# Patient Record
Sex: FEMALE | Race: WHITE | NOT HISPANIC OR LATINO | Employment: OTHER | URBAN - METROPOLITAN AREA
[De-identification: names, ages, dates, MRNs, and addresses within clinical notes are randomized per-mention and may not be internally consistent; named-entity substitution may affect disease eponyms.]

---

## 2017-01-10 ENCOUNTER — ALLSCRIPTS OFFICE VISIT (OUTPATIENT)
Dept: OTHER | Facility: OTHER | Age: 72
End: 2017-01-10

## 2017-01-31 ENCOUNTER — ALLSCRIPTS OFFICE VISIT (OUTPATIENT)
Dept: OTHER | Facility: OTHER | Age: 72
End: 2017-01-31

## 2017-02-07 ENCOUNTER — GENERIC CONVERSION - ENCOUNTER (OUTPATIENT)
Dept: OTHER | Facility: OTHER | Age: 72
End: 2017-02-07

## 2017-02-24 DIAGNOSIS — E55.9 VITAMIN D DEFICIENCY: ICD-10-CM

## 2017-02-24 DIAGNOSIS — Z12.31 ENCOUNTER FOR SCREENING MAMMOGRAM FOR MALIGNANT NEOPLASM OF BREAST: ICD-10-CM

## 2017-02-24 DIAGNOSIS — I10 ESSENTIAL (PRIMARY) HYPERTENSION: ICD-10-CM

## 2017-02-24 DIAGNOSIS — Z12.11 ENCOUNTER FOR SCREENING FOR MALIGNANT NEOPLASM OF COLON: ICD-10-CM

## 2017-02-27 ENCOUNTER — ALLSCRIPTS OFFICE VISIT (OUTPATIENT)
Dept: OTHER | Facility: OTHER | Age: 72
End: 2017-02-27

## 2017-03-08 ENCOUNTER — ALLSCRIPTS OFFICE VISIT (OUTPATIENT)
Dept: OTHER | Facility: OTHER | Age: 72
End: 2017-03-08

## 2017-03-15 ENCOUNTER — GENERIC CONVERSION - ENCOUNTER (OUTPATIENT)
Dept: OTHER | Facility: OTHER | Age: 72
End: 2017-03-15

## 2017-04-03 ENCOUNTER — ALLSCRIPTS OFFICE VISIT (OUTPATIENT)
Dept: OTHER | Facility: OTHER | Age: 72
End: 2017-04-03

## 2017-05-03 ENCOUNTER — GENERIC CONVERSION - ENCOUNTER (OUTPATIENT)
Dept: OTHER | Facility: OTHER | Age: 72
End: 2017-05-03

## 2017-05-23 ENCOUNTER — GENERIC CONVERSION - ENCOUNTER (OUTPATIENT)
Dept: OTHER | Facility: OTHER | Age: 72
End: 2017-05-23

## 2017-06-26 ENCOUNTER — GENERIC CONVERSION - ENCOUNTER (OUTPATIENT)
Dept: OTHER | Facility: OTHER | Age: 72
End: 2017-06-26

## 2017-06-29 ENCOUNTER — ALLSCRIPTS OFFICE VISIT (OUTPATIENT)
Dept: OTHER | Facility: OTHER | Age: 72
End: 2017-06-29

## 2017-08-14 ENCOUNTER — ALLSCRIPTS OFFICE VISIT (OUTPATIENT)
Dept: OTHER | Facility: OTHER | Age: 72
End: 2017-08-14

## 2017-09-25 ENCOUNTER — GENERIC CONVERSION - ENCOUNTER (OUTPATIENT)
Dept: OTHER | Facility: OTHER | Age: 72
End: 2017-09-25

## 2017-09-25 DIAGNOSIS — I71.9 AORTIC ANEURYSM WITHOUT RUPTURE (HCC): ICD-10-CM

## 2017-09-26 ENCOUNTER — GENERIC CONVERSION - ENCOUNTER (OUTPATIENT)
Dept: OTHER | Facility: OTHER | Age: 72
End: 2017-09-26

## 2017-09-28 ENCOUNTER — GENERIC CONVERSION - ENCOUNTER (OUTPATIENT)
Dept: OTHER | Facility: OTHER | Age: 72
End: 2017-09-28

## 2017-09-29 ENCOUNTER — GENERIC CONVERSION - ENCOUNTER (OUTPATIENT)
Dept: OTHER | Facility: OTHER | Age: 72
End: 2017-09-29

## 2017-10-04 ENCOUNTER — GENERIC CONVERSION - ENCOUNTER (OUTPATIENT)
Dept: OTHER | Facility: OTHER | Age: 72
End: 2017-10-04

## 2017-10-05 ENCOUNTER — GENERIC CONVERSION - ENCOUNTER (OUTPATIENT)
Dept: OTHER | Facility: OTHER | Age: 72
End: 2017-10-05

## 2017-10-06 ENCOUNTER — GENERIC CONVERSION - ENCOUNTER (OUTPATIENT)
Dept: OTHER | Facility: OTHER | Age: 72
End: 2017-10-06

## 2017-10-10 ENCOUNTER — GENERIC CONVERSION - ENCOUNTER (OUTPATIENT)
Dept: OTHER | Facility: OTHER | Age: 72
End: 2017-10-10

## 2017-10-11 ENCOUNTER — HOSPITAL ENCOUNTER (INPATIENT)
Facility: HOSPITAL | Age: 72
LOS: 2 days | Discharge: HOME | DRG: 309 | End: 2017-10-13
Attending: EMERGENCY MEDICINE | Admitting: FAMILY MEDICINE
Payer: MEDICARE

## 2017-10-11 ENCOUNTER — GENERIC CONVERSION - ENCOUNTER (OUTPATIENT)
Dept: OTHER | Facility: OTHER | Age: 72
End: 2017-10-11

## 2017-10-11 ENCOUNTER — APPOINTMENT (EMERGENCY)
Dept: RADIOLOGY | Facility: HOSPITAL | Age: 72
DRG: 309 | End: 2017-10-11
Payer: MEDICARE

## 2017-10-11 DIAGNOSIS — F43.21 GRIEF REACTION: ICD-10-CM

## 2017-10-11 DIAGNOSIS — F41.9 ANXIETY: ICD-10-CM

## 2017-10-11 DIAGNOSIS — F43.29 GRIEF REACTION WITH PROLONGED BEREAVEMENT: ICD-10-CM

## 2017-10-11 DIAGNOSIS — F32.A DEPRESSION: ICD-10-CM

## 2017-10-11 DIAGNOSIS — I48.91 NEW ONSET ATRIAL FIBRILLATION (HCC): Primary | ICD-10-CM

## 2017-10-11 PROBLEM — R42 DIZZINESS: Status: ACTIVE | Noted: 2017-10-11

## 2017-10-11 PROBLEM — F43.81 GRIEF REACTION WITH PROLONGED BEREAVEMENT: Status: ACTIVE | Noted: 2017-10-11

## 2017-10-11 PROBLEM — N17.9 AKI (ACUTE KIDNEY INJURY) (HCC): Status: ACTIVE | Noted: 2017-10-11

## 2017-10-11 PROBLEM — I10 HTN (HYPERTENSION): Status: ACTIVE | Noted: 2017-10-11

## 2017-10-11 PROBLEM — G89.29 CHRONIC PAIN: Status: ACTIVE | Noted: 2017-10-11

## 2017-10-11 PROBLEM — M81.0 OSTEOPOROSIS: Status: ACTIVE | Noted: 2017-10-11

## 2017-10-11 LAB
ALBUMIN SERPL BCP-MCNC: 3.2 G/DL (ref 3.5–5)
ALP SERPL-CCNC: 108 U/L (ref 46–116)
ALT SERPL W P-5'-P-CCNC: 23 U/L (ref 12–78)
ANION GAP SERPL CALCULATED.3IONS-SCNC: 9 MMOL/L (ref 4–13)
APTT PPP: 30 SECONDS (ref 23–35)
AST SERPL W P-5'-P-CCNC: 19 U/L (ref 5–45)
BASOPHILS # BLD AUTO: 0.04 THOUSANDS/ΜL (ref 0–0.1)
BASOPHILS NFR BLD AUTO: 0 % (ref 0–1)
BILIRUB SERPL-MCNC: 0.54 MG/DL (ref 0.2–1)
BUN SERPL-MCNC: 22 MG/DL (ref 5–25)
CALCIUM SERPL-MCNC: 8.5 MG/DL (ref 8.3–10.1)
CHLORIDE SERPL-SCNC: 110 MMOL/L (ref 100–108)
CO2 SERPL-SCNC: 25 MMOL/L (ref 21–32)
CREAT SERPL-MCNC: 1.98 MG/DL (ref 0.6–1.3)
EOSINOPHIL # BLD AUTO: 0.09 THOUSAND/ΜL (ref 0–0.61)
EOSINOPHIL NFR BLD AUTO: 1 % (ref 0–6)
ERYTHROCYTE [DISTWIDTH] IN BLOOD BY AUTOMATED COUNT: 14.4 % (ref 11.6–15.1)
GFR SERPL CREATININE-BSD FRML MDRD: 25 ML/MIN/1.73SQ M
GLUCOSE SERPL-MCNC: 77 MG/DL (ref 65–140)
HCT VFR BLD AUTO: 48.4 % (ref 34.8–46.1)
HGB BLD-MCNC: 16 G/DL (ref 11.5–15.4)
INR PPP: 1.08 (ref 0.86–1.16)
LYMPHOCYTES # BLD AUTO: 1.85 THOUSANDS/ΜL (ref 0.6–4.47)
LYMPHOCYTES NFR BLD AUTO: 19 % (ref 14–44)
MAGNESIUM SERPL-MCNC: 1.8 MG/DL (ref 1.6–2.6)
MCH RBC QN AUTO: 30.8 PG (ref 26.8–34.3)
MCHC RBC AUTO-ENTMCNC: 33.1 G/DL (ref 31.4–37.4)
MCV RBC AUTO: 93 FL (ref 82–98)
MONOCYTES # BLD AUTO: 0.91 THOUSAND/ΜL (ref 0.17–1.22)
MONOCYTES NFR BLD AUTO: 10 % (ref 4–12)
NEUTROPHILS # BLD AUTO: 6.6 THOUSANDS/ΜL (ref 1.85–7.62)
NEUTS SEG NFR BLD AUTO: 70 % (ref 43–75)
NRBC BLD AUTO-RTO: 0 /100 WBCS
PHOSPHATE SERPL-MCNC: 3.7 MG/DL (ref 2.3–4.1)
PLATELET # BLD AUTO: 282 THOUSANDS/UL (ref 149–390)
PMV BLD AUTO: 11.1 FL (ref 8.9–12.7)
POTASSIUM SERPL-SCNC: 3.9 MMOL/L (ref 3.5–5.3)
PROT SERPL-MCNC: 7.2 G/DL (ref 6.4–8.2)
PROTHROMBIN TIME: 14 SECONDS (ref 12.1–14.4)
RBC # BLD AUTO: 5.2 MILLION/UL (ref 3.81–5.12)
SODIUM SERPL-SCNC: 144 MMOL/L (ref 136–145)
SPECIMEN SOURCE: NORMAL
T4 FREE SERPL-MCNC: 1.17 NG/DL (ref 0.76–1.46)
TROPONIN I BLD-MCNC: 0.01 NG/ML (ref 0–0.08)
TSH SERPL DL<=0.05 MIU/L-ACNC: 3.92 UIU/ML (ref 0.36–3.74)
WBC # BLD AUTO: 9.54 THOUSAND/UL (ref 4.31–10.16)

## 2017-10-11 PROCEDURE — 80053 COMPREHEN METABOLIC PANEL: CPT | Performed by: EMERGENCY MEDICINE

## 2017-10-11 PROCEDURE — 71010 HB CHEST X-RAY 1 VIEW FRONTAL (PORTABLE): CPT

## 2017-10-11 PROCEDURE — 96372 THER/PROPH/DIAG INJ SC/IM: CPT

## 2017-10-11 PROCEDURE — 96374 THER/PROPH/DIAG INJ IV PUSH: CPT

## 2017-10-11 PROCEDURE — 36415 COLL VENOUS BLD VENIPUNCTURE: CPT | Performed by: EMERGENCY MEDICINE

## 2017-10-11 PROCEDURE — 96361 HYDRATE IV INFUSION ADD-ON: CPT

## 2017-10-11 PROCEDURE — 84443 ASSAY THYROID STIM HORMONE: CPT | Performed by: EMERGENCY MEDICINE

## 2017-10-11 PROCEDURE — 85610 PROTHROMBIN TIME: CPT | Performed by: EMERGENCY MEDICINE

## 2017-10-11 PROCEDURE — 84439 ASSAY OF FREE THYROXINE: CPT | Performed by: EMERGENCY MEDICINE

## 2017-10-11 PROCEDURE — 85025 COMPLETE CBC W/AUTO DIFF WBC: CPT | Performed by: EMERGENCY MEDICINE

## 2017-10-11 PROCEDURE — 83735 ASSAY OF MAGNESIUM: CPT | Performed by: EMERGENCY MEDICINE

## 2017-10-11 PROCEDURE — 93005 ELECTROCARDIOGRAM TRACING: CPT

## 2017-10-11 PROCEDURE — 99285 EMERGENCY DEPT VISIT HI MDM: CPT

## 2017-10-11 PROCEDURE — 84484 ASSAY OF TROPONIN QUANT: CPT

## 2017-10-11 PROCEDURE — 93005 ELECTROCARDIOGRAM TRACING: CPT | Performed by: EMERGENCY MEDICINE

## 2017-10-11 PROCEDURE — 85730 THROMBOPLASTIN TIME PARTIAL: CPT | Performed by: EMERGENCY MEDICINE

## 2017-10-11 PROCEDURE — 84100 ASSAY OF PHOSPHORUS: CPT | Performed by: EMERGENCY MEDICINE

## 2017-10-11 RX ORDER — ETODOLAC 400 MG/1
400 TABLET, EXTENDED RELEASE ORAL 2 TIMES DAILY
COMMUNITY
End: 2017-10-13 | Stop reason: HOSPADM

## 2017-10-11 RX ORDER — BACLOFEN 10 MG/1
10 TABLET ORAL 2 TIMES DAILY PRN
COMMUNITY
End: 2019-05-08 | Stop reason: CLARIF

## 2017-10-11 RX ORDER — METOPROLOL SUCCINATE 25 MG/1
25 TABLET, EXTENDED RELEASE ORAL DAILY
Status: DISCONTINUED | OUTPATIENT
Start: 2017-10-12 | End: 2017-10-12

## 2017-10-11 RX ORDER — DULOXETIN HYDROCHLORIDE 60 MG/1
60 CAPSULE, DELAYED RELEASE ORAL
Status: DISCONTINUED | OUTPATIENT
Start: 2017-10-11 | End: 2017-10-11

## 2017-10-11 RX ORDER — AMLODIPINE BESYLATE 5 MG/1
5 TABLET ORAL DAILY
COMMUNITY
End: 2017-10-13 | Stop reason: HOSPADM

## 2017-10-11 RX ORDER — GABAPENTIN 300 MG/1
300 CAPSULE ORAL 3 TIMES DAILY
Status: DISCONTINUED | OUTPATIENT
Start: 2017-10-11 | End: 2017-10-13 | Stop reason: HOSPADM

## 2017-10-11 RX ORDER — AMLODIPINE BESYLATE 5 MG/1
5 TABLET ORAL DAILY
Status: DISCONTINUED | OUTPATIENT
Start: 2017-10-12 | End: 2017-10-12

## 2017-10-11 RX ORDER — ALENDRONATE SODIUM 70 MG/1
70 TABLET ORAL
COMMUNITY
End: 2018-02-16

## 2017-10-11 RX ORDER — OXYCODONE AND ACETAMINOPHEN 10; 325 MG/1; MG/1
1 TABLET ORAL DAILY PRN
COMMUNITY
End: 2018-09-27 | Stop reason: SDUPTHER

## 2017-10-11 RX ORDER — DILTIAZEM HYDROCHLORIDE 5 MG/ML
15 INJECTION INTRAVENOUS ONCE
Status: COMPLETED | OUTPATIENT
Start: 2017-10-11 | End: 2017-10-11

## 2017-10-11 RX ORDER — METOPROLOL SUCCINATE 50 MG/1
25 TABLET, EXTENDED RELEASE ORAL DAILY
Status: ON HOLD | COMMUNITY
End: 2017-10-13

## 2017-10-11 RX ORDER — ASPIRIN 81 MG/1
81 TABLET, CHEWABLE ORAL DAILY
COMMUNITY
End: 2017-10-13 | Stop reason: HOSPADM

## 2017-10-11 RX ORDER — SODIUM CHLORIDE 9 MG/ML
100 INJECTION, SOLUTION INTRAVENOUS CONTINUOUS
Status: DISCONTINUED | OUTPATIENT
Start: 2017-10-11 | End: 2017-10-11

## 2017-10-11 RX ORDER — ZOLPIDEM TARTRATE 5 MG/1
10 TABLET ORAL
Status: DISCONTINUED | OUTPATIENT
Start: 2017-10-11 | End: 2017-10-13 | Stop reason: HOSPADM

## 2017-10-11 RX ORDER — OXYCODONE HYDROCHLORIDE AND ACETAMINOPHEN 5; 325 MG/1; MG/1
1 TABLET ORAL ONCE
Status: COMPLETED | OUTPATIENT
Start: 2017-10-11 | End: 2017-10-11

## 2017-10-11 RX ORDER — ONDANSETRON 2 MG/ML
4 INJECTION INTRAMUSCULAR; INTRAVENOUS EVERY 6 HOURS PRN
Status: DISCONTINUED | OUTPATIENT
Start: 2017-10-11 | End: 2017-10-13 | Stop reason: HOSPADM

## 2017-10-11 RX ORDER — CALCIUM CARBONATE 200(500)MG
1000 TABLET,CHEWABLE ORAL DAILY PRN
Status: DISCONTINUED | OUTPATIENT
Start: 2017-10-11 | End: 2017-10-13 | Stop reason: HOSPADM

## 2017-10-11 RX ORDER — ACETAMINOPHEN 325 MG/1
650 TABLET ORAL EVERY 6 HOURS PRN
Status: DISCONTINUED | OUTPATIENT
Start: 2017-10-11 | End: 2017-10-13 | Stop reason: HOSPADM

## 2017-10-11 RX ORDER — DOCUSATE SODIUM 100 MG/1
100 CAPSULE, LIQUID FILLED ORAL 2 TIMES DAILY
Status: DISCONTINUED | OUTPATIENT
Start: 2017-10-11 | End: 2017-10-13 | Stop reason: HOSPADM

## 2017-10-11 RX ORDER — ASPIRIN 81 MG/1
81 TABLET, CHEWABLE ORAL DAILY
Status: DISCONTINUED | OUTPATIENT
Start: 2017-10-12 | End: 2017-10-13

## 2017-10-11 RX ORDER — BUPROPION HYDROCHLORIDE 150 MG/1
150 TABLET ORAL DAILY
Status: DISCONTINUED | OUTPATIENT
Start: 2017-10-12 | End: 2017-10-13 | Stop reason: HOSPADM

## 2017-10-11 RX ORDER — POTASSIUM CHLORIDE AND SODIUM CHLORIDE 900; 300 MG/100ML; MG/100ML
50 INJECTION, SOLUTION INTRAVENOUS CONTINUOUS
Status: DISCONTINUED | OUTPATIENT
Start: 2017-10-11 | End: 2017-10-12

## 2017-10-11 RX ORDER — OMEGA-3S/DHA/EPA/FISH OIL/D3 300MG-1000
800 CAPSULE ORAL DAILY
Status: DISCONTINUED | OUTPATIENT
Start: 2017-10-12 | End: 2017-10-13 | Stop reason: HOSPADM

## 2017-10-11 RX ORDER — OXYCODONE HYDROCHLORIDE AND ACETAMINOPHEN 5; 325 MG/1; MG/1
1 TABLET ORAL EVERY 6 HOURS PRN
Status: DISCONTINUED | OUTPATIENT
Start: 2017-10-11 | End: 2017-10-13 | Stop reason: HOSPADM

## 2017-10-11 RX ORDER — DULOXETIN HYDROCHLORIDE 60 MG/1
20 CAPSULE, DELAYED RELEASE ORAL DAILY
COMMUNITY
End: 2017-10-13 | Stop reason: HOSPADM

## 2017-10-11 RX ORDER — BUPROPION HYDROCHLORIDE 150 MG/1
150 TABLET ORAL DAILY
COMMUNITY
End: 2018-02-19 | Stop reason: SDUPTHER

## 2017-10-11 RX ADMIN — OXYCODONE HYDROCHLORIDE AND ACETAMINOPHEN 1 TABLET: 5; 325 TABLET ORAL at 17:02

## 2017-10-11 RX ADMIN — SODIUM CHLORIDE 1000 ML: 0.9 INJECTION, SOLUTION INTRAVENOUS at 16:03

## 2017-10-11 RX ADMIN — DILTIAZEM HYDROCHLORIDE 15 MG: 5 INJECTION INTRAVENOUS at 16:04

## 2017-10-11 RX ADMIN — DULOXETINE 60 MG: 60 CAPSULE, DELAYED RELEASE ORAL at 23:28

## 2017-10-11 RX ADMIN — SODIUM CHLORIDE AND POTASSIUM CHLORIDE 75 ML/HR: 9; 2.98 INJECTION, SOLUTION INTRAVENOUS at 23:36

## 2017-10-11 RX ADMIN — GABAPENTIN 300 MG: 300 CAPSULE ORAL at 20:45

## 2017-10-11 RX ADMIN — OXYCODONE HYDROCHLORIDE AND ACETAMINOPHEN 1 TABLET: 5; 325 TABLET ORAL at 20:45

## 2017-10-11 RX ADMIN — ZOLPIDEM TARTRATE 10 MG: 5 TABLET ORAL at 23:28

## 2017-10-11 RX ADMIN — ENOXAPARIN SODIUM 80 MG: 80 INJECTION SUBCUTANEOUS at 16:39

## 2017-10-11 RX ADMIN — DOCUSATE SODIUM 100 MG: 100 CAPSULE, LIQUID FILLED ORAL at 20:45

## 2017-10-11 NOTE — H&P
H&P Exam - Janice Madrigal 81 y o  female MRN: [id number]  Unit/Bed#: ED 27 Encounter: [id number]        Assessment:     Principal Problem:    Atrial fibrillation ([address] )  Active Problems:    MARU (acute kidney injury) ([address] )    Grief reaction with prolonged bereavement    Dizziness    Chronic pain    Osteoporosis    HTN (hypertension)    Depression    Anxiety      Plan:    Ellis Feldmna is a 81 y o  female with PMhx of HTN, AAA, Osteoporosis, vit D def, Depression, Anxiety, Insomnia, chronic pain syndrome, of being admitted under [facility] with one week hx of fatigue and one day hx of dizziness and SOB  Currently in no acute distress  1  Dizziness : likely 2n2 New Onset A Fib : currently rate controlled  Pulse of 84  New onset that resolved after 1x dose of IV Cardizem 15mg  Will work up patient for pathological  afib etiologies such as thyroid/ electrolyte imbalance/ cardiology  Currently : Troponin  Neg--> 0 1, TSH:  WNL: --> 0 392, T4: 1 17  Will place patient on telemetry, order and ECHO  ( last echo May 206 EF 60%, trace mitral regurge,) and get Cardiology consult for further recommendations  CHADS score 1 points: low risk for stroke  Will anticoagulate with aspirin and  Lovenox and await further anticoagulation from cardiology  2  SOB : resolved  Likely 2n2 to Small Pleural effusion and/or atelectasis/infiltrate, min pulm vascular congestion  Currently in NO acute distress  Sat O2 room air 96%  CBC 9 54 and afebrile  Will monitor  3  MARU: Likely pre-renal   ( BS 1 2)  1 04 ( May 2016)--> 1 98, BUN 20  GFR 25  Will Hydrate patient with  IV [id number] @75ml/hr given CXR of small pleural effusion  Monitor for fluid overload and BMP in the morning  Avoid NSAIDs    4  Hypokalemia: 3 9--> IV KDUR 40--> pending morning labs  5  Complicated Grief with underlying Depression/Anxiety: NO Suicidal Ideation  Recent death ( suicide of grandson around 6 weeks ago)  Patient agrees to Psych consult   Will continue home regimen of Wellbutrin XL Qd and Duloxetine 60mg QD  6  HTN: currently 130/84  Goal of 140/80  Continue home regimen Amlodipine 5mg QD, Metoprolol succinate ER 50mg QD  7  Aortic root dilatation of ascending aorta : Aortic Aneurysm  Mild dilatation seen on Echo May 2016  : Continue on Metoprolol 50mg QD  8  Chronic Pain syndrome: 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 to Degenerative oateoarthritic vertebra changes  MRI June 2017: spinal stenosis, foraminal stenosis  Currently no BI, UI  The   Followed by pain management outpatient  Will continue regimen of  Percocet 5-325 q tablet q6hrs prn, Gabapentin 300mg TID  9  Insomnia: Continue home regimen of Ambien 10mg HA prn  10  Osteoporosis: Home regimen of weekly Alendronate: formula not in pharmacy: will adjust accordingly   Continue Vit D  11  Diet: Regular   12  GI PPx: zofran, will consider protonix  13  DVT PPx: Lovenox , CSDs  14  PCP: Dr Andrea Isaac  15  CODE Status: DNR/DNI  16  Dispo: Inpatient  Monitor her new onset Afib, MARU, Depression  Likely +2 midnights      Plan D/W Dr Darnell  and [facility] Team  We appreciate the input from the consulting teams and case management  CC: Dizziness, fatigue, depression  History of Present Illness   Jeanene Dandy is a 81 y o  female  With PMhx of HTN, AAA, Osteoporosis, vit D def, Depression, Anxiety, Insomnia, chronic pain syndrome, who presents at [facility] ED with 1 week hx of  feeling fatigue and feeling dizzy when moving from sitting to standing position, and feeling " full head" and slightly difficult to breathe since 5 am this morning  Patients reports that these symptoms occurred this morning and have mostly resolved   Currently Denies CP, chest tightness, SOB, Abd pain, nausea, vomiting, blurry vision, constipation, diarrhea, weight loss/gain, dysuria        Patient states she as been going through a rough time these past "weeks", stating that she found her grandson ( whom she was extremely close to) hung at her house 6 weeks ago  She cries easily, cant talk about the incident, and is feeling "really down"  Also mentions that her   27 years ago today  Denies Suicidal Ideation, states she understands she needs help coping with  His loss  Patient also reports decrease appeite and decrease solid/ liquid intake for the past 3 weeks  ED Management: IV Bolus NS, EKG, CBC, CMP, Mg, Phos, POCT trop, PT INR, T4, TSH, Cardizem 15mg injection, Lovenox 80mg  Injection, percocet    Review of Systems   Constitutional: Positive for activity change, appetite change and fatigue  Negative for chills, diaphoresis, fever and unexpected weight change  HENT: Negative for congestion, rhinorrhea, sneezing and sore throat  Eyes: Negative for photophobia and visual disturbance  Respiratory: Positive for shortness of breath  Negative for cough, choking, chest tightness, wheezing and stridor  Cardiovascular: Negative for chest pain, palpitations and leg swelling  Gastrointestinal: Negative for abdominal distention, abdominal pain, constipation, diarrhea, nausea and vomiting  Endocrine: Negative for polyuria  Genitourinary: Negative for difficulty urinating, dysuria and flank pain  Musculoskeletal: Positive for arthralgias and back pain  Negative for gait problem, neck pain and neck stiffness  Skin: Negative for color change  Allergic/Immunologic: Negative for immunocompromised state  Neurological: Positive for dizziness and light-headedness  Negative for tremors, seizures, syncope, facial asymmetry, speech difficulty, weakness, numbness and headaches  Psychiatric/Behavioral: Positive for agitation  Negative for behavioral problems, confusion, decreased concentration, dysphoric mood, hallucinations, self-injury, sleep disturbance and suicidal ideas  The patient is nervous/anxious  The patient is not hyperactive           Historical Information   Past Medical History:   Diagnosis Date    Aortic aneurysm ([facility] )     Hypertension     Psychiatric disorder     anxiety     Past Surgical History:   Procedure Laterality Date    HIP SURGERY       Social History   History   Alcohol Use No     History   Drug Use No     History   Smoking Status    Never Smoker   Smokeless Tobacco    Not on file     Family History:   non-contributory    Meds/Allergies   all medications and allergies reviewed  No Known Allergies    Objective   Vitals: Blood pressure 128/75, pulse 74, temperature 97 7 °F (36 5 °C), temperature source Oral, resp  rate 18, height 5' 4" (1 626 m), weight 81 6 kg (180 lb), SpO2 94 %  Vitals:    10/11/17 1548 10/11/17 1658 10/11/17 1816   BP: 119/93 113/71 128/75   Pulse: (!) 137 58 74   Resp: 16 18 18   Temp: 97 7 °F (36 5 °C)     TempSrc: Oral     SpO2: 93% 92% 94%   Weight: 81 6 kg (180 lb)     Height: 5' 4" (1 626 m)           Intake/Output Summary (Last 24 hours) at 10/11/17 1929  Last data filed at 10/11/17 1523   Gross per 24 hour   Intake              900 ml   Output                0 ml   Net              900 ml       Invasive Devices     Peripheral Intravenous Line            Peripheral IV 10/11/17 Left Hand less than 1 day                Physical Exam   Constitutional: She is oriented to person, place, and time  She appears well-developed and well-nourished  No distress  HENT:   Head: Normocephalic  Eyes: Pupils are equal, round, and reactive to light  Right eye exhibits no discharge  Left eye exhibits no discharge  No scleral icterus  Neck: Normal range of motion  No JVD present  Cardiovascular: Normal rate, regular rhythm and normal heart sounds  Exam reveals no friction rub  No murmur heard  Pulmonary/Chest: Effort normal and breath sounds normal  No respiratory distress  She has no wheezes  She has no rales  She exhibits no tenderness  Abdominal: Soft  She exhibits no distension  There is no tenderness  There is no rebound  Musculoskeletal: Normal range of motion  She exhibits no edema  Neurological: She is alert and oriented to person, place, and time  She has normal reflexes  Skin: Skin is dry  No rash noted  She is not diaphoretic  No erythema  No pallor  Psychiatric: She has a normal mood and affect  Her behavior is normal  Judgment and thought content normal        Lab Results:   I have personally reviewed pertinent reports        Recent Results (from the past 24 hour(s))   POCT troponin    Collection Time: 10/11/17  3:29 PM   Result Value Ref Range    POC Troponin I 0 01 0 00 - 0 08 ng/ml    Specimen Type VENOUS    Comprehensive metabolic panel    Collection Time: 10/11/17  3:33 PM   Result Value Ref Range    Sodium 144 136 - 145 mmol/L    Potassium 3 9 3 5 - 5 3 mmol/L    Chloride 110 (H) 100 - 108 mmol/L    CO2 25 21 - 32 mmol/L    Anion Gap 9 4 - 13 mmol/L    BUN 22 5 - 25 mg/dL    Creatinine 1 98 (H) 0 60 - 1 30 mg/dL    Glucose 77 65 - 140 mg/dL    Calcium 8 5 8 3 - 10 1 mg/dL    AST 19 5 - 45 U/L    ALT 23 12 - 78 U/L    Alkaline Phosphatase 108 46 - 116 U/L    Total Protein 7 2 6 4 - 8 2 g/dL    Albumin 3 2 (L) 3 5 - 5 0 g/dL    Total Bilirubin 0 54 0 20 - 1 00 mg/dL    eGFR 25 ml/min/1 73sq m   CBC and differential    Collection Time: 10/11/17  3:33 PM   Result Value Ref Range    WBC 9 54 4 31 - 10 16 Thousand/uL    RBC 5 20 (H) 3 81 - 5 12 Million/uL    Hemoglobin 16 0 (H) 11 5 - 15 4 g/dL    Hematocrit 48 4 (H) 34 8 - 46 1 %    MCV 93 82 - 98 fL    MCH 30 8 26 8 - 34 3 pg    MCHC 33 1 31 4 - 37 4 g/dL    RDW 14 4 11 6 - 15 1 %    MPV 11 1 8 9 - 12 7 fL    Platelets 092 254 - 518 Thousands/uL    nRBC 0 /100 WBCs    Neutrophils Relative 70 43 - 75 %    Lymphocytes Relative 19 14 - 44 %    Monocytes Relative 10 4 - 12 %    Eosinophils Relative 1 0 - 6 %    Basophils Relative 0 0 - 1 %    Neutrophils Absolute 6 60 1 85 - 7 62 Thousands/µL    Lymphocytes Absolute 1 85 0 60 - 4 47 Thousands/µL    Monocytes Absolute 0 91 0 17 - 1 22 Thousand/µL    Eosinophils Absolute 0 09 0 00 - 0 61 Thousand/µL    Basophils Absolute 0 04 0 00 - 0 10 Thousands/µL   Protime-INR    Collection Time: 10/11/17  3:33 PM   Result Value Ref Range    Protime 14 0 12 1 - 14 4 seconds    INR 1 08 0 86 - 1 16   APTT    Collection Time: 10/11/17  3:33 PM   Result Value Ref Range    PTT 30 23 - 35 seconds   TSH    Collection Time: 10/11/17  3:33 PM   Result Value Ref Range    TSH 3RD GENERATON 3 920 (H) 0 358 - 3 740 uIU/mL   Magnesium    Collection Time: 10/11/17  3:33 PM   Result Value Ref Range    Magnesium 1 8 1 6 - 2 6 mg/dL   Phosphorus    Collection Time: 10/11/17  3:33 PM   Result Value Ref Range    Phosphorus 3 7 2 3 - 4 1 mg/dL   T4, free    Collection Time: 10/11/17  3:33 PM   Result Value Ref Range    Free T4 1 17 0 76 - 1 46 ng/dL     Blood Culture: No results found for: BLOODCX,   Urinalysis: No results found for: Kristi Body, SPECGRAV, PHUR, LEUKOCYTESUR, NITRITE, PROTEINUA, GLUCOSEU, KETONESU, BILIRUBINUR, BLOODU,   Urine Culture: No results found for: URINECX,   Wound Culure: No results found for: WOUNDCULT    Imaging:   EKG, Pathology, and Other Studies:   I have personally reviewed pertinent reports  Counseling / Coordination of Care  Total floor / unit time spent today 45 minutes  Greater than 50% of total time was spent with the patient and / or family counseling and / or coordination of care          Caitlin Neil MD PGY-2   Family Medicine

## 2017-10-11 NOTE — Clinical Note
Case was discussed with Dr Sebastian Sherman and the patient's admission status was agreed to be Admission Status: inpatient status to the service of Dr Sebastian Sherman

## 2017-10-11 NOTE — ED NOTES
Cardizem infusion to be held at this time, pt now in [facility], [address] [place] [address]  10/11/17 2698

## 2017-10-11 NOTE — ED ATTENDING ATTESTATION
Apurva Schuster DO, saw and evaluated the patient  I have discussed the patient with the resident/non-physician practitioner and agree with the resident's/non-physician practitioner's findings, Plan of Care, and MDM as documented in the resident's/non-physician practitioner's note, except where noted  All available labs and Radiology studies were reviewed  At this point I agree with the current assessment done in the Emergency Department  I have conducted an independent evaluation of this patient a history and physical is as follows:      Critical Care Time  The patient presented with a condition in which there was a high probability of imminent or life-threatening deterioration, and critical care services (excluding separately billable procedures) totalled 30-74 minutes  Emergency Department Note- Vianney Singh 81 y o  female MRN: [id number]    Unit/Bed#: ED 22 Encounter: [id number]    Vianney Singh is a 81 y o  female who presents with   Chief Complaint   Patient presents with    Dizziness     dizziness and sob since 5am, patient called PCP who advised to come to ER         History of Present Illness   HPI:  Vianney Singh is a 81 y o  female who presents with shortness of breath, lightheadedness and palpitations since approximately 5:00 a m  this morning when she awoke  Denies any recent change in medications, illness, trauma  The has had no previous symptoms of palpitations  Per EMS, patient was in a rapid atrial fibrillation and mildly hypotensive with systolic blood pressure around 100  We treated with IV fluids upon arrival   Patient is in a rapid atrial fibrillation and/or from 130 to 160 beats per minute  Blood pressure improved with IV fluids to systolic in the 002U  Will place on Cardizem bolus and drip  Will obtain labs including thyroid magnesium phosphorus, cardiac evaluation  Patient is also tearful and depressed secondary to the recent death of her grandson      ROS is otherwise unremarkable  Historical Information   Past Medical History:   Diagnosis Date    Aortic aneurysm ([address] )     Hypertension     Psychiatric disorder     anxiety     Past Surgical History:   Procedure Laterality Date    HIP SURGERY       Social History   History   Alcohol Use No     History   Drug Use No     History   Smoking Status    Never Smoker   Smokeless Tobacco    Not on file       Family History:   Meds/Allergies     No Known Allergies    Objective   First Vitals:   Blood Pressure: 119/93 (10/11/17 1548)  Pulse: (!) 137 (10/11/17 1548)  Temperature: 97 7 °F (36 5 °C) (10/11/17 1548)  Temp Source: Oral (10/11/17 1548)  Respirations: 16 (10/11/17 1548)  Height: 5' 4" (162 6 cm) (10/11/17 1548)  Weight - Scale: 81 6 kg (180 lb) (10/11/17 1548)  SpO2: 93 % (10/11/17 1548)    Current Vitals:   Blood Pressure: 119/93 (10/11/17 1548)  Pulse: (!) 137 (10/11/17 1548)  Temperature: 97 7 °F (36 5 °C) (10/11/17 1548)  Temp Source: Oral (10/11/17 1548)  Respirations: 16 (10/11/17 1548)  Height: 5' 4" (162 6 cm) (10/11/17 1548)  Weight - Scale: 81 6 kg (180 lb) (10/11/17 1548)  SpO2: 93 % (10/11/17 1548)      Intake/Output Summary (Last 24 hours) at 10/11/17 1700  Last data filed at 10/11/17 1523   Gross per 24 hour   Intake              900 ml   Output                0 ml   Net              900 ml       Invasive Devices     Peripheral Intravenous Line            Peripheral IV 10/11/17 Left Hand less than 1 day                      Medical Decision Makin  Labs reviewed within normal range  Patient did convert to normal sinus rhythm after Cardizem administration  Will give p o  calcium channel blocker, admit for further evaluation of new onset paroxysmal atrial fibrillation and also recommend psychiatric evaluation for grief reaction and patient did voice suicidal ideations states she can't live anymore with agree she has currently    Recent Results (from the past 36 hour(s))   POCT troponin    Collection Time: 10/11/17  3:29 PM   Result Value Ref Range    POC Troponin I 0 01 0 00 - 0 08 ng/ml    Specimen Type VENOUS    Comprehensive metabolic panel    Collection Time: 10/11/17  3:33 PM   Result Value Ref Range    Sodium 144 136 - 145 mmol/L    Potassium 3 9 3 5 - 5 3 mmol/L    Chloride 110 (H) 100 - 108 mmol/L    CO2 25 21 - 32 mmol/L    Anion Gap 9 4 - 13 mmol/L    BUN 22 5 - 25 mg/dL    Creatinine 1 98 (H) 0 60 - 1 30 mg/dL    Glucose 77 65 - 140 mg/dL    Calcium 8 5 8 3 - 10 1 mg/dL    AST 19 5 - 45 U/L    ALT 23 12 - 78 U/L    Alkaline Phosphatase 108 46 - 116 U/L    Total Protein 7 2 6 4 - 8 2 g/dL    Albumin 3 2 (L) 3 5 - 5 0 g/dL    Total Bilirubin 0 54 0 20 - 1 00 mg/dL    eGFR 25 ml/min/1 73sq m   CBC and differential    Collection Time: 10/11/17  3:33 PM   Result Value Ref Range    WBC 9 54 4 31 - 10 16 Thousand/uL    RBC 5 20 (H) 3 81 - 5 12 Million/uL    Hemoglobin 16 0 (H) 11 5 - 15 4 g/dL    Hematocrit 48 4 (H) 34 8 - 46 1 %    MCV 93 82 - 98 fL    MCH 30 8 26 8 - 34 3 pg    MCHC 33 1 31 4 - 37 4 g/dL    RDW 14 4 11 6 - 15 1 %    MPV 11 1 8 9 - 12 7 fL    Platelets 971 851 - 132 Thousands/uL    nRBC 0 /100 WBCs    Neutrophils Relative 70 43 - 75 %    Lymphocytes Relative 19 14 - 44 %    Monocytes Relative 10 4 - 12 %    Eosinophils Relative 1 0 - 6 %    Basophils Relative 0 0 - 1 %    Neutrophils Absolute 6 60 1 85 - 7 62 Thousands/µL    Lymphocytes Absolute 1 85 0 60 - 4 47 Thousands/µL    Monocytes Absolute 0 91 0 17 - 1 22 Thousand/µL    Eosinophils Absolute 0 09 0 00 - 0 61 Thousand/µL    Basophils Absolute 0 04 0 00 - 0 10 Thousands/µL   Protime-INR    Collection Time: 10/11/17  3:33 PM   Result Value Ref Range    Protime 14 0 12 1 - 14 4 seconds    INR 1 08 0 86 - 1 16   APTT    Collection Time: 10/11/17  3:33 PM   Result Value Ref Range    PTT 30 23 - 35 seconds     X-ray chest 1 view portable   Final Result      Small left lung base opacity suggestive of a pleural effusion and/or atelectasis/infiltrate  Minimal pulmonary vascular congestion  Stable cardiomegaly  Workstation performed: [id number]               Portions of the record may have been created with voice recognition software  Occasional wrong word or "sound a like" substitutions may have occurred due to the inherent limitations of voice recognition software

## 2017-10-11 NOTE — ED PROVIDER NOTES
History  Chief Complaint   Patient presents with    Dizziness     dizziness and sob since 5am, patient called PCP who advised to come to ER     HPI  42-year-old female presents to the ED with complaints of SOB and dizziness  Patient states that she awoke at 5 AM due to shortness of breath  Since that time, she has also felt slightly dizzy and lightheadedness  She has difficulty further explaining her symptoms  She denies having had similar symptoms in the past and has not noted any modifying factors for her symptoms  Due to dizziness, patient did not feel comfortable driving herself in, so she called EMS  Upon EMS arrival, she was found to have atrial fibrillation with RVR  She denies any known history of atrial fibrillation and she is not currently on any anticoagulation  On ROS, patient denies any recent fevers, chills, chest pain, abdominal pain, nausea, vomiting, weakness, numbness, paresthesias, or complaints other than stated above  However, while in the ED, she makes multiple comments about having felt very sad since her grandson committed suicide last month  Per nurses, during triage, she also made remarks about not wanting to live anymore  Prior to Admission Medications   Prescriptions Last Dose Informant Patient Reported? Taking?    Cholecalciferol (VITAMIN D PO)   Yes Yes   Sig: Take 5,000 mg by mouth daily   DULoxetine (CYMBALTA) 60 mg delayed release capsule   Yes Yes   Sig: Take 20 mg by mouth daily   alendronate (FOSAMAX) 70 mg tablet   Yes Yes   Sig: Take 70 mg by mouth every 7 days   amLODIPine (NORVASC) 5 mg tablet   Yes Yes   Sig: Take 5 mg by mouth daily   aspirin 81 mg chewable tablet   Yes Yes   Sig: Chew 81 mg daily   baclofen 10 mg tablet   Yes Yes   Sig: Take 10 mg by mouth 2 (two) times a day as needed for muscle spasms   etodolac (LODINE XL) 400 MG 24 hr tablet   Yes Yes   Sig: Take 400 mg by mouth 2 (two) times a day   furosemide (LASIX) 40 mg tablet   No Yes   Sig: Take 1 tablet (40 mg total) by mouth as needed (edema) Indications: Edema    gabapentin (NEURONTIN) 300 mg capsule 10/11/2017 at Unknown time  Yes Yes   Sig: Take 300 mg by mouth 3 (three) times a day     metoprolol succinate (TOPROL-XL) 50 mg 24 hr tablet   Yes Yes   Sig: Take 25 mg by mouth daily   oxyCODONE-acetaminophen (PERCOCET)  mg per tablet   Yes Yes   Sig: Take 1 tablet by mouth daily as needed for moderate pain   zolpidem (AMBIEN) 10 mg tablet 10/10/2017 at Unknown time  Yes Yes   Sig: Take 10 mg by mouth daily at bedtime as needed for sleep  Facility-Administered Medications: None       Past Medical History:   Diagnosis Date    Aortic aneurysm ([address] )     Hypertension     Psychiatric disorder     anxiety       Past Surgical History:   Procedure Laterality Date    HIP SURGERY         History reviewed  No pertinent family history  I have reviewed and agree with the history as documented  Social History   Substance Use Topics    Smoking status: Never Smoker    Smokeless tobacco: Not on file    Alcohol use No        Review of Systems   Constitutional: Negative for chills, fatigue and fever  HENT: Negative for trouble swallowing  Eyes: Negative for visual disturbance  Respiratory: Positive for shortness of breath  Negative for cough  Cardiovascular: Negative for chest pain  Gastrointestinal: Negative for abdominal pain, nausea and vomiting  Genitourinary: Negative for dysuria and hematuria  Musculoskeletal: Negative for back pain  Skin: Negative for rash  Allergic/Immunologic: Negative for immunocompromised state  Neurological: Positive for dizziness and light-headedness  Negative for headaches  Hematological: Does not bruise/bleed easily  Psychiatric/Behavioral: Positive for dysphoric mood  Negative for suicidal ideas  All other systems reviewed and are negative        Physical Exam  ED Triage Vitals [10/11/17 1548]   Temperature Pulse Respirations Blood Pressure SpO2   97 7 °F (36 5 °C) (!) 137 16 119/93 93 %      Temp Source Heart Rate Source Patient Position - Orthostatic VS BP Location FiO2 (%)   Oral Monitor Sitting Right arm --      Pain Score       No Pain           Physical Exam   Constitutional: She is oriented to person, place, and time  She appears well-nourished  No distress  HENT:   Head: Normocephalic and atraumatic  Eyes: EOM are normal    Neck: Normal range of motion  Neck supple  Cardiovascular: An irregularly irregular rhythm present  Tachycardia present  Pulmonary/Chest: Effort normal and breath sounds normal  No respiratory distress  Abdominal: Soft  She exhibits no distension  There is no tenderness  Musculoskeletal: Normal range of motion  Neurological: She is alert and oriented to person, place, and time  Skin: Skin is warm and dry  She is not diaphoretic  Psychiatric: She has a normal mood and affect  Her behavior is normal    Nursing note and vitals reviewed  ED Medications  Medications   sodium chloride 0 9 % bolus 1,000 mL (not administered)   diltiazem (CARDIZEM) injection 15 mg (not administered)   enoxaparin (LOVENOX) injection 1 mg/kg (not administered)       Diagnostic Studies  Labs Reviewed   COMPREHENSIVE METABOLIC PANEL - Abnormal        Result Value Ref Range Status    Chloride 110 (*) 100 - 108 mmol/L Final    Creatinine 1 98 (*) 0 60 - 1 30 mg/dL Final    Comment: Standardized to IDMS reference method    Albumin 3 2 (*) 3 5 - 5 0 g/dL Final    Sodium 144  136 - 145 mmol/L Final    Potassium 3 9  3 5 - 5 3 mmol/L Final    Comment: Slightly Hemolyzed; Results May be Affected    CO2 25  21 - 32 mmol/L Final    Anion Gap 9  4 - 13 mmol/L Final    BUN 22  5 - 25 mg/dL Final    Glucose 77  65 - 140 mg/dL Final    Comment:   If the patient is fasting, the ADA then defines impaired fasting glucose as > 100 mg/dL and diabetes as > or equal to 123 mg/dL    Specimen collection should occur prior to Sulfasalazine administration due to the potential for falsely depressed results  Specimen collection should occur prior to Sulfapyridine administration due to the potential for falsely elevated results  Calcium 8 5  8 3 - 10 1 mg/dL Final    AST 19  5 - 45 U/L Final    Comment: Slightly Hemolyzed; Results May be Affected  Specimen collection should occur prior to Sulfasalazine administration due to the potential for falsely depressed results  ALT 23  12 - 78 U/L Final    Comment:   Specimen collection should occur prior to Sulfasalazine and/or Sulfapyridine administration due to the potential for falsely depressed results  Alkaline Phosphatase 108  46 - 116 U/L Final    Total Protein 7 2  6 4 - 8 2 g/dL Final    Total Bilirubin 0 54  0 20 - 1 00 mg/dL Final    eGFR 25  ml/min/1 73sq m Final    Narrative:     National Kidney Disease Education Program recommendations are as follows:  GFR calculation is accurate only with a steady state creatinine  Chronic Kidney disease less than 60 ml/min/1 73 sq  meters  Kidney failure less than 15 ml/min/1 73 sq  meters     CBC AND DIFFERENTIAL - Abnormal     RBC 5 20 (*) 3 81 - 5 12 Million/uL Final    Hemoglobin 16 0 (*) 11 5 - 15 4 g/dL Final    Hematocrit 48 4 (*) 34 8 - 46 1 % Final    WBC 9 54  4 31 - 10 16 Thousand/uL Final    MCV 93  82 - 98 fL Final    MCH 30 8  26 8 - 34 3 pg Final    MCHC 33 1  31 4 - 37 4 g/dL Final    RDW 14 4  11 6 - 15 1 % Final    MPV 11 1  8 9 - 12 7 fL Final    Platelets 351  007 - 390 Thousands/uL Final    nRBC 0  /100 WBCs Final    Neutrophils Relative 70  43 - 75 % Final    Lymphocytes Relative 19  14 - 44 % Final    Monocytes Relative 10  4 - 12 % Final    Eosinophils Relative 1  0 - 6 % Final    Basophils Relative 0  0 - 1 % Final    Neutrophils Absolute 6 60  1 85 - 7 62 Thousands/µL Final    Lymphocytes Absolute 1 85  0 60 - 4 47 Thousands/µL Final    Monocytes Absolute 0 91  0 17 - 1 22 Thousand/µL Final    Eosinophils Absolute 0 09  0 00 - 0 61 Thousand/µL Final Basophils Absolute 0 04  0 00 - 0 10 Thousands/µL Final   TSH, 3RD GENERATION - Abnormal     TSH 3RD GENERATON 3 920 (*) 0 358 - 3 740 uIU/mL Final    Narrative:     Patients undergoing fluorescein dye angiography may retain small amounts of fluorescein in the body for 48-72 hours post procedure  Samples containing fluorescein can produce falsely depressed TSH values  If the patient had this procedure,a specimen should be resubmitted post fluorescein clearance  The recommended reference ranges for TSH during pregnancy are as follows:  First trimester 0 1 to 2 5 uIU/mL  Second trimester  0 2 to 3 0 uIU/mL  Third trimester 0 3 to 3 0 uIU/m     PROTIME-INR - Normal    Protime 14 0  12 1 - 14 4 seconds Final    INR 1 08  0 86 - 1 16 Final   APTT - Normal    PTT 30  23 - 35 seconds Final    Narrative: Therapeutic Heparin Range = 60-90 seconds   MAGNESIUM - Normal    Magnesium 1 8  1 6 - 2 6 mg/dL Final    Comment: Slightly Hemolyzed; Results May be Affected   PHOSPHORUS - Normal    Phosphorus 3 7  2 3 - 4 1 mg/dL Final   T4, FREE - Normal    Free T4 1 17  0 76 - 1 46 ng/dL Final    Comment:   Specimen collection should occur prior to Sulfasalazine administration due to the potential for falsely elevated results  POCT TROPONIN - Normal    POC Troponin I 0 01  0 00 - 0 08 ng/ml Final    Specimen Type VENOUS   Final    Narrative:     [employer] i-Stat handheld analyzer 99% cutoff is > 0 08ng/mL in network Emergency Departments    o cTnI 99% cutoff is useful only when applied to patients in the clinical setting of myocardial ischemia  o cTnI 99% cutoff should be interpreted in the context of clinical history, ECG findings and possibly cardiac imaging to establish correct diagnosis  o cTnI 99% cutoff may be suggestive but clearly not indicative of a coronary event without the clinical setting of myocardial ischemia         X-ray chest 1 view portable   Final Result      Small left lung base opacity suggestive of a pleural effusion and/or atelectasis/infiltrate  Minimal pulmonary vascular congestion  Stable cardiomegaly  Workstation performed: [id number]             Procedures  Procedures      Phone Consults  ED Phone Contact    ED Course  ED Course as of Oct 11 2253   Wed Oct 11, 2017   1625 1 0 one year ago Creatinine: (!) 1 98   1709 FM paged    1714 TSH 3RD Renell Asper: (!) 3 920                   MDM  Number of Diagnoses or Management Options  Grief reaction:   New onset atrial fibrillation [facility]):   Diagnosis management comments: 79-year-old female with new onset afib  Will obtain cardiac workup + lytes, TSH  Will tx with Cardizem and give Lovenox  Plan for admission  CritCare Time    Disposition  Final diagnoses:   New onset atrial fibrillation (HCC)   Grief reaction     ED Disposition     ED Disposition Condition Comment    Admit  Case was discussed with Dr Brenda Hernandez and the patient's admission status was agreed to be Admission Status: inpatient status to the service of Dr Brenda Hernandez  Follow-up Information      Patient's Medications   Discharge Prescriptions    No medications on file     No discharge procedures on file  ED Provider  Attending physically available and evaluated Lizbetne Dandy  JUSTIN managed the patient along with the ED Attending      Electronically Signed by       Scott Tadeo MD  Resident  10/14/17 2486

## 2017-10-12 ENCOUNTER — APPOINTMENT (INPATIENT)
Dept: RADIOLOGY | Facility: HOSPITAL | Age: 72
DRG: 309 | End: 2017-10-12
Payer: MEDICARE

## 2017-10-12 LAB
ALBUMIN SERPL BCP-MCNC: 3.2 G/DL (ref 3.5–5)
ALP SERPL-CCNC: 100 U/L (ref 46–116)
ALT SERPL W P-5'-P-CCNC: 23 U/L (ref 12–78)
ANION GAP SERPL CALCULATED.3IONS-SCNC: 7 MMOL/L (ref 4–13)
AST SERPL W P-5'-P-CCNC: 15 U/L (ref 5–45)
ATRIAL RATE: 150 BPM
ATRIAL RATE: 163 BPM
ATRIAL RATE: 54 BPM
BILIRUB SERPL-MCNC: 0.62 MG/DL (ref 0.2–1)
BUN SERPL-MCNC: 24 MG/DL (ref 5–25)
CALCIUM SERPL-MCNC: 8.4 MG/DL (ref 8.3–10.1)
CHLORIDE SERPL-SCNC: 111 MMOL/L (ref 100–108)
CO2 SERPL-SCNC: 25 MMOL/L (ref 21–32)
CREAT SERPL-MCNC: 1.3 MG/DL (ref 0.6–1.3)
GFR SERPL CREATININE-BSD FRML MDRD: 42 ML/MIN/1.73SQ M
GLUCOSE SERPL-MCNC: 92 MG/DL (ref 65–140)
P AXIS: 0 DEGREES
P AXIS: 192 DEGREES
P AXIS: 84 DEGREES
PLATELET # BLD AUTO: 233 THOUSANDS/UL (ref 149–390)
PMV BLD AUTO: 10.7 FL (ref 8.9–12.7)
POTASSIUM SERPL-SCNC: 4.3 MMOL/L (ref 3.5–5.3)
PR INTERVAL: 158 MS
PROT SERPL-MCNC: 7 G/DL (ref 6.4–8.2)
QRS AXIS: 38 DEGREES
QRS AXIS: 40 DEGREES
QRS AXIS: 46 DEGREES
QRSD INTERVAL: 86 MS
QRSD INTERVAL: 90 MS
QRSD INTERVAL: 92 MS
QT INTERVAL: 304 MS
QT INTERVAL: 374 MS
QT INTERVAL: 428 MS
QTC INTERVAL: 395 MS
QTC INTERVAL: 405 MS
QTC INTERVAL: 438 MS
SODIUM SERPL-SCNC: 143 MMOL/L (ref 136–145)
T WAVE AXIS: -25 DEGREES
T WAVE AXIS: 30 DEGREES
T WAVE AXIS: 35 DEGREES
VENTRICULAR RATE: 125 BPM
VENTRICULAR RATE: 54 BPM
VENTRICULAR RATE: 67 BPM

## 2017-10-12 PROCEDURE — 80053 COMPREHEN METABOLIC PANEL: CPT | Performed by: FAMILY MEDICINE

## 2017-10-12 PROCEDURE — 85049 AUTOMATED PLATELET COUNT: CPT | Performed by: FAMILY MEDICINE

## 2017-10-12 PROCEDURE — 73564 X-RAY EXAM KNEE 4 OR MORE: CPT

## 2017-10-12 RX ORDER — FAMOTIDINE 20 MG/1
20 TABLET, FILM COATED ORAL DAILY
Status: DISCONTINUED | OUTPATIENT
Start: 2017-10-12 | End: 2017-10-13 | Stop reason: HOSPADM

## 2017-10-12 RX ORDER — METOPROLOL SUCCINATE 50 MG/1
50 TABLET, EXTENDED RELEASE ORAL DAILY
Status: DISCONTINUED | OUTPATIENT
Start: 2017-10-13 | End: 2017-10-13 | Stop reason: HOSPADM

## 2017-10-12 RX ADMIN — OXYCODONE HYDROCHLORIDE AND ACETAMINOPHEN 1 TABLET: 5; 325 TABLET ORAL at 10:10

## 2017-10-12 RX ADMIN — ENOXAPARIN SODIUM 30 MG: 30 INJECTION SUBCUTANEOUS at 09:58

## 2017-10-12 RX ADMIN — FAMOTIDINE 20 MG: 20 TABLET, FILM COATED ORAL at 09:56

## 2017-10-12 RX ADMIN — GABAPENTIN 300 MG: 300 CAPSULE ORAL at 17:35

## 2017-10-12 RX ADMIN — DOCUSATE SODIUM 100 MG: 100 CAPSULE, LIQUID FILLED ORAL at 09:55

## 2017-10-12 RX ADMIN — BUPROPION HYDROCHLORIDE 150 MG: 150 TABLET, FILM COATED, EXTENDED RELEASE ORAL at 09:57

## 2017-10-12 RX ADMIN — Medication 1000 MG: at 12:28

## 2017-10-12 RX ADMIN — APIXABAN 5 MG: 5 TABLET, FILM COATED ORAL at 17:35

## 2017-10-12 RX ADMIN — OXYCODONE HYDROCHLORIDE AND ACETAMINOPHEN 1 TABLET: 5; 325 TABLET ORAL at 22:43

## 2017-10-12 RX ADMIN — ASPIRIN 81 MG 81 MG: 81 TABLET ORAL at 09:56

## 2017-10-12 RX ADMIN — DOCUSATE SODIUM 100 MG: 100 CAPSULE, LIQUID FILLED ORAL at 17:35

## 2017-10-12 RX ADMIN — GABAPENTIN 300 MG: 300 CAPSULE ORAL at 21:17

## 2017-10-12 RX ADMIN — CHOLECALCIFEROL TAB 10 MCG (400 UNIT) 800 UNITS: 10 TAB at 09:57

## 2017-10-12 RX ADMIN — METOPROLOL SUCCINATE 25 MG: 25 TABLET, EXTENDED RELEASE ORAL at 09:55

## 2017-10-12 RX ADMIN — AMLODIPINE BESYLATE 5 MG: 5 TABLET ORAL at 09:55

## 2017-10-12 RX ADMIN — ZOLPIDEM TARTRATE 10 MG: 5 TABLET ORAL at 22:43

## 2017-10-12 RX ADMIN — GABAPENTIN 300 MG: 300 CAPSULE ORAL at 09:55

## 2017-10-12 NOTE — SOCIAL WORK
Cm received call from [facility] practice resident regarding eliquis advised needs prior auth  Auth obtained ref# [id number]  Information provided to [employer] and co-pay is $8 25 pt made aware and agreeable

## 2017-10-12 NOTE — CONSULTS
Consultation -  [address] 81 y o  female MRN: [id number]  Unit/Bed#: [facility] 157-93 Encounter: [id number]      Chief Complaint: "I am not crazy"    History of Present Illness   Physician Requesting Consult: Christy oMbley MD  Reason for Consult / Principal Problem: Grief reaction, depression, anxiety    Lisa Meadows is a 81 y o  female presented with dizziness and shortness of breath  Patient reports feeling depressed after her grandson's suicide approximately 6 weeks ago  Patient states that she found him in her house after he hanged himself and he was pronounced brain dead  He was kept on life support for organ donation and she was not present when he was taken off life support  This affected her because her  was also taken off life support many years ago after a fall  Patient wanted a recent photo and ashes of her grandson, but her daughter refused to give them to her  Her son says that she needs to accept his death and "get over it"  She has been taking medications for depression including Cymbalta, Paxil, and Wellbutrin prescribed to her by her family doctor  Patient reports that these have not helped  Patient says that she has a neighbor that she can talk to but does not feel ready to see a therapist because she feels she will just cry the whole time  Patient reports poor sleep and is not involved in activities that she used to do due to decreased interest  Patient says that sometimes she thinks about not wanting to wake up, but she does not want to die  Patient denies any psychotic symptoms  She states that she has support from the  in her [place] and she has been able to talk to him multiple times  She does not feel guilty about the death of her grandson, but she does not understand what really happened to him and why he ended his life  They had a good relationship and she misses him  She understands that grief is normal because he  recently         Psychiatric Review Of Systems:  sleep: yes  appetite changes: yes  weight changes: no  energy/anergy: yes  interest/pleasure/anhedonia: yes  somatic symptoms: no  anxiety/panic: no  darwin: no  guilty/hopeless: no  self injurious behavior/risky behavior: no    Historical Information   Past Psychiatric History:   None  Currently in treatment with primary doctor  Past Suicide attempts: none  Past Violent behavior: none  Past Psychiatric medication trial: Paxil, Cymbalta, Wellbutrin    Substance Abuse History:  She denies any drug or alcohol abuse  I have assessed this patient for substance use within the past 12 months     History of IP/OP rehabilitation program: none  Smoking history: never smoked    Family Psychiatric History:   Father with history of depression  Daughter is a mikeer  Social History  Education: high school diploma/GED  Learning Disabilities: none  Marital history:   Living arrangement, social support: Patient lives alone  Occupational History: retired  Functioning Relationships: good support system  Other Pertinent History: none    Traumatic History:   Abuse: Denies  Other Traumatic Events: Denies    Past Medical History:   Diagnosis Date    Aortic aneurysm ([address] )     Arthritis     Hypertension     Psychiatric disorder     anxiety       Medical Review Of Systems:  Review of Systems - Negative except feelings of depression  All other systems were reviewed and they are negative       Meds/Allergies   current meds:   Current Facility-Administered Medications   Medication Dose Route Frequency    acetaminophen (TYLENOL) tablet 650 mg  650 mg Oral Q6H PRN    aspirin chewable tablet 81 mg  81 mg Oral Daily    buPROPion (WELLBUTRIN XL) 24 hr tablet 150 mg  150 mg Oral Daily    calcium carbonate (TUMS) chewable tablet 1,000 mg  1,000 mg Oral Daily PRN    cholecalciferol (VITAMIN D3) tablet 800 Units  800 Units Oral Daily    docusate sodium (COLACE) capsule 100 mg  100 mg Oral BID    enoxaparin (LOVENOX) subcutaneous injection 30 mg  30 mg Subcutaneous Daily    famotidine (PEPCID) tablet 20 mg  20 mg Oral Daily    gabapentin (NEURONTIN) capsule 300 mg  300 mg Oral TID    [START ON 10/13/2017] metoprolol succinate (TOPROL-XL) 24 hr tablet 50 mg  50 mg Oral Daily    ondansetron (ZOFRAN) injection 4 mg  4 mg Intravenous Q6H PRN    oxyCODONE-acetaminophen (PERCOCET) 5-325 mg per tablet 1 tablet  1 tablet Oral Q6H PRN    zolpidem (AMBIEN) tablet 10 mg  10 mg Oral HS PRN     No Known Allergies    Objective   Vital signs in last 24 hours:  Temp:  [97 7 °F (36 5 °C)-98 4 °F (36 9 °C)] 98 1 °F (36 7 °C)  HR:  [] 80  Resp:  [16-18] 18  BP: (112-134)/(70-93) 122/88      Intake/Output Summary (Last 24 hours) at 10/12/17 1349  Last data filed at 10/12/17 1222   Gross per 24 hour   Intake          2601 25 ml   Output              200 ml   Net          2401 25 ml       Mental Status Evaluation:  Appearance:  age appropriate   Behavior:  cooperative   Speech:  normal pitch and normal volume   Mood:  sad   Affect:  mood-congruent   Language: naming objects and repeating phrases   Thought Process:  goal directed   Associations: intact associations   Thought Content:  normal   Perceptual Disturbances: None   Risk Potential: She denies any suicidal thoughts plan or intent   Sensorium:  person, place, time/date and situation   Memory:  recent and remote memory grossly intact   Cognition:  grossly intact   Consciousness:  alert and awake    Attention: attention span and concentration were age appropriate   Intellect: within normal limits   Fund of Knowledge: awareness of current events: fair   Insight:  fair   Judgment: fair   Muscle Strength and Tone: within normal limits   Gait/Station: normal gait/station   Motor Activity: no abnormal movements     Lab Results:    Lab Results   Component Value Date    WBC 9 54 10/11/2017    HGB 16 0 (H) 10/11/2017    HCT 48 4 (H) 10/11/2017    MCV 93 10/11/2017     10/12/2017     Lab Results   Component Value Date    GLUCOSE 92 10/12/2017    CALCIUM 8 4 10/12/2017     10/12/2017    K 4 3 10/12/2017    CO2 25 10/12/2017     (H) 10/12/2017    BUN 24 10/12/2017    CREATININE 1 30 10/12/2017         Code Status: )Level 3 - DNAR and DNI    Assessment/Plan     Assessment:  Diego Welch is a 81 y o  female who presented with dizziness and shortness of breath  Patient is experiencing grief after her grandson's suicide and symptoms of depression including lack of sleep, poor appetite and anhedonia  She denies any suicidal thoughts plan or intent  At this moment patient states that she is not ready for individual therapy, but she is planning to restart her voluntary job  Diagnosis: Depressive disorder, unspecified F32 9      Plan:   Continue medical management  I recommend individual therapy  I told the patient that if she is feeling more depressed she can contact us at [phone] where we have a partial hospitalization if she needs it  Continue Wellbutrin  mg daily PO  Follow up with primary doctor  I will sign off    Risks, benefits and possible side effects of Medications:   Risks, benefits, and possible side effects of medications explained to patient and patient verbalizes understanding             Arabella Davis MD

## 2017-10-12 NOTE — CONSULTS
Consultation - Cardiology   Lisa Meadows 81 y o  female MRN: [id number]  Unit/Bed#: [facility] 511-01 Encounter: [id number]      Assessment:  Principal Problem:    Atrial fibrillation ([facility] [address] )  Active Problems:    MARU (acute kidney injury) ([facility] [address] )    Grief reaction with prolonged bereavement    Dizziness    Chronic pain    Osteoporosis    HTN (hypertension)    Depression    Anxiety      Plan:  1  New onset paroxysmal atrial fibrillation-  - this is in the setting of recent stress and increased caffeine intake  - currently in sinus rhythm  - on rate control-metoprolol succinate 25 mg daily- increase to 50mg daily  - CHADVASC-3-hypertension, age, female  Recommend anticoagulation with NOACs- Eliquis versus Xarelto depending on her insurance coverage   - free T4-1 17   - 2D echo to evaluate EF, chamber size and valvular function  - discussed to cut down on caffeine intake  2   Hypertension-  - Discontinue Amlodipine  Continue Toprol XL 50mg daily  History of Present Illness   Physician Requesting Consult: Kristin Skinner, *  Reason for Consult / Principal Problem:  Atrial fibrillation  HPI: Lisa Meadows is a 81y o  year old female w with past medical history of hypertension, AAA, depression, anxiety presented to the ED with chief complain of acute onset of shortness of breath  Patient has been feeling fatigued and dizzy for the past few weeks  Unfortunately, she recently he has lost her grandson due to a suicide at home and thought her symptoms were due to that  However, yesterday she woke up with an acute episode of shortness of breath and decided to come to the ED  Symptoms subsided since coming to the hospital   Denies any chest pain/chest pressure, prior episodes of shortness of breath, palpitations or feelings of skipped beat, nausea or vomiting  She is extremely tearful during my encounter due to her loss  Also reports decreased appetite and food intake for the past few weeks    Drinks Coke 4-5 cans every day  Denies any smoking or drug use  No prior cardiac history  No family history of premature heart disease  In the ED, patient was found to be in AFib with RVR and was given 15 mg of Cardizem with which she reverted back into sinus rhythm according to the documentation  Currently, patient denies any shortness of breath, no dizziness, chest pain/pressure or palpitations  She was seen by Cardiology in May 2016 for presumed diastolic heart failure and hypertension as an inpatient  Echo at that time revealed normal systolic and diastolic function  EKG:  Atrial fibrillation with a ventricular rate of 126  Normal axis  [facility]  Telemetry-in sinus rhythm at a rate of 70  Paroxysms of atrial fibrillation overnight  Inpatient consult to Cardiology  Performed by: Hema Lee  Authorized by: Beuford Severe           Review of Systems:  Review of Systems   Constitutional: Positive for activity change, appetite change and fatigue  Negative for chills, diaphoresis and fever  HENT: Positive for congestion  Respiratory: Negative for apnea, cough and chest tightness  Cardiovascular: Negative for chest pain, palpitations and leg swelling  Gastrointestinal: Negative for abdominal pain, nausea and vomiting  Genitourinary: Negative for difficulty urinating  Musculoskeletal: Positive for arthralgias and back pain  Neurological: Positive for dizziness  Negative for facial asymmetry, light-headedness, numbness and headaches  Psychiatric/Behavioral: Positive for decreased concentration and dysphoric mood         14 systems reviewed and negative with the exception of the above and the following    Historical Information   Past Medical History:   Diagnosis Date    Aortic aneurysm ([address] )     Arthritis     Hypertension     Psychiatric disorder     anxiety     Past Surgical History:   Procedure Laterality Date    HIP SURGERY      JOINT REPLACEMENT       History   Alcohol Use No     History Drug Use No     History   Smoking Status    Never Smoker   Smokeless Tobacco    Never Used     Family History: Father with MI at age 68  Meds/Allergies   all current active meds have been reviewed  No Known Allergies    Objective   Vitals: Blood pressure 122/88, pulse 80, temperature 98 1 °F (36 7 °C), temperature source Oral, resp  rate 18, height 5' 4" (1 626 m), weight 85 2 kg (187 lb 13 3 oz), SpO2 90 %  , Body mass index is 32 24 kg/m² , Orthostatic Blood Pressures    Flowsheet Row Most Recent Value   Blood Pressure  122/88 filed at 10/12/2017 0701   Patient Position - Orthostatic VS  Lying filed at 10/12/2017 0701            Intake/Output Summary (Last 24 hours) at 10/12/17 0846  Last data filed at 10/12/17 0701   Gross per 24 hour   Intake          2401 25 ml   Output              200 ml   Net          2201 25 ml       Invasive Devices     Peripheral Intravenous Line            Peripheral IV 10/11/17 Right Antecubital less than 1 day                    Physical Exam:  Physical Exam   Constitutional: She is oriented to person, place, and time  She appears well-developed and well-nourished  Patient is tearful  HENT:   Head: Normocephalic and atraumatic  Mouth/Throat: No oropharyngeal exudate  Eyes: Conjunctivae are normal  No scleral icterus  Neck: Neck supple  No JVD present  No thyromegaly present  Cardiovascular: Normal rate, regular rhythm, normal heart sounds and intact distal pulses  Exam reveals no gallop and no friction rub  No murmur heard  Pulmonary/Chest: Effort normal and breath sounds normal  She has no wheezes  She has no rales  Abdominal: Soft  Bowel sounds are normal  There is no tenderness  Musculoskeletal: Normal range of motion  She exhibits no edema  Neurological: She is alert and oriented to person, place, and time  Skin: Skin is warm and dry         Lab Results:     Lab Results   Component Value Date    TROPONINI <0 02 05/14/2016    TROPONINI <0 02 05/14/2016 TROPONINI <0 02 05/13/2016       Lab Results   Component Value Date    GLUCOSE 92 10/12/2017    CALCIUM 8 4 10/12/2017     10/12/2017    K 4 3 10/12/2017    CO2 25 10/12/2017     (H) 10/12/2017    BUN 24 10/12/2017    CREATININE 1 30 10/12/2017       Lab Results   Component Value Date    WBC 9 54 10/11/2017    HGB 16 0 (H) 10/11/2017    HCT 48 4 (H) 10/11/2017    MCV 93 10/11/2017     10/12/2017       Lab Results   Component Value Date    CHOL 152 03/25/2014     Lab Results   Component Value Date    HDL 52 03/25/2014     Lab Results   Component Value Date    LDLCALC 87 03/25/2014     Lab Results   Component Value Date    TRIG 63 03/25/2014       Lab Results   Component Value Date    ALT 23 10/12/2017    AST 15 10/12/2017         Results from last 7 days  Lab Units 10/11/17  1533   INR  1 08         Imaging: I have personally reviewed pertinent reports  EKG:  Atrial fibrillation with a ventricular rate of 126  Normal axis

## 2017-10-12 NOTE — SOCIAL WORK
Received script for eliquis to check cost  Per [facility] requires prior auth number to call is [phone] ID# [id number]  Resident Cardoza made aware
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
## 2017-10-12 NOTE — SOCIAL WORK
CM met with Pt with an introduction and explanation of role  Pt reported residing alone in a 5th floor apt with elevator access and a roller walker if needed  Pt reported being independent with ADLs, had VNA and [facility] the [facility] but no hx of mental health placement  Pt reported having a living will with her son Seble Ramos as [place]  Pt uses [employer]  CM reviewed d/c planning process including the following: identifying help at home, patient preference for d/c planning needs, Discharge Lounge, [facility] Meds to Bed program, availability of treatment team to discuss questions or concerns patient and/or family may have regarding understanding medications and recognizing signs and symptoms once discharged  CM also encouraged patient to follow up with all recommended appointments after discharge  Patient advised of importance for patient and family to participate in managing patients medical well being

## 2017-10-13 ENCOUNTER — GENERIC CONVERSION - ENCOUNTER (OUTPATIENT)
Dept: OTHER | Facility: OTHER | Age: 72
End: 2017-10-13

## 2017-10-13 ENCOUNTER — APPOINTMENT (INPATIENT)
Dept: NON INVASIVE DIAGNOSTICS | Facility: HOSPITAL | Age: 72
DRG: 309 | End: 2017-10-13
Payer: MEDICARE

## 2017-10-13 VITALS
OXYGEN SATURATION: 92 % | TEMPERATURE: 97.9 F | DIASTOLIC BLOOD PRESSURE: 75 MMHG | HEART RATE: 6 BPM | SYSTOLIC BLOOD PRESSURE: 137 MMHG | WEIGHT: 187.83 LBS | HEIGHT: 64 IN | RESPIRATION RATE: 18 BRPM | BODY MASS INDEX: 32.07 KG/M2

## 2017-10-13 LAB
ANION GAP SERPL CALCULATED.3IONS-SCNC: 6 MMOL/L (ref 4–13)
BASOPHILS # BLD AUTO: 0.02 THOUSANDS/ΜL (ref 0–0.1)
BASOPHILS NFR BLD AUTO: 0 % (ref 0–1)
BUN SERPL-MCNC: 17 MG/DL (ref 5–25)
CALCIUM SERPL-MCNC: 8.5 MG/DL (ref 8.3–10.1)
CHLORIDE SERPL-SCNC: 110 MMOL/L (ref 100–108)
CO2 SERPL-SCNC: 25 MMOL/L (ref 21–32)
CREAT SERPL-MCNC: 0.9 MG/DL (ref 0.6–1.3)
EOSINOPHIL # BLD AUTO: 0.1 THOUSAND/ΜL (ref 0–0.61)
EOSINOPHIL NFR BLD AUTO: 2 % (ref 0–6)
ERYTHROCYTE [DISTWIDTH] IN BLOOD BY AUTOMATED COUNT: 14.6 % (ref 11.6–15.1)
GFR SERPL CREATININE-BSD FRML MDRD: 65 ML/MIN/1.73SQ M
GLUCOSE SERPL-MCNC: 86 MG/DL (ref 65–140)
HCT VFR BLD AUTO: 40.4 % (ref 34.8–46.1)
HGB BLD-MCNC: 13.3 G/DL (ref 11.5–15.4)
LYMPHOCYTES # BLD AUTO: 1.65 THOUSANDS/ΜL (ref 0.6–4.47)
LYMPHOCYTES NFR BLD AUTO: 34 % (ref 14–44)
MCH RBC QN AUTO: 30.4 PG (ref 26.8–34.3)
MCHC RBC AUTO-ENTMCNC: 32.9 G/DL (ref 31.4–37.4)
MCV RBC AUTO: 92 FL (ref 82–98)
MONOCYTES # BLD AUTO: 0.41 THOUSAND/ΜL (ref 0.17–1.22)
MONOCYTES NFR BLD AUTO: 8 % (ref 4–12)
NEUTROPHILS # BLD AUTO: 2.74 THOUSANDS/ΜL (ref 1.85–7.62)
NEUTS SEG NFR BLD AUTO: 56 % (ref 43–75)
NRBC BLD AUTO-RTO: 0 /100 WBCS
PLATELET # BLD AUTO: 188 THOUSANDS/UL (ref 149–390)
PMV BLD AUTO: 10.6 FL (ref 8.9–12.7)
POTASSIUM SERPL-SCNC: 4 MMOL/L (ref 3.5–5.3)
RBC # BLD AUTO: 4.38 MILLION/UL (ref 3.81–5.12)
SODIUM SERPL-SCNC: 141 MMOL/L (ref 136–145)
WBC # BLD AUTO: 4.93 THOUSAND/UL (ref 4.31–10.16)

## 2017-10-13 PROCEDURE — 85025 COMPLETE CBC W/AUTO DIFF WBC: CPT | Performed by: FAMILY MEDICINE

## 2017-10-13 PROCEDURE — 93306 TTE W/DOPPLER COMPLETE: CPT

## 2017-10-13 PROCEDURE — 80048 BASIC METABOLIC PNL TOTAL CA: CPT | Performed by: FAMILY MEDICINE

## 2017-10-13 RX ORDER — FAMOTIDINE 20 MG/1
20 TABLET, FILM COATED ORAL DAILY
Qty: 30 TABLET | Refills: 0 | Status: CANCELLED | OUTPATIENT
Start: 2017-10-14 | End: 2017-11-13

## 2017-10-13 RX ORDER — RANITIDINE 150 MG/1
150 TABLET ORAL
Qty: 30 TABLET | Refills: 0 | Status: SHIPPED | OUTPATIENT
Start: 2017-10-13 | End: 2018-06-29 | Stop reason: SDUPTHER

## 2017-10-13 RX ORDER — DOCUSATE SODIUM 100 MG/1
100 CAPSULE, LIQUID FILLED ORAL 2 TIMES DAILY
Qty: 10 CAPSULE | Refills: 0 | Status: SHIPPED | OUTPATIENT
Start: 2017-10-13 | End: 2018-06-29 | Stop reason: SDUPTHER

## 2017-10-13 RX ORDER — METOPROLOL SUCCINATE 50 MG/1
50 TABLET, EXTENDED RELEASE ORAL 2 TIMES DAILY
Qty: 60 TABLET | Refills: 0 | Status: SHIPPED | OUTPATIENT
Start: 2017-10-13 | End: 2018-03-14 | Stop reason: CLARIF

## 2017-10-13 RX ADMIN — OXYCODONE HYDROCHLORIDE AND ACETAMINOPHEN 1 TABLET: 5; 325 TABLET ORAL at 15:16

## 2017-10-13 RX ADMIN — OXYCODONE HYDROCHLORIDE AND ACETAMINOPHEN 1 TABLET: 5; 325 TABLET ORAL at 08:54

## 2017-10-13 RX ADMIN — BUPROPION HYDROCHLORIDE 150 MG: 150 TABLET, FILM COATED, EXTENDED RELEASE ORAL at 09:46

## 2017-10-13 RX ADMIN — FAMOTIDINE 20 MG: 20 TABLET, FILM COATED ORAL at 09:47

## 2017-10-13 RX ADMIN — DOCUSATE SODIUM 100 MG: 100 CAPSULE, LIQUID FILLED ORAL at 09:47

## 2017-10-13 RX ADMIN — METOPROLOL SUCCINATE 50 MG: 50 TABLET, EXTENDED RELEASE ORAL at 09:46

## 2017-10-13 RX ADMIN — GABAPENTIN 300 MG: 300 CAPSULE ORAL at 09:47

## 2017-10-13 RX ADMIN — CHOLECALCIFEROL TAB 10 MCG (400 UNIT) 800 UNITS: 10 TAB at 09:46

## 2017-10-13 RX ADMIN — GABAPENTIN 300 MG: 300 CAPSULE ORAL at 15:15

## 2017-10-13 RX ADMIN — APIXABAN 5 MG: 5 TABLET, FILM COATED ORAL at 09:46

## 2017-10-13 RX ADMIN — ASPIRIN 81 MG 81 MG: 81 TABLET ORAL at 09:47

## 2017-10-13 NOTE — PROGRESS NOTES
Cardiology Progress Note - Calista Turner 81 y o  female MRN: [id number]    Unit/Bed#: [facility] 511-01 Encounter: [id number]      Assessment:  Principal Problem:    Atrial fibrillation ([facility] [address] )  Active Problems:    MARU (acute kidney injury) ([facility] [address] )    Grief reaction with prolonged bereavement    Dizziness    Chronic pain    Osteoporosis    HTN (hypertension)    Depression    Anxiety      Plan:  1  New onset paroxysmal atrial fibrillation-  - currently in sinus rhythm   -continue metoprolol succinate 50 mg daily  - CHADVASC-3-on Eliquis 5 mg b i d  Eliot Keoka - pending 2D echo      2  Hypertension-  -controlled on metoprolol succinate 50 mg daily       Subjective:   Patient seen and examined  No significant events overnight  Denies chest pain, shortness of breath or palpitations  No further episodes of dizziness  Telemetry-sinus rhythm with PACs  No episodes of AFib noted overnight  Objective:     Vitals: Blood pressure 136/87, pulse 76, temperature 98 2 °F (36 8 °C), temperature source Oral, resp  rate 18, height 5' 4" (1 626 m), weight 85 2 kg (187 lb 13 3 oz), SpO2 93 %  , Body mass index is 32 24 kg/m² , Orthostatic Blood Pressures    Flowsheet Row Most Recent Value   Blood Pressure  136/87 filed at 10/13/2017 4989   Patient Position - Orthostatic VS  Lying filed at 10/13/2017 8320            Intake/Output Summary (Last 24 hours) at 10/13/17 0954  Last data filed at 10/13/17 0120   Gross per 24 hour   Intake            872 5 ml   Output              350 ml   Net            522 5 ml           Physical Exam:    GEN: Calista Turner appears well, alert and oriented x 3, pleasant and cooperative   HEENT: anicteric, mucous membranes moist  NECK:  Supple   HEART: regular rhythm, normal S1 and S2, no murmurs, clicks, gallops or rubs   LUNGS: clear to auscultation bilaterally; no wheezes, rales, or rhonchi   ABDOMEN: normal bowel sounds, soft, no tenderness, no distention  EXTREMITIES: peripheral pulses normal; no clubbing, cyanosis, or edema  SKIN:  Warm      Current Facility-Administered Medications:     acetaminophen (TYLENOL) tablet 650 mg, 650 mg, Oral, Q6H PRN, Rosa Conley MD    [facility]) tablet 5 mg, 5 mg, Oral, BID, Marlyn Hopkins, , 5 mg at 10/13/17 0946    buPROPion (WELLBUTRIN XL) 24 hr tablet 150 mg, 150 mg, Oral, Daily, Rosa Conley MD, 150 mg at 10/13/17 0946    calcium carbonate (TUMS) chewable tablet 1,000 mg, 1,000 mg, Oral, Daily PRN, Rosa Conley MD, 1,000 mg at 10/12/17 1228    cholecalciferol (VITAMIN D3) tablet 800 Units, 800 Units, Oral, Daily, Rosa Conley MD, 800 Units at 10/13/17 0946    docusate sodium (COLACE) capsule 100 mg, 100 mg, Oral, BID, Rosa Conley MD, 100 mg at 10/13/17 0947    famotidine (PEPCID) tablet 20 mg, 20 mg, Oral, Daily, Alessandra Ordaz MD, 20 mg at 10/13/17 0947    gabapentin (NEURONTIN) capsule 300 mg, 300 mg, Oral, TID, Rosa Conley MD, 300 mg at 10/13/17 0947    metoprolol succinate (TOPROL-XL) 24 hr tablet 50 mg, 50 mg, Oral, Daily, Mary Beltran MD, 50 mg at 10/13/17 0946    ondansetron (ZOFRAN) injection 4 mg, 4 mg, Intravenous, Q6H PRN, Rosa Conley MD    oxyCODONE-acetaminophen (PERCOCET) 5-325 mg per tablet 1 tablet, 1 tablet, Oral, Q6H PRN, Rosa Conley MD, 1 tablet at 10/13/17 0854    zolpidem (AMBIEN) tablet 10 mg, 10 mg, Oral, HS PRN, Rosa Conley MD, 10 mg at 10/12/17 8953    Labs & Results:    Lab Results   Component Value Date    TROPONINI <0 02 05/14/2016    TROPONINI <0 02 05/14/2016    TROPONINI <0 02 05/13/2016       Lab Results   Component Value Date    GLUCOSE 86 10/13/2017    CALCIUM 8 5 10/13/2017     10/13/2017    K 4 0 10/13/2017    CO2 25 10/13/2017     (H) 10/13/2017    BUN 17 10/13/2017    CREATININE 0 90 10/13/2017       Lab Results   Component Value Date    WBC 4 93 10/13/2017    HGB 13 3 10/13/2017    HCT 40 4 10/13/2017    MCV 92 10/13/2017     10/13/2017       Results from last 7 days  Lab Units 10/11/17  1533   INR  1 08       Lab Results   Component Value Date    CHOL 152 03/25/2014     Lab Results   Component Value Date    HDL 52 03/25/2014     Lab Results   Component Value Date    LDLCALC 87 03/25/2014     Lab Results   Component Value Date    TRIG 63 03/25/2014       Lab Results   Component Value Date    ALT 23 10/12/2017    AST 15 10/12/2017

## 2017-10-13 NOTE — DISCHARGE SUMMARY
Discharge Summary - Cristiano Westbrook 81 y o  female MRN: [id number]    Unit/Bed#: [facility] 858-25 Encounter: [id number]    Admission Date: 10/11/2017   Discharge Date: 10/13/2017  Condition at Discharge: good   Disposition: Home       Admitting Diagnosis:   Principal Problem:    Atrial fibrillation ([facility] )  Active Problems:    MARU (acute kidney injury) ([facility] )    Grief reaction with prolonged bereavement    Dizziness    Chronic pain    Osteoporosis    HTN (hypertension)    Depression    Anxiety          Important Physician Related Follow Up:   · Follow up with Donte Resendiz within 2 weeks   Follow up new medications, including Eliquis and increased Metoprolol   · Follow up with cardiology outpatient within 3-4 weeks  · Follow up with ortho outpatient for chronic Left knee pain       Hospital Course:   Cristiano Westbrook is a 81y o  year old female with past medical history of hypertension, AAA, depression,osteoporosis who presented to the ED with chief complaint of acute onset of shortness of breath with dizziness  She had recently lost her grandson due to suicide at home and was dealing with severe grief since then  She reported she had not been eating properly and did not feel like doing anything and on day of presentation, when she woke up she felt that she could not breath   She also felt very dizzy and light headed and was brought to ED by EMS  In ED, EKG was done and she was found to be AFib with RVR and was given 15 mg of Cardizem with which she reverted back into sinus rhythm  Troponin was done which was negative, BMP was done which showed pt to be in mild MARU  Cr was 1 98 (baseline 1 2) and GFR was 25, the patient was hydrated with fluids and MARU resolved  Pt was placed on telemetry and observed overnight  Cardiology was consulted and they recommended increasing her metoprolol from 50 mg once daily to twice daily   Pt was started on anticoagulant Eliquis 5 mg twice daily because of CHADVASC  Score of 3 - hypertension, age,female echo was done on 10/13/17 and showed EF of 60% with Grade 1 diastolic dysfunction,there was dilatation of the ascending aorta at 4 1 cm in anteroposterior diameter   Psychiatry was also consulted for grief reaction and they recommended continuation of Wellbutrin 150 mg daily and individual therapy but pt refused individual therapy at this time  Pt also reported of falling and injuring her left knee ago  Left knee was swollen on exam  X ray was done but no fracture or dislocation was seen  Pt has been previously recommended Total knee Replacement of her left knee joint  Pt to follow up with orthopedic surgeon outpatient  On day of discharge pt had complete resolution of symptoms, ambulating  She was discharged with advise to follow up with PCP at [facility] within 2 weeks, cardiologist within 3-4 weeks and with orthopedic surgeon         Consults:  Cardiology  Psychiatry    Complications: None      Procedures Performed: none    Significant Findings/Abnormal Results with this admission:    Imaging/Other:  EKG: a fib rate 125/ -> Repeat EKG  Sinus bradycardia with sinus arrhythmia with junctional escape    WBC: 9 54->4 93  Hb: 16->13 3  Plat: 282->188  INR: 1 08  Na: 144->143->141  K: 3 9-> 4 3->4 0  Cr ( Baseline 1 2) 1 98'->1 30->0 9  GFR: 25-> 42->65  TSH: 3 920  T4: 1 17    CXR 10/11/17:   small left pleural effusion and/or atelectasis/infiltrate     Left knee xray 10/12/17   No fracture or dislocation    Echo 10/13/17       EF of 60% with Grade 1 diastolic dysfunction,there was dilatation of the ascending aorta at 4 1 cm in anteroposterior diameter           Exam on Day of Discharge:   Vitals:    10/12/17 2243 10/13/17 0311 10/13/17 0649 10/13/17 1043   BP: 136/86 102/66 136/87 114/86   Pulse: 76 72 76 84   Resp: 18 18 18 16   Temp: 97 5 °F (36 4 °C) 97 8 °F (36 6 °C) 98 2 °F (36 8 °C) 98 °F (36 7 °C)   TempSrc: Oral Oral Oral Oral   SpO2: 94% 95% 93% 90% Weight:       Height:         Physical Exam   Constitutional: She is oriented to person, place, and time  She appears well-developed and well-nourished  HENT:   Head: Normocephalic and atraumatic  Eyes: Conjunctivae and EOM are normal    Cardiovascular: Normal rate  Irregular   Pulmonary/Chest: Effort normal and breath sounds normal    Abdominal: Soft  Musculoskeletal:   Left knee:swelling +,edema +   Neurological: She is alert and oriented to person, place, and time  Skin: Skin is warm and dry  Discharge instructions/Information to patient and family:   See after visit summary for information provided to patient and family  Provisions for Follow-Up Care:  See after visit summary for information related to follow-up care and any pertinent home health orders  Planned Readmission: No    Discharge Statement   I spent 30 minutes discharging the patient  This time was spent on the day of discharge  I had direct contact with the patient on the day of discharge  Additional documentation is required if more than 30 minutes were spent on discharge  Discharge Medications:  See after visit summary for reconciled discharge medications provided to patient and family    Medication changes made with this admission:  · Eliquis 5 mg twice daily  · Metoprolol 50 mg twice daily  · Stop aspirin 81 mg, amlodipine 5 mg  · Continue Wellbutrin 150 mg  once daily      MD Demar Sawyer U  2  PGY 1  10/13/2017  11:49 AM

## 2017-10-13 NOTE — DISCHARGE INSTRUCTIONS
Please take    Eliquis 5 mg twice daily  Metoprolol 50 mg twice daily  Stop aspirin 81 mg  Stop amlodipine 5 mg  Stop Etodolac  May take Zantac 150 mg daily as needed   Continue Wellbutrin 150 mg  once daily    Continue rest of the home medications as previously prescribed     Follow up with   M[facility] within 2 weeks   2  Follow up with cardiology outpatient within 3-4 weeks  3  Follow up with ortho outpatient for left knee OA        A-fib (Atrial Fibrillation)   WHAT YOU NEED TO KNOW:   A-fib may come and go, or it may be a long-term condition  A-fib can cause blood clots, stroke, or heart failure  These conditions may become life-threatening  It is important to treat and manage a-fib to help prevent a blood clot, stroke, or heart failure  DISCHARGE INSTRUCTIONS:   Call 911 for any of the following:   · You have any of the following signs of a heart attack:      ¨ Squeezing, pressure, or pain in your chest that lasts longer than 5 minutes or returns    ¨ Discomfort or pain in your back, neck, jaw, stomach, or arm     ¨ Trouble breathing    ¨ Nausea or vomiting    ¨ Lightheadedness or a sudden cold sweat, especially with chest pain or trouble breathing    · You have any of the following signs of a stroke:      ¨ Numbness or drooping on one side of your face     ¨ Weakness in an arm or leg    ¨ Confusion or difficulty speaking    ¨ Dizziness, a severe headache, or vision loss  Seek care immediately if:  You have any of the following signs of a blood clot:  · You feel lightheaded, are short of breath, and have chest pain  · You cough up blood  · You have swelling, redness, pain, or warmth in your arm or leg  Contact your cardiologist or healthcare provider if:   · Your heart rate is higher than your healthcare provider said it should be  · You have new or worsening swelling in your legs, feet, ankles, or abdomen       · You are short of breath, even at rest      · You have questions or concerns about your condition or care  Medicines: You may need any of the following:  · Heart medicines  help control your heart rate and rhythm  You may need more than one medicine to treat your symptoms  · Blood thinners    help prevent blood clots  Examples of blood thinners include heparin and warfarin  Clots can cause strokes, heart attacks, and death  The following are general safety guidelines to follow while you are taking a blood thinner:    ¨ Watch for bleeding and bruising while you take blood thinners  Watch for bleeding from your gums or nose  Watch for blood in your urine and bowel movements  Use a soft washcloth on your skin, and a soft toothbrush to brush your teeth  This can keep your skin and gums from bleeding  If you shave, use an electric shaver  Do not play contact sports  ¨ Tell your dentist and other healthcare providers that you take anticoagulants  Wear a bracelet or necklace that says you take this medicine  ¨ Do not start or stop any medicines unless your healthcare provider tells you to  Many medicines cannot be used with blood thinners  ¨ Tell your healthcare provider right away if you forget to take the medicine, or if you take too much  ¨ Warfarin  is a blood thinner that you may need to take  The following are things you should be aware of if you take warfarin  § Foods and medicines can affect the amount of warfarin in your blood  Do not make major changes to your diet while you take warfarin  Warfarin works best when you eat about the same amount of vitamin K every day  Vitamin K is found in green leafy vegetables and certain other foods  Ask for more information about what to eat when you are taking warfarin  § You will need to see your healthcare provider for follow-up visits when you are on warfarin  You will need regular blood tests  These tests are used to decide how much medicine you need      · Antiplatelets , such as aspirin, help prevent blood clots  Take your antiplatelet medicine exactly as directed  These medicines make it more likely for you to bleed or bruise  If you are told to take aspirin, do not take acetaminophen or ibuprofen instead  · Take your medicine as directed  Contact your healthcare provider if you think your medicine is not helping or if you have side effects  Tell him or her if you are allergic to any medicine  Keep a list of the medicines, vitamins, and herbs you take  Include the amounts, and when and why you take them  Bring the list or the pill bottles to follow-up visits  Carry your medicine list with you in case of an emergency  Follow up with your cardiologist as directed: You will need regular blood tests and monitoring  Write down your questions so you remember to ask them during your visits  Manage A-fib:   · Know your target heart rate  Learn how to take your pulse and monitor your heart rate  · Manage other health conditions  This includes high blood pressure, sleep apnea, thyroid disease, diabetes, and other heart conditions  Take medicine as directed and follow your treatment plan  · Limit or do not drink alcohol  Alcohol can make a-fib hard to manage  Ask your healthcare provider if it is safe for you to drink alcohol  A drink of alcohol is 12 ounces of beer, 5 ounces of wine, or 1½ ounces of liquor  · Do not smoke  Nicotine and other chemicals in cigarettes and cigars can cause heart and lung damage  Ask your healthcare provider for information if you currently smoke and need help to quit  E-cigarettes or smokeless tobacco still contain nicotine  Talk to your healthcare provider before you use these products  · Eat heart-healthy foods  Heart healthy foods will help keep your cholesterol low  These include fruits, vegetables, whole-grain breads, low-fat dairy products, beans, lean meats, and fish   Replace butter and margarine with heart-healthy oils such as olive oil and canola oil      · Maintain a healthy weight  Ask your healthcare provider how much you should weigh  Ask him to help you create a weight loss plan if you are overweight  · Exercise for 30 minutes  most days of the week  Ask your healthcare provider about the best exercise plan for you  © 2017 [address] [employer] Information is for End User's use only and may not be sold, redistributed or otherwise used for commercial purposes  All illustrations and images included in CareNotes® are the copyrighted property of [employer]  or [address]  The above information is an  only  It is not intended as medical advice for individual conditions or treatments  Talk to your doctor, nurse or pharmacist before following any medical regimen to see if it is safe and effective for you

## 2017-10-13 NOTE — PROGRESS NOTES
Progress Note - Mona Sorto 81 y o  female MRN: [id number]    Unit/Bed#: [facility] 340-46 Encounter: [id number]      Assessment:  Mona Sorto is a 81y o  year old female with PMH of depression, osteoporosis, anxiety, chronic pain syndrome presented due to dizziness and SOB      Plan:  1  Dizziness and SOB in setting of New onset paroxysmal atrial fibrillation   -chest pain and dizziness resolved  - etiology electrolyte imbalance vs cardiac etiology   - echo today,  K 4 0   - metoprolol 50 mg daily per cardiology recommendations  - CHADVASC-3-hypertension, age, female, anticoagulation with NOACs- Eliquis per cardiology  - started eliquis 5 mg twice daily , discontinue Aspirin 81 mg     2  Complicated Grief with underlying Depression/Anxiety:   - Recent death ( suicide of grandson around 6 weeks ago)  - psych consulted: recommend continuation of Wellbutrin 150 mg daily, individual therapy but pt declined individual therapy at this time         3  Left knee swelling  -OA pt was recommended TKR  -h/o fall 2 weeks ago and pt complains of increase in swelling since then  -XR left knee: No fracture or dislocation  -continue pain regimen:percocet q6, tylenol  - outpt follow up with orthopedic surgeon    4  Heartburn  -started famotidine 20 mg daily     5  MARU: resolved after IVF, Likely pre-renal  -pt says she has not been eating or drinking a lot since death of her grandson    -Cr ( BS 1 2)  1 98->1 30-> 90, GFR 25->42->65      6  HTN: 136/87 today  Within goal   -Discontinue Amlodipine 5mg QD, Metoprolol succinate ER 50mg QD per cardio      7  Aortic root dilatation of ascending aorta : Aortic Aneurysm  Mild dilatation seen on Echo May 2016 , Echo ordered,  pending     8  Chronic Pain syndrome: Likely 2n2 to Degenerative oateoarthritic vertebra changes  MRI June 2017: spinal stenosis, foraminal stenosis     Follows pain management outpatient   Will continue regimen of  Percocet 5-325 q tablet q6hrs prn, Gabapentin 300mg TID     9  Insomnia: Continue home regimen of Ambien 10mg HA prn     10  Osteoporosis: Home regimen of weekly Alendronate Continue Vit D       11  Diet: Regular  12  DVT PPx: CSDs     13  PCP: Dr Tse     14  CODE Status: DNR/DNI     Dispo:  inpatient  Pending echo, anticipate discharge in 24 hours      Plan discussed with Dr Grande and [facility]     Subjective:   Pt seen and examined at bedside in morning  Pt slept well last night and had breakfast in morning   No acute events reported overnight          Objective:     Vitals:  Vitals:    10/12/17 1932 10/12/17 2243 10/13/17 0311 10/13/17 0649   BP: 101/63 136/86 102/66 136/87   Pulse: 60 76 72 76   Resp: 16 18 18 18   Temp: 97 9 °F (36 6 °C) 97 5 °F (36 4 °C) 97 8 °F (36 6 °C) 98 2 °F (36 8 °C)   TempSrc: Oral Oral Oral Oral   SpO2: 91% 94% 95% 93%   Weight:       Height:             Intake/Output Summary (Last 24 hours) at 10/13/17 0906  Last data filed at 10/13/17 0120   Gross per 24 hour   Intake            872 5 ml   Output              350 ml   Net            522 5 ml       Invasive Devices     Peripheral Intravenous Line            Peripheral IV 10/11/17 Right Antecubital 1 day                  Labs:   Admission on 10/11/2017   Component Date Value    Sodium 10/11/2017 144     Potassium 10/11/2017 3 9     Chloride 10/11/2017 110*    CO2 10/11/2017 25     Anion Gap 10/11/2017 9     BUN 10/11/2017 22     Creatinine 10/11/2017 1 98*    Glucose 10/11/2017 77     Calcium 10/11/2017 8 5     AST 10/11/2017 19     ALT 10/11/2017 23     Alkaline Phosphatase 10/11/2017 108     Total Protein 10/11/2017 7 2     Albumin 10/11/2017 3 2*    Total Bilirubin 10/11/2017 0 54     eGFR 10/11/2017 25     WBC 10/11/2017 9 54     RBC 10/11/2017 5 20*    Hemoglobin 10/11/2017 16 0*    Hematocrit 10/11/2017 48 4*    MCV 10/11/2017 93     MCH 10/11/2017 30 8     MCHC 10/11/2017 33 1     RDW 10/11/2017 14 4     MPV 10/11/2017 11 1     Platelets 57/49/8732 282  nRBC 10/11/2017 0     Neutrophils Relative 10/11/2017 70     Lymphocytes Relative 10/11/2017 19     Monocytes Relative 10/11/2017 10     Eosinophils Relative 10/11/2017 1     Basophils Relative 10/11/2017 0     Neutrophils Absolute 10/11/2017 6 60     Lymphocytes Absolute 10/11/2017 1 85     Monocytes Absolute 10/11/2017 0 91     Eosinophils Absolute 10/11/2017 0 09     Basophils Absolute 10/11/2017 0 04     Ventricular Rate 10/12/2017 54     Atrial Rate 10/12/2017 54     NC Interval 10/12/2017 158     QRSD Interval 10/12/2017 92     QT Interval 10/12/2017 428     QTC Interval 10/12/2017 405     P Axis 10/12/2017 84     QRS Axis 10/12/2017 46     T Wave Axis 10/12/2017 35     Protime 10/11/2017 14 0     INR 10/11/2017 1 08     PTT 10/11/2017 30     TSH 3RD GENERATON 10/11/2017 3 920*    Magnesium 10/11/2017 1 8     Phosphorus 10/11/2017 3 7     POC Troponin I 10/11/2017 0 01     Specimen Type 10/11/2017 VENOUS     Free T4 10/11/2017 1 17     Sodium 10/12/2017 143     Potassium 10/12/2017 4 3     Chloride 10/12/2017 111*    CO2 10/12/2017 25     Anion Gap 10/12/2017 7     BUN 10/12/2017 24     Creatinine 10/12/2017 1 30     Glucose 10/12/2017 92     Calcium 10/12/2017 8 4     AST 10/12/2017 15     ALT 10/12/2017 23     Alkaline Phosphatase 10/12/2017 100     Total Protein 10/12/2017 7 0     Albumin 10/12/2017 3 2*    Total Bilirubin 10/12/2017 0 62     eGFR 10/12/2017 42     Platelets 21/03/9372 233     MPV 10/12/2017 10 7     Ventricular Rate 10/12/2017 125     Atrial Rate 10/12/2017 163     QRSD Interval 10/12/2017 86     QT Interval 10/12/2017 304     QTC Interval 10/12/2017 438     P Axis 10/12/2017 0     QRS Axis 10/12/2017 40     T Wave Axis 10/12/2017 -25     Ventricular Rate 10/12/2017 67     Atrial Rate 10/12/2017 150     QRSD Interval 10/12/2017 90     QT Interval 10/12/2017 374     QTC Interval 10/12/2017 395     P Axis 10/12/2017 192     QRS Axis 10/12/2017 38     T Wave Axis 10/12/2017 30     Sodium 10/13/2017 141     Potassium 10/13/2017 4 0     Chloride 10/13/2017 110*    CO2 10/13/2017 25     Anion Gap 10/13/2017 6     BUN 10/13/2017 17     Creatinine 10/13/2017 0 90     Glucose 10/13/2017 86     Calcium 10/13/2017 8 5     eGFR 10/13/2017 65     WBC 10/13/2017 4 93     RBC 10/13/2017 4 38     Hemoglobin 10/13/2017 13 3     Hematocrit 10/13/2017 40 4     MCV 10/13/2017 92     MCH 10/13/2017 30 4     MCHC 10/13/2017 32 9     RDW 10/13/2017 14 6     MPV 10/13/2017 10 6     Platelets 39/06/2764 188     nRBC 10/13/2017 0     Neutrophils Relative 10/13/2017 56     Lymphocytes Relative 10/13/2017 34     Monocytes Relative 10/13/2017 8     Eosinophils Relative 10/13/2017 2     Basophils Relative 10/13/2017 0     Neutrophils Absolute 10/13/2017 2 74     Lymphocytes Absolute 10/13/2017 1 65     Monocytes Absolute 10/13/2017 0 41     Eosinophils Absolute 10/13/2017 0 10     Basophils Absolute 10/13/2017 0 02        Physical Exam   Constitutional: She is oriented to person, place, and time  She appears well-developed and well-nourished  HENT:   Head: Normocephalic and atraumatic  Eyes: Conjunctivae and EOM are normal    Cardiovascular: Normal rate  Irregular   Pulmonary/Chest: Effort normal and breath sounds normal    Musculoskeletal:   Left knee joint: swelling+,tenderness +   Neurological: She is alert and oriented to person, place, and time  Skin: Skin is warm and dry         Current Facility-Administered Medications   Medication Dose Route Frequency    acetaminophen (TYLENOL) tablet 650 mg  650 mg Oral Q6H PRN    apixaban (ELIQUIS) tablet 5 mg  5 mg Oral BID    aspirin chewable tablet 81 mg  81 mg Oral Daily    buPROPion (WELLBUTRIN XL) 24 hr tablet 150 mg  150 mg Oral Daily    calcium carbonate (TUMS) chewable tablet 1,000 mg  1,000 mg Oral Daily PRN    cholecalciferol (VITAMIN D3) tablet 800 Units  800 Units Oral Daily    docusate sodium (COLACE) capsule 100 mg  100 mg Oral BID    famotidine (PEPCID) tablet 20 mg  20 mg Oral Daily    gabapentin (NEURONTIN) capsule 300 mg  300 mg Oral TID    metoprolol succinate (TOPROL-XL) 24 hr tablet 50 mg  50 mg Oral Daily    ondansetron (ZOFRAN) injection 4 mg  4 mg Intravenous Q6H PRN    oxyCODONE-acetaminophen (PERCOCET) 5-325 mg per tablet 1 tablet  1 tablet Oral Q6H PRN    zolpidem (AMBIEN) tablet 10 mg  10 mg Oral HS PRN     No Known Allergies      Imaging/Other:    X ray left knee:  No fracture or dislocation    VTE Pharmacologic Prophylaxis: Sequential compression device (Venodyne)   VTE Mechanical Prophylaxis: sequential compression device      Be Agustin MD  Family Medicine  PGY-1

## 2017-10-14 NOTE — PHYSICIAN ADVISOR
Current patient class: Inpatient  The patient is currently on Hospital Day: 3      The patient was admitted to the hospital at [id number] on 10/11/17 for the following diagnosis: Anxiety [F41 9]  Grief reaction [F43 20]  Depression [F32 9]  New onset atrial fibrillation (HCC) [I48 91]  Bipolar affective disorder, rapid cycling ([address] ) [F31 9]  Grief reaction with prolonged bereavement [F43 21]       There is documentation in the medical record of an expected length of stay of at least 2 midnights  The patient is therefore expected to satisfy the 2 midnight benchmark and given the 2 midnight presumption is appropriate for INPATIENT ADMISSION  Given this expectation of a satisfying stay, CMS instructs us that the patient is most often appropriate for inpatient admission under part A provided medical necessity is documented in the chart  After review of the relevant documentation, labs, vital signs and test results, the patient is appropriate for INPATIENT ADMISSION  Admission to the hospital as an inpatient is a complex decision making process which requires the practitioner to consider the patients presenting complaint, history and physical examination and all relevant testing  With this in mind, in this case, the patient was deemed appropriate for INPATIENT ADMISSION  After review of the documentation and testing available at the time of the admission I concur with this clinical determination of medical necessity  Rationale is as follows:     The patient is a 81 yrs old Female who presented to the ED at 10/11/2017  3:20 PM with a chief complaint of Dizziness (dizziness and sob since 5am, patient called PCP who advised to come to ER)    The patients vitals on arrival were ED Triage Vitals [10/11/17 1548]   Temperature Pulse Respirations Blood Pressure SpO2   97 7 °F (36 5 °C) (!) 137 16 119/93 93 %      Temp Source Heart Rate Source Patient Position - Orthostatic VS BP Location FiO2 (%)   Oral Monitor Sitting Right arm --      Pain Score       No Pain           Past Medical History:   Diagnosis Date    Aortic aneurysm ([address] )     Arthritis     Hypertension     Psychiatric disorder     anxiety     Past Surgical History:   Procedure Laterality Date    HIP SURGERY      JOINT REPLACEMENT             Consults have been placed to:   IP CONSULT TO CARDIOLOGY  IP CONSULT TO PSYCHIATRY    Vitals:    10/13/17 0311 10/13/17 0649 10/13/17 1043 10/13/17 1500   BP: 102/66 136/87 114/86 137/75   Pulse: 72 76 84 (!) 6   Resp: 18 18 16 18   Temp: 97 8 °F (36 6 °C) 98 2 °F (36 8 °C) 98 °F (36 7 °C) 97 9 °F (36 6 °C)   TempSrc: Oral Oral Oral Oral   SpO2: 95% 93% 90% 92%   Weight:       Height:           Most recent labs:    Recent Labs      10/11/17   1533  10/12/17   0503  10/13/17   0425  10/13/17   0453   WBC  9 54   --   4 93   --    HGB  16 0*   --   13 3   --    HCT  48 4*   --   40 4   --    PLT  282  233  188   --    K  3 9  4 3   --   4 0   NA  144  143   --   141   CALCIUM  8 5  8 4   --   8 5   BUN  22  24   --   17   CREATININE  1 98*  1 30   --   0 90   INR  1 08   --    --    --    AST  19  15   --    --    ALT  23  23   --    --    ALKPHOS  108  100   --    --    BILITOT  0 54  0 62   --    --        Scheduled Meds:  Continuous Infusions:  No current facility-administered medications for this encounter  PRN Meds:      Surgical procedures (if appropriate):

## 2017-10-23 ENCOUNTER — ALLSCRIPTS OFFICE VISIT (OUTPATIENT)
Dept: OTHER | Facility: OTHER | Age: 72
End: 2017-10-23

## 2017-10-27 ENCOUNTER — GENERIC CONVERSION - ENCOUNTER (OUTPATIENT)
Dept: OTHER | Facility: OTHER | Age: 72
End: 2017-10-27

## 2017-11-01 ENCOUNTER — GENERIC CONVERSION - ENCOUNTER (OUTPATIENT)
Dept: OTHER | Facility: OTHER | Age: 72
End: 2017-11-01

## 2017-11-01 ENCOUNTER — ALLSCRIPTS OFFICE VISIT (OUTPATIENT)
Dept: OTHER | Facility: OTHER | Age: 72
End: 2017-11-01

## 2017-11-27 ENCOUNTER — ALLSCRIPTS OFFICE VISIT (OUTPATIENT)
Dept: OTHER | Facility: OTHER | Age: 72
End: 2017-11-27

## 2017-11-27 ENCOUNTER — GENERIC CONVERSION - ENCOUNTER (OUTPATIENT)
Dept: OTHER | Facility: OTHER | Age: 72
End: 2017-11-27
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
## 2018-01-10 NOTE — MISCELLANEOUS
Message   Recorded as Task   Date: 03/13/2017 08:18 PM, Created By: Farshad Ureña   Task Name: Follow Up   Assigned To: [facility],Team   Regarding Patient: Remigio BOUDREAUX, Status: Active   CommentDaris Pitch - 13 Mar 2017 8:18 PM     TASK CREATED  Please call the patient and advise her that I did discuss her medication regimen with Dr Les Jones and at this point with the issues in the past, he is not comfortable prescribing any opioid medications  At this point we will only provide non-opioid therapies or medications  As we discussed at her last office visit, she is welcome to seek out a second pain management opinion and we can provide her with a list of local providers, but as long as she is being seen by our practice it will be utilizing non-opioid therapy  Rosalee Coronado - 15 Mar 2017 11:19 AM     TASK EDITED  Spoke to pt, verbalized understanding  Copt of other pain management doctors placed in the mail  Confirmed w/pt home address  Active Problems    1  Analgesic use (V58 69) (Z79 899)   2  Aortic aneurysm (441 9) (I71 9)   3  Chronic bilateral low back pain with bilateral sciatica (724 2,724 3,338 29)   (M54 42,M54 41,G89 29)   4  Colon cancer screening (V76 51) (Z12 11)   5  Depression with anxiety (300 4) (F41 8)   6  Encounter for long-term opiate analgesic use (V58 69) (Z79 891)   7  Flu vaccine need (V04 81) (Z23)   8  Hypertension (401 9) (I10)   9  Insomnia (780 52) (G47 00)   10  Left knee pain (719 46) (M25 562)   11  Lumbar canal stenosis (724 02) (M48 06)   12  Lumbar radiculopathy (724 4) (M54 16)   13  Myofascial pain syndrome (729 1) (M79 1)   14  Noncompliance of patient with other medical treatment and regimen (V15 81) (Z91 19)   15  Opioid dependence (304 00) (F11 20)   16  Osteoarthritis, unspecified osteoarthritis type, unspecified site (715 90) (M19 90)   17  Osteoporosis (733 00) (M81 0)   18  Pain syndrome, chronic (338 4) (G89 4)   19   Postmenopausal (V49 81) (Z78 0)   20  Right shoulder pain (719 41) (M25 511)   21  Screening for osteoporosis (V82 81) (Z13 820)   22  Spondylosis of lumbar region without myelopathy or radiculopathy (721 3) (M47 816)   23  Visit for screening mammogram (V76 12) (Z12 31)   24  Vitamin D deficiency (268 9) (E55 9)    Current Meds   1  Clobetasol Propionate E 0 05 % External Cream; APPLY AND GENTLY MASSAGE INTO   AFFECTED AREA(S) TWICE DAILY; Therapy: 61VBR5083 to (Evaluate:07Oct2016)  Requested for: 44Hjd2958; Last   Rx:41Vth9227 Ordered   2  DiazePAM 2 MG Oral Tablet; TAKE 1 TABLET 3 times daily PRN; Therapy: 27DLA5201 to (Evaluate:29Mar2017)  Requested for: 12BPC1418; Last   Rx:92Rzj4703 Ordered   3  Diclofenac Sodium 1 % Transdermal Gel (Voltaren); Apply to affected joints 2 to 3 times   daily  Prescribed by Dr Mary Grace Glover; Therapy: 65IMP5048 to (Evaluate:06Jun2017)  Requested for: 50CCC0141; Last   Rx:08Mar2017 Ordered   4  DULoxetine HCl - 30 MG Oral Capsule Delayed Release Particles (Cymbalta); take 1   capsule daily; Therapy: 63AFR2209 to (Eli Canales)  Requested for: 87IFZ5387; Last   Rx:08Mar2017 Ordered   5  Gabapentin 300 MG Oral Capsule; TAKE 2 CAPSULES 3 TIMES DAILY; Therapy: 80ZRN9878 to (Evaluate:11Jun2017)  Requested for: 32Cwg6025; Last   Rx:05Aef4868 Ordered   6  Metoprolol Succinate ER 50 MG Oral Tablet Extended Release 24 Hour; TAKE 1 TABLET   DAILY; Therapy: 77Upm2838 to (Brook Perez)  Requested for: 34Pto6805; Last   Rx:73Txa4646 Ordered   7  Zolpidem Tartrate 10 MG Oral Tablet (Ambien); TAKE 1 TABLET AT BEDTIME AS   NEEDED; Therapy: 69XRV1658 to (Evaluate:29Mar2017); Last Rx:23Ylg5516 Ordered    Allergies    1   Xanax TABS    Signatures   Electronically signed by : Javid Florentino RN; Mar 15 2017 11:19AM EST                       (Author)

## 2018-01-10 NOTE — RESULT NOTES
Message   Recorded as Task   Date: 09/14/2016 03:23 PM, Created By: Anna Martínez   Task Name: Med Renewal Request   Assigned To: [facility]   Regarding Patient: Ronnie Mendez, Status: Active   Comment:    Sharron Metzger - 14 Sep 2016 3:23 PM     TASK CREATED  Caller: Self; Renew Medication; [phone] (Home)  Recieved a TC from the pt  today stating the gabapentin and voltaren gel were not ordered  Looks like it wa ordered through [employer]  Pt  is requesting it to be sent to [facility]  Can you please order? Thanks   Genesis Schwab - 14 Sep 2016 4:52 PM     TASK REPLIED TO: Previously Assigned To Pradip Soriano  The prescriptions were sent to the [facility] pharmacy thank you   Sharron Metzger - 19 Sep 2016 8:57 AM     TASK EDITED   Sharron Metzger - 19 Sep 2016 9:01 AM     TASK EDITED  Pt  [place]          Signatures   Electronically signed by : Al Robbins, ; Sep 19 2016  9:02AM EST                       (Author)

## 2018-01-10 NOTE — MISCELLANEOUS
Assessment    1  Atrial fibrillation, new onset (427 31) (I48 91)   2  Hypertension (401 9) (I10)   3  Depression with anxiety (300 4) (F41 8)   4  Osteoporosis (733 00) (M81 0)   5  Insomnia (780 52) (G47 00)    Plan  Aortic aneurysm    · BuPROPion HCl ER (XL) 150 MG Oral Tablet Extended Release 24 Hour  (Wellbutrin XL); TAKE 1 TABLET DAILY   Rx By: Marivel Glover; Dispense: 30 Days ; #:30 Tablet Extended Release 24 Hour; Refill: 1; For: Aortic aneurysm; VIOLETA = N; Verified Transmission to  [address]; Last Updated By: System Mercantila; 10/23/2017 11:33:58 AM  Hypertension    · Furosemide 40 MG Oral Tablet; Take one tablet as needed   Rx By: Marivel Glover; Dispense: 0 Days ; #:30 Tablet; Refill: 0; For: Hypertension; VIOLETA = N; Record   · Metoprolol Succinate ER 50 MG Oral Tablet Extended Release 24 Hour; TAKE 2  TABLETS DAILY   Rx By: Marivel Glover; Dispense: 30 Days ; #:60 Tablet Extended Release 24 Hour; Refill: 3; For: Hypertension; VIOLETA = N; Verified Transmission to  [address]; Last Updated By: System, SureScripts; 10/23/2017 11:34:08 AM  Insomnia    · Zolpidem Tartrate 10 MG Oral Tablet (Ambien); TAKE 1 TABLET AT BEDTIME AS  NEEDED   Rx By: Marivel Glover; Dispense: 30 Days ; #:30 Tablet; Refill: 0; For: Insomnia; VIOLETA = N; Call Rx  Lumbar radiculopathy, Pain syndrome, chronic    · Gabapentin 300 MG Oral Capsule; TAKE 2 CAPSULES 3 TIMES DAILY   Rx By: Marivel Glover; Dispense: 30 Days ; #:180 Capsule; Refill: 5; For: Lumbar radiculopathy, Pain syndrome, chronic; VIOLETA = N; Verified Transmission to  [address]; Last Updated By: System, SureScripts; 10/23/2017 11:33:54 AM  Osteoporosis    · Alendronate Sodium 70 MG Oral Tablet; take one tablet weekly   Rx By: Marivel Glover; Dispense: 0 Days ; #:4 Tablet;  Refill: 3; For: Osteoporosis; VIOLETA = N; Verified Transmission to  [address]; Last Updated By: System, SureScripts; 10/23/2017 11:34:03 AM    Discussion/Summary  Discussion Summary:   1)A-fib - continue eliquis and follow up with cardiology  2) sweating - could be related to buproprion which has a side effect of diaphoresis but don't want to change this at this time due to her complicated grief reaction  3)Stopped baclofen due to the a-fib - she will discuss this with pain management  4)HTN - well controlled - continue medication  5)Depression/grief reaction due to her grandson's death  Will reach out to  again to give her the behavioral health resources that were previously set up for her  She does seem amenable to this now  follow up 4-6 weeks  Chief Complaint  Chief Complaint Chronic Condition [facility]: Patient is here today for follow up of chronic conditions described in HPI  History of Present Illness  TCM Communication [facility]: The patient is being contacted for follow-up after hospitalization and 10/23/17  Hospital records were reviewed  She was hospitalized at [facility]  The dates of hospitalization:, date of admission: 10/11/17, date of discharge: 10/13/17  Diagnosis: Atrial Fiblrillation  She was discharged to home  Medications reviewed and updated today  She scheduled a follow up appointment  Follow-up appointments with other specialists: Cardiology  Symptoms: dizziness  The patient is currently experiencing symptoms  Topics counseled included instructions for management and activities of daily living     Communication performed and completed by Jese Birmingham LPN   HPI: Admission Date: 10/11/2017  Discharge Date: 10/13/2017 Condition at Discharge: good  Disposition: Home  Â  Â  Admitting Diagnosis:  Principal Problem:  Atrial fibrillation ([facility] ) Active Problems:  MARU (acute kidney injury) ([facility] [address] )  Grief reaction with prolonged bereavement  Dizziness  Chronic pain  Osteoporosis  HTN (hypertension)  Depression  Anxiety Â  Â  Â  Â  Important Physician Related Follow Up:  Â· Follow up with Demar GREEN 2  [place] within 2 weeks   Follow up new medications, including Eliquis and increased Metoprolol  Â· Follow up with cardiology outpatient within 3-4 weeks Â· Follow up with ortho outpatient for chronic Left knee pain  [facility] Course:  Tanmay Cordova a 81 y  o Â year old femaleÂ with past medical history of hypertension, AAA, depression,osteoporosis who presented to the ED with chief complaint of acute onset of shortness of breath with dizziness  She had recently lost her grandson due to suicide at home and was dealing with severe grief since then  She reported she had not been eating properly and did not feel like doing anything and on day of presentation, when she woke up she felt that she could not breath   She also felt very dizzy and light headed and was brought to ED by EMS  In ED, EKG was done and she was found to be AFib with RVR and was given 15 mg of Cardizem with which she reverted back into sinus rhythm  Troponin was done which was negative, BMP was done which showed pt to be in mild MARU  Cr was 1 98 (baseline 1 2) and GFR was 25, the patient was hydrated with fluids and MARU resolved  Pt was placed on telemetry and observed overnight  Cardiology was consulted and they recommended increasing her metoprolol from 50 mg once daily to twice daily  Pt was started on anticoagulant Eliquis 5 mg twice daily because of CHADVASC Score of 3 - hypertension, age,female echo was done on 10/13/17 and showed EF of 60% with Grade 1 diastolic dysfunction,there was dilatation of the ascending aorta at 4 1 cm in anteroposterior diameter   Psychiatry was also consulted for grief reaction and they recommended continuation of Wellbutrin 150 mg daily and individual therapy but pt refused individual therapy at this time  Pt also reported of falling and injuring her left knee ago  Left knee was swollen on exam  X ray was done but no fracture or dislocation was seen   Pt has been previously recommended Total knee Replacement of her left knee joint  Pt to follow up with orthopedic surgeon outpatient  On day of discharge pt had complete resolution of symptoms, ambulating  She was discharged with advise to follow up with PCP at [facility] within 2 weeks, cardiologist within 3-4 weeks and with orthopedic surgeon  SHAMIKA  Â  Consults: Cardiology Psychiatry Â  Complications: None Â  Â  Procedures Performed: none Â  Significant Findings/Abnormal Results with this admission: Â  Imaging/Other: EKG: a fib rate 125/ -> Repeat EKG Sinus bradycardia with sinus arrhythmia with junctional escape  WBC: 9 54->4 93 Hb: 16->13 3 Plat: 282->188 INR: 1 08 Na: 144->143->141 K: 3 9-> 4 3->4 0 Cr ( Baseline 1 2) 1 98'->1 30->0 9 GFR: 25-> 42->65 TSH: 3 920 T4: 1 17 Â  CXR 10/11/17:  small left pleural effusion and/or atelectasis/infiltrate  Â  Left knee xray 10/12/17  No fracture or dislocation Â  Echo 10/13/17  EF of 60% with Grade 1 diastolic dysfunction,there was dilatation of the ascending aorta at 4 1 cm in anteroposterior diameter   Sonia Layer Â  Exam on Day of Discharge:  Vitals Vitals: Â  10/12/17 2243 10/13/17 0311 10/13/17 0649 10/13/17 1043 BP: 136/86 102/66 136/87 114/86 Pulse: 76 72 76 84 Resp: 18 18 18 16 Temp: 97 5  degrees F (36 4  degrees C) 97 8  degrees F (36 6  degrees C) 98 2  degrees F (36 8  degrees C) 98  degrees F (36 7  degrees C) TempSrc: Oral Oral Oral Oral SpO2: 94% 95% 93% 90% Weight: Â  Â  Â  Â  Height: Â  Â  Â  Â   Â  Physical Exam  Constitutional: She is oriented to person, place, and time  She appears well-developed and well-nourished  HENT:  Head: Normocephalic and atraumatic  Eyes: Conjunctivae and EOM are normal   Cardiovascular: Normal rate  Irregular  Pulmonary/Chest: Effort normal and breath sounds normal   Abdominal: Soft  Musculoskeletal:  Left knee:swelling +,edema +  Neurological: She is alert and oriented to person, place, and time  Skin: Skin is warm and dry    Busnani Quiroz Â  Discharge instructions/Information to patient and family:  See after visit summary for information provided to patient and family  Â  Provisions for Follow-Up Care: See after visit summary for information related to follow-up care and any pertinent home health orders  Â  Planned Readmission: No Â  Discharge Statement  I spent 30 minutes discharging the patient  This time was spent on the day of discharge  I had direct contact with the patient on the day of discharge  Additional documentation is required if more than 30 minutes were spent on discharge  Â  Discharge Medications: See after visit summary for reconciled discharge medications provided to patient and family  Medication changes made with this admission: Â· Eliquis 5 mg twice daily Â· Metoprolol 50 mg twice daily Â· Stop aspirin 81 mg, amlodipine 5 mg Â· Continue Wellbutrin 150 mg once daily  Patient discharged home, reviewed all medications  Since being home she has had one episode of vertigo, no other symptoms  She is able to manage all of her daily living activities  Seen on 10/23 regarding the above hospitalization -   she was found to have A-fib  started on eliquis  she has noted increased nausea but is relieved by zantac and has not changed her appetitie  she has a follow up in place with cardiology for NOv1  I did address the issue of her grandson's death and she again became very tearful and told me that she couldn't talk about it because it was too painful  we were able to find her some counseling through our  and behavioral health which at the last visit the patient refused when they finally got in touch with her  She now says she regrets having done that and thinks that she does need some help    she also does complain about diaphoresis on occasion which is new for her  TSH done as inpatient I believe as part of a-fib work up  Review of Systems  Complete-Female:   Constitutional: no fever, not feeling poorly and no chills  Cardiovascular: no chest pain and no palpitations  Respiratory: no shortness of breath  Musculoskeletal: no arthralgias and no myalgias  Integumentary: no rashes and no skin lesions  Neurological: no headache  Active Problems    1  Analgesic use (V58 69) (Z79 899)   2  Aortic aneurysm (441 9) (I71 9)   3  Atrial fibrillation, new onset (427 31) (I48 91)   4  Chronic bilateral low back pain with bilateral sciatica (724 2,724 3,338 29)   (M54 42,M54 41,G89 29)   5  Colon cancer screening (V76 51) (Z12 11)   6  Depression with anxiety (300 4) (F41 8)   7  Encounter for long-term opiate analgesic use (V58 69) (Z79 891)   8  Flu vaccine need (V04 81) (Z23)   9  Hypertension (401 9) (I10)   10  Insomnia (780 52) (G47 00)   11  Left knee pain (719 46) (M25 562)   12  Lumbar canal stenosis (724 02) (M48 061)   13  Lumbar radiculopathy (724 4) (M54 16)   14  Myofascial pain syndrome (729 1) (M79 1)   15  Noncompliance of patient with other medical treatment and regimen (V15 81) (Z91 19)   16  Opioid dependence (304 00) (F11 20)   17  Osteoarthritis, unspecified osteoarthritis type, unspecified site (715 90) (M19 90)   18  Osteoporosis (733 00) (M81 0)   19  Pain syndrome, chronic (338 4) (G89 4)   20  Postmenopausal (V49 81) (Z78 0)   21  Right shoulder pain (719 41) (M25 511)   22  Screening for genitourinary condition (V81 6) (Z13 89)   23  Screening for osteoporosis (V82 81) (Z13 820)   24  Spondylosis of lumbar region without myelopathy or radiculopathy (721 3) (M47 816)   25  Visit for screening mammogram (V76 12) (Z12 31)   26  Vitamin D deficiency (268 9) (E55 9)    Past Medical History    1  History of Acute deep vein thrombosis of lower limb, unspecified laterality   2  History of Dislocation Of The Hip (835 00)   3  Need for pneumococcal vaccination (V03 82) (Z23)    Surgical History    1  History of Hip Replacement   2  History of Knee Arthroscopy (Therapeutic)   3   History of Tubal Ligation    Family History  Mother    1  Family history of Colon carcinoma   2  Family history of Mother  At Age 55  Father    3  Family history of Father  At Age 78  Family History    4  Family history of Cancer   5  Family history of Hypertension (V17 49)   6  Family history of Thyroid Disorder (V18 19)    Social History    · Being A Social Drinker   · Never A Smoker   · Never Used Drugs    Current Meds   1  Alendronate Sodium 70 MG Oral Tablet; take one tablet weekly; Therapy: (Ashley Montaño) to Recorded   2  BuPROPion HCl ER (XL) 150 MG Oral Tablet Extended Release 24 Hour; TAKE 1   TABLET DAILY; Therapy: 27QON7569 to (Cody Polk)  Requested for: 02XJF8732; Last   Rx:55Row5755 Ordered   3  Eliquis 5 MG Oral Tablet; Take 1 tablet twice daily; Therapy: 55MKB1470 to (Evaluate:2017); Last Rx:2017 Ordered   4  Furosemide 40 MG Oral Tablet; Take one tablet as needed; Therapy: (Ashley Montaño) to Recorded   5  Gabapentin 300 MG Oral Capsule; TAKE 2 CAPSULES 3 TIMES DAILY; Therapy: 64YSP4937 to (Evaluate:2017)  Requested for: 92Kaw9936; Last   Rx:87Ovy0992 Ordered   6  Meloxicam 7 5 MG Oral Tablet; take 1 tablet by mouth every day; Therapy: 65TEJ9315 to (Evaluate:2017)  Requested for: 96CUZ5790; Last   Rx:93Hur4494 Ordered   7  Metoprolol Succinate ER 50 MG Oral Tablet Extended Release 24 Hour; TAKE 2   TABLETS DAILY; Therapy: 06Ray9452 to (Evaluate:2017)  Requested for: 79HBO4550; Last   Rx:2017 Ordered   8  Oxycodone-Acetaminophen  MG Oral Tablet; TAKE 1 TABLET 4 TIMES DAILY AS   NEEDED FOR PAIN;   Therapy: (Recorded:19Vwx3612) to Recorded   9  RaNITidine HCl - 150 MG Oral Tablet; TAKE 1 TABLET AT BEDTIME; Therapy: 01YHN4908 to (Evaluate:02Ubl9063) Recorded   10  Vitamin D3 5000 UNIT Oral Tablet; take one tablet daily; Therapy: (Ashley Montaño) to Recorded   11   Zolpidem Tartrate 10 MG Oral Tablet; TAKE 1 TABLET AT BEDTIME AS NEEDED; Therapy: 61Ieb4176 to (03 17 74 30 53); Last Rx:15Ukr6639 Ordered  Medication List Reviewed: The medication list was reviewed and updated today  Allergies    1  Xanax TABS    Vitals  Signs   Recorded: 33UKS7937 11:03AM   Temperature: 97 F  Heart Rate: 68  Respiration: 16  Systolic: 830  Diastolic: 98  Height: 5 ft 4 5 in  Weight: 191 lb 6 oz  BMI Calculated: 32 34  BSA Calculated: 1 93  Pain Scale: 8    Physical Exam    Constitutional   General appearance: No acute distress, well appearing and well nourished  Eyes   Conjunctiva and lids: No swelling, erythema or discharge  Cardiovascular   Auscultation of heart: Normal rate and rhythm, normal S1 and S2, without murmurs  Examination of extremities for edema and/or varicosities: Normal     Abdomen   Abdomen: Non-tender, no masses  Musculoskeletal   Gait and station: Normal     Inspection/palpation of joints, bones, and muscles: Normal     Skin   Skin and subcutaneous tissue: Normal without rashes or lesions  Neurologic   Cranial nerves: Cranial nerves 2-12 intact  Psychiatric   Orientation to person, place, and time: Normal     Mood and affect: Normal          Message   Recorded as Task   Date: 10/13/2017 04:36 PM, Created By: System   Task Name: [facility]   Assigned To: [facility] triage,Team   Regarding Patient: MAGDALENO TARA D, Status:  In Progress   Comment:    System - 13 Oct 2017 4:36 PM     Patient discharged from hospital   Patient Name: Dejon Garcia  Patient YOB: 1945  Discharge Date: 10/13/2017  Facility: [place] [address] Oct 2017 9:33 AM     TASK REASSIGNED: Previously Assigned To [facility] 16 Oct 2017 5:47 PM     TASK IN PROGRESS     Future Appointments    Date/Time Provider Specialty Site   11/27/2017 11:30 AM Madelyn Shell[address]   11/01/2017 02:00 PM Tamiko De Anda MD Internal Medicine [facility] Cece Hand     Signatures   Electronically signed by : Hossein Cruz DO; Oct 23 2017  4:03PM EST                       (Author)

## 2018-01-10 NOTE — MISCELLANEOUS
Reason For Visit  Reason For Visit Free Text Note Form: No return call received from pt  and another message left today  Call to [facility]- counseling and appointment that had previously been scheduled for pt  for 10/10 has been cancelled as pt  has been unable to be notified of appointment date and time  Will await return call and continue to be available to provide support  Active Problems    1  Analgesic use (V58 69) (Z79 899)   2  Aortic aneurysm (441 9) (I71 9)   3  Chronic bilateral low back pain with bilateral sciatica (724 2,724 3,338 29)   (M54 42,M54 41,G89 29)   4  Colon cancer screening (V76 51) (Z12 11)   5  Depression with anxiety (300 4) (F41 8)   6  Encounter for long-term opiate analgesic use (V58 69) (Z79 891)   7  Flu vaccine need (V04 81) (Z23)   8  Hypertension (401 9) (I10)   9  Insomnia (780 52) (G47 00)   10  Left knee pain (719 46) (M25 562)   11  Lumbar canal stenosis (724 02) (M48 061)   12  Lumbar radiculopathy (724 4) (M54 16)   13  Myofascial pain syndrome (729 1) (M79 1)   14  Noncompliance of patient with other medical treatment and regimen (V15 81) (Z91 19)   15  Opioid dependence (304 00) (F11 20)   16  Osteoarthritis, unspecified osteoarthritis type, unspecified site (715 90) (M19 90)   17  Osteoporosis (733 00) (M81 0)   18  Pain syndrome, chronic (338 4) (G89 4)   19  Postmenopausal (V49 81) (Z78 0)   20  Right shoulder pain (719 41) (M25 511)   21  Screening for genitourinary condition (V81 6) (Z13 89)   22  Screening for osteoporosis (V82 81) (Z13 820)   23  Spondylosis of lumbar region without myelopathy or radiculopathy (721 3) (M47 816)   24  Visit for screening mammogram (V76 12) (Z12 31)   25  Vitamin D deficiency (268 9) (E55 9)    Current Meds   1  Adult Aspirin EC Low Strength 81 MG Oral Tablet Delayed Release; TAKE 1 TABLET   DAILY; Therapy: (William Uriarte) to Recorded   2  Alendronate Sodium 70 MG Oral Tablet; take one tablet weekly;    Therapy: (Laurie Rich) to Recorded   3  AmLODIPine Besylate 5 MG Oral Tablet; take one tablet daily; Therapy: (Laurie Rich) to Recorded   4  BuPROPion HCl ER (XL) 150 MG Oral Tablet Extended Release 24 Hour (Wellbutrin XL);   TAKE 1 TABLET DAILY; Therapy: 74LMK2991 to (Josue Sanabria)  Requested for: 83SFO6179; Last   Rx:30Ulz1032 Ordered   5  DULoxetine HCl - 60 MG Oral Capsule Delayed Release Particles; TAKE 2 CAPSULES   AT BEDTIME  Requested for: 49Wfx5903; Last Rx:72Jmq0081 Ordered   6  Furosemide 40 MG Oral Tablet; Take one tablet as needed; Therapy: (Laurie Rich) to Recorded   7  Gabapentin 300 MG Oral Capsule; TAKE 2 CAPSULES 3 TIMES DAILY; Therapy: 38BOA8195 to (Evaluate:11Jun2017)  Requested for: 10Afp9296; Last   Rx:72Sss6180 Ordered   8  Meloxicam 7 5 MG Oral Tablet; take 1 tablet by mouth every day; Therapy: 92ZUJ9874 to (Evaluate:12Nov2017)  Requested for: 75UJB0178; Last   Rx:13Cwu9955 Ordered   9  Metoprolol Succinate ER 50 MG Oral Tablet Extended Release 24 Hour; TAKE 1 TABLET   DAILY; Therapy: 71Ced6476 to (Evaluate:45Tny5913)  Requested for: 66Ofl8093; Last   Rx:95Otf2952 Ordered   10  Oxycodone-Acetaminophen  MG Oral Tablet; TAKE 1 TABLET 4 TIMES DAILY AS    NEEDED FOR PAIN;    Therapy: (Recorded:68Dga7035) to Recorded   11  Vitamin D3 5000 UNIT Oral Tablet; take one tablet daily; Therapy: (Laurie Rich) to Recorded   12  Zolpidem Tartrate 10 MG Oral Tablet (Ambien); TAKE 1 TABLET AT BEDTIME AS    NEEDED; Therapy: 09Apr2014 to (21 ); Last Rx:61Ujl8103 Ordered    Allergies    1   Xanax TABS    Signatures   Electronically signed by : ANNALISA Valera; Oct  6 2017  3:17PM EST                       (Author)

## 2018-01-11 NOTE — MISCELLANEOUS
Message   Recorded as Task   Date: 09/29/2016 01:49 PM, Created By: Pam Razo   Task Name: Follow Up   Assigned To: [facility],Team   Regarding Patient: Jefrey Meigs, Status: Active   Comment:    Rosalee Coronado - 29 Sep 2016 1:49 PM     TASK CREATED  Caller: Self; General Medical Question; [phone] (Home)  Pt called stating that she saw Dr Soriano at last appt and her oxycontin 12 hour was stopped  She does not want to return on the medication but she is requesting something long acting  Pt is currently taking percocet 7 5mg qid and she in pain all day  Her pain is in the left lower back 8/10 dull,throbbing,ache that never goes away  Pt would like to go back on either the fentanyl patch or morphine 5mg and she will take something for the constipation if it occurs again  Informed pt that I would give all information to Dr Starks and call w/recommendations  Pt has no f/u scheduled  Joy Gutierrez - 29 Sep 2016 2:32 PM     TASK REPLIED TO: Previously Assigned To [facility] [facility],Team                      I reviewed Dr Mando Bishop note and it should be best to remain on the oxycodone 7 5/325mg  We have changed medications few times  She can also consider the injection as discussed  Rosalee Coronado - 29 Sep 2016 3:05 PM     TASK EDITED  Pt aware, she states she is scared to death of needles  Would like to discuss w/Dr Gutierrez  SOVS 10/27/16  Active Problems    1  Analgesic use (V58 69) (Z79 899)   2  Aortic aneurysm (441 9) (I71 9)   3  Depression with anxiety (300 4) (F41 8)   4  Ecchymosis (459 89) (R58)   5  Eczema (692 9) (L30 9)   6  Edema (782 3) (R60 9)   7  Hypertension (401 9) (I10)   8  Insomnia (780 52) (G47 00)   9  Left knee pain (719 46) (M25 562)   10  Lumbar canal stenosis (724 02) (M48 06)   11  Lumbar radiculopathy (724 4) (M54 16)   12  Myofascial pain syndrome (729 1) (M79 1)   13   Noncompliance of patient with other medical treatment and regimen (V15 81) (Z91 19)   14  Opioid dependence (304 00) (F11 20)   15  Osteoarthritis, unspecified osteoarthritis type, unspecified site (715 90) (M19 90)   16  Osteoporosis (733 00) (M81 0)   17  Pain syndrome, chronic (338 4) (G89 4)   18  Right shoulder pain (719 41) (M25 511)   19  Spondylosis of lumbar region without myelopathy or radiculopathy (721 3) (M47 816)   20  Visit for screening mammogram (V76 12) (Z12 31)   21  Vitamin D deficiency (268 9) (E55 9)    Current Meds   1  AmLODIPine Besylate 5 MG Oral Tablet; TAKE 1 TABLET DAILY FOR BLOOD   PRESSURE; Therapy: 73FXQ2306 to (Evaluate:88Chs8211)  Requested for: 74Qwq3486; Last   Rx:07Sep2016 Ordered   2  Clobetasol Propionate E 0 05 % External Cream; APPLY AND GENTLY MASSAGE INTO   AFFECTED AREA(S) TWICE DAILY; Therapy: 42LWS0783 to (Evaluate:07Oct2016)  Requested for: 85Scl5294; Last   Rx:94Vzj3929 Ordered   3  Diazepam 2 MG Oral Tablet; TAKE 1 TABLET 3 times daily PRN; Therapy: 55QER6140 to (Jeanette Alfred)  Requested for: 02IZM9101; Last   Rx:45Exk2426 Ordered   4  Diclofenac Sodium 1 % Transdermal Gel (Voltaren); Apply to affected joints 2 to 3 times   daily  Prescribed by Dr Kate Gerardo; Therapy: 44ECA3700 to (Last Rx:44Eke6819)  Requested for: 75Nor7317 Ordered   5  Furosemide 40 MG Oral Tablet; TAKE 1 TABLET DAILY  Requested for: 77ORH5609; Last   WQ:46OFB3670 Ordered   6  Gabapentin 300 MG Oral Capsule; TAKE 1 CAPSULE 3 TIMES DAILY; Therapy: 53BSE0932 to (Evaluate:72Wzo8230)  Requested for: 17Cbz4185; Last   Rx:50Gpb1569 Ordered   7  Metoprolol Succinate ER 50 MG Oral Tablet Extended Release 24 Hour; TAKE 0 5   TABLET Twice daily; Therapy: 56Vuz9966 to (Jeanette Alfred)  Requested for: 58PGP2905; Last   Rx:84Qhi3627 Ordered   8  Oxycodone-Acetaminophen 7 5-325 MG Oral Tablet; TAKE 1 TABLET EVERY 6 HOURS   AS NEEDED FOR PAIN;   Therapy: 97HTT8112 to (Evaluate:40Hth3433); Last Rx:42Sjm1510 Ordered   9   OxyCONTIN 15 MG Oral Tablet ER 12 Hour Abuse-Deterrent; TAKE 1 TABLET Every 8   hours; Therapy: 34Zdt8880 to (Evaluate:49Fxy8722); Last Rx:13Jun2016 Ordered   10  PARoxetine HCl - 20 MG Oral Tablet (Paxil); TAKE 1 TABLET DAILY; Therapy: 32EID2200 to (Evaluate:87Sgr1975)  Requested for: 25Poz5519; Last    Rx:04Fds9331 Ordered   11  Vitamin D3 1000 UNIT Oral Capsule; Take 1 capsule twice daily; Therapy: 70WNO7586 to (Last Rx:17Mar2014)  Requested for: 32LVH0299 Ordered   12  Zolpidem Tartrate 10 MG Oral Tablet (Ambien); TAKE 1 TABLET AT BEDTIME AS    NEEDED; Therapy: 97TFI9592 to (Evaluate:06Nov2016); Last Rx:50Vsx7080 Ordered    Allergies    1   Xanax TABS    Signatures   Electronically signed by : Gillian Milian RN; Sep 29 2016  3:05PM EST                       (Author)
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
## 2018-01-11 NOTE — MISCELLANEOUS
Message   Recorded as Task   Date: 12/14/2016 04:14 PM, Created By: Fely Díaz   Task Name: Care Coordination   Assigned To: Rosalee Coronado   Regarding Patient: Izzy Davis, Status: Active   JossEmy Calhoun - 14 Dec 2016 4:14 PM     TASK CREATED  Pt was observed getting into the  side of her car,despite discharge instructions stating that she could not drive for the remainder of today & the pt informed the nurse her friend was driving her home  I discussed this with the pt and she stated that she didn't know that  Pt did change seats w/her friend and then drove away  Pt was post L L3-L5 RFA done today w/Dr Bo Borden - 14 Dec 2016 5:04 PM     TASK REPLIED TO: Previously Assigned To Theodore Cuadra  Thank you for addressing this  The patient was given clear instructions  Active Problems    1  Analgesic use (V58 69) (Z79 899)   2  Aortic aneurysm (441 9) (I71 9)   3  Colon cancer screening (V76 51) (Z12 11)   4  Depression with anxiety (300 4) (F41 8)   5  Flu vaccine need (V04 81) (Z23)   6  Hypertension (401 9) (I10)   7  Insomnia (780 52) (G47 00)   8  Left knee pain (719 46) (M25 562)   9  Lumbar canal stenosis (724 02) (M48 06)   10  Lumbar radiculopathy (724 4) (M54 16)   11  Myofascial pain syndrome (729 1) (M79 1)   12  Need for pneumococcal vaccination (V03 82) (Z23)   13  Noncompliance of patient with other medical treatment and regimen (V15 81) (Z91 19)   14  Opioid dependence (304 00) (F11 20)   15  Osteoarthritis, unspecified osteoarthritis type, unspecified site (715 90) (M19 90)   16  Osteoporosis (733 00) (M81 0)   17  Pain syndrome, chronic (338 4) (G89 4)   18  Postmenopausal (V49 81) (Z78 0)   19  Right shoulder pain (719 41) (M25 511)   20  Screening for osteoporosis (V82 81) (Z13 820)   21  Spondylosis of lumbar region without myelopathy or radiculopathy (721 3) (M47 816)   22   Visit for screening mammogram (V76 12) (Z12 31)   23  Vitamin D deficiency (268 9) (E55 9)    Current Meds   1  AmLODIPine Besylate 5 MG Oral Tablet; TAKE 1 TABLET DAILY FOR BLOOD   PRESSURE; Therapy: 85HFY3097 to (Evaluate:12Jan2017)  Requested for: 97Iky3895; Last   Rx:88Txu7680 Ordered   2  Clobetasol Propionate E 0 05 % External Cream; APPLY AND GENTLY MASSAGE INTO   AFFECTED AREA(S) TWICE DAILY; Therapy: 68QVJ3694 to (Evaluate:07Oct2016)  Requested for: 39Cwc7922; Last   Rx:43Pkk6328 Ordered   3  DiazePAM 2 MG Oral Tablet; TAKE 1 TABLET 3 times daily PRN; Therapy: 39GTX7240 to (Evaluate:11Feb2017)  Requested for: 86Vna9372; Last   Rx:43Gie3090 Ordered   4  Diclofenac Potassium 50 MG Oral Tablet; Take 1 tablet bid with meals when necessary; Therapy: 57EOS6548 to (Lorene Reece)  Requested for: 04NLJ4634; Last   Rx:53Sjt2325 Ordered   5  Diclofenac Sodium 1 % Transdermal Gel (Voltaren); Apply to affected joints 2 to 3 times   daily  Prescribed by Dr Tona Cooney; Therapy: 98OXK8549 to (Last Rx:41Civ0558)  Requested for: 72WUV5729 Ordered   6  DULoxetine HCl - 30 MG Oral Capsule Delayed Release Particles (Cymbalta); TAKE 1   CAPSULE Bedtime; Therapy: 73BJF5445 to (Evaluate:11Feb2017)  Requested for: 11Euj9648; Last   Rx:92Kdf0185 Ordered   7  Furosemide 40 MG Oral Tablet; TAKE 1 TABLET DAILY  Requested for: 53ERG1797; Last   Rx:83Kqj0843 Ordered   8  Gabapentin 300 MG Oral Capsule; TAKE 2 CAPSULES 3 TIMES DAILY; Therapy: 75LHM7486 to (Evaluate:11Jun2017)  Requested for: 88Ihx7748; Last   Rx:88Str1972 Ordered   9  Metoprolol Succinate ER 50 MG Oral Tablet Extended Release 24 Hour; TAKE 1 TABLET   DAILY; Therapy: 09Pew5819 to (Evaluate:16Oct2017)  Requested for: 21Oct2016; Last   Rx:21Oct2016 Ordered   10  Oxycodone-Acetaminophen 7 5-325 MG Oral Tablet; TAKE 1 TABLET EVERY 6 HOURS    AS NEEDED FOR PAIN;    Therapy: 28ALT8423 to (Evaluate:20Nbm5827); Last Rx:09Sep2016 Ordered   11  Vitamin D3 1000 UNIT Oral Capsule;  Take 1 capsule twice daily; Therapy: 43YHA5426 to (Last Rx:17Mar2014)  Requested for: 95YKX8483 Ordered   12  Zolpidem Tartrate 10 MG Oral Tablet (Ambien); TAKE 1 TABLET AT BEDTIME AS    NEEDED; Therapy: 74PLY3515 to (Evaluate:58Dcf5931); Last Rx:27Noo7499 Ordered    Allergies    1   Xanax TABS    Signatures   Electronically signed by : Tate Fernando RN; Dec 15 2016  8:03AM EST                       (Author)

## 2018-01-11 NOTE — MISCELLANEOUS
Message   Recorded as Task   Date: 11/14/2016 10:46 AM, Created By: Patricia Linton   Task Name: Follow Up   Assigned To: [facility] surgery sched,Team   Regarding Patient: Jered BOUDREAUX, Status: In Progress   Comment:    Aurelia Shen - 14 Nov 2016 10:46 AM     TASK CREATED  Caller: Self; General Medical Question; [phone] (Home)  Pt left vm on [address] triage that the last time she was seen by Dr Charlette Santoyo someone was suppose to call to schedule the procedure with the needles  No one has called to cortney it  She said she only has an ov for 12/5  Can someone check into it and please c/b  She would like to do it before the end of the year  Sharron Metzger - 14 Nov 2016 11:29 AM     TASK EDITED    Tried to review the notes  Looks like last RFA was in 2014  She had one Left L3-L5 MBB done on 3/23  I see a second one scheduled but then cancelled  Would you like me to schedule a second MBB or just go ahead and schedule the RFA  JW to advise  thanks   Derrick Kiser - 14 Nov 2016 5:30 PM     TASK REPLIED TO: Previously Assigned To Derrick Kiser                      The patient had a left L3-L5 RFA in the past with good results  There is no need to schedule a second medial branch block as she has had success with a radiofrequency ablation  Per Dr Carol Diane notes she was to be scheduled for radiofrequency ablation  Sharron Metzger - 15 Nov 2016 8:41 AM     TASK EDITED  Can you please schedule c/ JW  Thanks!!! Bryant Mercedes - 17 Nov 2016 10:11 AM     TASK IN PROGRESS   Bryant Mercedes - 17 Nov 2016 10:17 AM     TASK REPLIED TO: Previously Assigned To [facility] surgery sched,Team  Scheduled pt on 12/14 for L L3-L5 RFA  Went over preprocedure instructions:  NPO 1hr prior, needs , if develops infection or is put on abx needs to call to rs, loose, comf clothing  Pt verbalized understanding  Active Problems    1  Analgesic use (V58 69) (Z79 899)   2  Aortic aneurysm (441 9) (I71 9)   3  Depression with anxiety (300 4) (F41 8)   4  Ecchymosis (459 89) (R58)   5  Eczema (692 9) (L30 9)   6  Edema (782 3) (R60 9)   7  Hypertension (401 9) (I10)   8  Insomnia (780 52) (G47 00)   9  Left knee pain (719 46) (M25 562)   10  Lumbar canal stenosis (724 02) (M48 06)   11  Lumbar radiculopathy (724 4) (M54 16)   12  Myofascial pain syndrome (729 1) (M79 1)   13  Noncompliance of patient with other medical treatment and regimen (V15 81) (Z91 19)   14  Opioid dependence (304 00) (F11 20)   15  Osteoarthritis, unspecified osteoarthritis type, unspecified site (715 90) (M19 90)   16  Osteoporosis (733 00) (M81 0)   17  Pain syndrome, chronic (338 4) (G89 4)   18  Right shoulder pain (719 41) (M25 511)   19  Spondylosis of lumbar region without myelopathy or radiculopathy (721 3) (M47 816)   20  Visit for screening mammogram (V76 12) (Z12 31)   21  Vitamin D deficiency (268 9) (E55 9)    Current Meds   1  AmLODIPine Besylate 5 MG Oral Tablet; TAKE 1 TABLET DAILY FOR BLOOD   PRESSURE; Therapy: 34GQV5492 to (Evaluate:27Jpz9644)  Requested for: 48Gws1629; Last   Rx:57Suw0736 Ordered   2  Clobetasol Propionate E 0 05 % External Cream; APPLY AND GENTLY MASSAGE INTO   AFFECTED AREA(S) TWICE DAILY; Therapy: 01MAF9232 to (Evaluate:07Oct2016)  Requested for: 32Ntd3434; Last   Rx:79Pdv7981 Ordered   3  DiazePAM 2 MG Oral Tablet; TAKE 1 TABLET 3 times daily PRN; Therapy: 38VZD6293 to (Evaluate:09Vfe8040)  Requested for: 17OSQ8633; Last   Rx:21Oct2016 Ordered   4  Diclofenac Sodium 1 % Transdermal Gel (Voltaren); Apply to affected joints 2 to 3 times   daily  Prescribed by Dr Krupa Leahy; Therapy: 32FJA0819 to (Last Rx:21Oct2016)  Requested for: 21Oct2016 Ordered   5  Furosemide 40 MG Oral Tablet; TAKE 1 TABLET DAILY  Requested for: 90VUJ0246; Last   JQ:24JGS3107 Ordered   6  Gabapentin 300 MG Oral Capsule; TAKE 1 CAPSULE 3 TIMES DAILY; Therapy: 57QUY8429 to (Evaluate:26Hlz7439)  Requested for: 27Oct2016; Last   Rx:27Oct2016 Ordered   7   Metoprolol Succinate ER 50 MG Oral Tablet Extended Release 24 Hour; TAKE 1 TABLET   DAILY; Therapy: 76Cud2276 to (Evaluate:16Oct2017)  Requested for: 44RQG6189; Last   Rx:21Oct2016 Ordered   8  Oxycodone-Acetaminophen 7 5-325 MG Oral Tablet; TAKE 1 TABLET EVERY 6 HOURS   AS NEEDED FOR PAIN;   Therapy: 19APM9212 to (Evaluate:40Upa4863); Last Rx:31For7260 Ordered   9  PARoxetine HCl - 20 MG Oral Tablet (Paxil); TAKE 1 TABLET DAILY; Therapy: 70SAP8111 to (Evaluate:14Sql1313)  Requested for: 97Yzj3177; Last   Rx:88Goe4128 Ordered   10  Vitamin D3 1000 UNIT Oral Capsule; Take 1 capsule twice daily; Therapy: 95KQZ5867 to (Last Rx:17Mar2014)  Requested for: 09ISQ1168 Ordered   11  Zolpidem Tartrate 10 MG Oral Tablet (Ambien); TAKE 1 TABLET AT BEDTIME AS    NEEDED; Therapy: 28DCD7956 to (Evaluate:70Hdf5251); Last Rx:21Oct2016 Ordered    Allergies    1   Xanax TABS    Signatures   Electronically signed by : Poonam Trinidad, ; Nov 17 2016 10:17AM EST                       (Author)

## 2018-01-11 NOTE — MISCELLANEOUS
Message   Recorded as Task   Date: 08/08/2016 04:30 PM, Created By: Jarad lCeveland   Task Name: Med Renewal Request   Assigned To: [facility] [place] [facility],Team   Regarding Patient: Lizzie Welsh, Status: In Progress   Comment:    Javy Muniz - 08 Aug 2016 4:30 PM     TASK CREATED  Caller: Self; Renew Medication; [phone] (Home)  Received message from pt stating that she had 5 pills of her medication left and accidentally dropped them down the sink  Pt states that she spoke with [facility] who informed her that they could fill prescription early if it is ok with [facility]  Attempted to contact pt to identify medication she needs refill on  LMOM advising pt to call back  Stacia Paredes - 09 Aug 2016 9:51 AM     TASK IN PROGRESS   Javy Muniz - 10 Aug 2016 10:21 AM     TASK EDITED  spoke with pt who states that she received her bidpvjp4ntx from her pharmacy  Issue resolved  Active Problems    1  Analgesic use (V58 69) (Z79 899)   2  Anxiety disorder (300 00) (F41 9)   3  Aortic aneurysm (441 9) (I71 9)   4  Depression (311) (F32 9)   5  Eczema (692 9) (L30 9)   6  Edema (782 3) (R60 9)   7  Flu vaccine need (V04 81) (Z23)   8  Hypertension (401 9) (I10)   9  Insomnia (780 52) (G47 00)   10  Left knee pain (719 46) (M25 562)   11  Lumbar canal stenosis (724 02) (M48 06)   12  Lumbar radiculopathy (724 4) (M54 16)   13  Myofascial pain syndrome (729 1) (M79 1)   14  Noncompliance of patient with other medical treatment and regimen (V15 81) (Z91 19)   15  Opioid dependence (304 00) (F11 20)   16  Osteoarthritis, unspecified osteoarthritis type, unspecified site (715 90) (M19 90)   17  Osteoporosis (733 00) (M81 0)   18  Pain syndrome, chronic (338 4) (G89 4)   19  Right shoulder pain (719 41) (M25 511)   20  Spondylosis of lumbar region without myelopathy or radiculopathy (721 3) (M47 816)   21  Visit for screening mammogram (V76 12) (Z12 31)   22   Vitamin D deficiency (268 9) (E55 9)    Current Meds   1  AmLODIPine Besylate 5 MG Oral Tablet; TAKE 1 TABLET DAILY FOR BLOOD   PRESSURE; Therapy: 15UIJ4736 to (Evaluate:24Ciq5096)  Requested for: 22SGN5113; Last   Rx:33Mfn7063 Ordered   2  Desoximetasone 0 25 % External Cream (Topicort); APPLY TOPICALLY TWICE DAILY   AS NEEDED; Therapy: 26WWA9651 to (Evaluate:06Jun2016)  Requested for: 17VDC5903; Last   Rx:70Uli2169 Ordered   3  Diazepam 2 MG Oral Tablet; TAKE 1 TABLET 3 times daily PRN; Therapy: 19VNS4031 to (Evaluate:40Bqf7966); Last Rx:33Edl8369 Ordered   4  Diclofenac Sodium 1 % Transdermal Gel (Voltaren); Apply to affected joints 2 to 3 times   daily  Prescribed by Dr Ning Paredes; Therapy: 13ALJ4358 to (Last Rx:26Qdc3735)  Requested for: 94Jsu1067 Ordered   5  Furosemide 40 MG Oral Tablet Recorded   6  Gabapentin 300 MG Oral Capsule; TAKE 2 CAPSULE Bedtime; Therapy: 45WPP5628 to (Evaluate:58Jed1574)  Requested for: 17Hbo8064; Last   Rx:39Rtv1441 Ordered   7  Lopressor TABS (Metoprolol Tartrate); take 1/2 tablet twice daily Recorded   8  Metoprolol Succinate ER 50 MG Oral Tablet Extended Release 24 Hour; TAKE 0 5   TABLET Twice daily; Therapy: 97Jvo5636 to (Evaluate:40Dse7024)  Requested for: 12Rcw4734; Last   Rx:58Osm3531 Ordered   9  Oxycodone-Acetaminophen 7 5-325 MG Oral Tablet; TAKE 1 TABLET Every 8 hours   PRN; Therapy: 46VCA5078 to (Evaluate:96Yqj8091); Last Rx:13Jun2016 Ordered   10  OxyCONTIN 15 MG Oral Tablet ER 12 Hour Abuse-Deterrent; TAKE 1 TABLET Every 8    hours; Therapy: 32Lbb7150 to (Evaluate:38Cnh1475); Last Rx:13Jun2016 Ordered   11  PARoxetine HCl - 20 MG Oral Tablet (Paxil); TAKE 1 TABLET DAILY; Therapy: 22RPO6412 to [phone])  Requested for: [phone]; Last    Rx:27Oct2015 Ordered   12  Vitamin D3 1000 UNIT Oral Capsule; Take 1 capsule twice daily; Therapy: 92KJH7604 to (Last Rx:17Mar2014)  Requested for: 24ECC4932 Ordered   13   Zolpidem Tartrate 10 MG Oral Tablet (Ambien); TAKE 1 TABLET AT BEDTIME AS    NEEDED; Therapy: 07JDB0540 to (Evaluate:01Oct2016); Last Rx:67Ejx4239 Ordered    Allergies    1   Xanax TABS    Signatures   Electronically signed by : Jeremias Merchant, ; Aug 10 2016 10:22AM EST                       (Author)

## 2018-01-12 NOTE — RESULT NOTES
Message   Recorded as Task   Date: 02/07/2017 10:43 AM, Created By: Jonathon Tarango   Task Name: Medical Complaint Callback   Assigned To: [facility]   Regarding Patient: Risa Austin, Status: Active   CommentRylan Polanco - 07 Feb 2017 10:43 AM     TASK CREATED  Caller: Self; Medical Complaint; [phone] (Home)  S/w the pt  after receiving a vmlom form 1016 stating she has been taking the Robaxon for the past week and it is not helping her at all  It makes her sleepy and then she wakes up at 1 am wide awake and that is even after she takes an Electa Graft  Then she stated it is nonformulary per her insurance company  Also, the flector patch is not working  The pain is at her panty line and the patch will not stick to her because it is at an inconvenient spot  She tried using medical tape and it did not help  She stated that she did have the cream of the diclofenac and that did not help  She also stated she is taking ASA 325mg QID, because that seems to be helping with the pain  She is also taking Pepcid after inquiring about stomach pain  AO to advise  Thanks   Jimbo Amanda - 07 Feb 2017 1:09 PM     TASK REPLIED TO: Previously Assigned To Jimbo Amanda   I can try a different muscle relaxer for the patient  As far as the flector patch if it doesn't stick I am not going to order it  When I last saw her she told me she was taking 10 ASA daily her stomach pain is probably from that  I am willing to provide her a sample of vimovo which is naproxen with a stomach protectant in it  Sharron Metzger - 07 Feb 2017 1:48 PM     TASK EDITED   Sharron Metzger - 07 Feb 2017 3:22 PM     TASK EDITED  S/w the pt  and she will  the samples tomorrow     Jimbo Amanda - 07 Feb 2017 3:33 PM     TASK REPLIED TO: Previously Assigned To Jimbo guan TY        Signatures   Electronically signed by : Maged Reveles, ; Feb 7 2017  3:40PM EST                       (Author)

## 2018-01-12 NOTE — MISCELLANEOUS
Message   Recorded as Task   Date: 03/08/2016 01:22 PM, Created By: Vu Hooper   Task Name: Medical Complaint Callback   Assigned To: [facility]   Regarding Patient: Peggy Rider, Status: Active   CommentJarekángela Daniel - 08 Mar 2016 1:22 PM     TASK CREATED  Caller: Self; Medical Complaint; [phone] (Home)  Received VM from pt  on [facility] triage line from 1230 pm  Pt  states that the call is for ML  Pt  states that she is not doing well on her medications, and cannot imagine continuing like this for even one more day  Pt  states that she is "miserable " Pt  states that every month she comes to see Dr Joe Mancera, and she never experiences an improvement in her symptoms  Pt  states that she looked back at her records, and has never been on the dose that she is currently being prescribed  Pt  requesting c/b at [phone]  Stacia Paredes - 08 Mar 2016 1:52 PM     TASK REPLIED TO: Previously Assigned To [employer] with pt who is currently using fentanyl 25mcg patch q 48hrs  States she is miserable-due to change her patch tonight and has 5-6 25mcg patches left  Pt states her pain is across her back and is worse than ever  Taking percocet prn  States her pain is not under control  She feels her pain was better controlled with the 50mcg patch  Pt does have f/u with you 3-17-16  Please advise  Joy Dos Santos - 08 Mar 2016 3:48 PM     TASK REPLIED TO: Previously Assigned To Joy Dos Santos  I have ordered Fentanyl 50 mcg Patches  I have again ordered Q48, however if insurance does not approve it, she should change it to Q72  Stacia Paredes - 08 Mar 2016 4:14 PM     TASK REPLIED TO: Previously Assigned To [facility]  Spoke with pt and advised of the same,verbalizes understanding,will  rx tomorrow  Active Problems    1  Analgesic use (V58 69) (Z79 899)   2  Anxiety disorder (300 00) (F41 9)   3  Aortic aneurysm (441 9) (I71 9)   4  Depression (311) (F32 9)   5  Eczema (692 9) (L30 9)   6  Edema (782 3) (R60 9)   7  Flu vaccine need (V04 81) (Z23)   8  Hypertension (401 9) (I10)   9  Insomnia (780 52) (G47 00)   10  Left knee pain (719 46) (M25 562)   11  Lumbar canal stenosis (724 02) (M48 06)   12  Lumbar radiculopathy (724 4) (M54 16)   13  Myofascial pain syndrome (729 1) (M79 1)   14  Noncompliance of patient with other medical treatment and regimen (V15 81) (Z91 19)   15  Opioid dependence (304 00) (F11 20)   16  Osteoarthritis, unspecified osteoarthritis type, unspecified site (715 90) (M19 90)   17  Osteoporosis (733 00) (M81 0)   18  Pain syndrome, chronic (338 4) (G89 4)   19  Right shoulder pain (719 41) (M25 511)   20  Spondylosis of lumbar region without myelopathy or radiculopathy (721 3) (M47 816)   21  Visit for screening mammogram (V76 12) (Z12 31)   22  Vitamin D deficiency (268 9) (E55 9)    Current Meds   1  AmLODIPine Besylate 5 MG Oral Tablet; TAKE 1 TABLET DAILY FOR BLOOD   PRESSURE; Therapy: 55JWO9764 to (Evaluate:13Mar2016)  Requested for: 22BRJ5094; Last   Rx:19Mar2015 Ordered   2  Atenolol 100 MG Oral Tablet; TAKE ONE TABLET BY MOUTH ONCE DAILY; Therapy: 16SXE4536 to (Evaluate:15Mar2017)  Requested for: 17Yvp3116; Last   Rx:69Zzb4273 Ordered   3  Desoximetasone 0 25 % External Cream (Topicort); APPLY TOPICALLY TWICE DAILY   AS NEEDED; Therapy: 34ZEI2878 to (Evaluate:76Xvz1094)  Requested for: 69IVW7622; Last   Rx:23Erf3181 Ordered   4  Diazepam 2 MG Oral Tablet; TAKE 1 TABLET 3 times daily PRN; Therapy: 04BBO9342 to (Evaluate:73Quk5181); Last Rx:86Tjp3117 Ordered   5  FentaNYL 50 MCG/HR Transdermal Patch 72 Hour; APPLY 1 PATCH EVERY 48 HOURS; Therapy: 02KWA3255 to (Evaluate:07Apr2016)  Requested for: 90HGY2406; Last   Rx:08Mar2016 Ordered   6  Oxycodone-Acetaminophen 7 5-325 MG Oral Tablet; TAKE 1 TABLET Every 8 hours   PRN; Therapy: 71GLI3553 to (Evaluate:19Mar2016); Last Rx:18Feb2016 Ordered   7  PARoxetine HCl - 20 MG Oral Tablet (Paxil); TAKE 1 TABLET DAILY; Therapy: 51RVQ5950 to 72 470 15 18)  Requested for: [phone]; Last   Rx:18Qxg0796 Ordered   8  Vitamin D3 1000 UNIT Oral Capsule; Take 1 capsule twice daily; Therapy: 97FCS1139 to (Last Rx:17Mar2014)  Requested for: 48GPJ6979 Ordered   9  Voltaren 1 % Transdermal Gel; Apply to affected joints 2 to 3 times daily  Prescribed by   Dr April Tovar; Therapy: 06GNG5142 to (Last Rx:67Gqz1971)  Requested for: 55Vmx9712 Ordered   10  Zolpidem Tartrate 10 MG Oral Tablet (Ambien); TAKE 1 TABLET AT BEDTIME AS    NEEDED; Therapy: 58FIG5626 to (Evaluate:15Apr2016); Last Rx:03Ghe5881 Ordered    Allergies    1  Xanax TABS    Signatures   Electronically signed by :  Stacia Paredes, ; Mar  8 2016  4:14PM EST                       (Author)

## 2018-01-12 NOTE — RESULT NOTES
Message   Recorded as Task   Date: 04/13/2016 08:47 AM, Created By: Sahra Luke   Task Name: Follow Up   Assigned To: [facility]   Regarding Patient: Sara Deshpande, Status: Active   Comment:    Sahra Luke - 13 Apr 2016 8:47 AM     TASK CREATED  Patient's pain diary is confusing, she appeared to have more pain relief later in the day  If she felt it was significant, we can proceed with the ablation  Sharron Metzger - 13 Apr 2016 9:16 AM     TASK EDITED  S/w the pt  and explained the process  She will f/u tomorrow  she has povs c/you at 1015  Joy Dos Santos - 13 Apr 2016 10:09 AM     TASK REPLIED TO: Previously Assigned To Sahra Luke  Thank you          Signatures   Electronically signed by : Daljit Hayden, ; Apr 13 2016 10:12AM EST                       (Author)

## 2018-01-13 VITALS
SYSTOLIC BLOOD PRESSURE: 122 MMHG | BODY MASS INDEX: 32.15 KG/M2 | WEIGHT: 193 LBS | HEART RATE: 64 BPM | HEIGHT: 65 IN | DIASTOLIC BLOOD PRESSURE: 94 MMHG

## 2018-01-13 VITALS
HEART RATE: 107 BPM | TEMPERATURE: 97.8 F | SYSTOLIC BLOOD PRESSURE: 128 MMHG | HEIGHT: 65 IN | DIASTOLIC BLOOD PRESSURE: 91 MMHG | WEIGHT: 198.25 LBS | BODY MASS INDEX: 33.03 KG/M2

## 2018-01-13 VITALS
SYSTOLIC BLOOD PRESSURE: 148 MMHG | DIASTOLIC BLOOD PRESSURE: 98 MMHG | WEIGHT: 191.38 LBS | HEART RATE: 68 BPM | HEIGHT: 65 IN | BODY MASS INDEX: 31.89 KG/M2 | TEMPERATURE: 97 F | RESPIRATION RATE: 16 BRPM

## 2018-01-13 NOTE — MISCELLANEOUS
Reason For Visit  Reason For Visit Free Text Note Form: Spoke with pt x2 this date to help coordinate [address] referral  [facility] will not have an opening soon but pt [facility] in [place] or on [facility] does  Pt has a tentative appointment on Tuesday 10/10/17 at 11:25am with the therapist Jennifer at the [facility] location  This location has better parking available  SW will confirm arrangement with pt and assist as indicated  Active Problems    1  Analgesic use (V58 69) (Z79 899)   2  Aortic aneurysm (441 9) (I71 9)   3  Chronic bilateral low back pain with bilateral sciatica (724 2,724 3,338 29)   (M54 42,M54 41,G89 29)   4  Colon cancer screening (V76 51) (Z12 11)   5  Depression with anxiety (300 4) (F41 8)   6  Encounter for long-term opiate analgesic use (V58 69) (Z79 891)   7  Flu vaccine need (V04 81) (Z23)   8  Hypertension (401 9) (I10)   9  Insomnia (780 52) (G47 00)   10  Left knee pain (719 46) (M25 562)   11  Lumbar canal stenosis (724 02) (M48 06)   12  Lumbar radiculopathy (724 4) (M54 16)   13  Myofascial pain syndrome (729 1) (M79 1)   14  Noncompliance of patient with other medical treatment and regimen (V15 81) (Z91 19)   15  Opioid dependence (304 00) (F11 20)   16  Osteoarthritis, unspecified osteoarthritis type, unspecified site (715 90) (M19 90)   17  Osteoporosis (733 00) (M81 0)   18  Pain syndrome, chronic (338 4) (G89 4)   19  Postmenopausal (V49 81) (Z78 0)   20  Right shoulder pain (719 41) (M25 511)   21  Screening for genitourinary condition (V81 6) (Z13 89)   22  Screening for osteoporosis (V82 81) (Z13 820)   23  Spondylosis of lumbar region without myelopathy or radiculopathy (721 3) (M47 816)   24  Visit for screening mammogram (V76 12) (Z12 31)   25  Vitamin D deficiency (268 9) (E55 9)    Current Meds   1  Adult Aspirin EC Low Strength 81 MG Oral Tablet Delayed Release; TAKE 1 TABLET   DAILY;    Therapy: (Alex Balbuena) to Recorded   2  Alendronate Sodium 70 MG Oral Tablet; take one tablet weekly; Therapy: (Cj Sanchez) to Recorded   3  AmLODIPine Besylate 5 MG Oral Tablet; take one tablet daily; Therapy: (Cj Sanchez) to Recorded   4  BuPROPion HCl ER (XL) 150 MG Oral Tablet Extended Release 24 Hour (Wellbutrin XL);   TAKE 1 TABLET DAILY; Therapy: 70FAL4571 to (Mary Mims)  Requested for: 47XSD5032; Last   Rx:49Rpy8370 Ordered   5  DULoxetine HCl - 60 MG Oral Capsule Delayed Release Particles; TAKE 2 CAPSULES   AT BEDTIME  Requested for: 09Jjd6170; Last Rx:84Roe0830 Ordered   6  Furosemide 40 MG Oral Tablet; Take one tablet as needed; Therapy: (Cj Sanchez) to Recorded   7  Gabapentin 300 MG Oral Capsule; TAKE 2 CAPSULES 3 TIMES DAILY; Therapy: 64RXY5360 to (Evaluate:11Jun2017)  Requested for: 05Zjb9479; Last   Rx:93Tct9858 Ordered   8  Meloxicam 7 5 MG Oral Tablet; take 1 tablet by mouth every day; Therapy: 93WGQ8669 to (Evaluate:12Nov2017)  Requested for: 70MRO6197; Last   Rx:33Yez4128 Ordered   9  Metoprolol Succinate ER 50 MG Oral Tablet Extended Release 24 Hour; TAKE 1 TABLET   DAILY; Therapy: 63Jmx9035 to (Evaluate:16Jjj3293)  Requested for: 82Bfx6846; Last   Rx:35Jsp7111 Ordered   10  Oxycodone-Acetaminophen  MG Oral Tablet; TAKE 1 TABLET 4 TIMES DAILY AS    NEEDED FOR PAIN;    Therapy: (Recorded:64Kmr3508) to Recorded   11  Vitamin D3 5000 UNIT Oral Tablet; take one tablet daily; Therapy: (Cj Sanchez) to Recorded   12  Zolpidem Tartrate 10 MG Oral Tablet (Ambien); TAKE 1 TABLET AT BEDTIME AS    NEEDED; Therapy: 09Apr2014 to (03 17 74 30 53); Last Rx:51Qzg7328 Ordered    Allergies    1   Xanax TABS    Signatures   Electronically signed by : Vero Wolfe LCSW; Sep 29 2017  4:25PM EST                       (Author)
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
## 2018-01-14 VITALS
HEART RATE: 78 BPM | HEIGHT: 65 IN | RESPIRATION RATE: 16 BRPM | TEMPERATURE: 98.7 F | DIASTOLIC BLOOD PRESSURE: 90 MMHG | WEIGHT: 201.13 LBS | BODY MASS INDEX: 33.51 KG/M2 | SYSTOLIC BLOOD PRESSURE: 134 MMHG

## 2018-01-14 VITALS
HEART RATE: 109 BPM | TEMPERATURE: 98.5 F | DIASTOLIC BLOOD PRESSURE: 90 MMHG | WEIGHT: 194.38 LBS | SYSTOLIC BLOOD PRESSURE: 129 MMHG | HEIGHT: 65 IN | BODY MASS INDEX: 32.39 KG/M2

## 2018-01-14 VITALS
WEIGHT: 194.13 LBS | BODY MASS INDEX: 32.35 KG/M2 | DIASTOLIC BLOOD PRESSURE: 80 MMHG | SYSTOLIC BLOOD PRESSURE: 114 MMHG | TEMPERATURE: 97.3 F | HEIGHT: 65 IN | RESPIRATION RATE: 16 BRPM | HEART RATE: 68 BPM

## 2018-01-14 VITALS
DIASTOLIC BLOOD PRESSURE: 74 MMHG | TEMPERATURE: 97.4 F | SYSTOLIC BLOOD PRESSURE: 124 MMHG | HEART RATE: 72 BPM | RESPIRATION RATE: 14 BRPM | WEIGHT: 199.25 LBS | HEIGHT: 65 IN | BODY MASS INDEX: 33.2 KG/M2

## 2018-01-14 NOTE — MISCELLANEOUS
Reason For Visit  Reason For Visit Free Text Note Form: Call to pt  to inform her of outpatient mental health appointment that has been tentatively scheduled for her for Tuesday 10/10/17 at [facility]  Left message for pt  and await return call  Will continue to be available to provide support  Active Problems    1  Analgesic use (V58 69) (Z79 899)   2  Aortic aneurysm (441 9) (I71 9)   3  Chronic bilateral low back pain with bilateral sciatica (724 2,724 3,338 29)   (M54 42,M54 41,G89 29)   4  Colon cancer screening (V76 51) (Z12 11)   5  Depression with anxiety (300 4) (F41 8)   6  Encounter for long-term opiate analgesic use (V58 69) (Z79 891)   7  Flu vaccine need (V04 81) (Z23)   8  Hypertension (401 9) (I10)   9  Insomnia (780 52) (G47 00)   10  Left knee pain (719 46) (M25 562)   11  Lumbar canal stenosis (724 02) (M48 061)   12  Lumbar radiculopathy (724 4) (M54 16)   13  Myofascial pain syndrome (729 1) (M79 1)   14  Noncompliance of patient with other medical treatment and regimen (V15 81) (Z91 19)   15  Opioid dependence (304 00) (F11 20)   16  Osteoarthritis, unspecified osteoarthritis type, unspecified site (715 90) (M19 90)   17  Osteoporosis (733 00) (M81 0)   18  Pain syndrome, chronic (338 4) (G89 4)   19  Postmenopausal (V49 81) (Z78 0)   20  Right shoulder pain (719 41) (M25 511)   21  Screening for genitourinary condition (V81 6) (Z13 89)   22  Screening for osteoporosis (V82 81) (Z13 820)   23  Spondylosis of lumbar region without myelopathy or radiculopathy (721 3) (M47 816)   24  Visit for screening mammogram (V76 12) (Z12 31)   25  Vitamin D deficiency (268 9) (E55 9)    Current Meds   1  Adult Aspirin EC Low Strength 81 MG Oral Tablet Delayed Release; TAKE 1 TABLET   DAILY; Therapy: (Verdis Formica) to Recorded   2  Alendronate Sodium 70 MG Oral Tablet; take one tablet weekly; Therapy: (Gasper Whitakera) to Recorded   3   AmLODIPine Besylate 5 MG Oral Tablet; take one tablet daily; Therapy: (Alexis Evans) to Recorded   4  BuPROPion HCl ER (XL) 150 MG Oral Tablet Extended Release 24 Hour (Wellbutrin XL);   TAKE 1 TABLET DAILY; Therapy: 59LGY9096 to (Yadira Rodrigues)  Requested for: 36QBY5437; Last   Rx:59Qvv5348 Ordered   5  DULoxetine HCl - 60 MG Oral Capsule Delayed Release Particles; TAKE 2 CAPSULES   AT BEDTIME  Requested for: 59Zoz7636; Last Rx:01Pzl8905 Ordered   6  Furosemide 40 MG Oral Tablet; Take one tablet as needed; Therapy: (Alexis Evans) to Recorded   7  Gabapentin 300 MG Oral Capsule; TAKE 2 CAPSULES 3 TIMES DAILY; Therapy: 68PWZ8901 to (Evaluate:11Jun2017)  Requested for: 82Nar5809; Last   Rx:79Cjw6268 Ordered   8  Meloxicam 7 5 MG Oral Tablet; take 1 tablet by mouth every day; Therapy: 13VQM8574 to (Evaluate:12Nov2017)  Requested for: 90JUE5848; Last   Rx:60Dlf1661 Ordered   9  Metoprolol Succinate ER 50 MG Oral Tablet Extended Release 24 Hour; TAKE 1 TABLET   DAILY; Therapy: 48Pvt6617 to (Evaluate:24Vbk3390)  Requested for: 15Eij4787; Last   Rx:45Icu3503 Ordered   10  Oxycodone-Acetaminophen  MG Oral Tablet; TAKE 1 TABLET 4 TIMES DAILY AS    NEEDED FOR PAIN;    Therapy: (Recorded:98Fwq8776) to Recorded   11  Vitamin D3 5000 UNIT Oral Tablet; take one tablet daily; Therapy: (Alexis Evans) to Recorded   12  Zolpidem Tartrate 10 MG Oral Tablet (Ambien); TAKE 1 TABLET AT BEDTIME AS    NEEDED; Therapy: 09Apr2014 to (64 873 135); Last Rx:20Djj9975 Ordered    Allergies    1   Xanax TABS    Signatures   Electronically signed by : ANNALISA Dumont; Oct  5 2017 10:50AM EST                       (Author)
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
## 2018-01-14 NOTE — MISCELLANEOUS
Signatures   Electronically signed by : Dheeraj Chery, ; May  3 2017  2:32PM EST                       (Author)

## 2018-01-14 NOTE — MISCELLANEOUS
Message   Recorded as Task   Date: 06/26/2017 08:37 AM, Created By: Monique Benito   Task Name: Miscellaneous   Assigned To: Monique Benito   Regarding Patient: Jessi Romero, Status: Active   CommentMilo Sea - 26 Jun 2017 8:37 AM     TASK CREATED  Pt LM w/service to cx appt for today  I tried to reach her to r/s but got a message that "she was not accepting calls at this time"  Pradip Soriano - 26 Jun 2017 8:39 AM     TASK REPLIED TO: Previously Assigned To Pradip Soriano  aware        Active Problems    1  Analgesic use (V58 69) (Z79 899)   2  Aortic aneurysm (441 9) (I71 9)   3  Chronic bilateral low back pain with bilateral sciatica (724 2,724 3,338 29)   (M54 42,M54 41,G89 29)   4  Colon cancer screening (V76 51) (Z12 11)   5  Depression with anxiety (300 4) (F41 8)   6  Encounter for long-term opiate analgesic use (V58 69) (Z79 891)   7  Flu vaccine need (V04 81) (Z23)   8  Hypertension (401 9) (I10)   9  Insomnia (780 52) (G47 00)   10  Left knee pain (719 46) (M25 562)   11  Lumbar canal stenosis (724 02) (M48 06)   12  Lumbar radiculopathy (724 4) (M54 16)   13  Myofascial pain syndrome (729 1) (M79 1)   14  Noncompliance of patient with other medical treatment and regimen (V15 81) (Z91 19)   15  Opioid dependence (304 00) (F11 20)   16  Osteoarthritis, unspecified osteoarthritis type, unspecified site (715 90) (M19 90)   17  Osteoporosis (733 00) (M81 0)   18  Pain syndrome, chronic (338 4) (G89 4)   19  Postmenopausal (V49 81) (Z78 0)   20  Right shoulder pain (719 41) (M25 511)   21  Screening for osteoporosis (V82 81) (Z13 820)   22  Spondylosis of lumbar region without myelopathy or radiculopathy (721 3) (M47 816)   23  Visit for screening mammogram (V76 12) (Z12 31)   24  Vitamin D deficiency (268 9) (E55 9)    Current Meds   1  Clobetasol Propionate E 0 05 % External Cream; APPLY AND GENTLY MASSAGE INTO   AFFECTED AREA(S) TWICE DAILY;    Therapy: 12RRH2251 to (Evaluate:30Edt1785)  Requested for: 44IMK5513; Last   DC:82YSN5801 Ordered   2  DiazePAM 2 MG Oral Tablet; TAKE 1 TABLET 3 times daily PRN; Therapy: 22VNG9342 to (Evaluate:29Mar2017)  Requested for: 94IDB4703; Last   Rx:95Yfb7815 Ordered   3  Diclofenac Sodium 1 % Transdermal Gel (Voltaren); Apply to affected joints 2 to 3 times   daily  Prescribed by Dr Christina Dwyer; Therapy: 59RLO5007 to (Evaluate:06Jun2017)  Requested for: 81ZSA7185; Last   Rx:08Mar2017 Ordered   4  DULoxetine HCl - 30 MG Oral Capsule Delayed Release Particles (Cymbalta); take 1   capsule daily; Therapy: 49FRR8634 to (Ines Snyder)  Requested for: 08CKO8614; Last   Rx:08Mar2017 Ordered   5  Gabapentin 300 MG Oral Capsule; TAKE 2 CAPSULES 3 TIMES DAILY; Therapy: 13ZKD5255 to (Evaluate:11Jun2017)  Requested for: 16Kpd2675; Last   Rx:96Waz4095 Ordered   6  Metoprolol Succinate ER 50 MG Oral Tablet Extended Release 24 Hour; TAKE 1 TABLET   DAILY; Therapy: 78Iou0781 to (Ligia Max)  Requested for: 54Vlm4719; Last   Rx:37Ydf8584 Ordered   7  Zolpidem Tartrate 10 MG Oral Tablet (Ambien); TAKE 1 TABLET AT BEDTIME AS   NEEDED; Therapy: 77AYD5501 to (Evaluate:29Mar2017); Last Rx:89Ylk0077 Ordered    Allergies    1  Xanax TABS    Signatures   Electronically signed by :  Kyle Sierra, ; Jun 26 2017  8:40AM EST                       (Author)

## 2018-01-15 NOTE — MISCELLANEOUS
Reason For Visit  Reason For Visit Free Text Note Form: Received message from Ligia Johnson at [address] [facility] who reports she has received a return call from pt  who reports that she is not interested in scheduling an appointment for counseling at this time  Pt  informed Ligia Johnson that she would contact her as needed  Will continue to be available to provide support  Active Problems    1  Analgesic use (V58 69) (Z79 899)   2  Aortic aneurysm (441 9) (I71 9)   3  Chronic bilateral low back pain with bilateral sciatica (724 2,724 3,338 29)   (M54 42,M54 41,G89 29)   4  Colon cancer screening (V76 51) (Z12 11)   5  Depression with anxiety (300 4) (F41 8)   6  Encounter for long-term opiate analgesic use (V58 69) (Z79 891)   7  Flu vaccine need (V04 81) (Z23)   8  Hypertension (401 9) (I10)   9  Insomnia (780 52) (G47 00)   10  Left knee pain (719 46) (M25 562)   11  Lumbar canal stenosis (724 02) (M48 061)   12  Lumbar radiculopathy (724 4) (M54 16)   13  Myofascial pain syndrome (729 1) (M79 1)   14  Noncompliance of patient with other medical treatment and regimen (V15 81) (Z91 19)   15  Opioid dependence (304 00) (F11 20)   16  Osteoarthritis, unspecified osteoarthritis type, unspecified site (715 90) (M19 90)   17  Osteoporosis (733 00) (M81 0)   18  Pain syndrome, chronic (338 4) (G89 4)   19  Postmenopausal (V49 81) (Z78 0)   20  Right shoulder pain (719 41) (M25 511)   21  Screening for genitourinary condition (V81 6) (Z13 89)   22  Screening for osteoporosis (V82 81) (Z13 820)   23  Spondylosis of lumbar region without myelopathy or radiculopathy (721 3) (M47 816)   24  Visit for screening mammogram (V76 12) (Z12 31)   25  Vitamin D deficiency (268 9) (E55 9)    Current Meds   1  Adult Aspirin EC Low Strength 81 MG Oral Tablet Delayed Release; TAKE 1 TABLET   DAILY; Therapy: (Bereket Splinter) to Recorded   2  Alendronate Sodium 70 MG Oral Tablet; take one tablet weekly;    Therapy: (Anali Robles) to Recorded   3  AmLODIPine Besylate 5 MG Oral Tablet; take one tablet daily; Therapy: (Anali Robles) to Recorded   4  BuPROPion HCl ER (XL) 150 MG Oral Tablet Extended Release 24 Hour (Wellbutrin XL);   TAKE 1 TABLET DAILY; Therapy: 20QFQ1373 to (Betty Swanson)  Requested for: 67OEB0447; Last   Rx:43Pjq5152 Ordered   5  DULoxetine HCl - 60 MG Oral Capsule Delayed Release Particles; TAKE 2 CAPSULES   AT BEDTIME  Requested for: 32Yks9722; Last Rx:64Org7668 Ordered   6  Furosemide 40 MG Oral Tablet; Take one tablet as needed; Therapy: (Anali Robles) to Recorded   7  Gabapentin 300 MG Oral Capsule; TAKE 2 CAPSULES 3 TIMES DAILY; Therapy: 81VLB1055 to (Evaluate:11Jun2017)  Requested for: 94Qij7684; Last   Rx:84Utr1154 Ordered   8  Meloxicam 7 5 MG Oral Tablet; take 1 tablet by mouth every day; Therapy: 42HSH0231 to (Evaluate:12Nov2017)  Requested for: 48CLP6609; Last   Rx:28Uwa8878 Ordered   9  Metoprolol Succinate ER 50 MG Oral Tablet Extended Release 24 Hour; TAKE 1 TABLET   DAILY; Therapy: 44Cuv9820 to (Evaluate:55Swm0336)  Requested for: 97Gve4475; Last   Rx:71Idx8513 Ordered   10  Oxycodone-Acetaminophen  MG Oral Tablet; TAKE 1 TABLET 4 TIMES DAILY AS    NEEDED FOR PAIN;    Therapy: (Recorded:14Jcm0578) to Recorded   11  Vitamin D3 5000 UNIT Oral Tablet; take one tablet daily; Therapy: (Anali Robles) to Recorded   12  Zolpidem Tartrate 10 MG Oral Tablet (Ambien); TAKE 1 TABLET AT BEDTIME AS    NEEDED; Therapy: 25Laj4718 to (05 12 73 93 30); Last Rx:79Umg6803 Ordered    Allergies    1   Xanax TABS    Signatures   Electronically signed by : ANNALISA Taylor; Oct 10 2017  9:04AM EST                       (Author)

## 2018-01-15 NOTE — MISCELLANEOUS
Reason For Visit  Reason For Visit Free Text Note Form: Received message from MD that pt  has reconsidered and is now interested in pursuing outpatient mental health counseling  Call to pt  X2 and messages left  Will await return call and continue to be available to provide support  Update provided to MD       Active Problems    1  Acute kidney injury (584 9) (N17 9)   2  Analgesic use (V58 69) (Z79 899)   3  Aortic aneurysm (441 9) (I71 9)   4  Atrial fibrillation, new onset (427 31) (I48 91)   5  Chronic bilateral low back pain with bilateral sciatica (724 2,724 3,338 29)   (M54 42,M54 41,G89 29)   6  Colon cancer screening (V76 51) (Z12 11)   7  Depression with anxiety (300 4) (F41 8)   8  Encounter for long-term opiate analgesic use (V58 69) (Z79 891)   9  Flu vaccine need (V04 81) (Z23)   10  Hypertension (401 9) (I10)   11  Insomnia (780 52) (G47 00)   12  Left knee pain (719 46) (M25 562)   13  Lumbar canal stenosis (724 02) (M48 061)   14  Lumbar radiculopathy (724 4) (M54 16)   15  Myofascial pain syndrome (729 1) (M79 1)   16  Need for influenza vaccination (V04 81) (Z23)   17  Noncompliance of patient with other medical treatment and regimen (V15 81) (Z91 19)   18  Opioid dependence (304 00) (F11 20)   19  Osteoarthritis, unspecified osteoarthritis type, unspecified site (715 90) (M19 90)   20  Osteoporosis (733 00) (M81 0)   21  Pain syndrome, chronic (338 4) (G89 4)   22  Postmenopausal (V49 81) (Z78 0)   23  Right shoulder pain (719 41) (M25 511)   24  Screening for genitourinary condition (V81 6) (Z13 89)   25  Screening for osteoporosis (V82 81) (Z13 820)   26  Spondylosis of lumbar region without myelopathy or radiculopathy (721 3) (M47 816)   27  Visit for screening mammogram (V76 12) (Z12 31)   28  Vitamin D deficiency (268 9) (E55 9)    Current Meds   1  Alendronate Sodium 70 MG Oral Tablet; take one tablet weekly  Requested for:   38WCS8549; Last Rx:23Oct2017 Ordered   2   BuPROPion HCl ER (XL) 150 MG Oral Tablet Extended Release 24 Hour (Wellbutrin XL);   TAKE 1 TABLET DAILY; Therapy: 01RLG3876 to (Evaluate:00Fpo9194)  Requested for: 23Oct2017; Last   Rx:23Oct2017 Ordered   3  Eliquis 5 MG Oral Tablet; Take 1 tablet twice daily; Therapy: 73HPN2500 to (Evaluate:12Nov2017); Last Rx:13Oct2017 Ordered   4  Furosemide 40 MG Oral Tablet; Take one tablet as needed; Last Rx:23Oct2017 Ordered   5  Gabapentin 300 MG Oral Capsule; TAKE 2 CAPSULES 3 TIMES DAILY; Therapy: 23RKA9039 to (Evaluate:21Apr2018)  Requested for: 23Oct2017; Last   RW:64KUO4797 Ordered   6  Metoprolol Succinate ER 50 MG Oral Tablet Extended Release 24 Hour; TAKE 2   TABLETS DAILY; Therapy: 31Pwe7907 to (Evaluate:69Wxo2743)  Requested for: 23Oct2017; Last   RL:60IRN2224 Ordered   7  Oxycodone-Acetaminophen  MG Oral Tablet; TAKE 1 TABLET 4 TIMES DAILY AS   NEEDED FOR PAIN;   Therapy: (Recorded:26Pmh2107) to Recorded   8  RaNITidine HCl - 150 MG Oral Tablet; TAKE 1 TABLET AT BEDTIME; Therapy: 04UQL5570 to (Evaluate:35Mzw8210) Recorded   9  Vitamin D3 5000 UNIT Oral Tablet; take one tablet daily; Therapy: (Obey Olson) to Recorded   10  Zolpidem Tartrate 10 MG Oral Tablet (Ambien); TAKE 1 TABLET AT BEDTIME AS    NEEDED; Therapy: 59HQX6200 to (Evaluate:22Nov2017); Last Rx:23Oct2017 Ordered    Allergies    1   Xanax TABS    Signatures   Electronically signed by : ANNALISA Paulson; Oct 27 2017  2:03PM EST                       (Author)

## 2018-01-15 NOTE — MISCELLANEOUS
Reason For Visit  Reason For Visit Free Text Note Form: SW attempted to call pt again this date but had to leave a message  Case discussed with Jermain Matute MSW who will f/u with pt as needed  Active Problems    1  Analgesic use (V58 69) (Z79 899)   2  Aortic aneurysm (441 9) (I71 9)   3  Chronic bilateral low back pain with bilateral sciatica (724 2,724 3,338 29)   (M54 42,M54 41,G89 29)   4  Colon cancer screening (V76 51) (Z12 11)   5  Depression with anxiety (300 4) (F41 8)   6  Encounter for long-term opiate analgesic use (V58 69) (Z79 891)   7  Flu vaccine need (V04 81) (Z23)   8  Hypertension (401 9) (I10)   9  Insomnia (780 52) (G47 00)   10  Left knee pain (719 46) (M25 562)   11  Lumbar canal stenosis (724 02) (M48 061)   12  Lumbar radiculopathy (724 4) (M54 16)   13  Myofascial pain syndrome (729 1) (M79 1)   14  Noncompliance of patient with other medical treatment and regimen (V15 81) (Z91 19)   15  Opioid dependence (304 00) (F11 20)   16  Osteoarthritis, unspecified osteoarthritis type, unspecified site (715 90) (M19 90)   17  Osteoporosis (733 00) (M81 0)   18  Pain syndrome, chronic (338 4) (G89 4)   19  Postmenopausal (V49 81) (Z78 0)   20  Right shoulder pain (719 41) (M25 511)   21  Screening for genitourinary condition (V81 6) (Z13 89)   22  Screening for osteoporosis (V82 81) (Z13 820)   23  Spondylosis of lumbar region without myelopathy or radiculopathy (721 3) (M47 816)   24  Visit for screening mammogram (V76 12) (Z12 31)   25  Vitamin D deficiency (268 9) (E55 9)    Current Meds   1  Adult Aspirin EC Low Strength 81 MG Oral Tablet Delayed Release; TAKE 1 TABLET   DAILY; Therapy: (Gene Binning) to Recorded   2  Alendronate Sodium 70 MG Oral Tablet; take one tablet weekly; Therapy: (Gene Binning) to Recorded   3  AmLODIPine Besylate 5 MG Oral Tablet; take one tablet daily; Therapy: (Gene Binning) to Recorded   4   BuPROPion HCl ER (XL) 150 MG Oral Tablet Extended Release 24 Hour (Wellbutrin XL);   TAKE 1 TABLET DAILY; Therapy: 50PBC1669 to (Kathy Eason)  Requested for: 08SXC6367; Last   Rx:69Hoq8315 Ordered   5  DULoxetine HCl - 60 MG Oral Capsule Delayed Release Particles; TAKE 2 CAPSULES   AT BEDTIME  Requested for: 55Ggr0003; Last Rx:10Yqk7599 Ordered   6  Furosemide 40 MG Oral Tablet; Take one tablet as needed; Therapy: (Cristina Mendiola) to Recorded   7  Gabapentin 300 MG Oral Capsule; TAKE 2 CAPSULES 3 TIMES DAILY; Therapy: 17OWO0232 to (Evaluate:11Jun2017)  Requested for: 21Vzy5342; Last   Rx:03Rnd4488 Ordered   8  Meloxicam 7 5 MG Oral Tablet; take 1 tablet by mouth every day; Therapy: 43MDA2553 to (Evaluate:12Nov2017)  Requested for: 05CIE1036; Last   Rx:43Pyf7132 Ordered   9  Metoprolol Succinate ER 50 MG Oral Tablet Extended Release 24 Hour; TAKE 1 TABLET   DAILY; Therapy: 54Jnz0859 to (Evaluate:52Lab8782)  Requested for: 61Mlp1300; Last   Rx:59Udz0505 Ordered   10  Oxycodone-Acetaminophen  MG Oral Tablet; TAKE 1 TABLET 4 TIMES DAILY AS    NEEDED FOR PAIN;    Therapy: (Recorded:14Zns3089) to Recorded   11  Vitamin D3 5000 UNIT Oral Tablet; take one tablet daily; Therapy: (Cristina Mendiola) to Recorded   12  Zolpidem Tartrate 10 MG Oral Tablet (Ambien); TAKE 1 TABLET AT BEDTIME AS    NEEDED; Therapy: 09Apr2014 to (); Last Rx:75Mpj3536 Ordered    Allergies    1   Xanax TABS    Signatures   Electronically signed by : Stephen Hurst LCSW; Oct  4 2017  4:33PM EST                       (Author)

## 2018-01-15 NOTE — MISCELLANEOUS
Message   Recorded as Task   Date: 05/25/2016 03:28 PM, Created By: Mica Cristobal   Task Name: Miscellaneous   Assigned To: Mica Cristobal   Regarding Patient: Gertrude العراقي, Status: Active   CommentAmador Cami - 25 May 2016 3:28 PM     TASK CREATED  Caller: Self; [phone] (Home)  Called patient to  r/s appt from 6/9 to 6/13  She stated she would be out of medications by then  She also said she was in the hospital for a few days and didn't take them  So I'm thinking she should have extra  Do you know if she should be ok? Joy Dos Santos - 25 May 2016 3:44 PM     TASK REPLIED TO: Previously Assigned To Madhav Soler  It seems she was in hospital from 5/13 to 5/17, so she should have enough  Active Problems    1  Analgesic use (V58 69) (Z79 899)   2  Anxiety disorder (300 00) (F41 9)   3  Aortic aneurysm (441 9) (I71 9)   4  Depression (311) (F32 9)   5  Eczema (692 9) (L30 9)   6  Edema (782 3) (R60 9)   7  Flu vaccine need (V04 81) (Z23)   8  Hypertension (401 9) (I10)   9  Insomnia (780 52) (G47 00)   10  Left knee pain (719 46) (M25 562)   11  Lumbar canal stenosis (724 02) (M48 06)   12  Lumbar radiculopathy (724 4) (M54 16)   13  Myofascial pain syndrome (729 1) (M79 1)   14  Noncompliance of patient with other medical treatment and regimen (V15 81) (Z91 19)   15  Opioid dependence (304 00) (F11 20)   16  Osteoarthritis, unspecified osteoarthritis type, unspecified site (715 90) (M19 90)   17  Osteoporosis (733 00) (M81 0)   18  Pain syndrome, chronic (338 4) (G89 4)   19  Right shoulder pain (719 41) (M25 511)   20  Spondylosis of lumbar region without myelopathy or radiculopathy (721 3) (M47 816)   21  Visit for screening mammogram (V76 12) (Z12 31)   22  Vitamin D deficiency (268 9) (E55 9)    Current Meds   1  AmLODIPine Besylate 5 MG Oral Tablet; TAKE 1 TABLET DAILY FOR BLOOD   PRESSURE; Therapy: 15MOD8285 to (Evaluate:13Mar2016)  Requested for: 31NSK3382;  Last   Rx:19Mar2015 Ordered   2  Atenolol 100 MG Oral Tablet; TAKE ONE TABLET BY MOUTH ONCE DAILY; Therapy: 55HFF2979 to (Evaluate:15Mar2017)  Requested for: 17Ipj8979; Last   Rx:21Qbe4561 Ordered   3  Desoximetasone 0 25 % External Cream (Topicort); APPLY TOPICALLY TWICE DAILY   AS NEEDED; Therapy: 90ZEO7758 to (Evaluate:06Jun2016)  Requested for: 94NQV3067; Last   Rx:25Jsu3446 Ordered   4  Diazepam 2 MG Oral Tablet; TAKE 1 TABLET 3 times daily PRN; Therapy: 53ADE5846 to (Evaluate:06Jun2016); Last Rx:07Apr2016 Ordered   5  FentaNYL 25 MCG/HR Transdermal Patch 72 Hour; Apply 1 patch td every 48 hours as   directed; Therapy: 71NHS6706 to (Last Rx:50Smz4335)  Requested for: 14Apr2016 Ordered   6  Oxycodone-Acetaminophen 7 5-325 MG Oral Tablet; TAKE 1 TABLET Every 8 hours   PRN; Therapy: 63NNQ1962 to (Evaluate:12Jun2016); Last Rx:90Ozf0080 Ordered   7  OxyCONTIN 15 MG Oral Tablet ER 12 Hour Abuse-Deterrent; TAKE 1 TABLET Every 8   hours; Therapy: 08Zce0227 to (Evaluate:18Jun2016); Last Rx:21Czw1843 Ordered   8  PARoxetine HCl - 20 MG Oral Tablet (Paxil); TAKE 1 TABLET DAILY; Therapy: 22BEP6031 to [phone])  Requested for: 642-150-054; Last   Rx:22Vfa8193 Ordered   9  Vitamin D3 1000 UNIT Oral Capsule; Take 1 capsule twice daily; Therapy: 18QPF6934 to (Last Rx:17Mar2014)  Requested for: 04RHL1470 Ordered   10  Voltaren 1 % Transdermal Gel (Diclofenac Sodium); Apply to affected joints 2 to 3 times    daily  Prescribed by Dr Jim Conley; Therapy: 56ZMQ1880 to (Last Rx:57Nja5713)  Requested for: 41Nyd7200 Ordered   11  Zolpidem Tartrate 10 MG Oral Tablet (Ambien); TAKE 1 TABLET AT BEDTIME AS    NEEDED; Therapy: 12WKZ4478 to (Evaluate:12Jun2016); Last Rx:13Apr2016 Ordered    Allergies    1   Xanax TABS    Signatures   Electronically signed by : Liliam Mendoza, ; May 26 2016  8:13AM EST                       (Author)

## 2018-01-16 NOTE — MISCELLANEOUS
Message   Recorded as Task   Date: 10/05/2016 02:30 PM, Created By: Rivka Torres   Task Name: Care Coordination   Assigned To: [facility]   Regarding Patient: Stephania Nguyen, Status: Active   CommentRudolluis Margaret - 05 Oct 2016 2:30 PM     TASK CREATED  Caller: Mathieu Alanis; Care Coordination; [phone]  Zeke the pharmacist([phone]) called stating that the pt was at the pharmacy with a script for oxycodone 7/5mg with a DNFB of 10/7/16  Pt was asking the pharmacist for a couple tabs of oxycodone stating that her grandson has taken some of her medicaiton  Dr Gutierrez aware & pharmacist is not to fill early  Joy Mortensen - 06 Oct 2016 8:58 AM     TASK REPLIED TO: Previously Assigned To [facility]                      This is very concerning as she also recently stated she dropped her medication when elevator was stuck  Last November she had taken 2 different long acting medications in combination  She was reminded about our contract  These are dangerous behaviors and concerning and I do not feel comfortable continuing to prescribe her opioids  She has another prescription to be filled on 10/7 for percocet 7 5/325 mg Q6  She should decrease that to Q8 x 3 weeks  Then Q12 x 3 weeks, then Q daily x 3 weeks, then D/c  I will send her a letter that we will no longer prescribe opioids  Rosalee Coronado - 06 Oct 2016 9:43 AM     TASK EDITED    Discussed weaning instructions w/pt  Verbalized understanding  Confirmed sovs for 10/27/16  Joy Gutierrez - 06 Oct 2016 12:31 PM     TASK REPLIED TO: Previously Assigned To Joy Gutierrez                      Thank you        Active Problems    1  Analgesic use (V58 69) (Z79 899)   2  Aortic aneurysm (441 9) (I71 9)   3  Depression with anxiety (300 4) (F41 8)   4  Ecchymosis (459 89) (R58)   5  Eczema (692 9) (L30 9)   6  Edema (782 3) (R60 9)   7   Hypertension (401  9) (I10)   8  Insomnia (780 52) (G47 00)   9  Left knee pain (719 46) (M25 562)   10  Lumbar canal stenosis (724 02) (M48 06)   11  Lumbar radiculopathy (724 4) (M54 16)   12  Myofascial pain syndrome (729 1) (M79 1)   13  Noncompliance of patient with other medical treatment and regimen (V15 81) (Z91 19)   14  Opioid dependence (304 00) (F11 20)   15  Osteoarthritis, unspecified osteoarthritis type, unspecified site (715 90) (M19 90)   16  Osteoporosis (733 00) (M81 0)   17  Pain syndrome, chronic (338 4) (G89 4)   18  Right shoulder pain (719 41) (M25 511)   19  Spondylosis of lumbar region without myelopathy or radiculopathy (721 3) (M47 816)   20  Visit for screening mammogram (V76 12) (Z12 31)   21  Vitamin D deficiency (268 9) (E55 9)    Current Meds   1  AmLODIPine Besylate 5 MG Oral Tablet; TAKE 1 TABLET DAILY FOR BLOOD   PRESSURE; Therapy: 61NIF0724 to (Evaluate:41Obo4427)  Requested for: 56Wrf5852; Last   Rx:46Fha5839 Ordered   2  Clobetasol Propionate E 0 05 % External Cream; APPLY AND GENTLY MASSAGE INTO   AFFECTED AREA(S) TWICE DAILY; Therapy: 43JTL4054 to (Evaluate:08Ozt0855)  Requested for: 85Hbw3185; Last   Rx:07Oht1032 Ordered   3  Diazepam 2 MG Oral Tablet; TAKE 1 TABLET 3 times daily PRN; Therapy: 16LHD6349 to (John Castillo)  Requested for: 40GLA9099; Last   Rx:69Ovj3778 Ordered   4  Diclofenac Sodium 1 % Transdermal Gel (Voltaren); Apply to affected joints 2 to 3 times   daily  Prescribed by Dr Kate Gerardo; Therapy: 47HRO0651 to (Last Rx:78Zoz3111)  Requested for: 72Shr9464 Ordered   5  Furosemide 40 MG Oral Tablet; TAKE 1 TABLET DAILY  Requested for: 01BGM0660; Last   ME:20MBN0755 Ordered   6  Gabapentin 300 MG Oral Capsule; TAKE 1 CAPSULE 3 TIMES DAILY; Therapy: 26WQX4801 to (Evaluate:44Ejw1664)  Requested for: 99Gmg5442; Last   Rx:10Vku4158 Ordered   7  Metoprolol Succinate ER 50 MG Oral Tablet Extended Release 24 Hour; TAKE 0 5   TABLET Twice daily;    Therapy: 03Hrq3413 to (21 )  Requested for: 64ZWB3378; Last   Rx:70Qpq8906 Ordered   8  Oxycodone-Acetaminophen 7 5-325 MG Oral Tablet; TAKE 1 TABLET EVERY 6 HOURS   AS NEEDED FOR PAIN;   Therapy: 40CIL9465 to (Evaluate:70Fgd7925); Last Rx:74Ixc8568 Ordered   9  OxyCONTIN 15 MG Oral Tablet ER 12 Hour Abuse-Deterrent; TAKE 1 TABLET Every 8   hours; Therapy: 73Ndi9827 to (Evaluate:53Nfi6116); Last Rx:09Qrn0863 Ordered   10  PARoxetine HCl - 20 MG Oral Tablet (Paxil); TAKE 1 TABLET DAILY; Therapy: 16YFU6098 to (Evaluate:97Mix1241)  Requested for: 05Lym8245; Last    Rx:94Lno2066 Ordered   11  Vitamin D3 1000 UNIT Oral Capsule; Take 1 capsule twice daily; Therapy: 66PGK1918 to (Last Rx:17Mar2014)  Requested for: 88EAF6013 Ordered   12  Zolpidem Tartrate 10 MG Oral Tablet (Ambien); TAKE 1 TABLET AT BEDTIME AS    NEEDED; Therapy: 22TQP0946 to (Evaluate:06Nov2016); Last Rx:05Gfk5869 Ordered    Allergies    1   Xanax TABS    Signatures   Electronically signed by : Thomas Castillo RN; Oct  6 2016  1:25PM EST                       (Author)

## 2018-01-16 NOTE — MISCELLANEOUS
Message   Recorded as Task   Date: 01/27/2016 08:11 AM, Created By: Hortencia Chery   Task Name: Miscellaneous   Assigned To: Hortencia Chery   Regarding Patient: Dora Mcginnis, Status: Active   CommentJake Ross - 27 Jan 2016 8:11 AM     TASK CREATED  Patient left a message with the answering machine to cancel her appt  1/27/16 @ 10:45 AM with Dr Yessica Rodriguez  The patient will call back to Joy Van - 27 Jan 2016 8:34 AM     TASK REPLIED TO: Previously Assigned To Deysi Newberry  Thank you  Active Problems    1  Analgesic use (V58 69) (Z79 899)   2  Anxiety disorder (300 00) (F41 9)   3  Aortic aneurysm (441 9) (I71 9)   4  Depression (311) (F32 9)   5  Eczema (692 9) (L30 9)   6  Edema (782 3) (R60 9)   7  Flu vaccine need (V04 81) (Z23)   8  Hypertension (401 9) (I10)   9  Insomnia (780 52) (G47 00)   10  Left knee pain (719 46) (M25 562)   11  Lumbar canal stenosis (724 02) (M48 06)   12  Lumbar radiculopathy (724 4) (M54 16)   13  Myofascial pain syndrome (729 1) (M79 1)   14  Noncompliance of patient with other medical treatment and regimen (V15 81) (Z91 19)   15  Opioid dependence (304 00) (F11 20)   16  Osteoarthritis, unspecified osteoarthritis type, unspecified site (715 90) (M19 90)   17  Osteoporosis (733 00) (M81 0)   18  Pain syndrome, chronic (338 4) (G89 4)   19  Spondylosis of lumbar region without myelopathy or radiculopathy (721 3) (M47 816)   20  Visit for screening mammogram (V76 12) (Z12 31)   21  Vitamin D deficiency (268 9) (E55 9)    Current Meds   1  AmLODIPine Besylate 5 MG Oral Tablet; TAKE 1 TABLET DAILY FOR BLOOD   PRESSURE; Therapy: 41MHT9088 to (Evaluate:13Mar2016)  Requested for: 24HVZ2475; Last   Rx:19Mar2015 Ordered   2  Atenolol 100 MG Oral Tablet; TAKE ONE TABLET BY MOUTH ONCE DAILY; Therapy: 41WFE4150 to (Evaluate:15Mar2017)  Requested for: 21Dec2015; Last   Rx:73Gbb2957 Ordered   3   Desoximetasone 0 25 % External Cream (Topicort); APPLY TOPICALLY TWICE DAILY   AS NEEDED; Therapy: 81LPZ2901 to (Evaluate:53Bem2660)  Requested for: 44ABJ5558; Last   Rx:63Ahy7578 Ordered   4  Diazepam 2 MG Oral Tablet; TAKE 1 TABLET 3 times daily PRN; Therapy: 35USA7135 to (Evaluate:22Vrq3776); Last Rx:55Rtn3985 Ordered   5  Gabapentin 300 MG Oral Capsule; TAKE 2 CAPSULE Bedtime; Therapy: 00YIS8390 to (Evaluate:95Jzd1977)  Requested for: 21Jan2016; Last   Rx:21Jan2016 Ordered   6  Oxycodone-Acetaminophen 5-325 MG Oral Tablet; TAKE 1 TABLET EVERY 8 HOURS   AS NEEDED; Therapy: 98UTD1629 to (Evaluate:95Ymx4535); Last Rx:21Jan2016 Ordered   7  OxyCONTIN 15 MG Oral Tablet ER 12 Hour Abuse-Deterrent; TAKE 1 TABLET Every 8   hours; Therapy: 63Wai9565 to (Evaluate:45Bre9830); Last Rx:21Jan2016 Ordered   8  PARoxetine HCl - 20 MG Oral Tablet (Paxil); TAKE 1 TABLET DAILY; Therapy: 09VVN5460 to )  Requested for: 596-101-288; Last   Rx:43Rhz6605 Ordered   9  Vitamin D3 1000 UNIT Oral Capsule; Take 1 capsule twice daily; Therapy: 75SMY6248 to (Last Rx:80Fvz3703)  Requested for: 40NEP3882 Ordered   10  Voltaren 1 % Transdermal Gel; Apply to affected joints 2 to 3 times daily  Prescribed by    Dr Deep Banks; Therapy: 35HOS4123 to (Last Rx:06Vdt1403)  Requested for: 13Qvs2056 Ordered   11  Zolpidem Tartrate 10 MG Oral Tablet (Ambien); TAKE 1 TABLET AT BEDTIME AS    NEEDED; Therapy: 80IBW4310 to (Evaluate:41Lgo2808); Last Rx:12Drl9767 Ordered    Allergies    1  Xanax TABS    Signatures   Electronically signed by :  Mohini Hall, ; Jan 27 2016  9:46AM EST                       (Author)

## 2018-01-16 NOTE — MISCELLANEOUS
Reason For Visit  Reason For Visit Free Text Note Form: SW spoke with pt via the phone this date to offer OP Zeeshan Tobias services as she has suffered the loss of her grandson to Suicide  Supportive counseling provided  Pt has also agreed to referral to [facility] and referral tasked into them this date  If they are not available pt has agreed to additional referrals   SW to f/u and assist pt with same  Active Problems    1  Analgesic use (V58 69) (Z79 899)   2  Aortic aneurysm (441 9) (I71 9)   3  Chronic bilateral low back pain with bilateral sciatica (724 2,724 3,338 29)   (M54 42,M54 41,G89 29)   4  Colon cancer screening (V76 51) (Z12 11)   5  Depression with anxiety (300 4) (F41 8)   6  Encounter for long-term opiate analgesic use (V58 69) (Z79 891)   7  Flu vaccine need (V04 81) (Z23)   8  Hypertension (401 9) (I10)   9  Insomnia (780 52) (G47 00)   10  Left knee pain (719 46) (M25 562)   11  Lumbar canal stenosis (724 02) (M48 06)   12  Lumbar radiculopathy (724 4) (M54 16)   13  Myofascial pain syndrome (729 1) (M79 1)   14  Noncompliance of patient with other medical treatment and regimen (V15 81) (Z91 19)   15  Opioid dependence (304 00) (F11 20)   16  Osteoarthritis, unspecified osteoarthritis type, unspecified site (715 90) (M19 90)   17  Osteoporosis (733 00) (M81 0)   18  Pain syndrome, chronic (338 4) (G89 4)   19  Postmenopausal (V49 81) (Z78 0)   20  Right shoulder pain (719 41) (M25 511)   21  Screening for genitourinary condition (V81 6) (Z13 89)   22  Screening for osteoporosis (V82 81) (Z13 820)   23  Spondylosis of lumbar region without myelopathy or radiculopathy (721 3) (M47 816)   24  Visit for screening mammogram (V76 12) (Z12 31)   25  Vitamin D deficiency (268 9) (E55 9)    Current Meds   1  Adult Aspirin EC Low Strength 81 MG Oral Tablet Delayed Release; TAKE 1 TABLET   DAILY; Therapy: (Judith Barber) to Recorded   2   Alendronate Sodium 70 MG Oral Tablet; take one tablet weekly; Therapy: (Edwin Walker) to Recorded   3  AmLODIPine Besylate 5 MG Oral Tablet; take one tablet daily; Therapy: (Edwin Walker) to Recorded   4  BuPROPion HCl ER (XL) 150 MG Oral Tablet Extended Release 24 Hour (Wellbutrin XL);   TAKE 1 TABLET DAILY; Therapy: 32VCF6386 to (Carlos Chacon)  Requested for: 99NYM6968; Last   Rx:80Obr1579 Ordered   5  DULoxetine HCl - 60 MG Oral Capsule Delayed Release Particles; TAKE 2 CAPSULES   AT BEDTIME  Requested for: 56Cmc2009; Last Rx:80Dcu6842 Ordered   6  Furosemide 40 MG Oral Tablet; Take one tablet as needed; Therapy: (Edwin Walker) to Recorded   7  Gabapentin 300 MG Oral Capsule; TAKE 2 CAPSULES 3 TIMES DAILY; Therapy: 76NJQ2831 to (Evaluate:11Jun2017)  Requested for: 02Ifn6358; Last   Rx:39Ivk0757 Ordered   8  Meloxicam 7 5 MG Oral Tablet; take 1 tablet by mouth every day; Therapy: 08VRH0129 to (Evaluate:12Nov2017)  Requested for: 47QOB2444; Last   Rx:40For6679 Ordered   9  Metoprolol Succinate ER 50 MG Oral Tablet Extended Release 24 Hour; TAKE 1 TABLET   DAILY; Therapy: 21Ovg2893 to (Evaluate:44Wkj4277)  Requested for: 37Slh9448; Last   Rx:04Pwx7727 Ordered   10  Oxycodone-Acetaminophen  MG Oral Tablet; TAKE 1 TABLET 4 TIMES DAILY AS    NEEDED FOR PAIN;    Therapy: (Recorded:86Hey2815) to Recorded   11  Vitamin D3 5000 UNIT Oral Tablet; take one tablet daily; Therapy: (Edwin Walker) to Recorded   12  Zolpidem Tartrate 10 MG Oral Tablet (Ambien); TAKE 1 TABLET AT BEDTIME AS    NEEDED; Therapy: 09Apr2014 to (77 87 135); Last Rx:56Qwn2017 Ordered    Allergies    1   Xanax TABS    Signatures   Electronically signed by : Brice Vega LCSW; Sep 28 2017  2:31PM EST                       (Author)

## 2018-01-16 NOTE — RESULT NOTES
Message   Recorded as Task   Date: 10/27/2016 10:47 AM, Created By: Kaitlin Brooks   Task Name: Miscellaneous   Assigned To: [facility]   Regarding Patient: Rosio Martin, Status: Active   CommentColton Rein - 27 Oct 2016 10:47 AM     TASK CREATED  [facility] called to say she does not want you to send the note from todays visit to Dr Abdon Rolon  She would prefer that you send the note to Dr Jonna Fernandez since she would like to switch to that practice  Joy Gutierrez - 27 Oct 2016 12:03 PM     TASK REPLIED TO: Previously Assigned To [facility]                      I am not sure how to not send the note  They are sent automatically  As for her new PCP, any physician can see the notes from us  Sharron Metzger - 27 Oct 2016 12:31 PM     TASK EDITED   S/w the pt  and she is aware          Signatures   Electronically signed by : Althea Perez, ; Oct 27 2016 12:32PM EST                       (Author)

## 2018-01-16 NOTE — MISCELLANEOUS
Message   Recorded as Task   Date: 03/03/2016 01:05 PM, Created By: Vu Hooper   Task Name: Medical Complaint Callback   Assigned To: [facility]   Regarding Patient: Peggy Rider, Status: Active   CommentVinaney Sanchez - 03 Mar 2016 1:05 PM     TASK CREATED  Caller: Self; Medical Complaint; [phone] (Home)  Received VM from pt  on [employer] triage line from 1208 pm  Pt  states that she wants to talk about her Fentanyl patches  Per pt  the frequency was changed from Q48H to Q72H  Pt  states that she is not doing well w/ this change  Pt  requesting c/b at [phone]  Stacia Paredes - 03 Mar 2016 2:05 PM     TASK REPLIED TO: Previously Assigned To [facility]  VM left on home phone to cb  See previous task-insurance will only cover 10 patches/month that is why pt was instructed to change q 72 hours  Stacia Paredes - 80 Mar 2016 2:05 PM     TASK IN PROGRESS   Stephanie Morfin - 04 Mar 2016 9:15 AM     TASK EDITED  Received VM from pt  on [employer] triage line from 805 am  Pt  states that she is returning a call  Per pt  she was on Fentanyl 50 mcg pathces Q48H for 4 years  Pt  states that she was then switched to 25 mcg because of insurance reasons  Pt  states that she wants to be switched back to 50 mcg  Pt  also states that she has a note from her [facility] from 2/28/14 for Odette Crawley  Pt  requesting c/b  **Pt  mentioned in previous message about Q48H and Q72H dosing  It appears that even the order for 25 mcg patches are to be changed Q48H, but the patches themselves are the 72 hr  patches  **   Sharron Metzger - 04 Mar 2016 10:13 AM     TASK EDITED  S/w pt  to clarify and she states the 25mcg patch every 72 hours is not as effective as the 50mcg patch she was previously on  She notices by the second day that the pain is stronger and she finds that by the third day she is changing the patch earlier because of the pain   She is also taking the percocet for breakthrough pain and that doesn't really help also  She is not too sure what her insurance co will cover or if she needs a prior auth  Next POVS is on 3/17  Please advise  thanks   [id number] - 04 Mar 2016 4:02 PM     TASK REPLIED TO: Previously Assigned To [address]  She should do Q48 hours for now, and we can change to 50mcg next week  But it should again be Q72 h  MayStacia rodriguez - 04 Mar 2016 4:31 PM     TASK REPLIED TO: Previously Assigned To [facility]  Spoke with pt and advised to change fentanyl patch q 48hrs and to cb on Monday with Joy Laird - 04 Mar 2016 4:52 PM     TASK REPLIED TO: Previously Assigned To [id number]  Thank you  Active Problems    1  Analgesic use (V58 69) (Z79 899)   2  Anxiety disorder (300 00) (F41 9)   3  Aortic aneurysm (441 9) (I71 9)   4  Depression (311) (F32 9)   5  Eczema (692 9) (L30 9)   6  Edema (782 3) (R60 9)   7  Flu vaccine need (V04 81) (Z23)   8  Hypertension (401 9) (I10)   9  Insomnia (780 52) (G47 00)   10  Left knee pain (719 46) (M25 562)   11  Lumbar canal stenosis (724 02) (M48 06)   12  Lumbar radiculopathy (724 4) (M54 16)   13  Myofascial pain syndrome (729 1) (M79 1)   14  Noncompliance of patient with other medical treatment and regimen (V15 81) (Z91 19)   15  Opioid dependence (304 00) (F11 20)   16  Osteoarthritis, unspecified osteoarthritis type, unspecified site (715 90) (M19 90)   17  Osteoporosis (733 00) (M81 0)   18  Pain syndrome, chronic (338 4) (G89 4)   19  Right shoulder pain (719 41) (M25 511)   20  Spondylosis of lumbar region without myelopathy or radiculopathy (721 3) (M47 816)   21  Visit for screening mammogram (V76 12) (Z12 31)   22  Vitamin D deficiency (268 9) (E55 9)    Current Meds   1  AmLODIPine Besylate 5 MG Oral Tablet; TAKE 1 TABLET DAILY FOR BLOOD   PRESSURE; Therapy: 18QOG5673 to (Evaluate:13Mar2016)  Requested for: 96ROW7979; Last   Rx:19Mar2015 Ordered   2   Atenolol 100 MG Oral Tablet; TAKE ONE TABLET BY MOUTH ONCE DAILY; Therapy: 89KON7366 to (Evaluate:15Mar2017)  Requested for: 09Mzs7296; Last   Rx:95Cvk7055 Ordered   3  Desoximetasone 0 25 % External Cream (Topicort); APPLY TOPICALLY TWICE DAILY   AS NEEDED; Therapy: 14CJD1221 to (Evaluate:90Dho3304)  Requested for: 04BEU2101; Last   Rx:57Rki7557 Ordered   4  Diazepam 2 MG Oral Tablet; TAKE 1 TABLET 3 times daily PRN; Therapy: 16KQB5379 to (Evaluate:79Ghq3689); Last Rx:87Ndl0556 Ordered   5  FentaNYL 25 MCG/HR Transdermal Patch 72 Hour; Apply 1 patch td every 48 hours as   directed; Therapy: 21ACP4012 to (Evaluate:19Mar2016)  Requested for: 89TCN9507; Last   Rx:83Ofn1493 Ordered   6  Oxycodone-Acetaminophen 7 5-325 MG Oral Tablet; TAKE 1 TABLET Every 8 hours   PRN; Therapy: 68KAO6044 to (Evaluate:19Mar2016); Last Rx:78Glg1311 Ordered   7  PARoxetine HCl - 20 MG Oral Tablet (Paxil); TAKE 1 TABLET DAILY; Therapy: 78MEN7091 to 21 )  Requested for: [phone]; Last   Rx:61Vyw1251 Ordered   8  Vitamin D3 1000 UNIT Oral Capsule; Take 1 capsule twice daily; Therapy: 32BHT3608 to (Last Rx:17Mar2014)  Requested for: 76RZS7818 Ordered   9  Voltaren 1 % Transdermal Gel; Apply to affected joints 2 to 3 times daily  Prescribed by   Dr Nick Blizzard; Therapy: 38USW5562 to (Last Rx:55Crh6917)  Requested for: 02Iap9383 Ordered   10  Zolpidem Tartrate 10 MG Oral Tablet (Ambien); TAKE 1 TABLET AT BEDTIME AS    NEEDED; Therapy: 48KDV1719 to (Evaluate:15Apr2016); Last Rx:21Dtp4819 Ordered    Allergies    1   Xanax TABS    Signatures   Electronically signed by : Kristin Younger, ; Mar  7 2016  8:00AM EST                       (Author)

## 2018-01-16 NOTE — MISCELLANEOUS
Message   Recorded as Task   Date: 02/18/2016 02:00 PM, Created By: Efra Sainz   Task Name: Miscellaneous   Assigned To: [facility]   Regarding Patient: Tom Saint, Status: Active   CommentNile Valentino - 18 Feb 1570 3:02 PM     TASK CREATED  Recvd call from pharmacy Fentanyl need a quantity limit exception  Insurance will approved 10 for a 30 day supply  I called Insurance and spoke with leda and I submitted quantity limit exception request  72 hour turn around for the requestYou Dietrich - 23 Feb 9863 86:22 AM     TASK EDITED  Working on [employer], waiting on decision  Kanchan Cordova - 24 Feb 3154 5:73 PM     TASK EDITED  Recvd call from [employer] that they need to know why patient needs 15 patches and need to know why she can not use a higher dose like 50 or 75 because plan only covers 10 patches - You can call to discuss @ [phone] option 3 case # [id number] - Please advise  Emely Tinsley - 25 Feb 2016 4:20 PM     TASK REPLIED TO: Previously Assigned To [facility]  Please ask patient to change patches 72 hours as insurance will only cover 10 patches per month  Depending on her response, we can see how to proceed with the next order  Stacia Paredes - 25 Feb 2016 4:25 PM     TASK REPLIED TO: Previously Assigned To [facility]  Spoke with pt and advised of the same,verbalizes understanding  Active Problems    1  Analgesic use (V58 69) (Z79 899)   2  Anxiety disorder (300 00) (F41 9)   3  Aortic aneurysm (441 9) (I71 9)   4  Depression (311) (F32 9)   5  Eczema (692 9) (L30 9)   6  Edema (782 3) (R60 9)   7  Flu vaccine need (V04 81) (Z23)   8  Hypertension (401 9) (I10)   9  Insomnia (780 52) (G47 00)   10  Left knee pain (719 46) (M25 562)   11  Lumbar canal stenosis (724 02) (M48 06)   12  Lumbar radiculopathy (724 4) (M54 16)   13  Myofascial pain syndrome (729 1) (M79 1)   14  Noncompliance of patient with other medical treatment and regimen (V15 81) (Z91 19)   15  Opioid dependence (304 00) (F11 20)   16  Osteoarthritis, unspecified osteoarthritis type, unspecified site (715 90) (M19 90)   17  Osteoporosis (733 00) (M81 0)   18  Pain syndrome, chronic (338 4) (G89 4)   19  Right shoulder pain (719 41) (M25 511)   20  Spondylosis of lumbar region without myelopathy or radiculopathy (721 3) (M47 816)   21  Visit for screening mammogram (V76 12) (Z12 31)   22  Vitamin D deficiency (268 9) (E55 9)    Current Meds   1  AmLODIPine Besylate 5 MG Oral Tablet; TAKE 1 TABLET DAILY FOR BLOOD   PRESSURE; Therapy: 57BXG2455 to (Evaluate:13Mar2016)  Requested for: 74FTC8618; Last   Rx:19Mar2015 Ordered   2  Atenolol 100 MG Oral Tablet; TAKE ONE TABLET BY MOUTH ONCE DAILY; Therapy: 90CMP4707 to (Evaluate:15Mar2017)  Requested for: 54Gdn5752; Last   Rx:59Har5899 Ordered   3  Desoximetasone 0 25 % External Cream (Topicort); APPLY TOPICALLY TWICE DAILY   AS NEEDED; Therapy: 71ATN1964 to (Evaluate:28Tbx8055)  Requested for: 64VBJ6766; Last   Rx:35Res3335 Ordered   4  Diazepam 2 MG Oral Tablet; TAKE 1 TABLET 3 times daily PRN; Therapy: 66NUD2128 to (Evaluate:71Lpu2873); Last Rx:80Rlc1265 Ordered   5  FentaNYL 25 MCG/HR Transdermal Patch 72 Hour; Apply 1 patch td every 48 hours as   directed; Therapy: 73HJA7464 to (Evaluate:19Mar2016)  Requested for: 77WHZ9981; Last   Rx:43Wsm3312 Ordered   6  Oxycodone-Acetaminophen 7 5-325 MG Oral Tablet; TAKE 1 TABLET Every 8 hours   PRN; Therapy: 71EGM0753 to (Evaluate:19Mar2016); Last Rx:96Mau5903 Ordered   7  PARoxetine HCl - 20 MG Oral Tablet (Paxil); TAKE 1 TABLET DAILY; Therapy: 59RGD9683 to 72 470 15 18)  Requested for: [phone]; Last   Rx:27Oct2015 Ordered   8  Vitamin D3 1000 UNIT Oral Capsule; Take 1 capsule twice daily; Therapy: 59TWR0300 to (Last Rx:17Mar2014)  Requested for: 66NXR0850 Ordered   9  Voltaren 1 % Transdermal Gel;  Apply to affected joints 2 to 3 times daily  Prescribed by   Dr Karolina Euceda; Therapy: 74PVU1896 to (Last Rx:58Kom2919)  Requested for: 99Lun2329 Ordered   10  Zolpidem Tartrate 10 MG Oral Tablet (Ambien); TAKE 1 TABLET AT BEDTIME AS    NEEDED; Therapy: 61RQD1814 to (Evaluate:15Apr2016); Last Rx:74Mzg6737 Ordered    Allergies    1  Xanax TABS    Signatures   Electronically signed by :  Musa Mares, ; Feb 25 2016  4:25PM EST                       (Author)

## 2018-01-17 NOTE — RESULT NOTES
Message   Recorded as Task   Date: 07/06/2016 03:21 PM, Created By: Fredrick Metzger   Task Name: Med Renewal Request   Assigned To: [facility]   Regarding Patient: Poly Rubio, Status: In Progress   Comment:    Stacia Paredes - 06 Jul 2016 3:21 PM     TASK CREATED  Caller: Self; Renew Medication; [phone] (Home)  Pt left VM today requesting refill of voltaren gel 1% 100gms to [facility]  VM left for pt to cb-asking pt if ok to refill on Monday when DR Scott returns  Pt has f/u with Dr BOUDREAUX 9-6-16   Fredrick Metzger - 06 Jul 2016 3:21 PM     TASK IN [address] - 08 Jul 2016 3:49 PM     TASK EDITED  Attempted to contact pt  Spoke with a female who states pt is not in  Advised to have pt contact hospitals on Monday  Stacia Paredes - 11 Jul 2016 1:53 PM     TASK REASSIGNED: Previously Assigned To [facility]   Joy Gutierrez - 12 Jul 2016 2:37 PM     TASK REPLIED TO: Previously Assigned To Joy Gutierrez  This is refilled  It was last filled by Stacia Zapata - 12 Jul 2016 3:51 PM     TASK REPLIED TO: Previously Assigned To [facility]  Spoke with pt and advised of the same,appreciative  Signatures   Electronically signed by :  Nicole Villalobos, ; Jul 12 2016  3:51PM EST                       (Author)

## 2018-01-17 NOTE — RESULT NOTES
Message   Recorded as Task   Date: 12/15/2016 09:02 AM, Created By: Jennifer Alfaro   Task Name: Follow Up   Assigned To: Sharron Metzger   Regarding Patient: Carolin Euceda, Status: Active   Comment:    Sharron Metzger - 15 Dec 2016 9:02 AM     TASK CREATED  Results Inquiry  Pt  is S/P a Left L3-L5 RFA c/ JW on 12/14  Right L3-L5 RFA scheduled for 12/28  Sovs F/U is scheduled for 1/10  S/w the pt  this am and she stated she is a little sore but feeling ok  The bandaides are off and area is C/D/I  She knows she has to give it some time     Jeanne Jameel - 15 Dec 2016 1:12 PM     TASK REPLIED TO: Previously Assigned To [employer] 18   Electronically signed by : Fay Ruiz, ; Dec 15 2016  1:52PM EST                       (Author)

## 2018-01-22 VITALS
HEART RATE: 72 BPM | TEMPERATURE: 96.4 F | WEIGHT: 193.13 LBS | BODY MASS INDEX: 32.18 KG/M2 | HEIGHT: 65 IN | DIASTOLIC BLOOD PRESSURE: 90 MMHG | RESPIRATION RATE: 16 BRPM | SYSTOLIC BLOOD PRESSURE: 142 MMHG

## 2018-01-22 VITALS
TEMPERATURE: 96.7 F | WEIGHT: 191.5 LBS | HEIGHT: 63 IN | DIASTOLIC BLOOD PRESSURE: 72 MMHG | SYSTOLIC BLOOD PRESSURE: 118 MMHG | BODY MASS INDEX: 33.93 KG/M2 | HEART RATE: 74 BPM | RESPIRATION RATE: 14 BRPM

## 2018-01-22 VITALS
SYSTOLIC BLOOD PRESSURE: 104 MMHG | HEIGHT: 64 IN | HEART RATE: 78 BPM | DIASTOLIC BLOOD PRESSURE: 78 MMHG | BODY MASS INDEX: 32.78 KG/M2 | WEIGHT: 192 LBS

## 2018-02-16 PROBLEM — M54.41 CHRONIC BILATERAL LOW BACK PAIN WITH BILATERAL SCIATICA: Status: ACTIVE | Noted: 2017-03-08

## 2018-02-16 PROBLEM — G89.29 CHRONIC BILATERAL LOW BACK PAIN WITH BILATERAL SCIATICA: Status: ACTIVE | Noted: 2017-03-08

## 2018-02-16 PROBLEM — M54.42 CHRONIC BILATERAL LOW BACK PAIN WITH BILATERAL SCIATICA: Status: ACTIVE | Noted: 2017-03-08

## 2018-02-19 ENCOUNTER — OFFICE VISIT (OUTPATIENT)
Dept: FAMILY MEDICINE CLINIC | Facility: CLINIC | Age: 73
End: 2018-02-19
Payer: MEDICARE

## 2018-02-19 VITALS
SYSTOLIC BLOOD PRESSURE: 130 MMHG | WEIGHT: 190 LBS | RESPIRATION RATE: 14 BRPM | HEIGHT: 64 IN | DIASTOLIC BLOOD PRESSURE: 80 MMHG | TEMPERATURE: 98.2 F | BODY MASS INDEX: 32.44 KG/M2 | HEART RATE: 84 BPM

## 2018-02-19 DIAGNOSIS — I48.91 ATRIAL FIBRILLATION, NEW ONSET (HCC): ICD-10-CM

## 2018-02-19 DIAGNOSIS — G47.00 INSOMNIA, UNSPECIFIED TYPE: ICD-10-CM

## 2018-02-19 DIAGNOSIS — F41.8 DEPRESSION WITH ANXIETY: ICD-10-CM

## 2018-02-19 DIAGNOSIS — I10 ESSENTIAL HYPERTENSION: Primary | ICD-10-CM

## 2018-02-19 DIAGNOSIS — M54.41 CHRONIC BILATERAL LOW BACK PAIN WITH BILATERAL SCIATICA: ICD-10-CM

## 2018-02-19 DIAGNOSIS — G89.29 CHRONIC BILATERAL LOW BACK PAIN WITH BILATERAL SCIATICA: ICD-10-CM

## 2018-02-19 DIAGNOSIS — M54.42 CHRONIC BILATERAL LOW BACK PAIN WITH BILATERAL SCIATICA: ICD-10-CM

## 2018-02-19 PROCEDURE — 99213 OFFICE O/P EST LOW 20 MIN: CPT | Performed by: FAMILY MEDICINE

## 2018-02-19 RX ORDER — ZOLPIDEM TARTRATE 10 MG/1
10 TABLET ORAL
Qty: 30 TABLET | Refills: 0 | Status: SHIPPED | OUTPATIENT
Start: 2018-02-19 | End: 2018-03-16 | Stop reason: SDUPTHER

## 2018-02-19 RX ORDER — BUPROPION HYDROCHLORIDE 150 MG/1
150 TABLET ORAL DAILY
Qty: 30 TABLET | Refills: 2 | Status: SHIPPED | OUTPATIENT
Start: 2018-02-19 | End: 2018-05-11 | Stop reason: SDUPTHER

## 2018-02-19 RX ORDER — GABAPENTIN 300 MG/1
300 CAPSULE ORAL 3 TIMES DAILY
Qty: 30 CAPSULE | Refills: 2 | Status: SHIPPED | OUTPATIENT
Start: 2018-02-19 | End: 2018-05-11 | Stop reason: SDUPTHER

## 2018-02-19 NOTE — ASSESSMENT & PLAN NOTE
130/80 today  Well controlled with metoprolol  Patient does not check blood pressure at home    Will see cardiologist 3/7/18

## 2018-02-19 NOTE — ASSESSMENT & PLAN NOTE
Taking Sarahann Foil daily since her grandson hung him self in her apartament in 1/2017   daughter had cardiac arrest 1 month ago   Will renew [place]

## 2018-02-19 NOTE — PROGRESS NOTES
Assessment/Plan:    Problem List Items Addressed This Visit        Cardiovascular and Mediastinum    Atrial fibrillation, new onset ([address] )     Rhythm controlled with metoprolol  Anticoagulated on   Eliquis   will see cardiologist on/7/2018         Hypertension - Primary     130/80 today  Well controlled with metoprolol  Patient does not check blood pressure at home  Will see cardiologist 3/7/18            Other    Chronic bilateral low back pain with bilateral sciatica      Well controlled,  Will continue gabapentin         Relevant Medications    gabapentin (NEURONTIN) 300 mg capsule    buPROPion (WELLBUTRIN XL) 150 mg 24 hr tablet    Depression with anxiety     Well controlled  Will renew wellbutrin         Relevant Medications    buPROPion (WELLBUTRIN XL) 150 mg 24 hr tablet    zolpidem (AMBIEN) 10 mg tablet    Insomnia      Will renew zolpidem         Relevant Medications    zolpidem (AMBIEN) 10 mg tablet          Subjective:      Patient ID: Mindi Dsouza is a 81 y o  female  With history of AFib, hypertension, osteoarthritis, depression  Here to refill her medications  Patient takes medications as directed  She denies any chest pain, shortness of breath, dizziness, palpitations the    HPI  Patient is a 68-year-old lady with history of  The following portions of the patient's history were reviewed and updated as appropriate: allergies, current medications, past family history, past medical history, past social history, past surgical history and problem list     Review of Systems   Constitutional: Negative for fatigue and fever  Eyes: Negative for visual disturbance  Respiratory: Negative for chest tightness, shortness of breath and wheezing  Cardiovascular: Negative for chest pain and palpitations  Gastrointestinal: Negative for abdominal distention, abdominal pain, constipation, diarrhea and nausea           Objective:    Vitals:    02/19/18 1128   BP: 130/80   Pulse: 84   Resp: 14   Temp: 98 2 °F (36 8 °C)        Physical Exam   Constitutional: She is oriented to person, place, and time  She appears well-developed and well-nourished  No distress  HENT:   Head: Normocephalic  Right Ear: External ear normal    Left Ear: External ear normal    Mouth/Throat: Oropharynx is clear and moist  No oropharyngeal exudate  Eyes: Conjunctivae are normal  Pupils are equal, round, and reactive to light  Neck: Normal range of motion  Cardiovascular: Normal rate, regular rhythm, normal heart sounds and intact distal pulses  Exam reveals no gallop and no friction rub  No murmur heard  Pulmonary/Chest: Effort normal and breath sounds normal  No respiratory distress  She has no wheezes  She has no rales  She exhibits no tenderness  Abdominal: Soft  She exhibits no distension and no mass  There is no tenderness  There is no rebound and no guarding  Musculoskeletal: Normal range of motion  She exhibits no edema, tenderness or deformity  Lymphadenopathy:     She has no cervical adenopathy  Neurological: She is alert and oriented to person, place, and time  Skin: Skin is warm and dry  No rash noted  She is not diaphoretic  No pallor  Psychiatric: She has a normal mood and affect  Vitals reviewed

## 2018-03-08 DIAGNOSIS — I48.91 ATRIAL FIBRILLATION, UNSPECIFIED TYPE (HCC): Primary | ICD-10-CM

## 2018-03-14 ENCOUNTER — TELEPHONE (OUTPATIENT)
Dept: FAMILY MEDICINE CLINIC | Facility: CLINIC | Age: 73
End: 2018-03-14

## 2018-03-14 ENCOUNTER — OFFICE VISIT (OUTPATIENT)
Dept: CARDIOLOGY CLINIC | Facility: CLINIC | Age: 73
End: 2018-03-14

## 2018-03-14 VITALS
HEIGHT: 64 IN | BODY MASS INDEX: 31.72 KG/M2 | WEIGHT: 185.8 LBS | DIASTOLIC BLOOD PRESSURE: 60 MMHG | SYSTOLIC BLOOD PRESSURE: 100 MMHG | HEART RATE: 84 BPM

## 2018-03-14 DIAGNOSIS — I48.0 PAROXYSMAL ATRIAL FIBRILLATION (HCC): Primary | ICD-10-CM

## 2018-03-14 DIAGNOSIS — I10 ESSENTIAL HYPERTENSION: ICD-10-CM

## 2018-03-14 DIAGNOSIS — Z00.00 ENCOUNTER FOR PREVENTIVE HEALTH EXAMINATION: ICD-10-CM

## 2018-03-14 PROCEDURE — 99214 OFFICE O/P EST MOD 30 MIN: CPT | Performed by: INTERNAL MEDICINE

## 2018-03-14 PROCEDURE — 93000 ELECTROCARDIOGRAM COMPLETE: CPT | Performed by: INTERNAL MEDICINE

## 2018-03-14 RX ORDER — METOPROLOL SUCCINATE 50 MG/1
50 TABLET, EXTENDED RELEASE ORAL
COMMUNITY
End: 2018-03-14 | Stop reason: SDUPTHER

## 2018-03-14 RX ORDER — MORPHINE SULFATE 15 MG/1
TABLET, FILM COATED, EXTENDED RELEASE ORAL 2 TIMES DAILY
COMMUNITY
Start: 2018-02-20 | End: 2018-09-27 | Stop reason: SDUPTHER

## 2018-03-14 RX ORDER — METOPROLOL SUCCINATE 50 MG/1
50 TABLET, EXTENDED RELEASE ORAL DAILY
Qty: 90 TABLET | Refills: 3 | Status: SHIPPED | OUTPATIENT
Start: 2018-03-14 | End: 2018-10-03 | Stop reason: SDUPTHER

## 2018-03-14 NOTE — PROGRESS NOTES
Cardiology Progress Note    Janelle Arita  [id number]  1945  HEART & VASCULAR [facility] [address]      1  Atrial fibrillation, unspecified type (HCC)  POCT ECG    apixaban (ELIQUIS) 5 mg    metoprolol succinate (TOPROL-XL) 50 mg 24 hr tablet   2  Encounter for preventive health examination  Lipid Panel with Direct LDL reflex       Discussion/Summary:  Ms Jean Claude Greenwood is a 30-year-old female who is here for routine follow-up  Her past medical history is significant for paroxysmal atrial fibrillation, hypertension, AAA      1  Paroxysmal atrial fibrillation-  - EKG in clinic today- sinus tachycardia  Normal axis and intervals  - Continue metoprolol succinate 50 mg daily  - Anticoagulation with Eliquis 5 mg b i d        2  Hypertension-  - Controlled on Metoprolol succinate 50mg daily  - Will check lipid panel  Last lipid panel- March 2014- LDL-87, HDL-52     3  AAA-  - Echo- October 2017-4 1cm     - CT chest in Feb 2013-Ascending aorta-4 2cm  - Follow-up in six months at which time will get a repeat CT chest and BMP prior to it  F/u in six months  History of Present Illness:  Ms Jean Claude Greenwood is a 30-year-old female with past medical history of hypertension, AAA, depression, anxiety was seen in the hospital by me on 10/12/2017 when she presented to the ED with chief complaints of acute shortness of breath  During that time, unfortunately she lost her grandson due to a suicide of home and she was extremely tearful during the hospital stay  In the ED, patient was found to be in atrial fibrillation with RVR and was given 15 milligrams of Cardizem with which she reverted back into sinus rhythm  He was also seen by Cardiology in May 2016 for presumed diastolic heart failure and hypertension as an inpatient  Echo at that time revealed normal diastolic function   Her home dose of metoprolol succinate was increased to 50 mg twice daily(home medication- Amlodipine 5mg was discontinued) and she was started on anticoagulation with Eliquis as her Linh Vasc score was 3  Patient had normal thyroid function tests  An echo was done on 10/13/2017 which showed normal LVEF-60 percent with no regional wall motion abnormalities  Grade 1 diastolic dysfunction  Normal RV size and function  Normal biatrial size  Dilatation of the ascending aorta at 4 1 centimeters in the anteroposterior diameter  During my previous visit, dose of metoprolol was decreased to once daily due to symptoms of dizziness with patient maintaining normal heart rate in sinus  Since then, she has been doing well  No further episodes of dizziness  No chest pain/pressure or shortness of breath  No palpitations or skipped beats  No syncope  No decreased oral intake  And no trauma to the head  Patient is still trying to cope with her grandson loss and is going to seek therapy group sessions  Compliant with her medications  Able to do her daily activities with no limitations  No dark-colored stools or bruising with Eliquis         Patient Active Problem List   Diagnosis    [facility], [facility]    Acute kidney injury ([facility]    Grief reaction with prolonged bereavement    Dizziness    Chronic pain    Osteoporosis    Hypertension    Depression    Anxiety    Aortic aneurysm ([facility]    Chronic bilateral low back pain with bilateral sciatica    Depression with anxiety    Insomnia    Left knee pain    Lumbar canal stenosis    Myofascial pain syndrome    Opioid dependence (HCC)    Osteoarthritis    Pain syndrome, chronic    Right shoulder pain    Spondylosis of lumbar region without myelopathy or radiculopathy    Vitamin D deficiency     Past Medical History:   Diagnosis Date    Acute deep vein thrombosis of lower limb ([facility] )     last assessed [id number]    Aortic aneurysm (HCC)     Arthritis     Atrial fibrillation ([facility]     [facility]     last assessed [id number]  Hypertension     Psychiatric disorder     anxiety     Social History     Social History    Marital status:      Spouse name: N/A    Number of children: N/A    Years of education: N/A     Occupational History    Not on file       Social History Main Topics    Smoking status: Never Smoker    Smokeless tobacco: Never Used    Alcohol use No      Comment: being a social drinker per [employer]    Drug use: No    Sexual activity: Not on file     Other Topics Concern    Not on file     Social History Narrative    No narrative on file      Family History   Problem Relation Age of Onset    Colon cancer Mother     Breast cancer Family     Thyroid cancer Family     Colon cancer Family     Hypertension Family     Thyroid disease Family     Hypertension Father     Dementia Father      Past Surgical History:   Procedure Laterality Date    HIP SURGERY      JOINT REPLACEMENT      KNEE ARTHROSCOPY Bilateral     x2 rt and 1 on left    TUBAL LIGATION         Current Outpatient Prescriptions:     alendronate (FOSAMAX) 70 mg tablet, Take 1 tablet by mouth once a week, Disp: , Rfl:     apixaban (ELIQUIS) 5 mg, Take 1 tablet (5 mg total) by mouth 2 (two) times a day for 90 days, Disp: 180 tablet, Rfl: 3    baclofen 10 mg tablet, Take 10 mg by mouth 2 (two) times a day as needed for muscle spasms, Disp: , Rfl:     buPROPion (WELLBUTRIN XL) 150 mg 24 hr tablet, Take 1 tablet (150 mg total) by mouth daily, Disp: 30 tablet, Rfl: 2    docusate sodium (COLACE) 100 mg capsule, Take 1 capsule by mouth 2 (two) times a day (Patient taking differently: Take 100 mg by mouth as needed  ), Disp: 10 capsule, Rfl: 0    furosemide (LASIX) 40 mg tablet, Take 1 tablet (40 mg total) by mouth as needed (edema) Indications: Edema , Disp: 30 tablet, Rfl: 0    gabapentin (NEURONTIN) 300 mg capsule, Take 1 capsule (300 mg total) by mouth 3 (three) times a day, Disp: 30 capsule, Rfl: 2    metoprolol succinate (TOPROL-XL) 50 mg 24 hr tablet, Take 1 tablet (50 mg total) by mouth daily for 90 days, Disp: 90 tablet, Rfl: 3    morphine (MS CONTIN) 15 mg 12 hr tablet, 2 (two) times a day, Disp: , Rfl:     oxyCODONE-acetaminophen (PERCOCET)  mg per tablet, Take 1 tablet by mouth daily as needed for moderate pain, Disp: , Rfl:     ranitidine (ZANTAC) 150 mg tablet, Take 1 tablet by mouth daily at bedtime (Patient taking differently: Take 150 mg by mouth as needed  ), Disp: 30 tablet, Rfl: 0    zolpidem (AMBIEN) 10 mg tablet, Take 1 tablet (10 mg total) by mouth daily at bedtime as needed for sleep, Disp: 30 tablet, Rfl: 0  Allergies   Allergen Reactions    Alprazolam      Annotation - [id number]:  Bad side effect         Labs:  Admission on 10/11/2017, Discharged on 10/13/2017   Component Date Value    Sodium 10/11/2017 144     Potassium 10/11/2017 3 9     Chloride 10/11/2017 110*    CO2 10/11/2017 25     Anion Gap 10/11/2017 9     BUN 10/11/2017 22     Creatinine 10/11/2017 1 98*    Glucose 10/11/2017 77     Calcium 10/11/2017 8 5     AST 10/11/2017 19     ALT 10/11/2017 23     Alkaline Phosphatase 10/11/2017 108     Total Protein 10/11/2017 7 2     Albumin 10/11/2017 3 2*    Total Bilirubin 10/11/2017 0 54     eGFR 10/11/2017 25     WBC 10/11/2017 9 54     RBC 10/11/2017 5 20*    Hemoglobin 10/11/2017 16 0*    Hematocrit 10/11/2017 48 4*    MCV 10/11/2017 93     MCH 10/11/2017 30 8     MCHC 10/11/2017 33 1     RDW 10/11/2017 14 4     MPV 10/11/2017 11 1     Platelets 80/47/9682 282     nRBC 10/11/2017 0     Neutrophils Relative 10/11/2017 70     Lymphocytes Relative 10/11/2017 19     Monocytes Relative 10/11/2017 10     Eosinophils Relative 10/11/2017 1     Basophils Relative 10/11/2017 0     Neutrophils Absolute 10/11/2017 6 60     Lymphocytes Absolute 10/11/2017 1 85     Monocytes Absolute 10/11/2017 0 91     Eosinophils Absolute 10/11/2017 0 09     Basophils Absolute 10/11/2017 0 04     Ventricular Rate 10/11/2017 54     Atrial Rate 10/11/2017 54     MN Interval 10/11/2017 158     QRSD Interval 10/11/2017 92     QT Interval 10/11/2017 428     QTC Interval 10/11/2017 405     P Axis 10/11/2017 84     QRS Axis 10/11/2017 46     T Wave Axis 10/11/2017 35     Protime 10/11/2017 14 0     INR 10/11/2017 1 08     PTT 10/11/2017 30     TSH 3RD GENERATON 10/11/2017 3 920*    Magnesium 10/11/2017 1 8     Phosphorus 10/11/2017 3 7     POC Troponin I 10/11/2017 0 01     Specimen Type 10/11/2017 VENOUS     Free T4 10/11/2017 1 17     Sodium 10/12/2017 143     Potassium 10/12/2017 4 3     Chloride 10/12/2017 111*    CO2 10/12/2017 25     Anion Gap 10/12/2017 7     BUN 10/12/2017 24     Creatinine 10/12/2017 1 30     Glucose 10/12/2017 92     Calcium 10/12/2017 8 4     AST 10/12/2017 15     ALT 10/12/2017 23     Alkaline Phosphatase 10/12/2017 100     Total Protein 10/12/2017 7 0     Albumin 10/12/2017 3 2*    Total Bilirubin 10/12/2017 0 62     eGFR 10/12/2017 42     Platelets 55/99/6278 233     MPV 10/12/2017 10 7     Ventricular Rate 10/11/2017 125     Atrial Rate 10/11/2017 163     QRSD Interval 10/11/2017 86     QT Interval 10/11/2017 304     QTC Interval 10/11/2017 438     P Axis 10/11/2017 0     QRS Axis 10/11/2017 40     T Wave Axis 10/11/2017 -25     Ventricular Rate 10/11/2017 67     Atrial Rate 10/11/2017 150     QRSD Interval 10/11/2017 90     QT Interval 10/11/2017 374     QTC Interval 10/11/2017 395     P Axis 10/11/2017 192     QRS Axis 10/11/2017 38     T Wave Axis 10/11/2017 30     Sodium 10/13/2017 141     Potassium 10/13/2017 4 0     Chloride 10/13/2017 110*    CO2 10/13/2017 25     Anion Gap 10/13/2017 6     BUN 10/13/2017 17     Creatinine 10/13/2017 0 90     Glucose 10/13/2017 86     Calcium 10/13/2017 8 5     eGFR 10/13/2017 65     WBC 10/13/2017 4 93     RBC 10/13/2017 4 38     Hemoglobin 10/13/2017 13 3     Hematocrit 10/13/2017 40 4     MCV 10/13/2017 92     MCH 10/13/2017 30 4     MCHC 10/13/2017 32 9     RDW 10/13/2017 14 6     MPV 10/13/2017 10 6     Platelets 00/77/1154 188     nRBC 10/13/2017 0     Neutrophils Relative 10/13/2017 56     Lymphocytes Relative 10/13/2017 34     Monocytes Relative 10/13/2017 8     Eosinophils Relative 10/13/2017 2     Basophils Relative 10/13/2017 0     Neutrophils Absolute 10/13/2017 2 74     Lymphocytes Absolute 10/13/2017 1 65     Monocytes Absolute 10/13/2017 0 41     Eosinophils Absolute 10/13/2017 0 10     Basophils Absolute 10/13/2017 0 02         Imaging: No results found  ECG: Sinus tachycardia  Normal axis and intervals  Review of Systems:  Review of Systems   Constitutional: Negative for activity change, appetite change, chills, diaphoresis, fatigue and fever  HENT: Negative for congestion  Respiratory: Negative for cough, chest tightness, shortness of breath and wheezing  Cardiovascular: Negative for chest pain, palpitations and leg swelling  Gastrointestinal: Negative for abdominal pain, diarrhea, nausea and vomiting  Genitourinary: Negative for difficulty urinating and dysuria  Musculoskeletal: Negative for back pain  Skin: Negative for color change and rash  Neurological: Negative for dizziness, syncope, facial asymmetry, weakness, light-headedness, numbness and headaches  Psychiatric/Behavioral: Negative for confusion           Vitals:    03/14/18 1256   BP: 100/60   BP Location: Right arm   Patient Position: Sitting   Cuff Size: Standard   Pulse: 84   Weight: 84 3 kg (185 lb 12 8 oz)   Height: 5' 4" (1 626 m)     Vitals:    03/14/18 1256   Weight: 84 3 kg (185 lb 12 8 oz)     Height: 5' 4" (162 6 cm)     Physical Exam:  General appearance:  Appears stated age, alert, well appearing and in no distress  HEENT:  PERRLA, EOMI, no scleral icterus, no conjunctival pallor  NECK:  Supple, No elevated JVP, no thyromegaly, no carotid bruits  HEART:  Regular rate and rhythm, normal S1/S2, no S3/S4, no murmur or rub  LUNGS:  Clear to auscultation bilaterally  ABDOMEN:  Soft, non-tender, positive bowel sounds, no rebound or guarding, no organomegaly   EXTREMITIES:  No edema  VASCULAR:  Normal pedal pulses   SKIN: No lesions or rashes on exposed skin  NEURO:  CN II-XII intact, no focal deficits

## 2018-03-15 ENCOUNTER — TELEPHONE (OUTPATIENT)
Dept: FAMILY MEDICINE CLINIC | Facility: CLINIC | Age: 73
End: 2018-03-15

## 2018-03-15 ENCOUNTER — TELEPHONE (OUTPATIENT)
Dept: OBGYN CLINIC | Facility: HOSPITAL | Age: 73
End: 2018-03-15

## 2018-03-16 DIAGNOSIS — G47.00 INSOMNIA, UNSPECIFIED TYPE: ICD-10-CM

## 2018-03-16 RX ORDER — ZOLPIDEM TARTRATE 10 MG/1
10 TABLET ORAL
Qty: 30 TABLET | Refills: 0 | Status: SHIPPED | OUTPATIENT
Start: 2018-03-16 | End: 2018-05-10 | Stop reason: SDUPTHER

## 2018-04-09 NOTE — TELEPHONE ENCOUNTER
Gwen Shrestha calling again - requesting supporting notes/records for pt to receive this back brace   # is [phone] with any questions

## 2018-04-09 NOTE — TELEPHONE ENCOUNTER
Faxed office note from 2/19/18 and pain management note from 2/20/18 to [facility] @ [phone]

## 2018-04-13 ENCOUNTER — TELEPHONE (OUTPATIENT)
Dept: FAMILY MEDICINE CLINIC | Facility: CLINIC | Age: 73
End: 2018-04-13

## 2018-04-13 NOTE — TELEPHONE ENCOUNTER
Voicemail:    Patient requesting refill    Zolpidem 10mg 1 tablet daily    Please advise  Thank you      Patient uses [address]

## 2018-04-14 NOTE — TELEPHONE ENCOUNTER
Dear  Dr Isaias Castañeda    Can you please refill zolpidem for this Pt  PDMP checked   Last filled 3/16       Thank you

## 2018-05-09 ENCOUNTER — TELEPHONE (OUTPATIENT)
Dept: FAMILY MEDICINE CLINIC | Facility: CLINIC | Age: 73
End: 2018-05-09

## 2018-05-09 NOTE — TELEPHONE ENCOUNTER
Dear Dr Courtney Crawford    [facility]       Thank you

## 2018-05-10 DIAGNOSIS — M54.42 CHRONIC BILATERAL LOW BACK PAIN WITH BILATERAL SCIATICA: ICD-10-CM

## 2018-05-10 DIAGNOSIS — G47.00 INSOMNIA, UNSPECIFIED TYPE: ICD-10-CM

## 2018-05-10 DIAGNOSIS — G89.29 CHRONIC BILATERAL LOW BACK PAIN WITH BILATERAL SCIATICA: ICD-10-CM

## 2018-05-10 DIAGNOSIS — M54.41 CHRONIC BILATERAL LOW BACK PAIN WITH BILATERAL SCIATICA: ICD-10-CM

## 2018-05-10 RX ORDER — ZOLPIDEM TARTRATE 10 MG/1
10 TABLET ORAL
Qty: 30 TABLET | Refills: 3 | Status: SHIPPED | OUTPATIENT
Start: 2018-05-10 | End: 2018-08-31 | Stop reason: SDUPTHER

## 2018-05-10 RX ORDER — GABAPENTIN 300 MG/1
CAPSULE ORAL
Qty: 180 CAPSULE | Refills: 0 | Status: CANCELLED | OUTPATIENT
Start: 2018-05-10

## 2018-05-10 RX ORDER — BUPROPION HYDROCHLORIDE 150 MG/1
TABLET ORAL
Qty: 30 TABLET | Refills: 0 | Status: CANCELLED | OUTPATIENT
Start: 2018-05-10

## 2018-05-11 ENCOUNTER — TELEPHONE (OUTPATIENT)
Dept: FAMILY MEDICINE CLINIC | Facility: CLINIC | Age: 73
End: 2018-05-11

## 2018-05-11 DIAGNOSIS — M54.41 CHRONIC BILATERAL LOW BACK PAIN WITH BILATERAL SCIATICA: ICD-10-CM

## 2018-05-11 DIAGNOSIS — G89.29 CHRONIC BILATERAL LOW BACK PAIN WITH BILATERAL SCIATICA: ICD-10-CM

## 2018-05-11 DIAGNOSIS — M54.42 CHRONIC BILATERAL LOW BACK PAIN WITH BILATERAL SCIATICA: ICD-10-CM

## 2018-05-11 RX ORDER — GABAPENTIN 300 MG/1
600 CAPSULE ORAL 3 TIMES DAILY
Qty: 180 CAPSULE | Refills: 0 | Status: SHIPPED | OUTPATIENT
Start: 2018-05-11 | End: 2018-06-28 | Stop reason: SDUPTHER

## 2018-05-11 RX ORDER — BUPROPION HYDROCHLORIDE 150 MG/1
150 TABLET ORAL DAILY
Qty: 30 TABLET | Refills: 0 | Status: SHIPPED | OUTPATIENT
Start: 2018-05-11 | End: 2018-06-18 | Stop reason: SDUPTHER

## 2018-05-11 RX ORDER — GABAPENTIN 300 MG/1
600 CAPSULE ORAL 3 TIMES DAILY
Qty: 30 CAPSULE | Refills: 0 | Status: SHIPPED | OUTPATIENT
Start: 2018-05-11 | End: 2018-05-11 | Stop reason: SDUPTHER

## 2018-05-11 NOTE — TELEPHONE ENCOUNTER
Pt calling she needs her medications filled  She said she usually sees Dr Lavinia Bucio and the last provider messed up her medication refills  I called [place] [facility] to clarify pt had been on Wellbutrin 150 daily, and gabapentin was always 300mg caps take 2 capsules tid  They were supposed to deliver her meds this afternoon and they faxed us although we had not received the fax  Please review   Thank you

## 2018-06-18 DIAGNOSIS — M54.42 CHRONIC BILATERAL LOW BACK PAIN WITH BILATERAL SCIATICA: ICD-10-CM

## 2018-06-18 DIAGNOSIS — G89.29 CHRONIC BILATERAL LOW BACK PAIN WITH BILATERAL SCIATICA: ICD-10-CM

## 2018-06-18 DIAGNOSIS — M54.41 CHRONIC BILATERAL LOW BACK PAIN WITH BILATERAL SCIATICA: ICD-10-CM

## 2018-06-19 RX ORDER — BUPROPION HYDROCHLORIDE 150 MG/1
150 TABLET ORAL DAILY
Qty: 30 TABLET | Refills: 0 | Status: SHIPPED | OUTPATIENT
Start: 2018-06-19 | End: 2018-06-28 | Stop reason: SDUPTHER

## 2018-06-27 NOTE — PROGRESS NOTES
Bronwen Riedel 1945 female MRN: [id number]    Family Medicine Follow-up Visit    ASSESSMENT/PLAN  Vitamin D deficiency  Last Vit D not recent - recheck    Pain syndrome, chronic  Follows with pain management - continue    Depression with anxiety  Controlled on wellbutrin  She is considering seeing a counselor - she will either call her insurance or call this office and speak with Destiny Melendez to find a counselor if she decides to do so  Healthcare maintenance  Hep c - ordered  Mammogram - refused despite knowing risk  Colonoscopy - refused depsite knowing risk and refused FIT  Blood work - ordered lipids etc   Glaucoma - saw doctor at [facility] but that was over 2 years ago  Will see doctor  Osteoarthritis  voltaren gel for knee pain/arthritis - this has helped int he past     Hypertension    Hypertension- patients hypertension is controlled today with a Blood pressure of Blood Pressure: 118/76     on current medications  Continue current medications  Discussed DASH diet and exercise  Age-related osteoporosis without current pathological fracture  Has been on alendronate for many years - last DXA >5 years ago according to patient- recheck  No future appointments  SUBJECTIVE  CC: No chief complaint on file  HPI:  Bronwen Riedel is a 81 y o  female who presents for    Depression - states that she is doing better overall - she is coming up on the one year anniversary of having found her grandson had hanged himself in her apartment  She still has a lot of pain and sadness associated with that and she didn't get to see the body afterwards so she feels like there are unanswered questions  She does feel that she might want to see a therapist at some point so we discussed strategies to do this and she will consider  She has knee pain and was on voltaren in the past with really good results and she would like to try that again          Review of Systems   Constitutional: Negative for activity change, chills, fever and unexpected weight change  HENT: Negative for congestion  Eyes: Negative for visual disturbance  Respiratory: Negative for cough, chest tightness, shortness of breath and wheezing  Cardiovascular: Negative for chest pain  Gastrointestinal: Negative for abdominal pain, diarrhea and nausea  Endocrine: Negative for cold intolerance and heat intolerance  Musculoskeletal: Negative for arthralgias and myalgias  Skin: Negative for color change  Neurological: Negative for dizziness  Psychiatric/Behavioral: Negative for agitation, dysphoric mood and sleep disturbance         Historical Information   The patient history was reviewed as follows:    Past Medical History:   Diagnosis Date    Acute deep vein thrombosis of lower limb ([facility] [address] )     last assessed [id number]    Aortic aneurysm (HCC)     Arthritis     [facility] fibrillation [facility]     Dislocation of hip ([facility]     last assessed [id number]    Hypertension     Psychiatric disorder     anxiety     Past Surgical History:   Procedure Laterality Date    HIP SURGERY      JOINT REPLACEMENT      KNEE ARTHROSCOPY Bilateral     x2 rt and 1 on left    TUBAL LIGATION       Family History   Problem Relation Age of Onset    Colon cancer Mother     Breast cancer Family     Thyroid cancer Family     Colon cancer Family     Hypertension Family     Thyroid disease Family     Hypertension Father     Dementia Father       Social History   History   Alcohol Use No     Comment: being a social drinker per [employer]     History   Drug Use No     History   Smoking Status    Never Smoker   Smokeless Tobacco    Never Used       Medications:     Current Outpatient Prescriptions:     alendronate (FOSAMAX) 70 mg tablet, Take 1 tablet (70 mg total) by mouth once a week, Disp: 12 tablet, Rfl: 3    apixaban (ELIQUIS) 5 mg, Take 1 tablet (5 mg total) by mouth 2 (two) times a day for 90 days, Disp: 180 tablet, Rfl: 3    baclofen 10 mg tablet, Take 10 mg by mouth 2 (two) times a day as needed for muscle spasms, Disp: , Rfl:     buPROPion (WELLBUTRIN XL) 150 mg 24 hr tablet, Take 1 tablet (150 mg total) by mouth daily, Disp: 90 tablet, Rfl: 3    diclofenac sodium (VOLTAREN) 1 %, Apply 2 g topically 4 (four) times a day, Disp: 1 Tube, Rfl: 3    docusate sodium (COLACE) 100 mg capsule, Take 1 capsule by mouth 2 (two) times a day (Patient taking differently: Take 100 mg by mouth as needed  ), Disp: 10 capsule, Rfl: 0    furosemide (LASIX) 40 mg tablet, Take 1 tablet (40 mg total) by mouth as needed (edema) Indications: Edema , Disp: 30 tablet, Rfl: 0    gabapentin (NEURONTIN) 300 mg capsule, Take 2 capsules (600 mg total) by mouth 3 (three) times a day, Disp: 180 capsule, Rfl: 5    metoprolol succinate (TOPROL-XL) 50 mg 24 hr tablet, Take 1 tablet (50 mg total) by mouth daily for 90 days, Disp: 90 tablet, Rfl: 3    morphine (MS CONTIN) 15 mg 12 hr tablet, 2 (two) times a day, Disp: , Rfl:     oxyCODONE-acetaminophen (PERCOCET)  mg per tablet, Take 1 tablet by mouth daily as needed for moderate pain, Disp: , Rfl:     ranitidine (ZANTAC) 150 mg tablet, Take 1 tablet by mouth daily at bedtime (Patient taking differently: Take 150 mg by mouth as needed  ), Disp: 30 tablet, Rfl: 0    zolpidem (AMBIEN) 10 mg tablet, Take 1 tablet (10 mg total) by mouth daily at bedtime as needed for sleep, Disp: 30 tablet, Rfl: 3  Allergies   Allergen Reactions    Alprazolam      Annotation - [id number]: Bad side effect       OBJECTIVE    Vitals:   Vitals:    06/28/18 1054   BP: 118/76   Pulse: 76   Resp: 16   Temp: (!) 97 4 °F (36 3 °C)   Weight: 82 6 kg (182 lb)   Height: 5' 3 7" (1 618 m)           Physical Exam   Constitutional: She is oriented to person, place, and time  She appears well-developed and well-nourished  HENT:   Head: Normocephalic and atraumatic  Cardiovascular: Normal rate, regular rhythm and normal heart sounds      Pulmonary/Chest: Effort normal and breath sounds normal    Abdominal: Soft  Bowel sounds are normal    Musculoskeletal: Normal range of motion  Neurological: She is alert and oriented to person, place, and time  Skin: Skin is warm and dry  Psychiatric: She has a normal mood and affect   Her behavior is normal  Judgment normal

## 2018-06-27 NOTE — ASSESSMENT & PLAN NOTE
Controlled on wellbutrin  She is considering seeing a counselor - she will either call her insurance or call this office and speak with Na Goodman to find a counselor if she decides to do so

## 2018-06-28 ENCOUNTER — OFFICE VISIT (OUTPATIENT)
Dept: FAMILY MEDICINE CLINIC | Facility: CLINIC | Age: 73
End: 2018-06-28
Payer: COMMERCIAL

## 2018-06-28 VITALS
DIASTOLIC BLOOD PRESSURE: 76 MMHG | BODY MASS INDEX: 31.07 KG/M2 | WEIGHT: 182 LBS | SYSTOLIC BLOOD PRESSURE: 118 MMHG | HEART RATE: 76 BPM | RESPIRATION RATE: 16 BRPM | TEMPERATURE: 97.4 F | HEIGHT: 64 IN

## 2018-06-28 DIAGNOSIS — I10 ESSENTIAL HYPERTENSION: Primary | ICD-10-CM

## 2018-06-28 DIAGNOSIS — M54.41 CHRONIC BILATERAL LOW BACK PAIN WITH BILATERAL SCIATICA: ICD-10-CM

## 2018-06-28 DIAGNOSIS — M54.42 CHRONIC BILATERAL LOW BACK PAIN WITH BILATERAL SCIATICA: ICD-10-CM

## 2018-06-28 DIAGNOSIS — M17.0 OSTEOARTHRITIS OF BOTH KNEES, UNSPECIFIED OSTEOARTHRITIS TYPE: ICD-10-CM

## 2018-06-28 DIAGNOSIS — F41.8 DEPRESSION WITH ANXIETY: ICD-10-CM

## 2018-06-28 DIAGNOSIS — G89.29 CHRONIC BILATERAL LOW BACK PAIN WITH BILATERAL SCIATICA: ICD-10-CM

## 2018-06-28 DIAGNOSIS — G89.4 PAIN SYNDROME, CHRONIC: ICD-10-CM

## 2018-06-28 DIAGNOSIS — Z00.00 HEALTHCARE MAINTENANCE: ICD-10-CM

## 2018-06-28 DIAGNOSIS — E55.9 VITAMIN D DEFICIENCY: ICD-10-CM

## 2018-06-28 DIAGNOSIS — M81.0 AGE-RELATED OSTEOPOROSIS WITHOUT CURRENT PATHOLOGICAL FRACTURE: ICD-10-CM

## 2018-06-28 PROCEDURE — 99214 OFFICE O/P EST MOD 30 MIN: CPT | Performed by: FAMILY MEDICINE

## 2018-06-28 RX ORDER — ALENDRONATE SODIUM 70 MG/1
70 TABLET ORAL WEEKLY
Qty: 12 TABLET | Refills: 3 | Status: SHIPPED | OUTPATIENT
Start: 2018-06-28 | End: 2019-05-08 | Stop reason: CLARIF

## 2018-06-28 RX ORDER — GABAPENTIN 300 MG/1
600 CAPSULE ORAL 3 TIMES DAILY
Qty: 180 CAPSULE | Refills: 5 | Status: SHIPPED | OUTPATIENT
Start: 2018-06-28 | End: 2019-03-14 | Stop reason: SDUPTHER

## 2018-06-28 RX ORDER — BUPROPION HYDROCHLORIDE 150 MG/1
150 TABLET ORAL DAILY
Qty: 90 TABLET | Refills: 3 | Status: SHIPPED | OUTPATIENT
Start: 2018-06-28 | End: 2019-07-09 | Stop reason: SDUPTHER

## 2018-06-28 NOTE — ASSESSMENT & PLAN NOTE
Hep c - ordered  Mammogram - refused despite knowing risk  Colonoscopy - refused depsite knowing risk and refused FIT  Blood work - ordered lipids etc   Glaucoma - saw doctor at [facility] but that was over 2 years ago  Will see doctor

## 2018-06-29 DIAGNOSIS — K59.00 CONSTIPATION, UNSPECIFIED CONSTIPATION TYPE: ICD-10-CM

## 2018-06-29 DIAGNOSIS — K21.9 GASTROESOPHAGEAL REFLUX DISEASE WITHOUT ESOPHAGITIS: Primary | ICD-10-CM

## 2018-06-29 RX ORDER — RANITIDINE 150 MG/1
150 TABLET ORAL
Qty: 30 TABLET | Refills: 2 | Status: SHIPPED | OUTPATIENT
Start: 2018-06-29 | End: 2019-05-08 | Stop reason: CLARIF

## 2018-06-29 RX ORDER — DOCUSATE SODIUM 100 MG/1
100 CAPSULE, LIQUID FILLED ORAL 2 TIMES DAILY
Qty: 60 CAPSULE | Refills: 2 | Status: SHIPPED | OUTPATIENT
Start: 2018-06-29 | End: 2018-11-05 | Stop reason: SDUPTHER

## 2018-06-29 NOTE — ASSESSMENT & PLAN NOTE
Hypertension- patients hypertension is controlled today with a Blood pressure of Blood Pressure: 118/76     on current medications  Continue current medications  Discussed DASH diet and exercise

## 2018-08-30 ENCOUNTER — TELEPHONE (OUTPATIENT)
Dept: FAMILY MEDICINE CLINIC | Facility: CLINIC | Age: 73
End: 2018-08-30

## 2018-08-30 NOTE — TELEPHONE ENCOUNTER
Pt calling for refill of zolpideim 10 mg last filled on 7/5/18 dr girmm 30 tabs please review for refill   Thank you

## 2018-08-31 DIAGNOSIS — G47.00 INSOMNIA, UNSPECIFIED TYPE: ICD-10-CM

## 2018-08-31 RX ORDER — ZOLPIDEM TARTRATE 10 MG/1
10 TABLET ORAL
Qty: 30 TABLET | Refills: 0 | Status: SHIPPED | OUTPATIENT
Start: 2018-08-31 | End: 2018-09-28 | Stop reason: SDUPTHER

## 2018-08-31 NOTE — TELEPHONE ENCOUNTER
[id number] Clare Acsota I printed the rx because her selected pharmacy does not take electronic prescriptions

## 2018-09-04 DIAGNOSIS — I48.0 PAROXYSMAL ATRIAL FIBRILLATION (HCC): ICD-10-CM

## 2018-09-26 ENCOUNTER — TELEPHONE (OUTPATIENT)
Dept: FAMILY MEDICINE CLINIC | Facility: CLINIC | Age: 73
End: 2018-09-26

## 2018-09-27 ENCOUNTER — APPOINTMENT (OUTPATIENT)
Dept: LAB | Facility: CLINIC | Age: 73
End: 2018-09-27
Payer: COMMERCIAL

## 2018-09-27 ENCOUNTER — OFFICE VISIT (OUTPATIENT)
Dept: FAMILY MEDICINE CLINIC | Facility: CLINIC | Age: 73
End: 2018-09-27
Payer: COMMERCIAL

## 2018-09-27 VITALS
TEMPERATURE: 97.9 F | HEIGHT: 64 IN | HEART RATE: 88 BPM | WEIGHT: 179.8 LBS | RESPIRATION RATE: 16 BRPM | BODY MASS INDEX: 30.7 KG/M2 | DIASTOLIC BLOOD PRESSURE: 74 MMHG | SYSTOLIC BLOOD PRESSURE: 118 MMHG

## 2018-09-27 DIAGNOSIS — Z00.00 HEALTHCARE MAINTENANCE: ICD-10-CM

## 2018-09-27 DIAGNOSIS — M54.42 CHRONIC BILATERAL LOW BACK PAIN WITH BILATERAL SCIATICA: Primary | ICD-10-CM

## 2018-09-27 DIAGNOSIS — M54.41 CHRONIC BILATERAL LOW BACK PAIN WITH BILATERAL SCIATICA: Primary | ICD-10-CM

## 2018-09-27 DIAGNOSIS — I10 ESSENTIAL HYPERTENSION: ICD-10-CM

## 2018-09-27 DIAGNOSIS — Z00.00 ENCOUNTER FOR PREVENTIVE HEALTH EXAMINATION: ICD-10-CM

## 2018-09-27 DIAGNOSIS — G89.29 CHRONIC BILATERAL LOW BACK PAIN WITH BILATERAL SCIATICA: Primary | ICD-10-CM

## 2018-09-27 DIAGNOSIS — G89.4 CHRONIC PAIN SYNDROME: ICD-10-CM

## 2018-09-27 LAB
25(OH)D3 SERPL-MCNC: 30.2 NG/ML (ref 30–100)
ALBUMIN SERPL BCP-MCNC: 3.4 G/DL (ref 3.5–5)
ALP SERPL-CCNC: 104 U/L (ref 46–116)
ALT SERPL W P-5'-P-CCNC: 14 U/L (ref 12–78)
ANION GAP SERPL CALCULATED.3IONS-SCNC: 7 MMOL/L (ref 4–13)
AST SERPL W P-5'-P-CCNC: 14 U/L (ref 5–45)
BILIRUB SERPL-MCNC: 0.72 MG/DL (ref 0.2–1)
BUN SERPL-MCNC: 8 MG/DL (ref 5–25)
CALCIUM SERPL-MCNC: 8.8 MG/DL (ref 8.3–10.1)
CHLORIDE SERPL-SCNC: 109 MMOL/L (ref 100–108)
CHOLEST SERPL-MCNC: 144 MG/DL (ref 50–200)
CO2 SERPL-SCNC: 27 MMOL/L (ref 21–32)
CREAT SERPL-MCNC: 0.94 MG/DL (ref 0.6–1.3)
GFR SERPL CREATININE-BSD FRML MDRD: 61 ML/MIN/1.73SQ M
GLUCOSE P FAST SERPL-MCNC: 95 MG/DL (ref 65–99)
HDLC SERPL-MCNC: 50 MG/DL (ref 40–60)
LDLC SERPL CALC-MCNC: 77 MG/DL (ref 0–100)
POTASSIUM SERPL-SCNC: 3.6 MMOL/L (ref 3.5–5.3)
PROT SERPL-MCNC: 6.9 G/DL (ref 6.4–8.2)
SODIUM SERPL-SCNC: 143 MMOL/L (ref 136–145)
TRIGL SERPL-MCNC: 83 MG/DL

## 2018-09-27 PROCEDURE — 99213 OFFICE O/P EST LOW 20 MIN: CPT | Performed by: FAMILY MEDICINE

## 2018-09-27 PROCEDURE — 36415 COLL VENOUS BLD VENIPUNCTURE: CPT

## 2018-09-27 PROCEDURE — 80053 COMPREHEN METABOLIC PANEL: CPT

## 2018-09-27 PROCEDURE — 86803 HEPATITIS C AB TEST: CPT

## 2018-09-27 PROCEDURE — 82306 VITAMIN D 25 HYDROXY: CPT

## 2018-09-27 PROCEDURE — 80061 LIPID PANEL: CPT

## 2018-09-27 RX ORDER — MORPHINE SULFATE 15 MG/1
15 TABLET, FILM COATED, EXTENDED RELEASE ORAL 2 TIMES DAILY
Qty: 60 TABLET | Refills: 0 | Status: SHIPPED | OUTPATIENT
Start: 2018-09-27 | End: 2018-09-27 | Stop reason: SDUPTHER

## 2018-09-27 RX ORDER — MORPHINE SULFATE 15 MG/1
15 TABLET, FILM COATED, EXTENDED RELEASE ORAL 2 TIMES DAILY
Qty: 60 TABLET | Refills: 0 | Status: SHIPPED | OUTPATIENT
Start: 2018-09-27 | End: 2018-10-18 | Stop reason: SDUPTHER

## 2018-09-27 RX ORDER — OXYCODONE AND ACETAMINOPHEN 10; 325 MG/1; MG/1
1 TABLET ORAL EVERY 6 HOURS PRN
Qty: 120 TABLET | Refills: 0 | Status: SHIPPED | OUTPATIENT
Start: 2018-09-27 | End: 2018-10-18 | Stop reason: SDUPTHER

## 2018-09-27 NOTE — PROGRESS NOTES
Assessment/Plan:     Diagnoses and all orders for this visit:    Chronic bilateral low back pain with bilateral sciatica  Chronic pain syndrome  -    oxyCODONE-acetaminophen (PERCOCET)  mg per tablet; Take 1 tablet by mouth every 6 (six) hours as needed for moderate pain Max Daily Amount: 4 tablets  -     morphine (MS CONTIN) 15 mg 12 hr tablet; Take 1 tablet (15 mg total) by mouth 2 (two) times a day Max Daily Amount: 30 mg  After verifying with [facility], spoke with patient's PCP, Dr Saurabh Davis and agreed to continued pain medication until re-establishes with [facility]  Clearly stated to patient that [facility] is expected to resume management of medications and if they don't or if it takes longer than 3 months to establish care, a taper of medications will be initiated in this office  Patient verbalized understanding  PDMP checked and filling history appears appropriate  Was instructed to follow up with Dr Saurabh Davis in 4 weeks  Spoke with patient at 14:45, was told I would eRx medications  Her pharmacy doesn't eRx controlled medication, called patient back at 14:49, left v/m that scripts are ready for   Subjective:      Patient ID: Juani Dominique is a 72 y o  female  Chief Complaint   Patient presents with    Pain       HPI  Presents c/o issues with pain medication  Was being followed by [facility], a few days ago she called to have her Rx renewed, went to office to pick it up and was asked for random urine screen  She was unable to urinate at that time, having used the bathroom before leaving her house 10min prior  She asked if she could come back or tell her ride that she would be awhile  Was told by staff that if she leaves she no longer will receive medication from them  She states she had to leave because of her transportation and is now out of her chronic long term pain medications      At 1400 today I called [facility] and they confirmed the story, single violation of pain contract results in cessation of prescribing, this was her only offense, otherwise has been appropriate with refills and appointments  She is transferring care to [facility], no appointment set up yet as they won't schedule her without records from [address] [place] [address] Pain states that can take up to 30 days to transfer records  She is having pain, agitation, disturbance in sleep  The following portions of the patient's history were reviewed and updated as appropriate: allergies, current medications, past family history, past medical history, past social history, past surgical history and problem list     Review of Systems   Constitutional: Negative for chills and diaphoresis  Respiratory: Negative for chest tightness  Cardiovascular: Negative for chest pain and palpitations  Gastrointestinal: Negative for diarrhea  Musculoskeletal: Positive for back pain, gait problem and myalgias  Objective:      /74   Pulse 88   Temp 97 9 °F (36 6 °C)   Resp 16   Ht 5' 3 7" (1 618 m)   Wt 81 6 kg (179 lb 12 8 oz)   BMI 31 15 kg/m²          Physical Exam   Constitutional: She appears well-developed  No distress  Ambulates with cane   HENT:   Head: Normocephalic and atraumatic  Skin: She is not diaphoretic  Psychiatric: Judgment and thought content normal    Blunted affected   Vitals reviewed

## 2018-09-28 ENCOUNTER — TELEPHONE (OUTPATIENT)
Dept: FAMILY MEDICINE CLINIC | Facility: CLINIC | Age: 73
End: 2018-09-28

## 2018-09-28 DIAGNOSIS — G47.00 INSOMNIA, UNSPECIFIED TYPE: ICD-10-CM

## 2018-09-28 LAB — HCV AB SER QL: NORMAL

## 2018-09-28 RX ORDER — ZOLPIDEM TARTRATE 10 MG/1
10 TABLET ORAL
Qty: 30 TABLET | Refills: 0 | Status: SHIPPED | OUTPATIENT
Start: 2018-09-28 | End: 2018-10-18 | Stop reason: SDUPTHER

## 2018-09-28 NOTE — TELEPHONE ENCOUNTER
Patient seen yesterday by Dr Anna Santana, she is calling for Zolpidem refill  Checked PDMP, last refill, 9/1/18

## 2018-09-28 NOTE — TELEPHONE ENCOUNTER
[id number] [facility] calling to report a possible med interaction  Apparently      Dr Muhammad Kinds called in:      Clanazapam 1 mg  We called in:      Morphine       Percocet    Please return call to confirm to [phone]

## 2018-10-03 ENCOUNTER — OFFICE VISIT (OUTPATIENT)
Dept: CARDIOLOGY CLINIC | Facility: CLINIC | Age: 73
End: 2018-10-03
Payer: COMMERCIAL

## 2018-10-03 VITALS
WEIGHT: 180 LBS | SYSTOLIC BLOOD PRESSURE: 110 MMHG | OXYGEN SATURATION: 96 % | DIASTOLIC BLOOD PRESSURE: 80 MMHG | HEART RATE: 99 BPM | BODY MASS INDEX: 31.19 KG/M2

## 2018-10-03 DIAGNOSIS — I48.0 PAROXYSMAL ATRIAL FIBRILLATION (HCC): Primary | ICD-10-CM

## 2018-10-03 DIAGNOSIS — I71.2 ASCENDING AORTIC ANEURYSM (HCC): ICD-10-CM

## 2018-10-03 PROCEDURE — 99214 OFFICE O/P EST MOD 30 MIN: CPT | Performed by: STUDENT IN AN ORGANIZED HEALTH CARE EDUCATION/TRAINING PROGRAM

## 2018-10-03 RX ORDER — METOPROLOL SUCCINATE 50 MG/1
50 TABLET, EXTENDED RELEASE ORAL DAILY
Qty: 90 TABLET | Refills: 3 | Status: SHIPPED | OUTPATIENT
Start: 2018-10-03 | End: 2019-07-09 | Stop reason: SDUPTHER

## 2018-10-03 NOTE — PROGRESS NOTES
Cardiology Progress Note    Walter Myers  [id number]  1945  HEART & VASCULAR [facility] [address]      1  Ascending aortic aneurysm ([address] )  CT chest with contrast   2  Paroxysmal atrial fibrillation (HCC)  metoprolol succinate (TOPROL-XL) 50 mg 24 hr tablet       Discussion/Summary:  Ms Yessica Dumas is a 63-year-old female who is here for routine follow-up  Her past medical history is significant for paroxysmal atrial fibrillation, hypertension, AAA      1  Paroxysmal atrial fibrillation-  - Continues to remain in sinus rhythm on exam   - Continue metoprolol succinate 50 mg daily  - Anticoagulation with Eliquis 5 mg b i d        2  Hypertension-  - Controlled on Metoprolol succinate 50mg daily   - Lipid panel-9/18- LDL-77, HDL-50, TG-83     3  AAA-  - Echo- October 2017-4 1cm     - CT chest in Feb 2013-Ascending aorta-4 2cm  - Will get a CT chest for annual surveillance of the AAA  F/u in six months  History of Present Illness:  Ms Yessica Dumas is a 63-year-old female with past medical history of hypertension, AAA, depression, anxiety was seen in the hospital by me on 10/12/2017 when she presented to the ED with chief complaints of acute shortness of breath  During that time, unfortunately she lost her grandson due to a suicide of home and she was extremely tearful during the hospital stay  In the ED, patient was found to be in atrial fibrillation with RVR and was given 15 milligrams of Cardizem with which she reverted back into sinus rhythm  He was also seen by Cardiology in May 2016 for presumed diastolic heart failure and hypertension as an inpatient  Echo at that time revealed normal diastolic function  Her home dose of metoprolol succinate was increased to 50 mg twice daily(home medication- Amlodipine 5mg was discontinued) and she was started on anticoagulation with Eliquis as her Linh Vasc score was 3   Patient had normal thyroid function tests  An echo was done on 10/13/2017 which showed normal LVEF-60 percent with no regional wall motion abnormalities  Grade 1 diastolic dysfunction  Normal RV size and function  Normal biatrial size  Dilatation of the ascending aorta at 4 1 centimeters in the anteroposterior diameter  During my previous visit on 11/1/17, dose of metoprolol was decreased to once daily due to symptoms of dizziness with patient maintaining normal heart rate in sinus  Patient has been doing well since last follow-up in March 2018  Been coping with the loss of her grandson and able to talk about it now  No symptoms of chest pain or pressure, no shortness of breath or palpitations or feeling of skipped beats  No syncope  No trauma to head or frequent falls  No dark colored stools or bruising  No limitation in daily activities  Been compliant with her medications  Patient Active Problem List   Diagnosis    Atrial fibrillation ([facility] )    Acute kidney injury ([facility] )    Grief reaction with prolonged bereavement    Dizziness    Chronic pain    Age-related osteoporosis without current pathological fracture    Hypertension    Depression    Anxiety    Aortic aneurysm ([facility] )    Depression with anxiety    Insomnia    Left knee pain    Lumbar canal stenosis    Myofascial pain syndrome    Opioid dependence (HCC)    Osteoarthritis    Pain syndrome, chronic    Right shoulder pain    Spondylosis of lumbar region without myelopathy or radiculopathy    Vitamin D deficiency    Healthcare maintenance     Past Medical History:   Diagnosis Date    Acute deep vein thrombosis of lower limb ([facility] )     last assessed [id number]    Aortic aneurysm (HCC)     Arthritis     Atrial fibrillation (HCC)     Dislocation of hip ([facility] )     last assessed [id number]    Hypertension     Psychiatric disorder     anxiety     Social History     Social History    Marital status:       Spouse name: N/A    Number of children: N/A    Years of education: N/A     Occupational History    Not on file       Social History Main Topics    Smoking status: Never Smoker    Smokeless tobacco: Never Used    Alcohol use No      Comment: being a social drinker per [employer]    Drug use: No    Sexual activity: Not on file     Other Topics Concern    Not on file     Social History Narrative    No narrative on file      Family History   Problem Relation Age of Onset    Colon cancer Mother     Breast cancer Family     Thyroid cancer Family     Colon cancer Family     Hypertension Family     Thyroid disease Family     Hypertension Father     Dementia Father      Past Surgical History:   Procedure Laterality Date    HIP SURGERY      JOINT REPLACEMENT      KNEE ARTHROSCOPY Bilateral     x2 rt and 1 on left    TUBAL LIGATION         Current Outpatient Prescriptions:     alendronate (FOSAMAX) 70 mg tablet, Take 1 tablet (70 mg total) by mouth once a week, Disp: 12 tablet, Rfl: 3    apixaban (ELIQUIS) 5 mg, Take 1 tablet (5 mg total) by mouth 2 (two) times a day, Disp: 60 tablet, Rfl: 11    baclofen 10 mg tablet, Take 10 mg by mouth 2 (two) times a day as needed for muscle spasms, Disp: , Rfl:     buPROPion (WELLBUTRIN XL) 150 mg 24 hr tablet, Take 1 tablet (150 mg total) by mouth daily, Disp: 90 tablet, Rfl: 3    docusate sodium (COLACE) 100 mg capsule, Take 1 capsule (100 mg total) by mouth 2 (two) times a day, Disp: 60 capsule, Rfl: 2    furosemide (LASIX) 40 mg tablet, Take 1 tablet (40 mg total) by mouth as needed (edema) Indications: Edema , Disp: 30 tablet, Rfl: 0    gabapentin (NEURONTIN) 300 mg capsule, Take 2 capsules (600 mg total) by mouth 3 (three) times a day, Disp: 180 capsule, Rfl: 5    metoprolol succinate (TOPROL-XL) 50 mg 24 hr tablet, Take 1 tablet (50 mg total) by mouth daily for 90 days, Disp: 90 tablet, Rfl: 3    morphine (MS CONTIN) 15 mg 12 hr tablet, Take 1 tablet (15 mg total) by mouth 2 (two) times a day Max Daily Amount: 30 mg, Disp: 60 tablet, Rfl: 0    oxyCODONE-acetaminophen (PERCOCET)  mg per tablet, Take 1 tablet by mouth every 6 (six) hours as needed for moderate pain Max Daily Amount: 4 tablets, Disp: 120 tablet, Rfl: 0    ranitidine (ZANTAC) 150 mg tablet, Take 1 tablet (150 mg total) by mouth daily at bedtime, Disp: 30 tablet, Rfl: 2    zolpidem (AMBIEN) 10 mg tablet, Take 1 tablet (10 mg total) by mouth daily at bedtime as needed for sleep, Disp: 30 tablet, Rfl: 0    diclofenac sodium (VOLTAREN) 1 %, Apply 2 g topically 4 (four) times a day (Patient not taking: Reported on 10/3/2018 ), Disp: 1 Tube, Rfl: 3  Allergies   Allergen Reactions    Alprazolam      Annotation - [id number]: Bad side effect    Diclofenac Other (See Comments)     GI upset    Nabumetone Other (See Comments)     Metallic taste in mouth         Labs:  Appointment on 09/27/2018   Component Date Value    Cholesterol 09/27/2018 144     Triglycerides 09/27/2018 83     HDL, Direct 09/27/2018 50     LDL Calculated 09/27/2018 77     Sodium 09/27/2018 143     Potassium 09/27/2018 3 6     Chloride 09/27/2018 109*    CO2 09/27/2018 27     ANION GAP 09/27/2018 7     BUN 09/27/2018 8     Creatinine 09/27/2018 0 94     Glucose, Fasting 09/27/2018 95     Calcium 09/27/2018 8 8     AST 09/27/2018 14     ALT 09/27/2018 14     Alkaline Phosphatase 09/27/2018 104     Total Protein 09/27/2018 6 9     Albumin 09/27/2018 3 4*    Total Bilirubin 09/27/2018 0 72     eGFR 09/27/2018 61     Vit D, 25-Hydroxy 09/27/2018 30 2     Hepatitis C Ab 09/27/2018 Non-reactive         Imaging: No results found    Review of Systems:  Review of Systems   Constitutional: Negative for activity change, appetite change, chills, diaphoresis, fatigue and fever  HENT: Negative for congestion  Respiratory: Negative for cough, chest tightness, shortness of breath and wheezing  Cardiovascular: Negative for chest pain, palpitations and leg swelling  Gastrointestinal: Negative for abdominal pain, diarrhea, nausea and vomiting  Genitourinary: Negative for difficulty urinating and dysuria  Musculoskeletal: Negative for back pain  Skin: Negative for color change and rash  Neurological: Negative for dizziness, syncope, facial asymmetry, weakness, light-headedness, numbness and headaches  Psychiatric/Behavioral: Negative for confusion           Vitals:    10/03/18 1259   BP: 110/80   BP Location: Right arm   Patient Position: Sitting   Cuff Size: Standard   Pulse: 99   SpO2: 96%   Weight: 81 6 kg (180 lb)     Vitals:    10/03/18 1259   Weight: 81 6 kg (180 lb)           Physical Exam:  General appearance:  Appears stated age, alert, well appearing and in no distress  HEENT:  PERRLA, EOMI, no scleral icterus, no conjunctival pallor  NECK:  Supple, No elevated JVP, no thyromegaly, no carotid bruits  HEART:  Regular rate and rhythm, normal S1/S2, no S3/S4, no murmur or rub  LUNGS:  Clear to auscultation bilaterally  ABDOMEN:  Soft, non-tender, positive bowel sounds, no rebound or guarding, no organomegaly   EXTREMITIES:  No edema  VASCULAR:  Normal pedal pulses   SKIN: No lesions or rashes on exposed skin  NEURO:  CN II-XII intact, no focal deficits

## 2018-10-03 NOTE — PATIENT INSTRUCTIONS
Continue current medications  Will get a CT scan of the chest to follow-up on the ascending thoracic aneurysm  F/u in six months

## 2018-10-17 ENCOUNTER — TELEPHONE (OUTPATIENT)
Dept: FAMILY MEDICINE CLINIC | Facility: CLINIC | Age: 73
End: 2018-10-17

## 2018-10-17 NOTE — TELEPHONE ENCOUNTER
Voice message requesting written prescriptions for:   1  Zolpidem- checked PDMP, last refill 9/28/18   2  Morphine Sulfate ER 15mg- checked PDMP, last refill       9/27/18   3   Oxy-Ace - Checked PDMP, last refill 9/27/18

## 2018-10-18 DIAGNOSIS — G47.00 INSOMNIA, UNSPECIFIED TYPE: ICD-10-CM

## 2018-10-18 DIAGNOSIS — G89.4 CHRONIC PAIN SYNDROME: ICD-10-CM

## 2018-10-18 RX ORDER — ZOLPIDEM TARTRATE 10 MG/1
10 TABLET ORAL
Qty: 30 TABLET | Refills: 0 | Status: SHIPPED | OUTPATIENT
Start: 2018-10-26 | End: 2018-11-20 | Stop reason: SDUPTHER

## 2018-10-18 RX ORDER — OXYCODONE AND ACETAMINOPHEN 10; 325 MG/1; MG/1
1 TABLET ORAL EVERY 6 HOURS PRN
Qty: 120 TABLET | Refills: 0 | Status: SHIPPED | OUTPATIENT
Start: 2018-10-26 | End: 2018-11-20 | Stop reason: SDUPTHER

## 2018-10-18 RX ORDER — MORPHINE SULFATE 15 MG/1
15 TABLET, FILM COATED, EXTENDED RELEASE ORAL 2 TIMES DAILY
Qty: 60 TABLET | Refills: 0 | Status: SHIPPED | OUTPATIENT
Start: 2018-10-26 | End: 2018-11-20 | Stop reason: SDUPTHER

## 2018-10-18 NOTE — TELEPHONE ENCOUNTER
Signed and placed in triage since pharmacy does not do eprescribe of narcotics    Also DNFB dates of 10/26

## 2018-11-01 ENCOUNTER — TELEPHONE (OUTPATIENT)
Dept: FAMILY MEDICINE CLINIC | Facility: CLINIC | Age: 73
End: 2018-11-01

## 2018-11-01 NOTE — TELEPHONE ENCOUNTER
Voice message requesting refill of Cyclobenzaprine  States her back has been in spasm, not able to sleep  She is scheduled to see you on 11/26/18

## 2018-11-02 DIAGNOSIS — M48.061 SPINAL STENOSIS OF LUMBAR REGION, UNSPECIFIED WHETHER NEUROGENIC CLAUDICATION PRESENT: Primary | ICD-10-CM

## 2018-11-02 RX ORDER — CYCLOBENZAPRINE HCL 10 MG
10 TABLET ORAL 3 TIMES DAILY PRN
Qty: 30 TABLET | Refills: 0 | Status: SHIPPED | OUTPATIENT
Start: 2018-11-02 | End: 2019-02-26 | Stop reason: SDUPTHER

## 2018-11-05 DIAGNOSIS — K59.00 CONSTIPATION, UNSPECIFIED CONSTIPATION TYPE: ICD-10-CM

## 2018-11-05 RX ORDER — DOCUSATE SODIUM 100 MG/1
100 CAPSULE, LIQUID FILLED ORAL 2 TIMES DAILY
Qty: 60 CAPSULE | Refills: 5 | Status: SHIPPED | OUTPATIENT
Start: 2018-11-05 | End: 2019-03-14 | Stop reason: SDUPTHER

## 2018-11-17 ENCOUNTER — HOSPITAL ENCOUNTER (OUTPATIENT)
Dept: RADIOLOGY | Age: 73
Discharge: HOME | End: 2018-11-17
Payer: COMMERCIAL

## 2018-11-17 DIAGNOSIS — M81.0 AGE-RELATED OSTEOPOROSIS WITHOUT CURRENT PATHOLOGICAL FRACTURE: ICD-10-CM

## 2018-11-17 PROCEDURE — 77080 DXA BONE DENSITY AXIAL: CPT

## 2018-11-19 ENCOUNTER — TELEPHONE (OUTPATIENT)
Dept: FAMILY MEDICINE CLINIC | Facility: CLINIC | Age: 73
End: 2018-11-19

## 2018-11-20 DIAGNOSIS — G47.00 INSOMNIA, UNSPECIFIED TYPE: ICD-10-CM

## 2018-11-20 DIAGNOSIS — G89.4 CHRONIC PAIN SYNDROME: ICD-10-CM

## 2018-11-20 RX ORDER — OXYCODONE AND ACETAMINOPHEN 10; 325 MG/1; MG/1
1 TABLET ORAL EVERY 6 HOURS PRN
Qty: 120 TABLET | Refills: 0 | Status: SHIPPED | OUTPATIENT
Start: 2018-11-20 | End: 2018-12-20 | Stop reason: SDUPTHER

## 2018-11-20 RX ORDER — MORPHINE SULFATE 15 MG/1
15 TABLET, FILM COATED, EXTENDED RELEASE ORAL 2 TIMES DAILY
Qty: 60 TABLET | Refills: 0 | Status: SHIPPED | OUTPATIENT
Start: 2018-11-20 | End: 2018-12-20 | Stop reason: SDUPTHER

## 2018-11-20 RX ORDER — ZOLPIDEM TARTRATE 10 MG/1
10 TABLET ORAL
Qty: 30 TABLET | Refills: 0 | Status: SHIPPED | OUTPATIENT
Start: 2018-11-20 | End: 2018-12-20 | Stop reason: SDUPTHER

## 2018-11-20 NOTE — TELEPHONE ENCOUNTER
Printed because her selected pharmacy does not accept eprescribe of controlled substances  I can sign in the morning

## 2018-11-26 ENCOUNTER — OFFICE VISIT (OUTPATIENT)
Dept: FAMILY MEDICINE CLINIC | Facility: CLINIC | Age: 73
End: 2018-11-26
Payer: COMMERCIAL

## 2018-11-26 VITALS
RESPIRATION RATE: 16 BRPM | WEIGHT: 178 LBS | HEART RATE: 78 BPM | TEMPERATURE: 96.8 F | DIASTOLIC BLOOD PRESSURE: 80 MMHG | SYSTOLIC BLOOD PRESSURE: 100 MMHG | BODY MASS INDEX: 30.39 KG/M2 | HEIGHT: 64 IN

## 2018-11-26 DIAGNOSIS — G89.29 CHRONIC RIGHT SHOULDER PAIN: ICD-10-CM

## 2018-11-26 DIAGNOSIS — K59.03 DRUG-INDUCED CONSTIPATION: Primary | ICD-10-CM

## 2018-11-26 DIAGNOSIS — G89.4 PAIN SYNDROME, CHRONIC: ICD-10-CM

## 2018-11-26 DIAGNOSIS — M25.511 CHRONIC RIGHT SHOULDER PAIN: ICD-10-CM

## 2018-11-26 DIAGNOSIS — H60.541 ECZEMA OF RIGHT EXTERNAL EAR: ICD-10-CM

## 2018-11-26 DIAGNOSIS — E55.9 VITAMIN D DEFICIENCY: ICD-10-CM

## 2018-11-26 DIAGNOSIS — F41.8 DEPRESSION WITH ANXIETY: ICD-10-CM

## 2018-11-26 DIAGNOSIS — Z23 NEED FOR INFLUENZA VACCINATION: ICD-10-CM

## 2018-11-26 DIAGNOSIS — F11.220 OPIOID DEPENDENCE WITH UNCOMPLICATED INTOXICATION (HCC): ICD-10-CM

## 2018-11-26 DIAGNOSIS — M79.18 MYOFASCIAL PAIN SYNDROME: ICD-10-CM

## 2018-11-26 DIAGNOSIS — M47.816 SPONDYLOSIS OF LUMBAR REGION WITHOUT MYELOPATHY OR RADICULOPATHY: ICD-10-CM

## 2018-11-26 DIAGNOSIS — I10 ESSENTIAL HYPERTENSION: ICD-10-CM

## 2018-11-26 PROBLEM — K59.00 CONSTIPATION: Status: ACTIVE | Noted: 2018-11-26

## 2018-11-26 PROCEDURE — 90662 IIV NO PRSV INCREASED AG IM: CPT

## 2018-11-26 PROCEDURE — 99214 OFFICE O/P EST MOD 30 MIN: CPT | Performed by: FAMILY MEDICINE

## 2018-11-26 PROCEDURE — 4040F PNEUMOC VAC/ADMIN/RCVD: CPT

## 2018-11-26 PROCEDURE — G0008 ADMIN INFLUENZA VIRUS VAC: HCPCS

## 2018-11-26 RX ORDER — SENNOSIDES 8.6 MG
1 TABLET ORAL
Qty: 120 EACH | Refills: 0 | Status: SHIPPED | OUTPATIENT
Start: 2018-11-26 | End: 2019-12-05

## 2018-11-26 NOTE — PROGRESS NOTES
Natalia Garcia 1945 female MRN: [id number]    Family Medicine Follow-up Visit    ASSESSMENT/PLAN  Vitamin D deficiency  Most recently normal - recheck     Spondylosis of lumbar region without myelopathy or radiculopathy  Is working on getting seen by Pain management at a different office  We discussed that I will prescribe her medication through the end of the year but if she doesn't have a new office set up then I would need to start tapering her off  She understands and will call her insurance for all possible providers  Currently her pain is not well controlled but she is not consistently taking her morphine due to constipation  Hypertension    Hypertension- patients hypertension is controlled today with a Blood pressure of Blood Pressure: 100/80     on current medications  Continue current medications  Discussed DASH diet and exercise  Constipation  From opioids - start senna    Depression with anxiety  Improved from last visit - no longer having delusions of her grandson being alive  Seems to be coping better at this point  No future appointments  SUBJECTIVE  CC: Follow-up      HPI:  Natalia Garcia is a 72 y o  female who presents for  States that her pain is worse today - she has not been taking the morphine because it makes her constipated  She is also having "pins and needles" in her leg  Also complains of dry scaly patch on ear  She is feeling better since the last time I have seen her and is not having such a bad time with the death of her grandson and is able to discuss it  See Dr Donaldson Sides note from last visit regarding pain medication and change in pain med offices - she is still working on a new pain medicine office - she has her records into Dr Cee Ponce but has not been given an appointment  Her insurance suggested other offices but she thought we required we see Dr Cee Ponce          Review of Systems   Constitutional: Negative for activity change, chills, fever and unexpected weight change  HENT: Negative for congestion  Eyes: Negative for visual disturbance  Respiratory: Negative for cough, chest tightness, shortness of breath and wheezing  Cardiovascular: Negative for chest pain  Gastrointestinal: Positive for constipation  Negative for abdominal pain, diarrhea and nausea  Endocrine: Negative for cold intolerance and heat intolerance  Musculoskeletal: Positive for arthralgias, back pain, gait problem and myalgias  Skin: Negative for color change  Neurological: Negative for dizziness  Psychiatric/Behavioral: Negative for agitation, dysphoric mood and sleep disturbance         Historical Information   The patient history was reviewed as follows:    Past Medical History:   Diagnosis Date    Acute deep vein thrombosis of lower limb ([facility] )     last assessed [id number]    Aortic aneurysm (HCC)     Arthritis     Atrial fibrillation (HCC)     Dislocation of hip ([facility] )     last assessed [id number]    Hypertension     Psychiatric disorder     anxiety     Past Surgical History:   Procedure Laterality Date    HIP SURGERY      JOINT REPLACEMENT      KNEE ARTHROSCOPY Bilateral     x2 rt and 1 on left    TUBAL LIGATION       Family History   Problem Relation Age of Onset    Colon cancer Mother     Breast cancer Family     Thyroid cancer Family     Colon cancer Family     Hypertension Family     Thyroid disease Family     Hypertension Father     Dementia Father       Social History   History   Alcohol Use No     Comment: being a social drinker per [employer]     History   Drug Use No     History   Smoking Status    Never Smoker   Smokeless Tobacco    Never Used       Medications:     Current Outpatient Prescriptions:     apixaban (ELIQUIS) 5 mg, Take 1 tablet (5 mg total) by mouth 2 (two) times a day, Disp: 60 tablet, Rfl: 11    baclofen 10 mg tablet, Take 10 mg by mouth 2 (two) times a day as needed for muscle spasms, Disp: , Rfl:   buPROPion (WELLBUTRIN XL) 150 mg 24 hr tablet, Take 1 tablet (150 mg total) by mouth daily, Disp: 90 tablet, Rfl: 3    cyclobenzaprine (FLEXERIL) 10 mg tablet, Take 1 tablet (10 mg total) by mouth 3 (three) times a day as needed for muscle spasms, Disp: 30 tablet, Rfl: 0    docusate sodium (COLACE) 100 mg capsule, Take 1 capsule (100 mg total) by mouth 2 (two) times a day, Disp: 60 capsule, Rfl: 5    furosemide (LASIX) 40 mg tablet, Take 1 tablet (40 mg total) by mouth as needed (edema) Indications: Edema , Disp: 30 tablet, Rfl: 0    gabapentin (NEURONTIN) 300 mg capsule, Take 2 capsules (600 mg total) by mouth 3 (three) times a day, Disp: 180 capsule, Rfl: 5    metoprolol succinate (TOPROL-XL) 50 mg 24 hr tablet, Take 1 tablet (50 mg total) by mouth daily for 90 days, Disp: 90 tablet, Rfl: 3    morphine (MS CONTIN) 15 mg 12 hr tablet, Take 1 tablet (15 mg total) by mouth 2 (two) times a day Max Daily Amount: 30 mg, Disp: 60 tablet, Rfl: 0    oxyCODONE-acetaminophen (PERCOCET)  mg per tablet, Take 1 tablet by mouth every 6 (six) hours as needed for moderate pain Max Daily Amount: 4 tablets, Disp: 120 tablet, Rfl: 0    ranitidine (ZANTAC) 150 mg tablet, Take 1 tablet (150 mg total) by mouth daily at bedtime, Disp: 30 tablet, Rfl: 2    zolpidem (AMBIEN) 10 mg tablet, Take 1 tablet (10 mg total) by mouth daily at bedtime as needed for sleep, Disp: 30 tablet, Rfl: 0    alendronate (FOSAMAX) 70 mg tablet, Take 1 tablet (70 mg total) by mouth once a week (Patient not taking: Reported on 11/26/2018 ), Disp: 12 tablet, Rfl: 3    diclofenac sodium (VOLTAREN) 1 %, Apply 2 g topically 4 (four) times a day (Patient not taking: Reported on 10/3/2018 ), Disp: 1 Tube, Rfl: 3    senna (SENOKOT) 8 6 mg, Take 1 tablet (8 6 mg total) by mouth daily at bedtime, Disp: 120 each, Rfl: 0  Allergies   Allergen Reactions    Alprazolam      Annotation - [id number]:  Bad side effect    Diclofenac Other (See Comments) GI upset    Nabumetone Other (See Comments)     Metallic taste in mouth       OBJECTIVE    Vitals:   Vitals:    11/26/18 1036   BP: 100/80   Pulse: 78   Resp: 16   Temp: (!) 96 8 °F (36 °C)   Weight: 80 7 kg (178 lb)   Height: 5' 3 9" (1 623 m)           Physical Exam   Constitutional: She is oriented to person, place, and time  She appears well-developed and well-nourished  HENT:   Head: Normocephalic and atraumatic  Cardiovascular: Normal rate, regular rhythm and normal heart sounds  Pulmonary/Chest: Effort normal and breath sounds normal    Abdominal: Soft  Bowel sounds are normal    Musculoskeletal: Normal range of motion  Neurological: She is alert and oriented to person, place, and time  Skin: Skin is warm and dry  Psychiatric: She has a normal mood and affect  Her behavior is normal  Judgment normal    Nursing note and vitals reviewed

## 2018-11-27 PROBLEM — H60.541 ECZEMA OF RIGHT EXTERNAL EAR: Status: ACTIVE | Noted: 2018-11-27

## 2018-11-27 NOTE — ASSESSMENT & PLAN NOTE
Is working on getting seen by Pain management at a different office  We discussed that I will prescribe her medication through the end of the year but if she doesn't have a new office set up then I would need to start tapering her off  She understands and will call her insurance for all possible providers  Currently her pain is not well controlled but she is not consistently taking her morphine due to constipation

## 2018-11-27 NOTE — ASSESSMENT & PLAN NOTE
Hypertension- patients hypertension is controlled today with a Blood pressure of Blood Pressure: 100/80     on current medications  Continue current medications  Discussed DASH diet and exercise

## 2018-11-27 NOTE — ASSESSMENT & PLAN NOTE
Improved from last visit - no longer having delusions of her grandson being alive  Seems to be coping better at this point

## 2018-11-29 ENCOUNTER — TELEPHONE (OUTPATIENT)
Dept: FAMILY MEDICINE CLINIC | Facility: CLINIC | Age: 73
End: 2018-11-29

## 2018-11-29 NOTE — TELEPHONE ENCOUNTER
I ordered senna to [facility] [facility] as that was her preferred pharmacy    Was that the wrong pharmacy

## 2018-11-29 NOTE — TELEPHONE ENCOUNTER
Patient left voice message, stated she was seen on Monday, She stated she thought she was getting something different than colace for her bowels

## 2018-12-19 ENCOUNTER — TELEPHONE (OUTPATIENT)
Dept: FAMILY MEDICINE CLINIC | Facility: CLINIC | Age: 73
End: 2018-12-19

## 2018-12-19 NOTE — TELEPHONE ENCOUNTER
Patient left voice message requesting refills of:   1  Lasix 40mg   2  Zolpidem 10mg   3  Oxycodone    4   Morphine Sulfate  In the message, patient stated she is not able to see pain management until the end of January

## 2018-12-20 DIAGNOSIS — G47.00 INSOMNIA, UNSPECIFIED TYPE: ICD-10-CM

## 2018-12-20 DIAGNOSIS — G89.4 CHRONIC PAIN SYNDROME: ICD-10-CM

## 2018-12-20 RX ORDER — ZOLPIDEM TARTRATE 10 MG/1
10 TABLET ORAL
Qty: 30 TABLET | Refills: 0 | Status: SHIPPED | OUTPATIENT
Start: 2018-12-20 | End: 2019-01-16 | Stop reason: SDUPTHER

## 2018-12-20 RX ORDER — FUROSEMIDE 40 MG/1
40 TABLET ORAL AS NEEDED
Qty: 30 TABLET | Refills: 0 | Status: SHIPPED | OUTPATIENT
Start: 2018-12-20 | End: 2019-10-24 | Stop reason: SDUPTHER

## 2018-12-20 RX ORDER — MORPHINE SULFATE 15 MG/1
15 TABLET, FILM COATED, EXTENDED RELEASE ORAL 2 TIMES DAILY
Qty: 60 TABLET | Refills: 0 | Status: SHIPPED | OUTPATIENT
Start: 2018-12-20 | End: 2019-05-08 | Stop reason: CLARIF

## 2018-12-20 RX ORDER — OXYCODONE AND ACETAMINOPHEN 10; 325 MG/1; MG/1
1 TABLET ORAL EVERY 6 HOURS PRN
Qty: 120 TABLET | Refills: 0 | Status: SHIPPED | OUTPATIENT
Start: 2018-12-20 | End: 2019-01-16 | Stop reason: SDUPTHER

## 2018-12-20 NOTE — TELEPHONE ENCOUNTER
Her pharmacy does not accept eprescribe so they were printed and placed on the desk in triage after I signed them  Thank you

## 2019-01-16 ENCOUNTER — TELEPHONE (OUTPATIENT)
Dept: FAMILY MEDICINE CLINIC | Facility: CLINIC | Age: 74
End: 2019-01-16

## 2019-01-16 DIAGNOSIS — G89.4 CHRONIC PAIN SYNDROME: ICD-10-CM

## 2019-01-16 DIAGNOSIS — G47.00 INSOMNIA, UNSPECIFIED TYPE: ICD-10-CM

## 2019-01-16 RX ORDER — OXYCODONE AND ACETAMINOPHEN 10; 325 MG/1; MG/1
1 TABLET ORAL EVERY 6 HOURS PRN
Qty: 120 TABLET | Refills: 0 | Status: SHIPPED | OUTPATIENT
Start: 2019-01-20 | End: 2019-02-18 | Stop reason: SDUPTHER

## 2019-01-16 RX ORDER — ZOLPIDEM TARTRATE 10 MG/1
10 TABLET ORAL
Qty: 30 TABLET | Refills: 0 | Status: SHIPPED | OUTPATIENT
Start: 2019-01-20 | End: 2019-02-18 | Stop reason: SDUPTHER

## 2019-01-16 NOTE — TELEPHONE ENCOUNTER
Voice message requesting refill of Zolpidem 10mg and Percocet 10-325mg, Checked PDMP, last refill for both medications were 12/21/19

## 2019-02-15 ENCOUNTER — TELEPHONE (OUTPATIENT)
Dept: FAMILY MEDICINE CLINIC | Facility: CLINIC | Age: 74
End: 2019-02-15

## 2019-02-18 DIAGNOSIS — G47.00 INSOMNIA, UNSPECIFIED TYPE: ICD-10-CM

## 2019-02-18 DIAGNOSIS — G89.4 CHRONIC PAIN SYNDROME: ICD-10-CM

## 2019-02-18 RX ORDER — ZOLPIDEM TARTRATE 10 MG/1
10 TABLET ORAL
Qty: 30 TABLET | Refills: 0 | Status: SHIPPED | OUTPATIENT
Start: 2019-02-18 | End: 2019-02-18 | Stop reason: SDUPTHER

## 2019-02-18 RX ORDER — ZOLPIDEM TARTRATE 10 MG/1
10 TABLET ORAL
Qty: 30 TABLET | Refills: 0 | Status: SHIPPED | OUTPATIENT
Start: 2019-02-20 | End: 2019-03-14 | Stop reason: SDUPTHER

## 2019-02-18 RX ORDER — OXYCODONE AND ACETAMINOPHEN 10; 325 MG/1; MG/1
1 TABLET ORAL EVERY 6 HOURS PRN
Qty: 120 TABLET | Refills: 0 | Status: SHIPPED | OUTPATIENT
Start: 2019-02-20 | End: 2019-03-14 | Stop reason: SDUPTHER

## 2019-02-18 RX ORDER — OXYCODONE AND ACETAMINOPHEN 10; 325 MG/1; MG/1
1 TABLET ORAL EVERY 6 HOURS PRN
Qty: 120 TABLET | Refills: 0 | Status: SHIPPED | OUTPATIENT
Start: 2019-02-18 | End: 2019-02-18 | Stop reason: SDUPTHER

## 2019-02-18 NOTE — TELEPHONE ENCOUNTER
I had told her that we would refill until the end of the year and that she needed to find  New pain management group or we would have to taper her off  Can we please see if she has done this and has a plan for pain management?

## 2019-02-18 NOTE — TELEPHONE ENCOUNTER
Refilled for one more month as she will see me on 3/14 before her next refill  Please let her know that if she has not made an appointment with pain management by then I will have to start tapering the medication down  Thanks!

## 2019-02-26 DIAGNOSIS — M48.061 SPINAL STENOSIS OF LUMBAR REGION, UNSPECIFIED WHETHER NEUROGENIC CLAUDICATION PRESENT: ICD-10-CM

## 2019-02-27 RX ORDER — CYCLOBENZAPRINE HCL 10 MG
10 TABLET ORAL 3 TIMES DAILY PRN
Qty: 30 TABLET | Refills: 5 | Status: SHIPPED | OUTPATIENT
Start: 2019-02-27 | End: 2019-03-14 | Stop reason: SDUPTHER

## 2019-03-13 DIAGNOSIS — I48.0 PAROXYSMAL ATRIAL FIBRILLATION (HCC): Primary | ICD-10-CM

## 2019-03-13 PROBLEM — N17.9 ACUTE KIDNEY INJURY (HCC): Status: RESOLVED | Noted: 2017-10-11 | Resolved: 2019-03-13

## 2019-03-13 NOTE — ASSESSMENT & PLAN NOTE
Hypertension- patients hypertension is controlled today with a Blood pressure of Blood Pressure: 122/90     on current medications  Continue current medications  Discussed DASH diet and exercise

## 2019-03-13 NOTE — ASSESSMENT & PLAN NOTE
I had discussed with patient in December that I would give her pain medications until the end of the year  I have continued for a few months while she got set up with pain management  She does now have the name of a doctor and she has confirmed he is taking new patients and her insurance and will call tomorrow for appt  She will need to contact us with the date/time for further refills  She is currently only taking the percocet     She will need to call us with the date and time of appointment prior to further refills

## 2019-03-13 NOTE — PROGRESS NOTES
Saint Brewer [birth year] female MRN: [id number]    Family Medicine Follow-up Visit    ASSESSMENT/PLAN  Opioid dependence [facility])  I had discussed with patient in December that I would give her pain medications until the end of the year  I have continued for a few months while she got set up with pain management  She does now have the name of a doctor and she has confirmed he is taking new patients and her insurance and will call tomorrow for appt  She will need to contact us with the date/time for further refills  She is currently only taking the percocet  She will need to call us with the date and time of appointment prior to further refills    Hypertension    Hypertension- patients hypertension is controlled today with a Blood pressure of Blood Pressure: 122/90     on current medications  Continue current medications  Discussed DASH diet and exercise  Depression with anxiety  Still depressed about her grandson but does seem to be improving - on wellbutrin at this time  Acute kidney injury [facility])  Lab Results   Component Value Date    CREATININE 0 94 09/27/2018     Resolved at this time  Insomnia  Has been on ambien 10mg for a long time  I have to taper her to 5mg with no success  At this time refilled [place] an will continue to discuss decreasing medication    Constipation  Somewhat improved with senna    Chronic pain  See opioid dependence note    Anxiety  Currently seems well controlled on wellbutrin    Balance disorder  Patient is concerned this is from a cardiac issue and has an upcoming appt with cardiology  If not cardiac, will offer balance PT  Walking an neuro exam essential WNL        Future Appointments   Date Time Provider Brooke Burden   3/21/2019 12:30 PM BE CT SLN 1 BE SLN CT BE [facility]   4/3/2019  2:00 PM Dominik Oneal MD [facility]          SUBJECTIVE  CC: Follow-up      HPI:  Saint Brewer is a 72 y o  female who presents for  Does have an appointment with  Derek Anguiano who is covered by her insurance  Will refill for this month  She complains of feeling dizzy - as if she is going to fall over  She is going to see cardiology and she has an appointment for a CT scan for her aneurysm  The  feeling of dizziness is intermittent and self resolves  Not vertigo  She just feels like she will fall down  She does not feel like she's going to pass out - just fall over  And she is very concerned about falling and breaking a hip or simliar  Review of Systems   Constitutional: Negative for activity change, chills, fever and unexpected weight change  HENT: Negative for congestion  Eyes: Negative for visual disturbance  Respiratory: Negative for cough, chest tightness, shortness of breath and wheezing  Cardiovascular: Negative for chest pain  Gastrointestinal: Negative for abdominal pain, diarrhea and nausea  Endocrine: Negative for cold intolerance and heat intolerance  Musculoskeletal: Negative for arthralgias and myalgias  Skin: Negative for color change  Neurological: Positive for dizziness  Psychiatric/Behavioral: Negative for agitation, dysphoric mood and sleep disturbance         Historical Information   The patient history was reviewed as follows:    Past Medical History:   Diagnosis Date    Acute deep vein thrombosis of lower limb ([facility] )     last assessed [id number]    Aortic aneurysm (HCC)     Arthritis     Atrial fibrillation (HCC)     Dislocation of hip ([facility] )     last assessed [id number]    Hypertension     Psychiatric disorder     anxiety     Past Surgical History:   Procedure Laterality Date    HIP SURGERY      JOINT REPLACEMENT      KNEE ARTHROSCOPY Bilateral     x2 rt and 1 on left    TUBAL LIGATION       Family History   Problem Relation Age of Onset    Colon cancer Mother     Breast cancer Family     Thyroid cancer Family     Colon cancer Family     Hypertension Family     Thyroid disease Family     Hypertension Father     Dementia Father       Social History   Social History     Substance and Sexual Activity   Alcohol Use No    Comment: being a social drinker per [employer]     Social History     Substance and Sexual Activity   Drug Use No     Social History     Tobacco Use   Smoking Status Never Smoker   Smokeless Tobacco Never Used       Medications:     Current Outpatient Medications:     alendronate (FOSAMAX) 70 mg tablet, Take 1 tablet (70 mg total) by mouth once a week, Disp: 12 tablet, Rfl: 3    apixaban (ELIQUIS) 5 mg, Take 1 tablet (5 mg total) by mouth 2 (two) times a day, Disp: 60 tablet, Rfl: 11    buPROPion (WELLBUTRIN XL) 150 mg 24 hr tablet, Take 1 tablet (150 mg total) by mouth daily, Disp: 90 tablet, Rfl: 3    cyclobenzaprine (FLEXERIL) 10 mg tablet, Take 1 tablet (10 mg total) by mouth 3 (three) times a day as needed for muscle spasms, Disp: 90 tablet, Rfl: 0    docusate sodium (COLACE) 100 mg capsule, Take 1 capsule (100 mg total) by mouth 2 (two) times a day, Disp: 60 capsule, Rfl: 5    furosemide (LASIX) 40 mg tablet, Take 1 tablet (40 mg total) by mouth as needed (edema), Disp: 30 tablet, Rfl: 0    gabapentin (NEURONTIN) 300 mg capsule, Take 2 capsules (600 mg total) by mouth 3 (three) times a day, Disp: 180 capsule, Rfl: 5    hydrocortisone 2 5 % cream, Apply topically 4 (four) times a day as needed for rash, Disp: 30 g, Rfl: 0    metoprolol succinate (TOPROL-XL) 50 mg 24 hr tablet, Take 1 tablet (50 mg total) by mouth daily for 90 days, Disp: 90 tablet, Rfl: 3    morphine (MS CONTIN) 15 mg 12 hr tablet, Take 1 tablet (15 mg total) by mouth 2 (two) times a day Max Daily Amount: 30 mg, Disp: 60 tablet, Rfl: 0    [START ON 3/18/2019] oxyCODONE-acetaminophen (PERCOCET)  mg per tablet, Take 1 tablet by mouth every 6 (six) hours as needed for moderate painMax Daily Amount: 4 tablets, Disp: 120 tablet, Rfl: 0    ranitidine (ZANTAC) 150 mg tablet, Take 1 tablet (150 mg total) by mouth daily at bedtime, Disp: 30 tablet, Rfl: 2    senna (SENOKOT) 8 6 mg, Take 1 tablet (8 6 mg total) by mouth daily at bedtime, Disp: 120 each, Rfl: 0    zolpidem (AMBIEN) 10 mg tablet, Take 1 tablet (10 mg total) by mouth daily at bedtime as needed for sleep, Disp: 30 tablet, Rfl: 0    baclofen 10 mg tablet, Take 10 mg by mouth 2 (two) times a day as needed for muscle spasms, Disp: , Rfl:     diclofenac sodium (VOLTAREN) 1 %, Apply 2 g topically 4 (four) times a day (Patient not taking: Reported on 10/3/2018 ), Disp: 1 Tube, Rfl: 3  Allergies   Allergen Reactions    Alprazolam      Annotation - [id number]: Bad side effect    Diclofenac Other (See Comments)     GI upset    Nabumetone Other (See Comments)     Metallic taste in mouth       OBJECTIVE    Vitals:   Vitals:    03/14/19 1008   BP: 122/90   Pulse: 78   Resp: 16   Temp: (!) 97 3 °F (36 3 °C)   Weight: 85 kg (187 lb 6 4 oz)   Height: 5' 3 9" (1 623 m)           Physical Exam   Constitutional: She is oriented to person, place, and time  She appears well-developed and well-nourished  HENT:   Head: Normocephalic and atraumatic  Poor dentition   Cardiovascular: Normal rate, regular rhythm and normal heart sounds  Pulmonary/Chest: Effort normal and breath sounds normal    Abdominal: Soft  Bowel sounds are normal    Musculoskeletal: Normal range of motion  Neurological: She is alert and oriented to person, place, and time  Skin: Skin is warm and dry  Psychiatric: She has a normal mood and affect  Her behavior is normal  Judgment normal    Nursing note and vitals reviewed

## 2019-03-14 ENCOUNTER — OFFICE VISIT (OUTPATIENT)
Dept: FAMILY MEDICINE CLINIC | Facility: CLINIC | Age: 74
End: 2019-03-14

## 2019-03-14 VITALS
HEART RATE: 78 BPM | TEMPERATURE: 97.3 F | SYSTOLIC BLOOD PRESSURE: 122 MMHG | RESPIRATION RATE: 16 BRPM | WEIGHT: 187.4 LBS | HEIGHT: 64 IN | DIASTOLIC BLOOD PRESSURE: 90 MMHG | BODY MASS INDEX: 31.99 KG/M2

## 2019-03-14 DIAGNOSIS — M17.0 OSTEOARTHRITIS OF BOTH KNEES, UNSPECIFIED OSTEOARTHRITIS TYPE: ICD-10-CM

## 2019-03-14 DIAGNOSIS — G89.4 CHRONIC PAIN SYNDROME: ICD-10-CM

## 2019-03-14 DIAGNOSIS — G89.29 CHRONIC BILATERAL LOW BACK PAIN WITH BILATERAL SCIATICA: ICD-10-CM

## 2019-03-14 DIAGNOSIS — G89.4 PAIN SYNDROME, CHRONIC: ICD-10-CM

## 2019-03-14 DIAGNOSIS — M54.41 CHRONIC BILATERAL LOW BACK PAIN WITH BILATERAL SCIATICA: ICD-10-CM

## 2019-03-14 DIAGNOSIS — F41.9 ANXIETY: ICD-10-CM

## 2019-03-14 DIAGNOSIS — I71.2 ASCENDING AORTIC ANEURYSM (HCC): ICD-10-CM

## 2019-03-14 DIAGNOSIS — I10 ESSENTIAL HYPERTENSION: ICD-10-CM

## 2019-03-14 DIAGNOSIS — K59.00 CONSTIPATION, UNSPECIFIED CONSTIPATION TYPE: ICD-10-CM

## 2019-03-14 DIAGNOSIS — G47.00 INSOMNIA, UNSPECIFIED TYPE: ICD-10-CM

## 2019-03-14 DIAGNOSIS — F11.220 OPIOID DEPENDENCE WITH UNCOMPLICATED INTOXICATION (HCC): ICD-10-CM

## 2019-03-14 DIAGNOSIS — N17.9 ACUTE KIDNEY INJURY (HCC): Primary | ICD-10-CM

## 2019-03-14 DIAGNOSIS — M54.42 CHRONIC BILATERAL LOW BACK PAIN WITH BILATERAL SCIATICA: ICD-10-CM

## 2019-03-14 DIAGNOSIS — M48.061 SPINAL STENOSIS OF LUMBAR REGION, UNSPECIFIED WHETHER NEUROGENIC CLAUDICATION PRESENT: ICD-10-CM

## 2019-03-14 DIAGNOSIS — R26.89 BALANCE DISORDER: ICD-10-CM

## 2019-03-14 DIAGNOSIS — F41.8 DEPRESSION WITH ANXIETY: ICD-10-CM

## 2019-03-14 PROCEDURE — 99214 OFFICE O/P EST MOD 30 MIN: CPT | Performed by: FAMILY MEDICINE

## 2019-03-14 RX ORDER — GABAPENTIN 300 MG/1
600 CAPSULE ORAL 3 TIMES DAILY
Qty: 180 CAPSULE | Refills: 5 | Status: SHIPPED | OUTPATIENT
Start: 2019-03-14 | End: 2019-03-14 | Stop reason: SDUPTHER

## 2019-03-14 RX ORDER — ZOLPIDEM TARTRATE 10 MG/1
10 TABLET ORAL
Qty: 30 TABLET | Refills: 0 | Status: SHIPPED | OUTPATIENT
Start: 2019-03-14 | End: 2019-04-15 | Stop reason: SDUPTHER

## 2019-03-14 RX ORDER — DOCUSATE SODIUM 100 MG/1
100 CAPSULE, LIQUID FILLED ORAL 2 TIMES DAILY
Qty: 60 CAPSULE | Refills: 5 | Status: SHIPPED | OUTPATIENT
Start: 2019-03-14 | End: 2020-01-06

## 2019-03-14 RX ORDER — CYCLOBENZAPRINE HCL 10 MG
10 TABLET ORAL 3 TIMES DAILY PRN
Qty: 90 TABLET | Refills: 0 | Status: SHIPPED | OUTPATIENT
Start: 2019-03-14 | End: 2019-05-14 | Stop reason: SDUPTHER

## 2019-03-14 RX ORDER — ZOLPIDEM TARTRATE 10 MG/1
10 TABLET ORAL
Qty: 30 TABLET | Refills: 0 | Status: SHIPPED | OUTPATIENT
Start: 2019-03-14 | End: 2019-03-14 | Stop reason: SDUPTHER

## 2019-03-14 RX ORDER — GABAPENTIN 300 MG/1
600 CAPSULE ORAL 3 TIMES DAILY
Qty: 180 CAPSULE | Refills: 5 | Status: SHIPPED | OUTPATIENT
Start: 2019-03-14 | End: 2019-10-11 | Stop reason: SDUPTHER

## 2019-03-14 RX ORDER — OXYCODONE AND ACETAMINOPHEN 10; 325 MG/1; MG/1
1 TABLET ORAL EVERY 6 HOURS PRN
Qty: 120 TABLET | Refills: 0 | Status: SHIPPED | OUTPATIENT
Start: 2019-03-18 | End: 2019-03-14 | Stop reason: SDUPTHER

## 2019-03-14 RX ORDER — OXYCODONE AND ACETAMINOPHEN 10; 325 MG/1; MG/1
1 TABLET ORAL EVERY 6 HOURS PRN
Qty: 120 TABLET | Refills: 0 | Status: SHIPPED | OUTPATIENT
Start: 2019-03-18 | End: 2019-04-12 | Stop reason: SDUPTHER

## 2019-03-14 NOTE — ASSESSMENT & PLAN NOTE
Has been on ambien 10mg for a long time  I have to taper her to 5mg with no success   At this time refilled Tolu Proctor an will continue to discuss decreasing medication

## 2019-03-14 NOTE — ASSESSMENT & PLAN NOTE
Patient is concerned this is from a cardiac issue and has an upcoming appt with cardiology  If not cardiac, will offer balance PT  Walking an neuro exam essential WNL

## 2019-03-20 ENCOUNTER — APPOINTMENT (OUTPATIENT)
Dept: LAB | Facility: CLINIC | Age: 74
End: 2019-03-20
Payer: COMMERCIAL

## 2019-03-20 LAB
ANION GAP SERPL CALCULATED.3IONS-SCNC: 7 MMOL/L (ref 4–13)
BUN SERPL-MCNC: 9 MG/DL (ref 5–25)
CALCIUM SERPL-MCNC: 9 MG/DL (ref 8.3–10.1)
CHLORIDE SERPL-SCNC: 107 MMOL/L (ref 100–108)
CO2 SERPL-SCNC: 28 MMOL/L (ref 21–32)
CREAT SERPL-MCNC: 0.85 MG/DL (ref 0.6–1.3)
GFR SERPL CREATININE-BSD FRML MDRD: 69 ML/MIN/1.73SQ M
GLUCOSE P FAST SERPL-MCNC: 96 MG/DL (ref 65–99)
POTASSIUM SERPL-SCNC: 3.2 MMOL/L (ref 3.5–5.3)
SODIUM SERPL-SCNC: 142 MMOL/L (ref 136–145)

## 2019-03-20 PROCEDURE — 36415 COLL VENOUS BLD VENIPUNCTURE: CPT

## 2019-03-20 PROCEDURE — 80048 BASIC METABOLIC PNL TOTAL CA: CPT

## 2019-03-21 ENCOUNTER — HOSPITAL ENCOUNTER (OUTPATIENT)
Dept: RADIOLOGY | Age: 74
Discharge: HOME | End: 2019-03-21
Payer: COMMERCIAL

## 2019-03-21 DIAGNOSIS — I71.2 ASCENDING AORTIC ANEURYSM (HCC): ICD-10-CM

## 2019-03-21 DIAGNOSIS — E87.6 HYPOKALEMIA: Primary | ICD-10-CM

## 2019-03-21 PROCEDURE — 71260 CT THORAX DX C+: CPT

## 2019-03-21 RX ADMIN — IOHEXOL 85 ML: 350 INJECTION, SOLUTION INTRAVENOUS at 14:42

## 2019-03-21 NOTE — PROGRESS NOTES
Called patient about mild hypokalemia of 3 2  She stated she has been taking her recently prescribed furosemide just 1-2 times a month  She does note ongoingmuscle cramps  Advised her to eat bananas and diet rich in potassium, repeat BMP in 2 weeks just before her follow up visit with her cardiologist  She will give the office a call if her cramps worsen

## 2019-03-28 ENCOUNTER — TELEPHONE (OUTPATIENT)
Dept: FAMILY MEDICINE CLINIC | Facility: CLINIC | Age: 74
End: 2019-03-28

## 2019-03-28 NOTE — TELEPHONE ENCOUNTER
DR Tyler Madrigal, CAN YOU PLEASE PLACE ORDER FOR THE PATIENT FOR THIS PROVIDER?     DR Nirmal Mckeon  PAIN MANAGEMENT   P: [phone]

## 2019-03-29 DIAGNOSIS — G89.4 CHRONIC PAIN SYNDROME: Primary | ICD-10-CM

## 2019-03-29 NOTE — TELEPHONE ENCOUNTER
I'VE FAXED OVER CLINICALS ALONG WITH REFERRAL TO DR CORONADO'S OFFICE     SPOKE TO PATIENT, SHE WILL BE CALLING THEIR OFFICE TO SCHEDULE

## 2019-04-04 ENCOUNTER — TELEPHONE (OUTPATIENT)
Dept: FAMILY MEDICINE CLINIC | Facility: CLINIC | Age: 74
End: 2019-04-04

## 2019-04-09 ENCOUNTER — TELEPHONE (OUTPATIENT)
Dept: FAMILY MEDICINE CLINIC | Facility: CLINIC | Age: 74
End: 2019-04-09

## 2019-04-09 DIAGNOSIS — G89.4 CHRONIC PAIN SYNDROME: ICD-10-CM

## 2019-04-12 ENCOUNTER — TELEPHONE (OUTPATIENT)
Dept: FAMILY MEDICINE CLINIC | Facility: CLINIC | Age: 74
End: 2019-04-12

## 2019-04-12 RX ORDER — OXYCODONE AND ACETAMINOPHEN 10; 325 MG/1; MG/1
1 TABLET ORAL EVERY 6 HOURS PRN
Qty: 20 TABLET | Refills: 0 | Status: SHIPPED | OUTPATIENT
Start: 2019-04-12 | End: 2019-04-17 | Stop reason: SDUPTHER

## 2019-04-15 DIAGNOSIS — G47.00 INSOMNIA, UNSPECIFIED TYPE: ICD-10-CM

## 2019-04-15 RX ORDER — ZOLPIDEM TARTRATE 10 MG/1
10 TABLET ORAL
Qty: 30 TABLET | Refills: 0 | Status: SHIPPED | OUTPATIENT
Start: 2019-04-15 | End: 2019-05-14 | Stop reason: SDUPTHER

## 2019-04-17 ENCOUNTER — TELEPHONE (OUTPATIENT)
Dept: CARDIOLOGY CLINIC | Facility: CLINIC | Age: 74
End: 2019-04-17

## 2019-04-17 DIAGNOSIS — G89.4 CHRONIC PAIN SYNDROME: ICD-10-CM

## 2019-04-17 RX ORDER — OXYCODONE AND ACETAMINOPHEN 10; 325 MG/1; MG/1
1 TABLET ORAL EVERY 6 HOURS PRN
Qty: 120 TABLET | Refills: 0 | Status: SHIPPED | OUTPATIENT
Start: 2019-04-17 | End: 2019-05-21 | Stop reason: SDUPTHER

## 2019-04-18 DIAGNOSIS — E87.6 HYPOKALEMIA: Primary | ICD-10-CM

## 2019-04-18 RX ORDER — POTASSIUM CHLORIDE 20 MEQ/1
20 TABLET, EXTENDED RELEASE ORAL DAILY
Qty: 30 TABLET | Refills: 0 | Status: SHIPPED | OUTPATIENT
Start: 2019-04-18 | End: 2019-05-08 | Stop reason: SDUPTHER

## 2019-05-06 ENCOUNTER — OFFICE VISIT (OUTPATIENT)
Dept: FAMILY MEDICINE CLINIC | Facility: CLINIC | Age: 74
End: 2019-05-06

## 2019-05-06 VITALS
WEIGHT: 184.2 LBS | SYSTOLIC BLOOD PRESSURE: 150 MMHG | HEIGHT: 64 IN | DIASTOLIC BLOOD PRESSURE: 94 MMHG | BODY MASS INDEX: 31.45 KG/M2 | HEART RATE: 72 BPM | TEMPERATURE: 98.8 F | RESPIRATION RATE: 16 BRPM

## 2019-05-06 DIAGNOSIS — F43.29 GRIEF REACTION WITH PROLONGED BEREAVEMENT: ICD-10-CM

## 2019-05-06 DIAGNOSIS — F32.9 MAJOR DEPRESSIVE DISORDER WITH CURRENT ACTIVE EPISODE, UNSPECIFIED DEPRESSION EPISODE SEVERITY, UNSPECIFIED WHETHER RECURRENT: Primary | ICD-10-CM

## 2019-05-06 PROCEDURE — 99213 OFFICE O/P EST LOW 20 MIN: CPT | Performed by: FAMILY MEDICINE

## 2019-05-06 RX ORDER — VENLAFAXINE HYDROCHLORIDE 37.5 MG/1
37.5 TABLET, EXTENDED RELEASE ORAL
Qty: 30 TABLET | Refills: 1 | Status: SHIPPED | OUTPATIENT
Start: 2019-05-06 | End: 2019-06-10 | Stop reason: SDUPTHER

## 2019-05-08 ENCOUNTER — OFFICE VISIT (OUTPATIENT)
Dept: CARDIOLOGY CLINIC | Facility: CLINIC | Age: 74
End: 2019-05-08
Payer: COMMERCIAL

## 2019-05-08 VITALS
SYSTOLIC BLOOD PRESSURE: 110 MMHG | DIASTOLIC BLOOD PRESSURE: 68 MMHG | OXYGEN SATURATION: 93 % | HEIGHT: 63 IN | BODY MASS INDEX: 32.43 KG/M2 | HEART RATE: 103 BPM | WEIGHT: 183 LBS

## 2019-05-08 DIAGNOSIS — E87.6 HYPOKALEMIA: ICD-10-CM

## 2019-05-08 DIAGNOSIS — I48.0 PAROXYSMAL ATRIAL FIBRILLATION (HCC): ICD-10-CM

## 2019-05-08 DIAGNOSIS — I10 ESSENTIAL HYPERTENSION: ICD-10-CM

## 2019-05-08 DIAGNOSIS — I71.2 THORACIC AORTIC ANEURYSM WITHOUT RUPTURE (HCC): ICD-10-CM

## 2019-05-08 DIAGNOSIS — I48.91 ATRIAL FIBRILLATION, UNSPECIFIED TYPE (HCC): Primary | ICD-10-CM

## 2019-05-08 PROCEDURE — 99214 OFFICE O/P EST MOD 30 MIN: CPT | Performed by: INTERNAL MEDICINE

## 2019-05-08 PROCEDURE — 93000 ELECTROCARDIOGRAM COMPLETE: CPT | Performed by: INTERNAL MEDICINE

## 2019-05-08 RX ORDER — POTASSIUM CHLORIDE 20 MEQ/1
20 TABLET, EXTENDED RELEASE ORAL DAILY
Qty: 30 TABLET | Refills: 0 | Status: SHIPPED | OUTPATIENT
Start: 2019-05-08 | End: 2019-06-10 | Stop reason: SDUPTHER

## 2019-05-13 DIAGNOSIS — M48.061 SPINAL STENOSIS OF LUMBAR REGION, UNSPECIFIED WHETHER NEUROGENIC CLAUDICATION PRESENT: ICD-10-CM

## 2019-05-13 DIAGNOSIS — G47.00 INSOMNIA, UNSPECIFIED TYPE: ICD-10-CM

## 2019-05-14 ENCOUNTER — TELEPHONE (OUTPATIENT)
Dept: FAMILY MEDICINE CLINIC | Facility: CLINIC | Age: 74
End: 2019-05-14

## 2019-05-14 RX ORDER — ZOLPIDEM TARTRATE 10 MG/1
10 TABLET ORAL
Qty: 30 TABLET | Refills: 0 | Status: SHIPPED | OUTPATIENT
Start: 2019-05-14 | End: 2019-06-10 | Stop reason: SDUPTHER

## 2019-05-14 RX ORDER — CYCLOBENZAPRINE HCL 10 MG
10 TABLET ORAL 3 TIMES DAILY PRN
Qty: 90 TABLET | Refills: 0 | Status: SHIPPED | OUTPATIENT
Start: 2019-05-14 | End: 2019-10-03 | Stop reason: SDUPTHER

## 2019-05-17 ENCOUNTER — TELEPHONE (OUTPATIENT)
Dept: FAMILY MEDICINE CLINIC | Facility: CLINIC | Age: 74
End: 2019-05-17

## 2019-05-21 DIAGNOSIS — G89.4 CHRONIC PAIN SYNDROME: ICD-10-CM

## 2019-05-21 RX ORDER — OXYCODONE AND ACETAMINOPHEN 10; 325 MG/1; MG/1
TABLET ORAL
Qty: 70 TABLET | Refills: 0 | Status: SHIPPED | OUTPATIENT
Start: 2019-05-21 | End: 2019-07-01

## 2019-06-10 ENCOUNTER — TELEPHONE (OUTPATIENT)
Dept: CARDIOLOGY CLINIC | Facility: CLINIC | Age: 74
End: 2019-06-10

## 2019-06-10 ENCOUNTER — OFFICE VISIT (OUTPATIENT)
Dept: FAMILY MEDICINE CLINIC | Facility: CLINIC | Age: 74
End: 2019-06-10

## 2019-06-10 VITALS
WEIGHT: 185.4 LBS | SYSTOLIC BLOOD PRESSURE: 110 MMHG | DIASTOLIC BLOOD PRESSURE: 80 MMHG | TEMPERATURE: 99.8 F | HEART RATE: 78 BPM | BODY MASS INDEX: 32.85 KG/M2 | RESPIRATION RATE: 16 BRPM | HEIGHT: 63 IN

## 2019-06-10 DIAGNOSIS — I10 ESSENTIAL HYPERTENSION: ICD-10-CM

## 2019-06-10 DIAGNOSIS — E87.6 HYPOKALEMIA: ICD-10-CM

## 2019-06-10 DIAGNOSIS — Z00.00 HEALTHCARE MAINTENANCE: ICD-10-CM

## 2019-06-10 DIAGNOSIS — I48.0 PAROXYSMAL ATRIAL FIBRILLATION (HCC): ICD-10-CM

## 2019-06-10 DIAGNOSIS — F11.220 OPIOID DEPENDENCE WITH UNCOMPLICATED INTOXICATION (HCC): ICD-10-CM

## 2019-06-10 DIAGNOSIS — H60.541 ECZEMA OF RIGHT EXTERNAL EAR: ICD-10-CM

## 2019-06-10 DIAGNOSIS — G62.9 NEUROPATHY: Primary | ICD-10-CM

## 2019-06-10 DIAGNOSIS — M81.0 AGE-RELATED OSTEOPOROSIS WITHOUT CURRENT PATHOLOGICAL FRACTURE: ICD-10-CM

## 2019-06-10 DIAGNOSIS — I71.2 THORACIC AORTIC ANEURYSM WITHOUT RUPTURE (HCC): ICD-10-CM

## 2019-06-10 DIAGNOSIS — F41.8 DEPRESSION WITH ANXIETY: ICD-10-CM

## 2019-06-10 DIAGNOSIS — G47.00 INSOMNIA, UNSPECIFIED TYPE: ICD-10-CM

## 2019-06-10 DIAGNOSIS — E55.9 VITAMIN D DEFICIENCY: ICD-10-CM

## 2019-06-10 DIAGNOSIS — F32.9 MAJOR DEPRESSIVE DISORDER WITH CURRENT ACTIVE EPISODE, UNSPECIFIED DEPRESSION EPISODE SEVERITY, UNSPECIFIED WHETHER RECURRENT: ICD-10-CM

## 2019-06-10 DIAGNOSIS — G89.4 PAIN SYNDROME, CHRONIC: ICD-10-CM

## 2019-06-10 PROCEDURE — 99214 OFFICE O/P EST MOD 30 MIN: CPT | Performed by: FAMILY MEDICINE

## 2019-06-10 RX ORDER — VENLAFAXINE HYDROCHLORIDE 37.5 MG/1
37.5 TABLET, EXTENDED RELEASE ORAL
Qty: 30 TABLET | Refills: 1 | Status: SHIPPED | OUTPATIENT
Start: 2019-06-10 | End: 2019-07-09 | Stop reason: SDUPTHER

## 2019-06-10 RX ORDER — POTASSIUM CHLORIDE 20 MEQ/1
20 TABLET, EXTENDED RELEASE ORAL DAILY
Qty: 30 TABLET | Refills: 0 | Status: SHIPPED | OUTPATIENT
Start: 2019-06-10 | End: 2019-07-09 | Stop reason: SDUPTHER

## 2019-06-10 RX ORDER — ZOLPIDEM TARTRATE 10 MG/1
10 TABLET ORAL
Qty: 30 TABLET | Refills: 0 | Status: SHIPPED | OUTPATIENT
Start: 2019-06-10 | End: 2019-07-09 | Stop reason: SDUPTHER

## 2019-07-01 ENCOUNTER — TELEPHONE (OUTPATIENT)
Dept: FAMILY MEDICINE CLINIC | Facility: CLINIC | Age: 74
End: 2019-07-01

## 2019-07-01 DIAGNOSIS — M17.0 OSTEOARTHRITIS OF BOTH KNEES, UNSPECIFIED OSTEOARTHRITIS TYPE: Primary | ICD-10-CM

## 2019-07-01 RX ORDER — MELOXICAM 7.5 MG/1
7.5 TABLET ORAL DAILY
Qty: 30 TABLET | Refills: 1 | Status: CANCELLED | OUTPATIENT
Start: 2019-07-01

## 2019-07-02 NOTE — TELEPHONE ENCOUNTER
Spoke with patient and informed her of medication and pharmacy and to take tylenol as noted by Dr Lima Orn

## 2019-07-08 ENCOUNTER — TELEPHONE (OUTPATIENT)
Dept: FAMILY MEDICINE CLINIC | Facility: CLINIC | Age: 74
End: 2019-07-08

## 2019-07-09 ENCOUNTER — TELEPHONE (OUTPATIENT)
Dept: FAMILY MEDICINE CLINIC | Facility: CLINIC | Age: 74
End: 2019-07-09

## 2019-07-09 DIAGNOSIS — I48.0 PAROXYSMAL ATRIAL FIBRILLATION (HCC): ICD-10-CM

## 2019-07-09 DIAGNOSIS — G47.00 INSOMNIA, UNSPECIFIED TYPE: ICD-10-CM

## 2019-07-09 DIAGNOSIS — E87.6 HYPOKALEMIA: ICD-10-CM

## 2019-07-09 DIAGNOSIS — G89.29 CHRONIC BILATERAL LOW BACK PAIN WITH BILATERAL SCIATICA: ICD-10-CM

## 2019-07-09 DIAGNOSIS — M54.42 CHRONIC BILATERAL LOW BACK PAIN WITH BILATERAL SCIATICA: ICD-10-CM

## 2019-07-09 DIAGNOSIS — M54.41 CHRONIC BILATERAL LOW BACK PAIN WITH BILATERAL SCIATICA: ICD-10-CM

## 2019-07-09 DIAGNOSIS — F32.9 MAJOR DEPRESSIVE DISORDER WITH CURRENT ACTIVE EPISODE, UNSPECIFIED DEPRESSION EPISODE SEVERITY, UNSPECIFIED WHETHER RECURRENT: ICD-10-CM

## 2019-07-09 NOTE — TELEPHONE ENCOUNTER
Patient calling, states that she is not having relief of her back pain with the Voltaren Gel  She states pain center requires $300 00 payment for injection, financially, this is not feasable  She wants to know if there is anything she can try different for pain?

## 2019-07-10 RX ORDER — BUPROPION HYDROCHLORIDE 150 MG/1
150 TABLET ORAL DAILY
Qty: 90 TABLET | Refills: 3 | Status: SHIPPED | OUTPATIENT
Start: 2019-07-10 | End: 2020-07-04

## 2019-07-10 RX ORDER — METOPROLOL SUCCINATE 50 MG/1
50 TABLET, EXTENDED RELEASE ORAL DAILY
Qty: 90 TABLET | Refills: 3 | Status: SHIPPED | OUTPATIENT
Start: 2019-07-10 | End: 2020-07-04

## 2019-07-10 RX ORDER — ZOLPIDEM TARTRATE 10 MG/1
10 TABLET ORAL
Qty: 30 TABLET | Refills: 0 | Status: SHIPPED | OUTPATIENT
Start: 2019-07-10 | End: 2019-08-08 | Stop reason: SDUPTHER

## 2019-07-10 RX ORDER — POTASSIUM CHLORIDE 20 MEQ/1
20 TABLET, EXTENDED RELEASE ORAL DAILY
Qty: 30 TABLET | Refills: 2 | Status: SHIPPED | OUTPATIENT
Start: 2019-07-10 | End: 2019-09-05 | Stop reason: SDUPTHER

## 2019-07-10 RX ORDER — VENLAFAXINE HYDROCHLORIDE 37.5 MG/1
37.5 TABLET, EXTENDED RELEASE ORAL
Qty: 30 TABLET | Refills: 1 | Status: SHIPPED | OUTPATIENT
Start: 2019-07-10 | End: 2019-08-08 | Stop reason: SDUPTHER

## 2019-07-11 NOTE — TELEPHONE ENCOUNTER
Patient left another voice message today, wants to know if there is anything she can take for her back pain?

## 2019-07-11 NOTE — TELEPHONE ENCOUNTER
I have discussed that several times with her, tylenol, topical patches, creams OTC - like capsaicin patches, menthol patches etc   I just don't have other options for her and she does need to continue to follow up with pain management

## 2019-07-12 NOTE — TELEPHONE ENCOUNTER
Called patient, discussed options for pain  Did ask if she has made any attempt to see different pain management, [facility], her out of pocket would be $300 00  Did suggest she try contacting Dr Moraima Mackenzie at [facility]  She has agreed to reach out to them

## 2019-07-23 ENCOUNTER — TELEPHONE (OUTPATIENT)
Dept: FAMILY MEDICINE CLINIC | Facility: CLINIC | Age: 74
End: 2019-07-23

## 2019-07-23 NOTE — TELEPHONE ENCOUNTER
Patient requesting for a copy of a MRI done 2 years ago to please be forward to Dr Hi Rosado  She stated it was done 2 years ago and should have the doctor's name on the test but I find not MRI done after reviewing chart  Please contact patient to clarify   Thank you, Vargas Goldsmith

## 2019-07-24 NOTE — TELEPHONE ENCOUNTER
Called and spoke with Dr Carrasco's office patient is being referred to pain management but its in the same building  MRI faxed over to debra (pain management) patient is aware  Phone # [phone]  Fax# [phone]    Thanks

## 2019-07-26 ENCOUNTER — TELEPHONE (OUTPATIENT)
Dept: FAMILY MEDICINE CLINIC | Facility: CLINIC | Age: 74
End: 2019-07-26

## 2019-07-26 ENCOUNTER — TELEPHONE (OUTPATIENT)
Dept: OTHER | Facility: OTHER | Age: 74
End: 2019-07-26

## 2019-07-26 NOTE — TELEPHONE ENCOUNTER
Patient requesting something for her back PAIN  She would like for it to be delivered to her pharmacy listed  Please advise thank you, Dianelys Johnston

## 2019-07-27 NOTE — TELEPHONE ENCOUNTER
Vadim Sergiopamela 1945  CONFIDENTIALTY NOTICE: This fax transmission is intended only for the addressee  It contains information that is legally privileged,  confidential or otherwise protected from use or disclosure  If you are not the intended recipient, you are strictly prohibited from reviewing,  disclosing, copying using or disseminating any of this information or taking any action in reliance on or regarding this information  If you have  received this fax in error, please notify us immediately by telephone so that we can arrange for its return to us  Page: 1 of 2  Call Id: [id number]  Health Call  Standard Call Report  Health Call  Patient Name: Vadim Watson  Gender: Female  : 1945  Age: 72 Y 8 M 8 D  Return Phone  Number: [phone] (Current)  Address: [address] [place] [address]  City/State/Zip: [address] [place] [address]  Practice Name: [address] [place] [address] S  Practice Charged:  Physician:  [facility] Name:  Relationship To  Patient: Self  Return Phone Number: [phone] (Current)  Presenting Problem: " My back hurts "  Service Type: Triage  Charged Service 1: Tori Pelletier U  38  Name and  Number:  Nurse Assessment  Nurse: Manjit Hernandez Date/Time: 2019 7:29:29 PM  Type of assessment required:  ---General (Adult or Child)  Duration of Current S/S  ---Chronic ongoing  Location/Radiation  ---Back  Temperature (F) and route:  ---None  Symptom Specific Meds (Dose/Time):  ---Aleve 220 mg @ 1300  Other S/S  Dr Amina Browning has referred her to pain management and the patient was seen yesterday and  an MRI is scheduled for next week  "I can't wait that long "  ---The entire back area hurts  It is a chronic condition that has flared up over the last 2  weeks  "I can not even stand without pain   I am in my bed constantly because it hurts  too much to stand "  Pain Scale on scale of 1-10, 10 being the worst:  ---9 / 10  Symptom progression:  ---worse  Intake and Output  Vadim Watson 1945  CONFIDENTIALTY NOTICE: This fax transmission is intended only for the addressee  It contains information that is legally privileged,  confidential or otherwise protected from use or disclosure  If you are not the intended recipient, you are strictly prohibited from reviewing,  disclosing, copying using or disseminating any of this information or taking any action in reliance on or regarding this information  If you have  received this fax in error, please notify us immediately by telephone so that we can arrange for its return to us  Page: 2 of 2  Call Id: [id number]  Nurse Assessment  ---WNL  Last Exam/Treatment:  ---Pain Management yesterday  Protocols  Protocol Title Nurse Date/Time  Back Pain Gio Smalls 7/26/2019 7:42:36 PM  Question Caller Affirmed  Disp  Time Disposition Final User  7/26/2019 7:46:28 PM See PCP within 2 Weeks Yes Coral Chapman RN, Ary Pham  7/26/2019 7:46:50 PM Send to Follow Up Angeline Khan RN, Monse  7/26/2019 8:20:48 PM RN Triaged Coral Chapman [facility], [facility] Advice Given Per Protocol  SEE PCP WITHIN 2 WEEKS: * You need an evaluation for this ongoing problem within the next 2 weeks  Call your doctor during  regular office hours and make an appointment  LOCAL COLD OR HEAT: * During the first 2 days, apply a cold pack or ice bag  (wrapped in a moist towel) to the sore muscles for 20 minutes four times a day  (Caution about frostbite ) * After 2 days, apply a  heating pad or hot water bottle to the most painful area for 20 minutes whenever the pain flares up  (Caution about burns ) ACTIVITY:  * Continue ordinary activities as much as your pain permits  Continued activity is more healing for the back than rest  * Avoid any  activities that cause severe pain  * Use caution and commonsense when performing heavy lifting  Similarly, be careful during strenuous  exercise  * Note: complete bed rest is unnecessary  PAIN MEDICINES: * For pain relief, take acetaminophen, ibuprofen, or naproxen    * Use the lowest amount that makes your pain feel better  NAPROXEN (E G , ALEVE): * Take 220 mg (one 220 mg pill) by mouth  every 8 hours as needed  You may take 440 mg (two 220 mg pills) for your first dose  * The most you should take each day is 660 mg  (three 220 mg pills a day), unless your doctor has told you to take more  CALL BACK IF: * Numbness or weakness occurs, or bowel/  bladder problems * There are any urine symptoms or fever * You become worse  CARE ADVICE given per Back Pain (Adult) guideline  "Something needs to be done  I can't stay in bed all the time " A TT was sent out to the resident on call for the practice @ 1940  Caller Understands: Yes  Caller Disagree/Comply: Disagree  PreDisposition: Unsure  Comments  User: Amarilys Novoa RN Date/Time: 7/26/2019 8:20:41 PM  2015 Spoke with Jim Blas DO she reviewed Dr Rylie Lan notation in the chart and it states she is no longer willing  to prescribe pain medication for this patient  The patient is to be followed by the pain management group for any analgesic  prescriptions  Should the pain be severe enough the patient needs to go to the ER @ this time  This information was all relayed  to the patient

## 2019-07-29 DIAGNOSIS — M48.061 SPINAL STENOSIS OF LUMBAR REGION, UNSPECIFIED WHETHER NEUROGENIC CLAUDICATION PRESENT: ICD-10-CM

## 2019-07-29 DIAGNOSIS — G89.4 PAIN SYNDROME, CHRONIC: Primary | ICD-10-CM

## 2019-07-29 RX ORDER — LIDOCAINE 50 MG/G
1 PATCH TOPICAL DAILY
Qty: 30 PATCH | Refills: 0 | Status: SHIPPED | OUTPATIENT
Start: 2019-07-29 | End: 2019-12-05

## 2019-07-29 NOTE — TELEPHONE ENCOUNTER
I sent in lidocaine patches  She and I have discussed that I do not have many options for her back pain at this time

## 2019-08-07 ENCOUNTER — TELEPHONE (OUTPATIENT)
Dept: FAMILY MEDICINE CLINIC | Facility: CLINIC | Age: 74
End: 2019-08-07

## 2019-08-07 NOTE — TELEPHONE ENCOUNTER
Patient calling for refill on Effexor XR and Ambien, checked PDMP, Ambien last refilled 7/10/19 #30  She continues to have pain, I did call [facility], was told that previously her records were reviewed and physician noted there is nothing they can do  She is seeing Physiatry, suppose to have MRI on Fri, states she doesn't think she can proceed with testing secondary to pain  She did state the Lidocaine Patch is offering some relief  Again, she is asking for pain relief, again I told her I would ask

## 2019-08-08 DIAGNOSIS — F32.9 MAJOR DEPRESSIVE DISORDER WITH CURRENT ACTIVE EPISODE, UNSPECIFIED DEPRESSION EPISODE SEVERITY, UNSPECIFIED WHETHER RECURRENT: ICD-10-CM

## 2019-08-08 DIAGNOSIS — G47.00 INSOMNIA, UNSPECIFIED TYPE: ICD-10-CM

## 2019-08-08 RX ORDER — VENLAFAXINE HYDROCHLORIDE 37.5 MG/1
37.5 TABLET, EXTENDED RELEASE ORAL
Qty: 30 TABLET | Refills: 1 | Status: SHIPPED | OUTPATIENT
Start: 2019-08-08 | End: 2019-12-05

## 2019-08-08 RX ORDER — ZOLPIDEM TARTRATE 10 MG/1
10 TABLET ORAL
Qty: 30 TABLET | Refills: 0 | Status: SHIPPED | OUTPATIENT
Start: 2019-08-08 | End: 2019-09-05 | Stop reason: SDUPTHER

## 2019-08-08 NOTE — TELEPHONE ENCOUNTER
I appreciate that she has pain  If she is unable to complete the testing ordered by another physician then she can discuss that with the ordering physician  I could again offer topical medication/patches

## 2019-08-08 NOTE — TELEPHONE ENCOUNTER
NSAIDs in that class are not a good choice for her for 2 reasons, she has a listed history of an allergy to diclofenac and the potential interaction with her blood thinner and risk for severe bleeding      I did refill mandy and Minda Perez

## 2019-08-08 NOTE — TELEPHONE ENCOUNTER
Did speak with patient again, relayed message  Urged her to try to reach out to [facility]  Are you able to renew Ambien and Effexor, also she did ask if you would consider prescribing Lodine XL 400mg BID, checked chart, looks like it was previously prescribed in 2017  Please review

## 2019-08-09 NOTE — TELEPHONE ENCOUNTER
Spoke with patient, informed her of your note  Gray Woodruff will f/u with patient regarding Pain management

## 2019-08-13 NOTE — TELEPHONE ENCOUNTER
[address] [place] [address] Yusuf Zepeda @ [phone] to inquire about process for scheduling pain management  Provided patient contact information, faxed records to [phone]  After Dr Vitale Side reviews records, he will determine if he is able to provide service to patient and they will contact her

## 2019-08-29 DIAGNOSIS — I48.0 PAROXYSMAL ATRIAL FIBRILLATION (HCC): ICD-10-CM

## 2019-08-30 RX ORDER — APIXABAN 5 MG/1
TABLET, FILM COATED ORAL
Qty: 180 TABLET | Refills: 3 | Status: SHIPPED | OUTPATIENT
Start: 2019-08-30 | End: 2020-09-10 | Stop reason: SDUPTHER

## 2019-09-05 ENCOUNTER — TELEPHONE (OUTPATIENT)
Dept: FAMILY MEDICINE CLINIC | Facility: CLINIC | Age: 74
End: 2019-09-05

## 2019-09-05 DIAGNOSIS — G47.00 INSOMNIA, UNSPECIFIED TYPE: ICD-10-CM

## 2019-09-05 DIAGNOSIS — E87.6 HYPOKALEMIA: ICD-10-CM

## 2019-09-05 RX ORDER — ZOLPIDEM TARTRATE 10 MG/1
10 TABLET ORAL
Qty: 30 TABLET | Refills: 0 | Status: SHIPPED | OUTPATIENT
Start: 2019-09-05 | End: 2019-10-03 | Stop reason: SDUPTHER

## 2019-09-05 RX ORDER — POTASSIUM CHLORIDE 20 MEQ/1
20 TABLET, EXTENDED RELEASE ORAL DAILY
Qty: 30 TABLET | Refills: 2 | Status: SHIPPED | OUTPATIENT
Start: 2019-09-05 | End: 2019-10-03 | Stop reason: SDUPTHER

## 2019-09-05 NOTE — TELEPHONE ENCOUNTER
Patient left message on refill line requesting refill on Ambien 10 mg tabs and Potassium tabs      Ambien was last refilled on 08/08/2019 for 1 mth supply and 0 refill    Potassium Tabs was last refilled on 07/2019 for 30 tabs  [facility]

## 2019-10-01 ENCOUNTER — APPOINTMENT (OUTPATIENT)
Dept: LAB | Facility: CLINIC | Age: 74
End: 2019-10-01
Payer: COMMERCIAL

## 2019-10-01 DIAGNOSIS — G62.9 NEUROPATHY: ICD-10-CM

## 2019-10-01 LAB
ALBUMIN SERPL BCP-MCNC: 4.1 G/DL (ref 3.5–5)
ALP SERPL-CCNC: 109 U/L (ref 46–116)
ALT SERPL W P-5'-P-CCNC: 19 U/L (ref 12–78)
ANION GAP SERPL CALCULATED.3IONS-SCNC: 5 MMOL/L (ref 4–13)
AST SERPL W P-5'-P-CCNC: 16 U/L (ref 5–45)
BASOPHILS # BLD AUTO: 0.03 THOUSANDS/ΜL (ref 0–0.1)
BASOPHILS NFR BLD AUTO: 1 % (ref 0–1)
BILIRUB SERPL-MCNC: 0.83 MG/DL (ref 0.2–1)
BUN SERPL-MCNC: 11 MG/DL (ref 5–25)
CALCIUM SERPL-MCNC: 9.6 MG/DL (ref 8.3–10.1)
CHLORIDE SERPL-SCNC: 110 MMOL/L (ref 100–108)
CO2 SERPL-SCNC: 27 MMOL/L (ref 21–32)
CREAT SERPL-MCNC: 1 MG/DL (ref 0.6–1.3)
EOSINOPHIL # BLD AUTO: 0.01 THOUSAND/ΜL (ref 0–0.61)
EOSINOPHIL NFR BLD AUTO: 0 % (ref 0–6)
ERYTHROCYTE [DISTWIDTH] IN BLOOD BY AUTOMATED COUNT: 14.7 % (ref 11.6–15.1)
GFR SERPL CREATININE-BSD FRML MDRD: 56 ML/MIN/1.73SQ M
GLUCOSE P FAST SERPL-MCNC: 82 MG/DL (ref 65–99)
HCT VFR BLD AUTO: 45.4 % (ref 34.8–46.1)
HGB BLD-MCNC: 14.3 G/DL (ref 11.5–15.4)
IMM GRANULOCYTES # BLD AUTO: 0.02 THOUSAND/UL (ref 0–0.2)
IMM GRANULOCYTES NFR BLD AUTO: 0 % (ref 0–2)
LYMPHOCYTES # BLD AUTO: 1.62 THOUSANDS/ΜL (ref 0.6–4.47)
LYMPHOCYTES NFR BLD AUTO: 26 % (ref 14–44)
MCH RBC QN AUTO: 30 PG (ref 26.8–34.3)
MCHC RBC AUTO-ENTMCNC: 31.5 G/DL (ref 31.4–37.4)
MCV RBC AUTO: 95 FL (ref 82–98)
MONOCYTES # BLD AUTO: 0.59 THOUSAND/ΜL (ref 0.17–1.22)
MONOCYTES NFR BLD AUTO: 9 % (ref 4–12)
NEUTROPHILS # BLD AUTO: 3.99 THOUSANDS/ΜL (ref 1.85–7.62)
NEUTS SEG NFR BLD AUTO: 64 % (ref 43–75)
NRBC BLD AUTO-RTO: 0 /100 WBCS
PLATELET # BLD AUTO: 216 THOUSANDS/UL (ref 149–390)
PMV BLD AUTO: 11.1 FL (ref 8.9–12.7)
POTASSIUM SERPL-SCNC: 4.8 MMOL/L (ref 3.5–5.3)
PROT SERPL-MCNC: 7.3 G/DL (ref 6.4–8.2)
RBC # BLD AUTO: 4.77 MILLION/UL (ref 3.81–5.12)
SODIUM SERPL-SCNC: 142 MMOL/L (ref 136–145)
WBC # BLD AUTO: 6.26 THOUSAND/UL (ref 4.31–10.16)

## 2019-10-01 PROCEDURE — 80053 COMPREHEN METABOLIC PANEL: CPT | Performed by: STUDENT IN AN ORGANIZED HEALTH CARE EDUCATION/TRAINING PROGRAM

## 2019-10-01 PROCEDURE — 85025 COMPLETE CBC W/AUTO DIFF WBC: CPT

## 2019-10-01 PROCEDURE — 36415 COLL VENOUS BLD VENIPUNCTURE: CPT | Performed by: STUDENT IN AN ORGANIZED HEALTH CARE EDUCATION/TRAINING PROGRAM

## 2019-10-01 PROCEDURE — 83918 ORGANIC ACIDS TOTAL QUANT: CPT

## 2019-10-03 DIAGNOSIS — M48.061 SPINAL STENOSIS OF LUMBAR REGION, UNSPECIFIED WHETHER NEUROGENIC CLAUDICATION PRESENT: ICD-10-CM

## 2019-10-03 DIAGNOSIS — E87.6 HYPOKALEMIA: ICD-10-CM

## 2019-10-03 DIAGNOSIS — G47.00 INSOMNIA, UNSPECIFIED TYPE: ICD-10-CM

## 2019-10-03 RX ORDER — POTASSIUM CHLORIDE 20 MEQ/1
20 TABLET, EXTENDED RELEASE ORAL DAILY
Qty: 30 TABLET | Refills: 5 | Status: SHIPPED | OUTPATIENT
Start: 2019-10-03 | End: 2020-04-02

## 2019-10-03 RX ORDER — CYCLOBENZAPRINE HCL 10 MG
10 TABLET ORAL 3 TIMES DAILY PRN
Qty: 90 TABLET | Refills: 0 | Status: SHIPPED | OUTPATIENT
Start: 2019-10-03 | End: 2020-07-13 | Stop reason: SDUPTHER

## 2019-10-03 RX ORDER — ZOLPIDEM TARTRATE 10 MG/1
10 TABLET ORAL
Qty: 30 TABLET | Refills: 0 | Status: SHIPPED | OUTPATIENT
Start: 2019-10-06 | End: 2019-11-01 | Stop reason: SDUPTHER

## 2019-10-03 NOTE — TELEPHONE ENCOUNTER
Patient left voice message requesting prescription refills  Checked PDMP, Zolpidem last refiiled on 9/6/19 #30, entered new order to fill on 10/6/19   Please sign off if you approve

## 2019-10-05 LAB
METHYLMALONATE SERPL-SCNC: 528 NMOL/L (ref 0–378)
SL AMB DISCLAIMER: ABNORMAL

## 2019-10-11 DIAGNOSIS — G89.29 CHRONIC BILATERAL LOW BACK PAIN WITH BILATERAL SCIATICA: ICD-10-CM

## 2019-10-11 DIAGNOSIS — M54.42 CHRONIC BILATERAL LOW BACK PAIN WITH BILATERAL SCIATICA: ICD-10-CM

## 2019-10-11 DIAGNOSIS — M54.41 CHRONIC BILATERAL LOW BACK PAIN WITH BILATERAL SCIATICA: ICD-10-CM

## 2019-10-14 DIAGNOSIS — M54.42 CHRONIC BILATERAL LOW BACK PAIN WITH BILATERAL SCIATICA: ICD-10-CM

## 2019-10-14 DIAGNOSIS — G89.29 CHRONIC BILATERAL LOW BACK PAIN WITH BILATERAL SCIATICA: ICD-10-CM

## 2019-10-14 DIAGNOSIS — M54.41 CHRONIC BILATERAL LOW BACK PAIN WITH BILATERAL SCIATICA: ICD-10-CM

## 2019-10-14 RX ORDER — GABAPENTIN 300 MG/1
600 CAPSULE ORAL 3 TIMES DAILY
Qty: 180 CAPSULE | Refills: 5 | Status: SHIPPED | OUTPATIENT
Start: 2019-10-14 | End: 2019-10-24 | Stop reason: SDUPTHER

## 2019-10-14 RX ORDER — GABAPENTIN 300 MG/1
CAPSULE ORAL
Qty: 180 CAPSULE | Refills: 5 | Status: SHIPPED | OUTPATIENT
Start: 2019-10-14 | End: 2019-12-05 | Stop reason: SDUPTHER

## 2019-10-24 ENCOUNTER — OFFICE VISIT (OUTPATIENT)
Dept: FAMILY MEDICINE CLINIC | Facility: CLINIC | Age: 74
End: 2019-10-24

## 2019-10-24 VITALS
TEMPERATURE: 98.3 F | SYSTOLIC BLOOD PRESSURE: 110 MMHG | HEART RATE: 86 BPM | DIASTOLIC BLOOD PRESSURE: 80 MMHG | RESPIRATION RATE: 16 BRPM | BODY MASS INDEX: 33.59 KG/M2 | WEIGHT: 189.6 LBS | HEIGHT: 63 IN

## 2019-10-24 DIAGNOSIS — Z23 ENCOUNTER FOR IMMUNIZATION: ICD-10-CM

## 2019-10-24 DIAGNOSIS — G89.4 CHRONIC PAIN SYNDROME: ICD-10-CM
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
## 2019-10-24 DIAGNOSIS — Z00.00 HEALTHCARE MAINTENANCE: ICD-10-CM

## 2019-10-24 DIAGNOSIS — G89.29 CHRONIC BILATERAL LOW BACK PAIN WITH BILATERAL SCIATICA: ICD-10-CM

## 2019-10-24 DIAGNOSIS — G47.00 INSOMNIA, UNSPECIFIED TYPE: ICD-10-CM

## 2019-10-24 DIAGNOSIS — I48.0 PAROXYSMAL ATRIAL FIBRILLATION (HCC): ICD-10-CM

## 2019-10-24 DIAGNOSIS — I10 ESSENTIAL HYPERTENSION: ICD-10-CM

## 2019-10-24 DIAGNOSIS — M54.42 CHRONIC BILATERAL LOW BACK PAIN WITH BILATERAL SCIATICA: ICD-10-CM

## 2019-10-24 DIAGNOSIS — G89.29 CHRONIC PAIN OF LEFT KNEE: ICD-10-CM

## 2019-10-24 DIAGNOSIS — F41.8 DEPRESSION WITH ANXIETY: ICD-10-CM

## 2019-10-24 DIAGNOSIS — M25.562 CHRONIC PAIN OF LEFT KNEE: ICD-10-CM

## 2019-10-24 DIAGNOSIS — I71.2 THORACIC AORTIC ANEURYSM WITHOUT RUPTURE (HCC): ICD-10-CM

## 2019-10-24 DIAGNOSIS — M79.601 MUSCULOSKELETAL ARM PAIN, RIGHT: ICD-10-CM

## 2019-10-24 DIAGNOSIS — G89.4 PAIN SYNDROME, CHRONIC: Primary | ICD-10-CM

## 2019-10-24 DIAGNOSIS — M54.41 CHRONIC BILATERAL LOW BACK PAIN WITH BILATERAL SCIATICA: ICD-10-CM

## 2019-10-24 PROCEDURE — G0008 ADMIN INFLUENZA VIRUS VAC: HCPCS | Performed by: FAMILY MEDICINE

## 2019-10-24 PROCEDURE — 90662 IIV NO PRSV INCREASED AG IM: CPT | Performed by: FAMILY MEDICINE

## 2019-10-24 PROCEDURE — 99214 OFFICE O/P EST MOD 30 MIN: CPT | Performed by: FAMILY MEDICINE

## 2019-10-24 RX ORDER — GABAPENTIN 300 MG/1
900 CAPSULE ORAL 3 TIMES DAILY
Qty: 270 CAPSULE | Refills: 0 | Status: SHIPPED | OUTPATIENT
Start: 2019-10-24 | End: 2019-12-05

## 2019-10-24 RX ORDER — FUROSEMIDE 40 MG/1
40 TABLET ORAL AS NEEDED
Qty: 30 TABLET | Refills: 0 | Status: SHIPPED | OUTPATIENT
Start: 2019-10-24 | End: 2020-08-25 | Stop reason: SDUPTHER

## 2019-10-24 RX ORDER — FUROSEMIDE 40 MG/1
40 TABLET ORAL AS NEEDED
Qty: 30 TABLET | Refills: 0 | Status: SHIPPED | OUTPATIENT
Start: 2019-10-24 | End: 2019-10-24 | Stop reason: SDUPTHER

## 2019-10-24 NOTE — ASSESSMENT & PLAN NOTE
Hypertension- patients hypertension is controlled today with a Blood pressure of Blood Pressure: 110/80     on current medications  Continue current medications  Discussed DASH diet and exercise

## 2019-10-24 NOTE — ASSESSMENT & PLAN NOTE
Following with pain management -   On oxycodone 10mg BID but she says it isn't enough  She would like for me to write her pain medications as she doesn't want to continue to go to pain management and pay co-pays etc   I don't have any records from them and discussed that I would need to see records to even begin that conversation

## 2019-10-24 NOTE — ASSESSMENT & PLAN NOTE
Patient had a fall at home 3 weeks ago  Discussed further testing including echo/carotid doppler but patient does not wish to pursue this testing    She will let me know if she has further symptoms or will go to ED

## 2019-10-24 NOTE — PROGRESS NOTES
Maribel Gonzalez [id number] female MRN: [id number]    Family Medicine Follow-up Visit    ASSESSMENT/PLAN  Aortic aneurysm [facility])  Last imaging was April 2019  43 mm Ascending aortic aneurysm  Suggested to follow up with vascular surgery     Atrial fibrillation ([address] )  On eliquis  Rate controlled  Follows yearly with cardiology who offered loop recorder if she decided she would like to come off [address] maintenance  awv  mamogram  crc  bmi  Flu - done today    Hypertension    Hypertension- patients hypertension is controlled today with a Blood pressure of Blood Pressure: 110/80     on current medications  Continue current medications  Discussed DASH diet and exercise  Pain syndrome, chronic  Following with pain management -   On oxycodone 10mg BID but she says it isn't enough  She would like for me to write her pain medications as she doesn't want to continue to go to pain management and pay co-pays etc   I don't have any records from them and discussed that I would need to see records to even begin that conversation  Depression with anxiety  On venlafaxine - continue    Fall  Patient had a fall at home 3 weeks ago  Discussed further testing including echo/carotid doppler but patient does not wish to pursue this testing  She will let me know if she has further symptoms or will go to ED    Musculoskeletal arm pain, right  Pain in arm after fall  Xray ordered  Insomnia  Controlled on ambien      Future Appointments   Date Time Provider Brooke Burden   12/2/2019 11:30 AM [address]          SUBJECTIVE  CC: Fall      HPI:  Maribel Gonzalez is a 69 y o  female who presents for  Here for follow up of several concerns  afib - continue to follow with cardiology - no cp/sob  Hypertension - denies CP/SOB/HA  Compliant with medications  No known episodes of hypotension       Depression/anxiety - fair control subjectively    Chronic pain - is currently seeing pain management but has to travel and is not sure she wants to do the injections they are suggesting  She did have a fall at home  She states that she was standing in her kitchen and then was sitting on the floor  She did not lose consciousness but does not have a clear reason for falling  No head injury  This occurred 3 weeks ago  She says that she tried to call and get an appt but that I wasn't available so she didn't come in         Review of Systems    Historical Information   The patient history was reviewed as follows:    Past Medical History:   Diagnosis Date    Acute deep vein thrombosis of lower limb ([facility] [address] )     last assessed [id number]    Aortic aneurysm (HCC)     Arthritis     Atrial fibrillation (HCC)     Dislocation of hip ([facility] [address] )     last assessed [id number]    Hypertension     Psychiatric disorder     anxiety     Past Surgical History:   Procedure Laterality Date    HIP SURGERY      JOINT REPLACEMENT      KNEE ARTHROSCOPY Bilateral     x2 rt and 1 on left    TUBAL LIGATION       Family History   Problem Relation Age of Onset    Colon cancer Mother     Breast cancer Family     Thyroid cancer Family     Colon cancer Family     Hypertension Family     Thyroid disease Family     Hypertension Father     Dementia Father       Social History   Social History     Substance and Sexual Activity   Alcohol Use No    Comment: being a social drinker per [employer]     Social History     Substance and Sexual Activity   Drug Use No     Social History     Tobacco Use   Smoking Status Never Smoker   Smokeless Tobacco Never Used       Medications:     Current Outpatient Medications:     buPROPion (WELLBUTRIN XL) 150 mg 24 hr tablet, Take 1 tablet (150 mg total) by mouth daily, Disp: 90 tablet, Rfl: 3    cyclobenzaprine (FLEXERIL) 10 mg tablet, Take 1 tablet (10 mg total) by mouth 3 (three) times a day as needed for muscle spasms, Disp: 90 tablet, Rfl: 0    diclofenac sodium (VOLTAREN) 1 %, Apply 2 g topically 4 (four) times a day, Disp: 1 Tube, Rfl: 1    docusate sodium (COLACE) 100 mg capsule, Take 1 capsule (100 mg total) by mouth 2 (two) times a day, Disp: 60 capsule, Rfl: 5    ELIQUIS 5 MG, TAKE 1 TAB TWICE A DAY, Disp: 180 tablet, Rfl: 3    furosemide (LASIX) 40 mg tablet, Take 1 tablet (40 mg total) by mouth as needed (edema), Disp: 30 tablet, Rfl: 0    gabapentin (NEURONTIN) 300 mg capsule, TAKE 2 CAPSULES THREE TIMES A DAY, Disp: 180 capsule, Rfl: 5    gabapentin (NEURONTIN) 300 mg capsule, Take 3 capsules (900 mg total) by mouth 3 (three) times a day, Disp: 270 capsule, Rfl: 0    hydrocortisone 2 5 % cream, Apply topically 4 (four) times a day as needed for rash, Disp: 30 g, Rfl: 0    lidocaine (LIDODERM) 5 %, Apply 1 patch topically daily Remove & Discard patch within 12 hours or as directed by MD, Disp: 30 patch, Rfl: 0    metoprolol succinate (TOPROL-XL) 50 mg 24 hr tablet, Take 1 tablet (50 mg total) by mouth daily for 217 days, Disp: 90 tablet, Rfl: 3    potassium chloride (K-DUR,KLOR-CON) 20 mEq tablet, Take 1 tablet (20 mEq total) by mouth daily, Disp: 30 tablet, Rfl: 5    senna (SENOKOT) 8 6 mg, Take 1 tablet (8 6 mg total) by mouth daily at bedtime, Disp: 120 each, Rfl: 0    venlafaxine 37 5 mg 24 hr tablet, Take 1 tablet (37 5 mg total) by mouth daily with breakfast, Disp: 30 tablet, Rfl: 1    zolpidem (AMBIEN) 10 mg tablet, Take 1 tablet (10 mg total) by mouth daily at bedtime as needed for sleep, Disp: 30 tablet, Rfl: 0  Allergies   Allergen Reactions    Alprazolam      [facility] [id number]:  Bad side effect    Diclofenac Other (See Comments)     GI upset    Nabumetone Other (See Comments)     Metallic taste in mouth       OBJECTIVE    Vitals:   Vitals:    10/24/19 1011   BP: 110/80   Pulse: 86   Resp: 16   Temp: 98 3 °F (36 8 °C)   Weight: 86 kg (189 lb 9 6 oz)   Height: 5' 3" (1 6 m)           Physical Exam   Constitutional: She is oriented to person, place, and time  She appears well-developed and well-nourished  HENT:   Head: Normocephalic and atraumatic  Poor dentition   Cardiovascular: Normal rate, regular rhythm and normal heart sounds  Pulmonary/Chest: Effort normal and breath sounds normal    Abdominal: Soft  Bowel sounds are normal    Musculoskeletal: She exhibits tenderness  Tenderness and eccymosis over right upper arm, lateral aspect   Neurological: She is alert and oriented to person, place, and time  Skin: Skin is warm and dry  Psychiatric: She has a normal mood and affect  Her behavior is normal  Judgment normal    Nursing note and vitals reviewed

## 2019-10-24 NOTE — ASSESSMENT & PLAN NOTE
Last imaging was April 2019  43 mm Ascending aortic aneurysm    Suggested to follow up with vascular surgery

## 2019-10-24 NOTE — ASSESSMENT & PLAN NOTE
On eliquis  Rate controlled  Follows yearly with cardiology who offered loop recorder if she decided she would like to come off eliquis

## 2019-11-01 ENCOUNTER — TELEPHONE (OUTPATIENT)
Dept: FAMILY MEDICINE CLINIC | Facility: CLINIC | Age: 74
End: 2019-11-01

## 2019-11-01 DIAGNOSIS — G47.00 INSOMNIA, UNSPECIFIED TYPE: ICD-10-CM

## 2019-11-01 RX ORDER — ZOLPIDEM TARTRATE 10 MG/1
10 TABLET ORAL
Qty: 30 TABLET | Refills: 0 | Status: SHIPPED | OUTPATIENT
Start: 2019-11-01 | End: 2019-11-29 | Stop reason: SDUPTHER

## 2019-11-01 NOTE — TELEPHONE ENCOUNTER
Patient left message on voicemail requesting refill on Ambien 10 mg tabs and Cyclobenzaprine 10 mg tabs  Both requested medications were last refilled on 10/03/2019  Next Appt: 12/02/2019  Patient says that she uses the [facility]  With any question(s), please call patient at home number listed in chart  Thank you  Routing message to clinical [employer] for assistance with PDMP

## 2019-11-01 NOTE — TELEPHONE ENCOUNTER
Patient follows with Dr Roger Infante  Checked PDMP, last refill Zolpidem 10mg #30-10/4/19  Are you able to refill prescriptions?

## 2019-11-15 ENCOUNTER — APPOINTMENT (OUTPATIENT)
Dept: RADIOLOGY | Age: 74
End: 2019-11-15
Payer: COMMERCIAL

## 2019-11-15 DIAGNOSIS — M79.601 MUSCULOSKELETAL ARM PAIN, RIGHT: ICD-10-CM

## 2019-11-15 PROCEDURE — 73060 X-RAY EXAM OF HUMERUS: CPT

## 2019-11-18 ENCOUNTER — TELEPHONE (OUTPATIENT)
Dept: FAMILY MEDICINE CLINIC | Facility: CLINIC | Age: 74
End: 2019-11-18

## 2019-11-18 NOTE — TELEPHONE ENCOUNTER
Patient called to get result of xray  Also states she would like Dr Krista Velásquez to prescribe Oxycodone for arm pain, so she can drive to pain managment tomorrow

## 2019-11-20 NOTE — TELEPHONE ENCOUNTER
Looks like chronic changes and that she needs dedicated shoulder imaging  I can't quite understand the last line so i'm going to call radiology and see what they meant  I cannot write her for oxycodone as it would violate her agreement with pain management

## 2019-11-21 NOTE — TELEPHONE ENCOUNTER
Called patient, reviewed results available  Have you heard from radiology in regards to your questions?

## 2019-11-22 DIAGNOSIS — M25.511 CHRONIC RIGHT SHOULDER PAIN: Primary | ICD-10-CM

## 2019-11-22 DIAGNOSIS — G89.29 CHRONIC RIGHT SHOULDER PAIN: Primary | ICD-10-CM

## 2019-11-22 NOTE — TELEPHONE ENCOUNTER
I haven't - but I am going to order dedicated shoulder xray first and then see if I can get further information

## 2019-11-26 ENCOUNTER — APPOINTMENT (OUTPATIENT)
Dept: RADIOLOGY | Age: 74
End: 2019-11-26
Payer: COMMERCIAL

## 2019-11-26 DIAGNOSIS — M25.511 CHRONIC RIGHT SHOULDER PAIN: ICD-10-CM

## 2019-11-26 DIAGNOSIS — G89.29 CHRONIC RIGHT SHOULDER PAIN: ICD-10-CM

## 2019-11-26 PROCEDURE — 73030 X-RAY EXAM OF SHOULDER: CPT

## 2019-11-29 DIAGNOSIS — G47.00 INSOMNIA, UNSPECIFIED TYPE: ICD-10-CM

## 2019-12-02 RX ORDER — ZOLPIDEM TARTRATE 10 MG/1
TABLET ORAL
Qty: 30 TABLET | Refills: 0 | Status: SHIPPED | OUTPATIENT
Start: 2019-12-02 | End: 2019-12-30 | Stop reason: SDUPTHER

## 2019-12-03 ENCOUNTER — TELEPHONE (OUTPATIENT)
Dept: FAMILY MEDICINE CLINIC | Facility: CLINIC | Age: 74
End: 2019-12-03

## 2019-12-03 NOTE — TELEPHONE ENCOUNTER
Patient had an xray done on 11/26 and was told we would call with the results  She missed her appointment yesterday with Dr Gilma Fernandez

## 2019-12-05 ENCOUNTER — OFFICE VISIT (OUTPATIENT)
Dept: FAMILY MEDICINE CLINIC | Facility: CLINIC | Age: 74
End: 2019-12-05

## 2019-12-05 VITALS
SYSTOLIC BLOOD PRESSURE: 122 MMHG | TEMPERATURE: 96.5 F | WEIGHT: 191.6 LBS | HEIGHT: 63 IN | RESPIRATION RATE: 18 BRPM | DIASTOLIC BLOOD PRESSURE: 70 MMHG | BODY MASS INDEX: 33.95 KG/M2 | HEART RATE: 80 BPM

## 2019-12-05 DIAGNOSIS — G89.29 CHRONIC BILATERAL LOW BACK PAIN WITH BILATERAL SCIATICA: ICD-10-CM

## 2019-12-05 DIAGNOSIS — G89.29 CHRONIC RIGHT SHOULDER PAIN: Primary | ICD-10-CM

## 2019-12-05 DIAGNOSIS — M79.601 MUSCULOSKELETAL ARM PAIN, RIGHT: ICD-10-CM

## 2019-12-05 DIAGNOSIS — F11.220 OPIOID DEPENDENCE WITH UNCOMPLICATED INTOXICATION (HCC): ICD-10-CM

## 2019-12-05 DIAGNOSIS — M54.42 CHRONIC BILATERAL LOW BACK PAIN WITH BILATERAL SCIATICA: ICD-10-CM

## 2019-12-05 DIAGNOSIS — M25.511 CHRONIC RIGHT SHOULDER PAIN: Primary | ICD-10-CM

## 2019-12-05 DIAGNOSIS — M54.41 CHRONIC BILATERAL LOW BACK PAIN WITH BILATERAL SCIATICA: ICD-10-CM

## 2019-12-05 PROCEDURE — 99213 OFFICE O/P EST LOW 20 MIN: CPT | Performed by: FAMILY MEDICINE

## 2019-12-05 RX ORDER — LIDOCAINE 50 MG/G
1 PATCH TOPICAL DAILY
Qty: 30 PATCH | Refills: 2 | Status: SHIPPED | OUTPATIENT
Start: 2019-12-05 | End: 2021-11-26 | Stop reason: SDUPTHER

## 2019-12-05 RX ORDER — GABAPENTIN 300 MG/1
900 CAPSULE ORAL 3 TIMES DAILY
Qty: 270 CAPSULE | Refills: 5 | Status: SHIPPED | OUTPATIENT
Start: 2019-12-05 | End: 2020-06-18 | Stop reason: SDUPTHER

## 2019-12-05 RX ORDER — OXYCODONE HYDROCHLORIDE 10 MG/1
TABLET ORAL
Refills: 0 | COMMUNITY
Start: 2019-11-21 | End: 2021-10-01 | Stop reason: SDUPTHER

## 2019-12-05 NOTE — ASSESSMENT & PLAN NOTE
Continued to discuss this with patient and that she needs to continue follow up with pain management as she is not stable on a dose and does continue to have increased pain  No further opioids from this office

## 2019-12-05 NOTE — ASSESSMENT & PLAN NOTE
Xray showed arthritis  - still potential for rotator cuff involvement  Referred to orthopedics  [facility] as well if she chooses  She has voltaren gel that she will try as well as a lidocaine patch for now

## 2019-12-05 NOTE — PROGRESS NOTES
Percy Rodgers 1945 female MRN: [id number]    Family Medicine Follow-up Visit    ASSESSMENT/PLAN  Opioid dependence [facility])  Continued to discuss this with patient and that she needs to continue follow up with pain management as she is not stable on a dose and does continue to have increased pain  No further opioids from this office  Musculoskeletal arm pain, right  Xray showed arthritis  - still potential for rotator cuff involvement  Referred to orthopedics  She [facility]o [facility] but [facility] as well if she chooses  She has voltaren gel that she will try as well as a lidocaine patch for now  Future Appointments   Date Time Provider Brooke Burden   2/20/2020 10:50 AM Brandon [facility] Leroy          SUBJECTIVE  CC: Shoulder Pain and Results      HPI:  Percy Rodgers is a 69 y o  female who presents for  Had an abnormal xray of her humerus showing some abnormality in the shoulder joint and so dedicated shoulder xray was ordered  Shoulder xray showed shoulder/glenohumeral severe osteoarthritis which is likely the cause of her shoulder pain  She did start having the pain after an injury  She also notes that her arm/humerus pain is essentially resolved now  Again requested us to refill pain medications so that she can stop seeing comprehensive spine and pain  I declined  She mentions that she knows I prescribe some pain medication for people that she knows and she doesn't know why I won't do this for her  I cannot comment on the care of another patient but discussed that all situations are different  Review of Systems   Constitutional: Negative for activity change, chills, fever and unexpected weight change  HENT: Negative for congestion  Eyes: Negative for visual disturbance  Respiratory: Negative for cough, chest tightness, shortness of breath and wheezing  Cardiovascular: Negative for chest pain     Gastrointestinal: Negative for abdominal pain, diarrhea and nausea  Endocrine: Negative for cold intolerance and heat intolerance  Musculoskeletal: Negative for arthralgias and myalgias  Skin: Negative for color change  Neurological: Negative for dizziness  Psychiatric/Behavioral: Negative for agitation, dysphoric mood and sleep disturbance         Historical Information   The patient history was reviewed as follows:    Past Medical History:   Diagnosis Date    Acute deep vein thrombosis of lower limb ([facility] [address] )     last assessed [id number]    Aortic aneurysm (HCC)     Arthritis     Atrial fibrillation ([facility] [address] )     Dislocation of hip ([facility] [address] )     last assessed [id number]    Hypertension     Psychiatric disorder     anxiety     Past Surgical History:   Procedure Laterality Date    HIP SURGERY      JOINT REPLACEMENT      KNEE ARTHROSCOPY Bilateral     x2 rt and 1 on left    TUBAL LIGATION       Family History   Problem Relation Age of Onset    Colon cancer Mother     Breast cancer Family     Thyroid cancer Family     Colon cancer Family     Hypertension Family     Thyroid disease Family     Hypertension Father     Dementia Father       Social History   Social History     Substance and Sexual Activity   Alcohol Use No    Comment: being a social drinker per [employer]     Social History     Substance and Sexual Activity   Drug Use No     Social History     Tobacco Use   Smoking Status Never Smoker   Smokeless Tobacco Never Used       Medications:     Current Outpatient Medications:     buPROPion (WELLBUTRIN XL) 150 mg 24 hr tablet, Take 1 tablet (150 mg total) by mouth daily, Disp: 90 tablet, Rfl: 3    cyclobenzaprine (FLEXERIL) 10 mg tablet, Take 1 tablet (10 mg total) by mouth 3 (three) times a day as needed for muscle spasms, Disp: 90 tablet, Rfl: 0    diclofenac sodium (VOLTAREN) 1 %, Apply 2 g topically 4 (four) times a day, Disp: 1 Tube, Rfl: 1    docusate sodium (COLACE) 100 mg capsule, Take 1 capsule (100 mg total) by mouth 2 (two) times a day, Disp: 60 capsule, Rfl: 5    ELIQUIS 5 MG, TAKE 1 TAB TWICE A DAY, Disp: 180 tablet, Rfl: 3    furosemide (LASIX) 40 mg tablet, Take 1 tablet (40 mg total) by mouth as needed (edema), Disp: 30 tablet, Rfl: 0    gabapentin (NEURONTIN) 300 mg capsule, Take 3 capsules (900 mg total) by mouth 3 (three) times a day, Disp: 270 capsule, Rfl: 5    hydrocortisone 2 5 % cream, Apply topically 4 (four) times a day as needed for rash, Disp: 30 g, Rfl: 0    metoprolol succinate (TOPROL-XL) 50 mg 24 hr tablet, Take 1 tablet (50 mg total) by mouth daily for 217 days, Disp: 90 tablet, Rfl: 3    potassium chloride (K-DUR,KLOR-CON) 20 mEq tablet, Take 1 tablet (20 mEq total) by mouth daily, Disp: 30 tablet, Rfl: 5    zolpidem (AMBIEN) 10 mg tablet, TAKE 1 TAB AT BEDTIME AS NEEDED FOR SLEEP, Disp: 30 tablet, Rfl: 0    lidocaine (LIDODERM) 5 %, Apply 1 patch topically daily Remove & Discard patch within 12 hours or as directed by MD, Disp: 30 patch, Rfl: 2    oxyCODONE (ROXICODONE) 10 MG TABS, TAKE 1 TAB AS NEEDED TWO TO THREE TIMES A DAY, Disp: , Rfl: 0  Allergies   Allergen Reactions    Alprazolam      Annotation - [id number]: Bad side effect    Diclofenac Other (See Comments)     GI upset    Nabumetone Other (See Comments)     Metallic taste in mouth       OBJECTIVE    Vitals:   Vitals:    12/05/19 1020   BP: 122/70   Pulse: 80   Resp: 18   Temp: (!) 96 5 °F (35 8 °C)   Weight: 86 9 kg (191 lb 9 6 oz)   Height: 5' 3" (1 6 m)           Physical Exam   Constitutional: She is oriented to person, place, and time  She appears well-developed and well-nourished  HENT:   Head: Normocephalic and atraumatic  Cardiovascular: Normal rate, regular rhythm and normal heart sounds  Pulmonary/Chest: Effort normal and breath sounds normal    Abdominal: Soft  Bowel sounds are normal    Musculoskeletal: She exhibits tenderness and deformity     Right shoulder has restriction in ROM due to pain with both active and passive motion with extension, abduction and external rotation  Patient is very apprehensive to move the arm  Neurological: She is alert and oriented to person, place, and time  Skin: Skin is warm and dry  Psychiatric: She has a normal mood and affect  Her behavior is normal  Judgment normal    Nursing note and vitals reviewed

## 2019-12-30 DIAGNOSIS — G47.00 INSOMNIA, UNSPECIFIED TYPE: ICD-10-CM

## 2019-12-30 RX ORDER — ZOLPIDEM TARTRATE 10 MG/1
TABLET ORAL
Qty: 30 TABLET | Refills: 0 | Status: SHIPPED | OUTPATIENT
Start: 2019-12-30 | End: 2020-01-29 | Stop reason: SDUPTHER

## 2020-01-03 ENCOUNTER — TELEPHONE (OUTPATIENT)
Dept: FAMILY MEDICINE CLINIC | Facility: CLINIC | Age: 75
End: 2020-01-03

## 2020-01-06 ENCOUNTER — OFFICE VISIT (OUTPATIENT)
Dept: OBGYN CLINIC | Facility: HOSPITAL | Age: 75
End: 2020-01-06
Payer: COMMERCIAL

## 2020-01-06 VITALS
WEIGHT: 186 LBS | DIASTOLIC BLOOD PRESSURE: 81 MMHG | HEART RATE: 96 BPM | BODY MASS INDEX: 32.96 KG/M2 | SYSTOLIC BLOOD PRESSURE: 116 MMHG | HEIGHT: 63 IN

## 2020-01-06 DIAGNOSIS — M19.011 PRIMARY OSTEOARTHRITIS OF RIGHT SHOULDER: Primary | ICD-10-CM

## 2020-01-06 PROCEDURE — 20610 DRAIN/INJ JOINT/BURSA W/O US: CPT | Performed by: ORTHOPAEDIC SURGERY

## 2020-01-06 PROCEDURE — 99203 OFFICE O/P NEW LOW 30 MIN: CPT | Performed by: ORTHOPAEDIC SURGERY

## 2020-01-06 RX ORDER — BUPIVACAINE HYDROCHLORIDE 2.5 MG/ML
2 INJECTION, SOLUTION INFILTRATION; PERINEURAL
Status: COMPLETED | OUTPATIENT
Start: 2020-01-06 | End: 2020-01-06

## 2020-01-06 RX ORDER — BETAMETHASONE SODIUM PHOSPHATE AND BETAMETHASONE ACETATE 3; 3 MG/ML; MG/ML
6 INJECTION, SUSPENSION INTRA-ARTICULAR; INTRALESIONAL; INTRAMUSCULAR; SOFT TISSUE
Status: COMPLETED | OUTPATIENT
Start: 2020-01-06 | End: 2020-01-06

## 2020-01-06 RX ADMIN — BETAMETHASONE SODIUM PHOSPHATE AND BETAMETHASONE ACETATE 6 MG: 3; 3 INJECTION, SUSPENSION INTRA-ARTICULAR; INTRALESIONAL; INTRAMUSCULAR; SOFT TISSUE at 10:41

## 2020-01-06 RX ADMIN — BUPIVACAINE HYDROCHLORIDE 2 ML: 2.5 INJECTION, SOLUTION INFILTRATION; PERINEURAL at 10:41

## 2020-01-06 NOTE — PROGRESS NOTES
Linus White MD personally examined the patient and reviewed the history provided  I agree with the note and the assessment and plan by Zee Saldivar PA-C  Assessment  Diagnoses and all orders for this visit:    Primary osteoarthritis of right shoulder  -     Ambulatory referral to Orthopedic Surgery    Other orders  -     Large joint arthrocentesis        Discussion and Plan:    1  Steroid injection right shoulder (glenohumeral joint) for pain relief   2  Activities to tolerance  Modify activities until symptoms improve  3  Follow up as needed  If symptoms return, injection can be repeated (in 3-4 months)  If pain does not improve, then further discussion about surgical treatment options are indicated (total shoulder)  4  Tylenol PRN  5  Ice/Heat PRN    Subjective:   Patient ID: Yoselyn Wilson is a 69 y o  female      Gar Hires presents to the office with Chief Complaint of Right shoulder pain  She denies any pain in the right shoulder prior to fall in October 2019  She states when she fell she should got akiko  Pain was tolerable at the time, but has worsened  She denies any treatment for the right shoulder since her injury  She takes oxycodone 10mg for her back  She denies improvement with rest  She has pain with motion  She has pain when she tries to sleep  She admits to crepitation with motion and compares her pain to the pain she has in her arthritis left knee  She denies numbness and tingling  The following portions of the patient's history were reviewed and updated as appropriate: allergies, current medications, past family history, past medical history, past social history, past surgical history and problem list     Review of Systems   Constitutional: Negative for chills and fever  HENT: Negative for hearing loss  Eyes: Negative for visual disturbance  Respiratory: Negative for shortness of breath  Cardiovascular: Negative for chest pain     Gastrointestinal: Negative for abdominal pain  Musculoskeletal: Positive for arthralgias, back pain and myalgias  As reviewed in the HPI   Skin: Negative for rash  Neurological:        As reviewed in the HPI   Psychiatric/Behavioral: Negative for agitation  Objective:  /81   Pulse 96   Ht 5' 3" (1 6 m)   Wt 84 4 kg (186 lb)   BMI 32 95 kg/m²       Right Shoulder Exam     Tenderness   Right shoulder tenderness location: deltoid  Range of Motion   Passive abduction: normal   External rotation: normal   Forward flexion: 90 (patient refuses to move further)     Muscle Strength   External rotation: 4/5   Supraspinatus: 4/5     Tests   Hernandez test: positive  Impingement: positive  Drop arm: negative    Other   Erythema: absent  Sensation: normal  Pulse: present    Comments:  Difficult to get patient to participate in exam             Physical Exam   Constitutional: She is oriented to person, place, and time  She appears well-developed and well-nourished  HENT:   Head: Normocephalic  Eyes: EOM are normal    Neck: Normal range of motion  Pulmonary/Chest: Breath sounds normal  She has no wheezes  Neurological: She is alert and oriented to person, place, and time  Skin: Skin is warm and dry  Psychiatric: She has a normal mood and affect  Her behavior is normal  Judgment and thought content normal          I have personally reviewed pertinent films in PACS and my interpretation is as follows      3 views right shoulder: severe glenohumeral OA, no proximal humeral migration    Large joint arthrocentesis: R glenohumeral  Date/Time: 1/6/2020 10:41 AM  Consent given by: patient  Timeout: Immediately prior to procedure a time out was called to verify the correct patient, procedure, equipment, support staff and site/side marked as required   Supporting Documentation  Indications: pain   Procedure Details  Location: shoulder - R glenohumeral  Preparation: Patient was prepped and draped in the usual sterile fashion  Needle size: 22 G  Ultrasound guidance: no  Approach: posterior  Medications administered: 6 mg betamethasone acetate-betamethasone sodium phosphate 6 (3-3) mg/mL; 2 mL bupivacaine 0 25 %    Patient tolerance: patient tolerated the procedure well with no immediate complications  Dressing:  Sterile dressing applied

## 2020-01-06 NOTE — PATIENT INSTRUCTIONS

## 2020-01-28 ENCOUNTER — TELEPHONE (OUTPATIENT)
Dept: FAMILY MEDICINE CLINIC | Facility: CLINIC | Age: 75
End: 2020-01-28

## 2020-01-28 DIAGNOSIS — G47.00 INSOMNIA, UNSPECIFIED TYPE: ICD-10-CM

## 2020-01-29 RX ORDER — ZOLPIDEM TARTRATE 10 MG/1
10 TABLET ORAL
Qty: 30 TABLET | Refills: 0 | Status: SHIPPED | OUTPATIENT
Start: 2020-01-29 | End: 2020-02-25 | Stop reason: SDUPTHER

## 2020-02-18 NOTE — PROGRESS NOTES
Assessment/Plan: Aortic aneurysm ([facility] )  43mm in 2019, compared to 42 in 2014  >5cm would warrant referral   She is also seeing cardiology    Vitamin D deficiency  Recheck Vit D    Atrial fibrillation ([facility] )  Saw cardiology  Is currently rate controlled and is on [employer] maintenance  AWV suggested  She will consider    Hypertension    Hypertension- patients hypertension is controlled today with a Blood pressure of Blood Pressure: 130/82     on current medications  Continue current medications  Discussed DASH diet and exercise  Eczema of right external ear  Continue hydrocortisone as needed  Constipation  OK at this time  Depression  Seems improved overall  She is on wellbutrin  Seems well controlled at this time  Right shoulder pain  Saw orthopedics and pain management and had  Steroid injection but did not feel comfortable with either of these offices  to referred to a different ortho group at her request         Diagnoses and all orders for this visit:    Opioid dependence with uncomplicated intoxication ([facility] )    Depression, unspecified depression type    Paroxysmal atrial fibrillation (HCC)  -     CBC and differential; Future  -     Comprehensive metabolic panel; Future    Healthcare maintenance    Age-related osteoporosis without current pathological fracture  -     cyclobenzaprine (FLEXERIL) 10 mg tablet; Take 1 tablet (10 mg total) by mouth 3 (three) times a day as needed for muscle spasms    Essential hypertension  -     CBC and differential; Future  -     Comprehensive metabolic panel; Future  -     Lipid panel; Future    Anxiety    Drug-induced constipation    Thoracic aortic aneurysm without rupture (HCC)    Vitamin D deficiency  -     Vitamin D 25 hydroxy;  Future    Eczema of right external ear  -     hydrocortisone 2 5 % cream; Apply topically 4 (four) times a day as needed for rash    Chronic right shoulder pain  -     Ambulatory referral to Orthopedic Surgery; Future    Other orders  -     Cancel: CBC and differential; Future  -     Cancel: Comprehensive metabolic panel; Future  -     Cancel: Lipid panel; Future        BMI Counseling: Body mass index is 33 9 kg/m²  The BMI is below normal  No BMI follow-up plan is appropriate  Patient is 74 years old and weight reduction/weight gain would further complicate their underlying physical disability  Subjective:      Patient ID: Sabina Peterson is a 69 y o  female  Here for follow up    She had an injection in her right shoulder with steroids and it didn't really help  She will follow up with ortho regarding this  Her back is the same  She does have sort of widespread arthritis  Is wondering why she's been on wellbutrin for many years    She does have tingling in her feet which is worse when she elevates her feet  Has been on gabapentin for several months with no resolution  No claudication type symptoms but has trouble walking a distance due to her back pain  The following portions of the patient's history were reviewed and updated as appropriate:   She  has a past medical history of Acute deep vein thrombosis of lower limb ([address] ), Aortic aneurysm ([address] ), Arthritis, Atrial fibrillation ([address] ), Dislocation of hip ([address] ), Hypertension, and Psychiatric disorder    She   Patient Active Problem List    Diagnosis Date Noted    Primary osteoarthritis of right shoulder 01/06/2020    Fall 10/24/2019    Musculoskeletal arm pain, right 10/24/2019    Balance disorder 03/14/2019    Eczema of right external ear 11/27/2018    Constipation 11/26/2018    Healthcare maintenance 06/28/2018    Atrial fibrillation ([address] ) 10/11/2017    Chronic pain 10/11/2017    Age-related osteoporosis without current pathological fracture 10/11/2017    Hypertension 10/11/2017    Depression 10/11/2017    Anxiety 10/11/2017    Depression with anxiety 09/07/2016    Right shoulder pain 02/18/2016    Opioid dependence ([address] ) 01/21/2016  Left knee pain 04/24/2015    Aortic aneurysm ([facility] ) 03/17/2014    Spondylosis of lumbar region without myelopathy or radiculopathy 05/09/2013    Myofascial pain syndrome 01/11/2013    Insomnia 12/19/2012    Lumbar canal stenosis 06/15/2012    Osteoarthritis 06/15/2012    Vitamin D deficiency 06/15/2012    Pain syndrome, chronic 05/16/2012     Current Outpatient Medications   Medication Sig Dispense Refill    buPROPion (WELLBUTRIN XL) 150 mg 24 hr tablet Take 1 tablet (150 mg total) by mouth daily 90 tablet 3    cyclobenzaprine (FLEXERIL) 10 mg tablet Take 1 tablet (10 mg total) by mouth 3 (three) times a day as needed for muscle spasms 90 tablet 0    cyclobenzaprine (FLEXERIL) 10 mg tablet Take 1 tablet (10 mg total) by mouth 3 (three) times a day as needed for muscle spasms 20 tablet 0    diclofenac sodium (VOLTAREN) 1 % Apply 2 g topically 4 (four) times a day 1 Tube 1    ELIQUIS 5 MG TAKE 1 TAB TWICE A  tablet 3    furosemide (LASIX) 40 mg tablet Take 1 tablet (40 mg total) by mouth as needed (edema) 30 tablet 0    gabapentin (NEURONTIN) 300 mg capsule Take 3 capsules (900 mg total) by mouth 3 (three) times a day 270 capsule 5    hydrocortisone 2 5 % cream Apply topically 4 (four) times a day as needed for rash 30 g 0    lidocaine (LIDODERM) 5 % Apply 1 patch topically daily Remove & Discard patch within 12 hours or as directed by MD 30 patch 2    metoprolol succinate (TOPROL-XL) 50 mg 24 hr tablet Take 1 tablet (50 mg total) by mouth daily for 217 days 90 tablet 3    oxyCODONE (ROXICODONE) 10 MG TABS TAKE 1 TAB AS NEEDED TWO TO THREE TIMES A DAY  0    potassium chloride (K-DUR,KLOR-CON) 20 mEq tablet Take 1 tablet (20 mEq total) by mouth daily 30 tablet 5    zolpidem (AMBIEN) 10 mg tablet Take 1 tablet (10 mg total) by mouth daily at bedtime as needed for sleep 30 tablet 0     No current facility-administered medications for this visit        She is allergic to alprazolam; diclofenac; and nabumetone       Review of Systems   Constitutional: Negative for activity change, chills, fever and unexpected weight change  HENT: Negative for congestion  Eyes: Negative for visual disturbance  Respiratory: Negative for cough, chest tightness, shortness of breath and wheezing  Cardiovascular: Negative for chest pain  Gastrointestinal: Negative for abdominal pain, diarrhea and nausea  Endocrine: Negative for cold intolerance and heat intolerance  Musculoskeletal: Negative for arthralgias and myalgias  Skin: Negative for color change  Neurological: Negative for dizziness  Psychiatric/Behavioral: Negative for agitation, dysphoric mood and sleep disturbance  Objective:      /82   Pulse 82   Temp 97 5 °F (36 4 °C)   Resp 18   Ht 5' 3" (1 6 m)   Wt 86 8 kg (191 lb 6 4 oz)   BMI 33 90 kg/m²          Physical Exam   Constitutional: She is oriented to person, place, and time  She appears well-developed and well-nourished  HENT:   Head: Normocephalic and atraumatic  Cardiovascular: Normal rate, regular rhythm and normal heart sounds  Pulmonary/Chest: Effort normal and breath sounds normal    Abdominal: Soft  Bowel sounds are normal    Musculoskeletal: Normal range of motion  Neurological: She is alert and oriented to person, place, and time  Skin: Skin is warm and dry  Psychiatric: She has a normal mood and affect  Her behavior is normal  Judgment normal    Nursing note and vitals reviewed

## 2020-02-20 ENCOUNTER — OFFICE VISIT (OUTPATIENT)
Dept: FAMILY MEDICINE CLINIC | Facility: CLINIC | Age: 75
End: 2020-02-20

## 2020-02-20 VITALS
RESPIRATION RATE: 18 BRPM | WEIGHT: 191.4 LBS | BODY MASS INDEX: 33.91 KG/M2 | HEART RATE: 82 BPM | TEMPERATURE: 97.5 F | SYSTOLIC BLOOD PRESSURE: 130 MMHG | DIASTOLIC BLOOD PRESSURE: 82 MMHG | HEIGHT: 63 IN

## 2020-02-20 DIAGNOSIS — G89.29 CHRONIC RIGHT SHOULDER PAIN: ICD-10-CM

## 2020-02-20 DIAGNOSIS — I10 ESSENTIAL HYPERTENSION: ICD-10-CM

## 2020-02-20 DIAGNOSIS — I48.0 PAROXYSMAL ATRIAL FIBRILLATION (HCC): ICD-10-CM

## 2020-02-20 DIAGNOSIS — F11.220 OPIOID DEPENDENCE WITH UNCOMPLICATED INTOXICATION (HCC): Primary | ICD-10-CM

## 2020-02-20 DIAGNOSIS — F41.9 ANXIETY: ICD-10-CM

## 2020-02-20 DIAGNOSIS — M81.0 AGE-RELATED OSTEOPOROSIS WITHOUT CURRENT PATHOLOGICAL FRACTURE: ICD-10-CM

## 2020-02-20 DIAGNOSIS — I71.2 THORACIC AORTIC ANEURYSM WITHOUT RUPTURE (HCC): ICD-10-CM

## 2020-02-20 DIAGNOSIS — H60.541 ECZEMA OF RIGHT EXTERNAL EAR: ICD-10-CM

## 2020-02-20 DIAGNOSIS — K59.03 DRUG-INDUCED CONSTIPATION: ICD-10-CM

## 2020-02-20 DIAGNOSIS — Z00.00 HEALTHCARE MAINTENANCE: ICD-10-CM

## 2020-02-20 DIAGNOSIS — E55.9 VITAMIN D DEFICIENCY: ICD-10-CM

## 2020-02-20 DIAGNOSIS — M25.511 CHRONIC RIGHT SHOULDER PAIN: ICD-10-CM

## 2020-02-20 DIAGNOSIS — F32.A DEPRESSION, UNSPECIFIED DEPRESSION TYPE: ICD-10-CM

## 2020-02-20 PROBLEM — F43.81 GRIEF REACTION WITH PROLONGED BEREAVEMENT: Status: RESOLVED | Noted: 2017-10-11 | Resolved: 2020-02-20

## 2020-02-20 PROBLEM — F43.29 GRIEF REACTION WITH PROLONGED BEREAVEMENT: Status: RESOLVED | Noted: 2017-10-11 | Resolved: 2020-02-20

## 2020-02-20 PROBLEM — R42 DIZZINESS: Status: RESOLVED | Noted: 2017-10-11 | Resolved: 2020-02-20

## 2020-02-20 PROCEDURE — 1036F TOBACCO NON-USER: CPT | Performed by: FAMILY MEDICINE

## 2020-02-20 PROCEDURE — 3079F DIAST BP 80-89 MM HG: CPT | Performed by: FAMILY MEDICINE

## 2020-02-20 PROCEDURE — 3075F SYST BP GE 130 - 139MM HG: CPT | Performed by: FAMILY MEDICINE

## 2020-02-20 PROCEDURE — 4040F PNEUMOC VAC/ADMIN/RCVD: CPT | Performed by: FAMILY MEDICINE

## 2020-02-20 PROCEDURE — 99214 OFFICE O/P EST MOD 30 MIN: CPT | Performed by: FAMILY MEDICINE

## 2020-02-20 PROCEDURE — 3008F BODY MASS INDEX DOCD: CPT | Performed by: FAMILY MEDICINE

## 2020-02-20 RX ORDER — CYCLOBENZAPRINE HCL 10 MG
10 TABLET ORAL 3 TIMES DAILY PRN
Qty: 20 TABLET | Refills: 0 | Status: SHIPPED | OUTPATIENT
Start: 2020-02-20 | End: 2020-03-26 | Stop reason: SDUPTHER

## 2020-02-21 NOTE — ASSESSMENT & PLAN NOTE
Hypertension- patients hypertension is controlled today with a Blood pressure of Blood Pressure: 130/82     on current medications  Continue current medications  Discussed DASH diet and exercise

## 2020-02-21 NOTE — ASSESSMENT & PLAN NOTE
Saw orthopedics and pain management and had  Steroid injection but did not feel comfortable with either of these offices  to referred to a different ortho group at her request

## 2020-02-25 DIAGNOSIS — H60.541 ECZEMA OF RIGHT EXTERNAL EAR: ICD-10-CM

## 2020-02-25 DIAGNOSIS — G47.00 INSOMNIA, UNSPECIFIED TYPE: ICD-10-CM

## 2020-02-25 RX ORDER — ZOLPIDEM TARTRATE 10 MG/1
10 TABLET ORAL
Qty: 30 TABLET | Refills: 0 | Status: SHIPPED | OUTPATIENT
Start: 2020-02-25 | End: 2020-03-23 | Stop reason: SDUPTHER

## 2020-03-02 NOTE — TELEPHONE ENCOUNTER
Saw Dr Ambrose Read today - this was done  Thanks 
Voice message requesting refill of Zolpidem   Checked PDMP, last refill 9/1/18, patient scheduled to see you on 9/27/18
Spontaneous, unlabored and symmetrical

## 2020-03-23 ENCOUNTER — TELEPHONE (OUTPATIENT)
Dept: FAMILY MEDICINE CLINIC | Facility: CLINIC | Age: 75
End: 2020-03-23

## 2020-03-23 DIAGNOSIS — G47.00 INSOMNIA, UNSPECIFIED TYPE: ICD-10-CM

## 2020-03-23 RX ORDER — ZOLPIDEM TARTRATE 10 MG/1
10 TABLET ORAL
Qty: 30 TABLET | Refills: 0 | Status: SHIPPED | OUTPATIENT
Start: 2020-03-23 | End: 2020-04-22 | Stop reason: SDUPTHER

## 2020-03-23 NOTE — TELEPHONE ENCOUNTER
Voice message requesting refill of Cyclobenzaprine and Zolpidem 10mg  Checked PDMP, last Zolpidem refill 2/25/20 #30

## 2020-03-26 ENCOUNTER — TELEPHONE (OUTPATIENT)
Dept: FAMILY MEDICINE CLINIC | Facility: CLINIC | Age: 75
End: 2020-03-26

## 2020-03-26 DIAGNOSIS — M81.0 AGE-RELATED OSTEOPOROSIS WITHOUT CURRENT PATHOLOGICAL FRACTURE: ICD-10-CM

## 2020-03-26 RX ORDER — CYCLOBENZAPRINE HCL 10 MG
10 TABLET ORAL 3 TIMES DAILY PRN
Qty: 20 TABLET | Refills: 0 | Status: SHIPPED | OUTPATIENT
Start: 2020-03-26 | End: 2020-05-13 | Stop reason: SDUPTHER

## 2020-04-02 DIAGNOSIS — E87.6 HYPOKALEMIA: ICD-10-CM

## 2020-04-02 RX ORDER — POTASSIUM CHLORIDE 20 MEQ/1
TABLET, EXTENDED RELEASE ORAL
Qty: 30 TABLET | Refills: 5 | Status: SHIPPED | OUTPATIENT
Start: 2020-04-02 | End: 2020-10-09

## 2020-04-22 DIAGNOSIS — G47.00 INSOMNIA, UNSPECIFIED TYPE: ICD-10-CM

## 2020-04-22 RX ORDER — ZOLPIDEM TARTRATE 10 MG/1
10 TABLET ORAL
Qty: 30 TABLET | Refills: 0 | Status: SHIPPED | OUTPATIENT
Start: 2020-04-22 | End: 2020-05-19 | Stop reason: SDUPTHER

## 2020-05-13 ENCOUNTER — TELEMEDICINE (OUTPATIENT)
Dept: CARDIOLOGY CLINIC | Facility: CLINIC | Age: 75
End: 2020-05-13
Payer: COMMERCIAL

## 2020-05-13 DIAGNOSIS — I48.0 PAROXYSMAL ATRIAL FIBRILLATION (HCC): Primary | ICD-10-CM

## 2020-05-13 DIAGNOSIS — I50.32 CHRONIC HEART FAILURE WITH PRESERVED EJECTION FRACTION (HCC): ICD-10-CM

## 2020-05-13 DIAGNOSIS — I10 ESSENTIAL HYPERTENSION: ICD-10-CM

## 2020-05-13 PROCEDURE — G2012 BRIEF CHECK IN BY MD/QHP: HCPCS | Performed by: STUDENT IN AN ORGANIZED HEALTH CARE EDUCATION/TRAINING PROGRAM

## 2020-05-19 ENCOUNTER — TELEPHONE (OUTPATIENT)
Dept: FAMILY MEDICINE CLINIC | Facility: CLINIC | Age: 75
End: 2020-05-19

## 2020-05-19 DIAGNOSIS — G47.00 INSOMNIA, UNSPECIFIED TYPE: ICD-10-CM

## 2020-05-19 RX ORDER — ZOLPIDEM TARTRATE 10 MG/1
10 TABLET ORAL
Qty: 30 TABLET | Refills: 0 | Status: SHIPPED | OUTPATIENT
Start: 2020-05-22 | End: 2020-06-18 | Stop reason: SDUPTHER

## 2020-05-27 ENCOUNTER — TELEPHONE (OUTPATIENT)
Dept: FAMILY MEDICINE CLINIC | Facility: CLINIC | Age: 75
End: 2020-05-27

## 2020-05-28 ENCOUNTER — OFFICE VISIT (OUTPATIENT)
Dept: FAMILY MEDICINE CLINIC | Facility: CLINIC | Age: 75
End: 2020-05-28

## 2020-05-28 VITALS
RESPIRATION RATE: 14 BRPM | HEIGHT: 63 IN | TEMPERATURE: 97.4 F | SYSTOLIC BLOOD PRESSURE: 128 MMHG | BODY MASS INDEX: 33.91 KG/M2 | DIASTOLIC BLOOD PRESSURE: 78 MMHG | WEIGHT: 191.4 LBS | HEART RATE: 78 BPM

## 2020-05-28 DIAGNOSIS — M25.511 CHRONIC RIGHT SHOULDER PAIN: ICD-10-CM
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
## 2020-05-28 DIAGNOSIS — F41.8 DEPRESSION WITH ANXIETY: ICD-10-CM

## 2020-05-28 DIAGNOSIS — K59.03 DRUG-INDUCED CONSTIPATION: ICD-10-CM

## 2020-05-28 DIAGNOSIS — I10 ESSENTIAL HYPERTENSION: ICD-10-CM

## 2020-05-28 DIAGNOSIS — I50.32 CHRONIC HEART FAILURE WITH PRESERVED EJECTION FRACTION (HCC): ICD-10-CM

## 2020-05-28 DIAGNOSIS — M79.18 MYOFASCIAL PAIN SYNDROME: ICD-10-CM

## 2020-05-28 DIAGNOSIS — G89.4 CHRONIC PAIN SYNDROME: ICD-10-CM

## 2020-05-28 DIAGNOSIS — G89.29 CHRONIC RIGHT SHOULDER PAIN: ICD-10-CM

## 2020-05-28 DIAGNOSIS — I48.0 PAROXYSMAL ATRIAL FIBRILLATION (HCC): Primary | ICD-10-CM

## 2020-05-28 PROCEDURE — 99214 OFFICE O/P EST MOD 30 MIN: CPT | Performed by: FAMILY MEDICINE

## 2020-05-28 PROCEDURE — 3074F SYST BP LT 130 MM HG: CPT | Performed by: FAMILY MEDICINE

## 2020-05-28 PROCEDURE — 1160F RVW MEDS BY RX/DR IN RCRD: CPT | Performed by: FAMILY MEDICINE

## 2020-05-28 PROCEDURE — 1036F TOBACCO NON-USER: CPT | Performed by: FAMILY MEDICINE

## 2020-05-28 PROCEDURE — 4040F PNEUMOC VAC/ADMIN/RCVD: CPT | Performed by: FAMILY MEDICINE

## 2020-05-28 PROCEDURE — 3078F DIAST BP <80 MM HG: CPT | Performed by: FAMILY MEDICINE

## 2020-05-28 RX ORDER — DOCUSATE SODIUM 100 MG/1
100 CAPSULE, LIQUID FILLED ORAL 2 TIMES DAILY
Qty: 10 CAPSULE | Refills: 0 | Status: SHIPPED | OUTPATIENT
Start: 2020-05-28 | End: 2020-06-18 | Stop reason: SDUPTHER

## 2020-05-28 RX ORDER — LIDOCAINE 50 MG/G
1 PATCH TOPICAL DAILY
Qty: 30 PATCH | Refills: 1 | Status: SHIPPED | OUTPATIENT
Start: 2020-05-28 | End: 2020-08-26

## 2020-06-05 ENCOUNTER — TELEPHONE (OUTPATIENT)
Dept: FAMILY MEDICINE CLINIC | Facility: CLINIC | Age: 75
End: 2020-06-05

## 2020-06-17 ENCOUNTER — TELEPHONE (OUTPATIENT)
Dept: FAMILY MEDICINE CLINIC | Facility: CLINIC | Age: 75
End: 2020-06-17

## 2020-06-18 DIAGNOSIS — K59.03 DRUG-INDUCED CONSTIPATION: ICD-10-CM

## 2020-06-18 DIAGNOSIS — M54.42 CHRONIC BILATERAL LOW BACK PAIN WITH BILATERAL SCIATICA: ICD-10-CM

## 2020-06-18 DIAGNOSIS — M54.41 CHRONIC BILATERAL LOW BACK PAIN WITH BILATERAL SCIATICA: ICD-10-CM

## 2020-06-18 DIAGNOSIS — G47.00 INSOMNIA, UNSPECIFIED TYPE: ICD-10-CM

## 2020-06-18 DIAGNOSIS — G89.29 CHRONIC BILATERAL LOW BACK PAIN WITH BILATERAL SCIATICA: ICD-10-CM

## 2020-06-18 RX ORDER — GABAPENTIN 300 MG/1
900 CAPSULE ORAL 3 TIMES DAILY
Qty: 270 CAPSULE | Refills: 5 | Status: SHIPPED | OUTPATIENT
Start: 2020-06-18 | End: 2020-12-02 | Stop reason: SDUPTHER

## 2020-06-18 RX ORDER — DOCUSATE SODIUM 100 MG/1
100 CAPSULE, LIQUID FILLED ORAL 2 TIMES DAILY
Qty: 10 CAPSULE | Refills: 0 | Status: SHIPPED | OUTPATIENT
Start: 2020-06-18 | End: 2020-07-13 | Stop reason: SDUPTHER

## 2020-06-18 RX ORDER — ZOLPIDEM TARTRATE 10 MG/1
10 TABLET ORAL
Qty: 30 TABLET | Refills: 0 | Status: SHIPPED | OUTPATIENT
Start: 2020-06-18 | End: 2020-07-16 | Stop reason: SDUPTHER

## 2020-06-30 ENCOUNTER — APPOINTMENT (OUTPATIENT)
Dept: LAB | Age: 75
End: 2020-06-30
Payer: COMMERCIAL

## 2020-06-30 DIAGNOSIS — E55.9 VITAMIN D DEFICIENCY: ICD-10-CM

## 2020-06-30 DIAGNOSIS — I48.0 PAROXYSMAL ATRIAL FIBRILLATION (HCC): ICD-10-CM

## 2020-06-30 DIAGNOSIS — I10 ESSENTIAL HYPERTENSION: ICD-10-CM

## 2020-06-30 LAB
25(OH)D3 SERPL-MCNC: 11.7 NG/ML (ref 30–100)
ALBUMIN SERPL BCP-MCNC: 3.4 G/DL (ref 3.5–5)
ALP SERPL-CCNC: 100 U/L (ref 46–116)
ALT SERPL W P-5'-P-CCNC: 15 U/L (ref 12–78)
ANION GAP SERPL CALCULATED.3IONS-SCNC: 6 MMOL/L (ref 4–13)
AST SERPL W P-5'-P-CCNC: 11 U/L (ref 5–45)
BASOPHILS # BLD AUTO: 0.04 THOUSANDS/ΜL (ref 0–0.1)
BASOPHILS NFR BLD AUTO: 1 % (ref 0–1)
BILIRUB SERPL-MCNC: 0.57 MG/DL (ref 0.2–1)
BUN SERPL-MCNC: 11 MG/DL (ref 5–25)
CALCIUM SERPL-MCNC: 8.6 MG/DL (ref 8.3–10.1)
CHLORIDE SERPL-SCNC: 107 MMOL/L (ref 100–108)
CHOLEST SERPL-MCNC: 142 MG/DL (ref 50–200)
CO2 SERPL-SCNC: 26 MMOL/L (ref 21–32)
CREAT SERPL-MCNC: 0.97 MG/DL (ref 0.6–1.3)
EOSINOPHIL # BLD AUTO: 0.01 THOUSAND/ΜL (ref 0–0.61)
EOSINOPHIL NFR BLD AUTO: 0 % (ref 0–6)
ERYTHROCYTE [DISTWIDTH] IN BLOOD BY AUTOMATED COUNT: 13.8 % (ref 11.6–15.1)
GFR SERPL CREATININE-BSD FRML MDRD: 59 ML/MIN/1.73SQ M
GLUCOSE P FAST SERPL-MCNC: 93 MG/DL (ref 65–99)
HCT VFR BLD AUTO: 46.3 % (ref 34.8–46.1)
HDLC SERPL-MCNC: 59 MG/DL
HGB BLD-MCNC: 14.6 G/DL (ref 11.5–15.4)
IMM GRANULOCYTES # BLD AUTO: 0.01 THOUSAND/UL (ref 0–0.2)
IMM GRANULOCYTES NFR BLD AUTO: 0 % (ref 0–2)
LDLC SERPL CALC-MCNC: 74 MG/DL (ref 0–100)
LYMPHOCYTES # BLD AUTO: 1.33 THOUSANDS/ΜL (ref 0.6–4.47)
LYMPHOCYTES NFR BLD AUTO: 26 % (ref 14–44)
MCH RBC QN AUTO: 29.6 PG (ref 26.8–34.3)
MCHC RBC AUTO-ENTMCNC: 31.5 G/DL (ref 31.4–37.4)
MCV RBC AUTO: 94 FL (ref 82–98)
MONOCYTES # BLD AUTO: 0.44 THOUSAND/ΜL (ref 0.17–1.22)
MONOCYTES NFR BLD AUTO: 9 % (ref 4–12)
NEUTROPHILS # BLD AUTO: 3.34 THOUSANDS/ΜL (ref 1.85–7.62)
NEUTS SEG NFR BLD AUTO: 64 % (ref 43–75)
NONHDLC SERPL-MCNC: 83 MG/DL
NRBC BLD AUTO-RTO: 0 /100 WBCS
PLATELET # BLD AUTO: 226 THOUSANDS/UL (ref 149–390)
PMV BLD AUTO: 10.9 FL (ref 8.9–12.7)
POTASSIUM SERPL-SCNC: 4 MMOL/L (ref 3.5–5.3)
PROT SERPL-MCNC: 6.7 G/DL (ref 6.4–8.2)
RBC # BLD AUTO: 4.94 MILLION/UL (ref 3.81–5.12)
SODIUM SERPL-SCNC: 139 MMOL/L (ref 136–145)
TRIGL SERPL-MCNC: 47 MG/DL
WBC # BLD AUTO: 5.17 THOUSAND/UL (ref 4.31–10.16)

## 2020-06-30 PROCEDURE — 82306 VITAMIN D 25 HYDROXY: CPT

## 2020-06-30 PROCEDURE — 36415 COLL VENOUS BLD VENIPUNCTURE: CPT

## 2020-06-30 PROCEDURE — 85025 COMPLETE CBC W/AUTO DIFF WBC: CPT

## 2020-06-30 PROCEDURE — 80053 COMPREHEN METABOLIC PANEL: CPT

## 2020-06-30 PROCEDURE — 80061 LIPID PANEL: CPT

## 2020-07-03 DIAGNOSIS — M54.42 CHRONIC BILATERAL LOW BACK PAIN WITH BILATERAL SCIATICA: ICD-10-CM

## 2020-07-03 DIAGNOSIS — G89.29 CHRONIC BILATERAL LOW BACK PAIN WITH BILATERAL SCIATICA: ICD-10-CM

## 2020-07-03 DIAGNOSIS — I48.0 PAROXYSMAL ATRIAL FIBRILLATION (HCC): ICD-10-CM

## 2020-07-03 DIAGNOSIS — M54.41 CHRONIC BILATERAL LOW BACK PAIN WITH BILATERAL SCIATICA: ICD-10-CM

## 2020-07-04 RX ORDER — BUPROPION HYDROCHLORIDE 150 MG/1
TABLET ORAL
Qty: 90 TABLET | Refills: 3 | Status: SHIPPED | OUTPATIENT
Start: 2020-07-04 | End: 2021-06-24

## 2020-07-04 RX ORDER — METOPROLOL SUCCINATE 50 MG/1
TABLET, EXTENDED RELEASE ORAL
Qty: 90 TABLET | Refills: 3 | Status: SHIPPED | OUTPATIENT
Start: 2020-07-04 | End: 2021-06-24

## 2020-07-13 ENCOUNTER — TELEPHONE (OUTPATIENT)
Dept: FAMILY MEDICINE CLINIC | Facility: CLINIC | Age: 75
End: 2020-07-13

## 2020-07-13 DIAGNOSIS — M17.0 OSTEOARTHRITIS OF BOTH KNEES, UNSPECIFIED OSTEOARTHRITIS TYPE: ICD-10-CM

## 2020-07-13 DIAGNOSIS — K59.03 DRUG-INDUCED CONSTIPATION: ICD-10-CM

## 2020-07-13 DIAGNOSIS — M48.061 SPINAL STENOSIS OF LUMBAR REGION, UNSPECIFIED WHETHER NEUROGENIC CLAUDICATION PRESENT: ICD-10-CM

## 2020-07-13 NOTE — TELEPHONE ENCOUNTER
Patient also requesting refills of Zolpidem 10mg  Checked PDMP, last refill 6/18/20 #30  She is wondering if she can have Rx renewed with refills?

## 2020-07-14 RX ORDER — CYCLOBENZAPRINE HCL 10 MG
10 TABLET ORAL 3 TIMES DAILY PRN
Qty: 90 TABLET | Refills: 0 | Status: SHIPPED | OUTPATIENT
Start: 2020-07-14 | End: 2021-01-18 | Stop reason: SDUPTHER

## 2020-07-14 RX ORDER — DOCUSATE SODIUM 100 MG/1
100 CAPSULE, LIQUID FILLED ORAL 2 TIMES DAILY
Qty: 30 CAPSULE | Refills: 2 | Status: SHIPPED | OUTPATIENT
Start: 2020-07-14 | End: 2020-08-25 | Stop reason: SDUPTHER

## 2020-07-16 DIAGNOSIS — G47.00 INSOMNIA, UNSPECIFIED TYPE: ICD-10-CM

## 2020-07-16 RX ORDER — ZOLPIDEM TARTRATE 10 MG/1
10 TABLET ORAL
Qty: 30 TABLET | Refills: 2 | Status: SHIPPED | OUTPATIENT
Start: 2020-07-16 | End: 2020-10-08 | Stop reason: SDUPTHER

## 2020-08-25 DIAGNOSIS — G89.4 CHRONIC PAIN SYNDROME: ICD-10-CM

## 2020-08-25 DIAGNOSIS — K59.03 DRUG-INDUCED CONSTIPATION: ICD-10-CM

## 2020-08-25 RX ORDER — DOCUSATE SODIUM 100 MG/1
100 CAPSULE, LIQUID FILLED ORAL 2 TIMES DAILY
Qty: 30 CAPSULE | Refills: 2 | Status: SHIPPED | OUTPATIENT
Start: 2020-08-25 | End: 2020-12-23 | Stop reason: SDUPTHER

## 2020-08-25 RX ORDER — FUROSEMIDE 40 MG/1
40 TABLET ORAL AS NEEDED
Qty: 30 TABLET | Refills: 1 | Status: SHIPPED | OUTPATIENT
Start: 2020-08-25 | End: 2022-05-11 | Stop reason: SDUPTHER

## 2020-08-26 RX ORDER — APIXABAN 5 MG/1
TABLET, FILM COATED ORAL
COMMUNITY
Start: 2020-06-03 | End: 2020-08-26

## 2020-08-26 RX ORDER — LIDOCAINE 50 MG/G
PATCH TOPICAL
COMMUNITY
Start: 2020-06-08 | End: 2020-08-26

## 2020-08-26 RX ORDER — OXYCODONE HYDROCHLORIDE 10 MG/1
TABLET ORAL
COMMUNITY
Start: 2020-06-16 | End: 2020-08-26

## 2020-08-26 RX ORDER — POTASSIUM CHLORIDE 1500 MG/1
TABLET, FILM COATED, EXTENDED RELEASE ORAL
COMMUNITY
Start: 2020-06-16 | End: 2020-08-26

## 2020-08-26 RX ORDER — GABAPENTIN 300 MG/1
CAPSULE ORAL
COMMUNITY
Start: 2020-06-18 | End: 2020-08-26

## 2020-08-26 RX ORDER — ZOLPIDEM TARTRATE 10 MG/1
TABLET ORAL
COMMUNITY
Start: 2020-06-18 | End: 2020-08-26

## 2020-08-27 ENCOUNTER — OFFICE VISIT (OUTPATIENT)
Dept: FAMILY MEDICINE CLINIC | Facility: CLINIC | Age: 75
End: 2020-08-27

## 2020-08-27 VITALS
BODY MASS INDEX: 34.02 KG/M2 | RESPIRATION RATE: 16 BRPM | DIASTOLIC BLOOD PRESSURE: 70 MMHG | HEIGHT: 63 IN | WEIGHT: 192 LBS | SYSTOLIC BLOOD PRESSURE: 107 MMHG | HEART RATE: 60 BPM | TEMPERATURE: 98.7 F

## 2020-08-27 DIAGNOSIS — F41.8 DEPRESSION WITH ANXIETY: ICD-10-CM

## 2020-08-27 DIAGNOSIS — M25.562 CHRONIC PAIN OF LEFT KNEE: Primary | ICD-10-CM

## 2020-08-27 DIAGNOSIS — I50.32 CHRONIC HEART FAILURE WITH PRESERVED EJECTION FRACTION (HCC): ICD-10-CM

## 2020-08-27 DIAGNOSIS — E55.9 VITAMIN D DEFICIENCY: ICD-10-CM

## 2020-08-27 DIAGNOSIS — G89.29 CHRONIC PAIN OF LEFT KNEE: Primary | ICD-10-CM

## 2020-08-27 PROCEDURE — 4040F PNEUMOC VAC/ADMIN/RCVD: CPT | Performed by: FAMILY MEDICINE

## 2020-08-27 PROCEDURE — 99214 OFFICE O/P EST MOD 30 MIN: CPT | Performed by: FAMILY MEDICINE

## 2020-08-27 PROCEDURE — 3074F SYST BP LT 130 MM HG: CPT | Performed by: FAMILY MEDICINE

## 2020-08-27 PROCEDURE — 1160F RVW MEDS BY RX/DR IN RCRD: CPT | Performed by: FAMILY MEDICINE

## 2020-08-27 PROCEDURE — 3078F DIAST BP <80 MM HG: CPT | Performed by: FAMILY MEDICINE

## 2020-08-27 PROCEDURE — 1036F TOBACCO NON-USER: CPT | Performed by: FAMILY MEDICINE

## 2020-08-27 PROCEDURE — 3008F BODY MASS INDEX DOCD: CPT | Performed by: FAMILY MEDICINE

## 2020-08-27 RX ORDER — DULOXETIN HYDROCHLORIDE 60 MG/1
60 CAPSULE, DELAYED RELEASE ORAL DAILY
Qty: 30 CAPSULE | Refills: 2 | Status: SHIPPED | OUTPATIENT
Start: 2020-08-27 | End: 2020-10-08

## 2020-08-27 RX ORDER — ERGOCALCIFEROL 1.25 MG/1
50000 CAPSULE ORAL WEEKLY
Qty: 8 CAPSULE | Refills: 0 | Status: SHIPPED | OUTPATIENT
Start: 2020-08-27 | End: 2022-05-23

## 2020-09-10 DIAGNOSIS — I48.0 PAROXYSMAL ATRIAL FIBRILLATION (HCC): ICD-10-CM

## 2020-09-14 ENCOUNTER — OFFICE VISIT (OUTPATIENT)
Dept: PODIATRY | Facility: CLINIC | Age: 75
End: 2020-09-14
Payer: COMMERCIAL

## 2020-09-14 VITALS
WEIGHT: 175 LBS | HEART RATE: 70 BPM | HEIGHT: 63 IN | DIASTOLIC BLOOD PRESSURE: 86 MMHG | SYSTOLIC BLOOD PRESSURE: 127 MMHG | BODY MASS INDEX: 31.01 KG/M2

## 2020-09-14 DIAGNOSIS — M79.675 PAIN IN TOE OF LEFT FOOT: ICD-10-CM

## 2020-09-14 DIAGNOSIS — L60.0 INGROWN TOENAIL: Primary | ICD-10-CM

## 2020-09-14 PROCEDURE — 11750 EXCISION NAIL&NAIL MATRIX: CPT | Performed by: PODIATRIST

## 2020-09-14 PROCEDURE — 99202 OFFICE O/P NEW SF 15 MIN: CPT | Performed by: PODIATRIST

## 2020-09-14 RX ORDER — LIDOCAINE HYDROCHLORIDE AND EPINEPHRINE 10; 10 MG/ML; UG/ML
1 INJECTION, SOLUTION INFILTRATION; PERINEURAL ONCE
Status: COMPLETED | OUTPATIENT
Start: 2020-09-14 | End: 2020-09-14

## 2020-09-14 RX ORDER — LIDOCAINE HYDROCHLORIDE 10 MG/ML
1 INJECTION, SOLUTION EPIDURAL; INFILTRATION; INTRACAUDAL; PERINEURAL ONCE
Status: COMPLETED | OUTPATIENT
Start: 2020-09-14 | End: 2020-09-14

## 2020-09-14 RX ADMIN — LIDOCAINE HYDROCHLORIDE 1 ML: 10 INJECTION, SOLUTION EPIDURAL; INFILTRATION; INTRACAUDAL; PERINEURAL at 16:58

## 2020-09-14 RX ADMIN — LIDOCAINE HYDROCHLORIDE AND EPINEPHRINE 1 ML: 10; 10 INJECTION, SOLUTION INFILTRATION; PERINEURAL at 16:59

## 2020-09-14 NOTE — PROGRESS NOTES
Assessment/Plan:    Explained to the patient that she has recurrent ingrown nail along the lateral nail border of the left great toe  Discussed treatment options recommending partial matrixectomy  The goal of this procedure is permanent erradication of the offending nail border  Procedure performed as follows: Anesthesia via 2 cc of a one-to-one mixture of 1 percent xylocaine with epinephrine and 1 percent xylocaine plain  A Betadine prep was performed  Lateral nail border of the left great toe was avulsed to the eponychium  A partial matrixectomy was performed utilizing phenol in a standard manner  A bacitracin dressing was applied  The patient is to soak in warm water twice a day tomorrow followed by a Neosporin dressing  The patient is rescheduled in 1 week  No problem-specific Assessment & Plan notes found for this encounter  Diagnoses and all orders for this visit:    Ingrown toenail  -     lidocaine (PF) (XYLOCAINE-MPF) 1 % injection 1 mL  -     lidocaine-epinephrine (XYLOCAINE/EPINEPHRINE) 1 %-1:100,000 injection 1 mL    Pain in toe of left foot          Subjective:      Patient ID: Gerald Lopez is a 69 y o  female  HPI     Patient, a 79-year-old female presents with chronic pain secondary to an ingrown nail along the lateral nail border of the left great toe  This nail has been a problem off and on for years  She has had partial avulsions performed on at least 2 occasions  Patient is interested in permanent correction  The following portions of the patient's history were reviewed and updated as appropriate: allergies, current medications, past family history, past medical history, past social history, past surgical history and problem list     Review of Systems   Respiratory: Negative  Cardiovascular:        History of cardiac disease and atrial fibrillation  Musculoskeletal: Positive for back pain  Hematological: Bruises/bleeds easily           Objective:      /86 Pulse 70   Ht 5' 3" (1 6 m)   Wt 79 4 kg (175 lb)   BMI 31 00 kg/m²          Physical Exam  Cardiovascular:      Pulses: Normal pulses  Comments: Temperature and turgor within normal limits bilateral  Musculoskeletal: Normal range of motion  Skin:     Capillary Refill: Capillary refill takes 2 to 3 seconds  Comments: Pain with palpation lateral nail border left great toe  No evidence of infection  Neurological:      General: No focal deficit present  Mental Status: She is oriented to person, place, and time  Nail removal    Date/Time: 9/14/2020 4:45 PM  Performed by: Pedro Quinn DPM  Authorized by: Pedro Quinn DPM     Patient location:  Clinic  Other Assisting Provider: No    Consent:     Consent obtained:  Verbal    Consent given by:  Patient    Risks discussed:  Permanent nail deformity, pain and infection    Alternatives discussed:  No treatment  Universal protocol:     Procedure explained and questions answered to patient or proxy's satisfaction: yes      Patient identity confirmed:  Verbally with patient  Location:     Foot:  L big toe  Pre-procedure details:     Skin preparation:  Betadine  Anesthesia (see MAR for exact dosages): Anesthesia method:  Nerve block    Block needle gauge:  25 G    Block anesthetic:  Lidocaine 1% w/o epi and lidocaine 1% WITH epi    Block outcome:  Anesthesia achieved  Nail Removal:     Nail removed:  Partial    Nail side:  Lateral    Nail bed sutured: no    Ingrown nail:     Nail matrix removed or ablated:  Partial  Post-procedure details:     Dressing:  4x4 sterile gauze and antibiotic ointment    Patient tolerance of procedure:   Tolerated well, no immediate complications

## 2020-09-21 ENCOUNTER — OFFICE VISIT (OUTPATIENT)
Dept: PODIATRY | Facility: CLINIC | Age: 75
End: 2020-09-21

## 2020-09-21 VITALS — HEIGHT: 63 IN | BODY MASS INDEX: 31 KG/M2 | TEMPERATURE: 97.9 F

## 2020-09-21 DIAGNOSIS — L60.0 INGROWN TOENAIL: Primary | ICD-10-CM

## 2020-09-21 PROCEDURE — 99024 POSTOP FOLLOW-UP VISIT: CPT | Performed by: PODIATRIST

## 2020-09-21 NOTE — PROGRESS NOTES
Patient presents for assessment of partial matrixectomy which was performed last week  Surgical site healing uneventfully with minimal discomfort related  Drainage is present within normal limits for procedure performed  There is no evidence of infection  Patient to contact me should problems arise

## 2020-10-08 ENCOUNTER — TELEPHONE (OUTPATIENT)
Dept: FAMILY MEDICINE CLINIC | Facility: CLINIC | Age: 75
End: 2020-10-08

## 2020-10-08 DIAGNOSIS — F41.8 DEPRESSION WITH ANXIETY: ICD-10-CM

## 2020-10-08 DIAGNOSIS — G47.00 INSOMNIA, UNSPECIFIED TYPE: ICD-10-CM

## 2020-10-08 DIAGNOSIS — E87.6 HYPOKALEMIA: ICD-10-CM

## 2020-10-08 RX ORDER — ZOLPIDEM TARTRATE 10 MG/1
10 TABLET ORAL
Qty: 30 TABLET | Refills: 3 | Status: SHIPPED | OUTPATIENT
Start: 2020-10-08 | End: 2021-01-18 | Stop reason: SDUPTHER

## 2020-10-08 RX ORDER — DULOXETIN HYDROCHLORIDE 30 MG/1
30 CAPSULE, DELAYED RELEASE ORAL DAILY
Qty: 90 CAPSULE | Refills: 1 | Status: SHIPPED | OUTPATIENT
Start: 2020-10-08 | End: 2021-01-18

## 2020-10-09 RX ORDER — POTASSIUM CHLORIDE 20 MEQ/1
TABLET, EXTENDED RELEASE ORAL
Qty: 30 TABLET | Refills: 5 | Status: SHIPPED | OUTPATIENT
Start: 2020-10-09 | End: 2021-04-26 | Stop reason: SDUPTHER

## 2020-10-13 ENCOUNTER — IMMUNIZATIONS (OUTPATIENT)
Dept: FAMILY MEDICINE CLINIC | Facility: CLINIC | Age: 75
End: 2020-10-13

## 2020-10-13 DIAGNOSIS — Z23 ENCOUNTER FOR IMMUNIZATION: ICD-10-CM

## 2020-10-13 PROCEDURE — 90662 IIV NO PRSV INCREASED AG IM: CPT

## 2020-10-13 PROCEDURE — G0008 ADMIN INFLUENZA VIRUS VAC: HCPCS

## 2020-10-30 ENCOUNTER — OFFICE VISIT (OUTPATIENT)
Dept: PODIATRY | Facility: CLINIC | Age: 75
End: 2020-10-30
Payer: COMMERCIAL

## 2020-10-30 VITALS
HEIGHT: 63 IN | BODY MASS INDEX: 31 KG/M2 | DIASTOLIC BLOOD PRESSURE: 101 MMHG | SYSTOLIC BLOOD PRESSURE: 145 MMHG | HEART RATE: 98 BPM

## 2020-10-30 DIAGNOSIS — L60.0 INGROWN TOENAIL: Primary | ICD-10-CM

## 2020-10-30 PROCEDURE — 99212 OFFICE O/P EST SF 10 MIN: CPT | Performed by: PODIATRIST

## 2020-11-11 ENCOUNTER — HOSPITAL ENCOUNTER (OUTPATIENT)
Dept: NON INVASIVE DIAGNOSTICS | Facility: CLINIC | Age: 75
Discharge: HOME | End: 2020-11-11
Payer: COMMERCIAL

## 2020-11-11 DIAGNOSIS — I50.32 CHRONIC HEART FAILURE WITH PRESERVED EJECTION FRACTION (HCC): ICD-10-CM

## 2020-11-11 PROCEDURE — 93306 TTE W/DOPPLER COMPLETE: CPT

## 2020-11-11 PROCEDURE — 93306 TTE W/DOPPLER COMPLETE: CPT | Performed by: INTERNAL MEDICINE

## 2020-12-02 DIAGNOSIS — M54.41 CHRONIC BILATERAL LOW BACK PAIN WITH BILATERAL SCIATICA: ICD-10-CM

## 2020-12-02 DIAGNOSIS — H60.541 ECZEMA OF RIGHT EXTERNAL EAR: ICD-10-CM

## 2020-12-02 DIAGNOSIS — G89.29 CHRONIC BILATERAL LOW BACK PAIN WITH BILATERAL SCIATICA: ICD-10-CM

## 2020-12-02 DIAGNOSIS — M54.42 CHRONIC BILATERAL LOW BACK PAIN WITH BILATERAL SCIATICA: ICD-10-CM

## 2020-12-02 RX ORDER — GABAPENTIN 300 MG/1
900 CAPSULE ORAL 3 TIMES DAILY
Qty: 270 CAPSULE | Refills: 5 | Status: SHIPPED | OUTPATIENT
Start: 2020-12-02 | End: 2021-01-18 | Stop reason: SDUPTHER

## 2020-12-09 ENCOUNTER — VBI (OUTPATIENT)
Dept: ADMINISTRATIVE | Facility: OTHER | Age: 75
End: 2020-12-09

## 2020-12-23 DIAGNOSIS — K59.03 DRUG-INDUCED CONSTIPATION: ICD-10-CM

## 2020-12-23 RX ORDER — DOCUSATE SODIUM 100 MG/1
100 CAPSULE, LIQUID FILLED ORAL 2 TIMES DAILY
Qty: 60 CAPSULE | Refills: 5 | Status: SHIPPED | OUTPATIENT
Start: 2020-12-23 | End: 2021-09-02 | Stop reason: SDUPTHER

## 2021-01-18 ENCOUNTER — OFFICE VISIT (OUTPATIENT)
Dept: FAMILY MEDICINE CLINIC | Facility: CLINIC | Age: 76
End: 2021-01-18

## 2021-01-18 VITALS
SYSTOLIC BLOOD PRESSURE: 118 MMHG | RESPIRATION RATE: 14 BRPM | BODY MASS INDEX: 34.16 KG/M2 | DIASTOLIC BLOOD PRESSURE: 80 MMHG | HEART RATE: 74 BPM | WEIGHT: 192.8 LBS | HEIGHT: 63 IN | TEMPERATURE: 98.2 F

## 2021-01-18 DIAGNOSIS — M54.41 CHRONIC BILATERAL LOW BACK PAIN WITH BILATERAL SCIATICA: ICD-10-CM

## 2021-01-18 DIAGNOSIS — M48.061 SPINAL STENOSIS OF LUMBAR REGION, UNSPECIFIED WHETHER NEUROGENIC CLAUDICATION PRESENT: ICD-10-CM

## 2021-01-18 DIAGNOSIS — F41.8 DEPRESSION WITH ANXIETY: ICD-10-CM

## 2021-01-18 DIAGNOSIS — I48.0 PAROXYSMAL ATRIAL FIBRILLATION (HCC): ICD-10-CM

## 2021-01-18 DIAGNOSIS — G47.00 INSOMNIA, UNSPECIFIED TYPE: ICD-10-CM

## 2021-01-18 DIAGNOSIS — G89.29 CHRONIC BILATERAL LOW BACK PAIN WITH BILATERAL SCIATICA: ICD-10-CM

## 2021-01-18 DIAGNOSIS — H53.8 BLURRY VISION, BILATERAL: Primary | ICD-10-CM

## 2021-01-18 DIAGNOSIS — I71.2 THORACIC AORTIC ANEURYSM WITHOUT RUPTURE (HCC): ICD-10-CM

## 2021-01-18 DIAGNOSIS — I50.32 CHRONIC HEART FAILURE WITH PRESERVED EJECTION FRACTION (HCC): ICD-10-CM

## 2021-01-18 DIAGNOSIS — E55.9 VITAMIN D DEFICIENCY: ICD-10-CM

## 2021-01-18 DIAGNOSIS — M54.42 CHRONIC BILATERAL LOW BACK PAIN WITH BILATERAL SCIATICA: ICD-10-CM

## 2021-01-18 DIAGNOSIS — F11.220 OPIOID DEPENDENCE WITH UNCOMPLICATED INTOXICATION (HCC): ICD-10-CM

## 2021-01-18 PROCEDURE — 99214 OFFICE O/P EST MOD 30 MIN: CPT | Performed by: FAMILY MEDICINE

## 2021-01-18 RX ORDER — GABAPENTIN 300 MG/1
900 CAPSULE ORAL 3 TIMES DAILY
Qty: 270 CAPSULE | Refills: 5 | Status: SHIPPED | OUTPATIENT
Start: 2021-01-18 | End: 2021-08-04

## 2021-01-18 RX ORDER — ZOLPIDEM TARTRATE 10 MG/1
10 TABLET ORAL
Qty: 30 TABLET | Refills: 3 | Status: SHIPPED | OUTPATIENT
Start: 2021-01-28 | End: 2021-04-26 | Stop reason: SDUPTHER

## 2021-01-18 RX ORDER — DULOXETIN HYDROCHLORIDE 20 MG/1
20 CAPSULE, DELAYED RELEASE ORAL DAILY
Qty: 30 CAPSULE | Refills: 2 | Status: SHIPPED | OUTPATIENT
Start: 2021-01-18 | End: 2021-04-13 | Stop reason: SDUPTHER

## 2021-01-18 RX ORDER — CYCLOBENZAPRINE HCL 10 MG
10 TABLET ORAL 3 TIMES DAILY PRN
Qty: 90 TABLET | Refills: 0 | Status: SHIPPED | OUTPATIENT
Start: 2021-01-18 | End: 2021-04-26 | Stop reason: SDUPTHER

## 2021-01-18 NOTE — ASSESSMENT & PLAN NOTE
Continues to see pain management and continues to ask me to take over prescribing her pain medications  She does still report that her pain is not well controlled and we discussed that I will not take over when she isn't stable on a regimen

## 2021-01-18 NOTE — PROGRESS NOTES
Assessment/Plan:    Vitamin D deficiency  Replaced over the summer - recheck vit D level    Opioid dependence ([facility] )  Continues to see pain management and continues to ask me to take over prescribing her pain medications  She does still report that her pain is not well controlled and we discussed that I will not take over when she isn't stable on a regimen  Lumbar canal stenosis  Continue pain management follow up - needs to get us records    Insomnia  Continue zolpidem    Depression with anxiety  I gave her cymbalta at a previous visit (which she had been on before ) but she had increase daytime sleepiness (was taking in the morning)  We discussed trying to take this at night and to continue for 7-10 days with the understanding that the fatigue usually resolves without intervention  We discussed staying on the medication for a year once started  She agreed    Aortic aneurysm (HCC)  Stable at 42mm on echo - no further intervention at this time  Chronic heart failure with preserved ejection fraction (HCC)  Wt Readings from Last 3 Encounters:   01/18/21 87 5 kg (192 lb 12 8 oz)   09/14/20 79 4 kg (175 lb)   08/27/20 87 1 kg (192 lb)       Continue with current treatment and medications  Atrial fibrillation ([facility] )  Rate controlled on eliquis       a      Subjective:      Patient ID: Michael Doss is a 70 y o  female  Complains of change in vision and needs to see ophthalmology    I had given her medication for depression and she took it for a few days with increased    She has been walking with a walker and feels more confident with it  She doesn't want to go to stores without a cart etc because of feeling unsteady - offered PT for evaluation of the right tool for walking  She doesn't want to change this at this time  She is driving less and is asking that we manage her pain medication due to     She is going to get the COVID vaccine when available            The following portions of the patient's history were reviewed and updated as appropriate:   She  has a past medical history of Acute deep vein thrombosis of lower limb ([facility] ), Aortic aneurysm ([facility] ), Arthritis, Atrial fibrillation ([facility] ), Dislocation of hip ([facility] ), Hypertension, and Psychiatric disorder    She   Patient Active Problem List    Diagnosis Date Noted    Chronic heart failure with preserved ejection fraction ([facility] ) 05/13/2020    Primary osteoarthritis of right shoulder 01/06/2020    Fall 10/24/2019    Musculoskeletal arm pain, right 10/24/2019    Balance disorder 03/14/2019    Eczema of right external ear 11/27/2018    Constipation 11/26/2018    Healthcare maintenance 06/28/2018    Atrial fibrillation (HCC) 10/11/2017    Chronic pain 10/11/2017    Age-related osteoporosis without current pathological fracture 10/11/2017    Hypertension 10/11/2017    Depression 10/11/2017    Anxiety 10/11/2017    Depression with anxiety 09/07/2016    Right shoulder pain 02/18/2016    Opioid dependence ([facility] ) 01/21/2016    Left knee pain 04/24/2015    Aortic aneurysm ([facility] ) 03/17/2014    Spondylosis of lumbar region without myelopathy or radiculopathy 05/09/2013    Myofascial pain syndrome 01/11/2013    Insomnia 12/19/2012    Lumbar canal stenosis 06/15/2012    Osteoarthritis 06/15/2012    Vitamin D deficiency 06/15/2012    Pain syndrome, chronic 05/16/2012     Current Outpatient Medications   Medication Sig Dispense Refill    apixaban (Eliquis) 5 mg Take 1 tablet (5 mg total) by mouth 2 (two) times a day 60 tablet 5    buPROPion (WELLBUTRIN XL) 150 mg 24 hr tablet TAKE 1 TAB DAILY 90 tablet 3    cyclobenzaprine (FLEXERIL) 10 mg tablet Take 1 tablet (10 mg total) by mouth 3 (three) times a day as needed for muscle spasms 90 tablet 0    diclofenac sodium (VOLTAREN) 1 % Apply 2 g topically 4 (four) times a day 1 Tube 5    docusate sodium (COLACE) 100 mg capsule Take 1 capsule (100 mg total) by mouth 2 (two) times a day 60 capsule 5  DULoxetine (CYMBALTA) 20 mg capsule Take 1 capsule (20 mg total) by mouth daily 30 capsule 2    furosemide (LASIX) 40 mg tablet Take 1 tablet (40 mg total) by mouth as needed (edema) 30 tablet 1    gabapentin (NEURONTIN) 300 mg capsule Take 3 capsules (900 mg total) by mouth 3 (three) times a day 270 capsule 5    hydrocortisone 2 5 % cream Apply topically 2 (two) times a day 30 g 2    lidocaine (LIDODERM) 5 % Apply 1 patch topically daily Remove & Discard patch within 12 hours or as directed by MD 30 patch 2    metoprolol succinate (TOPROL-XL) 50 mg 24 hr tablet TAKE 1 TAB DAILY 90 tablet 3    oxyCODONE (ROXICODONE) 10 MG TABS TAKE 1 TAB AS NEEDED TWO TO THREE TIMES A DAY  0    potassium chloride (K-DUR,KLOR-CON) 20 mEq tablet TAKE 1 TAB DAILY 30 tablet 5    [START ON 1/28/2021] zolpidem (AMBIEN) 10 mg tablet Take 1 tablet (10 mg total) by mouth daily at bedtime as needed for sleep 30 tablet 3    ergocalciferol (VITAMIN D2) 50,000 units Take 1 capsule (50,000 Units total) by mouth once a week for 8 doses 8 capsule 0     No current facility-administered medications for this visit  She is allergic to alprazolam; diclofenac; and nabumetone       Review of Systems   Constitutional: Negative for activity change, chills, fever and unexpected weight change  HENT: Negative for congestion  Eyes: Negative for visual disturbance  Respiratory: Negative for cough, chest tightness, shortness of breath and wheezing  Cardiovascular: Negative for chest pain  Gastrointestinal: Negative for abdominal pain, diarrhea and nausea  Endocrine: Negative for cold intolerance and heat intolerance  Musculoskeletal: Positive for arthralgias, back pain, gait problem and myalgias  Skin: Negative for color change  Neurological: Negative for dizziness  Psychiatric/Behavioral: Negative for agitation, dysphoric mood and sleep disturbance           Objective:      /80 (BP Location: Left arm, Patient Position: Sitting, Cuff Size: Large)   Pulse 74   Temp 98 2 °F (36 8 °C) (Tympanic)   Resp 14   Ht 5' 3" (1 6 m)   Wt 87 5 kg (192 lb 12 8 oz)   BMI 34 15 kg/m²          Physical Exam  Constitutional:       Appearance: She is well-developed  HENT:      Head: Normocephalic and atraumatic  Cardiovascular:      Rate and Rhythm: Normal rate and regular rhythm  Heart sounds: Normal heart sounds  Pulmonary:      Effort: Pulmonary effort is normal       Breath sounds: Normal breath sounds  Abdominal:      General: Bowel sounds are normal       Palpations: Abdomen is soft  Musculoskeletal: Normal range of motion  Skin:     General: Skin is warm and dry  Neurological:      Mental Status: She is alert and oriented to person, place, and time     Psychiatric:         Behavior: Behavior normal          Judgment: Judgment normal

## 2021-01-18 NOTE — ASSESSMENT & PLAN NOTE
I gave her cymbalta at a previous visit (which she had been on before ) but she had increase daytime sleepiness (was taking in the morning)  We discussed trying to take this at night and to continue for 7-10 days with the understanding that the fatigue usually resolves without intervention  We discussed staying on the medication for a year once started   She agreed

## 2021-01-18 NOTE — ASSESSMENT & PLAN NOTE
Wt Readings from Last 3 Encounters:   01/18/21 87 5 kg (192 lb 12 8 oz)   09/14/20 79 4 kg (175 lb)   08/27/20 87 1 kg (192 lb)       Continue with current treatment and medications

## 2021-01-30 ENCOUNTER — IMMUNIZATIONS (OUTPATIENT)
Dept: FAMILY MEDICINE CLINIC | Facility: CLINIC | Age: 76
End: 2021-01-30

## 2021-01-30 DIAGNOSIS — Z23 ENCOUNTER FOR IMMUNIZATION: Primary | ICD-10-CM

## 2021-02-27 ENCOUNTER — IMMUNIZATIONS (OUTPATIENT)
Dept: FAMILY MEDICINE CLINIC | Facility: CLINIC | Age: 76
End: 2021-02-27

## 2021-02-27 DIAGNOSIS — Z23 ENCOUNTER FOR IMMUNIZATION: Primary | ICD-10-CM

## 2021-03-10 DIAGNOSIS — I48.0 PAROXYSMAL ATRIAL FIBRILLATION (HCC): ICD-10-CM

## 2021-03-10 RX ORDER — APIXABAN 5 MG/1
TABLET, FILM COATED ORAL
Qty: 180 TABLET | Refills: 4 | Status: SHIPPED | OUTPATIENT
Start: 2021-03-10 | End: 2022-03-31 | Stop reason: SDUPTHER

## 2021-04-13 DIAGNOSIS — F41.8 DEPRESSION WITH ANXIETY: ICD-10-CM

## 2021-04-14 RX ORDER — DULOXETIN HYDROCHLORIDE 20 MG/1
20 CAPSULE, DELAYED RELEASE ORAL DAILY
Qty: 30 CAPSULE | Refills: 5 | Status: SHIPPED | OUTPATIENT
Start: 2021-04-14 | End: 2021-04-26 | Stop reason: SDUPTHER

## 2021-04-26 ENCOUNTER — OFFICE VISIT (OUTPATIENT)
Dept: FAMILY MEDICINE CLINIC | Facility: CLINIC | Age: 76
End: 2021-04-26

## 2021-04-26 VITALS
RESPIRATION RATE: 18 BRPM | BODY MASS INDEX: 31.69 KG/M2 | DIASTOLIC BLOOD PRESSURE: 70 MMHG | WEIGHT: 185.6 LBS | HEIGHT: 64 IN | SYSTOLIC BLOOD PRESSURE: 100 MMHG | HEART RATE: 94 BPM | OXYGEN SATURATION: 95 % | TEMPERATURE: 96.9 F

## 2021-04-26 DIAGNOSIS — Z00.00 ENCOUNTER FOR ANNUAL WELLNESS VISIT (AWV) IN MEDICARE PATIENT: Primary | ICD-10-CM

## 2021-04-26 DIAGNOSIS — R55 SYNCOPE, UNSPECIFIED SYNCOPE TYPE: ICD-10-CM
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
## 2021-04-26 DIAGNOSIS — F41.8 DEPRESSION WITH ANXIETY: ICD-10-CM

## 2021-04-26 DIAGNOSIS — M48.061 SPINAL STENOSIS OF LUMBAR REGION, UNSPECIFIED WHETHER NEUROGENIC CLAUDICATION PRESENT: ICD-10-CM

## 2021-04-26 DIAGNOSIS — G47.00 INSOMNIA, UNSPECIFIED TYPE: ICD-10-CM

## 2021-04-26 DIAGNOSIS — E87.6 HYPOKALEMIA: ICD-10-CM

## 2021-04-26 PROBLEM — R29.6 FALLS: Status: ACTIVE | Noted: 2019-10-24

## 2021-04-26 PROCEDURE — 99214 OFFICE O/P EST MOD 30 MIN: CPT | Performed by: FAMILY MEDICINE

## 2021-04-26 PROCEDURE — G0439 PPPS, SUBSEQ VISIT: HCPCS | Performed by: FAMILY MEDICINE

## 2021-04-26 RX ORDER — CYCLOBENZAPRINE HCL 10 MG
10 TABLET ORAL 3 TIMES DAILY PRN
Qty: 90 TABLET | Refills: 0 | Status: SHIPPED | OUTPATIENT
Start: 2021-04-26 | End: 2021-08-18 | Stop reason: SDUPTHER

## 2021-04-26 RX ORDER — DULOXETIN HYDROCHLORIDE 20 MG/1
20 CAPSULE, DELAYED RELEASE ORAL DAILY
Qty: 30 CAPSULE | Refills: 5 | Status: SHIPPED | OUTPATIENT
Start: 2021-04-26 | End: 2021-06-17

## 2021-04-26 RX ORDER — POTASSIUM CHLORIDE 20 MEQ/1
20 TABLET, EXTENDED RELEASE ORAL DAILY
Qty: 30 TABLET | Refills: 5 | Status: SHIPPED | OUTPATIENT
Start: 2021-04-26 | End: 2021-10-01 | Stop reason: SDUPTHER

## 2021-04-26 RX ORDER — ZOLPIDEM TARTRATE 10 MG/1
10 TABLET ORAL
Qty: 30 TABLET | Refills: 3 | Status: SHIPPED | OUTPATIENT
Start: 2021-04-26 | End: 2021-09-02 | Stop reason: SDUPTHER

## 2021-04-26 NOTE — ASSESSMENT & PLAN NOTE
Has had several falls in the past few months where she gets very dizzy and then finds herself on the floor  Echo normal <6 months ago  Check carotid dopplers    Patient became "light headed" upon standing to leave visit  Repeat BP at that time was 100/70 so jessica had a component of orthostatic hypotension  Discussed increasing hydration  She currently drinks 3-4 cans of coke/day  We discussed that hydration would be better with water and to drink a large glass of water when thirsty prior to drinking soda  She agrees to try

## 2021-04-26 NOTE — ASSESSMENT & PLAN NOTE
Continued follow up with pain management  She is going to sign a records release as she continues to want us to write for her pain medication

## 2021-04-26 NOTE — PROGRESS NOTES
Assessment and Plan:     Problem List Items Addressed This Visit     None           Preventive health issues were discussed with patient, and age appropriate screening tests were ordered as noted in patient's After Visit Summary  Personalized health advice and appropriate referrals for health education or preventive services given if needed, as noted in patient's After Visit Summary       History of Present Illness:     Patient presents for Medicare Annual Wellness visit    Patient Care Team:  [address] as PCP - General  [address] as PCP - [facility],[address] (RTE)  Uday Rivers, DO [place], JOE Liu Ala     Problem List:     Patient Active Problem List   Diagnosis    Atrial fibrillation ([facility] )    Chronic pain    Age-related osteoporosis without current pathological fracture    Hypertension    Depression    Anxiety    Aortic aneurysm ([facility]    Depression with anxiety    Insomnia    Left knee pain    Lumbar canal stenosis    Myofascial pain syndrome    Opioid dependence ([facility] )    Osteoarthritis    Pain syndrome, chronic    Right shoulder pain    Spondylosis of lumbar region without myelopathy or radiculopathy    Vitamin D deficiency    Healthcare maintenance    Constipation    Eczema of right external ear    Balance disorder    Fall    Musculoskeletal arm pain, right    Primary osteoarthritis of right shoulder    Chronic heart failure with preserved ejection fraction [facility])      Past Medical and Surgical History:     Past Medical History:   Diagnosis Date    Acute deep vein thrombosis of lower limb ([facility] )     last assessed [id number]    Aortic aneurysm (HCC)     Arthritis     Atrial fibrillation ([facility]     Dislocation of hip ([facility] )     last assessed [id number]    Hypertension     Psychiatric disorder     anxiety     Past Surgical History:   Procedure Laterality Date    HIP SURGERY      JOINT REPLACEMENT      KNEE ARTHROSCOPY Bilateral     x2 rt and 1 on left    TUBAL LIGATION        Family History:     Family History   Problem Relation Age of Onset    Colon cancer Mother     Breast cancer Family     Thyroid cancer Family     Colon cancer Family     Hypertension Family     Thyroid disease Family     Hypertension Father     Dementia Father       Social History:     E-Cigarette/Vaping    E-Cigarette Use Never User      E-Cigarette/Vaping Substances    Nicotine No     THC No     CBD No     Flavoring No     Other No     Unknown No      Social History     Socioeconomic History    Marital status:       Spouse name: Not on file    Number of children: Not on file    Years of education: Not on file    Highest education level: Not on file   Occupational History    Not on file   Social Needs    Financial resource strain: Not on file    Food insecurity     Worry: Not on file     Inability: Not on file    Transportation needs     Medical: Not on file     Non-medical: Not on file   Tobacco Use    Smoking status: Never Smoker    Smokeless tobacco: Never Used   Substance and Sexual Activity    Alcohol use: No     Comment: being a social drinker per [employer]    Drug use: No    Sexual activity: Not on file   Lifestyle    Physical activity     Days per week: Not on file     Minutes per session: Not on file    Stress: Not on file   Relationships    Social connections     Talks on phone: Not on file     Gets together: Not on file     Attends Congregational service: Not on file     Active member of club or organization: Not on file     Attends meetings of clubs or organizations: Not on file     Relationship status: Not on file    Intimate partner violence     Fear of current or ex partner: Not on file     Emotionally abused: Not on file     Physically abused: Not on file     Forced sexual activity: Not on file   Other Topics Concern    Not on file   Social History Narrative    Not on file      Medications and Allergies:     Current Outpatient Medications   Medication Sig Dispense Refill    buPROPion (WELLBUTRIN XL) 150 mg 24 hr tablet TAKE 1 TAB DAILY 90 tablet 3    cyclobenzaprine (FLEXERIL) 10 mg tablet Take 1 tablet (10 mg total) by mouth 3 (three) times a day as needed for muscle spasms 90 tablet 0    diclofenac sodium (VOLTAREN) 1 % Apply 2 g topically 4 (four) times a day 1 Tube 5    docusate sodium (COLACE) 100 mg capsule Take 1 capsule (100 mg total) by mouth 2 (two) times a day 60 capsule 5    DULoxetine (CYMBALTA) 20 mg capsule Take 1 capsule (20 mg total) by mouth daily 30 capsule 5    Eliquis 5 MG TAKE 1 TAB TWICE A  tablet 4    ergocalciferol (VITAMIN D2) 50,000 units Take 1 capsule (50,000 Units total) by mouth once a week for 8 doses 8 capsule 0    furosemide (LASIX) 40 mg tablet Take 1 tablet (40 mg total) by mouth as needed (edema) 30 tablet 1    gabapentin (NEURONTIN) 300 mg capsule Take 3 capsules (900 mg total) by mouth 3 (three) times a day 270 capsule 5    hydrocortisone 2 5 % cream Apply topically 2 (two) times a day 30 g 2    lidocaine (LIDODERM) 5 % Apply 1 patch topically daily Remove & Discard patch within 12 hours or as directed by MD 30 patch 2    metoprolol succinate (TOPROL-XL) 50 mg 24 hr tablet TAKE 1 TAB DAILY 90 tablet 3    oxyCODONE (ROXICODONE) 10 MG TABS TAKE 1 TAB AS NEEDED TWO TO THREE TIMES A DAY  0    potassium chloride (K-DUR,KLOR-CON) 20 mEq tablet TAKE 1 TAB DAILY 30 tablet 5    zolpidem (AMBIEN) 10 mg tablet Take 1 tablet (10 mg total) by mouth daily at bedtime as needed for sleep 30 tablet 3     No current facility-administered medications for this visit  Allergies   Allergen Reactions    Alprazolam      [facility] - [id number]:  Bad side effect    Diclofenac Other (See Comments)     GI upset    Nabumetone Other (See Comments)     Metallic taste in mouth      Immunizations:     Immunization History   Administered Date(s) Administered    INFLUENZA 12/01/2016    Influenza Split High Dose Preservative Free IM 10/27/2015, 12/13/2016, 01/17/2017, 10/23/2017    Influenza, high dose seasonal 0 7 mL 11/26/2018, 10/24/2019, 10/13/2020    Influenza, seasonal, injectable 12/19/2012    Pneumococcal Conjugate 13-Valent 12/13/2016    Pneumococcal Polysaccharide PPV23 12/01/2016    SARS-CoV-2 / COVID-19 mRNA IM (Delvis Santoyo) 01/30/2021, 02/27/2021    Tdap 04/27/2017      Health Maintenance:         Topic Date Due    Colorectal Cancer Screening  12/13/2026    Hepatitis C Screening  Completed     There are no preventive care reminders to display for this patient  Medicare Health Risk Assessment:     There were no vitals taken for this visit  Last Medicare Wellness visit information reviewed, patient interviewed, no change since last AWV  Health Risk Assessment:   Patient rates overall health as fair  Patient feels that their physical health rating is same  Patient is dissatisfied with their life  Eyesight was rated as much worse  Hearing was rated as same  Patient feels that their emotional and mental health rating is slightly worse  Patients states they are never, rarely angry  Patient states they are always unusually tired/fatigued  Pain experienced in the last 7 days has been some  Patient's pain rating has been 5/10  Patient states that she has experienced no weight loss or gain in last 6 months  Fall Risk Screening: In the past year, patient has experienced: history of falling in past year    Number of falls: 2 or more  Injured during fall?: Yes    Feels unsteady when standing or walking?: Yes    Worried about falling?: Yes      Urinary Incontinence Screening:   Patient has leaked urine accidently in the last six months  Falls Plan of Care: Balance, strength, and gait training instructions were provided  Also sent for carotid dopplers, patient to keep track of her spells, and will reassess        Home Safety:  Patient has trouble with stairs inside or outside of their home  Patient has working smoke alarms and has no working carbon monoxide detector  Home safety hazards include: none  Nutrition:   Current diet is Unhealthy  Usually consists of a tasty cake for breakfast with a coke  And then no lunch and then dinner is usually a protein/potato  Discussed adding vegetables or fruit with each meal     Medications:   Patient is currently taking over-the-counter supplements  OTC medications include: see medication list  Patient is able to manage medications  Activities of Daily Living (ADLs)/Instrumental Activities of Daily Living (IADLs):   Walk and transfer into and out of bed and chair?: Yes  Dress and groom yourself?: Yes    Bathe or shower yourself?: Yes    Feed yourself? Yes  Do your laundry/housekeeping?: Yes  Manage your money, pay your bills and track your expenses?: Yes  Make your own meals?: Yes    Do your own shopping?: Yes    Previous Hospitalizations:   Any hospitalizations or ED visits within the last 12 months?: No      PREVENTIVE SCREENINGS      Cardiovascular Screening:    General: Screening Current      Diabetes Screening:     General: Screening Current      Colorectal Cancer Screening:     General: Screening Current      Cervical Cancer Screening:    General: Screening Not Indicated      Osteoporosis Screening:    General: Screening Not Indicated and History Osteoporosis      Abdominal Aortic Aneurysm (AAA) Screening:        General: Screening Not Indicated      Lung Cancer Screening:     General: Screening Not Indicated      Hepatitis C Screening:    General: Screening Current    Screening, Brief Intervention, and Referral to Treatment (SBIRT)    Screening  Typical number of drinks in a day: 0  Typical number of drinks in a week: 0  Interpretation: Low risk drinking behavior  Single Item Drug Screening:  How often have you used an illegal drug (including marijuana) or a prescription medication for non-medical reasons in the past year? never    Single Item Drug Screen Score: 0  Interpretation: Negative screen for possible drug use disorder    Review of Current Opioid Use  Opioid Risk Tool (ORT) Score: 1  Opioid Risk Tool (ORT) Interpretation: Score 0-3: Low risk for opioid misuse      Ernie Harrell

## 2021-04-26 NOTE — PROGRESS NOTES
Assessment/Plan:    Falls  Has had several falls in the past few months where she gets very dizzy and then finds herself on the floor  Echo normal <6 months ago  Check carotid dopplers    Patient became "light headed" upon standing to leave visit  Repeat BP at that time was 100/70 so jessica had a component of orthostatic hypotension  Discussed increasing hydration  She currently drinks 3-4 cans of coke/day  We discussed that hydration would be better with water and to drink a large glass of water when thirsty prior to drinking soda  She agrees to try  Lumbar canal stenosis  Continued follow up with pain management  She is going to sign a records release as she continues to want us to write for her pain medication  Insomnia  Still on ambien    Depression with anxiety  Decreased to 20mg of cymbalta based on her concern about this causing her dizziness  Also checking bmp due to concern for hyponatremia       a      Subjective:      Patient ID: Nicole Solorio is a 70 y o  female  Falls - several falls in the past few months where she gets dizzy and then finds herself on the floor  This started after starting the cymbalta although apparently she had some difficulty with the refill of cymbalta and has been taking both 20mg and 60 mg alternating on various days  She doesn't stand quickly but it does happen when standing  She denies n/v, chest pain or SOB  She also complains of right ear discomfort that has been present for several days and she put in ear cleaning solution of some kind  She is again asking for refills of her pain medication which is managed by pain management  She refuses injections etc by them            The following portions of the patient's history were reviewed and updated as appropriate:   She  has a past medical history of Acute deep vein thrombosis of lower limb ([address] ), Aortic aneurysm ([address] ), Arthritis, Atrial fibrillation ([address] ), Dislocation of hip ([address] ), Hypertension, and Psychiatric disorder    She   Patient Active Problem List    Diagnosis Date Noted    Chronic heart failure with preserved ejection fraction ([facility] [address] ) 05/13/2020    Primary osteoarthritis of right shoulder 01/06/2020    Falls 10/24/2019    Musculoskeletal arm pain, right 10/24/2019    Balance disorder 03/14/2019    Eczema of right external ear 11/27/2018    Constipation 11/26/2018    Healthcare maintenance 06/28/2018    Atrial fibrillation (HCC) 10/11/2017    Chronic pain 10/11/2017    Age-related osteoporosis without current pathological fracture 10/11/2017    Hypertension 10/11/2017    Depression 10/11/2017    Anxiety 10/11/2017    Depression with anxiety 09/07/2016    Right shoulder pain 02/18/2016    Opioid dependence ([facility] [address] ) 01/21/2016    Left knee pain 04/24/2015    [facility] 03/17/2014    Spondylosis of lumbar region without myelopathy or radiculopathy 05/09/2013    Myofascial pain syndrome 01/11/2013    Insomnia 12/19/2012    Lumbar canal stenosis 06/15/2012    Osteoarthritis 06/15/2012    Vitamin D deficiency 06/15/2012    Pain syndrome, chronic 05/16/2012     Current Outpatient Medications   Medication Sig Dispense Refill    buPROPion (WELLBUTRIN XL) 150 mg 24 hr tablet TAKE 1 TAB DAILY 90 tablet 3    cyclobenzaprine (FLEXERIL) 10 mg tablet Take 1 tablet (10 mg total) by mouth 3 (three) times a day as needed for muscle spasms 90 tablet 0    diclofenac sodium (VOLTAREN) 1 % Apply 2 g topically 4 (four) times a day 1 Tube 5    docusate sodium (COLACE) 100 mg capsule Take 1 capsule (100 mg total) by mouth 2 (two) times a day 60 capsule 5    DULoxetine (CYMBALTA) 20 mg capsule Take 1 capsule (20 mg total) by mouth daily 30 capsule 5    Eliquis 5 MG TAKE 1 TAB TWICE A  tablet 4    ergocalciferol (VITAMIN D2) 50,000 units Take 1 capsule (50,000 Units total) by mouth once a week for 8 doses 8 capsule 0    furosemide (LASIX) 40 mg tablet Take 1 tablet (40 mg total) by mouth as needed (edema) 30 tablet 1    gabapentin (NEURONTIN) 300 mg capsule Take 3 capsules (900 mg total) by mouth 3 (three) times a day 270 capsule 5    hydrocortisone 2 5 % cream Apply topically 2 (two) times a day 30 g 2    lidocaine (LIDODERM) 5 % Apply 1 patch topically daily Remove & Discard patch within 12 hours or as directed by MD 30 patch 2    metoprolol succinate (TOPROL-XL) 50 mg 24 hr tablet TAKE 1 TAB DAILY 90 tablet 3    oxyCODONE (ROXICODONE) 10 MG TABS TAKE 1 TAB AS NEEDED TWO TO THREE TIMES A DAY  0    potassium chloride (K-DUR,KLOR-CON) 20 mEq tablet Take 1 tablet (20 mEq total) by mouth daily 30 tablet 5    zolpidem (AMBIEN) 10 mg tablet Take 1 tablet (10 mg total) by mouth daily at bedtime as needed for sleep 30 tablet 3     No current facility-administered medications for this visit  She is allergic to alprazolam; diclofenac; and nabumetone       Review of Systems   Constitutional: Negative for activity change, chills, fever and unexpected weight change  HENT: Positive for ear pain  Negative for congestion  Eyes: Negative for visual disturbance  Respiratory: Negative for cough, chest tightness, shortness of breath and wheezing  Cardiovascular: Negative for chest pain  Gastrointestinal: Negative for abdominal pain, diarrhea and nausea  Endocrine: Negative for cold intolerance and heat intolerance  Musculoskeletal: Negative for arthralgias and myalgias  Skin: Negative for color change  Neurological: Positive for light-headedness  Negative for dizziness  Psychiatric/Behavioral: Negative for agitation, dysphoric mood and sleep disturbance           Objective:      /70 (BP Location: Left arm, Patient Position: Sitting, Cuff Size: Standard)   Pulse 94   Temp (!) 96 9 °F (36 1 °C) (Tympanic)   Resp 18   Ht 5' 3 5" (1 613 m)   Wt 84 2 kg (185 lb 9 6 oz)   SpO2 95%   BMI 32 36 kg/m²          Physical Exam  Constitutional:       Appearance: She is well-developed  HENT:      Head: Normocephalic and atraumatic  Cardiovascular:      Rate and Rhythm: Normal rate and regular rhythm  Heart sounds: Normal heart sounds  Pulmonary:      Effort: Pulmonary effort is normal       Breath sounds: Normal breath sounds  Abdominal:      General: Bowel sounds are normal       Palpations: Abdomen is soft  Musculoskeletal: Normal range of motion  Skin:     General: Skin is warm and dry  Neurological:      Mental Status: She is alert and oriented to person, place, and time     Psychiatric:         Behavior: Behavior normal          Judgment: Judgment normal

## 2021-04-26 NOTE — PATIENT INSTRUCTIONS
Medicare Preventive Visit Patient Instructions  Thank you for completing your Welcome to Medicare Visit or Medicare Annual Wellness Visit today  Your next wellness visit will be due in one year (4/27/2022)  The screening/preventive services that you may require over the next 5-10 years are detailed below  Some tests may not apply to you based off risk factors and/or age  Screening tests ordered at today's visit but not completed yet may show as past due  Also, please note that scanned in results may not display below  Preventive Screenings:  Service Recommendations Previous Testing/Comments   Colorectal Cancer Screening  * Colonoscopy    * Fecal Occult Blood Test (FOBT)/Fecal Immunochemical Test (FIT)  * Fecal DNA/Cologuard Test  * Flexible Sigmoidoscopy Age: 54-65 years old   Colonoscopy: every 10 years (may be performed more frequently if at higher risk)  OR  FOBT/FIT: every 1 year  OR  Cologuard: every 3 years  OR  Sigmoidoscopy: every 5 years  Screening may be recommended earlier than age 48 if at higher risk for colorectal cancer  Also, an individualized decision between you and your healthcare provider will decide whether screening between the ages of 74-80 would be appropriate  Colonoscopy: 12/13/2016  FOBT/FIT: Not on file  Cologuard: Not on file  Sigmoidoscopy: Not on file          Breast Cancer Screening Age: 36 years old  Frequency: every 1-2 years  Not required if history of left and right mastectomy Mammogram: Not on file        Cervical Cancer Screening Between the ages of 21-29, pap smear recommended once every 3 years  Between the ages of 33-67, can perform pap smear with HPV co-testing every 5 years     Recommendations may differ for women with a history of total hysterectomy, cervical cancer, or abnormal pap smears in past  Pap Smear: Not on file        Hepatitis C Screening Once for adults born between [facility]  More frequently in patients at high risk for Hepatitis C Hep C Antibody: 09/27/2018        Diabetes Screening 1-2 times per year if you're at risk for diabetes or have pre-diabetes Fasting glucose: 93 mg/dL   A1C: No results in last 5 years        Cholesterol Screening Once every 5 years if you don't have a lipid disorder  May order more often based on risk factors  Lipid panel: 06/30/2020          Other Preventive Screenings Covered by Medicare:  1  Abdominal Aortic Aneurysm (AAA) Screening: covered once if your at risk  You're considered to be at risk if you have a family history of AAA  2  Lung Cancer Screening: covers low dose CT scan once per year if you meet all of the following conditions: (1) Age 50-69; (2) No signs or symptoms of lung cancer; (3) Current smoker or have quit smoking within the last 15 years; (4) You have a tobacco smoking history of at least 30 pack years (packs per day multiplied by number of years you smoked); (5) You get a written order from a healthcare provider  3  Glaucoma Screening: covered annually if you're considered high risk: (1) You have diabetes OR (2) Family history of glaucoma OR (3)  aged 48 and older OR (3)  American aged 72 and older  3  Osteoporosis Screening: covered every 2 years if you meet one of the following conditions: (1) You're estrogen deficient and at risk for osteoporosis based off medical history and other findings; (2) Have a vertebral abnormality; (3) On glucocorticoid therapy for more than 3 months; (4) Have primary hyperparathyroidism; (5) On osteoporosis medications and need to assess response to drug therapy  · Last bone density test (DXA Scan): 11/17/2018  5  HIV Screening: covered annually if you're between the age of 12-76  Also covered annually if you are younger than 13 and older than 72 with risk factors for HIV infection  For pregnant patients, it is covered up to 3 times per pregnancy      Immunizations:  Immunization Recommendations   Influenza Vaccine Annual influenza vaccination during flu season is recommended for all persons aged >= 6 months who do not have contraindications   Pneumococcal Vaccine (Prevnar and Pneumovax)  * Prevnar = PCV13  * Pneumovax = PPSV23   Adults 25-60 years old: 1-3 doses may be recommended based on certain risk factors  Adults 72 years old: Prevnar (PCV13) vaccine recommended followed by Pneumovax (PPSV23) vaccine  If already received PPSV23 since turning 65, then PCV13 recommended at least one year after PPSV23 dose  Hepatitis B Vaccine 3 dose series if at intermediate or high risk (ex: diabetes, end stage renal disease, liver disease)   Tetanus (Td) Vaccine - COST NOT COVERED BY MEDICARE PART B Following completion of primary series, a booster dose should be given every 10 years to maintain immunity against tetanus  Td may also be given as tetanus wound prophylaxis  Tdap Vaccine - COST NOT COVERED BY MEDICARE PART B Recommended at least once for all adults  For pregnant patients, recommended with each pregnancy  Shingles Vaccine (Shingrix) - COST NOT COVERED BY MEDICARE PART B  2 shot series recommended in those aged 48 and above     Health Maintenance Due:      Topic Date Due    Colorectal Cancer Screening  12/13/2026    Hepatitis C Screening  Completed     Immunizations Due:  There are no preventive care reminders to display for this patient  Advance Directives   What are advance directives? Advance directives are legal documents that state your wishes and plans for medical care  These plans are made ahead of time in case you lose your ability to make decisions for yourself  Advance directives can apply to any medical decision, such as the treatments you want, and if you want to donate organs  What are the types of advance directives? There are many types of advance directives, and each state has rules about how to use them  You may choose a combination of any of the following:  · Living will: This is a written record of the treatment you want   You can also choose which treatments you do not want, which to limit, and which to stop at a certain time  This includes surgery, medicine, IV fluid, and tube feedings  · Durable power of  for healthcare [facility]): This is a written record that states who you want to make healthcare choices for you when you are unable to make them for yourself  This person, called a proxy, is usually a family member or a friend  You may choose more than 1 proxy  · Do not resuscitate (DNR) order:  A DNR order is used in case your heart stops beating or you stop breathing  It is a request not to have certain forms of treatment, such as CPR  A DNR order may be included in other types of advance directives  · Medical directive: This covers the care that you want if you are in a coma, near death, or unable to make decisions for yourself  You can list the treatments you want for each condition  Treatment may include pain medicine, surgery, blood transfusions, dialysis, IV or tube feedings, and a ventilator (breathing machine)  · Values history: This document has questions about your views, beliefs, and how you feel and think about life  This information can help others choose the care that you would choose  Why are advance directives important? An advance directive helps you control your care  Although spoken wishes may be used, it is better to have your wishes written down  Spoken wishes can be misunderstood, or not followed  Treatments may be given even if you do not want them  An advance directive may make it easier for your family to make difficult choices about your care  Weight Management   Why it is important to manage your weight:  Being overweight increases your risk of health conditions such as heart disease, high blood pressure, type 2 diabetes, and certain types of cancer  It can also increase your risk for osteoarthritis, sleep apnea, and other respiratory problems  Aim for a slow, steady weight loss   Even a small amount of weight loss can lower your risk of health problems  How to lose weight safely:  A safe and healthy way to lose weight is to eat fewer calories and get regular exercise  You can lose up about 1 pound a week by decreasing the number of calories you eat by 500 calories each day  Healthy meal plan for weight management:  A healthy meal plan includes a variety of foods, contains fewer calories, and helps you stay healthy  A healthy meal plan includes the following:  · Eat whole-grain foods more often  A healthy meal plan should contain fiber  Fiber is the part of grains, fruits, and vegetables that is not broken down by your body  Whole-grain foods are healthy and provide extra fiber in your diet  Some examples of whole-grain foods are whole-wheat breads and pastas, oatmeal, brown rice, and bulgur  · Eat a variety of vegetables every day  Include dark, leafy greens such as spinach, kale, amarilis greens, and mustard greens  Eat yellow and orange vegetables such as carrots, sweet potatoes, and winter squash  · Eat a variety of fruits every day  Choose fresh or canned fruit (canned in its own juice or light syrup) instead of juice  Fruit juice has very little or no fiber  · Eat low-fat dairy foods  Drink fat-free (skim) milk or 1% milk  Eat fat-free yogurt and low-fat cottage cheese  Try low-fat cheeses such as mozzarella and other reduced-fat cheeses  · Choose meat and other protein foods that are low in fat  Choose beans or other legumes such as split peas or lentils  Choose fish, skinless poultry (chicken or turkey), or lean cuts of red meat (beef or pork)  Before you cook meat or poultry, cut off any visible fat  · Use less fat and oil  Try baking foods instead of frying them  Add less fat, such as margarine, sour cream, regular salad dressing and mayonnaise to foods  Eat fewer high-fat foods  Some examples of high-fat foods include french fries, doughnuts, ice cream, and cakes    · Eat fewer sweets  Limit foods and drinks that are high in sugar  This includes candy, cookies, regular soda, and sweetened drinks  Exercise:  Exercise at least 30 minutes per day on most days of the week  Some examples of exercise include walking, biking, dancing, and swimming  You can also fit in more physical activity by taking the stairs instead of the elevator or parking farther away from stores  Ask your healthcare provider about the best exercise plan for you  © Copyright [address] 2018 Information is for End User's use only and may not be sold, redistributed or otherwise used for commercial purposes   All illustrations and images included in CareNotes® are the copyrighted property of A D A M [employer]  or [address] [place] [address]

## 2021-04-26 NOTE — ASSESSMENT & PLAN NOTE
Decreased to 20mg of cymbalta based on her concern about this causing her dizziness   Also checking bmp due to concern for hyponatremia

## 2021-06-17 ENCOUNTER — OFFICE VISIT (OUTPATIENT)
Dept: FAMILY MEDICINE CLINIC | Facility: CLINIC | Age: 76
End: 2021-06-17

## 2021-06-17 VITALS
HEART RATE: 89 BPM | HEIGHT: 64 IN | OXYGEN SATURATION: 98 % | RESPIRATION RATE: 18 BRPM | DIASTOLIC BLOOD PRESSURE: 78 MMHG | BODY MASS INDEX: 32.23 KG/M2 | WEIGHT: 188.8 LBS | SYSTOLIC BLOOD PRESSURE: 118 MMHG | TEMPERATURE: 97.9 F

## 2021-06-17 DIAGNOSIS — M47.816 SPONDYLOSIS OF LUMBAR REGION WITHOUT MYELOPATHY OR RADICULOPATHY: Primary | ICD-10-CM

## 2021-06-17 DIAGNOSIS — F11.220 OPIOID DEPENDENCE WITH UNCOMPLICATED INTOXICATION (HCC): ICD-10-CM

## 2021-06-17 PROCEDURE — 99214 OFFICE O/P EST MOD 30 MIN: CPT | Performed by: FAMILY MEDICINE

## 2021-06-17 NOTE — ASSESSMENT & PLAN NOTE
Again asked for me to consider prescribing her pain medication  She has not needed an increased dose and she does have to drive a long way to get her rx  I said she could make an appt and ensure we have her pain management records and I would consider

## 2021-06-17 NOTE — PROGRESS NOTES
Assessment/Plan:    Spondylosis of lumbar region without myelopathy or radiculopathy  Again asked for me to consider prescribing her pain medication  She has not needed an increased dose and she does have to drive a long way to get her rx  I said she could make an appt and ensure we have her pain management records and I would consider         a      Subjective:      Patient ID: Janette Goldsmith is a 70 y o  female  Here for follow up of frequent falls  She states that this has resolved since stopping the cymbalta  She did not yet do the carotid doppler  She did have some vision problems and so she went to ophthalmology and she has cataracts that need to be repaired  She is going to have surgery and needs to have a pre-op visit within a month of surgery  She is also wondering if she can have the paper for a handicap placard  She continues to see pain management but wants to know if we can write for her medicaiton   We do not have records from pain management yet  The following portions of the patient's history were reviewed and updated as appropriate:   She  has a past medical history of Acute deep vein thrombosis of lower limb ([address] ), Aortic aneurysm ([address] ), Arthritis, Atrial fibrillation ([address] ), Dislocation of hip ([address] ), Hypertension, and Psychiatric disorder    She   Patient Active Problem List    Diagnosis Date Noted    Chronic heart failure with preserved ejection fraction ([facility] ) 05/13/2020    Primary osteoarthritis of right shoulder 01/06/2020    Falls 10/24/2019    Musculoskeletal arm pain, right 10/24/2019    Balance disorder 03/14/2019    Eczema of right external ear 11/27/2018    Constipation 11/26/2018    Healthcare maintenance 06/28/2018    Atrial fibrillation (HCC) 10/11/2017    Chronic pain 10/11/2017    Age-related osteoporosis without current pathological fracture 10/11/2017    Hypertension 10/11/2017    Depression 10/11/2017    Anxiety 10/11/2017    Depression with anxiety 09/07/2016    Right shoulder pain 02/18/2016    Opioid dependence ([facility] ) 01/21/2016    Left knee pain 04/24/2015    Aortic aneurysm (HCC) 03/17/2014    Spondylosis of lumbar region without myelopathy or radiculopathy 05/09/2013    Myofascial pain syndrome 01/11/2013    Insomnia 12/19/2012    Lumbar canal stenosis 06/15/2012    Osteoarthritis 06/15/2012    Vitamin D deficiency 06/15/2012    Pain syndrome, chronic 05/16/2012     Current Outpatient Medications   Medication Sig Dispense Refill    buPROPion (WELLBUTRIN XL) 150 mg 24 hr tablet TAKE 1 TAB DAILY 90 tablet 3    cyclobenzaprine (FLEXERIL) 10 mg tablet Take 1 tablet (10 mg total) by mouth 3 (three) times a day as needed for muscle spasms 90 tablet 0    diclofenac sodium (VOLTAREN) 1 % Apply 2 g topically 4 (four) times a day 1 Tube 5    docusate sodium (COLACE) 100 mg capsule Take 1 capsule (100 mg total) by mouth 2 (two) times a day 60 capsule 5    Eliquis 5 MG TAKE 1 TAB TWICE A  tablet 4    ergocalciferol (VITAMIN D2) 50,000 units Take 1 capsule (50,000 Units total) by mouth once a week for 8 doses 8 capsule 0    furosemide (LASIX) 40 mg tablet Take 1 tablet (40 mg total) by mouth as needed (edema) 30 tablet 1    gabapentin (NEURONTIN) 300 mg capsule Take 3 capsules (900 mg total) by mouth 3 (three) times a day 270 capsule 5    hydrocortisone 2 5 % cream Apply topically 2 (two) times a day 30 g 2    lidocaine (LIDODERM) 5 % Apply 1 patch topically daily Remove & Discard patch within 12 hours or as directed by MD 30 patch 2    metoprolol succinate (TOPROL-XL) 50 mg 24 hr tablet TAKE 1 TAB DAILY 90 tablet 3    oxyCODONE (ROXICODONE) 10 MG TABS TAKE 1 TAB AS NEEDED TWO TO THREE TIMES A DAY  0    potassium chloride (K-DUR,KLOR-CON) 20 mEq tablet Take 1 tablet (20 mEq total) by mouth daily 30 tablet 5    zolpidem (AMBIEN) 10 mg tablet Take 1 tablet (10 mg total) by mouth daily at bedtime as needed for sleep 30 tablet 3     No current facility-administered medications for this visit  She is allergic to alprazolam, diclofenac, and nabumetone       Review of Systems   Constitutional: Negative for activity change, chills, fever and unexpected weight change  HENT: Negative for congestion  Eyes: Negative for visual disturbance  Respiratory: Negative for cough, chest tightness, shortness of breath and wheezing  Cardiovascular: Negative for chest pain  Gastrointestinal: Negative for abdominal pain, diarrhea and nausea  Endocrine: Negative for cold intolerance and heat intolerance  Musculoskeletal: Positive for arthralgias, back pain and myalgias  Skin: Negative for color change  Neurological: Negative for dizziness  Psychiatric/Behavioral: Positive for dysphoric mood  Negative for agitation and sleep disturbance  Objective:      /78 (BP Location: Left arm, Patient Position: Sitting, Cuff Size: Standard)   Pulse 89   Temp 97 9 °F (36 6 °C) (Tympanic)   Resp 18   Ht 5' 3 5" (1 613 m)   Wt 85 6 kg (188 lb 12 8 oz)   SpO2 98%   BMI 32 92 kg/m²          Physical Exam  Vitals reviewed  Constitutional:       Appearance: She is well-developed  HENT:      Head: Normocephalic and atraumatic  Cardiovascular:      Rate and Rhythm: Normal rate and regular rhythm  Heart sounds: Normal heart sounds  Pulmonary:      Effort: Pulmonary effort is normal       Breath sounds: Normal breath sounds  Abdominal:      General: Bowel sounds are normal       Palpations: Abdomen is soft  Musculoskeletal:         General: Tenderness present  Comments: Pain over paraspinal muscles    Skin:     General: Skin is warm and dry  Neurological:      Mental Status: She is alert and oriented to person, place, and time     Psychiatric:         Behavior: Behavior normal          Judgment: Judgment normal

## 2021-06-24 DIAGNOSIS — M54.42 CHRONIC BILATERAL LOW BACK PAIN WITH BILATERAL SCIATICA: ICD-10-CM

## 2021-06-24 DIAGNOSIS — G89.29 CHRONIC BILATERAL LOW BACK PAIN WITH BILATERAL SCIATICA: ICD-10-CM

## 2021-06-24 DIAGNOSIS — I48.0 PAROXYSMAL ATRIAL FIBRILLATION (HCC): ICD-10-CM

## 2021-06-24 DIAGNOSIS — M54.41 CHRONIC BILATERAL LOW BACK PAIN WITH BILATERAL SCIATICA: ICD-10-CM

## 2021-06-24 RX ORDER — METOPROLOL SUCCINATE 50 MG/1
TABLET, EXTENDED RELEASE ORAL
Qty: 90 TABLET | Refills: 3 | Status: SHIPPED | OUTPATIENT
Start: 2021-06-24 | End: 2022-06-27

## 2021-06-24 RX ORDER — BUPROPION HYDROCHLORIDE 150 MG/1
TABLET ORAL
Qty: 90 TABLET | Refills: 3 | Status: SHIPPED | OUTPATIENT
Start: 2021-06-24 | End: 2021-07-16

## 2021-07-08 NOTE — PROGRESS NOTES
Cardiology Follow Up    Calista Turner  1945  [id number]  [address] [facility] [place] [id number] [facility]  [phone]    1  Essential hypertension  POCT ECG   2  Chronic heart failure with preserved ejection fraction (HCC)     3  Paroxysmal atrial fibrillation (HCC)  POCT ECG   4  Mild ascending aorta dilatation (HCC)         Interval History:   Patient is here for a follow-up visit and cardiac clearance for a cataract procedures to be done in August 2021  Patient has a history of hypertension, DVT,  PAF on rate control and Eliquis and a mild ascending aortic aneurysm  CT scan of the chest done March 2019 demonstrated a mild ascending aortic aneurysm of 4 3 cm  Echocardiogram done November 2020 demonstrated preserved LV systolic function with mild LAE  The aortic root was 3 9 cm and the ascending aorta was mildly dilated at 4 2 cm  There was no significant change compared to a prior study done October 2017  Patient has had no chest pain or significant dyspnea  Her vital signs are stable today  Patient's EKG today demonstrates sinus rhythm and is a normal tracing with no significant change compared to a prior tracing done May 9, 2019      Patient Active Problem List   Diagnosis    Atrial fibrillation (HCC)    Chronic pain    Age-related osteoporosis without current pathological fracture    Hypertension    Depression    Anxiety    Aortic aneurysm (HCC)    Depression with anxiety    Insomnia    Left knee pain    Lumbar canal stenosis    Myofascial pain syndrome    Opioid dependence (HCC)    Osteoarthritis    Pain syndrome, chronic    Right shoulder pain    Spondylosis of lumbar region without myelopathy or radiculopathy    Vitamin D deficiency    Healthcare maintenance    Constipation    Eczema of right external ear    Balance disorder    Falls    Musculoskeletal arm pain, right    Primary osteoarthritis of right shoulder    Chronic heart failure with preserved ejection fraction [facility])     Past Medical History:   Diagnosis Date    Acute deep vein thrombosis of lower limb (HCC)     last assessed [id number]    Aortic aneurysm (HCC)     Arthritis     Atrial fibrillation (HCC)     Dislocation of hip ([facility] [address] )     last assessed [id number]    Hypertension     Psychiatric disorder     anxiety     Social History     Socioeconomic History    Marital status:      Spouse name: Not on file    Number of children: Not on file    Years of education: Not on file    Highest education level: Not on file   Occupational History    Not on file   Tobacco Use    Smoking status: Never Smoker    Smokeless tobacco: Never Used   Vaping Use    Vaping Use: Never used   Substance and Sexual Activity    Alcohol use: No     Comment: being a social drinker per [employer]    Drug use: No    Sexual activity: Not Currently     Partners: Male   Other Topics Concern    Not on file   Social History Narrative    Not on file     Social Determinants of Health     Financial Resource Strain:     Difficulty of Paying Living Expenses:    Food Insecurity:     Worried About Running Out of Food in the Last Year:     [address] in the Last Year:    Transportation Needs:     Lack of Transportation (Medical):      Lack of Transportation (Non-Medical):    Physical Activity:     Days of Exercise per Week:     Minutes of Exercise per Session:    Stress:     Feeling of Stress :    Social Connections:     Frequency of Communication with Friends and Family:     Frequency of Social Gatherings with Friends and Family:     Attends [employer] Services:     Active Member of Clubs or Organizations:     Attends Club or Organization Meetings:     Marital Status:    Intimate Partner Violence:     Fear of Current or Ex-Partner:     Emotionally Abused:     Physically Abused:     Sexually Abused:       Family History Problem Relation Age of Onset    Colon cancer Mother     Breast cancer Family     Thyroid cancer Family     Colon cancer Family     Hypertension Family     Thyroid disease Family     Hypertension Father     Dementia Father      Past Surgical History:   Procedure Laterality Date    HIP SURGERY      JOINT REPLACEMENT      KNEE ARTHROSCOPY Bilateral     x2 rt and 1 on left    TUBAL LIGATION         Current Outpatient Medications:     buPROPion (WELLBUTRIN XL) 150 mg 24 hr tablet, TAKE 1 TAB DAILY, Disp: 90 tablet, Rfl: 3    cyclobenzaprine (FLEXERIL) 10 mg tablet, Take 1 tablet (10 mg total) by mouth 3 (three) times a day as needed for muscle spasms, Disp: 90 tablet, Rfl: 0    diclofenac sodium (VOLTAREN) 1 %, Apply 2 g topically 4 (four) times a day, Disp: 1 Tube, Rfl: 5    docusate sodium (COLACE) 100 mg capsule, Take 1 capsule (100 mg total) by mouth 2 (two) times a day, Disp: 60 capsule, Rfl: 5    Eliquis 5 MG, TAKE 1 TAB TWICE A DAY, Disp: 180 tablet, Rfl: 4    ergocalciferol (VITAMIN D2) 50,000 units, Take 1 capsule (50,000 Units total) by mouth once a week for 8 doses, Disp: 8 capsule, Rfl: 0    furosemide (LASIX) 40 mg tablet, Take 1 tablet (40 mg total) by mouth as needed (edema), Disp: 30 tablet, Rfl: 1    gabapentin (NEURONTIN) 300 mg capsule, Take 3 capsules (900 mg total) by mouth 3 (three) times a day, Disp: 270 capsule, Rfl: 5    hydrocortisone 2 5 % cream, Apply topically 2 (two) times a day, Disp: 30 g, Rfl: 2    lidocaine (LIDODERM) 5 %, Apply 1 patch topically daily Remove & Discard patch within 12 hours or as directed by MD, Disp: 30 patch, Rfl: 2    metoprolol succinate (TOPROL-XL) 50 mg 24 hr tablet, TAKE 1 TAB DAILY, Disp: 90 tablet, Rfl: 3    oxyCODONE (ROXICODONE) 10 MG TABS, TAKE 1 TAB AS NEEDED TWO TO THREE TIMES A DAY, Disp: , Rfl: 0    potassium chloride (K-DUR,KLOR-CON) 20 mEq tablet, Take 1 tablet (20 mEq total) by mouth daily, Disp: 30 tablet, Rfl: 5   zolpidem (AMBIEN) 10 mg tablet, Take 1 tablet (10 mg total) by mouth daily at bedtime as needed for sleep, Disp: 30 tablet, Rfl: 3  Allergies   Allergen Reactions    Alprazolam      Annotation - [id number]: Bad side effect    Diclofenac Other (See Comments)     GI upset    Nabumetone Other (See Comments)     Metallic taste in mouth       Labs:not applicable  Imaging: No results found  Review of Systems:  Review of Systems   All other systems reviewed and are negative  Physical Exam:  /90 (BP Location: Right arm, Patient Position: Sitting, Cuff Size: Standard)   Pulse 79   Ht 5' 3" (1 6 m)   Wt 85 2 kg (187 lb 14 4 oz)   BMI 33 28 kg/m²   Physical Exam  Vitals reviewed  Constitutional:       Appearance: She is well-developed  HENT:      Head: Normocephalic and atraumatic  Eyes:      Conjunctiva/sclera: Conjunctivae normal       Pupils: Pupils are equal, round, and reactive to light  Cardiovascular:      Rate and Rhythm: Normal rate  Heart sounds: Normal heart sounds  Pulmonary:      Effort: Pulmonary effort is normal       Breath sounds: Normal breath sounds  Musculoskeletal:      Cervical back: Normal range of motion and neck supple  Skin:     General: Skin is warm and dry  Neurological:      Mental Status: She is alert and oriented to person, place, and time  Discussion/Summary:I will continue her present medical regimen  Patient is cleared from my point of view for her cataract surgery  If it is necessary to hold Eliquis as per the surgeon she should hold this for two days prior  I did discuss this with her  In reference to her cardiac status she is stable  I will arrange a follow-up echocardiogram for standard surveillance of her ascending aortic aneurysm and see her in one years time in sooner as is necessary

## 2021-07-09 ENCOUNTER — OFFICE VISIT (OUTPATIENT)
Dept: CARDIOLOGY CLINIC | Facility: CLINIC | Age: 76
End: 2021-07-09
Payer: COMMERCIAL

## 2021-07-09 VITALS
HEART RATE: 79 BPM | BODY MASS INDEX: 33.29 KG/M2 | HEIGHT: 63 IN | SYSTOLIC BLOOD PRESSURE: 134 MMHG | DIASTOLIC BLOOD PRESSURE: 90 MMHG | WEIGHT: 187.9 LBS

## 2021-07-09 DIAGNOSIS — I50.32 CHRONIC HEART FAILURE WITH PRESERVED EJECTION FRACTION (HCC): ICD-10-CM

## 2021-07-09 DIAGNOSIS — I10 ESSENTIAL HYPERTENSION: Primary | ICD-10-CM

## 2021-07-09 DIAGNOSIS — I77.810 MILD ASCENDING AORTA DILATATION (HCC): ICD-10-CM

## 2021-07-09 DIAGNOSIS — I48.0 PAROXYSMAL ATRIAL FIBRILLATION (HCC): ICD-10-CM

## 2021-07-09 PROCEDURE — 93000 ELECTROCARDIOGRAM COMPLETE: CPT | Performed by: INTERNAL MEDICINE

## 2021-07-09 PROCEDURE — 99214 OFFICE O/P EST MOD 30 MIN: CPT | Performed by: INTERNAL MEDICINE

## 2021-07-12 ENCOUNTER — PREPPED CHART (OUTPATIENT)
Dept: URBAN - METROPOLITAN AREA CLINIC 6 | Facility: CLINIC | Age: 76
End: 2021-07-12

## 2021-07-16 ENCOUNTER — OFFICE VISIT (OUTPATIENT)
Dept: FAMILY MEDICINE CLINIC | Facility: CLINIC | Age: 76
End: 2021-07-16

## 2021-07-16 VITALS
WEIGHT: 189.4 LBS | OXYGEN SATURATION: 91 % | BODY MASS INDEX: 32.33 KG/M2 | TEMPERATURE: 97.6 F | DIASTOLIC BLOOD PRESSURE: 80 MMHG | HEART RATE: 112 BPM | HEIGHT: 64 IN | SYSTOLIC BLOOD PRESSURE: 128 MMHG | RESPIRATION RATE: 18 BRPM

## 2021-07-16 DIAGNOSIS — H25.9 AGE-RELATED CATARACT OF BOTH EYES, UNSPECIFIED AGE-RELATED CATARACT TYPE: Primary | ICD-10-CM

## 2021-07-16 PROCEDURE — 99213 OFFICE O/P EST LOW 20 MIN: CPT | Performed by: FAMILY MEDICINE

## 2021-07-16 NOTE — PROGRESS NOTES
Family Practice  Ronda Bermudez 70 y o  female MRN: [id number]  Encounter: [id number]  7/16/2021    PREOP EXAM    Ronda Bermudez is a 70 y o  female with bilateral cataract who is planning to undergo Cataract surgery bilateral under local by Dr Renetta Helm on 8/3/21 right eye and 8/10/21 left eye  Patient has not traveled outside the [address] [place] [address] Road  within the last 14 days  Patient has not had complications with anesthesia in the past     ROS:   Chest pain: no   Shortness of breath: no  Shortness of breath with exertion: no  Orthopnea: no  Dizziness: no  Unexplained weight change: no    PMH:  CAD: yes  HTN: yes  CKD: no  DM: not on insulin: no  History of CVA: no     reports that she has never smoked  She has never used smokeless tobacco  She reports that she does not drink alcohol and does not use drugs  Lab Results   Component Value Date    CREATININE 0 97 06/30/2020       There are no diagnoses linked to this encounter      Revised Cardiac Risk Index (RCRI) for Pre-Operative Risk   (estimates risk of cardiac complications after noncardiac surgery)    · High-risk surgery: No 0 / Yes +1  · Intraperitoneal, intrathoracic, suprainguinal vascular  · History of ischemic heart disease: No 0 / Yes +1  · Hx of MI, (+) exercise test, current chest pain considered due to myocardial ischemia, use of nitrate therapy or ECG with pathological Q waves)  · History of CHF: No 0 / Yes +1  · Pulmonary edema, B/L rales or S3 gallop; FIGUEROA, orthopnea, PND, CXR showing pulmonary vascular redistribution)  · History of cerebrovascular disease: No 0 / Yes +1  · Prior TIA or stroke  · Pre-operative treatment with insulin: No 0 / Yes +1  · Pre-operative creatinine >2 mg/dL: No 0 / Yes +1    RCRI Scoring:  · 0 points: Class I Risk, 3 9% Risk of Major Cardiac Event  · 1 point: Class II Risk, 6 0% Risk of Major Cardiac Event  · 2 points: Class III Risk, 10 1% Risk of Major Cardiac Event  · 3 points: Class IV Risk, 15% Risk of Major Cardiac Event  · 4 points: Class IV Risk, 15% Risk of Major Cardiac Event  · 5 points: Class IV Risk, 15% Risk of Major Cardiac Event  · 6 points: Class IV Risk, 15% Risk of Major Cardiac Event    Recommendations:  Darlene Sandhu is undergoing an elective Minimal risk surgery  They are at [address] risk for major adverse cardiac event (MACE)  RCRI score of 0 at 3 9% risk of major cardiac event  They may proceed with surgery as planned without further workup        Kiara Lawton MD  Family Medicine, PGY-2  1:11 PM 7/16/2021

## 2021-08-04 DIAGNOSIS — M54.42 CHRONIC BILATERAL LOW BACK PAIN WITH BILATERAL SCIATICA: ICD-10-CM

## 2021-08-04 DIAGNOSIS — M54.41 CHRONIC BILATERAL LOW BACK PAIN WITH BILATERAL SCIATICA: ICD-10-CM

## 2021-08-04 DIAGNOSIS — G89.29 CHRONIC BILATERAL LOW BACK PAIN WITH BILATERAL SCIATICA: ICD-10-CM

## 2021-08-04 RX ORDER — GABAPENTIN 300 MG/1
900 CAPSULE ORAL 3 TIMES DAILY
Qty: 270 CAPSULE | Refills: 5 | Status: SHIPPED | OUTPATIENT
Start: 2021-08-04 | End: 2022-03-15 | Stop reason: SDUPTHER

## 2021-08-05 ENCOUNTER — SURGERY/PROCEDURE (OUTPATIENT)
Dept: URBAN - METROPOLITAN AREA SURGICAL CENTER 6 | Facility: SURGICAL CENTER | Age: 76
End: 2021-08-05

## 2021-08-05 DIAGNOSIS — H25.811: ICD-10-CM

## 2021-08-05 PROCEDURE — 66984 XCAPSL CTRC RMVL W/O ECP: CPT

## 2021-08-06 ENCOUNTER — POST-OP CHECK (OUTPATIENT)
Dept: URBAN - METROPOLITAN AREA CLINIC 6 | Facility: CLINIC | Age: 76
End: 2021-08-06

## 2021-08-06 DIAGNOSIS — Z96.1: ICD-10-CM

## 2021-08-06 PROCEDURE — 99024 POSTOP FOLLOW-UP VISIT: CPT

## 2021-08-06 ASSESSMENT — VISUAL ACUITY
OD_PH: 20/30-1
OD_SC: 20/40

## 2021-08-06 ASSESSMENT — TONOMETRY: OD_IOP_MMHG: 23

## 2021-08-10 ENCOUNTER — SURGERY/PROCEDURE (OUTPATIENT)
Dept: URBAN - METROPOLITAN AREA SURGICAL CENTER 6 | Facility: SURGICAL CENTER | Age: 76
End: 2021-08-10

## 2021-08-10 DIAGNOSIS — H25.812: ICD-10-CM

## 2021-08-10 PROCEDURE — 66984 XCAPSL CTRC RMVL W/O ECP: CPT | Mod: 79,LT

## 2021-08-11 ENCOUNTER — 1 DAY POST-OP (OUTPATIENT)
Dept: URBAN - METROPOLITAN AREA CLINIC 6 | Facility: CLINIC | Age: 76
End: 2021-08-11

## 2021-08-11 DIAGNOSIS — Z96.1: ICD-10-CM

## 2021-08-11 PROCEDURE — 99024 POSTOP FOLLOW-UP VISIT: CPT

## 2021-08-11 ASSESSMENT — VISUAL ACUITY
OD_PH: 20/30
OS_PH: 20/30+1
OD_SC: 20/60
OS_SC: 20/60

## 2021-08-11 ASSESSMENT — TONOMETRY
OS_IOP_MMHG: 21
OD_IOP_MMHG: 13

## 2021-08-18 ENCOUNTER — 1 WEEK POST-OP (OUTPATIENT)
Dept: URBAN - METROPOLITAN AREA CLINIC 6 | Facility: CLINIC | Age: 76
End: 2021-08-18

## 2021-08-18 DIAGNOSIS — Z96.1: ICD-10-CM

## 2021-08-18 DIAGNOSIS — M48.061 SPINAL STENOSIS OF LUMBAR REGION, UNSPECIFIED WHETHER NEUROGENIC CLAUDICATION PRESENT: ICD-10-CM

## 2021-08-18 PROCEDURE — 99024 POSTOP FOLLOW-UP VISIT: CPT

## 2021-08-18 ASSESSMENT — VISUAL ACUITY
OD_PH: 20/40
OD_SC: 20/50-2
OS_PH: 20/30-1
OS_SC: 20/80

## 2021-08-18 ASSESSMENT — TONOMETRY
OS_IOP_MMHG: 12
OD_IOP_MMHG: 13

## 2021-08-19 RX ORDER — CYCLOBENZAPRINE HCL 10 MG
10 TABLET ORAL 3 TIMES DAILY PRN
Qty: 90 TABLET | Refills: 0 | Status: SHIPPED | OUTPATIENT
Start: 2021-08-19 | End: 2021-11-01 | Stop reason: SDUPTHER

## 2021-08-31 ENCOUNTER — PREPPED CHART (OUTPATIENT)
Dept: URBAN - METROPOLITAN AREA CLINIC 6 | Facility: CLINIC | Age: 76
End: 2021-08-31

## 2021-09-02 DIAGNOSIS — K59.03 DRUG-INDUCED CONSTIPATION: ICD-10-CM

## 2021-09-02 RX ORDER — DOCUSATE SODIUM 100 MG/1
100 CAPSULE, LIQUID FILLED ORAL 2 TIMES DAILY
Qty: 60 CAPSULE | Refills: 5 | Status: SHIPPED | OUTPATIENT
Start: 2021-09-02 | End: 2021-10-01

## 2021-09-15 ENCOUNTER — RX CHECK (OUTPATIENT)
Dept: URBAN - METROPOLITAN AREA CLINIC 6 | Facility: CLINIC | Age: 76
End: 2021-09-15

## 2021-09-15 DIAGNOSIS — H52.203: ICD-10-CM

## 2021-09-15 DIAGNOSIS — Z96.1: ICD-10-CM

## 2021-09-15 DIAGNOSIS — H52.4: ICD-10-CM

## 2021-09-15 DIAGNOSIS — H52.13: ICD-10-CM

## 2021-09-15 PROCEDURE — 92015 DETERMINE REFRACTIVE STATE: CPT

## 2021-09-15 PROCEDURE — 99024 POSTOP FOLLOW-UP VISIT: CPT

## 2021-09-15 PROCEDURE — G8428 CUR MEDS NOT DOCUMENT: HCPCS

## 2021-09-15 PROCEDURE — 1036F TOBACCO NON-USER: CPT

## 2021-09-15 ASSESSMENT — VISUAL ACUITY
OS_SC: 20/70-1
OD_SC: 20/50-2

## 2021-09-24 ENCOUNTER — HOSPITAL ENCOUNTER (OUTPATIENT)
Dept: NON INVASIVE DIAGNOSTICS | Facility: CLINIC | Age: 76
Discharge: HOME | End: 2021-09-24
Payer: COMMERCIAL

## 2021-09-24 DIAGNOSIS — I48.0 PAROXYSMAL ATRIAL FIBRILLATION (HCC): ICD-10-CM

## 2021-09-24 DIAGNOSIS — I10 ESSENTIAL HYPERTENSION: ICD-10-CM

## 2021-09-24 DIAGNOSIS — I50.32 CHRONIC HEART FAILURE WITH PRESERVED EJECTION FRACTION (HCC): ICD-10-CM

## 2021-09-24 DIAGNOSIS — I77.810 MILD ASCENDING AORTA DILATATION (HCC): ICD-10-CM

## 2021-09-24 PROCEDURE — 93306 TTE W/DOPPLER COMPLETE: CPT

## 2021-09-24 PROCEDURE — 93306 TTE W/DOPPLER COMPLETE: CPT | Performed by: INTERNAL MEDICINE

## 2021-10-01 ENCOUNTER — OFFICE VISIT (OUTPATIENT)
Dept: FAMILY MEDICINE CLINIC | Facility: CLINIC | Age: 76
End: 2021-10-01

## 2021-10-01 VITALS
HEIGHT: 64 IN | TEMPERATURE: 98.5 F | SYSTOLIC BLOOD PRESSURE: 126 MMHG | WEIGHT: 184.8 LBS | HEART RATE: 92 BPM | RESPIRATION RATE: 18 BRPM | OXYGEN SATURATION: 98 % | BODY MASS INDEX: 31.55 KG/M2 | DIASTOLIC BLOOD PRESSURE: 84 MMHG

## 2021-10-01 DIAGNOSIS — E87.6 HYPOKALEMIA: ICD-10-CM

## 2021-10-01 DIAGNOSIS — K59.03 DRUG-INDUCED CONSTIPATION: ICD-10-CM

## 2021-10-01 DIAGNOSIS — G47.00 INSOMNIA, UNSPECIFIED TYPE: ICD-10-CM

## 2021-10-01 DIAGNOSIS — I10 PRIMARY HYPERTENSION: ICD-10-CM

## 2021-10-01 DIAGNOSIS — E55.9 VITAMIN D DEFICIENCY: Primary | ICD-10-CM

## 2021-10-01 DIAGNOSIS — H60.541 ECZEMA OF RIGHT EXTERNAL EAR: ICD-10-CM

## 2021-10-01 DIAGNOSIS — G89.4 PAIN SYNDROME, CHRONIC: ICD-10-CM

## 2021-10-01 DIAGNOSIS — I50.32 CHRONIC HEART FAILURE WITH PRESERVED EJECTION FRACTION (HCC): ICD-10-CM

## 2021-10-01 PROCEDURE — 99214 OFFICE O/P EST MOD 30 MIN: CPT | Performed by: FAMILY MEDICINE

## 2021-10-01 RX ORDER — OXYCODONE HYDROCHLORIDE 10 MG/1
10 TABLET ORAL EVERY 6 HOURS PRN
Qty: 120 TABLET | Refills: 0 | Status: SHIPPED | OUTPATIENT
Start: 2021-10-03 | End: 2021-11-02 | Stop reason: SDUPTHER

## 2021-10-01 RX ORDER — POTASSIUM CHLORIDE 20 MEQ/1
20 TABLET, EXTENDED RELEASE ORAL DAILY
Qty: 30 TABLET | Refills: 5 | Status: SHIPPED | OUTPATIENT
Start: 2021-10-01 | End: 2021-11-22 | Stop reason: SDUPTHER

## 2021-10-01 RX ORDER — NALOXONE HYDROCHLORIDE 4 MG/.1ML
SPRAY NASAL
Qty: 1 EACH | Refills: 1 | Status: SHIPPED | OUTPATIENT
Start: 2021-10-01 | End: 2022-02-15

## 2021-10-01 RX ORDER — ZOLPIDEM TARTRATE 10 MG/1
10 TABLET ORAL
Qty: 30 TABLET | Refills: 0 | Status: SHIPPED | OUTPATIENT
Start: 2021-10-02 | End: 2021-12-20 | Stop reason: SDUPTHER

## 2021-10-01 RX ORDER — SENNOSIDES 8.6 MG
8.6 TABLET ORAL
Qty: 30 TABLET | Refills: 3 | Status: SHIPPED | OUTPATIENT
Start: 2021-10-01

## 2021-10-28 DIAGNOSIS — M48.061 SPINAL STENOSIS OF LUMBAR REGION, UNSPECIFIED WHETHER NEUROGENIC CLAUDICATION PRESENT: ICD-10-CM

## 2021-11-01 DIAGNOSIS — M48.061 SPINAL STENOSIS OF LUMBAR REGION, UNSPECIFIED WHETHER NEUROGENIC CLAUDICATION PRESENT: ICD-10-CM

## 2021-11-01 NOTE — TELEPHONE ENCOUNTER
Completed  Thanks!   M3
Pt needs refill on zolpidem
No adenopathy or splenomegaly. No cervical or inguinal lymphadenopathy.

## 2021-11-02 RX ORDER — CYCLOBENZAPRINE HCL 10 MG
10 TABLET ORAL 3 TIMES DAILY PRN
Qty: 90 TABLET | Refills: 0 | Status: SHIPPED | OUTPATIENT
Start: 2021-11-02 | End: 2021-11-04 | Stop reason: SDUPTHER

## 2021-11-02 RX ORDER — CYCLOBENZAPRINE HCL 10 MG
10 TABLET ORAL 3 TIMES DAILY PRN
Qty: 90 TABLET | Refills: 0 | OUTPATIENT
Start: 2021-11-02

## 2021-11-04 ENCOUNTER — OFFICE VISIT (OUTPATIENT)
Dept: FAMILY MEDICINE CLINIC | Facility: CLINIC | Age: 76
End: 2021-11-04

## 2021-11-04 VITALS
WEIGHT: 186.8 LBS | DIASTOLIC BLOOD PRESSURE: 80 MMHG | HEIGHT: 64 IN | SYSTOLIC BLOOD PRESSURE: 138 MMHG | TEMPERATURE: 96.1 F | OXYGEN SATURATION: 93 % | RESPIRATION RATE: 18 BRPM | BODY MASS INDEX: 31.89 KG/M2 | HEART RATE: 90 BPM

## 2021-11-04 DIAGNOSIS — M47.816 SPONDYLOSIS OF LUMBAR REGION WITHOUT MYELOPATHY OR RADICULOPATHY: ICD-10-CM

## 2021-11-04 DIAGNOSIS — Z79.899 MEDICATION MANAGEMENT: ICD-10-CM

## 2021-11-04 DIAGNOSIS — Z23 ENCOUNTER FOR IMMUNIZATION: Primary | ICD-10-CM

## 2021-11-04 DIAGNOSIS — M79.18 MYOFASCIAL PAIN SYNDROME: ICD-10-CM

## 2021-11-04 DIAGNOSIS — M48.061 SPINAL STENOSIS OF LUMBAR REGION, UNSPECIFIED WHETHER NEUROGENIC CLAUDICATION PRESENT: ICD-10-CM

## 2021-11-04 DIAGNOSIS — G89.4 CHRONIC PAIN SYNDROME: ICD-10-CM

## 2021-11-04 DIAGNOSIS — F11.220 OPIOID DEPENDENCE WITH UNCOMPLICATED INTOXICATION (HCC): ICD-10-CM

## 2021-11-04 PROCEDURE — 99213 OFFICE O/P EST LOW 20 MIN: CPT | Performed by: FAMILY MEDICINE

## 2021-11-04 PROCEDURE — 90662 IIV NO PRSV INCREASED AG IM: CPT | Performed by: FAMILY MEDICINE

## 2021-11-04 PROCEDURE — G0008 ADMIN INFLUENZA VIRUS VAC: HCPCS | Performed by: FAMILY MEDICINE

## 2021-11-04 RX ORDER — CYCLOBENZAPRINE HCL 10 MG
10 TABLET ORAL 3 TIMES DAILY PRN
Qty: 90 TABLET | Refills: 0 | Status: SHIPPED | OUTPATIENT
Start: 2021-11-04 | End: 2022-01-19 | Stop reason: SDUPTHER

## 2021-11-10 ENCOUNTER — 3 MONTH FOLLOW UP (OUTPATIENT)
Dept: URBAN - METROPOLITAN AREA CLINIC 6 | Facility: CLINIC | Age: 76
End: 2021-11-10

## 2021-11-10 DIAGNOSIS — H40.023: ICD-10-CM

## 2021-11-10 DIAGNOSIS — H35.413: ICD-10-CM

## 2021-11-10 DIAGNOSIS — H02.834: ICD-10-CM

## 2021-11-10 DIAGNOSIS — Z96.1: ICD-10-CM

## 2021-11-10 DIAGNOSIS — H02.831: ICD-10-CM

## 2021-11-10 PROCEDURE — 1036F TOBACCO NON-USER: CPT

## 2021-11-10 PROCEDURE — G8427 DOCREV CUR MEDS BY ELIG CLIN: HCPCS

## 2021-11-10 PROCEDURE — 92014 COMPRE OPH EXAM EST PT 1/>: CPT

## 2021-11-10 ASSESSMENT — VISUAL ACUITY
OS_CC: 20/30
OD_CC: 20/30-2

## 2021-11-10 ASSESSMENT — TONOMETRY
OS_IOP_MMHG: 14
OD_IOP_MMHG: 14

## 2021-11-22 DIAGNOSIS — E87.6 HYPOKALEMIA: ICD-10-CM

## 2021-11-23 RX ORDER — POTASSIUM CHLORIDE 20 MEQ/1
20 TABLET, EXTENDED RELEASE ORAL DAILY
Qty: 30 TABLET | Refills: 5 | Status: SHIPPED | OUTPATIENT
Start: 2021-11-23 | End: 2022-04-14 | Stop reason: SDUPTHER

## 2021-11-26 DIAGNOSIS — M25.511 CHRONIC RIGHT SHOULDER PAIN: ICD-10-CM

## 2021-11-26 DIAGNOSIS — G89.29 CHRONIC RIGHT SHOULDER PAIN: ICD-10-CM

## 2021-11-26 DIAGNOSIS — G89.4 PAIN SYNDROME, CHRONIC: ICD-10-CM

## 2021-11-26 RX ORDER — LIDOCAINE 50 MG/G
1 PATCH TOPICAL DAILY
Qty: 30 PATCH | Refills: 2 | Status: SHIPPED | OUTPATIENT
Start: 2021-11-26

## 2021-11-26 RX ORDER — OXYCODONE HYDROCHLORIDE 10 MG/1
10 TABLET ORAL EVERY 6 HOURS PRN
Qty: 120 TABLET | Refills: 0 | Status: SHIPPED | OUTPATIENT
Start: 2021-11-26 | End: 2021-12-20 | Stop reason: SDUPTHER

## 2021-12-17 NOTE — PROGRESS NOTES
Assessment/Plan:    Chronic pain of left knee  -     Ambulatory referral to Orthopedic Surgery; Future  -   Pt may manage pain with OTC Naproxen B I D  PRN    Depression with anxiety  -    Pt complaining of worsening mood, sadness, disinterest d/t anniversary of grandson's suicide  Psychomotor retardation noted during exam  -  Scored 10 on PHQ-9, significant for moderate depression  - DULoxetine (CYMBALTA) 60 mg delayed release capsule; Take 1 capsule (60 mg total) by mouth daily    Chronic heart failure with preserved ejection fraction ([address] )  -  Follows with cardiology annually  - Mild ETODORO noted on exam  - Last echo in 2017 showed 60% EF, G1DD, trace MR, mild AR, moderate TR  - Echo complete with contrast if indicated; Future    Vitamin D deficiency  -     Hx multiple falls in 2019, none this year  -  Last Vit D level low at 11 7 on 6/30/2020  - ergocalciferol (VITAMIN D2) 50,000 units; Take 1 capsule (50,000 Units total) by mouth once a week for 8 doses    Paroxysmal Atrial Fibrillation, rate controlled  - On Eliquis 5 mg and metoprolol succinate 50 mg  - normal rate, rhythm on exam  - No signs of embolic phenomenae at this time  - Continue meds as indicated          Subjective:      Patient ID: Carmencita Glover is a 69 y o  female  Patient is a 68 yo F w/PMH s/f paroxysmal Afib on Eliquis, rate controlled; HTN; CHF w/prEF; chronic pain, depression, who was seen in the office today for f/u of her chronic health problems  Patient is c/o persistent L knee pain with weight bearing, ambulation, with no relief from current oxycodone administration  Pt also reports increased depressive sx lately including fatigue, disinterest, depressed mood/sadness, teariness, and insomnia  She communicates that her poor mood may be related to the anniversary of her grandson's suicide and her social isolation  She has not taken her Cymbalta for 1 5 years, but was receptive to restarting this medication   Pt denies CP, palpitations, SOB, Med: Duloxetine  Dosage: 60 MG  Sig:    Quantity requested:  90    Med: Morphine SR  Dosage: 15 MG  Sig:    Quantity requested:  30    Mail Order: no    Preferred pharmacy has been set up and verified.  [facility] [phone]   facial paralysis, changes in vision or hearing, faintness, localized weakness or numbness, LOC  The following portions of the patient's history were reviewed and updated as appropriate: allergies, current medications, past family history, past medical history, past social history, past surgical history and problem list     Review of Systems   Constitutional: Positive for activity change, appetite change and fatigue  Negative for chills, fever and unexpected weight change  Respiratory: Negative for cough, chest tightness, shortness of breath and wheezing  Cardiovascular: Negative for chest pain and palpitations  Neurological: Negative for facial asymmetry, speech difficulty, weakness, light-headedness and numbness  Psychiatric/Behavioral: Positive for dysphoric mood and sleep disturbance  Negative for confusion, decreased concentration and suicidal ideas  Objective:      /70 (BP Location: Left arm, Patient Position: Sitting, Cuff Size: Large)   Pulse 60   Temp 98 7 °F (37 1 °C) (Tympanic)   Resp 16   Ht 5' 3" (1 6 m)   Wt 87 1 kg (192 lb)   BMI 34 01 kg/m²          Physical Exam  Vitals signs and nursing note reviewed  Constitutional:       General: She is not in acute distress  Appearance: Normal appearance  She is obese  She is not diaphoretic  HENT:      Head: Normocephalic and atraumatic  Cardiovascular:      Rate and Rhythm: Normal rate and regular rhythm  Pulses: Normal pulses  Heart sounds: Normal heart sounds, S1 normal and S2 normal  No murmur  Pulmonary:      Effort: Pulmonary effort is normal  No tachypnea or respiratory distress  Breath sounds: Normal air entry  Examination of the right-lower field reveals decreased breath sounds  Examination of the left-lower field reveals decreased breath sounds  Decreased breath sounds present        Comments: Mildly decreased breath sounds in lower lung fields bilaterally  Musculoskeletal:      Right lower le+ Pitting Edema present  Left lower le+ Pitting Edema present  Skin:     General: Skin is cool and dry  Comments: Dry, flaky skin on B/L LE   Neurological:      General: No focal deficit present  Mental Status: She is oriented to person, place, and time  Cranial Nerves: Cranial nerves are intact  No cranial nerve deficit or facial asymmetry  Sensory: Sensation is intact  Psychiatric:         Attention and Perception: Attention and perception normal          Mood and Affect: Mood is depressed  Affect is blunt and tearful  Behavior: Behavior is cooperative  Thought Content: Thought content normal  Thought content does not include homicidal or suicidal ideation        Comments: Psychomotor retardation noted on exam--no insight per patient

## 2021-12-20 DIAGNOSIS — G89.4 PAIN SYNDROME, CHRONIC: ICD-10-CM

## 2021-12-20 DIAGNOSIS — G47.00 INSOMNIA, UNSPECIFIED TYPE: ICD-10-CM

## 2021-12-21 RX ORDER — OXYCODONE HYDROCHLORIDE 10 MG/1
10 TABLET ORAL EVERY 6 HOURS PRN
Qty: 120 TABLET | Refills: 0 | Status: SHIPPED | OUTPATIENT
Start: 2021-12-23 | End: 2022-01-19 | Stop reason: SDUPTHER

## 2021-12-21 RX ORDER — ZOLPIDEM TARTRATE 10 MG/1
10 TABLET ORAL
Qty: 30 TABLET | Refills: 0 | Status: SHIPPED | OUTPATIENT
Start: 2021-12-21 | End: 2022-01-20 | Stop reason: SDUPTHER

## 2022-01-19 DIAGNOSIS — M48.061 SPINAL STENOSIS OF LUMBAR REGION, UNSPECIFIED WHETHER NEUROGENIC CLAUDICATION PRESENT: ICD-10-CM

## 2022-01-19 DIAGNOSIS — G47.00 INSOMNIA, UNSPECIFIED TYPE: ICD-10-CM

## 2022-01-19 DIAGNOSIS — G89.4 PAIN SYNDROME, CHRONIC: ICD-10-CM

## 2022-01-19 RX ORDER — BUPROPION HYDROCHLORIDE 150 MG/1
TABLET ORAL
COMMUNITY
Start: 2021-12-21 | End: 2022-03-03

## 2022-01-19 RX ORDER — ZOLPIDEM TARTRATE 10 MG/1
10 TABLET ORAL
Qty: 30 TABLET | Refills: 0 | Status: CANCELLED | OUTPATIENT
Start: 2022-01-19

## 2022-01-19 NOTE — TELEPHONE ENCOUNTER
Patient call for medication refill on Generic -Ambien 10 mg tab at bedtime  -Flexeril 10 mg tab  Oxycodone 10 mg tab 4 times per day PRN   Thanks

## 2022-01-20 ENCOUNTER — TELEPHONE (OUTPATIENT)
Dept: FAMILY MEDICINE CLINIC | Facility: CLINIC | Age: 77
End: 2022-01-20

## 2022-01-20 DIAGNOSIS — G47.00 INSOMNIA, UNSPECIFIED TYPE: ICD-10-CM

## 2022-01-20 RX ORDER — CYCLOBENZAPRINE HCL 10 MG
10 TABLET ORAL 3 TIMES DAILY PRN
Qty: 90 TABLET | Refills: 0 | Status: SHIPPED | OUTPATIENT
Start: 2022-01-20 | End: 2022-02-04 | Stop reason: SDUPTHER

## 2022-01-20 RX ORDER — ZOLPIDEM TARTRATE 10 MG/1
10 TABLET ORAL
Qty: 30 TABLET | Refills: 0 | Status: SHIPPED | OUTPATIENT
Start: 2022-01-20 | End: 2022-02-04 | Stop reason: SDUPTHER

## 2022-01-20 RX ORDER — OXYCODONE HYDROCHLORIDE 10 MG/1
10 TABLET ORAL EVERY 6 HOURS PRN
Qty: 120 TABLET | Refills: 0 | Status: SHIPPED | OUTPATIENT
Start: 2022-01-20 | End: 2022-02-17 | Stop reason: SDUPTHER

## 2022-02-04 ENCOUNTER — OFFICE VISIT (OUTPATIENT)
Dept: FAMILY MEDICINE CLINIC | Facility: CLINIC | Age: 77
End: 2022-02-04

## 2022-02-04 VITALS
RESPIRATION RATE: 16 BRPM | WEIGHT: 185.6 LBS | OXYGEN SATURATION: 94 % | HEIGHT: 64 IN | TEMPERATURE: 97.8 F | DIASTOLIC BLOOD PRESSURE: 95 MMHG | SYSTOLIC BLOOD PRESSURE: 139 MMHG | BODY MASS INDEX: 31.69 KG/M2 | HEART RATE: 102 BPM

## 2022-02-04 DIAGNOSIS — F41.8 DEPRESSION WITH ANXIETY: Primary | ICD-10-CM

## 2022-02-04 DIAGNOSIS — M48.061 SPINAL STENOSIS OF LUMBAR REGION, UNSPECIFIED WHETHER NEUROGENIC CLAUDICATION PRESENT: ICD-10-CM

## 2022-02-04 DIAGNOSIS — G47.00 INSOMNIA, UNSPECIFIED TYPE: ICD-10-CM

## 2022-02-04 PROBLEM — F32.A DEPRESSION: Status: RESOLVED | Noted: 2017-10-11 | Resolved: 2022-02-04

## 2022-02-04 PROCEDURE — 99213 OFFICE O/P EST LOW 20 MIN: CPT | Performed by: FAMILY MEDICINE

## 2022-02-04 RX ORDER — ESCITALOPRAM OXALATE 5 MG/1
5 TABLET ORAL DAILY
Qty: 30 TABLET | Refills: 5 | Status: SHIPPED | OUTPATIENT
Start: 2022-02-04 | End: 2022-02-17 | Stop reason: SDUPTHER

## 2022-02-04 RX ORDER — CYCLOBENZAPRINE HCL 10 MG
10 TABLET ORAL 3 TIMES DAILY PRN
Qty: 90 TABLET | Refills: 3 | Status: SHIPPED | OUTPATIENT
Start: 2022-02-04 | End: 2022-06-14 | Stop reason: SDUPTHER

## 2022-02-04 NOTE — PROGRESS NOTES
Family Practice  Lorraine Balbuena 78 y o  female MRN: [id number]  Encounter: [id number]  2/4/2022      Assessment/Plan:       Problem List Items Addressed This Visit        Other    Depression with anxiety - Primary     Worsening symptsom of depressed mood without SI/SH secondary to current chapter of life including living by self and aging  Currently on Wellbutrin 150 mg daily  Plan  · Start lexapro 5 mg daily with transition to 10 mg if tolerating well with plan to eventually slowly taper off of wellbutrin 150 mg daily use for over 15 yrs per patient  · Follow up in 3-4 weeks  Relevant Medications    escitalopram (LEXAPRO) 5 mg tablet    Insomnia     PMDP reviewed  Patient can get Derek Collier reordered 2/20  Lumbar canal stenosis    Relevant Medications    cyclobenzaprine (FLEXERIL) 10 mg tablet              Follow up appointment: 3-4 weeks for start of lexapro    ________________________________________________________________________  Subjective:         HPI:  Lorraine Balbuena is a 78 y o  female presents to the office for follow up and does not have any concerns today  Patient reports feeling down due to aging and living on her own  Patient denies thoughts of SI/SH, but has more persistent days of depressed mood  Patient denies wellbutrin helping with mood very much, but has been on it for 15 years now per patient  Denies any changes at home  Review of Systems   Constitutional: Negative for chills and fever  HENT: Negative for trouble swallowing  Eyes: Negative for visual disturbance  Respiratory: Negative for chest tightness and shortness of breath  Gastrointestinal: Negative for abdominal pain  Neurological: Negative for dizziness, light-headedness and headaches  Psychiatric/Behavioral: Negative for self-injury and suicidal ideas           Historical Information   Past Medical History:   Diagnosis Date    Acute deep vein thrombosis of lower limb ([facility] [address] )     last assessed [id number]   [place] Aortic aneurysm (HCC)     Arthritis     Atrial fibrillation (HCC)     Dislocation of hip ([facility] [address] )     last assessed [id number]    Hypertension     Psychiatric disorder     anxiety     Past Surgical History:   Procedure Laterality Date    HIP SURGERY      JOINT REPLACEMENT      KNEE ARTHROSCOPY Bilateral     x2 rt and 1 on left    TUBAL LIGATION       Social History   Social History     Substance and Sexual Activity   Alcohol Use No    Comment: being a social drinker per [employer]     Social History     Substance and Sexual Activity   Drug Use No     Social History     Tobacco Use   Smoking Status Never Smoker   Smokeless Tobacco Never Used     Family History   Problem Relation Age of Onset    Colon cancer Mother     Breast cancer Family     Thyroid cancer Family     Colon cancer Family     Hypertension Family     Thyroid disease Family     Hypertension Father     Dementia Father          Current Outpatient Medications:     buPROPion (WELLBUTRIN XL) 150 mg 24 hr tablet, , Disp: , Rfl:     cyclobenzaprine (FLEXERIL) 10 mg tablet, Take 1 tablet (10 mg total) by mouth 3 (three) times a day as needed for muscle spasms, Disp: 90 tablet, Rfl: 3    diclofenac sodium (VOLTAREN) 1 %, Apply 2 g topically 4 (four) times a day, Disp: 1 Tube, Rfl: 5    Eliquis 5 MG, TAKE 1 TAB TWICE A DAY, Disp: 180 tablet, Rfl: 4    ergocalciferol (VITAMIN D2) 50,000 units, Take 1 capsule (50,000 Units total) by mouth once a week for 8 doses (Patient not taking: Reported on 11/4/2021), Disp: 8 capsule, Rfl: 0    escitalopram (LEXAPRO) 5 mg tablet, Take 1 tablet (5 mg total) by mouth daily, Disp: 30 tablet, Rfl: 5    furosemide (LASIX) 40 mg tablet, Take 1 tablet (40 mg total) by mouth as needed (edema), Disp: 30 tablet, Rfl: 1    gabapentin (NEURONTIN) 300 mg capsule, TAKE 3 CAPSULES (900 MG TOTAL) BY MOUTH 3 (THREE) TIMES A DAY, Disp: 270 capsule, Rfl: 5    hydrocortisone 2 5 % cream, Apply topically 2 (two) times a day, Disp: 30 g, Rfl: 2    lidocaine (LIDODERM) 5 %, Apply 1 patch topically daily Remove & Discard patch within 12 hours or as directed by MD, Disp: 30 patch, Rfl: 2    metoprolol succinate (TOPROL-XL) 50 mg 24 hr tablet, TAKE 1 TAB DAILY, Disp: 90 tablet, Rfl: 3    naloxone (NARCAN) 4 mg/0 1 mL nasal spray, Administer 1 spray into a nostril  If no response after 2-3 minutes, give another dose in the other nostril using a new spray , Disp: 1 each, Rfl: 1    oxyCODONE (ROXICODONE) 10 MG TABS, Take 1 tablet (10 mg total) by mouth every 6 (six) hours as needed for moderate pain Max Daily Amount: 40 mg, Disp: 120 tablet, Rfl: 0    potassium chloride (K-DUR,KLOR-CON) 20 mEq tablet, Take 1 tablet (20 mEq total) by mouth daily, Disp: 30 tablet, Rfl: 5    senna (SENOKOT) 8 6 mg, Take 1 tablet (8 6 mg total) by mouth daily at bedtime, Disp: 30 tablet, Rfl: 3    zolpidem (AMBIEN) 10 mg tablet, Take 1 tablet (10 mg total) by mouth daily at bedtime as needed for sleep, Disp: 30 tablet, Rfl: 0       Meds/Allergies   (Not in a hospital admission)    No current facility-administered medications for this visit  Allergies   Allergen Reactions    Alprazolam      Annotation - [id number]: Bad side effect    Diclofenac Other (See Comments)     GI upset    Nabumetone Other (See Comments)     Metallic taste in mouth         Objective:     Blood pressure 139/95, pulse 102, temperature 97 8 °F (36 6 °C), temperature source Temporal, resp  rate 16, height 5' 4" (1 626 m), weight 84 2 kg (185 lb 9 6 oz), SpO2 94 %  Body mass index is 31 86 kg/m²  Physical Exam      LAB RESULTS:     No visits with results within 1 Day(s) from this visit     Latest known visit with results is:   Appointment on 06/30/2020   Component Date Value    Vit D, 25-Hydroxy 06/30/2020 11 7*    WBC 06/30/2020 5 17     RBC 06/30/2020 4 94     Hemoglobin 06/30/2020 14 6     Hematocrit 06/30/2020 46 3*    MCV 06/30/2020 94     [address] 06/30/2020 29 6     [facility] 06/30/2020 31 5     RDW 06/30/2020 13 8     MPV 06/30/2020 10 9     Platelets 18/04/9905 226     nRBC 06/30/2020 0     Neutrophils Relative 06/30/2020 64     Immat GRANS % 06/30/2020 0     Lymphocytes Relative 06/30/2020 26     Monocytes Relative 06/30/2020 9     Eosinophils Relative 06/30/2020 0     Basophils Relative 06/30/2020 1     Neutrophils Absolute 06/30/2020 3 34     Immature Grans Absolute 06/30/2020 0 01     Lymphocytes Absolute 06/30/2020 1 33     Monocytes Absolute 06/30/2020 0 44     Eosinophils Absolute 06/30/2020 0 01     Basophils Absolute 06/30/2020 0 04     Sodium 06/30/2020 139     Potassium 06/30/2020 4 0     Chloride 06/30/2020 107     CO2 06/30/2020 26     ANION GAP 06/30/2020 6     BUN 06/30/2020 11     Creatinine 06/30/2020 0 97     Glucose, Fasting 06/30/2020 93     Calcium 06/30/2020 8 6     AST 06/30/2020 11     ALT 06/30/2020 15     Alkaline Phosphatase 06/30/2020 100     Total Protein 06/30/2020 6 7     Albumin 06/30/2020 3 4*    Total Bilirubin 06/30/2020 0 57     eGFR 06/30/2020 59     Cholesterol 06/30/2020 142     Triglycerides 06/30/2020 47     HDL, Direct 06/30/2020 59     LDL Calculated 06/30/2020 74     Non-HDL-Chol (CHOL-HDL) 06/30/2020 83        RADIOLOGY RESULTS: I have personally reviewed pertinent imaging studies            Prosper Puga MD  Family Medicine, PGY-2  9:35 PM 2/4/2022

## 2022-02-05 NOTE — ASSESSMENT & PLAN NOTE
Worsening symptsom of depressed mood without SI/SH secondary to current chapter of life including living by self and aging  Currently on Wellbutrin 150 mg daily  Plan  · Start lexapro 5 mg daily with transition to 10 mg if tolerating well with plan to eventually slowly taper off of wellbutrin 150 mg daily use for over 15 yrs per patient  · Follow up in 3-4 weeks

## 2022-02-07 RX ORDER — ZOLPIDEM TARTRATE 10 MG/1
10 TABLET ORAL
Qty: 30 TABLET | Refills: 0 | Status: SHIPPED | OUTPATIENT
Start: 2022-02-20 | End: 2022-03-15 | Stop reason: SDUPTHER

## 2022-02-15 ENCOUNTER — OFFICE VISIT (OUTPATIENT)
Dept: PODIATRY | Facility: CLINIC | Age: 77
End: 2022-02-15
Payer: COMMERCIAL

## 2022-02-15 VITALS
BODY MASS INDEX: 31.58 KG/M2 | HEART RATE: 93 BPM | SYSTOLIC BLOOD PRESSURE: 169 MMHG | HEIGHT: 64 IN | WEIGHT: 185 LBS | DIASTOLIC BLOOD PRESSURE: 122 MMHG

## 2022-02-15 DIAGNOSIS — L60.0 INGROWN TOENAIL: Primary | ICD-10-CM

## 2022-02-15 DIAGNOSIS — M79.675 PAIN IN TOE OF LEFT FOOT: ICD-10-CM

## 2022-02-15 PROCEDURE — 11750 EXCISION NAIL&NAIL MATRIX: CPT | Performed by: PODIATRIST

## 2022-02-15 NOTE — PROGRESS NOTES
Patient presents with recurrence of a painful ingrown nail affecting the lateral nail border of the left great toe  Patient had partial matrixectomy in 2020 but it has recurred  Discussed treatment options recommending partial matrixectomy  The goal of this procedure is permanent elimination of the offending nail border  Procedure performed as follows: Anesthesia via 3 cc of a one-to-one mixture of 1 percent xylocaine with epinephrine and 1 percent xylocaine plain  A Betadine prep was performed  Lateral nail border of the left great toe was avulsed to the eponychium  A partial matrixectomy was performed utilizing phenol in a standard manner  A bacitracin dressing was applied  The patient is to soak in warm water twice a day tomorrow followed by a Neosporin dressing  The patient is rescheduled in 1 week  Nail removal    Date/Time: 2/15/2022 4:54 PM  Performed by: Lisa Galloway DPM  Authorized by: Lisa Galloway DPM     Patient location:  ClinicUniversal Protocol:  Consent: Verbal consent obtained  Risks and benefits: risks, benefits and alternatives were discussed  Consent given by: patient  Patient understanding: patient states understanding of the procedure being performed  Patient identity confirmed: verbally with patient      Location:     Foot:  L big toe  Pre-procedure details:     Skin preparation:  Betadine  Anesthesia (see MAR for exact dosages):      Anesthesia method:  Nerve block    Block needle gauge:  25 G    Block anesthetic:  Lidocaine 1% WITH epi and lidocaine 1% w/o epi    Block injection procedure:  Anatomic landmarks identified    Block outcome:  Anesthesia achieved  Nail Removal:     Nail removed:  Partial    Nail side:  Lateral    Nail bed sutured: no    Ingrown nail:     Wedge excision of skin: no      Nail matrix removed or ablated:  Partial  Post-procedure details:     Dressing:  4x4 sterile gauze, antibiotic ointment and gauze roll    Patient tolerance of procedure: Tolerated well, no immediate complications

## 2022-02-16 ENCOUNTER — TELEPHONE (OUTPATIENT)
Dept: FAMILY MEDICINE CLINIC | Facility: CLINIC | Age: 77
End: 2022-02-16

## 2022-02-17 DIAGNOSIS — F41.8 DEPRESSION WITH ANXIETY: ICD-10-CM

## 2022-02-17 NOTE — TELEPHONE ENCOUNTER
She wasn't due until the 19th  I sent in the refill  However, something is odd with her PDMP and it doesn't have refills listed since Nov but we have written the prescriptions  Please ensure that she also has a follow up scheduled  Thanks

## 2022-02-18 RX ORDER — ESCITALOPRAM OXALATE 5 MG/1
5 TABLET ORAL DAILY
Qty: 30 TABLET | Refills: 5 | Status: SHIPPED | OUTPATIENT
Start: 2022-02-18 | End: 2022-03-03

## 2022-02-18 NOTE — TELEPHONE ENCOUNTER
Called pharmacy, reviewed medication requests, will be delivered today   Patient scheduled 3/3/22 for f/u

## 2022-02-24 ENCOUNTER — OFFICE VISIT (OUTPATIENT)
Dept: PODIATRY | Facility: CLINIC | Age: 77
End: 2022-02-24

## 2022-02-24 VITALS
DIASTOLIC BLOOD PRESSURE: 105 MMHG | WEIGHT: 185 LBS | HEIGHT: 64 IN | HEART RATE: 94 BPM | BODY MASS INDEX: 31.58 KG/M2 | SYSTOLIC BLOOD PRESSURE: 143 MMHG

## 2022-02-24 DIAGNOSIS — L60.0 INGROWN TOENAIL: Primary | ICD-10-CM

## 2022-02-24 PROCEDURE — 99024 POSTOP FOLLOW-UP VISIT: CPT | Performed by: PODIATRIST

## 2022-02-24 NOTE — PROGRESS NOTES
Patient presents for assessment of partial matrixectomy which was performed last week along the lateral nail border of the left great toe  Surgical site healing uneventfully with minimal discomfort related  Drainage is present within normal limits for procedure performed  There is no evidence of infection  Patient to contact me should problems arise

## 2022-03-03 ENCOUNTER — OFFICE VISIT (OUTPATIENT)
Dept: FAMILY MEDICINE CLINIC | Facility: CLINIC | Age: 77
End: 2022-03-03

## 2022-03-03 VITALS
SYSTOLIC BLOOD PRESSURE: 129 MMHG | HEART RATE: 108 BPM | TEMPERATURE: 98.2 F | WEIGHT: 181.2 LBS | DIASTOLIC BLOOD PRESSURE: 85 MMHG | BODY MASS INDEX: 30.93 KG/M2 | HEIGHT: 64 IN | RESPIRATION RATE: 16 BRPM

## 2022-03-03 DIAGNOSIS — I77.810 MILD ASCENDING AORTA DILATATION (HCC): ICD-10-CM

## 2022-03-03 DIAGNOSIS — F11.220 OPIOID DEPENDENCE WITH UNCOMPLICATED INTOXICATION (HCC): Primary | ICD-10-CM

## 2022-03-03 DIAGNOSIS — F41.8 DEPRESSION WITH ANXIETY: ICD-10-CM

## 2022-03-03 DIAGNOSIS — M48.061 SPINAL STENOSIS OF LUMBAR REGION, UNSPECIFIED WHETHER NEUROGENIC CLAUDICATION PRESENT: ICD-10-CM

## 2022-03-03 DIAGNOSIS — I48.0 PAROXYSMAL ATRIAL FIBRILLATION (HCC): ICD-10-CM

## 2022-03-03 DIAGNOSIS — G47.00 INSOMNIA, UNSPECIFIED TYPE: ICD-10-CM

## 2022-03-03 DIAGNOSIS — N18.31 STAGE 3A CHRONIC KIDNEY DISEASE (HCC): ICD-10-CM

## 2022-03-03 DIAGNOSIS — I50.32 CHRONIC HEART FAILURE WITH PRESERVED EJECTION FRACTION (HCC): ICD-10-CM

## 2022-03-03 PROCEDURE — 99213 OFFICE O/P EST LOW 20 MIN: CPT | Performed by: FAMILY MEDICINE

## 2022-03-03 RX ORDER — ESCITALOPRAM OXALATE 10 MG/1
10 TABLET ORAL DAILY
Qty: 90 TABLET | Refills: 1 | Status: SHIPPED | OUTPATIENT
Start: 2022-03-03

## 2022-03-03 NOTE — PROGRESS NOTES
Assessment/Plan:    Opioid dependence ([facility] )  Still on oxycodone which was started by pain management and we now manage due to transportation  Depression with anxiety  Started on lexapro and to taper off wellbutrin  PHQ-2/9 Depression Screening    Little interest or pleasure in doing things: 2 - more than half the days  Feeling down, depressed, or hopeless: 1 - several days  Trouble falling or staying asleep, or sleeping too much: 0 - not at all  Feeling tired or having little energy: 1 - several days  Poor appetite or overeating: 3 - nearly every day  Feeling bad about yourself - or that you are a failure or have let yourself or your family down: 1 - several days  Trouble concentrating on things, such as reading the newspaper or watching television: 0 - not at all  Moving or speaking so slowly that other people could have noticed  Or the opposite - being so fidgety or restless that you have been moving around a lot more than usual: 0 - not at all  Thoughts that you would be better off dead, or of hurting yourself in some way: 0 - not at all  PHQ-9 Score: 8   PHQ-9 Interpretation: Mild depression        She doesn't feel SI/HI  She does feel that her "end is coming near" and "this will be my year to die"         a      Subjective:      Patient ID: Paulina Wilburn is a 78 y o  female  Changed from wellbutrin to lexapro  Some improvement but has also had a bad month  No other new concerns today      The following portions of the patient's history were reviewed and updated as appropriate:   She  has a past medical history of Acute deep vein thrombosis of lower limb ([facility] ), Aortic aneurysm ([facility] ), Arthritis, Atrial fibrillation ([facility] ), Dislocation of hip ([facility] ), Hypertension, and Psychiatric disorder    She   Patient Active Problem List    Diagnosis Date Noted    Stage 3a chronic kidney disease ([place] [address] ) 03/03/2022    Hypokalemia 10/01/2021    Chronic heart failure with preserved ejection fraction ([facility] ) 05/13/2020    Primary osteoarthritis of right shoulder 01/06/2020    Falls 10/24/2019    Musculoskeletal arm pain, right 10/24/2019    Balance disorder 03/14/2019    Eczema of right external ear 11/27/2018    Constipation 11/26/2018    Healthcare maintenance 06/28/2018    Atrial fibrillation (HCC) 10/11/2017    Chronic pain 10/11/2017    Age-related osteoporosis without current pathological fracture 10/11/2017    Hypertension 10/11/2017    Anxiety 10/11/2017    Depression with anxiety 09/07/2016    Right shoulder pain 02/18/2016    Opioid dependence ([facility] ) 01/21/2016    Left knee pain 04/24/2015    Aortic aneurysm ([facility] ) 03/17/2014    Spondylosis of lumbar region without myelopathy or radiculopathy 05/09/2013    Myofascial pain syndrome 01/11/2013    Insomnia 12/19/2012    Lumbar canal stenosis 06/15/2012    Osteoarthritis 06/15/2012    Vitamin D deficiency 06/15/2012    Pain syndrome, chronic 05/16/2012     Current Outpatient Medications   Medication Sig Dispense Refill    cyclobenzaprine (FLEXERIL) 10 mg tablet Take 1 tablet (10 mg total) by mouth 3 (three) times a day as needed for muscle spasms 90 tablet 3    diclofenac sodium (VOLTAREN) 1 % Apply 2 g topically 4 (four) times a day 1 Tube 5    Eliquis 5 MG TAKE 1 TAB TWICE A  tablet 4    escitalopram (LEXAPRO) 10 mg tablet Take 1 tablet (10 mg total) by mouth daily 90 tablet 1    furosemide (LASIX) 40 mg tablet Take 1 tablet (40 mg total) by mouth as needed (edema) 30 tablet 1    hydrocortisone 2 5 % cream Apply topically 2 (two) times a day 30 g 2    lidocaine (LIDODERM) 5 % Apply 1 patch topically daily Remove & Discard patch within 12 hours or as directed by MD 30 patch 2    metoprolol succinate (TOPROL-XL) 50 mg 24 hr tablet TAKE 1 TAB DAILY 90 tablet 3    oxyCODONE (ROXICODONE) 10 MG TABS Take 1 tablet (10 mg total) by mouth every 6 (six) hours as needed for moderate pain Max Daily Amount: 40 mg 120 tablet 0    potassium chloride (K-DUR,KLOR-CON) 20 mEq tablet Take 1 tablet (20 mEq total) by mouth daily 30 tablet 5    senna (SENOKOT) 8 6 mg Take 1 tablet (8 6 mg total) by mouth daily at bedtime 30 tablet 3    zolpidem (AMBIEN) 10 mg tablet Take 1 tablet (10 mg total) by mouth daily at bedtime as needed for sleep 30 tablet 0    ergocalciferol (VITAMIN D2) 50,000 units Take 1 capsule (50,000 Units total) by mouth once a week for 8 doses (Patient not taking: Reported on 11/4/2021) 8 capsule 0    gabapentin (NEURONTIN) 300 mg capsule TAKE 3 CAPSULES (900 MG TOTAL) BY MOUTH 3 (THREE) TIMES A  capsule 5     No current facility-administered medications for this visit       Review of Systems   Constitutional: Negative for activity change, chills, fever and unexpected weight change  HENT: Negative for congestion  Eyes: Negative for visual disturbance  Respiratory: Negative for cough, chest tightness, shortness of breath and wheezing  Cardiovascular: Negative for chest pain  Gastrointestinal: Negative for abdominal pain, diarrhea and nausea  Endocrine: Negative for cold intolerance and heat intolerance  Musculoskeletal: Negative for arthralgias and myalgias  Skin: Negative for color change  Neurological: Negative for dizziness  Psychiatric/Behavioral: Positive for dysphoric mood  Negative for agitation and sleep disturbance  Objective:      /85 (BP Location: Left arm, Patient Position: Sitting, Cuff Size: Large)   Pulse (!) 108   Temp 98 2 °F (36 8 °C) (Temporal)   Resp 16   Ht 5' 4" (1 626 m)   Wt 82 2 kg (181 lb 3 2 oz)   BMI 31 10 kg/m²          Physical Exam  Vitals reviewed  Constitutional:       Appearance: She is well-developed  HENT:      Head: Normocephalic and atraumatic  Cardiovascular:      Rate and Rhythm: Normal rate and regular rhythm  Heart sounds: Normal heart sounds  Pulmonary:      Effort: Pulmonary effort is normal       Breath sounds: Normal breath sounds  Abdominal:      General: Bowel sounds are normal       Palpations: Abdomen is soft  Musculoskeletal:         General: Normal range of motion  Skin:     General: Skin is warm and dry  Neurological:      Mental Status: She is alert and oriented to person, place, and time     Psychiatric:         Behavior: Behavior normal          Judgment: Judgment normal

## 2022-03-03 NOTE — ASSESSMENT & PLAN NOTE
Started on lexapro and to taper off wellbutrin  PHQ-2/9 Depression Screening    Little interest or pleasure in doing things: 2 - more than half the days  Feeling down, depressed, or hopeless: 1 - several days  Trouble falling or staying asleep, or sleeping too much: 0 - not at all  Feeling tired or having little energy: 1 - several days  Poor appetite or overeating: 3 - nearly every day  Feeling bad about yourself - or that you are a failure or have let yourself or your family down: 1 - several days  Trouble concentrating on things, such as reading the newspaper or watching television: 0 - not at all  Moving or speaking so slowly that other people could have noticed   Or the opposite - being so fidgety or restless that you have been moving around a lot more than usual: 0 - not at all  Thoughts that you would be better off dead, or of hurting yourself in some way: 0 - not at all  PHQ-9 Score: 8   PHQ-9 Interpretation: Mild depression        She doesn't feel SI/HI  She does feel that her "end is coming near" and "this will be my year to die"    Increased lexapro to 10mg  Follow up in 4-6 weeks

## 2022-03-15 ENCOUNTER — TELEPHONE (OUTPATIENT)
Dept: FAMILY MEDICINE CLINIC | Facility: CLINIC | Age: 77
End: 2022-03-15

## 2022-03-15 NOTE — TELEPHONE ENCOUNTER
Received voicemail from patient to refill the following medications  Oxycodone (Roxicodone) 10mg  Gabapentin (Neurontin) 300mg  Zolpidem (Ambien) 10mg    Patient states she never received her Escitalopram (Lexapro) 10mg medication  Please advise if these can be refilled  Patient stated these medications can be sent to the [place] pharmacy to be delivered  Thank you!

## 2022-03-31 DIAGNOSIS — I48.0 PAROXYSMAL ATRIAL FIBRILLATION (HCC): ICD-10-CM

## 2022-04-14 ENCOUNTER — OFFICE VISIT (OUTPATIENT)
Dept: FAMILY MEDICINE CLINIC | Facility: CLINIC | Age: 77
End: 2022-04-14

## 2022-04-14 VITALS
HEART RATE: 105 BPM | HEIGHT: 64 IN | BODY MASS INDEX: 31 KG/M2 | RESPIRATION RATE: 18 BRPM | WEIGHT: 181.6 LBS | SYSTOLIC BLOOD PRESSURE: 108 MMHG | OXYGEN SATURATION: 95 % | DIASTOLIC BLOOD PRESSURE: 73 MMHG | TEMPERATURE: 97.2 F

## 2022-04-14 DIAGNOSIS — E87.6 HYPOKALEMIA: ICD-10-CM

## 2022-04-14 DIAGNOSIS — G47.00 INSOMNIA, UNSPECIFIED TYPE: ICD-10-CM

## 2022-04-14 DIAGNOSIS — G89.4 PAIN SYNDROME, CHRONIC: ICD-10-CM

## 2022-04-14 DIAGNOSIS — Z12.11 SCREENING FOR COLON CANCER: ICD-10-CM

## 2022-04-14 DIAGNOSIS — Z12.31 ENCOUNTER FOR SCREENING MAMMOGRAM FOR MALIGNANT NEOPLASM OF BREAST: ICD-10-CM

## 2022-04-14 DIAGNOSIS — M25.511 RIGHT SHOULDER PAIN, UNSPECIFIED CHRONICITY: ICD-10-CM

## 2022-04-14 DIAGNOSIS — I10 PRIMARY HYPERTENSION: ICD-10-CM

## 2022-04-14 DIAGNOSIS — M47.816 SPONDYLOSIS OF LUMBAR REGION WITHOUT MYELOPATHY OR RADICULOPATHY: Primary | ICD-10-CM

## 2022-04-14 PROCEDURE — 99214 OFFICE O/P EST MOD 30 MIN: CPT | Performed by: FAMILY MEDICINE

## 2022-04-14 RX ORDER — POTASSIUM CHLORIDE 20 MEQ/1
20 TABLET, EXTENDED RELEASE ORAL DAILY
Qty: 30 TABLET | Refills: 5 | Status: SHIPPED | OUTPATIENT
Start: 2022-04-14

## 2022-04-14 RX ORDER — ZOLPIDEM TARTRATE 10 MG/1
10 TABLET ORAL
Qty: 30 TABLET | Refills: 0 | Status: SHIPPED | OUTPATIENT
Start: 2022-04-14 | End: 2022-05-11 | Stop reason: SDUPTHER

## 2022-04-14 RX ORDER — OXYCODONE HYDROCHLORIDE 10 MG/1
10 TABLET ORAL EVERY 6 HOURS PRN
Qty: 120 TABLET | Refills: 0 | Status: SHIPPED | OUTPATIENT
Start: 2022-04-14 | End: 2022-05-11 | Stop reason: SDUPTHER

## 2022-04-14 NOTE — PROGRESS NOTES
Assessment/Plan:    Spondylosis of lumbar region without myelopathy or radiculopathy  Continued on oxycodone  See opioid dependence as well    Insomnia  Has been on ambien for years with no adverse effects and 5mg has not been effective for her in the past    Hypertension    Hypertension- patients hypertension is controlled today with a Blood pressure of Blood Pressure: 108/73     on current medications  Continue current medications  Discussed DASH diet and exercise  a      Subjective:      Patient ID: Matthias Cline is a 78 y o  female  Overall doing ok  No other new concerns  She has been taking care of her neighbor so she hasn't had a lot of time  The following portions of the patient's history were reviewed and updated as appropriate:   She  has a past medical history of Acute deep vein thrombosis of lower limb ([facility] ), Aortic aneurysm ([facility] ), Arthritis, Atrial fibrillation ([facility] ), Dislocation of hip ([facility] ), Hypertension, and Psychiatric disorder    She   Patient Active Problem List    Diagnosis Date Noted    Stage 3a chronic kidney disease ([facility] ) 03/03/2022    Hypokalemia 10/01/2021    Chronic heart failure with preserved ejection fraction ([facility] ) 05/13/2020    Primary osteoarthritis of right shoulder 01/06/2020    Falls 10/24/2019    Musculoskeletal arm pain, right 10/24/2019    Balance disorder 03/14/2019    Eczema of right external ear 11/27/2018    Constipation 11/26/2018    Healthcare maintenance 06/28/2018    Atrial fibrillation (HCC) 10/11/2017    Chronic pain 10/11/2017    Age-related osteoporosis without current pathological fracture 10/11/2017    Hypertension 10/11/2017    Anxiety 10/11/2017    Depression with anxiety 09/07/2016    Right shoulder pain 02/18/2016    Opioid dependence ([facility] ) 01/21/2016    Left knee pain 04/24/2015    Aortic aneurysm ([facility] ) 03/17/2014    Spondylosis of lumbar region without myelopathy or radiculopathy 05/09/2013    Myofascial pain syndrome 01/11/2013    Insomnia 12/19/2012    Lumbar canal stenosis 06/15/2012    Osteoarthritis 06/15/2012    Vitamin D deficiency 06/15/2012    Pain syndrome, chronic 05/16/2012     Current Outpatient Medications   Medication Sig Dispense Refill    apixaban (Eliquis) 5 mg Take 1 tablet (5 mg total) by mouth 2 (two) times a day 60 tablet 4    cyclobenzaprine (FLEXERIL) 10 mg tablet Take 1 tablet (10 mg total) by mouth 3 (three) times a day as needed for muscle spasms 90 tablet 3    diclofenac sodium (VOLTAREN) 1 % Apply 2 g topically 4 (four) times a day 1 Tube 5    escitalopram (LEXAPRO) 10 mg tablet Take 1 tablet (10 mg total) by mouth daily 90 tablet 1    furosemide (LASIX) 40 mg tablet Take 1 tablet (40 mg total) by mouth as needed (edema) 30 tablet 1    gabapentin (NEURONTIN) 300 mg capsule Take 3 capsules (900 mg total) by mouth 3 (three) times a day 270 capsule 5    hydrocortisone 2 5 % cream Apply topically 2 (two) times a day 30 g 2    lidocaine (LIDODERM) 5 % Apply 1 patch topically daily Remove & Discard patch within 12 hours or as directed by MD 30 patch 2    metoprolol succinate (TOPROL-XL) 50 mg 24 hr tablet TAKE 1 TAB DAILY 90 tablet 3    oxyCODONE (ROXICODONE) 10 MG TABS Take 1 tablet (10 mg total) by mouth every 6 (six) hours as needed for moderate pain Max Daily Amount: 40 mg 120 tablet 0    potassium chloride (K-DUR,KLOR-CON) 20 mEq tablet Take 1 tablet (20 mEq total) by mouth daily 30 tablet 5    senna (SENOKOT) 8 6 mg Take 1 tablet (8 6 mg total) by mouth daily at bedtime 30 tablet 3    zolpidem (AMBIEN) 10 mg tablet Take 1 tablet (10 mg total) by mouth daily at bedtime as needed for sleep 30 tablet 0    ergocalciferol (VITAMIN D2) 50,000 units Take 1 capsule (50,000 Units total) by mouth once a week for 8 doses (Patient not taking: Reported on 11/4/2021) 8 capsule 0     No current facility-administered medications for this visit  She has No Known Allergies       Review of Systems Constitutional: Negative for activity change, chills, fever and unexpected weight change  HENT: Negative for congestion  Eyes: Negative for visual disturbance  Respiratory: Negative for cough, chest tightness, shortness of breath and wheezing  Cardiovascular: Negative for chest pain  Gastrointestinal: Negative for abdominal pain, diarrhea and nausea  Endocrine: Negative for cold intolerance and heat intolerance  Musculoskeletal: Negative for arthralgias and myalgias  Skin: Negative for color change  Neurological: Negative for dizziness  Psychiatric/Behavioral: Negative for agitation, dysphoric mood and sleep disturbance  Objective:      /73 (BP Location: Right arm, Patient Position: Sitting, Cuff Size: Standard)   Pulse 105   Temp (!) 97 2 °F (36 2 °C) (Temporal)   Resp 18   Ht 5' 4" (1 626 m)   Wt 82 4 kg (181 lb 9 6 oz)   SpO2 95%   BMI 31 17 kg/m²          Physical Exam  Vitals reviewed  Constitutional:       Appearance: She is well-developed  HENT:      Head: Normocephalic and atraumatic  Mouth/Throat:      Comments: Poor dentition  Cardiovascular:      Rate and Rhythm: Normal rate and regular rhythm  Heart sounds: Normal heart sounds  Pulmonary:      Effort: Pulmonary effort is normal       Breath sounds: Normal breath sounds  Abdominal:      General: Bowel sounds are normal       Palpations: Abdomen is soft  Musculoskeletal:         General: Normal range of motion  Skin:     General: Skin is warm and dry  Neurological:      Mental Status: She is alert and oriented to person, place, and time     Psychiatric:         Behavior: Behavior normal          Judgment: Judgment normal

## 2022-04-15 NOTE — ASSESSMENT & PLAN NOTE
Hypertension- patients hypertension is controlled today with a Blood pressure of Blood Pressure: 108/73     on current medications  Continue current medications  Discussed DASH diet and exercise

## 2022-04-15 NOTE — ASSESSMENT & PLAN NOTE
Has been on ambien for years with no adverse effects and 5mg has not been effective for her in the past

## 2022-04-21 ENCOUNTER — OFFICE VISIT (OUTPATIENT)
Dept: PODIATRY | Facility: CLINIC | Age: 77
End: 2022-04-21
Payer: COMMERCIAL

## 2022-04-21 VITALS — BODY MASS INDEX: 30.9 KG/M2 | HEIGHT: 64 IN | WEIGHT: 181 LBS

## 2022-04-21 DIAGNOSIS — M79.675 PAIN IN TOE OF LEFT FOOT: ICD-10-CM

## 2022-04-21 DIAGNOSIS — L60.0 INGROWN TOENAIL: Primary | ICD-10-CM

## 2022-04-21 PROCEDURE — 11730 AVULSION NAIL PLATE SIMPLE 1: CPT | Performed by: PODIATRIST

## 2022-04-21 PROCEDURE — 99212 OFFICE O/P EST SF 10 MIN: CPT | Performed by: PODIATRIST

## 2022-04-21 RX ORDER — LIDOCAINE HYDROCHLORIDE 10 MG/ML
1 INJECTION, SOLUTION EPIDURAL; INFILTRATION; INTRACAUDAL; PERINEURAL ONCE
Status: COMPLETED | OUTPATIENT
Start: 2022-04-21 | End: 2022-04-21

## 2022-04-21 RX ORDER — LIDOCAINE HYDROCHLORIDE AND EPINEPHRINE 10; 10 MG/ML; UG/ML
1 INJECTION, SOLUTION INFILTRATION; PERINEURAL ONCE
Status: COMPLETED | OUTPATIENT
Start: 2022-04-21 | End: 2022-04-21

## 2022-04-21 RX ADMIN — LIDOCAINE HYDROCHLORIDE 1 ML: 10 INJECTION, SOLUTION EPIDURAL; INFILTRATION; INTRACAUDAL; PERINEURAL at 17:46

## 2022-04-21 RX ADMIN — LIDOCAINE HYDROCHLORIDE AND EPINEPHRINE 1 ML: 10; 10 INJECTION, SOLUTION INFILTRATION; PERINEURAL at 17:46

## 2022-04-21 NOTE — PROGRESS NOTES
Patient presents with continued pain in her left great toe lateral nail border due to an ingrown nail  Patient had partial matrixectomy in February but unfortunately still has pain  There is no evidence of nail recurrence but the remaining nail appears ingrown  Discussed treatment options recommending partial avulsion  The patient is aware that the ingrown nail will return in approximately 4-6 months with this procedure  A partial matrixectomy may be needed in the future  Anesthesia via 3 cc of a one-to-one mixture of 1 percent xylocaine with epinephrine and 1 percent xylocaine plain  A Betadine prep was performed  The lateral nail border of the left great toe was avulsed to the eponychium  Bacitracin dressing was applied  The patient is to soak in warm water b i d  followed by a Neosporin dressing tomorrow  The patient is rescheduled in 1 week  Nail removal    Date/Time: 4/21/2022 5:14 PM  Performed by: Celestino Renteria DPM  Authorized by: Celestino Renteria DPM     Patient location:  ClinicUniversal Protocol:  Consent: Verbal consent obtained  Risks and benefits: risks, benefits and alternatives were discussed  Consent given by: patient  Patient understanding: patient states understanding of the procedure being performed  Patient identity confirmed: verbally with patient      Location:     Foot:  L big toe  Pre-procedure details:     Skin preparation:  Betadine  Anesthesia (see MAR for exact dosages):      Anesthesia method:  Nerve block    Block needle gauge:  25 G    Block anesthetic:  Lidocaine 1% WITH epi and lidocaine 1% w/o epi    Block injection procedure:  Anatomic landmarks identified    Block outcome:  Anesthesia achieved  Nail Removal:     Nail removed:  Partial    Nail side:  Lateral    Nail bed sutured: no    Ingrown nail:     Wedge excision of skin: no      Nail matrix removed or ablated:  None  Post-procedure details:     Dressing:  4x4 sterile gauze, antibiotic ointment and gauze roll Patient tolerance of procedure:   Tolerated well, no immediate complications

## 2022-05-11 DIAGNOSIS — G89.4 CHRONIC PAIN SYNDROME: ICD-10-CM

## 2022-05-11 DIAGNOSIS — H60.541 ECZEMA OF RIGHT EXTERNAL EAR: ICD-10-CM

## 2022-05-11 DIAGNOSIS — G89.4 PAIN SYNDROME, CHRONIC: ICD-10-CM

## 2022-05-11 DIAGNOSIS — G47.00 INSOMNIA, UNSPECIFIED TYPE: ICD-10-CM

## 2022-05-11 NOTE — TELEPHONE ENCOUNTER
Patient called for medication refill for   -Zolpidem 10 mg tab  -Furosemide from 40 mg now is 20 mg tab  -Oxycodone 10 mg   Patient states that's for her Oxycodone it has to be the Pink color Pill she don't want another except -Pink Pill-  Patient contacted the pharmacy         Thanks Thais with [facility] calling INR of 2.3 and has been on 12.5 mg 5 days a week and 10 mg the other two which is Thurs and Sunday. [phone] ok to javier.   Stormy Marrufo RN

## 2022-05-12 RX ORDER — ZOLPIDEM TARTRATE 10 MG/1
10 TABLET ORAL
Qty: 30 TABLET | Refills: 0 | Status: SHIPPED | OUTPATIENT
Start: 2022-05-12 | End: 2022-06-15

## 2022-05-12 RX ORDER — OXYCODONE HYDROCHLORIDE 10 MG/1
10 TABLET ORAL EVERY 6 HOURS PRN
Qty: 120 TABLET | Refills: 0 | Status: SHIPPED | OUTPATIENT
Start: 2022-05-12 | End: 2022-06-14 | Stop reason: SDUPTHER

## 2022-05-12 RX ORDER — FUROSEMIDE 40 MG/1
40 TABLET ORAL AS NEEDED
Qty: 30 TABLET | Refills: 1 | Status: SHIPPED | OUTPATIENT
Start: 2022-05-12

## 2022-05-23 ENCOUNTER — OFFICE VISIT (OUTPATIENT)
Dept: FAMILY MEDICINE CLINIC | Facility: CLINIC | Age: 77
End: 2022-05-23

## 2022-05-23 VITALS — TEMPERATURE: 97.8 F | BODY MASS INDEX: 30.97 KG/M2 | OXYGEN SATURATION: 94 % | WEIGHT: 180.4 LBS

## 2022-05-23 DIAGNOSIS — M47.816 SPONDYLOSIS OF LUMBAR REGION WITHOUT MYELOPATHY OR RADICULOPATHY: ICD-10-CM

## 2022-05-23 DIAGNOSIS — F11.220 OPIOID DEPENDENCE WITH UNCOMPLICATED INTOXICATION (HCC): ICD-10-CM

## 2022-05-23 DIAGNOSIS — M67.442 GANGLION OF HAND, LEFT: Primary | ICD-10-CM

## 2022-05-23 DIAGNOSIS — F41.8 DEPRESSION WITH ANXIETY: ICD-10-CM

## 2022-05-23 DIAGNOSIS — M17.0 OSTEOARTHRITIS OF BOTH KNEES, UNSPECIFIED OSTEOARTHRITIS TYPE: ICD-10-CM

## 2022-05-23 PROBLEM — M67.431 GANGLION CYST OF WRIST, RIGHT: Status: ACTIVE | Noted: 2022-05-23

## 2022-05-23 PROCEDURE — 99214 OFFICE O/P EST MOD 30 MIN: CPT | Performed by: FAMILY MEDICINE

## 2022-05-23 NOTE — PROGRESS NOTES
Assessment/Plan:    Ganglion of hand, left  Most likely ganglion cyst   Offered ultrasound or referral to ortho but it isn't bothering her so she agreed to watchful waiting  Spondylosis of lumbar region without myelopathy or radiculopathy  Continued on long term oxycodone    Opioid dependence ([address] )  Maintained on same dose of oxycodone as prescribed by pain management    Depression with anxiety  Seems partially related to life situations - relatively stable on medications       a      Subjective:      Patient ID: Phyllis Liriano is a 78 y o  female  Still taking care of her neighbor and that takes  A lot of her time and causes her a lot of stress  She is trying to help and not be upset about the situation  She has a lump on her left dorsum of hand  No other new symptoms or concerns      The following portions of the patient's history were reviewed and updated as appropriate:   She  has a past medical history of Acute deep vein thrombosis of lower limb ([address] ), Aortic aneurysm ([address] ), Arthritis, Atrial fibrillation ([address] ), Dislocation of hip ([address] ), Hypertension, and Psychiatric disorder    She   Patient Active Problem List    Diagnosis Date Noted    Ganglion of hand, left 05/23/2022    Stage 3a chronic kidney disease ([address] ) 03/03/2022    Hypokalemia 10/01/2021    Chronic heart failure with preserved ejection fraction ([address] ) 05/13/2020    Primary osteoarthritis of right shoulder 01/06/2020    Falls 10/24/2019    Musculoskeletal arm pain, right 10/24/2019    Balance disorder 03/14/2019    Eczema of right external ear 11/27/2018    Constipation 11/26/2018    Healthcare maintenance 06/28/2018    Atrial fibrillation ([address] ) 10/11/2017    Chronic pain 10/11/2017    Age-related osteoporosis without current pathological fracture 10/11/2017    Hypertension 10/11/2017    Anxiety 10/11/2017    Depression with anxiety 09/07/2016    Right shoulder pain 02/18/2016    Opioid dependence ([address] ) 01/21/2016    Left knee pain 04/24/2015    Aortic aneurysm ([facility] ) 03/17/2014    Spondylosis of lumbar region without myelopathy or radiculopathy 05/09/2013    Myofascial pain syndrome 01/11/2013    Insomnia 12/19/2012    Lumbar canal stenosis 06/15/2012    Vitamin D deficiency 06/15/2012    Pain syndrome, chronic 05/16/2012     Current Outpatient Medications   Medication Sig Dispense Refill    apixaban (Eliquis) 5 mg Take 1 tablet (5 mg total) by mouth 2 (two) times a day 60 tablet 4    cyclobenzaprine (FLEXERIL) 10 mg tablet Take 1 tablet (10 mg total) by mouth 3 (three) times a day as needed for muscle spasms 90 tablet 3    diclofenac sodium (VOLTAREN) 1 % Apply 2 g topically 4 (four) times a day 1 Tube 5    escitalopram (LEXAPRO) 10 mg tablet Take 1 tablet (10 mg total) by mouth daily 90 tablet 1    furosemide (LASIX) 40 mg tablet Take 1 tablet (40 mg total) by mouth as needed in the morning (edema)  30 tablet 1    gabapentin (NEURONTIN) 300 mg capsule Take 3 capsules (900 mg total) by mouth 3 (three) times a day 270 capsule 5    hydrocortisone 2 5 % cream Apply topically 2 (two) times a day 30 g 2    lidocaine (LIDODERM) 5 % Apply 1 patch topically daily Remove & Discard patch within 12 hours or as directed by MD 30 patch 2    metoprolol succinate (TOPROL-XL) 50 mg 24 hr tablet TAKE 1 TAB DAILY 90 tablet 3    oxyCODONE (ROXICODONE) 10 MG TABS Take 1 tablet (10 mg total) by mouth every 6 (six) hours as needed for moderate pain Max Daily Amount: 40 mg 120 tablet 0    potassium chloride (K-DUR,KLOR-CON) 20 mEq tablet Take 1 tablet (20 mEq total) by mouth daily 30 tablet 5    senna (SENOKOT) 8 6 mg Take 1 tablet (8 6 mg total) by mouth daily at bedtime 30 tablet 3    zolpidem (AMBIEN) 10 mg tablet Take 1 tablet (10 mg total) by mouth daily at bedtime as needed for sleep 30 tablet 0     No current facility-administered medications for this visit  She has No Known Allergies       Review of Systems   Constitutional: Negative for activity change, chills, fever and unexpected weight change  HENT: Negative for congestion  Eyes: Negative for visual disturbance  Respiratory: Negative for cough, chest tightness, shortness of breath and wheezing  Cardiovascular: Negative for chest pain  Gastrointestinal: Negative for abdominal pain, diarrhea and nausea  Endocrine: Negative for cold intolerance and heat intolerance  Musculoskeletal: Negative for arthralgias and myalgias  Skin: Negative for color change  Neurological: Negative for dizziness  Psychiatric/Behavioral: Negative for agitation, dysphoric mood and sleep disturbance  Objective:      Temp 97 8 °F (36 6 °C) (Temporal)   Wt 81 8 kg (180 lb 6 4 oz)   SpO2 94%   BMI 30 97 kg/m²          Physical Exam  Constitutional:       Appearance: She is well-developed  HENT:      Head: Normocephalic and atraumatic  Cardiovascular:      Rate and Rhythm: Normal rate and regular rhythm  Heart sounds: Normal heart sounds  Pulmonary:      Effort: Pulmonary effort is normal       Breath sounds: Normal breath sounds  Abdominal:      General: Bowel sounds are normal       Palpations: Abdomen is soft  Musculoskeletal:         General: Swelling present  Normal range of motion  Comments: Left hand cyst/swelling   Skin:     General: Skin is warm and dry  Neurological:      Mental Status: She is alert and oriented to person, place, and time     Psychiatric:         Behavior: Behavior normal          Judgment: Judgment normal

## 2022-05-23 NOTE — ASSESSMENT & PLAN NOTE
Most likely ganglion cyst   Offered ultrasound or referral to ortho but it isn't bothering her so she agreed to watchful waiting

## 2022-06-06 ENCOUNTER — ESTABLISHED COMPREHENSIVE EXAM (OUTPATIENT)
Dept: URBAN - METROPOLITAN AREA CLINIC 6 | Facility: CLINIC | Age: 77
End: 2022-06-06

## 2022-06-06 DIAGNOSIS — H35.413: ICD-10-CM

## 2022-06-06 DIAGNOSIS — H02.831: ICD-10-CM

## 2022-06-06 DIAGNOSIS — Z96.1: ICD-10-CM

## 2022-06-06 DIAGNOSIS — H40.023: ICD-10-CM

## 2022-06-06 DIAGNOSIS — H02.834: ICD-10-CM

## 2022-06-06 PROCEDURE — 92014 COMPRE OPH EXAM EST PT 1/>: CPT

## 2022-06-06 PROCEDURE — 92133 CPTRZD OPH DX IMG PST SGM ON: CPT

## 2022-06-06 ASSESSMENT — VISUAL ACUITY
OU_CC: J1
OD_CC: 20/25-1
OS_CC: 20/25-1

## 2022-06-06 ASSESSMENT — TONOMETRY
OS_IOP_MMHG: 16
OD_IOP_MMHG: 17

## 2022-06-14 DIAGNOSIS — G89.4 PAIN SYNDROME, CHRONIC: ICD-10-CM

## 2022-06-14 DIAGNOSIS — M48.061 SPINAL STENOSIS OF LUMBAR REGION, UNSPECIFIED WHETHER NEUROGENIC CLAUDICATION PRESENT: ICD-10-CM

## 2022-06-14 DIAGNOSIS — I48.0 PAROXYSMAL ATRIAL FIBRILLATION (HCC): ICD-10-CM

## 2022-06-14 RX ORDER — CYCLOBENZAPRINE HCL 10 MG
10 TABLET ORAL 3 TIMES DAILY PRN
Qty: 90 TABLET | Refills: 3 | Status: SHIPPED | OUTPATIENT
Start: 2022-06-14

## 2022-06-14 RX ORDER — OXYCODONE HYDROCHLORIDE 10 MG/1
10 TABLET ORAL EVERY 6 HOURS PRN
Qty: 120 TABLET | Refills: 0 | Status: SHIPPED | OUTPATIENT
Start: 2022-06-14 | End: 2022-07-12 | Stop reason: SDUPTHER

## 2022-06-14 NOTE — TELEPHONE ENCOUNTER
Patient left message on medline requesting refill on her Oxycodone, Cyclobenziprime and Eliquis   Thank You

## 2022-06-27 DIAGNOSIS — I48.0 PAROXYSMAL ATRIAL FIBRILLATION (HCC): ICD-10-CM

## 2022-06-27 RX ORDER — METOPROLOL SUCCINATE 50 MG/1
TABLET, EXTENDED RELEASE ORAL
Qty: 90 TABLET | Refills: 3 | Status: SHIPPED | OUTPATIENT
Start: 2022-06-27

## 2022-07-12 DIAGNOSIS — G89.4 PAIN SYNDROME, CHRONIC: ICD-10-CM

## 2022-07-12 DIAGNOSIS — G47.00 INSOMNIA, UNSPECIFIED TYPE: ICD-10-CM

## 2022-07-12 NOTE — TELEPHONE ENCOUNTER
Patient call requesting refill on this medications and the diclofenac  I try to attached but state dose is no longer available

## 2022-07-13 DIAGNOSIS — M48.061 SPINAL STENOSIS OF LUMBAR REGION, UNSPECIFIED WHETHER NEUROGENIC CLAUDICATION PRESENT: Primary | ICD-10-CM

## 2022-07-13 RX ORDER — OXYCODONE HYDROCHLORIDE 10 MG/1
10 TABLET ORAL EVERY 6 HOURS PRN
Qty: 120 TABLET | Refills: 0 | Status: SHIPPED | OUTPATIENT
Start: 2022-07-14 | End: 2022-08-11 | Stop reason: SDUPTHER

## 2022-07-13 RX ORDER — ZOLPIDEM TARTRATE 10 MG/1
10 TABLET ORAL
Qty: 30 TABLET | Refills: 0 | Status: SHIPPED | OUTPATIENT
Start: 2022-07-14 | End: 2022-08-11 | Stop reason: SDUPTHER

## 2022-07-13 NOTE — TELEPHONE ENCOUNTER
Patient calling also needing medication the VOLTAREN cream be ordered  Was unable to do so       Patient needs the medication delivered by today, she is out

## 2022-08-11 ENCOUNTER — TELEPHONE (OUTPATIENT)
Dept: FAMILY MEDICINE CLINIC | Facility: CLINIC | Age: 77
End: 2022-08-11

## 2022-08-11 DIAGNOSIS — I48.0 PAROXYSMAL ATRIAL FIBRILLATION (HCC): ICD-10-CM

## 2022-08-11 DIAGNOSIS — H60.541 ECZEMA OF RIGHT EXTERNAL EAR: ICD-10-CM

## 2022-08-11 DIAGNOSIS — G89.4 PAIN SYNDROME, CHRONIC: ICD-10-CM

## 2022-08-11 DIAGNOSIS — G47.00 INSOMNIA, UNSPECIFIED TYPE: ICD-10-CM

## 2022-08-11 DIAGNOSIS — M48.061 SPINAL STENOSIS OF LUMBAR REGION, UNSPECIFIED WHETHER NEUROGENIC CLAUDICATION PRESENT: ICD-10-CM

## 2022-08-11 RX ORDER — ZOLPIDEM TARTRATE 10 MG/1
10 TABLET ORAL
Qty: 30 TABLET | Refills: 0 | Status: SHIPPED | OUTPATIENT
Start: 2022-08-11 | End: 2022-08-15 | Stop reason: SDUPTHER

## 2022-08-11 RX ORDER — OXYCODONE HYDROCHLORIDE 10 MG/1
10 TABLET ORAL EVERY 6 HOURS PRN
Qty: 120 TABLET | Refills: 0 | Status: SHIPPED | OUTPATIENT
Start: 2022-08-11 | End: 2022-08-15 | Stop reason: SDUPTHER

## 2022-08-12 NOTE — TELEPHONE ENCOUNTER
Called [facility], confirmed that they received patient's medications, spoke to Kimberly the pharmacist  Kimberly states medications will be out for delivery today

## 2022-08-12 NOTE — TELEPHONE ENCOUNTER
Called patient to see if she received medications  Per patient, she did not receive anything yet  Patient's been advised that call would be placed to pharmacy to check status  Patient verbalized understanding

## 2022-08-12 NOTE — TELEPHONE ENCOUNTER
Received voicemail from patient stating requested medications have not been sent into patient's pharmacy  Patient is requesting this be completed before the weekend as she is almost out of medication  Patient states the pharmacy never received medication

## 2022-08-15 DIAGNOSIS — M48.061 SPINAL STENOSIS OF LUMBAR REGION, UNSPECIFIED WHETHER NEUROGENIC CLAUDICATION PRESENT: ICD-10-CM

## 2022-08-15 DIAGNOSIS — G47.00 INSOMNIA, UNSPECIFIED TYPE: ICD-10-CM

## 2022-08-15 DIAGNOSIS — H60.541 ECZEMA OF RIGHT EXTERNAL EAR: ICD-10-CM

## 2022-08-15 DIAGNOSIS — I48.0 PAROXYSMAL ATRIAL FIBRILLATION (HCC): ICD-10-CM

## 2022-08-15 DIAGNOSIS — G89.4 PAIN SYNDROME, CHRONIC: ICD-10-CM

## 2022-08-15 RX ORDER — ZOLPIDEM TARTRATE 10 MG/1
10 TABLET ORAL
Qty: 30 TABLET | Refills: 0 | Status: SHIPPED | OUTPATIENT
Start: 2022-08-15 | End: 2022-09-13 | Stop reason: SDUPTHER

## 2022-08-15 RX ORDER — OXYCODONE HYDROCHLORIDE 10 MG/1
10 TABLET ORAL EVERY 6 HOURS PRN
Qty: 120 TABLET | Refills: 0 | Status: SHIPPED | OUTPATIENT
Start: 2022-08-15 | End: 2022-09-13 | Stop reason: SDUPTHER

## 2022-08-15 NOTE — TELEPHONE ENCOUNTER
Called patient, gave update regarding medication  Patient was advised that call would be placed to [facility] by end of day today to check status

## 2022-08-15 NOTE — TELEPHONE ENCOUNTER
Received voicemail from patient  Per patient, she needs medication refills sent to [facility] as she has run out of them  Please reorder and send to this pharmacy per patient's request  Thanks!

## 2022-08-15 NOTE — TELEPHONE ENCOUNTER
Patient called the pharmacy, the pharmacist told patient medication is not received   Per pharmacist, please call [facility] and cancel and if not cancelled in mail order  The patient has not taken any of  Medicine (4) in the past 5 days and needs the medication ASAP  Patient can be reached at [phone]

## 2022-08-15 NOTE — TELEPHONE ENCOUNTER
2 Carlie Amezcua, spoke to Bryce Leak the pharmacist  Per Bryce Leak, [facility] needs to cancel the medications in order for [address] [place] [address] to be able to fill them  Zeke advised that a call should be placed to [facility] to have those cancelled

## 2022-08-15 NOTE — TELEPHONE ENCOUNTER
Called patient, notified her that medication would be delivered today  Patient verbalized understanding

## 2022-08-15 NOTE — TELEPHONE ENCOUNTER
Called [facility], spoke to Dawson Osman was advised that patient's medications need to be cancelled as she will not be using that pharmacy  Dawson stated that she will put the request in  Dawson was also asked to put this in as an urgent request as patient is out of medication

## 2022-08-15 NOTE — TELEPHONE ENCOUNTER
2 Carlie Amezcua, spoke to [place] the pharmacist  Per Follansbee, he did not receive communication from [facility] that medication was cancelled  Follansbee states he is going to send some medication for patient so she has it until he has communication from [facility]  This should be delivered to patient today

## 2022-08-16 NOTE — TELEPHONE ENCOUNTER
Received voicemail from patient stating medications were still not cancelled by [facility] pharmacy  Called [facility], spoke to Gladys Putnam cancelled prescriptions while on the phone and advised that [facility] should be able to fill prescriptions now  Gladys Putanm states that claims for this were reversed and patient will not be billed  2 Carlie Amezcua, spoke to [place] the pharmacist  [facility] confirmed that this was cancelled  Haugen states medication will be sent out for delivery today  Called patient, gave update with medications

## 2022-08-18 ENCOUNTER — OFFICE VISIT (OUTPATIENT)
Dept: FAMILY MEDICINE CLINIC | Facility: CLINIC | Age: 77
End: 2022-08-18

## 2022-08-18 ENCOUNTER — TELEPHONE (OUTPATIENT)
Dept: FAMILY MEDICINE CLINIC | Facility: CLINIC | Age: 77
End: 2022-08-18

## 2022-08-18 VITALS
HEIGHT: 64 IN | SYSTOLIC BLOOD PRESSURE: 128 MMHG | RESPIRATION RATE: 18 BRPM | TEMPERATURE: 97.9 F | BODY MASS INDEX: 30.87 KG/M2 | DIASTOLIC BLOOD PRESSURE: 86 MMHG | OXYGEN SATURATION: 97 % | WEIGHT: 180.8 LBS | HEART RATE: 88 BPM

## 2022-08-18 DIAGNOSIS — M25.562 CHRONIC PAIN OF LEFT KNEE: ICD-10-CM

## 2022-08-18 DIAGNOSIS — Z59.9 FINANCIAL DIFFICULTIES: ICD-10-CM

## 2022-08-18 DIAGNOSIS — M48.061 SPINAL STENOSIS OF LUMBAR REGION, UNSPECIFIED WHETHER NEUROGENIC CLAUDICATION PRESENT: ICD-10-CM

## 2022-08-18 DIAGNOSIS — F11.220 OPIOID DEPENDENCE WITH UNCOMPLICATED INTOXICATION (HCC): ICD-10-CM

## 2022-08-18 DIAGNOSIS — N81.10 BLADDER PROLAPSE, FEMALE, ACQUIRED: ICD-10-CM

## 2022-08-18 DIAGNOSIS — E87.6 HYPOKALEMIA: ICD-10-CM

## 2022-08-18 DIAGNOSIS — I50.32 CHRONIC HEART FAILURE WITH PRESERVED EJECTION FRACTION (HCC): ICD-10-CM

## 2022-08-18 DIAGNOSIS — G89.29 CHRONIC PAIN OF LEFT KNEE: ICD-10-CM

## 2022-08-18 DIAGNOSIS — N18.31 STAGE 3A CHRONIC KIDNEY DISEASE (HCC): ICD-10-CM

## 2022-08-18 DIAGNOSIS — M47.816 SPONDYLOSIS OF LUMBAR REGION WITHOUT MYELOPATHY OR RADICULOPATHY: Primary | ICD-10-CM

## 2022-08-18 PROBLEM — H04.122 DRY EYE OF LEFT SIDE: Status: ACTIVE | Noted: 2022-08-18

## 2022-08-18 PROCEDURE — 99214 OFFICE O/P EST MOD 30 MIN: CPT | Performed by: FAMILY MEDICINE

## 2022-08-18 SDOH — ECONOMIC STABILITY - INCOME SECURITY: PROBLEM RELATED TO HOUSING AND ECONOMIC CIRCUMSTANCES, UNSPECIFIED: Z59.9

## 2022-08-18 NOTE — TELEPHONE ENCOUNTER
----- Message from [address] sent at 8/18/2022  9:24 AM EDT -----  Patient got a letter about the office changing insurance acceptance she had questions that I can't answer  Can someone please call her to clarify?  thanks

## 2022-08-18 NOTE — ASSESSMENT & PLAN NOTE
Most likely bladder prolapse based on history  Will schedule for vaginal exam and then likely referral to urogyn

## 2022-08-18 NOTE — ASSESSMENT & PLAN NOTE
Patient complains of constant tearing of that eye  Encouraged use of OTC lubricating drops to improve dry eye and decrease tearing

## 2022-08-18 NOTE — ASSESSMENT & PLAN NOTE
Long term oxycodone that was started and managed and pain management but transferred to our office because of transportation issues to [place]  No changes in medication  She is aware of the risks

## 2022-08-18 NOTE — TELEPHONE ENCOUNTER
Returning patients call [facility] to return call jack alex concerns regarding insurance issues

## 2022-08-18 NOTE — PROGRESS NOTES
Assessment/Plan:    Dry eye of left side  Patient complains of constant tearing of that eye  Encouraged use of OTC lubricating drops to improve dry eye and decrease tearing  Spondylosis of lumbar region without myelopathy or radiculopathy  Long term oxycodone that was started and managed and pain management but transferred to our office because of transportation issues to [place]  No changes in medication  She is aware of the risks  Stage 3a chronic kidney disease ([facility] )  Lab Results   Component Value Date    EGFR 59 06/30/2020    EGFR 56 10/01/2019    EGFR 69 03/20/2019    CREATININE 0 97 06/30/2020    CREATININE 1 00 10/01/2019    CREATININE 0 85 03/20/2019   last blood work in 2020  Given paper for blood work again    Left knee pain  Referred to ortho again - she does not wish to see [facility] ortho so she will contact [facility] or [facility]placed by [facility] since they did her hip;       Diagnoses and all orders for this visit:    Spondylosis of lumbar region without myelopathy or radiculopathy    Opioid dependence with uncomplicated intoxication ([facility] )    Spinal stenosis of lumbar region, unspecified whether neurogenic claudication present    Stage 3a chronic kidney disease ([facility] )    Hypokalemia    Chronic heart failure with preserved ejection fraction ([address] )    Financial difficulties  -     Ambulatory referral to social work care management program; Future    Chronic pain of left knee          Subjective:      Patient ID: Namrata Griggs is a 78 y o  female  Overall doing ok and at first does not have concerns but then admits to the following problems:  But she does have increased knee pain but does not want to see the [facility] ortho group as she had  A bad experience  Also has some feeling of bladder prolapse where she has to hold her bladder into her vagina in order to urinate  Also had some questions about insurance     complains of "nerves" getting bad  She takes care of her neighbor and that puts a lot of pressure on her  She also complains of left eye watering and having some loose skin below the eye        The following portions of the patient's history were reviewed and updated as appropriate:   She  has a past medical history of Acute deep vein thrombosis of lower limb ([facility] ), Aortic aneurysm ([facility] ), Arthritis, Atrial fibrillation ([facility] ), Dislocation of hip ([facility] ), Hypertension, and Psychiatric disorder    She   Patient Active Problem List    Diagnosis Date Noted    Dry eye of left side 08/18/2022    Ganglion of hand, left 05/23/2022    Stage 3a chronic kidney disease ([facility] ) 03/03/2022    Hypokalemia 10/01/2021    Chronic heart failure with preserved ejection fraction ([address] ) 05/13/2020    Primary osteoarthritis of right shoulder 01/06/2020    Falls 10/24/2019    Musculoskeletal arm pain, right 10/24/2019    Balance disorder 03/14/2019    Eczema of right external ear 11/27/2018    Constipation 11/26/2018    Healthcare maintenance 06/28/2018    Atrial fibrillation (HCC) 10/11/2017    Chronic pain 10/11/2017    Age-related osteoporosis without current pathological fracture 10/11/2017    Hypertension 10/11/2017    Anxiety 10/11/2017    Depression with anxiety 09/07/2016    Right shoulder pain 02/18/2016    Opioid dependence ([facility] ) 01/21/2016    Left knee pain 04/24/2015    Aortic aneurysm (HCC) 03/17/2014    Spondylosis of lumbar region without myelopathy or radiculopathy 05/09/2013    Myofascial pain syndrome 01/11/2013    Insomnia 12/19/2012    Lumbar canal stenosis 06/15/2012    Vitamin D deficiency 06/15/2012    Pain syndrome, chronic 05/16/2012     Current Outpatient Medications   Medication Sig Dispense Refill    apixaban (Eliquis) 5 mg Take 1 tablet (5 mg total) by mouth 2 (two) times a day 60 tablet 4    cyclobenzaprine (FLEXERIL) 10 mg tablet Take 1 tablet (10 mg total) by mouth 3 (three) times a day as needed for muscle spasms 90 tablet 3    Diclofenac Sodium (VOLTAREN) 1 % Apply 1 g topically 4 (four) times a day 20 g 0    escitalopram (LEXAPRO) 10 mg tablet Take 1 tablet (10 mg total) by mouth daily 90 tablet 1    furosemide (LASIX) 40 mg tablet Take 1 tablet (40 mg total) by mouth as needed in the morning (edema)  30 tablet 1    gabapentin (NEURONTIN) 300 mg capsule Take 3 capsules (900 mg total) by mouth 3 (three) times a day 270 capsule 5    hydrocortisone 2 5 % cream Apply topically 2 (two) times a day 30 g 2    lidocaine (LIDODERM) 5 % Apply 1 patch topically daily Remove & Discard patch within 12 hours or as directed by MD 30 patch 2    metoprolol succinate (TOPROL-XL) 50 mg 24 hr tablet TAKE 1 TABLET DAILY 90 tablet 3    oxyCODONE (ROXICODONE) 10 MG TABS Take 1 tablet (10 mg total) by mouth every 6 (six) hours as needed for moderate pain Max Daily Amount: 40 mg 120 tablet 0    potassium chloride (K-DUR,KLOR-CON) 20 mEq tablet Take 1 tablet (20 mEq total) by mouth daily 30 tablet 5    senna (SENOKOT) 8 6 mg Take 1 tablet (8 6 mg total) by mouth daily at bedtime 30 tablet 3    zolpidem (AMBIEN) 10 mg tablet Take 1 tablet (10 mg total) by mouth daily at bedtime as needed for sleep 30 tablet 0     No current facility-administered medications for this visit  She has No Known Allergies       Review of Systems   Constitutional: Negative for activity change, chills, fever and unexpected weight change  HENT: Negative for congestion  Eyes: Positive for discharge  Negative for visual disturbance  Respiratory: Negative for cough, chest tightness, shortness of breath and wheezing  Cardiovascular: Negative for chest pain  Gastrointestinal: Negative for abdominal pain, diarrhea and nausea  Endocrine: Negative for cold intolerance and heat intolerance  Musculoskeletal: Positive for arthralgias and back pain  Negative for myalgias  Skin: Negative for color change  Neurological: Negative for dizziness  Psychiatric/Behavioral: Positive for dysphoric mood   Negative for agitation and sleep disturbance  The patient is nervous/anxious  Objective:      /86 (BP Location: Left arm, Patient Position: Sitting, Cuff Size: Standard)   Pulse 88   Temp 97 9 °F (36 6 °C) (Temporal)   Resp 18   Ht 5' 4" (1 626 m)   Wt 82 kg (180 lb 12 8 oz)   SpO2 97%   BMI 31 03 kg/m²          Physical Exam  Constitutional:       Appearance: She is well-developed  HENT:      Head: Normocephalic and atraumatic  Cardiovascular:      Rate and Rhythm: Normal rate and regular rhythm  Heart sounds: Normal heart sounds  Pulmonary:      Effort: Pulmonary effort is normal       Breath sounds: Normal breath sounds  Abdominal:      General: Bowel sounds are normal       Palpations: Abdomen is soft  Musculoskeletal:         General: Normal range of motion  Skin:     General: Skin is warm and dry  Comments: Loose skin under eyes bilaterally but L>R   Neurological:      Mental Status: She is alert and oriented to person, place, and time     Psychiatric:         Behavior: Behavior normal          Judgment: Judgment normal

## 2022-08-18 NOTE — ASSESSMENT & PLAN NOTE
Referred to ortho again - she does not wish to see [facility]'s ortho so she will contact [facility] since they did her hip;

## 2022-08-18 NOTE — ASSESSMENT & PLAN NOTE
Lab Results   Component Value Date    EGFR 59 06/30/2020    EGFR 56 10/01/2019    EGFR 69 03/20/2019    CREATININE 0 97 06/30/2020    CREATININE 1 00 10/01/2019    CREATININE 0 85 03/20/2019   last blood work in 2020  Given paper for blood work again

## 2022-08-25 ENCOUNTER — PATIENT OUTREACH (OUTPATIENT)
Dept: FAMILY MEDICINE CLINIC | Facility: CLINIC | Age: 77
End: 2022-08-25

## 2022-08-25 NOTE — PROGRESS NOTES
CASIMIRO MARTIN received referral from provider DO Carmencita to assist patient with financial difficulties  CASIMIRO MARTIN outreached patient regarding same  CASIMIRO MARTIN introduced self and explained role  Patient reported that she is anticipating the need for a knee surgery and is fearful of the costs  Patient reported that she does not feel comfortable receiving support from [address] [place] [address] due to a bad experience in the past  She reported that will potentially pursue [facility] for the knee surgery  CASIMIRO MARTIN explained to patient that most organizations have financial counselors to assist with payment plans, reduced cost plans, or hardship depending on income and presenting needs  CASIMIRO MARTIN advised patient to reach out to [facility] for further information  Patient was receptive  CASIMIRO MARTIN inquired about any other social needs  Patient reported that she is residing in [address] [facility] and has friends that reside there  She reported that she is still driving and is also receiving food stamps  She denied any further assistance at this time  CASIMIRO MARTIN provided patient with contact information if she is in need of any further support  CASIMIRO MARTIN will continue to remain available and provide psychosocial support as necessary

## 2022-09-09 NOTE — PROGRESS NOTES
Cardiology Follow Up    Jourdan Whitfield  1945  [id number]  [address] [facility] [address] [phone]    1  Essential hypertension  Echo complete w/ contrast if indicated   2  Chronic heart failure with preserved ejection fraction (HCC)  Echo complete w/ contrast if indicated   3  Paroxysmal atrial fibrillation (HCC)  Echo complete w/ contrast if indicated   4  Mild ascending aorta dilatation (HCC)  Echo complete w/ contrast if indicated       Interval History:  Patient is here for a follow-up visit  Patient has HTN, DVT, PAF (on rate control and Eliquis) and a mild ascending aortic aneurysm  CT scan of the chest done March 2019 demonstrated a mild ascending aortic aneurysm of 4 3 cm  Echocardiogram done 9/2021 demonstrated preserved LV systolic function  The aortic root was 4 4 cm and the ascending aorta was mildly dilated at 4 4 cm  Lipid profile done June 2020 looked good  She has been well  She has had no chest pain or significant dyspnea      Patient Active Problem List   Diagnosis    Atrial fibrillation (HCC)    Chronic pain    Age-related osteoporosis without current pathological fracture    Hypertension    Anxiety    Aortic aneurysm ([address] )    Depression with anxiety    Insomnia    Left knee pain    Lumbar canal stenosis    Myofascial pain syndrome    Opioid dependence (HCC)    Pain syndrome, chronic    Right shoulder pain    Spondylosis of lumbar region without myelopathy or radiculopathy    Vitamin D deficiency    Healthcare maintenance    Constipation    Eczema of right external ear    Balance disorder    Falls    Musculoskeletal arm pain, right    Primary osteoarthritis of right shoulder    Chronic heart failure with preserved ejection fraction (HCC)    Hypokalemia    Stage 3a chronic kidney disease (HCC)    Ganglion of hand, left    Dry eye of left side    Bladder prolapse, female, acquired     Past Medical History:   Diagnosis Date    Acute deep vein thrombosis of lower limb ([facility] [address] )     last assessed [id number]    Aortic aneurysm (HCC)     Arthritis     Atrial fibrillation (HCC)     Dislocation of hip ([facility] )     last assessed [id number]    Hypertension     Psychiatric disorder     anxiety     Social History     Socioeconomic History    Marital status:      Spouse name: Not on file    Number of children: Not on file    Years of education: Not on file    Highest education level: Not on file   Occupational History    Not on file   Tobacco Use    Smoking status: Never Smoker    Smokeless tobacco: Never Used   Vaping Use    Vaping Use: Never used   Substance and Sexual Activity    Alcohol use: No     Comment: being a social drinker per [employer]    Drug use: No    Sexual activity: Not Currently     Partners: Male   Other Topics Concern    Not on file   Social History Narrative    Not on file     Social Determinants of Health     Financial Resource Strain: Medium Risk    Difficulty of Paying Living Expenses: Somewhat hard   Food Insecurity: No Food Insecurity    Worried About Running Out of Food in the Last Year: Never true    Maciej of Food in the Last Year: Never true   Transportation Needs: No Transportation Needs    Lack of Transportation (Medical): No    Lack of Transportation (Non-Medical):  No   Physical Activity: Not on file   Stress: Not on file   Social Connections: Not on file   Intimate Partner Violence: Not At Risk    Fear of Current or Ex-Partner: No    Emotionally Abused: No    Physically Abused: No    Sexually Abused: No   Housing Stability: Low Risk     Unable to Pay for Housing in the Last Year: No    Number of Places Lived in the Last Year: 1    Unstable Housing in the Last Year: No      Family History   Problem Relation Age of Onset    Colon cancer Mother     Breast cancer Family     Thyroid cancer Family     Colon cancer Family     Hypertension Family     Thyroid disease Family     Hypertension Father     Dementia Father      Past Surgical History:   Procedure Laterality Date    HIP SURGERY      JOINT REPLACEMENT      KNEE ARTHROSCOPY Bilateral     x2 rt and 1 on left    TUBAL LIGATION         Current Outpatient Medications:     apixaban (Eliquis) 5 mg, Take 1 tablet (5 mg total) by mouth 2 (two) times a day, Disp: 60 tablet, Rfl: 4    cyclobenzaprine (FLEXERIL) 10 mg tablet, Take 1 tablet (10 mg total) by mouth 3 (three) times a day as needed for muscle spasms, Disp: 90 tablet, Rfl: 3    Diclofenac Sodium (VOLTAREN) 1 %, Apply 1 g topically 4 (four) times a day, Disp: 20 g, Rfl: 0    escitalopram (LEXAPRO) 10 mg tablet, Take 1 tablet (10 mg total) by mouth daily, Disp: 90 tablet, Rfl: 1    furosemide (LASIX) 40 mg tablet, Take 1 tablet (40 mg total) by mouth as needed (edema), Disp: 30 tablet, Rfl: 1    gabapentin (NEURONTIN) 300 mg capsule, Take 3 capsules (900 mg total) by mouth 3 (three) times a day, Disp: 270 capsule, Rfl: 5    hydrocortisone 2 5 % cream, Apply topically 2 (two) times a day, Disp: 30 g, Rfl: 2    lidocaine (LIDODERM) 5 %, Apply 1 patch topically daily Remove & Discard patch within 12 hours or as directed by MD, Disp: 30 patch, Rfl: 2    metoprolol succinate (TOPROL-XL) 50 mg 24 hr tablet, TAKE 1 TABLET DAILY, Disp: 90 tablet, Rfl: 3    oxyCODONE (ROXICODONE) 10 MG TABS, Take 1 tablet (10 mg total) by mouth every 6 (six) hours as needed for moderate pain Max Daily Amount: 40 mg, Disp: 120 tablet, Rfl: 0    potassium chloride (K-DUR,KLOR-CON) 20 mEq tablet, Take 1 tablet (20 mEq total) by mouth daily, Disp: 30 tablet, Rfl: 5    senna (SENOKOT) 8 6 mg, Take 1 tablet (8 6 mg total) by mouth daily at bedtime, Disp: 30 tablet, Rfl: 3    zolpidem (AMBIEN) 10 mg tablet, Take 1 tablet (10 mg total) by mouth daily at bedtime as needed for sleep, Disp: 30 tablet, Rfl: 0  No Known Allergies    Labs:not applicable  Imaging: No results found  Review of Systems:  Review of Systems   All other systems reviewed and are negative  Physical Exam:  /80 (BP Location: Right arm, Patient Position: Sitting, Cuff Size: Extra-Large)   Pulse 104   Wt 79 7 kg (175 lb 11 2 oz)   SpO2 96%   BMI 30 16 kg/m²   Physical Exam  Vitals reviewed  Constitutional:       Appearance: She is well-developed  HENT:      Head: Normocephalic and atraumatic  Eyes:      Conjunctiva/sclera: Conjunctivae normal       Pupils: Pupils are equal, round, and reactive to light  Cardiovascular:      Rate and Rhythm: Normal rate  Heart sounds: Normal heart sounds  Pulmonary:      Effort: Pulmonary effort is normal       Breath sounds: Normal breath sounds  Musculoskeletal:      Cervical back: Normal range of motion and neck supple  Skin:     General: Skin is warm and dry  Neurological:      Mental Status: She is alert and oriented to person, place, and time  Discussion/Summary:I will continue the patient's present medical regimen  The patient appears well compensated  I have asked the patient to call if there is a problem in the interim otherwise I will see the patient in one years time  Patient is due for an echocardiogram prior to the next visit to assess LV wall thickness and systolic function

## 2022-09-13 DIAGNOSIS — G89.4 PAIN SYNDROME, CHRONIC: ICD-10-CM

## 2022-09-13 DIAGNOSIS — G47.00 INSOMNIA, UNSPECIFIED TYPE: ICD-10-CM

## 2022-09-13 DIAGNOSIS — G89.4 CHRONIC PAIN SYNDROME: ICD-10-CM

## 2022-09-13 NOTE — TELEPHONE ENCOUNTER
Received VM on Rx line pt requesting the following medication: Furosemide, oxyCODONE, and ambien  All medications pended please review PDMP for oxycodone and refill Rx's if appropriate  Pt requesting refills to go to [facility]

## 2022-09-14 DIAGNOSIS — G47.00 INSOMNIA, UNSPECIFIED TYPE: ICD-10-CM

## 2022-09-14 DIAGNOSIS — G89.4 PAIN SYNDROME, CHRONIC: ICD-10-CM

## 2022-09-14 DIAGNOSIS — G89.4 CHRONIC PAIN SYNDROME: ICD-10-CM

## 2022-09-14 RX ORDER — ZOLPIDEM TARTRATE 10 MG/1
10 TABLET ORAL
Qty: 30 TABLET | Refills: 0 | Status: SHIPPED | OUTPATIENT
Start: 2022-09-14 | End: 2022-10-12

## 2022-09-14 RX ORDER — OXYCODONE HYDROCHLORIDE 10 MG/1
10 TABLET ORAL EVERY 6 HOURS PRN
Qty: 120 TABLET | Refills: 0 | OUTPATIENT
Start: 2022-09-14 | End: 2022-10-14

## 2022-09-14 RX ORDER — ZOLPIDEM TARTRATE 10 MG/1
10 TABLET ORAL
Qty: 30 TABLET | Refills: 0 | OUTPATIENT
Start: 2022-09-14

## 2022-09-14 RX ORDER — FUROSEMIDE 40 MG/1
40 TABLET ORAL AS NEEDED
Qty: 30 TABLET | Refills: 1 | OUTPATIENT
Start: 2022-09-14

## 2022-09-14 RX ORDER — OXYCODONE HYDROCHLORIDE 10 MG/1
10 TABLET ORAL EVERY 6 HOURS PRN
Qty: 120 TABLET | Refills: 0 | Status: SHIPPED | OUTPATIENT
Start: 2022-09-14 | End: 2022-10-12 | Stop reason: SDUPTHER

## 2022-09-14 RX ORDER — FUROSEMIDE 40 MG/1
40 TABLET ORAL AS NEEDED
Qty: 30 TABLET | Refills: 1 | Status: SHIPPED | OUTPATIENT
Start: 2022-09-14

## 2022-09-14 NOTE — TELEPHONE ENCOUNTER
VM left informing pt Rx's have been sent to pharmacy office number provided for any questions or concern

## 2022-09-14 NOTE — TELEPHONE ENCOUNTER
Patient called for medication refill for   -Oxycodone 10 mg tab  -Furosemide 40 mg tab  -Zolpidem 10 mg tab  Please send medication to   [facility]   [phone]       Thanks

## 2022-09-19 ENCOUNTER — OFFICE VISIT (OUTPATIENT)
Dept: CARDIOLOGY CLINIC | Facility: CLINIC | Age: 77
End: 2022-09-19
Payer: COMMERCIAL

## 2022-09-19 VITALS
DIASTOLIC BLOOD PRESSURE: 80 MMHG | BODY MASS INDEX: 30.16 KG/M2 | SYSTOLIC BLOOD PRESSURE: 104 MMHG | OXYGEN SATURATION: 96 % | WEIGHT: 175.7 LBS | HEART RATE: 104 BPM

## 2022-09-19 DIAGNOSIS — I50.32 CHRONIC HEART FAILURE WITH PRESERVED EJECTION FRACTION (HCC): ICD-10-CM

## 2022-09-19 DIAGNOSIS — I77.810 MILD ASCENDING AORTA DILATATION (HCC): ICD-10-CM

## 2022-09-19 DIAGNOSIS — I48.0 PAROXYSMAL ATRIAL FIBRILLATION (HCC): ICD-10-CM

## 2022-09-19 DIAGNOSIS — I10 ESSENTIAL HYPERTENSION: Primary | ICD-10-CM

## 2022-09-19 PROCEDURE — 99214 OFFICE O/P EST MOD 30 MIN: CPT | Performed by: INTERNAL MEDICINE

## 2022-09-19 NOTE — PATIENT INSTRUCTIONS
I will continue the patient's present medical regimen  The patient appears well compensated  I have asked the patient to call if there is a problem in the interim otherwise I will see the patient in 12 months time  Will check follow-up echocardiogram prior to next visit

## 2022-09-22 ENCOUNTER — OFFICE VISIT (OUTPATIENT)
Dept: PODIATRY | Facility: CLINIC | Age: 77
End: 2022-09-22
Payer: COMMERCIAL

## 2022-09-22 VITALS
HEART RATE: 120 BPM | WEIGHT: 175 LBS | SYSTOLIC BLOOD PRESSURE: 136 MMHG | DIASTOLIC BLOOD PRESSURE: 101 MMHG | BODY MASS INDEX: 29.88 KG/M2 | HEIGHT: 64 IN

## 2022-09-22 DIAGNOSIS — L84 CORNS: ICD-10-CM

## 2022-09-22 DIAGNOSIS — L60.0 INGROWN TOENAIL: Primary | ICD-10-CM

## 2022-09-22 PROCEDURE — 99212 OFFICE O/P EST SF 10 MIN: CPT | Performed by: PODIATRIST

## 2022-09-22 NOTE — PROGRESS NOTES
Patient presents for assessment of left great toe  Patient had partial matrixectomy along with partial avulsion of this nail border approximately 5-6 months ago  She notes that if she wears an enclosed shoe for 4-5 hour she has discomfort along this nail border  On exam, no evidence of ingrown nail along the lateral nail border of the left hallux  Eschar/callus is present as anticipated with history of partial matrixectomy  Treatment consisted of removing of callus tissue  Patient told to utilize a moisturizer on a b i d  Basis  She should contact me in 3-4 weeks if pain persists  I am trying to avoid taking more toenail

## 2022-10-10 ENCOUNTER — OFFICE VISIT (OUTPATIENT)
Dept: FAMILY MEDICINE CLINIC | Facility: CLINIC | Age: 77
End: 2022-10-10

## 2022-10-10 DIAGNOSIS — Z23 NEED FOR INFLUENZA VACCINATION: ICD-10-CM

## 2022-10-10 DIAGNOSIS — M17.0 PRIMARY OSTEOARTHRITIS OF BOTH KNEES: ICD-10-CM

## 2022-10-10 DIAGNOSIS — Z59.86 FINANCIAL INSECURITY: Primary | ICD-10-CM

## 2022-10-10 DIAGNOSIS — N81.10 VAGINAL PROLAPSE: ICD-10-CM

## 2022-10-10 DIAGNOSIS — Z91.81 AT RISK FOR FALLS: ICD-10-CM

## 2022-10-10 PROCEDURE — G0008 ADMIN INFLUENZA VIRUS VAC: HCPCS | Performed by: FAMILY MEDICINE

## 2022-10-10 PROCEDURE — 90662 IIV NO PRSV INCREASED AG IM: CPT | Performed by: FAMILY MEDICINE

## 2022-10-10 PROCEDURE — G0439 PPPS, SUBSEQ VISIT: HCPCS | Performed by: FAMILY MEDICINE

## 2022-10-10 SDOH — ECONOMIC STABILITY - INCOME SECURITY: FINANCIAL INSECURITY: Z59.86

## 2022-10-10 NOTE — PROGRESS NOTES
Assessment and Plan:     Problem List Items Addressed This Visit        Other    Financial insecurity - Primary    Relevant Orders    Ambulatory referral to social work care management program      Other Visit Diagnoses     At risk for falls        Primary osteoarthritis of both knees        Relevant Orders    Ambulatory Referral to Orthopedic Surgery    Need for influenza vaccination        Relevant Orders    influenza vaccine, high-dose, PF 0 7 mL (FLUZONE HIGH-DOSE) (Completed)    Vaginal prolapse        Relevant Orders    Ambulatory Referral to Urogynecology        BMI Counseling: There is no height or weight on file to calculate BMI  The BMI is above normal  Nutrition recommendations include encouraging healthy choices of fruits and vegetables  Exercise recommendations include exercising 3-5 times per week  Rationale for BMI follow-up plan is due to patient being overweight or obese  Preventive health issues were discussed with patient, and age appropriate screening tests were ordered as noted in patient's After Visit Summary  Personalized health advice and appropriate referrals for health education or preventive services given if needed, as noted in patient's After Visit Summary       History of Present Illness:     Patient presents for a Medicare Wellness Visit    HPI   Patient Care Team:  Mansoor Yost as PCP - General  Jenny Ayala MD as PCP - [address] [place] [address] (RTE)  Washington Dickinson, DO Nathalie Gillespie, JOE Lezama     Review of Systems:     Review of Systems     Problem List:     Patient Active Problem List   Diagnosis   • Atrial fibrillation [facility])   • Chronic pain   • Age-related osteoporosis without current pathological fracture   • Hypertension   • Anxiety   • Aortic aneurysm (HCC)   • Depression with anxiety   • Insomnia   • Left knee pain   • Lumbar canal stenosis   • Myofascial pain syndrome   • Opioid dependence (HCC)   • Pain syndrome, chronic   • Right shoulder pain   • Spondylosis of lumbar region without myelopathy or radiculopathy   • Vitamin D deficiency   • Healthcare maintenance   • Constipation   • Eczema of right external ear   • Balance disorder   • Falls   • Musculoskeletal arm pain, right   • Primary osteoarthritis of right shoulder   • Chronic heart failure with preserved ejection fraction (HCC)   • Hypokalemia   • Stage 3a chronic kidney disease (HCC)   • Ganglion of hand, left   • Dry eye of left side   • Bladder prolapse, female, acquired   • Financial insecurity      Past Medical and Surgical History:     Past Medical History:   Diagnosis Date   • Acute deep vein thrombosis of lower limb ([facility])     last assessed [id number]   • Aortic aneurysm [facility])    • Arthritis    • Atrial fibrillation ([facility])    • Dislocation of hip ([facility] [address] )     last assessed [id number]   • Hypertension    • Psychiatric disorder     anxiety     Past Surgical History:   Procedure Laterality Date   • HIP SURGERY     • JOINT REPLACEMENT     • KNEE ARTHROSCOPY Bilateral     x2 rt and 1 on left   • TUBAL LIGATION        Family History:     Family History   Problem Relation Age of Onset   • Colon cancer Mother    • Breast cancer Family    • Thyroid cancer Family    • Colon cancer Family    • Hypertension Family    • Thyroid disease Family    • Hypertension Father    • Dementia Father       Social History:     Social History     Socioeconomic History   • Marital status:       Spouse name: None   • Number of children: None   • Years of education: None   • Highest education level: None   Occupational History   • None   Tobacco Use   • Smoking status: Never Smoker   • Smokeless tobacco: Never Used   Vaping Use   • Vaping Use: Never used   Substance and Sexual Activity   • Alcohol use: No     Comment: being a social drinker per [employer]   • Drug use: No   • Sexual activity: Not Currently     Partners: Male   Other Topics Concern   • None   Social History Narrative   • None     Social Determinants of Health     Financial Resource Strain: High Risk   • Difficulty of Paying Living Expenses: Very hard   Food Insecurity: No Food Insecurity   • Worried About Running Out of Food in the Last Year: Never true   • Ran Out of Food in the Last Year: Never true   Transportation Needs: No Transportation Needs   • Lack of Transportation (Medical): No   • Lack of Transportation (Non-Medical):  No   Physical Activity: Not on file   Stress: Not on file   Social Connections: Not on file   Intimate Partner Violence: Not At Risk   • Fear of Current or Ex-Partner: No   • Emotionally Abused: No   • Physically Abused: No   • Sexually Abused: No   Housing Stability: Low Risk    • Unable to Pay for Housing in the Last Year: No   • Number of Places Lived in the Last Year: 1   • Unstable Housing in the Last Year: No      Medications and Allergies:     Current Outpatient Medications   Medication Sig Dispense Refill   • apixaban (Eliquis) 5 mg Take 1 tablet (5 mg total) by mouth 2 (two) times a day 60 tablet 4   • cyclobenzaprine (FLEXERIL) 10 mg tablet Take 1 tablet (10 mg total) by mouth 3 (three) times a day as needed for muscle spasms 90 tablet 3   • Diclofenac Sodium (VOLTAREN) 1 % Apply 1 g topically 4 (four) times a day 20 g 0   • escitalopram (LEXAPRO) 10 mg tablet Take 1 tablet (10 mg total) by mouth daily 90 tablet 1   • furosemide (LASIX) 40 mg tablet Take 1 tablet (40 mg total) by mouth as needed (edema) 30 tablet 1   • gabapentin (NEURONTIN) 300 mg capsule Take 3 capsules (900 mg total) by mouth 3 (three) times a day 270 capsule 5   • hydrocortisone 2 5 % cream Apply topically 2 (two) times a day 30 g 2   • lidocaine (LIDODERM) 5 % Apply 1 patch topically daily Remove & Discard patch within 12 hours or as directed by MD 30 patch 2   • metoprolol succinate (TOPROL-XL) 50 mg 24 hr tablet TAKE 1 TABLET DAILY 90 tablet 3   • oxyCODONE (ROXICODONE) 10 MG TABS Take 1 tablet (10 mg total) by mouth every 6 (six) hours as needed for moderate pain Max Daily Amount: 40 mg 120 tablet 0   • potassium chloride (K-DUR,KLOR-CON) 20 mEq tablet Take 1 tablet (20 mEq total) by mouth daily 30 tablet 5   • senna (SENOKOT) 8 6 mg Take 1 tablet (8 6 mg total) by mouth daily at bedtime 30 tablet 3   • zolpidem (AMBIEN) 10 mg tablet Take 1 tablet (10 mg total) by mouth daily at bedtime as needed for sleep 30 tablet 0     No current facility-administered medications for this visit  No Known Allergies   Immunizations:     Immunization History   Administered Date(s) Administered   • COVID-19 MODERNA VACC 0 5 ML IM 01/30/2021, 02/27/2021, 12/03/2021   • INFLUENZA 12/01/2016   • Influenza Split High Dose Preservative Free IM 10/27/2015, 12/13/2016, 01/17/2017, 10/23/2017   • Influenza, high dose seasonal 0 7 mL 11/26/2018, 10/24/2019, 10/13/2020, 11/04/2021, 10/10/2022   • Influenza, seasonal, injectable 12/19/2012   • Pneumococcal Conjugate 13-Valent 12/13/2016   • Pneumococcal Polysaccharide PPV23 12/01/2016   • Tdap 04/27/2017      Health Maintenance:         Topic Date Due   • Breast Cancer Screening: Mammogram  Never done   • Colorectal Cancer Screening  Never done   • Hepatitis C Screening  Completed         Topic Date Due   • COVID-19 Vaccine (4 - Booster for [place] series) 04/03/2022      Medicare Screening Tests and Risk Assessments:     [facility] is here for her Subsequent Wellness visit  Last Medicare Wellness visit information reviewed, patient interviewed, no change since last AWV  Health Risk Assessment:   Patient rates overall health as fair  Patient feels that their physical health rating is same  Patient is dissatisfied with their life  Eyesight was rated as same  Hearing was rated as same  Patient feels that their emotional and mental health rating is same  Patients states they are sometimes angry  Patient states they are often unusually tired/fatigued  Pain experienced in the last 7 days has been some   Patient states that she has experienced weight loss or gain in last 6 months  Lost pounds     Fall Risk Screening: In the past year, patient has experienced: history of falling in past year    Number of falls: 2 or more  Injured during fall?: No    Feels unsteady when standing or walking?: Yes    Worried about falling?: Yes      Urinary Incontinence Screening:   Patient has leaked urine accidently in the last six months  Has what sounds like vaginal prolapse  Referred to urogyn      Home Safety:  Patient has trouble with stairs inside or outside of their home  Patient has working smoke alarms and has no working carbon monoxide detector  Home safety hazards include: none  Nutrition:   Current diet is Regular  Medications:   Patient is not currently taking any over-the-counter supplements  Patient is able to manage medications  Activities of Daily Living (ADLs)/Instrumental Activities of Daily Living (IADLs):   Walk and transfer into and out of bed and chair?: Yes  Dress and groom yourself?: Yes    Bathe or shower yourself?: Yes    Feed yourself?  Yes  Do your laundry/housekeeping?: Yes  Manage your money, pay your bills and track your expenses?: Yes  Make your own meals?: Yes    Do your own shopping?: Yes    Previous Hospitalizations:   Any hospitalizations or ED visits within the last 12 months?: No      Advance Care Planning:   Living will: Yes    Advanced directive: Yes      PREVENTIVE SCREENINGS      Cardiovascular Screening:    General: Screening Current      Diabetes Screening:     General: Screening Current      Colorectal Cancer Screening:       Due for: Colonoscopy - Low Risk      Breast Cancer Screening:     General: Screening Not Indicated      Cervical Cancer Screening:    General: Screening Not Indicated      Osteoporosis Screening:    General: Screening Not Indicated and History Osteoporosis      Abdominal Aortic Aneurysm (AAA) Screening:        General: Screening Not Indicated      Lung Cancer Screening:     General: Screening Not Indicated      Hepatitis C Screening:    General: Screening Current      Preventive Screening Comments: Colonoscopy ordered previously    Screening, Brief Intervention, and Referral to Treatment (SBIRT)    Screening  Typical number of drinks in a day: 0  Typical number of drinks in a week: 0  Interpretation: Low risk drinking behavior  Single Item Drug Screening:  How often have you used an illegal drug (including marijuana) or a prescription medication for non-medical reasons in the past year? never    Single Item Drug Screen Score: 0  Interpretation: Negative screen for possible drug use disorder    Review of Current Opioid Use    Opioid Risk Tool (ORT) Interpretation: Complete Opioid Risk Tool (ORT)    No exam data present     Physical Exam:     There were no vitals taken for this visit  Physical Exam     [address] [facility] Plan of Care: Balance, strength, and gait training instructions were provided  patient refused PT   Thinks her gait dysfunction is from knee OA - rferred to orthopedics

## 2022-10-10 NOTE — PATIENT INSTRUCTIONS
Medicare Preventive Visit Patient Instructions  Thank you for completing your Welcome to Medicare Visit or Medicare Annual Wellness Visit today  Your next wellness visit will be due in one year (10/11/2023)  The screening/preventive services that you may require over the next 5-10 years are detailed below  Some tests may not apply to you based off risk factors and/or age  Screening tests ordered at today's visit but not completed yet may show as past due  Also, please note that scanned in results may not display below  Preventive Screenings:  Service Recommendations Previous Testing/Comments   Colorectal Cancer Screening  * Colonoscopy    * Fecal Occult Blood Test (FOBT)/Fecal Immunochemical Test (FIT)  * Fecal DNA/Cologuard Test  * Flexible Sigmoidoscopy Age: 39-70 years old   Colonoscopy: every 10 years (may be performed more frequently if at higher risk)  OR  FOBT/FIT: every 1 year  OR  Cologuard: every 3 years  OR  Sigmoidoscopy: every 5 years  Screening may be recommended earlier than age 39 if at higher risk for colorectal cancer  Also, an individualized decision between you and your healthcare provider will decide whether screening between the ages of 74-80 would be appropriate  Colonoscopy: Not on file  FOBT/FIT: Not on file  Cologuard: Not on file  Sigmoidoscopy: Not on file          Breast Cancer Screening Age: 36 years old  Frequency: every 1-2 years  Not required if history of left and right mastectomy Mammogram: Not on file        Cervical Cancer Screening Between the ages of 21-29, pap smear recommended once every 3 years  Between the ages of 33-67, can perform pap smear with HPV co-testing every 5 years     Recommendations may differ for women with a history of total hysterectomy, cervical cancer, or abnormal pap smears in past  Pap Smear: Not on file        Hepatitis C Screening Once for adults born between [facility]  More frequently in patients at high risk for Hepatitis C Hep C Antibody: 09/27/2018        Diabetes Screening 1-2 times per year if you're at risk for diabetes or have pre-diabetes Fasting glucose: 93 mg/dL (6/30/2020)  A1C: No results in last 5 years (No results in last 5 years)      Cholesterol Screening Once every 5 years if you don't have a lipid disorder  May order more often based on risk factors  Lipid panel: 06/30/2020          Other Preventive Screenings Covered by Medicare:  1  Abdominal Aortic Aneurysm (AAA) Screening: covered once if your at risk  You're considered to be at risk if you have a family history of AAA  2  Lung Cancer Screening: covers low dose CT scan once per year if you meet all of the following conditions: (1) Age 50-69; (2) No signs or symptoms of lung cancer; (3) Current smoker or have quit smoking within the last 15 years; (4) You have a tobacco smoking history of at least 20 pack years (packs per day multiplied by number of years you smoked); (5) You get a written order from a healthcare provider  3  Glaucoma Screening: covered annually if you're considered high risk: (1) You have diabetes OR (2) Family history of glaucoma OR (3)  aged 48 and older OR (3)  American aged 72 and older  3  Osteoporosis Screening: covered every 2 years if you meet one of the following conditions: (1) You're estrogen deficient and at risk for osteoporosis based off medical history and other findings; (2) Have a vertebral abnormality; (3) On glucocorticoid therapy for more than 3 months; (4) Have primary hyperparathyroidism; (5) On osteoporosis medications and need to assess response to drug therapy  · Last bone density test (DXA Scan): 11/17/2018  5  HIV Screening: covered annually if you're between the age of 12-76  Also covered annually if you are younger than 13 and older than 72 with risk factors for HIV infection  For pregnant patients, it is covered up to 3 times per pregnancy      Immunizations:  Immunization Recommendations   Influenza Vaccine Annual influenza vaccination during flu season is recommended for all persons aged >= 6 months who do not have contraindications   Pneumococcal Vaccine   * Pneumococcal conjugate vaccine = PCV13 (Prevnar 13), PCV15 (Vaxneuvance), PCV20 (Prevnar 20)  * Pneumococcal polysaccharide vaccine = PPSV23 (Pneumovax) Adults 25-60 years old: 1-3 doses may be recommended based on certain risk factors  Adults 72 years old: 1-2 doses may be recommended based off what pneumonia vaccine you previously received   Hepatitis B Vaccine 3 dose series if at intermediate or high risk (ex: diabetes, end stage renal disease, liver disease)   Tetanus (Td) Vaccine - COST NOT COVERED BY MEDICARE PART B Following completion of primary series, a booster dose should be given every 10 years to maintain immunity against tetanus  Td may also be given as tetanus wound prophylaxis  Tdap Vaccine - COST NOT COVERED BY MEDICARE PART B Recommended at least once for all adults  For pregnant patients, recommended with each pregnancy  Shingles Vaccine (Shingrix) - COST NOT COVERED BY MEDICARE PART B  2 shot series recommended in those aged 48 and above     Health Maintenance Due:      Topic Date Due   • Breast Cancer Screening: Mammogram  Never done   • Colorectal Cancer Screening  Never done   • Hepatitis C Screening  Completed     Immunizations Due:      Topic Date Due   • COVID-19 Vaccine (4 - Booster for Moderna series) 04/03/2022   • Influenza Vaccine (1) 09/01/2022     Advance Directives   What are advance directives? Advance directives are legal documents that state your wishes and plans for medical care  These plans are made ahead of time in case you lose your ability to make decisions for yourself  Advance directives can apply to any medical decision, such as the treatments you want, and if you want to donate organs  What are the types of advance directives?   There are many types of advance directives, and each state has rules about how to use them  You may choose a combination of any of the following:  · Living will: This is a written record of the treatment you want  You can also choose which treatments you do not want, which to limit, and which to stop at a certain time  This includes surgery, medicine, IV fluid, and tube feedings  · Durable power of  for healthcare [facility]): This is a written record that states who you want to make healthcare choices for you when you are unable to make them for yourself  This person, called a proxy, is usually a family member or a friend  You may choose more than 1 proxy  · Do not resuscitate (DNR) order:  A DNR order is used in case your heart stops beating or you stop breathing  It is a request not to have certain forms of treatment, such as CPR  A DNR order may be included in other types of advance directives  · Medical directive: This covers the care that you want if you are in a coma, near death, or unable to make decisions for yourself  You can list the treatments you want for each condition  Treatment may include pain medicine, surgery, blood transfusions, dialysis, IV or tube feedings, and a ventilator (breathing machine)  · Values history: This document has questions about your views, beliefs, and how you feel and think about life  This information can help others choose the care that you would choose  Why are advance directives important? An advance directive helps you control your care  Although spoken wishes may be used, it is better to have your wishes written down  Spoken wishes can be misunderstood, or not followed  Treatments may be given even if you do not want them  An advance directive may make it easier for your family to make difficult choices about your care     Weight Management   Why it is important to manage your weight:  Being overweight increases your risk of health conditions such as heart disease, high blood pressure, type 2 diabetes, and certain types of cancer  It can also increase your risk for osteoarthritis, sleep apnea, and other respiratory problems  Aim for a slow, steady weight loss  Even a small amount of weight loss can lower your risk of health problems  How to lose weight safely:  A safe and healthy way to lose weight is to eat fewer calories and get regular exercise  You can lose up about 1 pound a week by decreasing the number of calories you eat by 500 calories each day  Healthy meal plan for weight management:  A healthy meal plan includes a variety of foods, contains fewer calories, and helps you stay healthy  A healthy meal plan includes the following:  · Eat whole-grain foods more often  A healthy meal plan should contain fiber  Fiber is the part of grains, fruits, and vegetables that is not broken down by your body  Whole-grain foods are healthy and provide extra fiber in your diet  Some examples of whole-grain foods are whole-wheat breads and pastas, oatmeal, brown rice, and bulgur  · Eat a variety of vegetables every day  Include dark, leafy greens such as spinach, kale, amarilis greens, and mustard greens  Eat yellow and orange vegetables such as carrots, sweet potatoes, and winter squash  · Eat a variety of fruits every day  Choose fresh or canned fruit (canned in its own juice or light syrup) instead of juice  Fruit juice has very little or no fiber  · Eat low-fat dairy foods  Drink fat-free (skim) milk or 1% milk  Eat fat-free yogurt and low-fat cottage cheese  Try low-fat cheeses such as mozzarella and other reduced-fat cheeses  · Choose meat and other protein foods that are low in fat  Choose beans or other legumes such as split peas or lentils  Choose fish, skinless poultry (chicken or turkey), or lean cuts of red meat (beef or pork)  Before you cook meat or poultry, cut off any visible fat  · Use less fat and oil  Try baking foods instead of frying them   Add less fat, such as margarine, sour cream, regular salad dressing and deborah to foods  Eat fewer high-fat foods  Some examples of high-fat foods include french fries, doughnuts, ice cream, and cakes  · Eat fewer sweets  Limit foods and drinks that are high in sugar  This includes candy, cookies, regular soda, and sweetened drinks  Exercise:  Exercise at least 30 minutes per day on most days of the week  Some examples of exercise include walking, biking, dancing, and swimming  You can also fit in more physical activity by taking the stairs instead of the elevator or parking farther away from stores  Ask your healthcare provider about the best exercise plan for you  © Copyright [employer] 2018 Information is for End User's use only and may not be sold, redistributed or otherwise used for commercial purposes  All illustrations and images included in CareNotes® are the copyrighted property of [employer]  or [facility] Prevention   AMBULATORY CARE:   Fall prevention  includes ways to make your home and other areas safer  It also includes ways you can move more carefully to prevent a fall  Health conditions that cause changes in your blood pressure, vision, or muscle strength and coordination may increase your risk for falls  Medicines may also increase your risk for falls if they make you dizzy, weak, or sleepy  Call 911 or have someone else call if:   · You have fallen and are unconscious  · You have fallen and cannot move part of your body  Contact your healthcare provider if:   · You have fallen and have pain or a headache  · You have questions or concerns about your condition or care  Fall prevention tips:   · Stand or sit up slowly  This may help you keep your balance and prevent falls  · Use assistive devices as directed  Your healthcare provider may suggest that you use a cane or walker to help you keep your balance  You may need to have grab bars put in your bathroom near the toilet or in the shower      · Wear shoes that fit well and have soles that   Wear shoes both inside and outside  Use slippers with good   Do not wear shoes with high heels  · Wear a personal alarm  This is a device that allows you to call 911 if you fall and need help  Ask your healthcare provider for more information  · Stay active  Exercise can help strengthen your muscles and improve your balance  Your healthcare provider may recommend water aerobics or walking  He or she may also recommend physical therapy to improve your coordination  Never start an exercise program without talking to your healthcare provider first          · Manage your medical conditions  Keep all appointments with your healthcare providers  Visit your eye doctor as directed  Home safety tips:       · Add items to prevent falls in the bathroom  Put nonslip strips on your bath or shower floor to prevent you from slipping  Use a bath mat if you do not have carpet in the bathroom  This will prevent you from falling when you step out of the bath or shower  Use a shower seat so you do not need to stand while you shower  Sit on the toilet or a chair in your bathroom to dry yourself and put on clothing  This will prevent you from losing your balance from drying or dressing yourself while you are standing  · Keep paths clear  Remove books, shoes, and other objects from walkways and stairs  Place cords for telephones and lamps out of the way so that you do not need to walk over them  Tape them down if you cannot move them  Remove small rugs  If you cannot remove a rug, secure it with double-sided tape  This will prevent you from tripping  · Install bright lights in your home  Use night lights to help light paths to the bathroom or kitchen  Always turn on the light before you start walking  · Keep items you use often on shelves within reach  Do not use a step stool to help you reach an item  · Paint or place reflective tape on the edges of your stairs    This will help you see the stairs better  Follow up with your doctor as directed:  Write down your questions so you remember to ask them during your visits  © Copyright [employer] 2022 Information is for End User's use only and may not be sold, redistributed or otherwise used for commercial purposes  All illustrations and images included in CareNotes® are the copyrighted property of A D A M , Inc  or [facility][note 3] [place]a [place]   The above information is an  only  It is not intended as medical advice for individual conditions or treatments  Talk to your doctor, nurse or pharmacist before following any medical regimen to see if it is safe and effective for you

## 2022-10-12 DIAGNOSIS — G47.00 INSOMNIA, UNSPECIFIED TYPE: ICD-10-CM

## 2022-10-12 DIAGNOSIS — G89.4 PAIN SYNDROME, CHRONIC: ICD-10-CM

## 2022-10-12 DIAGNOSIS — I48.0 PAROXYSMAL ATRIAL FIBRILLATION (HCC): ICD-10-CM

## 2022-10-12 DIAGNOSIS — F41.8 DEPRESSION WITH ANXIETY: ICD-10-CM

## 2022-10-12 RX ORDER — METOPROLOL SUCCINATE 50 MG/1
50 TABLET, EXTENDED RELEASE ORAL DAILY
Qty: 90 TABLET | Refills: 3 | Status: SHIPPED | OUTPATIENT
Start: 2022-10-12

## 2022-10-12 RX ORDER — OXYCODONE HYDROCHLORIDE 10 MG/1
10 TABLET ORAL EVERY 6 HOURS PRN
Qty: 120 TABLET | Refills: 0 | Status: SHIPPED | OUTPATIENT
Start: 2022-10-12 | End: 2022-11-11 | Stop reason: SDUPTHER

## 2022-10-12 NOTE — TELEPHONE ENCOUNTER
This medication   oxyCODONE (ROXICODONE) 10 MG      has to be sent to [facility] in [place], [address]  Patent was in on Monday 10/10 just one medicine to be called to this pharmacy      All other medication go to [facility]

## 2022-10-13 ENCOUNTER — PATIENT OUTREACH (OUTPATIENT)
Dept: FAMILY MEDICINE CLINIC | Facility: CLINIC | Age: 77
End: 2022-10-13

## 2022-10-13 RX ORDER — ZOLPIDEM TARTRATE 10 MG/1
10 TABLET ORAL
Qty: 30 TABLET | Refills: 2 | Status: SHIPPED | OUTPATIENT
Start: 2022-10-13

## 2022-10-13 RX ORDER — ESCITALOPRAM OXALATE 10 MG/1
10 TABLET ORAL DAILY
Qty: 90 TABLET | Refills: 1 | Status: SHIPPED | OUTPATIENT
Start: 2022-10-13

## 2022-10-13 NOTE — PROGRESS NOTES
CASIMIRO MARTIN received a referral from Misbah Torres DO to assist patient due to postive [facility] screening regarding financial hardship  CASIMIRO MARTIN previously outreached patient on 8/35 for the same referral  Patient reported at the time that she was fearful of costs related to a knee surgery  Patient reported that did not want to pursue care with [address] [place] [address] due a bad experience in the past  She reported that she would potentially pursue [facility] for the knee surgery  CASIMIRO MARTIN provided information on financial support at the time  Patient denied any further reported at the time  CASIMIRO MARTIN followed up with patient again to determine if any further support is required  Patient reported that her knee pain is getting worse and she has received new referrals from Dr Misbah Torres  Patient reported that she is not as concerned about the knee surgery costs compared to PT costs that will follow  CASIMIRO MARTIN explained that follow up calls can be made to insurance to determine costs and potential financial support once her surgery is approved as insurance will likely not be able to provide costs until the claim is processed  Patient was receptive and understanding  Patient further reported that she is currently waiting for refill of Ambien as she is no longer has any pills  CASMIIRO MARTIN informed patient that a follow up message will be sent to provider  Patient denied any other needs at this time  CASIMIRO MARTIN will continue to follow and provide psychosocial support as necessary

## 2022-10-13 NOTE — TELEPHONE ENCOUNTER
Called patient, states she has not had luck with scheduling with new pain management  She states her insurance has provided her with 2 more providers, she is waiting to hear from them  She is scheduled to see you in March, but will come in sooner if needed  She also stated she stopped morphine, secondary to constipation  Wants to know if you will refill Percocet and Leora Don? This office note has been dictated.

## 2022-11-08 DIAGNOSIS — I48.0 PAROXYSMAL ATRIAL FIBRILLATION (HCC): ICD-10-CM

## 2022-11-11 DIAGNOSIS — G89.4 PAIN SYNDROME, CHRONIC: ICD-10-CM

## 2022-11-11 RX ORDER — OXYCODONE HYDROCHLORIDE 10 MG/1
10 TABLET ORAL EVERY 6 HOURS PRN
Qty: 120 TABLET | Refills: 0 | Status: SHIPPED | OUTPATIENT
Start: 2022-11-11 | End: 2022-12-11

## 2022-11-11 NOTE — TELEPHONE ENCOUNTER
Patient called for medication refill  -Oxycodone 10 mg tab  Patient state in VM if please send medication to   [facility] in [address] [phone]   Thanks

## 2022-11-11 NOTE — TELEPHONE ENCOUNTER
Patient was contacted and agreed to extend Dec 15th appt to a 40 min visit  However patient is asking if  medication refill would be call to pharmacy  The patient was instructed to follow our dietary recommendations regarding a high lean protein, low carb and low fat diet. Reviewed appropriate amount of water to intake daily. Be sure to take vitamins as recommended. Patient was instructed to get at least 150 minutes of exercise weekly including both cardio and strength training.

## 2022-11-11 NOTE — TELEPHONE ENCOUNTER
Good Afternoon Clerical  Please schedule patient for a 40 min visit for Opioid Mgm  Hay can be reached at [phone]    Thanks

## 2022-11-14 NOTE — TELEPHONE ENCOUNTER
Called patient, message relayed that medication was sent to the pharmacy  Patient verbalized understanding

## 2022-11-14 NOTE — TELEPHONE ENCOUNTER
Received voicemail from patient stating she missed call regarding medication refill  Patient states she is unsure if medication was sent to the pharmacy but she is out of it

## 2022-11-23 ENCOUNTER — OFFICE VISIT (OUTPATIENT)
Dept: OBGYN CLINIC | Facility: OTHER | Age: 77
End: 2022-11-23

## 2022-11-23 ENCOUNTER — APPOINTMENT (OUTPATIENT)
Dept: RADIOLOGY | Facility: OTHER | Age: 77
End: 2022-11-23

## 2022-11-23 VITALS
BODY MASS INDEX: 30.19 KG/M2 | SYSTOLIC BLOOD PRESSURE: 135 MMHG | WEIGHT: 176.8 LBS | HEIGHT: 64 IN | DIASTOLIC BLOOD PRESSURE: 91 MMHG | HEART RATE: 103 BPM

## 2022-11-23 DIAGNOSIS — Z01.89 ENCOUNTER FOR LOWER EXTREMITY COMPARISON IMAGING STUDY: ICD-10-CM

## 2022-11-23 DIAGNOSIS — M17.0 PRIMARY OSTEOARTHRITIS OF BOTH KNEES: ICD-10-CM

## 2022-11-23 DIAGNOSIS — M25.562 LEFT KNEE PAIN, UNSPECIFIED CHRONICITY: ICD-10-CM

## 2022-11-23 DIAGNOSIS — M17.12 PRIMARY OSTEOARTHRITIS OF LEFT KNEE: Primary | ICD-10-CM

## 2022-11-23 RX ORDER — BUPIVACAINE HYDROCHLORIDE 2.5 MG/ML
4 INJECTION, SOLUTION INFILTRATION; PERINEURAL
Status: COMPLETED | OUTPATIENT
Start: 2022-11-23 | End: 2022-11-23

## 2022-11-23 RX ORDER — METHYLPREDNISOLONE ACETATE 40 MG/ML
1 INJECTION, SUSPENSION INTRA-ARTICULAR; INTRALESIONAL; INTRAMUSCULAR; SOFT TISSUE
Status: COMPLETED | OUTPATIENT
Start: 2022-11-23 | End: 2022-11-23

## 2022-11-23 RX ADMIN — METHYLPREDNISOLONE ACETATE 1 ML: 40 INJECTION, SUSPENSION INTRA-ARTICULAR; INTRALESIONAL; INTRAMUSCULAR; SOFT TISSUE at 10:06

## 2022-11-23 RX ADMIN — BUPIVACAINE HYDROCHLORIDE 4 ML: 2.5 INJECTION, SOLUTION INFILTRATION; PERINEURAL at 10:06

## 2022-11-23 NOTE — PROGRESS NOTES
Orthopaedic Surgery - Office Note  Glen Brantley (31 y o  female)   : 1945   MRN: [id number]  Encounter Date: 2022    Chief Complaint   Patient presents with   • Left Knee - Pain       Assessment / Plan  Left knee osteoarthritis     · CSI of left knee joint was performed   · Referral given to begin PT  · Ice, heat and angelics prn   Return if symptoms worsen or fail to improve  History of Present Illness  Glen Brantley is a 78 y o  female who presents for evaluation of left knee pain  She has been having for many years without any specific injury  She does ambulate with a walker for longer distances  She had a CSI in the past which didn't offer her much relief  She describes her pain as being lateral with locking  She does get stiffness with prolonged sitting or walking  She has a history of bilateral ELSA which she recovered well from  She is here today to discuss treatments options  Review of Systems  Pertinent items are noted in HPI  All other systems were reviewed and are negative  Physical Exam  /91   Pulse 103   Ht 5' 4" (1 626 m)   Wt 80 2 kg (176 lb 12 8 oz)   BMI 30 35 kg/m²   Cons: Appears well  No apparent distress  Psych: Alert  Oriented x3  Mood and affect normal   Eyes: PERRLA, EOMI  Resp: Normal effort  No audible wheezing or stridor  CV: Palpable pulse  No discernable arrhythmia  No LE edema  Lymph:  No palpable cervical, axillary, or inguinal lymphadenopathy  Skin: Warm  No palpable masses  No visible lesions  Neuro: Normal muscle tone  Normal and symmetric DTR's  Left Knee Exam  Alignment:  mild genu valgus  which is correctable   Inspection:  No swelling  No ecchymosis  Palpation:  moderate diffuse knee tenderness  small effusion  ROM:  Knee Extension 0  Knee Flexion 110  Strength:  Able to actively extend knee against gravity  Stability:  No objective knee instability  Stable Varus / Valgus stress, Lachman, and Posterior drawer    Tests:  No pertinent positive or negative tests  Patella:  Patella tracks centrally with crepitus  Neurovascular:  Sensation intact in DP/SP/Rock/Sa/T nerve distributions  2+ DP & PT pulses  Gait:  Steady  Studies Reviewed  I have personally reviewed pertinent films in PACS  XR of left knee - images from 11/23/2022 which show severe OA    Large joint arthrocentesis: L knee  Universal Protocol:  Consent given by: patient  Time out: Immediately prior to procedure a "time out" was called to verify the correct patient, procedure, equipment, support staff and site/side marked as required  Site marked: the operative site was marked  Supporting Documentation  Indications: pain and diagnostic evaluation   Procedure Details  Location: knee - L knee  Preparation: Patient was prepped and draped in the usual sterile fashion  Needle size: 22 G  Ultrasound guidance: no  Approach: lateral  Medications administered: 4 mL bupivacaine 0 25 %; 1 mL methylPREDNISolone acetate 40 mg/mL    Patient tolerance: patient tolerated the procedure well with no immediate complications  Dressing:  Sterile dressing applied            Medical, Surgical, Family, and Social History  The patient's medical history, family history, and social history, were reviewed and updated as appropriate      Past Medical History:   Diagnosis Date   • Acute deep vein thrombosis of lower limb (HCC)     last assessed 25Feb2013   • Aortic aneurysm [facility])    • Arthritis    • Atrial fibrillation [facility])    • Dislocation of hip ([address] )     last assessed 25Feb2013   • Hypertension    • Psychiatric disorder     anxiety       Past Surgical History:   Procedure Laterality Date   • HIP SURGERY     • JOINT REPLACEMENT     • KNEE ARTHROSCOPY Bilateral     x2 rt and 1 on left   • TUBAL LIGATION         Family History   Problem Relation Age of Onset   • Colon cancer Mother    • Breast cancer Family    • Thyroid cancer Family    • Colon cancer Family    • Hypertension Family    • Thyroid disease Family    • Hypertension Father    • Dementia Father        Social History     Occupational History   • Not on file   Tobacco Use   • Smoking status: Never   • Smokeless tobacco: Never   Vaping Use   • Vaping Use: Never used   Substance and Sexual Activity   • Alcohol use: No     Comment: being a social drinker per [employer]   • Drug use: No   • Sexual activity: Not Currently     Partners: Male       No Known Allergies      Current Outpatient Medications:   •  apixaban (Eliquis) 5 mg, Take 1 tablet (5 mg total) by mouth 2 (two) times a day, Disp: 60 tablet, Rfl: 5  •  cyclobenzaprine (FLEXERIL) 10 mg tablet, Take 1 tablet (10 mg total) by mouth 3 (three) times a day as needed for muscle spasms, Disp: 90 tablet, Rfl: 3  •  Diclofenac Sodium (VOLTAREN) 1 %, Apply 1 g topically 4 (four) times a day, Disp: 20 g, Rfl: 0  •  escitalopram (LEXAPRO) 10 mg tablet, Take 1 tablet (10 mg total) by mouth daily, Disp: 90 tablet, Rfl: 1  •  furosemide (LASIX) 40 mg tablet, Take 1 tablet (40 mg total) by mouth as needed (edema), Disp: 30 tablet, Rfl: 1  •  hydrocortisone 2 5 % cream, Apply topically 2 (two) times a day, Disp: 30 g, Rfl: 2  •  lidocaine (LIDODERM) 5 %, Apply 1 patch topically daily Remove & Discard patch within 12 hours or as directed by MD, Disp: 30 patch, Rfl: 2  •  metoprolol succinate (TOPROL-XL) 50 mg 24 hr tablet, Take 1 tablet (50 mg total) by mouth daily, Disp: 90 tablet, Rfl: 3  •  oxyCODONE (ROXICODONE) 10 MG TABS, Take 1 tablet (10 mg total) by mouth every 6 (six) hours as needed for moderate pain Max Daily Amount: 40 mg, Disp: 120 tablet, Rfl: 0  •  potassium chloride (K-DUR,KLOR-CON) 20 mEq tablet, Take 1 tablet (20 mEq total) by mouth daily, Disp: 30 tablet, Rfl: 5  •  senna (SENOKOT) 8 6 mg, Take 1 tablet (8 6 mg total) by mouth daily at bedtime, Disp: 30 tablet, Rfl: 3  •  zolpidem (AMBIEN) 10 mg tablet, TAKE 1 TABLET (10 MG TOTAL) BY MOUTH DAILY AT BEDTIME AS NEEDED FOR SLEEP, Disp: 30 tablet, Rfl: 2  •  gabapentin (NEURONTIN) 300 mg capsule, Take 3 capsules (900 mg total) by mouth 3 (three) times a day, Disp: 270 capsule, Rfl: 5      [address]    I,:  Rebecca Montano am acting as a scribe while in the presence of the attending physician :       I,:  Peri Davis MD personally performed the services described in this documentation    as scribed in my presence :

## 2022-12-05 DIAGNOSIS — G89.29 CHRONIC BILATERAL LOW BACK PAIN WITH BILATERAL SCIATICA: ICD-10-CM

## 2022-12-05 DIAGNOSIS — M54.42 CHRONIC BILATERAL LOW BACK PAIN WITH BILATERAL SCIATICA: ICD-10-CM

## 2022-12-05 DIAGNOSIS — M54.41 CHRONIC BILATERAL LOW BACK PAIN WITH BILATERAL SCIATICA: ICD-10-CM

## 2022-12-06 RX ORDER — GABAPENTIN 300 MG/1
900 CAPSULE ORAL 3 TIMES DAILY
Qty: 270 CAPSULE | Refills: 5 | Status: SHIPPED | OUTPATIENT
Start: 2022-12-06 | End: 2023-06-04

## 2022-12-06 NOTE — TELEPHONE ENCOUNTER
General VM left informing pt Rx has been sent to pharmacy office number provided for questions or concerns

## 2022-12-07 DIAGNOSIS — G89.4 PAIN SYNDROME, CHRONIC: ICD-10-CM

## 2022-12-07 NOTE — TELEPHONE ENCOUNTER
Received voicemail from patient requesting medication refill of oxycodone 10 mg  Please review and advise if refill is appropriate  Thank you!

## 2022-12-08 RX ORDER — OXYCODONE HYDROCHLORIDE 10 MG/1
10 TABLET ORAL EVERY 6 HOURS PRN
Qty: 120 TABLET | Refills: 0 | Status: SHIPPED | OUTPATIENT
Start: 2022-12-12 | End: 2023-01-11

## 2022-12-08 RX ORDER — NALOXONE HYDROCHLORIDE 4 MG/.1ML
SPRAY NASAL
Qty: 1 EACH | Refills: 1 | Status: SHIPPED | OUTPATIENT
Start: 2022-12-08

## 2022-12-27 ENCOUNTER — OFFICE VISIT (OUTPATIENT)
Dept: FAMILY MEDICINE CLINIC | Facility: CLINIC | Age: 77
End: 2022-12-27

## 2022-12-27 VITALS
HEART RATE: 83 BPM | DIASTOLIC BLOOD PRESSURE: 73 MMHG | HEIGHT: 64 IN | TEMPERATURE: 97.6 F | RESPIRATION RATE: 18 BRPM | OXYGEN SATURATION: 94 % | WEIGHT: 175 LBS | BODY MASS INDEX: 29.88 KG/M2 | SYSTOLIC BLOOD PRESSURE: 107 MMHG

## 2022-12-27 DIAGNOSIS — B34.9 VIRAL ILLNESS: Primary | ICD-10-CM

## 2022-12-27 NOTE — PROGRESS NOTES
Name: Luther Mcburney      :       MRN: [id number]  Encounter Provider: Amber Toribio MD  Encounter Date: 2022   Encounter department: [address] [place] [address]  Viral illness  Assessment & Plan:  - Patient reports symptoms of nasal congestion, sore throat, generalized weakness and feeling unwell  Onset was last Friday  Suspect viral illness  Physical exam unremarkable today  Vital stable   -Discussed viral course with patient  Symptoms may get worse before he gets better  -Will prescribe lozenges to help with sore throat  Encouraged rest and adequate hydration   -Return to care and ED precautions if symptoms not improving    Orders:  -     menthol-cetylpyridinium (CEPACOL) 3 MG lozenge; Take 1 lozenge (3 mg total) by mouth as needed for sore throat         Subjective      Patient presents to the clinic due to respiratory symptoms  She has nasal congestion and sore throat  Onset was last Friday night  She woke up feeling unwell  Denies sick contacts  Review of Systems   Constitutional: Negative for chills and fever  HENT: Positive for congestion (Nasal congestion) and sore throat  Respiratory: Negative for cough, chest tightness and shortness of breath  Cardiovascular: Negative for chest pain and palpitations         Current Outpatient Medications on File Prior to Visit   Medication Sig   • apixaban (Eliquis) 5 mg Take 1 tablet (5 mg total) by mouth 2 (two) times a day   • cyclobenzaprine (FLEXERIL) 10 mg tablet Take 1 tablet (10 mg total) by mouth 3 (three) times a day as needed for muscle spasms   • Diclofenac Sodium (VOLTAREN) 1 % Apply 1 g topically 4 (four) times a day   • escitalopram (LEXAPRO) 10 mg tablet Take 1 tablet (10 mg total) by mouth daily   • furosemide (LASIX) 40 mg tablet Take 1 tablet (40 mg total) by mouth as needed (edema)   • gabapentin (NEURONTIN) 300 mg capsule TAKE 3 CAPSULES (900 MG TOTAL) BY MOUTH 3 (THREE) TIMES A DAY   • hydrocortisone 2 5 % cream Apply topically 2 (two) times a day   • lidocaine (LIDODERM) 5 % Apply 1 patch topically daily Remove & Discard patch within 12 hours or as directed by MD   • metoprolol succinate (TOPROL-XL) 50 mg 24 hr tablet Take 1 tablet (50 mg total) by mouth daily   • naloxone (NARCAN) 4 mg/0 1 mL nasal spray Administer 1 spray into a nostril  If no response after 2-3 minutes, give another dose in the other nostril using a new spray  • oxyCODONE (ROXICODONE) 10 MG TABS Take 1 tablet (10 mg total) by mouth every 6 (six) hours as needed for moderate pain Max Daily Amount: 40 mg Do not start before December 12, 2022  • potassium chloride (K-DUR,KLOR-CON) 20 mEq tablet Take 1 tablet (20 mEq total) by mouth daily   • senna (SENOKOT) 8 6 mg Take 1 tablet (8 6 mg total) by mouth daily at bedtime   • zolpidem (AMBIEN) 10 mg tablet TAKE 1 TABLET (10 MG TOTAL) BY MOUTH DAILY AT BEDTIME AS NEEDED FOR SLEEP       Objective     /73 (BP Location: Left arm, Patient Position: Sitting, Cuff Size: Standard)   Pulse 83   Temp 97 6 °F (36 4 °C) (Temporal)   Resp 18   Ht 5' 4" (1 626 m)   Wt 79 4 kg (175 lb)   SpO2 94%   BMI 30 04 kg/m²     Physical Exam  Vitals reviewed  Constitutional:       General: She is not in acute distress  Appearance: Normal appearance  She is not ill-appearing, toxic-appearing or diaphoretic  HENT:      Head: Normocephalic and atraumatic  Right Ear: Tympanic membrane normal  There is no impacted cerumen  Left Ear: Tympanic membrane normal  There is no impacted cerumen  Eyes:      General:         Right eye: No discharge  Left eye: No discharge  Conjunctiva/sclera: Conjunctivae normal    Cardiovascular:      Rate and Rhythm: Normal rate and regular rhythm  Pulses: Normal pulses  Heart sounds: Normal heart sounds  Pulmonary:      Effort: Pulmonary effort is normal  No respiratory distress        Breath sounds: Normal breath sounds  No wheezing  Musculoskeletal:      Right lower leg: No edema  Skin:     General: Skin is warm and dry  Neurological:      Mental Status: She is alert  Psychiatric:         Mood and Affect: Mood normal          Behavior: Behavior normal          Thought Content:  Thought content normal          Judgment: Judgment normal        Pavle Ibarra MD

## 2022-12-27 NOTE — ASSESSMENT & PLAN NOTE
- Patient reports symptoms of nasal congestion, sore throat, generalized weakness and feeling unwell  Onset was last Friday  Suspect viral illness  Physical exam unremarkable today  Vital stable   -Discussed viral course with patient  Symptoms may get worse before he gets better  -Will prescribe lozenges to help with sore throat    Encouraged rest and adequate hydration   -Return to care and ED precautions if symptoms not improving

## 2023-01-09 DIAGNOSIS — G89.4 PAIN SYNDROME, CHRONIC: ICD-10-CM

## 2023-01-09 NOTE — TELEPHONE ENCOUNTER
Received voicemail from patient requesting medication refill of Oxycodone 10mg  Patient would like this sent to [facility]  Called patient, confirmed that patient would like her medication sent to this pharmacy  Please review and advise if refill is appropriate  Thanks!

## 2023-01-11 RX ORDER — OXYCODONE HYDROCHLORIDE 10 MG/1
10 TABLET ORAL EVERY 6 HOURS PRN
Qty: 120 TABLET | Refills: 0 | Status: SHIPPED | OUTPATIENT
Start: 2023-01-11 | End: 2023-02-10

## 2023-01-12 NOTE — TELEPHONE ENCOUNTER
MS [facility]N CLOSING NOTES

 PT BACK TO SLEEP AFTER SPONGES BATH RENDERED . REPOSITION HER FOR COMFORT. STABLE 
THROUGHOUT THE NIGHT. NO SIGNS OF ANY ACUTE DISTRESS NOTED. IVF D51/2 NS AT 50ML/HR STILL 
INFUSING . KEPT HER WARM AND COMFORTABLE AT ALL TIMES. BED IN LOW AND LOCK IN POSITION WITH 
SIDE RAILS X3 UP AND BED ALARM SET FOR SAFETY. WILL ENDORSE TO AM NURSE FOR CONTINUITY OF 
CARE. Please refill as appropriate  Thank you

## 2023-01-13 NOTE — TELEPHONE ENCOUNTER
Received additional voicemail from patient requesting medication refill of Oxycodone  Called patient, she's been advised that this medication was previously sent to the pharmacy  Patient did not have any further questions

## 2023-01-20 ENCOUNTER — RA CDI HCC (OUTPATIENT)
Dept: OTHER | Facility: HOSPITAL | Age: 78
End: 2023-01-20

## 2023-01-20 NOTE — PROGRESS NOTES
May[facility]  coding opportunities     I13 0     Chart Reviewed number of suggestions sent to Provider: 1     Patients Insurance     Medicare Insurance: [address] [place] [address]

## 2023-01-26 ENCOUNTER — OFFICE VISIT (OUTPATIENT)
Dept: FAMILY MEDICINE CLINIC | Facility: CLINIC | Age: 78
End: 2023-01-26

## 2023-01-26 ENCOUNTER — TELEPHONE (OUTPATIENT)
Dept: FAMILY MEDICINE CLINIC | Facility: CLINIC | Age: 78
End: 2023-01-26

## 2023-01-26 VITALS
SYSTOLIC BLOOD PRESSURE: 128 MMHG | HEART RATE: 114 BPM | RESPIRATION RATE: 18 BRPM | BODY MASS INDEX: 29.59 KG/M2 | OXYGEN SATURATION: 87 % | WEIGHT: 172.4 LBS | TEMPERATURE: 97.1 F | DIASTOLIC BLOOD PRESSURE: 90 MMHG

## 2023-01-26 DIAGNOSIS — G89.4 CHRONIC PAIN SYNDROME: ICD-10-CM

## 2023-01-26 DIAGNOSIS — F11.20 CONTINUOUS OPIOID DEPENDENCE (HCC): ICD-10-CM

## 2023-01-26 RX ORDER — NALOXONE HYDROCHLORIDE 4 MG/.1ML
1 SPRAY NASAL ONCE
Qty: 1 EACH | Refills: 1 | Status: SHIPPED | OUTPATIENT
Start: 2023-01-26 | End: 2023-01-26

## 2023-01-26 NOTE — TELEPHONE ENCOUNTER
Form placed in the   [employer] in the [address] [place] [address] for scan  Patient and Dr Nuria Méndez reviewed and signed form by the time of patient appointment   Thanks

## 2023-01-26 NOTE — PROGRESS NOTES
Assessment/Plan     Problem List Items Addressed This Visit        Other    Chronic pain    Relevant Medications    naloxone (NARCAN) 4 mg/0 1 mL nasal spray    Other Relevant Orders    Oxycodone/Oxymorphone urine (Clinic collect)    Toxicology screen, urine (Clinic collect)   Other Visit Diagnoses     Continuous opioid dependence ([facility] )        Relevant Medications    naloxone (NARCAN) 4 mg/0 1 mL nasal spray    Other Relevant Orders    Oxycodone/Oxymorphone urine (Clinic collect)    Toxicology screen, urine (Clinic collect)         Opioid Management Plan      Subjective     Opioid Management:   Type of visit: Initial    Pain related diagnoses: knee OA, spondylolithesis    Pain is located in back and knee  Feels "crooked" she has to lean to the left to mitigate pain    Current pain description: "dull aching"    Functional status: Pain keeps her from walking long distances or shopping for long periods of time  She uses a walker  Goals of care: Get up in the morning, shower and complete normal activities    Social Support System  Patient receives support from their: daughter and son    Has 2 kids  Daughter has cancer and can't help a lot  Screening Tools/Assessments:    PHQ-2/9:  Last PHQ-2 score: 0 (Last PHQ-2 date: 4/14/2022)  Last PHQ-9 score: 0 (Last PHQ-9 date: 4/14/2022)    Opioid Risk Tool (ORT):  Current ORT Score: 0 (low risk for opiate abuse)    SOAPP:  Current SOAPP Score: 4 (negative, low risk patient)    Brief Pain Inventory (BPI):  1) Throughout our lives, most of us have had pain from time to time (such as minor headaches, sprains, and toothaches)  Have you had pain other than these everyday kinds of pain today?  Yes  2) Where is your pain located? low back and knee (left)  3) Rate your pain at its worst in the last 24 hours: 5  4) Rate your pain at its least in the last 24 hours: 4  5) Rate your average level of pain: 5  6) Rate your pain right now: 4  7) What treatments or medications are you receiving for your pain? oxycodone  8) In the past 24 hours, how much relief have pain treatments or medication provided? 90%  9) During the past 24 hours, pain has interfered with your:     A) General activity: 2     B) Mood: 2     C) Walking ability: 9     D) Normal work (work outside the home & housework): 5     E) Relations with other people: 0     F) Sleep: 0     G) Enjoyment of life: 5    Drug Screen:  Last drug screen was performed more than 365 days ago  Drug screen result based off current prescriptions: consistent    Opioid agreement:  Active Opioid agreement on file?: Yes    Opioid agreement signed date: 11/18/2022  Opioid agreement expiration date: 11/18/2023    Naloxone:  Currently prescribed Naloxone (Narcan): Yes      HPI  Pain Medications             cyclobenzaprine (FLEXERIL) 10 mg tablet Take 1 tablet (10 mg total) by mouth 3 (three) times a day as needed for muscle spasms    escitalopram (LEXAPRO) 10 mg tablet Take 1 tablet (10 mg total) by mouth daily    gabapentin (NEURONTIN) 300 mg capsule TAKE 3 CAPSULES (900 MG TOTAL) BY MOUTH 3 (THREE) TIMES A DAY    oxyCODONE (ROXICODONE) 10 MG TABS Take 1 tablet (10 mg total) by mouth every 6 (six) hours as needed for moderate pain Max Daily Amount: 40 mg         Outpatient Morphine Milligram Equivalents Per Day     1/26/23 - 2/10/23 60 MME/Day    Order Name Dose Route Frequency Maximum MME/Day     oxyCODONE (ROXICODONE) 10 MG TABS 10 mg Oral Every 6 hours PRN 60 MME/Day    Total Potential Morphine Milligram Equivalents Per Day 60 MME/Day    Calculation Information        oxyCODONE (ROXICODONE) 10 MG TABS    oxyCODONE 10 MG Tabs: maximum daily dose of 40 mg * morphine equivalence factor of 1 5 = 60 MME/Day                     2/11/23 and after None              PDMP Review       Value Time User    PDMP Reviewed  Yes 1/6/2023  2:10 PM Con Tse         Review of Systems  Objective     There were no vitals taken for this visit      Physical Exam    Lord Timmy

## 2023-01-26 NOTE — PATIENT INSTRUCTIONS

## 2023-01-27 ENCOUNTER — TELEPHONE (OUTPATIENT)
Dept: FAMILY MEDICINE CLINIC | Facility: CLINIC | Age: 78
End: 2023-01-27

## 2023-01-27 DIAGNOSIS — F11.220 OPIOID DEPENDENCE WITH UNCOMPLICATED INTOXICATION (HCC): Primary | ICD-10-CM

## 2023-01-27 DIAGNOSIS — F11.20 OPIOID DEPENDENCE, UNCOMPLICATED (HCC): ICD-10-CM

## 2023-01-27 DIAGNOSIS — F11.20 UNCOMPLICATED OPIOID DEPENDENCE (HCC): ICD-10-CM

## 2023-01-27 NOTE — TELEPHONE ENCOUNTER
Garth Raines [facility] calling to report, Urine collected yesterday was GNS (quantity not sufficient)  Questions please contact [phone]

## 2023-02-07 DIAGNOSIS — G47.00 INSOMNIA, UNSPECIFIED TYPE: ICD-10-CM

## 2023-02-07 DIAGNOSIS — G89.4 PAIN SYNDROME, CHRONIC: ICD-10-CM

## 2023-02-07 NOTE — TELEPHONE ENCOUNTER
Requested Prescriptions     Pending Prescriptions Disp Refills   • zolpidem (AMBIEN) 10 mg tablet 30 tablet 0     Sig: Take 1 tablet (10 mg total) by mouth daily at bedtime as needed for sleep   • oxyCODONE (ROXICODONE) 10 MG TABS 120 tablet 0     Sig: Take 1 tablet (10 mg total) by mouth every 6 (six) hours as needed for moderate pain Max Daily Amount: 40 mg       -Number of refills left:    -Amount of medication left:    -Pharmacy verified and updated: Yes    -Additional information:    Patient left message on rx line requesting refill on this medications  Per patient she would be out of the OXYCODONE on the of this week

## 2023-02-08 RX ORDER — ZOLPIDEM TARTRATE 10 MG/1
10 TABLET ORAL
Qty: 30 TABLET | Refills: 0 | Status: SHIPPED | OUTPATIENT
Start: 2023-02-08

## 2023-02-08 RX ORDER — OXYCODONE HYDROCHLORIDE 10 MG/1
10 TABLET ORAL EVERY 6 HOURS PRN
Qty: 120 TABLET | Refills: 0 | Status: SHIPPED | OUTPATIENT
Start: 2023-02-10 | End: 2023-03-12

## 2023-02-27 ENCOUNTER — PATIENT OUTREACH (OUTPATIENT)
Dept: FAMILY MEDICINE CLINIC | Facility: CLINIC | Age: 78
End: 2023-02-27

## 2023-02-27 NOTE — PROGRESS NOTES
CASIMIRO MARTIN outreach to follow up with Pt overall status and referral needs  Per chart review Pt with possible financial difficulties/needed supports and concerns r/t cost of potential knee surgery  CASIMIRO CM placed call to Pt this day with no answer  VM left and return call requested to follow-up on Pt status and any/all needs  CASIMIRO CM will continue to follow and assist PRN

## 2023-03-03 ENCOUNTER — PATIENT OUTREACH (OUTPATIENT)
Dept: FAMILY MEDICINE CLINIC | Facility: CLINIC | Age: 78
End: 2023-03-03

## 2023-03-03 NOTE — PROGRESS NOTES
CASIMIRO MARTIN attempted second outreach to Pt this day to follow-up on overall status and any/all referral needs r/t reported financial difficulties  CASIMIRO MARTIN placed second attempt call to Pt this day with no answer  VM left requesting return call to discuss any Pt needs  UTR letter sent this day  Referral closed d/t lack of response at this time  CASIMIRO MARTIN will remain available for any Pt needs or future referrals

## 2023-03-03 NOTE — LETTER
03/03/23    Dear Leandro Norwood,    I am a Care Manager with [address] [place] [address] [id number] [phone]  We have made several attempts to call you by phone  It is important that you contact us back at [phone] so that we can assist with your care needs       Sincerely,         [address] [place] [address]

## 2023-03-06 ENCOUNTER — ESTABLISHED COMPREHENSIVE EXAM (OUTPATIENT)
Dept: URBAN - METROPOLITAN AREA CLINIC 6 | Facility: CLINIC | Age: 78
End: 2023-03-06

## 2023-03-06 DIAGNOSIS — H35.413: ICD-10-CM

## 2023-03-06 DIAGNOSIS — H02.831: ICD-10-CM

## 2023-03-06 DIAGNOSIS — Z96.1: ICD-10-CM

## 2023-03-06 DIAGNOSIS — H02.834: ICD-10-CM

## 2023-03-06 DIAGNOSIS — H40.023: ICD-10-CM

## 2023-03-06 PROCEDURE — 92014 COMPRE OPH EXAM EST PT 1/>: CPT

## 2023-03-06 PROCEDURE — 92133 CPTRZD OPH DX IMG PST SGM ON: CPT

## 2023-03-06 ASSESSMENT — VISUAL ACUITY
OU_CC: J1
OS_GLARE: 20/30
OD_GLARE: 20/30
OD_CC: 20/25-1
OS_CC: 20/25

## 2023-03-06 ASSESSMENT — TONOMETRY
OS_IOP_MMHG: 15
OD_IOP_MMHG: 13

## 2023-03-07 ENCOUNTER — TELEPHONE (OUTPATIENT)
Dept: FAMILY MEDICINE CLINIC | Facility: CLINIC | Age: 78
End: 2023-03-07

## 2023-03-07 DIAGNOSIS — G47.00 INSOMNIA, UNSPECIFIED TYPE: ICD-10-CM

## 2023-03-07 RX ORDER — ZOLPIDEM TARTRATE 10 MG/1
10 TABLET ORAL
Qty: 30 TABLET | Refills: 0 | Status: SHIPPED | OUTPATIENT
Start: 2023-03-07 | End: 2023-04-06 | Stop reason: SDUPTHER

## 2023-03-09 DIAGNOSIS — G47.00 INSOMNIA, UNSPECIFIED TYPE: ICD-10-CM

## 2023-03-09 DIAGNOSIS — G89.4 PAIN SYNDROME, CHRONIC: ICD-10-CM

## 2023-03-09 RX ORDER — OXYCODONE HYDROCHLORIDE 10 MG/1
10 TABLET ORAL EVERY 6 HOURS PRN
Qty: 120 TABLET | Refills: 0 | Status: SHIPPED | OUTPATIENT
Start: 2023-03-09 | End: 2023-04-06 | Stop reason: SDUPTHER

## 2023-03-09 RX ORDER — ZOLPIDEM TARTRATE 10 MG/1
10 TABLET ORAL
Qty: 30 TABLET | Refills: 0 | Status: CANCELLED | OUTPATIENT
Start: 2023-03-09

## 2023-03-09 NOTE — TELEPHONE ENCOUNTER
Requested Prescriptions     Pending Prescriptions Disp Refills   • oxyCODONE (ROXICODONE) 10 MG TABS 120 tablet 0     Sig: Take 1 tablet (10 mg total) by mouth every 6 (six) hours as needed for moderate pain Max Daily Amount: 40 mg   • zolpidem (AMBIEN) 10 mg tablet 30 tablet 0     Sig: Take 1 tablet (10 mg total) by mouth daily at bedtime as needed for sleep         -Number of refills left: 0    -Amount of medication left:     -Pharmacy verified and updated: Yes    -Additional information:     Patient left message on rx line requesting refills on this medications  Per patient she only have two tablets left for the oxycodone and need to be sent today so the pharmacy can delivered before the weekend  Thank You

## 2023-03-20 ENCOUNTER — HOSPITAL ENCOUNTER (OUTPATIENT)
Dept: NON INVASIVE DIAGNOSTICS | Facility: CLINIC | Age: 78
Discharge: HOME | End: 2023-03-20

## 2023-03-20 VITALS
HEART RATE: 98 BPM | WEIGHT: 172 LBS | HEIGHT: 64 IN | DIASTOLIC BLOOD PRESSURE: 90 MMHG | SYSTOLIC BLOOD PRESSURE: 128 MMHG | BODY MASS INDEX: 29.37 KG/M2

## 2023-03-20 DIAGNOSIS — I77.810 MILD ASCENDING AORTA DILATATION (HCC): ICD-10-CM

## 2023-03-20 DIAGNOSIS — I48.0 PAROXYSMAL ATRIAL FIBRILLATION (HCC): ICD-10-CM

## 2023-03-20 DIAGNOSIS — I50.32 CHRONIC HEART FAILURE WITH PRESERVED EJECTION FRACTION (HCC): ICD-10-CM

## 2023-03-20 DIAGNOSIS — I10 ESSENTIAL HYPERTENSION: ICD-10-CM

## 2023-03-20 LAB
AORTIC ROOT: 4.4 CM
AORTIC VALVE MEAN VELOCITY: 8.1 M/S
APICAL FOUR CHAMBER EJECTION FRACTION: 56 %
ASCENDING AORTA: 4.4 CM
AV LVOT MEAN GRADIENT: 3 MMHG
AV LVOT PEAK GRADIENT: 5 MMHG
AV MEAN GRADIENT: 3 MMHG
AV PEAK GRADIENT: 6 MMHG
AV VELOCITY RATIO: 0.91
DOP CALC AO PEAK VEL: 1.27 M/S
DOP CALC AO VTI: 21.64 CM
DOP CALC LVOT PEAK VEL VTI: 21.43 CM
DOP CALC LVOT PEAK VEL: 1.16 M/S
E WAVE DECELERATION TIME: 168 MS
FRACTIONAL SHORTENING: 32 (ref 28–44)
INTERVENTRICULAR SEPTUM IN DIASTOLE (PARASTERNAL SHORT AXIS VIEW): 1 CM
INTERVENTRICULAR SEPTUM: 1 CM (ref 0.6–1.1)
LAAS-AP2: 22.7 CM2
LAAS-AP4: 27.2 CM2
LEFT ATRIUM SIZE: 2.2 CM
LEFT INTERNAL DIMENSION IN SYSTOLE: 1.9 CM (ref 2.1–4)
LEFT VENTRICULAR INTERNAL DIMENSION IN DIASTOLE: 2.8 CM (ref 3.5–6)
LEFT VENTRICULAR POSTERIOR WALL IN END DIASTOLE: 1.3 CM
LEFT VENTRICULAR STROKE VOLUME: 18 ML
LVSV (TEICH): 18 ML
MV E'TISSUE VEL-SEP: 8 CM/S
MV PEAK A VEL: 0.95 M/S
MV PEAK E VEL: 70 CM/S
MV STENOSIS PRESSURE HALF TIME: 49 MS
MV VALVE AREA P 1/2 METHOD: 4.49
RA PRESSURE ESTIMATED: 8 MMHG
RIGHT ATRIUM AREA SYSTOLE A4C: 14.7 CM2
RIGHT VENTRICLE ID DIMENSION: 3.7 CM
RV PSP: 33 MMHG
SL CV LEFT ATRIUM LENGTH A2C: 6.9 CM
SL CV LV EF: 60
SL CV PED ECHO LEFT VENTRICLE DIASTOLIC VOLUME (MOD BIPLANE) 2D: 30 ML
SL CV PED ECHO LEFT VENTRICLE SYSTOLIC VOLUME (MOD BIPLANE) 2D: 12 ML
TR MAX PG: 25 MMHG
TR PEAK VELOCITY: 2.5 M/S
TRICUSPID ANNULAR PLANE SYSTOLIC EXCURSION: 2.1 CM
TRICUSPID VALVE PEAK REGURGITATION VELOCITY: 2.5 M/S

## 2023-03-22 ENCOUNTER — TELEPHONE (OUTPATIENT)
Dept: CARDIOLOGY CLINIC | Facility: CLINIC | Age: 78
End: 2023-03-22

## 2023-03-22 DIAGNOSIS — I50.32 CHRONIC HEART FAILURE WITH PRESERVED EJECTION FRACTION (HCC): Primary | ICD-10-CM

## 2023-03-22 NOTE — TELEPHONE ENCOUNTER
----- Message from Patrick Corley MD sent at 3/20/2023  3:25 PM EDT -----  Please call patient  Echocardiogram demonstrates a echogenic structure posterior to the pericardium  I do not see it on prior studies  Please order a CAT scan of the chest without contrast to better characterize this  For diagnosis for CT of Chest  write "echogenic circumscribed structure posterior to the pericardium of unclear etiology"  Compare to prior Chest CT done 3/21/82460  Thank you

## 2023-03-22 NOTE — TELEPHONE ENCOUNTER
Spoke to patient directly, per Dr Dayami Gould pt will call to arrange CT of chest as directed based on result of recent Echo

## 2023-03-27 ENCOUNTER — OFFICE VISIT (OUTPATIENT)
Dept: FAMILY MEDICINE CLINIC | Facility: CLINIC | Age: 78
End: 2023-03-27

## 2023-03-27 VITALS
BODY MASS INDEX: 30.11 KG/M2 | OXYGEN SATURATION: 94 % | SYSTOLIC BLOOD PRESSURE: 113 MMHG | HEART RATE: 108 BPM | RESPIRATION RATE: 22 BRPM | HEIGHT: 64 IN | TEMPERATURE: 98 F | DIASTOLIC BLOOD PRESSURE: 79 MMHG | WEIGHT: 176.4 LBS

## 2023-03-27 DIAGNOSIS — F11.220 OPIOID DEPENDENCE WITH UNCOMPLICATED INTOXICATION (HCC): ICD-10-CM

## 2023-03-27 DIAGNOSIS — I48.91 ATRIAL FIBRILLATION, UNSPECIFIED TYPE (HCC): ICD-10-CM

## 2023-03-27 DIAGNOSIS — N18.31 STAGE 3A CHRONIC KIDNEY DISEASE (HCC): ICD-10-CM

## 2023-03-27 DIAGNOSIS — I10 PRIMARY HYPERTENSION: Primary | ICD-10-CM

## 2023-03-27 NOTE — ASSESSMENT & PLAN NOTE
Hypertension- patient’s hypertension is controlled today with a Blood pressure of Blood Pressure: 113/79     on current medications  Continue current medications  Discussed DASH diet and exercise

## 2023-03-27 NOTE — ASSESSMENT & PLAN NOTE
Lab Results   Component Value Date    EGFR 59 06/30/2020    EGFR 56 10/01/2019    EGFR 69 03/20/2019    CREATININE 0 97 06/30/2020    CREATININE 1 00 10/01/2019    CREATININE 0 85 03/20/2019   blood work ordered

## 2023-03-27 NOTE — ASSESSMENT & PLAN NOTE
Tried to get urine sample last visit and was unable  We discussed in detail again and she will try as an outpatient  She has a lot of trouble with urinating outside of her home  She did sit in the office for over an hour drinking water trying to give a sample

## 2023-03-27 NOTE — PROGRESS NOTES
Name: Chana Clements      :       MRN: [id number]  Encounter Provider: Davy Aragon  Encounter Date: 3/27/2023   Encounter department: [address] [place] [address]  Primary hypertension  Assessment & Plan:    Hypertension- patient’s hypertension is controlled today with a Blood pressure of Blood Pressure: 113/79     on current medications  Continue current medications  Discussed DASH diet and exercise  Orders:  -     CBC and differential; Future  -     Comprehensive metabolic panel; Future  -     Lipid Panel with Direct LDL reflex; Future    2  Stage 3a chronic kidney disease [facility])  Assessment & Plan:  Lab Results   Component Value Date    EGFR 59 2020    EGFR 56 10/01/2019    EGFR 69 2019    CREATININE 0 97 2020    CREATININE 1 00 10/01/2019    CREATININE 0 85 2019   blood work ordered    Orders:  -     CBC and differential; Future  -     Comprehensive metabolic panel; Future  -     Lipid Panel with Direct LDL reflex; Future    3  Atrial fibrillation, unspecified type [facility])  Assessment & Plan:  Continued cardiology follow up    Orders:  -     TSH, 3rd generation with Free T4 reflex; Future    4  Opioid dependence with uncomplicated intoxication [facility])  Assessment & Plan:  Tried to get urine sample last visit and was unable  We discussed in detail again and she will try as an outpatient  She has a lot of trouble with urinating outside of her home  She did sit in the office for over an hour drinking water trying to give a sample  Subjective      Overall doing ok  Needs to get blood work and urine testing done  Does have a CT scheduled due to an echogenic mass seen on echo - ordered by cardiology    Review of Systems   Constitutional: Negative for activity change, chills, fever and unexpected weight change  HENT: Negative for congestion  Eyes: Negative for visual disturbance     Respiratory: Negative for cough, chest tightness, shortness of breath and wheezing  Cardiovascular: Negative for chest pain  Gastrointestinal: Negative for abdominal pain, diarrhea and nausea  Endocrine: Negative for cold intolerance and heat intolerance  Musculoskeletal: Negative for arthralgias and myalgias  Skin: Negative for color change  Neurological: Negative for dizziness  Psychiatric/Behavioral: Negative for agitation, dysphoric mood and sleep disturbance  Current Outpatient Medications on File Prior to Visit   Medication Sig   • apixaban (Eliquis) 5 mg Take 1 tablet (5 mg total) by mouth 2 (two) times a day   • cyclobenzaprine (FLEXERIL) 10 mg tablet Take 1 tablet (10 mg total) by mouth 3 (three) times a day as needed for muscle spasms   • Diclofenac Sodium (VOLTAREN) 1 % Apply 1 g topically 4 (four) times a day   • escitalopram (LEXAPRO) 10 mg tablet Take 1 tablet (10 mg total) by mouth daily   • furosemide (LASIX) 40 mg tablet Take 1 tablet (40 mg total) by mouth as needed (edema)   • gabapentin (NEURONTIN) 300 mg capsule TAKE 3 CAPSULES (900 MG TOTAL) BY MOUTH 3 (THREE) TIMES A DAY   • hydrocortisone 2 5 % cream Apply topically 2 (two) times a day   • lidocaine (LIDODERM) 5 % Apply 1 patch topically daily Remove & Discard patch within 12 hours or as directed by MD   • menthol-cetylpyridinium (CEPACOL) 3 MG lozenge Take 1 lozenge (3 mg total) by mouth as needed for sore throat   • metoprolol succinate (TOPROL-XL) 50 mg 24 hr tablet Take 1 tablet (50 mg total) by mouth daily   • naloxone (NARCAN) 4 mg/0 1 mL nasal spray Administer 1 spray into a nostril  If no response after 2-3 minutes, give another dose in the other nostril using a new spray     • naloxone (NARCAN) 4 mg/0 1 mL nasal spray 0 1 mL (4 mg total) into each nostril once for 1 dose If the desired response is not obtained after 2-3 minutes, administer an additional dose using a new spray   • oxyCODONE (ROXICODONE) 10 MG TABS Take 1 tablet (10 mg "total) by mouth every 6 (six) hours as needed for moderate pain Max Daily Amount: 40 mg   • potassium chloride (K-DUR,KLOR-CON) 20 mEq tablet Take 1 tablet (20 mEq total) by mouth daily   • senna (SENOKOT) 8 6 mg Take 1 tablet (8 6 mg total) by mouth daily at bedtime   • zolpidem (AMBIEN) 10 mg tablet Take 1 tablet (10 mg total) by mouth daily at bedtime as needed for sleep       Objective     /79 (BP Location: Left arm, Patient Position: Sitting, Cuff Size: Standard)   Pulse (!) 108   Temp 98 °F (36 7 °C) (Temporal)   Resp 22   Ht 5' 4\" (1 626 m)   Wt 80 kg (176 lb 6 4 oz)   SpO2 94%   BMI 30 28 kg/m²     Physical Exam  Constitutional:       Appearance: She is well-developed  HENT:      Head: Normocephalic and atraumatic  Cardiovascular:      Rate and Rhythm: Normal rate and regular rhythm  Heart sounds: Normal heart sounds  Pulmonary:      Effort: Pulmonary effort is normal       Breath sounds: Normal breath sounds  Abdominal:      General: Bowel sounds are normal       Palpations: Abdomen is soft  Musculoskeletal:         General: Normal range of motion  Skin:     General: Skin is warm and dry  Neurological:      Mental Status: She is alert and oriented to person, place, and time     Psychiatric:         Behavior: Behavior normal          Judgment: Judgment normal        Macy Tse  "

## 2023-03-29 ENCOUNTER — APPOINTMENT (OUTPATIENT)
Dept: LAB | Age: 78
End: 2023-03-29

## 2023-03-29 ENCOUNTER — HOSPITAL ENCOUNTER (OUTPATIENT)
Dept: RADIOLOGY | Age: 78
Discharge: HOME | End: 2023-03-29

## 2023-03-29 DIAGNOSIS — I48.91 ATRIAL FIBRILLATION, UNSPECIFIED TYPE (HCC): ICD-10-CM

## 2023-03-29 DIAGNOSIS — I10 PRIMARY HYPERTENSION: ICD-10-CM

## 2023-03-29 DIAGNOSIS — N18.31 STAGE 3A CHRONIC KIDNEY DISEASE (HCC): ICD-10-CM

## 2023-03-29 DIAGNOSIS — I50.32 CHRONIC HEART FAILURE WITH PRESERVED EJECTION FRACTION (HCC): ICD-10-CM

## 2023-03-29 LAB
ALBUMIN SERPL BCP-MCNC: 3.6 G/DL (ref 3.5–5)
ALP SERPL-CCNC: 77 U/L (ref 46–116)
ALT SERPL W P-5'-P-CCNC: 12 U/L (ref 12–78)
ANION GAP SERPL CALCULATED.3IONS-SCNC: 5 MMOL/L (ref 4–13)
AST SERPL W P-5'-P-CCNC: 14 U/L (ref 5–45)
BASOPHILS # BLD AUTO: 0.04 THOUSANDS/ÂΜL (ref 0–0.1)
BASOPHILS NFR BLD AUTO: 1 % (ref 0–1)
BILIRUB SERPL-MCNC: 1.02 MG/DL (ref 0.2–1)
BUN SERPL-MCNC: 11 MG/DL (ref 5–25)
CALCIUM SERPL-MCNC: 8.8 MG/DL (ref 8.3–10.1)
CHLORIDE SERPL-SCNC: 107 MMOL/L (ref 96–108)
CHOLEST SERPL-MCNC: 152 MG/DL
CO2 SERPL-SCNC: 27 MMOL/L (ref 21–32)
CREAT SERPL-MCNC: 1.09 MG/DL (ref 0.6–1.3)
EOSINOPHIL # BLD AUTO: 0.1 THOUSAND/ÂΜL (ref 0–0.61)
EOSINOPHIL NFR BLD AUTO: 2 % (ref 0–6)
ERYTHROCYTE [DISTWIDTH] IN BLOOD BY AUTOMATED COUNT: 17.2 % (ref 11.6–15.1)
GFR SERPL CREATININE-BSD FRML MDRD: 49 ML/MIN/1.73SQ M
GLUCOSE P FAST SERPL-MCNC: 81 MG/DL (ref 65–99)
HCT VFR BLD AUTO: 41.8 % (ref 34.8–46.1)
HDLC SERPL-MCNC: 54 MG/DL
HGB BLD-MCNC: 12.8 G/DL (ref 11.5–15.4)
IMM GRANULOCYTES # BLD AUTO: 0.03 THOUSAND/UL (ref 0–0.2)
IMM GRANULOCYTES NFR BLD AUTO: 1 % (ref 0–2)
LDLC SERPL CALC-MCNC: 83 MG/DL (ref 0–100)
LYMPHOCYTES # BLD AUTO: 1.24 THOUSANDS/ÂΜL (ref 0.6–4.47)
LYMPHOCYTES NFR BLD AUTO: 23 % (ref 14–44)
MCH RBC QN AUTO: 27.5 PG (ref 26.8–34.3)
MCHC RBC AUTO-ENTMCNC: 30.6 G/DL (ref 31.4–37.4)
MCV RBC AUTO: 90 FL (ref 82–98)
MONOCYTES # BLD AUTO: 0.4 THOUSAND/ÂΜL (ref 0.17–1.22)
MONOCYTES NFR BLD AUTO: 8 % (ref 4–12)
NEUTROPHILS # BLD AUTO: 3.54 THOUSANDS/ÂΜL (ref 1.85–7.62)
NEUTS SEG NFR BLD AUTO: 65 % (ref 43–75)
NRBC BLD AUTO-RTO: 0 /100 WBCS
PLATELET # BLD AUTO: 287 THOUSANDS/UL (ref 149–390)
PMV BLD AUTO: 11.5 FL (ref 8.9–12.7)
POTASSIUM SERPL-SCNC: 4.2 MMOL/L (ref 3.5–5.3)
PROT SERPL-MCNC: 6.5 G/DL (ref 6.4–8.4)
RBC # BLD AUTO: 4.65 MILLION/UL (ref 3.81–5.12)
SODIUM SERPL-SCNC: 139 MMOL/L (ref 135–147)
TRIGL SERPL-MCNC: 75 MG/DL
TSH SERPL DL<=0.05 MIU/L-ACNC: 3.46 UIU/ML (ref 0.45–4.5)
WBC # BLD AUTO: 5.35 THOUSAND/UL (ref 4.31–10.16)

## 2023-03-31 ENCOUNTER — APPOINTMENT (OUTPATIENT)
Dept: LAB | Age: 78
End: 2023-03-31

## 2023-03-31 PROCEDURE — 80365 DRUG SCREENING OXYCODONE: CPT | Performed by: FAMILY MEDICINE

## 2023-03-31 PROCEDURE — 80307 DRUG TEST PRSMV CHEM ANLYZR: CPT | Performed by: FAMILY MEDICINE

## 2023-04-05 DIAGNOSIS — M48.061 SPINAL STENOSIS OF LUMBAR REGION, UNSPECIFIED WHETHER NEUROGENIC CLAUDICATION PRESENT: ICD-10-CM

## 2023-04-05 DIAGNOSIS — G47.00 INSOMNIA, UNSPECIFIED TYPE: ICD-10-CM

## 2023-04-05 DIAGNOSIS — G89.4 PAIN SYNDROME, CHRONIC: ICD-10-CM

## 2023-04-05 DIAGNOSIS — F41.8 DEPRESSION WITH ANXIETY: ICD-10-CM

## 2023-04-06 LAB
OXYCODONE UR QL CFM: 3064 NG/ML
OXYCODONE+OXYMORPHONE SERPLBLD QL SCN: POSITIVE
OXYCODONE+OXYMORPHONE UR QL SCN: NORMAL NG/ML
OXYCODONE+OXYMORPHONE UR QL SCN: POSITIVE
OXYMORPHONE UR CFM-MCNC: 6552 NG/ML
OXYMORPHONE UR QL CFM: POSITIVE

## 2023-04-06 RX ORDER — OXYCODONE HYDROCHLORIDE 10 MG/1
10 TABLET ORAL EVERY 6 HOURS PRN
Qty: 120 TABLET | Refills: 0 | Status: SHIPPED | OUTPATIENT
Start: 2023-04-06 | End: 2023-05-06

## 2023-04-06 RX ORDER — ZOLPIDEM TARTRATE 10 MG/1
10 TABLET ORAL
Qty: 30 TABLET | Refills: 0 | Status: SHIPPED | OUTPATIENT
Start: 2023-04-06

## 2023-04-06 RX ORDER — CYCLOBENZAPRINE HCL 10 MG
10 TABLET ORAL 3 TIMES DAILY PRN
Qty: 90 TABLET | Refills: 3 | Status: SHIPPED | OUTPATIENT
Start: 2023-04-06

## 2023-04-06 RX ORDER — ESCITALOPRAM OXALATE 10 MG/1
10 TABLET ORAL DAILY
Qty: 90 TABLET | Refills: 1 | OUTPATIENT
Start: 2023-04-06

## 2023-04-06 RX ORDER — ESCITALOPRAM OXALATE 10 MG/1
10 TABLET ORAL DAILY
Qty: 90 TABLET | Refills: 1 | Status: SHIPPED | OUTPATIENT
Start: 2023-04-06

## 2023-04-06 NOTE — TELEPHONE ENCOUNTER
Received voicemail from patient requesting medication refill of Ambien 10mg, Lexapro 10mg, Cyclobenzaprine 10mg and Oxycodone 10mg  Patient is requesting this be sent to [facility] in [facility]  Patient states she is going to [place] for the holiday; please advise if refills are appropriate

## 2023-04-09 LAB
AMPHETAMINES UR QL SCN: NEGATIVE NG/ML
BARBITURATES UR QL SCN: NEGATIVE NG/ML
BENZODIAZ UR QL: NEGATIVE NG/ML
BZE UR QL: NEGATIVE NG/ML
CANNABINOIDS UR QL SCN: NEGATIVE NG/ML
METHADONE UR QL SCN: NEGATIVE NG/ML
OPIATES UR QL: NEGATIVE
PCP UR QL: NEGATIVE NG/ML
PROPOXYPH UR QL SCN: NEGATIVE NG/ML

## 2023-04-27 ENCOUNTER — OFFICE VISIT (OUTPATIENT)
Dept: FAMILY MEDICINE CLINIC | Facility: CLINIC | Age: 78
End: 2023-04-27

## 2023-04-27 VITALS
SYSTOLIC BLOOD PRESSURE: 131 MMHG | DIASTOLIC BLOOD PRESSURE: 81 MMHG | WEIGHT: 170.2 LBS | OXYGEN SATURATION: 96 % | HEART RATE: 96 BPM | TEMPERATURE: 97.9 F | RESPIRATION RATE: 18 BRPM | BODY MASS INDEX: 29.21 KG/M2

## 2023-04-27 DIAGNOSIS — F11.220 OPIOID DEPENDENCE WITH UNCOMPLICATED INTOXICATION (HCC): Primary | ICD-10-CM

## 2023-04-27 DIAGNOSIS — F41.8 DEPRESSION WITH ANXIETY: ICD-10-CM

## 2023-04-27 DIAGNOSIS — Z79.899 HIGH RISK MEDICATION USE: ICD-10-CM

## 2023-04-27 DIAGNOSIS — F11.20 CONTINUOUS OPIOID DEPENDENCE (HCC): ICD-10-CM

## 2023-04-27 DIAGNOSIS — G89.4 CHRONIC PAIN SYNDROME: ICD-10-CM

## 2023-04-27 NOTE — PATIENT INSTRUCTIONS
Goals of care:  Maximize your health and quality of life by:   · Increasing your level of function and activity  · Decreasing the negative effects of pain on your life  · Minimizing the risks and side effects of medications and ensuring safe use of opioid medication     Ways for you to help meet your goals:  Maintain a healthy lifestyle  This includes proper nutrition, regular physical activity as able, try for 8 hours of sleep per night, use stress reduction strategies, avoid triggers  Risks and side effects of opioid use:  Prescription opioids carry serious risks of addiction and  overdose, especially with prolonged use  An opioid overdose,  often marked by slowed breathing, can cause sudden death  The  use of prescription opioids can have a number of side effects as  well, even when taken as directed:  · Tolerance--meaning you might need to take more of a medication for the same pain relief  · Physical dependence--meaning you have symptoms of withdrawal when a medication is stopped  · Increased sensitivity to pain  · Constipation  · Nausea, vomiting, dry mouth  · Sleepiness and dizziness   · Confusion  · Depression  · Low levels of testosterone that can result in lower sex drive, energy, and strength  · Itching and sweating    If you are prescribed opioids for pain:  · Never take opioids in greater amounts or more often than prescribed  · Help prevent misuse and abuse  - Never sell or share prescription opioids         - Never use another person’s prescription opioids  · ‡Store prescription opioids in a secure place and out of reach of others (this may include visitors, children, friends, and family)  · Safely dispose of unused prescription opioids: Find your community drug take-back program or your pharmacy mail-back program, or flush them down the toilet, following guidance from the Food and Drug Administration (www fda gov/Drugs/ResourcesForYou)    · ‡Visit www cdc gov/drugoverdose to learn about the risks of opioid abuse and overdose  · If you believe you may be struggling with addiction, tell your health care provider and ask for guidance or call [facility]’s National Helpline at 5-050-113-HELP  Goals of care:  Maximize your health and quality of life by: Increasing your level of function and activity  Decreasing the negative effects of pain on your life  Minimizing the risks and side effects of medications and ensuring safe use of opioid medication     Ways for you to help meet your goals:  Maintain a healthy lifestyle  This includes proper nutrition, regular physical activity as able, try for 8 hours of sleep per night, use stress reduction strategies, avoid triggers  Risks and side effects of opioid use:  Prescription opioids carry serious risks of addiction and  overdose, especially with prolonged use  An opioid overdose,  often marked by slowed breathing, can cause sudden death  The  use of prescription opioids can have a number of side effects as  well, even when taken as directed: Tolerance--meaning you might need to take more of a medication for the same pain relief  Physical dependence--meaning you have symptoms of withdrawal when a medication is stopped  Increased sensitivity to pain  Constipation  Nausea, vomiting, dry mouth  Sleepiness and dizziness   Confusion  Depression  Low levels of testosterone that can result in lower sex drive, energy, and strength  Itching and sweating    If you are prescribed opioids for pain:  Never take opioids in greater amounts or more often than prescribed  Help prevent misuse and abuse  - Never sell or share prescription opioids         - Never use another person’s prescription opioids  ‡Store prescription opioids in a secure place and out of reach of others (this may include visitors, children, friends, and family)    Safely dispose of unused prescription opioids: Find your community drug take-back program or your pharmacy mail-back program, or flush them down the toilet, following guidance from the Food and Drug Administration (www fda gov/Drugs/ResourcesForYou)  ‡Visit www cdc gov/drugoverdose to learn about the risks of opioid abuse and overdose  If you believe you may be struggling with addiction, tell your health care provider and ask for guidance or call [facility]’s National Helpline at 1-684-919-FKXC

## 2023-04-27 NOTE — PROGRESS NOTES
Assessment/Plan     Problem List Items Addressed This Visit        Other    Chronic pain     Continue current medications  See opioid HPI         Opioid dependence ([facility] ) - Primary   Other Visit Diagnoses     [facility]        High risk medication use                 Treatment Goals: To perform daily activities with acceptable pain levels    Opiate risks  There are risks associated with opioid medications, including dependence, addiction and tolerance  The patient understands and agrees to use these medications only as prescribed  Potential side effects of the medications include, but are not limited to, constipation, drowsiness, addiction, impaired judgment and risk of fatal overdose if not taken as prescribed  The patient was warned against driving while taking sedation medications  Sharing medications is a felony  At this point in time, the patient is showing no signs of addiction, abuse, diversion or suicidal ideation  Patient has a high risk condition (age > 72, MARIBEL, renal or hepatic impairment, mental health condition, use of alcohol or other substances)  Has been counseled on the specific risks of taking opioids with these conditions and the increased risks including including sedation, increased fall risk, dizziness, addictive potential and death  PDMP review  [place] or [place]  reviewed  No red flags were identified; safe to proceed with prescription          Subjective     Opioid Management:   Type of visit: Follow-up    Pain related diagnoses: knee OA, spondylolithesis    Current pain description: Pain today is OK -she is taking her medication at this time       Functional status: Able to complete most of her activities - does have knee issues that limit some walking    Screening Tools/Assessments:    PHQ-2/9:  PHQ-2 score: 2    Brief Pain Inventory (BPI):  1) Throughout our lives, most of us have had pain from time to time (such as minor headaches, sprains, and toothaches)  Have you had pain other than these everyday kinds of pain today? Yes  2) Where is your pain located? left knee, back - lower left  3) Rate your pain at its worst in the last 24 hours: 7  4) Rate your pain at its least in the last 24 hours: 3  5) Rate your average level of pain: 6  6) Rate your pain right now: 6  7) What treatments or medications are you receiving for your pain?  oxycodone, voltaren, aleve  8) In the past 24 hours, how much relief have pain treatments or medication provided? 50%  9) During the past 24 hours, pain has interfered with your:     A) General activity: 10     B) Mood: 5     C) Walking ability: 10     D) Normal work (work outside the home & housework): 10     E) Relations with other people: 0     F) Sleep: 0     G) Enjoyment of life: 6    Drug Screen:  Date of last drug screen: 4/9/2023  Drug screen result based off current prescriptions: consistent    Opioid agreement:  Active Opioid agreement on file?: Yes    Opioid agreement signed date: 11/18/2022  Opioid agreement expiration date: 11/18/2023    Naloxone:  Currently prescribed Naloxone (Narcan): Yes      HPI  Pain Medications             cyclobenzaprine (FLEXERIL) 10 mg tablet Take 1 tablet (10 mg total) by mouth 3 (three) times a day as needed for muscle spasms    escitalopram (LEXAPRO) 10 mg tablet TAKE 1 TABLET (10 MG TOTAL) BY MOUTH DAILY    gabapentin (NEURONTIN) 300 mg capsule TAKE 3 CAPSULES (900 MG TOTAL) BY MOUTH 3 (THREE) TIMES A DAY    oxyCODONE (ROXICODONE) 10 MG TABS Take 1 tablet (10 mg total) by mouth every 6 (six) hours as needed for moderate pain Max Daily Amount: 40 mg         Outpatient Morphine Milligram Equivalents Per Day     4/27/23 - 5/6/23 60 MME/Day    Order Name Dose Route Frequency Maximum MME/Day     oxyCODONE (ROXICODONE) 10 MG TABS 10 mg Oral Every 6 hours PRN 60 MME/Day    Total Potential Morphine Milligram Equivalents Per Day 60 MME/Day    Calculation Information        oxyCODONE (Elysia Kee) 10 MG TABS    oxyCODONE 10 MG Tabs: maximum daily dose of 40 mg * morphine equivalence factor of 1 5 = 60 MME/Day                     5/7/23 and after None              PDMP Review       Value Time User    PDMP Reviewed  Yes 1/6/2023  2:10 PM Taylor Tse         Review of Systems   Constitutional: Negative for activity change, chills, fever and unexpected weight change  HENT: Negative for congestion  Eyes: Negative for visual disturbance  Respiratory: Negative for cough, chest tightness, shortness of breath and wheezing  Cardiovascular: Negative for chest pain  Gastrointestinal: Negative for abdominal pain, diarrhea and nausea  Endocrine: Negative for cold intolerance and heat intolerance  Musculoskeletal: Positive for arthralgias, back pain and myalgias  Skin: Negative for color change  Neurological: Negative for dizziness  Psychiatric/Behavioral: Negative for agitation, dysphoric mood and sleep disturbance  Objective     /81   Pulse 96   Temp 97 9 °F (36 6 °C)   Resp 18   Wt 77 2 kg (170 lb 3 2 oz)   SpO2 96%   BMI 29 21 kg/m²     Physical Exam  Vitals and nursing note reviewed  Constitutional:       General: She is not in acute distress  Appearance: She is well-developed  HENT:      Head: Normocephalic and atraumatic  Eyes:      Conjunctiva/sclera: Conjunctivae normal    Cardiovascular:      Rate and Rhythm: Normal rate and regular rhythm  Heart sounds: No murmur heard  Pulmonary:      Effort: Pulmonary effort is normal  No respiratory distress  Breath sounds: Normal breath sounds  Abdominal:      Palpations: Abdomen is soft  Tenderness: There is no abdominal tenderness  Musculoskeletal:         General: Tenderness and deformity present  No swelling  Cervical back: Neck supple  Skin:     General: Skin is warm and dry  Capillary Refill: Capillary refill takes less than 2 seconds     Neurological:      Mental Status: She is alert    Psychiatric:         Mood and Affect: Mood normal          Davy Aragon

## 2023-05-05 ENCOUNTER — TELEPHONE (OUTPATIENT)
Dept: FAMILY MEDICINE CLINIC | Facility: CLINIC | Age: 78
End: 2023-05-05

## 2023-05-30 DIAGNOSIS — G89.4 PAIN SYNDROME, CHRONIC: ICD-10-CM

## 2023-05-30 DIAGNOSIS — G47.00 INSOMNIA, UNSPECIFIED TYPE: ICD-10-CM

## 2023-05-30 RX ORDER — OXYCODONE HYDROCHLORIDE 10 MG/1
10 TABLET ORAL EVERY 6 HOURS PRN
Qty: 120 TABLET | Refills: 0 | Status: SHIPPED | OUTPATIENT
Start: 2023-06-07 | End: 2023-07-07

## 2023-05-30 RX ORDER — ZOLPIDEM TARTRATE 10 MG/1
10 TABLET ORAL
Qty: 30 TABLET | Refills: 0 | Status: SHIPPED | OUTPATIENT
Start: 2023-06-02

## 2023-06-30 ENCOUNTER — APPOINTMENT (EMERGENCY)
Dept: RADIOLOGY | Facility: HOSPITAL | Age: 78
DRG: 690 | End: 2023-06-30
Payer: COMMERCIAL

## 2023-06-30 ENCOUNTER — HOSPITAL ENCOUNTER (INPATIENT)
Facility: HOSPITAL | Age: 78
LOS: 5 days | Discharge: HOME HEALTH | DRG: 690 | End: 2023-07-06
Attending: EMERGENCY MEDICINE | Admitting: FAMILY MEDICINE
Payer: COMMERCIAL

## 2023-06-30 DIAGNOSIS — I48.0 PAROXYSMAL ATRIAL FIBRILLATION (HCC): ICD-10-CM

## 2023-06-30 DIAGNOSIS — G89.4 PAIN SYNDROME, CHRONIC: ICD-10-CM

## 2023-06-30 DIAGNOSIS — M79.601 MUSCULOSKELETAL ARM PAIN, RIGHT: ICD-10-CM

## 2023-06-30 DIAGNOSIS — D64.9 ANEMIA: ICD-10-CM

## 2023-06-30 DIAGNOSIS — M48.061 SPINAL STENOSIS OF LUMBAR REGION, UNSPECIFIED WHETHER NEUROGENIC CLAUDICATION PRESENT: ICD-10-CM
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
## 2023-06-30 DIAGNOSIS — G47.00 INSOMNIA: ICD-10-CM

## 2023-06-30 DIAGNOSIS — K59.00 CONSTIPATION: ICD-10-CM

## 2023-06-30 LAB
BASOPHILS # BLD AUTO: 0.04 THOUSANDS/ÂΜL (ref 0–0.1)
BASOPHILS NFR BLD AUTO: 1 % (ref 0–1)
EOSINOPHIL # BLD AUTO: 0.04 THOUSAND/ÂΜL (ref 0–0.61)
EOSINOPHIL NFR BLD AUTO: 1 % (ref 0–6)
ERYTHROCYTE [DISTWIDTH] IN BLOOD BY AUTOMATED COUNT: 17.2 % (ref 11.6–15.1)
HCT VFR BLD AUTO: 27.9 % (ref 34.8–46.1)
HGB BLD-MCNC: 8.8 G/DL (ref 11.5–15.4)
IMM GRANULOCYTES # BLD AUTO: 0.03 THOUSAND/UL (ref 0–0.2)
IMM GRANULOCYTES NFR BLD AUTO: 1 % (ref 0–2)
LYMPHOCYTES # BLD AUTO: 0.86 THOUSANDS/ÂΜL (ref 0.6–4.47)
LYMPHOCYTES NFR BLD AUTO: 13 % (ref 14–44)
MCH RBC QN AUTO: 29.1 PG (ref 26.8–34.3)
MCHC RBC AUTO-ENTMCNC: 31.5 G/DL (ref 31.4–37.4)
MCV RBC AUTO: 92 FL (ref 82–98)
MONOCYTES # BLD AUTO: 0.36 THOUSAND/ÂΜL (ref 0.17–1.22)
MONOCYTES NFR BLD AUTO: 5 % (ref 4–12)
NEUTROPHILS # BLD AUTO: 5.29 THOUSANDS/ÂΜL (ref 1.85–7.62)
NEUTS SEG NFR BLD AUTO: 79 % (ref 43–75)
NRBC BLD AUTO-RTO: 0 /100 WBCS
PLATELET # BLD AUTO: 200 THOUSANDS/UL (ref 149–390)
PMV BLD AUTO: 11.3 FL (ref 8.9–12.7)
RBC # BLD AUTO: 3.02 MILLION/UL (ref 3.81–5.12)
WBC # BLD AUTO: 6.62 THOUSAND/UL (ref 4.31–10.16)

## 2023-06-30 PROCEDURE — 80053 COMPREHEN METABOLIC PANEL: CPT

## 2023-06-30 PROCEDURE — 99285 EMERGENCY DEPT VISIT HI MDM: CPT

## 2023-06-30 PROCEDURE — 85025 COMPLETE CBC W/AUTO DIFF WBC: CPT

## 2023-06-30 PROCEDURE — 73502 X-RAY EXAM HIP UNI 2-3 VIEWS: CPT

## 2023-06-30 PROCEDURE — 36415 COLL VENOUS BLD VENIPUNCTURE: CPT

## 2023-06-30 PROCEDURE — 82550 ASSAY OF CK (CPK): CPT

## 2023-06-30 PROCEDURE — 84443 ASSAY THYROID STIM HORMONE: CPT | Performed by: EMERGENCY MEDICINE

## 2023-06-30 PROCEDURE — 72125 CT NECK SPINE W/O DYE: CPT

## 2023-06-30 PROCEDURE — G1004 CDSM NDSC: HCPCS

## 2023-06-30 PROCEDURE — 96360 HYDRATION IV INFUSION INIT: CPT

## 2023-06-30 PROCEDURE — 93005 ELECTROCARDIOGRAM TRACING: CPT

## 2023-06-30 PROCEDURE — 73030 X-RAY EXAM OF SHOULDER: CPT

## 2023-06-30 PROCEDURE — 73560 X-RAY EXAM OF KNEE 1 OR 2: CPT

## 2023-06-30 PROCEDURE — 70450 CT HEAD/BRAIN W/O DYE: CPT

## 2023-06-30 RX ADMIN — SODIUM CHLORIDE 1000 ML: 0.9 INJECTION, SOLUTION INTRAVENOUS at 23:39

## 2023-06-30 NOTE — Clinical Note
Case was discussed with Dr. Grecia Shea and the patient's admission status was agreed to be Admission Status: observation status to the service of Dr. Long Griffiths .

## 2023-07-01 PROBLEM — D64.9 ANEMIA: Status: ACTIVE | Noted: 2023-07-01
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
## 2023-07-01 PROBLEM — N39.0 UTI (URINARY TRACT INFECTION): Status: ACTIVE | Noted: 2023-07-01

## 2023-07-01 LAB
ALBUMIN SERPL BCP-MCNC: 3 G/DL (ref 3.5–5)
ALP SERPL-CCNC: 49 U/L (ref 46–116)
ALT SERPL W P-5'-P-CCNC: 17 U/L (ref 12–78)
ANION GAP SERPL CALCULATED.3IONS-SCNC: 6 MMOL/L
ANION GAP SERPL CALCULATED.3IONS-SCNC: 8 MMOL/L
AST SERPL W P-5'-P-CCNC: 17 U/L (ref 5–45)
ATRIAL RATE: 103 BPM
BACTERIA UR QL AUTO: ABNORMAL /HPF
BILIRUB SERPL-MCNC: 0.93 MG/DL (ref 0.2–1)
BILIRUB UR QL STRIP: NEGATIVE
BUN SERPL-MCNC: 41 MG/DL (ref 5–25)
BUN SERPL-MCNC: 45 MG/DL (ref 5–25)
CALCIUM ALBUM COR SERPL-MCNC: 9.5 MG/DL (ref 8.3–10.1)
CALCIUM SERPL-MCNC: 8.1 MG/DL (ref 8.3–10.1)
CALCIUM SERPL-MCNC: 8.7 MG/DL (ref 8.3–10.1)
CHLORIDE SERPL-SCNC: 113 MMOL/L (ref 96–108)
CHLORIDE SERPL-SCNC: 116 MMOL/L (ref 96–108)
CK SERPL-CCNC: 68 U/L (ref 26–192)
CLARITY UR: CLEAR
CO2 SERPL-SCNC: 22 MMOL/L (ref 21–32)
CO2 SERPL-SCNC: 23 MMOL/L (ref 21–32)
COLOR UR: YELLOW
CREAT SERPL-MCNC: 0.91 MG/DL (ref 0.6–1.3)
CREAT SERPL-MCNC: 1.12 MG/DL (ref 0.6–1.3)
ERYTHROCYTE [DISTWIDTH] IN BLOOD BY AUTOMATED COUNT: 17.2 % (ref 11.6–15.1)
FERRITIN SERPL-MCNC: 15 NG/ML (ref 11–307)
GFR SERPL CREATININE-BSD FRML MDRD: 48 ML/MIN/1.73SQ M
GFR SERPL CREATININE-BSD FRML MDRD: 61 ML/MIN/1.73SQ M
GLUCOSE SERPL-MCNC: 104 MG/DL (ref 65–140)
GLUCOSE SERPL-MCNC: 107 MG/DL (ref 65–140)
GLUCOSE UR STRIP-MCNC: NEGATIVE MG/DL
HCT VFR BLD AUTO: 24.1 % (ref 34.8–46.1)
HGB BLD-MCNC: 7.7 G/DL (ref 11.5–15.4)
HGB UR QL STRIP.AUTO: NEGATIVE
HYALINE CASTS #/AREA URNS LPF: ABNORMAL /LPF
IRON SATN MFR SERPL: 21 % (ref 15–50)
IRON SERPL-MCNC: 47 UG/DL (ref 50–170)
KETONES UR STRIP-MCNC: ABNORMAL MG/DL
LEUKOCYTE ESTERASE UR QL STRIP: NEGATIVE
MCH RBC QN AUTO: 29.4 PG (ref 26.8–34.3)
MCHC RBC AUTO-ENTMCNC: 32 G/DL (ref 31.4–37.4)
MCV RBC AUTO: 92 FL (ref 82–98)
NITRITE UR QL STRIP: POSITIVE
NON-SQ EPI CELLS URNS QL MICRO: ABNORMAL /HPF
P AXIS: 62 DEGREES
PH UR STRIP.AUTO: 5.5 [PH] (ref 4.5–8)
PLATELET # BLD AUTO: 167 THOUSANDS/UL (ref 149–390)
PLATELET # BLD AUTO: 195 THOUSANDS/UL (ref 149–390)
PMV BLD AUTO: 11.6 FL (ref 8.9–12.7)
PMV BLD AUTO: 11.9 FL (ref 8.9–12.7)
POTASSIUM SERPL-SCNC: 3.7 MMOL/L (ref 3.5–5.3)
POTASSIUM SERPL-SCNC: 3.8 MMOL/L (ref 3.5–5.3)
PR INTERVAL: 160 MS
PROT SERPL-MCNC: 5.9 G/DL (ref 6.4–8.4)
PROT UR STRIP-MCNC: NEGATIVE MG/DL
QRS AXIS: 46 DEGREES
QRSD INTERVAL: 80 MS
QT INTERVAL: 358 MS
QTC INTERVAL: 468 MS
RBC # BLD AUTO: 2.62 MILLION/UL (ref 3.81–5.12)
RBC #/AREA URNS AUTO: ABNORMAL /HPF
SODIUM SERPL-SCNC: 143 MMOL/L (ref 135–147)
SODIUM SERPL-SCNC: 145 MMOL/L (ref 135–147)
SP GR UR STRIP.AUTO: 1.02 (ref 1–1.03)
T WAVE AXIS: 6 DEGREES
TIBC SERPL-MCNC: 228 UG/DL (ref 250–450)
TSH SERPL DL<=0.05 MIU/L-ACNC: 2.08 UIU/ML (ref 0.45–4.5)
UROBILINOGEN UR QL STRIP.AUTO: 1 E.U./DL
VENTRICULAR RATE: 103 BPM
WBC # BLD AUTO: 5.96 THOUSAND/UL (ref 4.31–10.16)
WBC #/AREA URNS AUTO: ABNORMAL /HPF

## 2023-07-01 PROCEDURE — 87086 URINE CULTURE/COLONY COUNT: CPT | Performed by: FAMILY MEDICINE

## 2023-07-01 PROCEDURE — 81001 URINALYSIS AUTO W/SCOPE: CPT

## 2023-07-01 PROCEDURE — 99285 EMERGENCY DEPT VISIT HI MDM: CPT | Performed by: EMERGENCY MEDICINE

## 2023-07-01 PROCEDURE — 83550 IRON BINDING TEST: CPT

## 2023-07-01 PROCEDURE — 87186 SC STD MICRODIL/AGAR DIL: CPT | Performed by: FAMILY MEDICINE

## 2023-07-01 PROCEDURE — 85049 AUTOMATED PLATELET COUNT: CPT

## 2023-07-01 PROCEDURE — 82728 ASSAY OF FERRITIN: CPT

## 2023-07-01 PROCEDURE — 99223 1ST HOSP IP/OBS HIGH 75: CPT | Performed by: FAMILY MEDICINE

## 2023-07-01 PROCEDURE — 85027 COMPLETE CBC AUTOMATED: CPT

## 2023-07-01 PROCEDURE — 80048 BASIC METABOLIC PNL TOTAL CA: CPT

## 2023-07-01 PROCEDURE — 83540 ASSAY OF IRON: CPT

## 2023-07-01 PROCEDURE — 36415 COLL VENOUS BLD VENIPUNCTURE: CPT

## 2023-07-01 PROCEDURE — 93010 ELECTROCARDIOGRAM REPORT: CPT | Performed by: INTERNAL MEDICINE

## 2023-07-01 RX ORDER — CEPHALEXIN 500 MG/1
500 CAPSULE ORAL 3 TIMES DAILY
Status: COMPLETED | OUTPATIENT
Start: 2023-07-01 | End: 2023-07-05

## 2023-07-01 RX ORDER — OXYCODONE HYDROCHLORIDE 10 MG/1
10 TABLET ORAL EVERY 8 HOURS PRN
Status: DISCONTINUED | OUTPATIENT
Start: 2023-07-01 | End: 2023-07-06 | Stop reason: HOSPADM

## 2023-07-01 RX ORDER — OXYCODONE HYDROCHLORIDE 5 MG/1
5 TABLET ORAL EVERY 6 HOURS PRN
Status: DISCONTINUED | OUTPATIENT
Start: 2023-07-01 | End: 2023-07-01

## 2023-07-01 RX ORDER — ACETAMINOPHEN 325 MG/1
650 TABLET ORAL EVERY 6 HOURS PRN
Status: DISCONTINUED | OUTPATIENT
Start: 2023-07-01 | End: 2023-07-06 | Stop reason: HOSPADM

## 2023-07-01 RX ORDER — CYCLOBENZAPRINE HCL 10 MG
10 TABLET ORAL
Status: DISCONTINUED | OUTPATIENT
Start: 2023-07-01 | End: 2023-07-02

## 2023-07-01 RX ORDER — LANOLIN ALCOHOL/MO/W.PET/CERES
3 CREAM (GRAM) TOPICAL
Status: DISCONTINUED | OUTPATIENT
Start: 2023-07-01 | End: 2023-07-06 | Stop reason: HOSPADM

## 2023-07-01 RX ORDER — ERGOCALCIFEROL 1.25 MG/1
50000 CAPSULE ORAL WEEKLY
Status: DISCONTINUED | OUTPATIENT
Start: 2023-07-01 | End: 2023-07-06 | Stop reason: HOSPADM

## 2023-07-01 RX ORDER — ZOLPIDEM TARTRATE 5 MG/1
10 TABLET ORAL
Status: DISCONTINUED | OUTPATIENT
Start: 2023-07-01 | End: 2023-07-01

## 2023-07-01 RX ORDER — SENNOSIDES 8.6 MG
8.6 TABLET ORAL
Status: DISCONTINUED | OUTPATIENT
Start: 2023-07-01 | End: 2023-07-04

## 2023-07-01 RX ORDER — KETOROLAC TROMETHAMINE 30 MG/ML
15 INJECTION, SOLUTION INTRAMUSCULAR; INTRAVENOUS ONCE
Status: DISCONTINUED | OUTPATIENT
Start: 2023-07-01 | End: 2023-07-02

## 2023-07-01 RX ORDER — FERROUS SULFATE 325(65) MG
325 TABLET ORAL
Status: DISCONTINUED | OUTPATIENT
Start: 2023-07-02 | End: 2023-07-06 | Stop reason: HOSPADM

## 2023-07-01 RX ORDER — ONDANSETRON 2 MG/ML
4 INJECTION INTRAMUSCULAR; INTRAVENOUS EVERY 6 HOURS PRN
Status: DISCONTINUED | OUTPATIENT
Start: 2023-07-01 | End: 2023-07-06 | Stop reason: HOSPADM

## 2023-07-01 RX ORDER — CYCLOBENZAPRINE HCL 10 MG
10 TABLET ORAL 3 TIMES DAILY PRN
Status: DISCONTINUED | OUTPATIENT
Start: 2023-07-01 | End: 2023-07-01

## 2023-07-01 RX ORDER — METOPROLOL SUCCINATE 50 MG/1
50 TABLET, EXTENDED RELEASE ORAL DAILY
Status: DISCONTINUED | OUTPATIENT
Start: 2023-07-01 | End: 2023-07-06 | Stop reason: HOSPADM

## 2023-07-01 RX ORDER — OXYCODONE HYDROCHLORIDE 10 MG/1
10 TABLET ORAL EVERY 6 HOURS PRN
Status: DISCONTINUED | OUTPATIENT
Start: 2023-07-01 | End: 2023-07-01

## 2023-07-01 RX ORDER — ESCITALOPRAM OXALATE 10 MG/1
10 TABLET ORAL DAILY
Status: DISCONTINUED | OUTPATIENT
Start: 2023-07-01 | End: 2023-07-06 | Stop reason: HOSPADM

## 2023-07-01 RX ORDER — POLYETHYLENE GLYCOL 3350 17 G/17G
17 POWDER, FOR SOLUTION ORAL DAILY
Status: DISCONTINUED | OUTPATIENT
Start: 2023-07-01 | End: 2023-07-04

## 2023-07-01 RX ORDER — CALCIUM CARBONATE 500 MG/1
1000 TABLET, CHEWABLE ORAL DAILY PRN
Status: DISCONTINUED | OUTPATIENT
Start: 2023-07-01 | End: 2023-07-06 | Stop reason: HOSPADM

## 2023-07-01 RX ORDER — GABAPENTIN 300 MG/1
300 CAPSULE ORAL 3 TIMES DAILY
Status: DISCONTINUED | OUTPATIENT
Start: 2023-07-01 | End: 2023-07-06 | Stop reason: HOSPADM

## 2023-07-01 RX ADMIN — CEPHALEXIN 500 MG: 500 CAPSULE ORAL at 16:17

## 2023-07-01 RX ADMIN — OXYCODONE HYDROCHLORIDE 10 MG: 10 TABLET ORAL at 11:13

## 2023-07-01 RX ADMIN — GABAPENTIN 300 MG: 300 CAPSULE ORAL at 08:23

## 2023-07-01 RX ADMIN — APIXABAN 5 MG: 5 TABLET, FILM COATED ORAL at 08:24

## 2023-07-01 RX ADMIN — GABAPENTIN 300 MG: 300 CAPSULE ORAL at 20:07

## 2023-07-01 RX ADMIN — ACETAMINOPHEN 650 MG: 325 TABLET, FILM COATED ORAL at 18:15

## 2023-07-01 RX ADMIN — ERGOCALCIFEROL 50000 UNITS: 1.25 CAPSULE ORAL at 12:45

## 2023-07-01 RX ADMIN — ACETAMINOPHEN 650 MG: 325 TABLET, FILM COATED ORAL at 07:21

## 2023-07-01 RX ADMIN — CEPHALEXIN 500 MG: 500 CAPSULE ORAL at 20:06

## 2023-07-01 RX ADMIN — CYCLOBENZAPRINE HYDROCHLORIDE 10 MG: 10 TABLET, FILM COATED ORAL at 03:31

## 2023-07-01 RX ADMIN — ESCITALOPRAM OXALATE 10 MG: 10 TABLET ORAL at 08:23

## 2023-07-01 RX ADMIN — SENNOSIDES 8.6 MG: 8.6 TABLET, FILM COATED ORAL at 03:32

## 2023-07-01 RX ADMIN — METOPROLOL SUCCINATE 50 MG: 50 TABLET, EXTENDED RELEASE ORAL at 08:23

## 2023-07-01 RX ADMIN — MELATONIN TAB 3 MG 3 MG: 3 TAB at 21:35

## 2023-07-01 RX ADMIN — OXYCODONE HYDROCHLORIDE 10 MG: 10 TABLET ORAL at 20:06

## 2023-07-01 RX ADMIN — APIXABAN 5 MG: 5 TABLET, FILM COATED ORAL at 20:06

## 2023-07-01 RX ADMIN — MELATONIN TAB 3 MG 3 MG: 3 TAB at 03:31

## 2023-07-01 RX ADMIN — CYCLOBENZAPRINE HYDROCHLORIDE 10 MG: 10 TABLET, FILM COATED ORAL at 21:35

## 2023-07-01 RX ADMIN — CEPHALEXIN 500 MG: 500 CAPSULE ORAL at 12:45

## 2023-07-01 RX ADMIN — SODIUM CHLORIDE 1000 ML: 0.9 INJECTION, SOLUTION INTRAVENOUS at 03:33

## 2023-07-01 RX ADMIN — GABAPENTIN 300 MG: 300 CAPSULE ORAL at 16:17

## 2023-07-01 RX ADMIN — OXYCODONE HYDROCHLORIDE 10 MG: 10 TABLET ORAL at 03:32

## 2023-07-01 NOTE — ASSESSMENT & PLAN NOTE
Lab Results   Component Value Date    EGFR 71 07/04/2023    EGFR 72 07/02/2023    EGFR 61 07/01/2023    CREATININE 0.81 07/04/2023    CREATININE 0.80 07/02/2023    CREATININE 0.91 07/01/2023     Plan  - avoid nephrotoxic agents, renal dose medications

## 2023-07-01 NOTE — ASSESSMENT & PLAN NOTE
Wt Readings from Last 3 Encounters:   04/27/23 77.2 kg (170 lb 3.2 oz)   03/27/23 80 kg (176 lb 6.4 oz)   03/20/23 78 kg (172 lb)     Home meds: Toprol 50 mg daily and lasix 40 mg as needed  Last Cardiology visit: 9/19/2022    Assessment  Not in acute heart failure. No fluid overload.      Plan  - c/w Toprol 50 mg daily

## 2023-07-01 NOTE — ASSESSMENT & PLAN NOTE
78 F with of presents to ED after a mechanical fall. No headstrike or LOC. On eliquis. Pt was down for significant amount of time, difficult to quantify because her room remained dark for hours. Pt fell due to her legs giving out, she believes it is related to her Left knee OA. She fell onto her left hip and knee and also bruised her right shoulder. No signs of acute bleeding.   - Pt reports her medications make her dizzy.    · VSS  · Hgb 8.8 on admission downtrending, 6.2 this am   · CK wnl, TSH wnl  · 6/30 CTH, CT spine, Left pelvis/hip, left knee, and right shoulder negative for acute pathology or bleed based, however, pending final results    Plan  - monitor for acute bleed  - PT/OT following, recommend short term rehab  - Medication reconciliation to decrease dizziness 2/2 medications:  d/c ambien, decrease flexeril to 10 mg qHs PRN, and decrease Oxy 10 mg q8h PRN

## 2023-07-01 NOTE — ASSESSMENT & PLAN NOTE
Pt takes ambien 10 mg at bedtime. She reports dizziness with her current medications. Plan  - D/c ambien; can likely benefit from consultant to pharmacist for polypharmacy  - C/w Flexeril 10 mg qHs  - Try melatonin PRN  - delirium precautions.

## 2023-07-01 NOTE — ASSESSMENT & PLAN NOTE
-Hgb: 6.3>6.2 >8.4>7.3  -Iron: 47  -s/p 1u PRBC on 7/3, 200 mg IV iron given (7/2)  -Pt endorses poor PO intake  -7/3 CT Ab/Pelv to assess for active source of bleeding: small amount of hemorrhage possibly present in gastric lumen, R psoas    Plan  -Holding Eliquis  -Hemoccult order  -Started patient on 325 mg ferrous sulfate daily   -Transfuse hgb > 7

## 2023-07-01 NOTE — ASSESSMENT & PLAN NOTE
Patient has chronic pain related to L knee OA and spondylolithesis  Home meds: Flexeril 10 TID PRN, Gabapentin 900 mg TID, oxycodone 10 mg q6h PRN  PDMP reviewed, pt appears to use Oxycodone 4 times a day (pt fills 120 tabs per month)  Last CSI of left knee, 11/23/22   Patient still taking Oxy 10 every 8 hours pain still 8-10 out of 10  I have a significant discussion with patient as well overuse history of opioid can cause amatory dysfunction lead to falling and more complication  Plan  - Decrease flexeril 10 qHs PRN and decrease oxycodone 10 mg q8h PRN

## 2023-07-01 NOTE — ASSESSMENT & PLAN NOTE
Blood pressure well controlled  -most recent Blood Pressure: 108/78    Plan  - Continue home Toprol 50 mg daily and Lasix 40 mg PRN for leg edema

## 2023-07-01 NOTE — UTILIZATION REVIEW
Initial Clinical Review     OBSERVATION 7/1 0051 CHANGED to INPATIENT 7/1 1039  DEHYDRATED ON EXAM, UTI NOTED, START KEFLEX, AMBULATORY DYSFUNCTION REQUIRING PT/OT     Admission: Date/Time/Statement:   Admission Orders (From admission, onward)     Ordered        07/01/23 1039  [address]  Once            07/01/23 0051  Place in Observation  Once,   Status:  Canceled                      Orders Placed This Encounter   Procedures   • INPATIENT ADMISSION     Standing Status:   Standing     Number of Occurrences:   1     Order Specific Question:   Level of Care     Answer:   Med Surg [16]     Order Specific Question:   Estimated length of stay     Answer:   More than 2 Midnights     Order Specific Question:   Certification     Answer:   I certify that inpatient services are medically necessary for this patient for a duration of greater than two midnights. See H&P and MD Progress Notes for additional information about the patient's course of treatment. ED Arrival Information     Expected   -    Arrival   6/30/2023 22:07    Acuity   Urgent            Means of arrival   Ambulance    Escorted by   [id number]    Admission type   Emergency            Arrival complaint   -           Chief Complaint   Patient presents with   • Fall     Per ems pt was found on the floor of her apartment by her neighbor. Pt stated she slipped and fell down by her bed and denies head strike. Pt on eliquis. EMS stated pt has some confusion. Initial Presentation: 78 y.o. female  Presents to ED from home via EMS due to mechanical fall, fell in bedroom, crawled across living room to front door where she called and banged on walls for help. Denies headstrike, c/o pain right shoulder, bilateral knees Exam notes bruises BL knees right shoulder.  No s/s fluid overload, LBM 1 week ago, abdomen soft,  CBC  Shows Hgb 8.8, decreased from previous 12, CK wnl   Admitted as observation to med surg for fall, ambulatory dysfunction, CKD Stage 3, chronic heart failure, Insomnia, constipation, atrial fib on Eliquis  Plan Monitor for acute bleed, follow up imaging, PT/OT, pain control, DC ambien, decrease flexiril and oxycodone , Continue Toprol and Eliquis         Inpatient order entered 7/1 1039 and Observation order canceled  Urinary tract infection noted and will start Keflex , await urine culture, pt was dehydrated on exam and received 2L IVF  continue oral hydration Give one dose Toradol for pain. Patient is on large dose narcotics at home, we will scale back      7/2 DAY 2   C/o mild fatigue, mood labile, Hgb 6.9 this morning, she did consent to transfusion. Mucous membranes are dry , will give 1 dose Venofer. Check Hemoccult.  Start calorie count Encourage oral intake <ay need IV fluids      Intake/Output Summary (Last 24 hours) at 7/2/2023 1020  Last data filed at 7/2/2023 0401  Gross per 24 hour   Intake 250 ml   Output 1100 ml   Net -850 ml     Date: 7/3    Day 3: Has surpassed a 2nd midnight with active treatments and services, which include continued monitoring of H/H 7/3 0445 6.3/20.5, IV Venofer     ED Triage Vitals   Temperature Pulse Respirations Blood Pressure SpO2   06/30/23 2224 06/30/23 2221 06/30/23 2221 06/30/23 2221 06/30/23 2221   97.8 °F (36.6 °C) 102 18 129/75 100 %      Temp Source Heart Rate Source Patient Position - Orthostatic VS BP Location FiO2 (%)   06/30/23 2224 06/30/23 2221 06/30/23 2221 06/30/23 2221 --   Oral Monitor Lying Left arm       Pain Score       07/01/23 0325       9          Wt Readings from Last 1 Encounters:   04/27/23 77.2 kg (170 lb 3.2 oz)     Additional Vital Signs:      /Time Temp Pulse Resp BP MAP (mmHg) SpO2 O2 Device Patient Position - Orthostatic VS   07/01/23 0830 -- 104 16 136/90 103 97 % None (Room air) Lying   07/01/23 0715 -- 92 16 144/88 -- 95 % None (Room air) Lying   07/01/23 0400 -- -- -- -- -- -- None (Room air) --   07/01/23 0324 -- 92 18 186/88 Abnormal  117 97 % None (Room air) Lying   06/30/23 2224 97.8 °F (36.6 °C) -- -- -- -- -- --        Pertinent Labs/Diagnostic Test Results:      EKG 6/30 - rate 103   XR shoulder 2+ views RIGHT   Final Result by Jerry Armstrong MD (07/01 1105)      No acute osseous abnormality. XR hip/pelv 2-3 vws left if performed   Final Result by Jerry Armstrong MD (07/01 1102)      No acute osseous abnormality. XR knee 1 or 2 vw left   Final Result by Jerry Armstrong MD (07/01 1106)      Osteoarthritis      No acute osseous abnormality. CT head without contrast   Final Result by Deshaun Bradford MD (07/01 4000)      No acute intracranial abnormality. Findings are consistent with the preliminary report from [facility] which was provided shortly after completion of the exam.               CT spine cervical without contrast   Final Result by Deshaun Bradford MD (07/01 5282)      No acute cervical spine fracture or traumatic malalignment.       Findings are consistent with the preliminary report from [facility] which was provided shortly after completion of the exam.                  Results from last 7 days   Lab Units 07/01/23  0402 07/01/23  0401 06/30/23  2335   WBC Thousand/uL  --  5.96 6.62   HEMOGLOBIN g/dL  --  7.7* 8.8*   HEMATOCRIT %  --  24.1* 27.9*   PLATELETS Thousands/uL 167 195 200   NEUTROS ABS Thousands/µL  --   --  5.29         Results from last 7 days   Lab Units 07/01/23  0401 06/30/23  2335   SODIUM mmol/L 145 143   POTASSIUM mmol/L 3.7 3.8   CHLORIDE mmol/L 116* 113*   CO2 mmol/L 23 22   ANION GAP mmol/L 6 8   BUN mg/dL 41* 45*   CREATININE mg/dL 0.91 1.12   EGFR ml/min/1.73sq m 61 48   CALCIUM mg/dL 8.1* 8.7     Results from last 7 days   Lab Units 06/30/23  2335   AST U/L 17   ALT U/L 17   ALK PHOS U/L 49   TOTAL PROTEIN g/dL 5.9*   ALBUMIN g/dL 3.0*   TOTAL BILIRUBIN mg/dL 0.93         Results from last 7 days   Lab Units 07/01/23  0401 06/30/23  2335   GLUCOSE RANDOM mg/dL 104 107               Results from last 7 days   Lab Units 06/30/23  2335   CK TOTAL U/L 68                 Results from last 7 days   Lab Units 06/30/23  2335   TSH 3RD GENERATON uIU/mL 2.082           Results from last 7 days   Lab Units 07/01/23  0401   FERRITIN ng/mL 15   IRON SATURATION % 21   IRON ug/dL 47*   TIBC ug/dL 228*           Results from last 7 days   Lab Units 07/01/23  0748   CLARITY UA  Clear   COLOR UA  Yellow   SPEC GRAV UA  1.020   PH UA  5.5   GLUCOSE UA mg/dl Negative   KETONES UA mg/dl 15 (1+)*   BLOOD UA  Negative   PROTEIN UA mg/dl Negative   NITRITE UA  Positive*   BILIRUBIN UA  Negative   UROBILINOGEN UA E.U./dl 1.0   LEUKOCYTES UA  Negative   WBC UA /hpf None Seen   RBC UA /hpf 1-2   BACTERIA UA /hpf Innumerable*   EPITHELIAL CELLS WET PREP /hpf Occasional                     ED Treatment:   Medication Administration from 06/30/2023 2207 to 07/01/2023 1005       Date/Time Order Dose Route Action Action by Comments     07/01/2023 0019 EDT sodium chloride 0.9 % bolus 1,000 mL 0 mL Intravenous Stopped Alexei Mesce IV, RN --     06/30/2023 2339 EDT sodium chloride 0.9 % bolus 1,000 mL 1,000 mL Intravenous New Bag Alexei Mesce IV, RN --     07/01/2023 9305 EDT apixaban (ELIQUIS) tablet 5 mg 5 mg Oral Given [facility], RN --     07/01/2023 6414 EDT escitalopram (LEXAPRO) tablet 10 mg 10 mg Oral Given [facility], RN --     07/01/2023 0863 EDT gabapentin (NEURONTIN) capsule 300 mg 300 mg Oral Given [facility], RN --     07/01/2023 8202 EDT metoprolol succinate (TOPROL-XL) 24 hr tablet 50 mg 50 mg Oral Given [facility], RN --     07/01/2023 0332 EDT senna (SENOKOT) tablet 8.6 mg 8.6 mg Oral Given Leann Vann, RN --     07/01/2023 0828 EDT polyethylene glycol (MIRALAX) packet 17 g 17 g Oral Not Given Kings Stack RN --     07/01/2023 6129 EDT acetaminophen (TYLENOL) tablet 650 mg 650 mg Oral Given Channing Mcgovern Cathie Baker, USHA --     07/01/2023 1659 EDT sodium chloride 0.9 % bolus 1,000 mL 1,000 mL Intravenous [address] Cruzito German RN --     07/01/2023 0331 EDT melatonin tablet 3 mg 3 mg Oral Given Cruzito German RN --     07/01/2023 0331 EDT cyclobenzaprine (FLEXERIL) tablet 10 mg 10 mg Oral Given Cruzito German RN --     07/01/2023 7803 EDT oxyCODONE (ROXICODONE) immediate release tablet 10 mg 10 mg Oral Given Cruzito German RN --        Past Medical History:   Diagnosis Date   • Acute deep vein thrombosis of lower limb ([address])     last assessed 25Feb2013   • Aortic aneurysm [facility])    • Arthritis    • Atrial fibrillation [facility])    • Dislocation of hip ([address])     last assessed 25Feb2013   • Hypertension    • Psychiatric disorder     anxiety     Present on Admission:  • Atrial fibrillation ([address])  • Chronic heart failure with preserved ejection fraction (HCC)  • Chronic pain  • Constipation  • Depression with anxiety  • Hypertension  • Insomnia  • Stage 3a chronic kidney disease ([address])      Admitting Diagnosis: Unspecified multiple injuries, initial encounter [T07. XXXA]  Age/Sex: 78 y.o. female  Admission Orders:  Scheduled Medications:  apixaban, 5 mg, Oral, BID  cephalexin, 500 mg, Oral, TID  cyclobenzaprine, 10 mg, Oral, HS  ergocalciferol, 50,000 Units, Oral, Weekly  escitalopram, 10 mg, Oral, Daily  [START ON 7/2/2023] ferrous sulfate, 325 mg, Oral, Daily With Breakfast  gabapentin, 300 mg, Oral, TID  ketorolac, 15 mg, Intravenous, Once  melatonin, 3 mg, Oral, HS  metoprolol succinate, 50 mg, Oral, Daily  polyethylene glycol, 17 g, Oral, Daily  senna, 8.6 mg, Oral, HS      Continuous IV Infusions:     PRN Meds:  acetaminophen, 650 mg, Oral, Q6H PRN  calcium carbonate, 1,000 mg, Oral, Daily PRN  ondansetron, 4 mg, Intravenous, Q6H PRN  oxyCODONE, 10 mg, Oral, Q8H PRN       SCD's , PT/OT     IP CONSULT TO CASE MANAGEMENT  IP CONSULT TO CASE MANAGEMENT  IP CONSULT TO CASE MANAGEMENT    Network Utilization Review Department  ATTENTION: Please call with any questions or concerns to [phone] and carefully listen to the prompts so that you are directed to the right person. All voicemails are confidential.  Felicia Herring all requests for admission clinical reviews, approved or denied determinations and any other requests to dedicated fax number below belonging to the campus where the patient is receiving treatment.  List of dedicated fax numbers for the Facilities:  [place] DENIALS (Administrative/Medical Necessity) [phone] [address] [facility] (Maternity/NICU/Pediatrics) [phone]   [address] [place] [address] [phone]   [facility] [address] [facility] [phone]   [address] [place] [address] [phone]   [address] [facility] [address] [phone]

## 2023-07-01 NOTE — ASSESSMENT & PLAN NOTE
UA 7/1: positive for nitrites and bacteria  Ucx 7/1: >100,000cfu E.coli, pan susceptible   No dysuria, no polyuria.   Stable vital signs    Plan:  -cephalexin 500 mg 3 times daily; plan for 5 days total course (today is day 4 of 5)

## 2023-07-01 NOTE — ASSESSMENT & PLAN NOTE
On rate control with Toprol 50 mg daily and AC with Eliquis 5 mg BID    Assessment  Pt is in RRR. She has no obvious signs of bleeding and scans are negative s/p fall.      Plan  - Continue home meds Toprol 50 mg daily  -We will hold Eliquis for now and concerning of downtrending hemoglobin unknown GI bleed could be a cause

## 2023-07-01 NOTE — ED PROVIDER NOTES
History  Chief Complaint   Patient presents with   • Fall     Per ems pt was found on the floor of her apartment by her neighbor. Pt stated she slipped and fell down by her bed and denies head strike. Pt on eliquis. EMS stated pt has some confusion. 61-year-old female patient with a history of previous DVT, A-fib, on Eliquis presenting with fall. patient states that her knees gave out and she was on the ground for multiple hours. Patient states that she was unsure of how long she was down for. Patient currently denies any chest pain or shortness of breath, abdominal pain, syncope, lightheadedness. Patient states her knees gave out as she has arthritic knees. Patient currently states that she has some right shoulder pain left hip pain and left knee pain. Patient states she otherwise has no other symptoms. Denies any blood in stool. Prior to Admission Medications   Prescriptions Last Dose Informant Patient Reported? Taking?    Diclofenac Sodium (VOLTAREN) 1 %  Self No No   Sig: Apply 1 g topically 4 (four) times a day   Eliquis 5 MG   No No   Sig: TAKE 1 TABLET (5 MG TOTAL) BY MOUTH 2 (TWO) TIMES A DAY   cyclobenzaprine (FLEXERIL) 10 mg tablet   No No   Sig: Take 1 tablet (10 mg total) by mouth 3 (three) times a day as needed for muscle spasms   escitalopram (LEXAPRO) 10 mg tablet   No No   Sig: TAKE 1 TABLET (10 MG TOTAL) BY MOUTH DAILY   furosemide (LASIX) 40 mg tablet  Self No No   Sig: Take 1 tablet (40 mg total) by mouth as needed (edema)   gabapentin (NEURONTIN) 300 mg capsule  Self No No   Sig: TAKE 3 CAPSULES (900 MG TOTAL) BY MOUTH 3 (THREE) TIMES A DAY   hydrocortisone 2.5 % cream  Self No No   Sig: Apply topically 2 (two) times a day   lidocaine (LIDODERM) 5 %  Self No No   Sig: Apply 1 patch topically daily Remove & Discard patch within 12 hours or as directed by MD   menthol-cetylpyridinium (CEPACOL) 3 MG lozenge  Self No No   Sig: Take 1 lozenge (3 mg total) by mouth as needed for sore throat   metoprolol succinate (TOPROL-XL) 50 mg 24 hr tablet  Self No No   Sig: Take 1 tablet (50 mg total) by mouth daily   naloxone (NARCAN) 4 mg/0.1 mL nasal spray  Self No No   Sig: Administer 1 spray into a nostril. If no response after 2-3 minutes, give another dose in the other nostril using a new spray. naloxone (NARCAN) 4 mg/0.1 mL nasal spray  Self No No   Si.1 mL (4 mg total) into each nostril once for 1 dose If the desired response is not obtained after 2-3 minutes, administer an additional dose using a new spray   oxyCODONE (ROXICODONE) 10 MG TABS   No No   Sig: Take 1 tablet (10 mg total) by mouth every 6 (six) hours as needed for moderate pain Max Daily Amount: 40 mg Do not start before 2023. potassium chloride (K-DUR,KLOR-CON) 20 mEq tablet  Self No No   Sig: Take 1 tablet (20 mEq total) by mouth daily   senna (SENOKOT) 8.6 mg  Self No No   Sig: Take 1 tablet (8.6 mg total) by mouth daily at bedtime   zolpidem (AMBIEN) 10 mg tablet   No No   Sig: Take 1 tablet (10 mg total) by mouth daily at bedtime as needed for sleep Do not start before 2023.       Facility-Administered Medications: None       Past Medical History:   Diagnosis Date   • Acute deep vein thrombosis of lower limb (HCC)     last assessed 2013   • Aortic aneurysm [facility])    • Arthritis    • Atrial fibrillation (HCC)    • Dislocation of hip ([address])     last assessed 2013   • Hypertension    • Psychiatric disorder     anxiety       Past Surgical History:   Procedure Laterality Date   • HIP SURGERY     • JOINT REPLACEMENT     • KNEE ARTHROSCOPY Bilateral     x2 rt and 1 on left   • TUBAL LIGATION         Family History   Problem Relation Age of Onset   • Colon cancer Mother    • Breast cancer Family    • Thyroid cancer Family    • Colon cancer Family    • Hypertension Family    • Thyroid disease Family    • Hypertension Father    • Dementia Father      I have reviewed and agree with the history as documented. E-Cigarette/Vaping   • E-Cigarette Use Never User      E-Cigarette/Vaping Substances   • Nicotine No    • THC No    • CBD No    • Flavoring No    • Other No    • Unknown No      Social History     Tobacco Use   • Smoking status: Never   • Smokeless tobacco: Never   Vaping Use   • Vaping Use: Never used   Substance Use Topics   • Alcohol use: No     Comment: being a social drinker per [employer]   • Drug use: No        Review of Systems   Musculoskeletal:        Right shoulder pain, left hip pain, left knee pain   All other systems reviewed and are negative. Physical Exam  ED Triage Vitals   Temperature Pulse Respirations Blood Pressure SpO2   06/30/23 2224 06/30/23 2221 06/30/23 2221 06/30/23 2221 06/30/23 2221   97.8 °F (36.6 °C) 102 18 129/75 100 %      Temp Source Heart Rate Source Patient Position - Orthostatic VS BP Location FiO2 (%)   06/30/23 2224 06/30/23 2221 06/30/23 2221 06/30/23 2221 --   Oral Monitor Lying Left arm       Pain Score       07/01/23 0325       9             Orthostatic Vital Signs  Vitals:    07/01/23 1645 07/01/23 1832 07/01/23 1833 07/01/23 2202   BP: 124/82 112/80 112/80 109/75   Pulse: 74  82 84   Patient Position - Orthostatic VS: Lying          Physical Exam  Vitals reviewed. Constitutional:       Appearance: Normal appearance. HENT:      Head: Normocephalic and atraumatic. Nose: Nose normal.      Mouth/Throat:      Mouth: Mucous membranes are moist.      Pharynx: Oropharynx is clear. Eyes:      Extraocular Movements: Extraocular movements intact. Conjunctiva/sclera: Conjunctivae normal.   Cardiovascular:      Rate and Rhythm: Normal rate and regular rhythm. Pulses: Normal pulses. Heart sounds: Normal heart sounds. Pulmonary:      Effort: Pulmonary effort is normal.      Breath sounds: Normal breath sounds. Abdominal:      General: Bowel sounds are normal.      Palpations: Abdomen is soft. Tenderness:  There is no abdominal tenderness. Musculoskeletal:         General: Swelling (Left knee swelling) and tenderness (Tenderness to right shoulder, left hip.,  Left knee) present. Cervical back: Normal range of motion. Comments: Decreased range of motion of left hip secondary to pain   Skin:     General: Skin is warm and dry. Neurological:      General: No focal deficit present. Mental Status: She is alert and oriented to person, place, and time. Mental status is at baseline.          ED Medications  Medications   apixaban (ELIQUIS) tablet 5 mg (5 mg Oral Given 7/1/23 2006)   escitalopram (LEXAPRO) tablet 10 mg (10 mg Oral Given 7/1/23 0823)   gabapentin (NEURONTIN) capsule 300 mg (300 mg Oral Given 7/1/23 2007)   metoprolol succinate (TOPROL-XL) 24 hr tablet 50 mg (50 mg Oral Given 7/1/23 0823)   senna (SENOKOT) tablet 8.6 mg (8.6 mg Oral Not Given 7/1/23 2135)   calcium carbonate (TUMS) chewable tablet 1,000 mg (has no administration in time range)   ondansetron (ZOFRAN) injection 4 mg (has no administration in time range)   polyethylene glycol (MIRALAX) packet 17 g (17 g Oral Not Given 7/1/23 0828)   acetaminophen (TYLENOL) tablet 650 mg (650 mg Oral Given 7/1/23 1815)   melatonin tablet 3 mg (3 mg Oral Given 7/1/23 2135)   cyclobenzaprine (FLEXERIL) tablet 10 mg (10 mg Oral Given 7/1/23 2135)   oxyCODONE (ROXICODONE) immediate release tablet 10 mg (10 mg Oral Given 7/1/23 2006)   ergocalciferol (VITAMIN D2) capsule 50,000 Units (50,000 Units Oral Given 7/1/23 1245)   cephalexin (KEFLEX) capsule 500 mg (500 mg Oral Given 7/1/23 2006)   ferrous sulfate tablet 325 mg (has no administration in time range)   sodium chloride 0.9 % bolus 1,000 mL (0 mL Intravenous Stopped 7/1/23 0019)   sodium chloride 0.9 % bolus 1,000 mL (0 mL Intravenous Stopped 7/1/23 0733)   ketorolac (TORADOL) injection 15 mg (15 mg Intravenous Given 7/2/23 0115)       Diagnostic Studies  Results Reviewed     Procedure Component Value Units Date/Time CBC and Platelet [[id number]]     Lab Status: No result Specimen: Blood     Basic metabolic panel [[id number]]     Lab Status: No result Specimen: Blood     Urine culture [[id number]] Collected: 07/01/23 1357    Lab Status:  In process Specimen: Urine, Other Updated: 07/01/23 1421    Ferritin [[id number]]  (Normal) Collected: 07/01/23 0401    Lab Status: Final result Specimen: Blood from Arm, Right Updated: 07/01/23 1155     Ferritin 15 ng/mL     Iron Saturation % [[id number]]  (Abnormal) Collected: 07/01/23 0401    Lab Status: Final result Specimen: Blood from Arm, Right Updated: 07/01/23 0911     Iron Saturation 21 %      TIBC 228 ug/dL      Iron 47 ug/dL     Urine Microscopic [[id number]]  (Abnormal) Collected: 07/01/23 0748    Lab Status: Final result Specimen: Urine, Other Updated: 07/01/23 0813     RBC, UA 1-2 /hpf      WBC, UA None Seen /hpf      Epithelial Cells Occasional /hpf      Bacteria, UA Innumerable /hpf      Hyaline Casts, UA 0-3 /lpf     Urine Macroscopic, POC [[id number]]  (Abnormal) Collected: 07/01/23 0748    Lab Status: Final result Specimen: Urine Updated: 07/01/23 0751     Color, UA Yellow     Clarity, UA Clear     pH, UA 5.5     Leukocytes, UA Negative     Nitrite, UA Positive     Protein, UA Negative mg/dl      Glucose, UA Negative mg/dl      Ketones, UA 15 (1+) mg/dl      Urobilinogen, UA 1.0 E.U./dl      Bilirubin, UA Negative     Occult Blood, UA Negative     Specific Gravity, UA 1.020    Narrative:      CLINITEK RESULT    Occult blood 1-3, stool [[id number]]     Lab Status: No result Specimen: Stool     Basic metabolic panel [[id number]]  (Abnormal) Collected: 07/01/23 0401    Lab Status: Final result Specimen: Blood from Arm, Right Updated: 07/01/23 0438     Sodium 145 mmol/L      Potassium 3.7 mmol/L      Chloride 116 mmol/L      CO2 23 mmol/L      ANION GAP 6 mmol/L      BUN 41 mg/dL      Creatinine 0.91 mg/dL      Glucose 104 mg/dL      Calcium 8.1 mg/dL      eGFR 61 ml/min/1.73sq m Narrative:      National [employer] guidelines for Chronic Kidney Disease (CKD):   •  Stage 1 with normal or high GFR (GFR > 90 mL/min/1.73 square meters)  •  Stage 2 Mild CKD (GFR = 60-89 mL/min/1.73 square meters)  •  Stage 3A Moderate CKD (GFR = 45-59 mL/min/1.73 square meters)  •  Stage 3B Moderate CKD (GFR = 30-44 mL/min/1.73 square meters)  •  Stage 4 Severe CKD (GFR = 15-29 mL/min/1.73 square meters)  •  Stage 5 End Stage CKD (GFR <15 mL/min/1.73 square meters)  Note: GFR calculation is accurate only with a steady state creatinine    CBC (With Platelets) [[id number]]  (Abnormal) Collected: 07/01/23 0401    Lab Status: Final result Specimen: Blood from Arm, Right Updated: 07/01/23 0414     WBC 5.96 Thousand/uL      RBC 2.62 Million/uL      Hemoglobin 7.7 g/dL      Hematocrit 24.1 %      MCV 92 fL      MCH 29.4 pg      MCHC 32.0 g/dL      RDW 17.2 %      Platelets 726 Thousands/uL      MPV 11.9 fL     Platelet count [[id number]]  (Normal) Collected: 07/01/23 0402    Lab Status: Final result Specimen: Blood from Arm, Right Updated: 07/01/23 0413     Platelets 125 Thousands/uL      MPV 11.6 fL     TSH, 3rd generation with Free T4 reflex [[id number]]  (Normal) Collected: 06/30/23 2335    Lab Status: Final result Specimen: Blood from Arm, Left Updated: 07/01/23 0018     TSH 3RD GENERATON 2.082 uIU/mL     CK [[id number]]  (Normal) Collected: 06/30/23 2335    Lab Status: Final result Specimen: Blood from Arm, Left Updated: 07/01/23 0006     Total CK 68 U/L     Comprehensive metabolic panel [[id number]]  (Abnormal) Collected: 06/30/23 2335    Lab Status: Final result Specimen: Blood from Arm, Left Updated: 07/01/23 0003     Sodium 143 mmol/L      Potassium 3.8 mmol/L      Chloride 113 mmol/L      CO2 22 mmol/L      ANION GAP 8 mmol/L      BUN 45 mg/dL      Creatinine 1.12 mg/dL      Glucose 107 mg/dL      Calcium 8.7 mg/dL      Corrected Calcium 9.5 mg/dL      AST 17 U/L      ALT 17 U/L      Alkaline Phosphatase 49 U/L      Total Protein 5.9 g/dL      Albumin 3.0 g/dL      Total Bilirubin 0.93 mg/dL      eGFR 48 ml/min/1.73sq m     Narrative:      [place] guidelines for Chronic Kidney Disease (CKD):   •  Stage 1 with normal or high GFR (GFR > 90 mL/min/1.73 square meters)  •  Stage 2 Mild CKD (GFR = 60-89 mL/min/1.73 square meters)  •  Stage 3A Moderate CKD (GFR = 45-59 mL/min/1.73 square meters)  •  Stage 3B Moderate CKD (GFR = 30-44 mL/min/1.73 square meters)  •  Stage 4 Severe CKD (GFR = 15-29 mL/min/1.73 square meters)  •  Stage 5 End Stage CKD (GFR <15 mL/min/1.73 square meters)  Note: GFR calculation is accurate only with a steady state creatinine    CBC and differential [[id number]]  (Abnormal) Collected: 06/30/23 2335    Lab Status: Final result Specimen: Blood from Arm, Left Updated: 06/30/23 2344     WBC 6.62 Thousand/uL      RBC 3.02 Million/uL      Hemoglobin 8.8 g/dL      Hematocrit 27.9 %      MCV 92 fL      MCH 29.1 pg      MCHC 31.5 g/dL      RDW 17.2 %      MPV 11.3 fL      Platelets 534 Thousands/uL      nRBC 0 /100 WBCs      Neutrophils Relative 79 %      Immat GRANS % 1 %      Lymphocytes Relative 13 %      Monocytes Relative 5 %      Eosinophils Relative 1 %      Basophils Relative 1 %      Neutrophils Absolute 5.29 Thousands/µL      Immature Grans Absolute 0.03 Thousand/uL      Lymphocytes Absolute 0.86 Thousands/µL      Monocytes Absolute 0.36 Thousand/µL      Eosinophils Absolute 0.04 Thousand/µL      Basophils Absolute 0.04 Thousands/µL                  XR shoulder 2+ views RIGHT   Final Result by Dave Weaver MD (07/01 1105)      No acute osseous abnormality. Workstation performed: [id number]         XR hip/pelv 2-3 vws left if performed   Final Result by Dave Weaver MD (07/01 1102)      No acute osseous abnormality.             Workstation performed: [id number]         XR knee 1 or 2 vw left   Final Result by Dave Weaver MD (07/01 1106) Osteoarthritis      No acute osseous abnormality. Workstation performed: [id number]         CT head without contrast   Final Result by Harry Feldman MD (07/01 2943)      No acute intracranial abnormality. Findings are consistent with the preliminary report from [facility] which was provided shortly after completion of the exam.                     Workstation performed: [id number]         CT spine cervical without contrast   Final Result by Harry Feldman MD (07/01 1146)      No acute cervical spine fracture or traumatic malalignment. Findings are consistent with the preliminary report from [facility] which was provided shortly after completion of the exam.               Workstation performed: [id number]               Procedures  Procedures      ED Course               Identification of Seniors at [facility] Most Recent Value   (ISAR) Identification of Seniors at Risk    Before the illness or injury that brought you to the Emergency, did you need someone to help you on a regular basis? 0 Filed at: 06/30/2023 2223   In the last 24 hours, have you needed more help than usual? 0 Filed at: 06/30/2023 2223   Have you been hospitalized for one or more nights during the past 6 months? 0 Filed at: 06/30/2023 2223   In general, do you see well? 1 Filed at: 06/30/2023 2223   In general, do you have serious problems with your memory? 1 Filed at: 06/30/2023 2223   Do you take more than three different medications every day? 1 Filed at: 06/30/2023 2223   ISAR Score 3 Filed at: 06/30/2023 2223                              Medical Decision Making  72-year-old female patient with history of DVT, A-fib on Eliquis presenting with fall. Patient states she fell on her right side and was not able to get up. Unable to get up because of her knees or in pain. Denies any loss of consciousness. Patient states that her knees gave out because of arthritis. Concern for fracture. CT head, CT C-spine negative for acute intracranial or spinal fractures. X-ray of hip shoulder and knee shows no acute osseous fractures or dislocations. Labs show anemia which is worsened from previous. Patient denies any bloody stools. Will admit to family medicine for ambulatory dysfunction, and on being Eliquis. Anemia:     Details: New onset of anemia. Denies any black or bloody stools  Amount and/or Complexity of Data Reviewed  Labs: ordered. Discussion of management or test interpretation with external provider(s): Admitted to medicine for anemia and ambulatory dysfunction    Risk  Decision regarding hospitalization. Disposition  Final diagnoses:   Fall, initial encounter   Anemia     Time reflects when diagnosis was documented in both MDM as applicable and the Disposition within this note     Time User Action Codes Description Comment    7/1/2023 12:50 AM Jose Phlegm Aime Add [O61. XXXA] Fall, initial encounter     7/1/2023 12:50 AM Alejandra Chan Add [D64.9] Anemia       ED Disposition     ED Disposition   Admit    Condition   Stable    Date/Time   Sat Jul 1, 2023 10:36 AM    Comment   Case was discussed with Dr. Sabas Hoang and the patient's admission status was agreed to be Admission Status: inpatient status to the service of Dr. Siddhartha Duckworth .            Follow-up Information    None         Current Discharge Medication List      CONTINUE these medications which have NOT CHANGED    Details   cyclobenzaprine (FLEXERIL) 10 mg tablet Take 1 tablet (10 mg total) by mouth 3 (three) times a day as needed for muscle spasms  Qty: 90 tablet, Refills: 3    Associated Diagnoses: Spinal stenosis of lumbar region, unspecified whether neurogenic claudication present      Diclofenac Sodium (VOLTAREN) 1 % Apply 1 g topically 4 (four) times a day  Qty: 20 g, Refills: 0    Associated Diagnoses: Spinal stenosis of lumbar region, unspecified whether neurogenic claudication present      Eliquis 5 MG TAKE 1 TABLET (5 MG TOTAL) BY MOUTH 2 (TWO) TIMES A DAY  Qty: 60 tablet, Refills: 5    Associated Diagnoses: Paroxysmal atrial fibrillation (HCC)      escitalopram (LEXAPRO) 10 mg tablet TAKE 1 TABLET (10 MG TOTAL) BY MOUTH DAILY  Qty: 90 tablet, Refills: 1    Associated Diagnoses: Depression with anxiety      furosemide (LASIX) 40 mg tablet Take 1 tablet (40 mg total) by mouth as needed (edema)  Qty: 30 tablet, Refills: 1    Associated Diagnoses: Chronic pain syndrome      gabapentin (NEURONTIN) 300 mg capsule TAKE 3 CAPSULES (900 MG TOTAL) BY MOUTH 3 (THREE) TIMES A DAY  Qty: 270 capsule, Refills: 5    Associated Diagnoses: Chronic bilateral low back pain with bilateral sciatica      hydrocortisone 2.5 % cream Apply topically 2 (two) times a day  Qty: 30 g, Refills: 2    Associated Diagnoses: Eczema of right external ear      lidocaine (LIDODERM) 5 % Apply 1 patch topically daily Remove & Discard patch within 12 hours or as directed by MD  Qty: 30 patch, Refills: 2    Associated Diagnoses: Chronic right shoulder pain      menthol-cetylpyridinium (CEPACOL) 3 MG lozenge Take 1 lozenge (3 mg total) by mouth as needed for sore throat  Qty: 9 lozenge, Refills: 0    Associated Diagnoses: Viral illness      metoprolol succinate (TOPROL-XL) 50 mg 24 hr tablet Take 1 tablet (50 mg total) by mouth daily  Qty: 90 tablet, Refills: 3    Associated Diagnoses: Paroxysmal atrial fibrillation (HCC)      naloxone (NARCAN) 4 mg/0.1 mL nasal spray Administer 1 spray into a nostril. If no response after 2-3 minutes, give another dose in the other nostril using a new spray. Qty: 1 each, Refills: 1    Associated Diagnoses: Pain syndrome, chronic      oxyCODONE (ROXICODONE) 10 MG TABS Take 1 tablet (10 mg total) by mouth every 6 (six) hours as needed for moderate pain Max Daily Amount: 40 mg Do not start before June 7, 2023.   Qty: 120 tablet, Refills: 0    Associated Diagnoses: Pain syndrome, chronic      potassium chloride (K-DUR,KLOR-CON) 20 mEq tablet Take 1 tablet (20 mEq total) by mouth daily  Qty: 30 tablet, Refills: 5    Associated Diagnoses: Hypokalemia      senna (SENOKOT) 8.6 mg Take 1 tablet (8.6 mg total) by mouth daily at bedtime  Qty: 30 tablet, Refills: 3    Associated Diagnoses: Drug-induced constipation      zolpidem (AMBIEN) 10 mg tablet Take 1 tablet (10 mg total) by mouth daily at bedtime as needed for sleep Do not start before June 2, 2023. Qty: 30 tablet, Refills: 0    Associated Diagnoses: Insomnia, unspecified type           No discharge procedures on file. PDMP Review       Value Time User    PDMP Reviewed  Yes 7/1/2023  1:37 AM Flakito Daniels DO           ED Provider  Attending physically available and evaluated Debra Kelly. I managed the patient along with the ED Attending.     Electronically Signed by         Emma Akins MD  07/02/23 0244

## 2023-07-01 NOTE — ED ATTENDING ATTESTATION
Final Diagnoses:     1. Fall, initial encounter    2. Anemia           I, Willene Prader MD, saw and evaluated the patient. All available labs and X-rays were ordered by me or the resident / non-physician and have been reviewed by myself. I discussed the patient with the resident / non-physician and agree with the resident's / non-physician practitioner's findings and plan as documented in the resident's / non-physician practicitioner's note, except where noted. At this point, I agree with the current assessment done in the ED. I was present during key portions of all procedures performed unless otherwise stated. Nursing Triage:     Chief Complaint   Patient presents with   • Fall     Per ems pt was found on the floor of her apartment by her neighbor. Pt stated she slipped and fell down by her bed and denies head strike. Pt on eliquis. EMS stated pt has some confusion. HPI:   This is a 78 y.o. female presenting for evaluation of being down on the ground. The patient is an incredibly poor historian, but states they may be because her slippers were going across the carpet or rug she might of fallen because of that but is uncertain. She denies any fevers or chills, feels nauseous, has very dry mouth, she was unable to get off the ground for at least 24 hours, does not know the day of the week. She mentions something about taking gabapentin. The patient is on Eliquis normally. The patient lives by herself. She Banged the walls and eventually someone called EMS who brought her in for evaluation. ASSESSMENT + PLAN:   Fall, prolonged downtime  Labs, fluids, check for rhabdo, CT head for bleed, CT neck for fracture, imaging for acute abnormality, has right shoulder pain and bilateral hip pain, we will x-ray those regions as well. Low threshold for admission. Urine for infection. P.o. challenge. She lives by herself, unable to care for self.   Given the age and ocular proptosis, discussed getting a single TSH for thyroid dysfunction    Physical:   Pertinent: Right shoulder tenderness but able to range it, bilateral hip tenderness but able to range it. No obvious external rotation noted. Very dry mucous membranes. Extremities are intact but very proptotic eyes. General: VS reviewed  Appears in NAD  awake, alert. Well-nourished, well-developed. Appears stated age. Speaking normally in full sentences. Head: Normocephalic, atraumatic  Eyes: EOM-I. No diplopia. No hyphema. No subconjunctival hemorrhages. Symmetrical lids. ENT: Atraumatic external nose and ears. Dry MM  No malocclusion. No stridor. Normal phonation. No drooling. Normal swallowing. Neck: No JVD. CV: No pallor noted  Lungs:   No tachypnea  No respiratory distress  Abd: soft nt nd no rebound/guarding  MSK:   FROM spontaneously  Skin: Dry, intact. Neuro: Awake, alert, GCS15, CN II-XII grossly intact. Motor grossly intact. Psychiatric/Behavioral: interacting normally; appropriate mood/affect.  Exam: deferred    Vitals:    07/03/23 1007 07/03/23 1217 07/03/23 1255 07/03/23 1257   BP: 106/75  105/77 105/77   Pulse: 105  103 100   Resp:   16    Temp: 97.9 °F (36.6 °C)  97.7 °F (36.5 °C) 97.7 °F (36.5 °C)   TempSrc:       SpO2: 95%   90%   Height:  5' 4" (1.626 m)       - There are no obvious limitations to social determinants of care. - Nursing note reviewed. - Vitals reviewed. - Orders placed by myself and/or advanced practitioner / resident.    - Previous chart was reviewed  - No language barrier.   - History obtained from patient. - There are no limitations to the history obtained:     Past Medical:    has a past medical history of Acute deep vein thrombosis of lower limb ([address]), Aortic aneurysm ([address]), Arthritis, Atrial fibrillation ([address]), Dislocation of hip ([address]), Hypertension, and Psychiatric disorder.     Past Surgical:    has a past surgical history that includes Hip surgery; Joint replacement; Knee arthroscopy (Bilateral); and Tubal ligation. Social:     Social History     Substance and Sexual Activity   Alcohol Use No    Comment: being a social drinker per [employer]     Social History     Tobacco Use   Smoking Status Never   Smokeless Tobacco Never     Social History     Substance and Sexual Activity   Drug Use No       Echo:   Results for orders placed during the hospital encounter of 21    Echo complete with contrast if indicated    Narrative  [id number] [facility] [address]  [address] [place] [address],  [facility]  [phone]    Transthoracic Echocardiogram  2D, M-mode, Doppler, and Color Doppler    Study date:  24-Sep-2021    Patient: Royal Lewis  MR number: [id number]  Account number: [de-identified]  : 1945  Age: 78 years  Gender: Female  Status: Outpatient  Location: [address] [place] [address] [facility]  Height: 63 in  Weight: 189 lb  BP: 128/ 80 mmHg    Indications: Heart failure    Diagnoses: I10. - Essential (primary) hypertension    Sonographer:  NOEMI Hogan  Primary Physician:  Filomena Cockayne, DO  Referring Physician:  Iveth Jenkins MD  Group:  [facility]  Cardiology Fellow:  Carolann Laughlin DO  Interpreting Physician:  Atul Oseguera MD    SUMMARY    PROCEDURE INFORMATION:  This was a technically difficult study. LEFT VENTRICLE:  Systolic function was normal. Ejection fraction was estimated to be 60 %. Although no diagnostic regional wall motion abnormality was identified, this possibility cannot be completely excluded on the basis of this study. Doppler parameters were consistent with abnormal left ventricular relaxation (grade 1 diastolic dysfunction). ATRIAL SEPTUM:  There was a septal aneurysm best seen in subcostal view. TRICUSPID VALVE:  There was mild regurgitation. PULMONIC VALVE:  There was trace regurgitation. AORTA:  Sinus of Valsalva measures 4.4 cm. Ascending aorta measures 4.4 cm.  Arch measures 3.8 cm. These measurements were taken from off-axis views. Consider dedicated imaging aorta with CT/MR.    COMPARISONS:  Comparison was made with the previous study of 11-Nov-2021. There is interval increase in size of the sinus of Valsalva and ascending aorta. Difficult study and these were taken off-axis. Consider dedicated aorta imaging with CT/MR. HISTORY: PRIOR HISTORY: Hypertension, heart failure, atrial fibrillation, aortic aneurysm    PROCEDURE: The study was performed in the [facility] [place]. This was a routine study. The transthoracic approach was used. The study included complete 2D imaging, M-mode, complete spectral Doppler, and color Doppler. This  was a technically difficult study. LEFT VENTRICLE: Size was normal. Systolic function was normal. Ejection fraction was estimated to be 60 %. Although no diagnostic regional wall motion abnormality was identified, this possibility cannot be completely excluded on the basis  of this study. Wall thickness was normal. DOPPLER: Doppler parameters were consistent with abnormal left ventricular relaxation (grade 1 diastolic dysfunction). Left ventricular diastolic function parameters were abnormal.    RIGHT VENTRICLE: The size was normal. Systolic function was normal. Wall thickness was normal.    LEFT ATRIUM: Size was normal.    ATRIAL SEPTUM: There was a septal aneurysm best seen in subcostal view. RIGHT ATRIUM: Size was normal.    MITRAL VALVE: Valve structure was normal. There was normal leaflet separation. DOPPLER: The transmitral velocity was within the normal range. There was no evidence for stenosis. There was no significant regurgitation. AORTIC VALVE: The valve was trileaflet. Leaflets exhibited normal thickness and normal cuspal separation. DOPPLER: Transaortic velocity was within the normal range. There was no evidence for stenosis. There was no significant  regurgitation.     TRICUSPID VALVE: The valve structure was normal. There was normal leaflet separation. DOPPLER: The transtricuspid velocity was within the normal range. There was no evidence for stenosis. There was mild regurgitation. The tricuspid jet  envelope definition was inadequate for estimation of RV systolic pressure. There are no indirect findings (abnormal RV volume or geometry, altered pulmonary flow velocity profile, or leftward septal displacement) which would suggest  moderate or severe pulmonary hypertension. PULMONIC VALVE: Not well visualized. DOPPLER: The transpulmonic velocity was within the normal range. There was trace regurgitation. PERICARDIUM: There was no pericardial effusion. The pericardium was normal in appearance. AORTA: Sinus of Valsalva measures 4.4 cm. Ascending aorta measures 4.4 cm. Arch measures 3.8 cm. These measurements were taken from off-axis views. Consider dedicated imaging aorta with CT/MR. SYSTEMIC VEINS: IVC: The inferior vena cava was not well visualized. The inferior vena cava was normal in size. SYSTEM MEASUREMENT TABLES    2D  %FS: 30.27 %  Ao Diam: 3.17 cm  EDV(Teich): 37.81 ml  EF(Teich): 59.12 %  ESV(Teich): 15.46 ml  IVSd: 0.78 cm  LA Area: 11.08 cm2  LA Diam: 2.6 cm  LVEDV MOD A4C: 79.24 ml  LVEF MOD A4C: 53.88 %  LVESV MOD A4C: 36.55 ml  LVIDd: 3.1 cm  LVIDs: 2.16 cm  LVLd A4C: 7.64 cm  LVLs A4C: 6.71 cm  LVPWd: 0.78 cm  RA Area: 10.32 cm2  RVIDd: 2.45 cm  SV MOD A4C: 42.69 ml  SV(Teich): 22.35 ml    CW  TR Vmax: 2.42 m/s  TR maxP.36 mmHg    MM  TAPSE: 1.78 cm    PW  E' Sept: 0.07 m/s  E/E' Sept: 9.37  MV A Phillip: 0.84 m/s  MV Dec Jerauld: 2.51 m/s2  MV DecT: 253.4 ms  MV E Phillip: 0.64 m/s  MV E/A Ratio: 0.76  MV PHT: 73.49 ms  MVA By PHT: 2.99 cm2    [employer] Accredited Echocardiography Laboratory    Prepared and electronically signed by    Shahla Kendall MD  Signed 24-Sep-2021 16:58:46    No results found for this or any previous visit.       Cath:    No results found for this or any previous visit. Code Status: Level 1 - Full Code  Advance Directive and Living Will:      Power of :    POLST:    Medications   escitalopram (LEXAPRO) tablet 10 mg (10 mg Oral Given 7/3/23 0831)   gabapentin (NEURONTIN) capsule 300 mg (300 mg Oral Given 7/3/23 0831)   metoprolol succinate (TOPROL-XL) 24 hr tablet 50 mg (50 mg Oral Not Given 7/3/23 0830)   senna (SENOKOT) tablet 8.6 mg (8.6 mg Oral Given 7/2/23 2112)   calcium carbonate (TUMS) chewable tablet 1,000 mg (has no administration in time range)   ondansetron (ZOFRAN) injection 4 mg (has no administration in time range)   polyethylene glycol (MIRALAX) packet 17 g (17 g Oral Given 7/3/23 0831)   acetaminophen (TYLENOL) tablet 650 mg (650 mg Oral Given 7/3/23 0225)   melatonin tablet 3 mg (3 mg Oral Given 7/2/23 2112)   oxyCODONE (ROXICODONE) immediate release tablet 10 mg (10 mg Oral Given 7/3/23 1346)   ergocalciferol (VITAMIN D2) capsule 50,000 Units (50,000 Units Oral Given 7/1/23 1245)   cephalexin (KEFLEX) capsule 500 mg (500 mg Oral Given 7/3/23 0831)   ferrous sulfate tablet 325 mg (325 mg Oral Given 7/3/23 0831)   multivitamin-minerals (CENTRUM) tablet 1 tablet (1 tablet Oral Given 7/3/23 0831)   lidocaine (LIDODERM) 5 % patch 2 patch (2 patches Topical Medication Applied 7/3/23 0832)   cyclobenzaprine (FLEXERIL) tablet 10 mg (10 mg Oral Given 7/3/23 0831)   sodium chloride 0.9 % bolus 1,000 mL (0 mL Intravenous Stopped 7/1/23 0019)   sodium chloride 0.9 % bolus 1,000 mL (0 mL Intravenous Stopped 7/1/23 0733)   ketorolac (TORADOL) injection 15 mg (15 mg Intravenous Given 7/2/23 0115)   iron sucrose (VENOFER) 200 mg in sodium chloride 0.9 % 100 mL IVPB (0 mg Intravenous Stopped 7/3/23 0204)     XR shoulder 2+ views RIGHT   Final Result      No acute osseous abnormality. Workstation performed: [id number]         XR hip/pelv 2-3 vws left if performed   Final Result      No acute osseous abnormality.             Workstation performed: [id number] XR knee 1 or 2 vw left   Final Result      Osteoarthritis      No acute osseous abnormality. Workstation performed: [id number]         CT head without contrast   Final Result      No acute intracranial abnormality. Findings are consistent with the preliminary report from [facility] which was provided shortly after completion of the exam.                     Workstation performed: [id number]         CT spine cervical without contrast   Final Result      No acute cervical spine fracture or traumatic malalignment. Findings are consistent with the preliminary report from [facility] which was provided shortly after completion of the exam.               Workstation performed: [id number]         CT abdomen pelvis wo contrast    (Results Pending)     Orders Placed This Encounter   Procedures   • Occult blood 1-3, stool   • Urine culture   • CT head without contrast   • CT spine cervical without contrast   • XR shoulder 2+ views RIGHT   • XR hip/pelv 2-3 vws left if performed   • XR knee 1 or 2 vw left   • CT abdomen pelvis wo contrast   • CBC and differential   • Comprehensive metabolic panel   • CK   • TSH, 3rd generation with Free T4 reflex   • Basic metabolic panel   • CBC (With Platelets)   • Platelet count   • Urine Microscopic   • Iron Saturation %   • Ferritin   • CBC and Platelet   • Basic metabolic panel   • Hemoglobin and hematocrit, blood   • Hemoglobin and hematocrit, blood   • Hemoglobin and hematocrit, blood   • Protime-INR   • APTT   • Hemoglobin and hematocrit, blood   • CBC and Platelet   • Basic metabolic panel   • Diet Regular; Regular House   • Urine dip analyzer   • Nursing communication Continue IV as ordered.    • Notify admitting physician   • Notify admitting physician on arrival   • Vital Signs per unit routine   • Up and OOB as tolerated   • Insert peripheral IV   • Maintain IV access   • Apply SCD or Foot pumps   • Urinary retention protocol   • Nursing communication Continue IV as ordered.    • Notify admitting physician   • Notify admitting physician on arrival   • Calorie count   • Vital Signs   • Notify physician and Hold transfusion if:   • Level 1-Full Code: all life saving measures are indicated   • Inpatient consult to Case Management   • OT eval and treat   • PT eval and treat   • ECG 12 lead   • ECG 12 lead   • Prepare Leukoreduced RBC: 1 Units, Leukoreduced   • Type and screen   • ABORh Recheck - Contact Blood Bank Prior to Collection   • INPATIENT ADMISSION     Labs Reviewed   CBC AND DIFFERENTIAL - Abnormal       Result Value Ref Range Status    WBC 6.62  4.31 - 10.16 Thousand/uL Final    RBC 3.02 (*) 3.81 - 5.12 Million/uL Final    Hemoglobin 8.8 (*) 11.5 - 15.4 g/dL Final    Hematocrit 27.9 (*) 34.8 - 46.1 % Final    MCV 92  82 - 98 fL Final    MCH 29.1  26.8 - 34.3 pg Final    MCHC 31.5  31.4 - 37.4 g/dL Final    RDW 17.2 (*) 11.6 - 15.1 % Final    MPV 11.3  8.9 - 12.7 fL Final    Platelets 652  825 - 390 Thousands/uL Final    nRBC 0  /100 WBCs Final    Neutrophils Relative 79 (*) 43 - 75 % Final    Immat GRANS % 1  0 - 2 % Final    Lymphocytes Relative 13 (*) 14 - 44 % Final    Monocytes Relative 5  4 - 12 % Final    Eosinophils Relative 1  0 - 6 % Final    Basophils Relative 1  0 - 1 % Final    Neutrophils Absolute 5.29  1.85 - 7.62 Thousands/µL Final    Immature Grans Absolute 0.03  0.00 - 0.20 Thousand/uL Final    Lymphocytes Absolute 0.86  0.60 - 4.47 Thousands/µL Final    Monocytes Absolute 0.36  0.17 - 1.22 Thousand/µL Final    Eosinophils Absolute 0.04  0.00 - 0.61 Thousand/µL Final    Basophils Absolute 0.04  0.00 - 0.10 Thousands/µL Final   COMPREHENSIVE METABOLIC PANEL - Abnormal    Sodium 143  135 - 147 mmol/L Final    Potassium 3.8  3.5 - 5.3 mmol/L Final    Chloride 113 (*) 96 - 108 mmol/L Final    CO2 22  21 - 32 mmol/L Final    ANION GAP 8  mmol/L Final    BUN 45 (*) 5 - 25 mg/dL Final    Creatinine 1.12  0.60 - 1.30 mg/dL Final Comment: Standardized to IDMS reference method    Glucose 107  65 - 140 mg/dL Final    Comment: If the patient is fasting, the ADA then defines impaired fasting glucose as > 100 mg/dL and diabetes as > or equal to 123 mg/dL. Specimen collection should occur prior to Sulfasalazine administration due to the potential for falsely depressed results. Specimen collection should occur prior to Sulfapyridine administration due to the potential for falsely elevated results. Calcium 8.7  8.3 - 10.1 mg/dL Final    Corrected Calcium 9.5  8.3 - 10.1 mg/dL Final    AST 17  5 - 45 U/L Final    Comment: Specimen collection should occur prior to Sulfasalazine administration due to the potential for falsely depressed results. ALT 17  12 - 78 U/L Final    Comment: Specimen collection should occur prior to Sulfasalazine and/or Sulfapyridine administration due to the potential for falsely depressed results. Alkaline Phosphatase 49  46 - 116 U/L Final    Total Protein 5.9 (*) 6.4 - 8.4 g/dL Final    Albumin 3.0 (*) 3.5 - 5.0 g/dL Final    Total Bilirubin 0.93  0.20 - 1.00 mg/dL Final    Comment: Use of this assay is not recommended for patients undergoing treatment with eltrombopag due to the potential for falsely elevated results.     eGFR 48  ml/min/1.73sq m Final    Narrative:     [place] guidelines for Chronic Kidney Disease (CKD):   •  Stage 1 with normal or high GFR (GFR > 90 mL/min/1.73 square meters)  •  Stage 2 Mild CKD (GFR = 60-89 mL/min/1.73 square meters)  •  Stage 3A Moderate CKD (GFR = 45-59 mL/min/1.73 square meters)  •  Stage 3B Moderate CKD (GFR = 30-44 mL/min/1.73 square meters)  •  Stage 4 Severe CKD (GFR = 15-29 mL/min/1.73 square meters)  •  Stage 5 End Stage CKD (GFR <15 mL/min/1.73 square meters)  Note: GFR calculation is accurate only with a steady state creatinine   BASIC METABOLIC PANEL - Abnormal    Sodium 145  135 - 147 mmol/L Final    Potassium 3.7  3.5 - 5.3 mmol/L Final    Chloride 116 (*) 96 - 108 mmol/L Final    CO2 23  21 - 32 mmol/L Final    ANION GAP 6  mmol/L Final    BUN 41 (*) 5 - 25 mg/dL Final    Creatinine 0.91  0.60 - 1.30 mg/dL Final    Comment: Standardized to IDMS reference method    Glucose 104  65 - 140 mg/dL Final    Comment: Specimen collection should occur prior to Sulfasalazine administration due to the potential for falsely depressed results. Specimen collection should occur prior to Sulfapyridine administration due to the potential for falsely elevated results. If the patient is fasting, the ADA then defines impaired fasting glucose as > 100 mg/dL and diabetes as > or equal to 123 mg/dL.     Calcium 8.1 (*) 8.3 - 10.1 mg/dL Final    eGFR 61  ml/min/1.73sq m Final    Narrative:     [place] guidelines for Chronic Kidney Disease (CKD):   •  Stage 1 with normal or high GFR (GFR > 90 mL/min/1.73 square meters)  •  Stage 2 Mild CKD (GFR = 60-89 mL/min/1.73 square meters)  •  Stage 3A Moderate CKD (GFR = 45-59 mL/min/1.73 square meters)  •  Stage 3B Moderate CKD (GFR = 30-44 mL/min/1.73 square meters)  •  Stage 4 Severe CKD (GFR = 15-29 mL/min/1.73 square meters)  •  Stage 5 End Stage CKD (GFR <15 mL/min/1.73 square meters)  Note: GFR calculation is accurate only with a steady state creatinine   CBC AND PLATELET - Abnormal    WBC 5.96  4.31 - 10.16 Thousand/uL Final    RBC 2.62 (*) 3.81 - 5.12 Million/uL Final    Hemoglobin 7.7 (*) 11.5 - 15.4 g/dL Final    Hematocrit 24.1 (*) 34.8 - 46.1 % Final    MCV 92  82 - 98 fL Final    MCH 29.4  26.8 - 34.3 pg Final    MCHC 32.0  31.4 - 37.4 g/dL Final    RDW 17.2 (*) 11.6 - 15.1 % Final    Platelets 457  743 - 390 Thousands/uL Final    MPV 11.9  8.9 - 12.7 fL Final   URINE MICROSCOPIC - Abnormal    RBC, UA 1-2  None Seen, 1-2 /hpf Final    WBC, UA None Seen  None Seen, 1-2 /hpf Final    Epithelial Cells Occasional  None Seen, Occasional /hpf Final    Bacteria, UA Innumerable (*) None Seen, Occasional /hpf Final    Hyaline Casts, UA 0-3 (*) None Seen /lpf Final   IRON SATURATION - Abnormal    Iron Saturation 21  15 - 50 % Final    TIBC 228 (*) 250 - 450 ug/dL Final    Iron 47 (*) 50 - 170 ug/dL Final    Comment: Patients treated with metal-binding drugs (ie. Deferoxamine) may have depressed iron values. URINE MACROSCOPIC, POC - Abnormal    Color, UA Yellow   Final    Clarity, UA Clear   Final    pH, UA 5.5  4.5 - 8.0 Final    Leukocytes, UA Negative  Negative Final    Nitrite, UA Positive (*) Negative Final    Protein, UA Negative  Negative mg/dl Final    Glucose, UA Negative  Negative mg/dl Final    Ketones, UA 15 (1+) (*) Negative mg/dl Final    Urobilinogen, UA 1.0  0.2, 1.0 E.U./dl E.U./dl Final    Bilirubin, UA Negative  Negative Final    Occult Blood, UA Negative  Negative Final    Specific Gravity, UA 1.020  1.003 - 1.030 Final    Narrative:     CLINITEK RESULT   CK - Normal    Total CK 68  26 - 192 U/L Final   TSH, 3RD GENERATION WITH FREE T4 REFLEX - Normal    TSH 3RD GENERATON 2.082  0.450 - 4.500 uIU/mL Final    Comment: The recommended reference ranges for TSH during pregnancy are as follows:   First trimester 0.1 to 2.5 uIU/mL   Second trimester  0.2 to 3.0 uIU/mL   Third trimester 0.3 to 3.0 uIU/m    Note: Normal ranges may not apply to patients who are transgender, non-binary, or whose legal sex, sex at birth, and gender identity differ. Adult TSH (3rd generation) reference range follows the recommended guidelines of the American Thy[employer] Association, January, 2020. PLATELET COUNT - Normal    Platelets 823  487 - 390 Thousands/uL Final    MPV 11.6  8.9 - 12.7 fL Final   FERRITIN - Normal    Ferritin 15  11 - 307 ng/mL Final   OCCULT BLOOD 1-3, STOOL (DIAGNOSTIC)   IRON PANEL (INCLUDES FERRITIN,IRON SAT%, IRON, AND TIBC)    Narrative: The following orders were created for panel order Iron Panel (Includes Ferritin, Iron Sat%, Iron, and TIBC).   Procedure Abnormality         Status                     ---------                               -----------         ------                     Iron Saturation %[[id number]]            Abnormal            Final result               Ferritin[[id number]]                     Normal              Final result                 Please view results for these tests on the individual orders. Time reflects when diagnosis was documented in both MDM as applicable and the Disposition within this note     Time User Action Codes Description Comment    7/1/2023 12:50 AM Mary Salomon Add [D58. XXXA] Fall, initial encounter     7/1/2023 12:50 AM Lindsey Gama Add [D64.9] Anemia       ED Disposition     ED Disposition   Admit    Condition   Stable    Date/Time   Sat Jul 1, 2023 10:36 AM    Comment   Case was discussed with Dr. Maria L Levi and the patient's admission status was agreed to be Admission Status: inpatient status to the service of Dr. Woodrow Hanna .            Follow-up Information    None       Current Discharge Medication List      CONTINUE these medications which have NOT CHANGED    Details   cyclobenzaprine (FLEXERIL) 10 mg tablet Take 1 tablet (10 mg total) by mouth 3 (three) times a day as needed for muscle spasms  Qty: 90 tablet, Refills: 3    Associated Diagnoses: Spinal stenosis of lumbar region, unspecified whether neurogenic claudication present      Diclofenac Sodium (VOLTAREN) 1 % Apply 1 g topically 4 (four) times a day  Qty: 20 g, Refills: 0    Associated Diagnoses: Spinal stenosis of lumbar region, unspecified whether neurogenic claudication present      Eliquis 5 MG TAKE 1 TABLET (5 MG TOTAL) BY MOUTH 2 (TWO) TIMES A DAY  Qty: 60 tablet, Refills: 5    Associated Diagnoses: Paroxysmal atrial fibrillation (HCC)      escitalopram (LEXAPRO) 10 mg tablet TAKE 1 TABLET (10 MG TOTAL) BY MOUTH DAILY  Qty: 90 tablet, Refills: 1    Associated Diagnoses: Depression with anxiety      furosemide (LASIX) 40 mg tablet Take 1 tablet (40 mg total) by mouth as needed (edema)  Qty: 30 tablet, Refills: 1    Associated Diagnoses: Chronic pain syndrome      gabapentin (NEURONTIN) 300 mg capsule TAKE 3 CAPSULES (900 MG TOTAL) BY MOUTH 3 (THREE) TIMES A DAY  Qty: 270 capsule, Refills: 5    Associated Diagnoses: Chronic bilateral low back pain with bilateral sciatica      hydrocortisone 2.5 % cream Apply topically 2 (two) times a day  Qty: 30 g, Refills: 2    Associated Diagnoses: Eczema of right external ear      lidocaine (LIDODERM) 5 % Apply 1 patch topically daily Remove & Discard patch within 12 hours or as directed by MD  Qty: 30 patch, Refills: 2    Associated Diagnoses: Chronic right shoulder pain      menthol-cetylpyridinium (CEPACOL) 3 MG lozenge Take 1 lozenge (3 mg total) by mouth as needed for sore throat  Qty: 9 lozenge, Refills: 0    Associated Diagnoses: Viral illness      metoprolol succinate (TOPROL-XL) 50 mg 24 hr tablet Take 1 tablet (50 mg total) by mouth daily  Qty: 90 tablet, Refills: 3    Associated Diagnoses: Paroxysmal atrial fibrillation (HCC)      naloxone (NARCAN) 4 mg/0.1 mL nasal spray Administer 1 spray into a nostril. If no response after 2-3 minutes, give another dose in the other nostril using a new spray. Qty: 1 each, Refills: 1    Associated Diagnoses: Pain syndrome, chronic      oxyCODONE (ROXICODONE) 10 MG TABS Take 1 tablet (10 mg total) by mouth every 6 (six) hours as needed for moderate pain Max Daily Amount: 40 mg Do not start before June 7, 2023.   Qty: 120 tablet, Refills: 0    Associated Diagnoses: Pain syndrome, chronic      potassium chloride (K-DUR,KLOR-CON) 20 mEq tablet Take 1 tablet (20 mEq total) by mouth daily  Qty: 30 tablet, Refills: 5    Associated Diagnoses: Hypokalemia      senna (SENOKOT) 8.6 mg Take 1 tablet (8.6 mg total) by mouth daily at bedtime  Qty: 30 tablet, Refills: 3    Associated Diagnoses: Drug-induced constipation      zolpidem (AMBIEN) 10 mg tablet Take 1 tablet (10 mg total) by mouth daily at bedtime as needed for sleep Do not start before 2023. Qty: 30 tablet, Refills: 0    Associated Diagnoses: Insomnia, unspecified type           No discharge procedures on file. Prior to Admission Medications   Prescriptions Last Dose Informant Patient Reported? Taking? Diclofenac Sodium (VOLTAREN) 1 %  Self No No   Sig: Apply 1 g topically 4 (four) times a day   Eliquis 5 MG   No No   Sig: TAKE 1 TABLET (5 MG TOTAL) BY MOUTH 2 (TWO) TIMES A DAY   cyclobenzaprine (FLEXERIL) 10 mg tablet   No No   Sig: Take 1 tablet (10 mg total) by mouth 3 (three) times a day as needed for muscle spasms   escitalopram (LEXAPRO) 10 mg tablet   No No   Sig: TAKE 1 TABLET (10 MG TOTAL) BY MOUTH DAILY   furosemide (LASIX) 40 mg tablet  Self No No   Sig: Take 1 tablet (40 mg total) by mouth as needed (edema)   gabapentin (NEURONTIN) 300 mg capsule  Self No No   Sig: TAKE 3 CAPSULES (900 MG TOTAL) BY MOUTH 3 (THREE) TIMES A DAY   hydrocortisone 2.5 % cream  Self No No   Sig: Apply topically 2 (two) times a day   lidocaine (LIDODERM) 5 %  Self No No   Sig: Apply 1 patch topically daily Remove & Discard patch within 12 hours or as directed by MD   menthol-cetylpyridinium (CEPACOL) 3 MG lozenge  Self No No   Sig: Take 1 lozenge (3 mg total) by mouth as needed for sore throat   metoprolol succinate (TOPROL-XL) 50 mg 24 hr tablet  Self No No   Sig: Take 1 tablet (50 mg total) by mouth daily   naloxone (NARCAN) 4 mg/0.1 mL nasal spray  Self No No   Sig: Administer 1 spray into a nostril. If no response after 2-3 minutes, give another dose in the other nostril using a new spray.    naloxone (NARCAN) 4 mg/0.1 mL nasal spray  Self No No   Si.1 mL (4 mg total) into each nostril once for 1 dose If the desired response is not obtained after 2-3 minutes, administer an additional dose using a new spray   oxyCODONE (ROXICODONE) 10 MG TABS   No No   Sig: Take 1 tablet (10 mg total) by mouth every 6 (six) hours as needed for moderate pain Max Daily Amount: 40 mg Do not start before June 7, 2023. potassium chloride (K-DUR,KLOR-CON) 20 mEq tablet  Self No No   Sig: Take 1 tablet (20 mEq total) by mouth daily   senna (SENOKOT) 8.6 mg  Self No No   Sig: Take 1 tablet (8.6 mg total) by mouth daily at bedtime   zolpidem (AMBIEN) 10 mg tablet   No No   Sig: Take 1 tablet (10 mg total) by mouth daily at bedtime as needed for sleep Do not start before June 2, 2023. Facility-Administered Medications: None                        Portions of the record may have been created with voice recognition software. Occasional wrong word or "sound a like" substitutions may have occurred due to the inherent limitations of voice recognition software. Read the chart carefully and recognize, using context, where substitutions have occurred.     Electronically signed by:  Corirne Wilhelm

## 2023-07-01 NOTE — H&P
H&P Exam - Malen Jazzy 78 y.o. female MRN: [id number]  Unit/Bed#: ED 03 Encounter: [id number]  Admission status: Observation    DATE: 7/1/2023  TIME: 1:50 AM  Primary Care Physician: Daryl Dykes [id number] [facility],Third [facility]  Admitting Provider: Gabo Montoya DO    Patient is a 78 y.o. female being admitted for ambulatory dysfunction and PT/TO eval under [facility] with:     * Fall  Assessment & Plan  78 F with of presents to ED after a mechanical fall. No headstrike or LOC. On eliquis. Pt was down for significant amount of time, difficult to quantify because her room remained dark for hours. Pt fell due to her legs giving out, she believes it is related to her Left knee OA. She fell onto her left hip and knee and also bruised her right shoulder. No signs of acute bleeding.   - Pt reports her medications make her dizzy. · VSS  · Hgb 8.8, CK wnl, TSH wnl  · 6/30 CTH, CT spine, Left pelvis/hip, left knee, and right shoulder negative for acute pathology or bleed based, however, pending final results    Plan  - f/u on final results of imaging  - monitor for acute bleed  - PT/OT  - pain control  - Medication reconciliation to decrease dizziness 2/2 medications:  d/c ambien, decrease flexeril to 10 mg qHs PRN, and decrease Oxy 10 mg q8h PRN    Stage 3a chronic kidney disease [facility])  Assessment & Plan  Lab Results   Component Value Date    EGFR 48 06/30/2023    EGFR 49 03/29/2023    EGFR 59 06/30/2020    CREATININE 1.12 06/30/2023    CREATININE 1.09 03/29/2023    CREATININE 0.97 06/30/2020     Plan  - avoid nephrotoxic agents, renal dose medications    Chronic heart failure with preserved ejection fraction [facility])  Assessment & Plan  Wt Readings from Last 3 Encounters:   04/27/23 77.2 kg (170 lb 3.2 oz)   03/27/23 80 kg (176 lb 6.4 oz)   03/20/23 78 kg (172 lb)     Home meds: Toprol 50 mg daily and lasix 40 mg as needed  Last Cardiology visit: 9/19/2022    Assessment  Not in acute heart failure. No fluid overload.      Plan  - c/w Toprol 50 mg daily    Constipation  Assessment & Plan  Plan  - Continue home senna qhs.   - Add miralax daily PRN    Insomnia  Assessment & Plan  Pt takes ambien 10 mg at bedtime. She reports dizziness with her current medications. Plan  - D/c ambien  - C/w Flexeril 10 mg qHs  - Try melatonin PRN  - delirium precautions. Depression with anxiety  Assessment & Plan  Plan  - Continue with home lexapro 10 mg daily    Hypertension  Assessment & Plan  Plan  - Continue home Toprol 50 mg daily and Lasix 40 mg PRN for leg edema    Chronic pain  Assessment & Plan  Patient has chronic pain related to L knee OA and spondylolithesis  Home meds: Flexeril 10 TID PRN, Gabapentin 900 mg TID, oxycodone 10 mg q6h PRN  PDMP reviewed, pt appears to use Oxycodone 4 times a day (pt fills 120 tabs per month)  Last CSI of left knee, 11/23/22    Plan  - Decrease flexeril 10 qHs PRN and decrease oxycodone 10 mg q8h PRN    Atrial fibrillation (HCC)  Assessment & Plan  On rate control with Toprol 50 mg daily and AC with Eliquis 5 mg BID    Assessment  Pt is in RRR. She has no obvious signs of bleeding and scans are negative s/p fall. Plan  - Continue home meds Toprol 50 mg daily  - Will continue home Eliquis 5 mg BID at this time given CHADVASC score is 5. Diet:        Diet Orders   (From admission, onward)             Start     Ordered    07/01/23 0128  Diet Regular; Regular House  Diet effective now        References:    Adult Nutrition Support Algorithm    RD Therapeutic Diet Order Protocol   Question Answer Comment   Diet Type Regular    Regular Regular House    RD to adjust diet per protocol? Yes        07/01/23 0129                Code Status: Level 1 - Full Code  POLST:    VTE Prophylaxis: Reason for no pharmacologic prophylaxis on home eliquis sequential compression device  Admission Status: OBSERVATION  Dispo:    History of Present Illness     HPI:  Mishel Baker is a 78 y.o. female who presents after a fall on eliquis.  [facility] includes afib on eliquis, stage III kidney disease, HTN, insomnia, and depression with anxiety. The patient is a poor historian however from the story she tells: she got up in the middle of the night (she thinks it was the middle of the night) to urinate. On her way back from the bathroom she slipped on her floor and fell between her bed and her TV stand table. She was unable to get up, she crawled across her living room to the front door and continued to call for help and band on the walls. She lives at [employer] and one of her neighbors ended up hearing her and called EMS. She was brought to the hospital via EMS, denied headstrike. At this time she has pain in her R shoulder and BL knees where she hit when she fell. She denies CP, SOB, dizziness, or a syncopal episode. Her last BM was approximately 1 week ago. ED Management:   CBC remarkable for a hgb of 8.8 from 12, CMP unremarkable, CK normal, TSH normal.   CTH, CT Spine, XR of BL knees and R shoulder are pending reads at this time. Review of Systems   Constitutional: Positive for activity change. Negative for chills and fever. Respiratory: Negative for cough and shortness of breath. Cardiovascular: Negative for chest pain and palpitations. Gastrointestinal: Negative for abdominal pain and vomiting. Genitourinary: Negative for dysuria and hematuria. Musculoskeletal: Positive for arthralgias. Negative for neck pain and neck stiffness. Skin: Negative for color change and rash. Neurological: Positive for weakness. Negative for dizziness and syncope. Hematological: Bruises/bleeds easily. All other systems reviewed and are negative.       Historical Information   Past Medical History:   Diagnosis Date   • Acute deep vein thrombosis of lower limb ([address])     last assessed [id number]   • Aortic aneurysm [facility])    • Arthritis    • Atrial fibrillation [facility])    • Dislocation of hip ([address])     last assessed [id number]   • Hypertension    • Psychiatric disorder     anxiety     Past Surgical History:   Procedure Laterality Date   • HIP SURGERY     • JOINT REPLACEMENT     • KNEE ARTHROSCOPY Bilateral     x2 rt and 1 on left   • TUBAL LIGATION       Social History   Social History     Substance and Sexual Activity   Alcohol Use No    Comment: being a social drinker per [employer]     Social History     Substance and Sexual Activity   Drug Use No     Social History     Tobacco Use   Smoking Status Never   Smokeless Tobacco Never     Family History: non-contributory    Meds/Allergies   all medications and allergies reviewed  No Known Allergies    Objective   Vitals: Blood pressure 129/75, pulse 102, temperature 97.8 °F (36.6 °C), temperature source Oral, resp. rate 18, SpO2 100 %. Intake/Output Summary (Last 24 hours) at 7/1/2023 0150  Last data filed at 7/1/2023 0019  Gross per 24 hour   Intake 1000 ml   Output --   Net 1000 ml       Invasive Devices     Peripheral Intravenous Line  Duration           Peripheral IV 06/30/23 Left Antecubital <1 day                Physical Exam  Constitutional:       General: She is not in acute distress. Appearance: Normal appearance. She is not ill-appearing. HENT:      Head: Normocephalic and atraumatic. Nose: Nose normal.      Mouth/Throat:      Mouth: Mucous membranes are moist.      Pharynx: Oropharynx is clear. Eyes:      General: No scleral icterus. Conjunctiva/sclera: Conjunctivae normal.      Pupils: Pupils are equal, round, and reactive to light. Cardiovascular:      Rate and Rhythm: Normal rate and regular rhythm. Pulses: Normal pulses. Heart sounds: Normal heart sounds. No murmur heard. No friction rub. No gallop. Pulmonary:      Effort: Pulmonary effort is normal.      Breath sounds: Normal breath sounds. No wheezing, rhonchi or rales. Abdominal:      General: Abdomen is flat. Bowel sounds are normal.      Palpations: Abdomen is soft. There is no mass.       Tenderness: There is no abdominal tenderness. Musculoskeletal:         General: No swelling. Cervical back: Neck supple. Right lower leg: No edema. Left lower leg: No edema. Skin:     General: Skin is warm and dry. Comments: Bruises on BL knees without laceration. Bruise on R shoulder without laceration    Neurological:      General: No focal deficit present. Mental Status: She is alert and oriented to person, place, and time. Psychiatric:         Mood and Affect: Mood normal.         Behavior: Behavior normal.         Lab Results:   I have personally reviewed pertinent reports.       Recent Results (from the past 24 hour(s))   ECG 12 lead    Collection Time: 06/30/23 11:27 PM   Result Value Ref Range    Ventricular Rate 103 BPM    Atrial Rate 103 BPM    AR Interval 160 ms    QRSD Interval 80 ms    QT Interval 358 ms    QTC Interval 468 ms    P Axis 62 degrees    QRS Axis 46 degrees    T Wave Axis 6 degrees   CBC and differential    Collection Time: 06/30/23 11:35 PM   Result Value Ref Range    WBC 6.62 4.31 - 10.16 Thousand/uL    RBC 3.02 (L) 3.81 - 5.12 Million/uL    Hemoglobin 8.8 (L) 11.5 - 15.4 g/dL    Hematocrit 27.9 (L) 34.8 - 46.1 %    MCV 92 82 - 98 fL    MCH 29.1 26.8 - 34.3 pg    MCHC 31.5 31.4 - 37.4 g/dL    RDW 17.2 (H) 11.6 - 15.1 %    MPV 11.3 8.9 - 12.7 fL    Platelets 981 921 - 529 Thousands/uL    nRBC 0 /100 WBCs    Neutrophils Relative 79 (H) 43 - 75 %    Immat GRANS % 1 0 - 2 %    Lymphocytes Relative 13 (L) 14 - 44 %    Monocytes Relative 5 4 - 12 %    Eosinophils Relative 1 0 - 6 %    Basophils Relative 1 0 - 1 %    Neutrophils Absolute 5.29 1.85 - 7.62 Thousands/µL    Immature Grans Absolute 0.03 0.00 - 0.20 Thousand/uL    Lymphocytes Absolute 0.86 0.60 - 4.47 Thousands/µL    Monocytes Absolute 0.36 0.17 - 1.22 Thousand/µL    Eosinophils Absolute 0.04 0.00 - 0.61 Thousand/µL    Basophils Absolute 0.04 0.00 - 0.10 Thousands/µL   Comprehensive metabolic panel    Collection Time: 06/30/23 11:35 PM   Result Value Ref Range    Sodium 143 135 - 147 mmol/L    Potassium 3.8 3.5 - 5.3 mmol/L    Chloride 113 (H) 96 - 108 mmol/L    CO2 22 21 - 32 mmol/L    ANION GAP 8 mmol/L    BUN 45 (H) 5 - 25 mg/dL    Creatinine 1.12 0.60 - 1.30 mg/dL    Glucose 107 65 - 140 mg/dL    Calcium 8.7 8.3 - 10.1 mg/dL    Corrected Calcium 9.5 8.3 - 10.1 mg/dL    AST 17 5 - 45 U/L    ALT 17 12 - 78 U/L    Alkaline Phosphatase 49 46 - 116 U/L    Total Protein 5.9 (L) 6.4 - 8.4 g/dL    Albumin 3.0 (L) 3.5 - 5.0 g/dL    Total Bilirubin 0.93 0.20 - 1.00 mg/dL    eGFR 48 ml/min/1.73sq m   CK    Collection Time: 06/30/23 11:35 PM   Result Value Ref Range    Total CK 68 26 - 192 U/L   TSH, 3rd generation with Free T4 reflex    Collection Time: 06/30/23 11:35 PM   Result Value Ref Range    TSH 3RD GENERATON 2.082 0.450 - 4.500 uIU/mL     Blood Culture: No results found for: "BLOODCX",   Urinalysis: No results found for: "COLORU", "CLARITYU", "SPECGRAV", "PHUR", "LEUKOCYTESUR", "NITRITE", "PROTEINUA", "GLUCOSEU", "[place]", "BILIRUBINUR", "BLOODU",   Urine Culture: No results found for: "URINECX",   Wound Culure: No results found for: "WOUNDCULT"    Imaging:   EKG, Pathology, and Other Studies: I have personally reviewed pertinent reports.       Michael Carter,   PGY-2  [facility]

## 2023-07-02 LAB
ANION GAP SERPL CALCULATED.3IONS-SCNC: 3 MMOL/L
BUN SERPL-MCNC: 32 MG/DL (ref 5–25)
CALCIUM SERPL-MCNC: 8.1 MG/DL (ref 8.3–10.1)
CHLORIDE SERPL-SCNC: 114 MMOL/L (ref 96–108)
CO2 SERPL-SCNC: 22 MMOL/L (ref 21–32)
CREAT SERPL-MCNC: 0.8 MG/DL (ref 0.6–1.3)
ERYTHROCYTE [DISTWIDTH] IN BLOOD BY AUTOMATED COUNT: 17.5 % (ref 11.6–15.1)
GFR SERPL CREATININE-BSD FRML MDRD: 72 ML/MIN/1.73SQ M
GLUCOSE SERPL-MCNC: 89 MG/DL (ref 65–140)
HCT VFR BLD AUTO: 20.9 % (ref 34.8–46.1)
HCT VFR BLD AUTO: 21.3 % (ref 34.8–46.1)
HGB BLD-MCNC: 6.9 G/DL (ref 11.5–15.4)
HGB BLD-MCNC: 7 G/DL (ref 11.5–15.4)
MCH RBC QN AUTO: 29 PG (ref 26.8–34.3)
MCHC RBC AUTO-ENTMCNC: 31 G/DL (ref 31.4–37.4)
MCV RBC AUTO: 94 FL (ref 82–98)
PLATELET # BLD AUTO: 167 THOUSANDS/UL (ref 149–390)
PMV BLD AUTO: 12.2 FL (ref 8.9–12.7)
POTASSIUM SERPL-SCNC: 3.5 MMOL/L (ref 3.5–5.3)
RBC # BLD AUTO: 2.31 MILLION/UL (ref 3.81–5.12)
SODIUM SERPL-SCNC: 139 MMOL/L (ref 135–147)
WBC # BLD AUTO: 4.98 THOUSAND/UL (ref 4.31–10.16)

## 2023-07-02 PROCEDURE — 97163 PT EVAL HIGH COMPLEX 45 MIN: CPT

## 2023-07-02 PROCEDURE — 85018 HEMOGLOBIN: CPT

## 2023-07-02 PROCEDURE — 99232 SBSQ HOSP IP/OBS MODERATE 35: CPT | Performed by: FAMILY MEDICINE

## 2023-07-02 PROCEDURE — 85027 COMPLETE CBC AUTOMATED: CPT

## 2023-07-02 PROCEDURE — 85014 HEMATOCRIT: CPT

## 2023-07-02 PROCEDURE — 80048 BASIC METABOLIC PNL TOTAL CA: CPT

## 2023-07-02 RX ORDER — LIDOCAINE 50 MG/G
2 PATCH TOPICAL DAILY
Status: DISCONTINUED | OUTPATIENT
Start: 2023-07-03 | End: 2023-07-02

## 2023-07-02 RX ORDER — CYCLOBENZAPRINE HCL 10 MG
10 TABLET ORAL 3 TIMES DAILY PRN
Status: DISCONTINUED | OUTPATIENT
Start: 2023-07-02 | End: 2023-07-02

## 2023-07-02 RX ORDER — CYCLOBENZAPRINE HCL 10 MG
10 TABLET ORAL 3 TIMES DAILY
Status: DISCONTINUED | OUTPATIENT
Start: 2023-07-02 | End: 2023-07-06 | Stop reason: HOSPADM

## 2023-07-02 RX ORDER — CYCLOBENZAPRINE HCL 10 MG
10 TABLET ORAL 3 TIMES DAILY PRN
Status: CANCELLED | OUTPATIENT
Start: 2023-07-02

## 2023-07-02 RX ORDER — LIDOCAINE 50 MG/G
2 PATCH TOPICAL DAILY
Status: DISCONTINUED | OUTPATIENT
Start: 2023-07-02 | End: 2023-07-06 | Stop reason: HOSPADM

## 2023-07-02 RX ORDER — KETOROLAC TROMETHAMINE 30 MG/ML
15 INJECTION, SOLUTION INTRAMUSCULAR; INTRAVENOUS ONCE
Status: COMPLETED | OUTPATIENT
Start: 2023-07-02 | End: 2023-07-02

## 2023-07-02 RX ADMIN — CEPHALEXIN 500 MG: 500 CAPSULE ORAL at 16:17

## 2023-07-02 RX ADMIN — OXYCODONE HYDROCHLORIDE 10 MG: 10 TABLET ORAL at 04:08

## 2023-07-02 RX ADMIN — CEPHALEXIN 500 MG: 500 CAPSULE ORAL at 21:12

## 2023-07-02 RX ADMIN — ESCITALOPRAM OXALATE 10 MG: 10 TABLET ORAL at 09:35

## 2023-07-02 RX ADMIN — GABAPENTIN 300 MG: 300 CAPSULE ORAL at 09:35

## 2023-07-02 RX ADMIN — CEPHALEXIN 500 MG: 500 CAPSULE ORAL at 09:35

## 2023-07-02 RX ADMIN — OXYCODONE HYDROCHLORIDE 10 MG: 10 TABLET ORAL at 21:12

## 2023-07-02 RX ADMIN — FERROUS SULFATE TAB 325 MG (65 MG ELEMENTAL FE) 325 MG: 325 (65 FE) TAB at 09:34

## 2023-07-02 RX ADMIN — IRON SUCROSE 200 MG: 20 INJECTION, SOLUTION INTRAVENOUS at 12:19

## 2023-07-02 RX ADMIN — Medication 1 TABLET: at 12:18

## 2023-07-02 RX ADMIN — APIXABAN 5 MG: 5 TABLET, FILM COATED ORAL at 09:35

## 2023-07-02 RX ADMIN — OXYCODONE HYDROCHLORIDE 10 MG: 10 TABLET ORAL at 12:18

## 2023-07-02 RX ADMIN — CYCLOBENZAPRINE 10 MG: 10 TABLET, FILM COATED ORAL at 21:12

## 2023-07-02 RX ADMIN — KETOROLAC TROMETHAMINE 15 MG: 30 INJECTION, SOLUTION INTRAMUSCULAR; INTRAVENOUS at 01:15

## 2023-07-02 RX ADMIN — MELATONIN TAB 3 MG 3 MG: 3 TAB at 21:12

## 2023-07-02 RX ADMIN — GABAPENTIN 300 MG: 300 CAPSULE ORAL at 16:17

## 2023-07-02 RX ADMIN — LIDOCAINE 5% 1 PATCH: 700 PATCH TOPICAL at 17:54

## 2023-07-02 RX ADMIN — SENNOSIDES 8.6 MG: 8.6 TABLET, FILM COATED ORAL at 21:12

## 2023-07-02 RX ADMIN — GABAPENTIN 300 MG: 300 CAPSULE ORAL at 21:12

## 2023-07-02 NOTE — PROGRESS NOTES
Pt c/o significant, unresolved pain in left knee after breakthrough Dilaudid given. Pt also expressed feeling nervous over surgery. PRN Ativan given, as well as emotional support. Pt requesting to see physician regarding pain regimen. RN reached out to Dr. Bryon Estrella to make aware of above. Stated will come to bedside. Pt made aware. Will continue to monitor.

## 2023-07-02 NOTE — PLAN OF CARE
Problem: PAIN - ADULT  Goal: Verbalizes/displays adequate comfort level or baseline comfort level  Description: Interventions:  - Encourage patient to monitor pain and request assistance  - Assess pain using appropriate pain scale  - Administer analgesics based on type and severity of pain and evaluate response  - Implement non-pharmacological measures as appropriate and evaluate response  - Consider cultural and social influences on pain and pain management  - Notify physician/advanced practitioner if interventions unsuccessful or patient reports new pain  Outcome: Progressing     Problem: INFECTION - ADULT  Goal: Absence or prevention of progression during hospitalization  Description: INTERVENTIONS:  - Assess and monitor for signs and symptoms of infection  - Monitor lab/diagnostic results  - Monitor all insertion sites, i.e. indwelling lines, tubes, and drains  - Monitor endotracheal if appropriate and nasal secretions for changes in amount and color  - [place] appropriate cooling/warming therapies per order  - Administer medications as ordered  - Instruct and encourage patient and family to use good hand hygiene technique  - Identify and instruct in appropriate isolation precautions for identified infection/condition  Outcome: Progressing  Goal: Absence of fever/infection during neutropenic period  Description: INTERVENTIONS:  - Monitor WBC    Outcome: Progressing

## 2023-07-02 NOTE — PROGRESS NOTES
Pt c/o significant back pain. States she takes Oxycodone more frequently than what is ordered here. Dr. Marian Benjamin with Family Medicine made aware. Awaiting new orders. Will continue to monitor.

## 2023-07-02 NOTE — PLAN OF CARE
Problem: MOBILITY - ADULT  Goal: Maintain or return to baseline ADL function  Description: INTERVENTIONS:  -  Assess patient's ability to carry out ADLs; assess patient's baseline for ADL function and identify physical deficits which impact ability to perform ADLs (bathing, care of mouth/teeth, toileting, grooming, dressing, etc.)  - Assess/evaluate cause of self-care deficits   - Assess range of motion  - Assess patient's mobility; develop plan if impaired  - Assess patient's need for assistive devices and provide as appropriate  - Encourage maximum independence but intervene and supervise when necessary  - Involve family in performance of ADLs  - Assess for home care needs following discharge   - Consider OT consult to assist with ADL evaluation and planning for discharge  - Provide patient education as appropriate  Outcome: Progressing

## 2023-07-02 NOTE — PROGRESS NOTES
Progress Notes - Family Medicine Residency, [address] [place] [address] 1945, 78 y.o. female. MRN: [id number]    Unit/Bed#: [facility]-01 Encounter: [id number]  Primary Care Provider: Humaira Tse      Admission Date: 6/30/2023 2208  Length of Stay: 1 days  Code Status:  Level 1 - Full Code  Consult:   IP CONSULT TO CASE MANAGEMENT    Anemia  Assessment & Plan  Hgb 8.8 --> 7.7 -->6.9  Iron: 47  Pt endorses poor PO intake    Plan  -We will recheck with an H&H; type and screen; consent obtained for blood transfusion. Low threshold for transfusion  -Started patient on 325 mg ferrous sulfate daily   -f/u CBC  -Transfuse hgb > 7    UTI (urinary tract infection)  Assessment & Plan  UA positive for nitrites and bacteria  No dysuria, no polyuria. Stable vital signs  Plan  -cephalexin 500 mg 3 times daily; plan for 5 days total course  -First dose 7/1 1200    Stage 3a chronic kidney disease [facility])  Assessment & Plan  Lab Results   Component Value Date    EGFR 72 07/02/2023    EGFR 61 07/01/2023    EGFR 48 06/30/2023    CREATININE 0.80 07/02/2023    CREATININE 0.91 07/01/2023    CREATININE 1.12 06/30/2023     Plan  - avoid nephrotoxic agents, renal dose medications    Chronic heart failure with preserved ejection fraction [facility])  Assessment & Plan  Wt Readings from Last 3 Encounters:   04/27/23 77.2 kg (170 lb 3.2 oz)   03/27/23 80 kg (176 lb 6.4 oz)   03/20/23 78 kg (172 lb)     Home meds: Toprol 50 mg daily and lasix 40 mg as needed  Last Cardiology visit: 9/19/2022    Assessment  Not in acute heart failure. No fluid overload. Plan  - c/w Toprol 50 mg daily    Constipation  Assessment & Plan  Plan  - Continue home senna qhs.   - Add miralax daily PRN    Insomnia  Assessment & Plan  Pt takes ambien 10 mg at bedtime. She reports dizziness with her current medications.     Plan  - D/c ambien; can likely benefit from consultant to pharmacist for polypharmacy  - C/w Flexeril 10 mg qHs  - Try melatonin PRN  - delirium precautions. Depression with anxiety  Assessment & Plan  Plan  - Continue with home lexapro 10 mg daily    Hypertension  Assessment & Plan  Blood pressure well controlled  Plan  - Continue home Toprol 50 mg daily and Lasix 40 mg PRN for leg edema    Chronic pain  Assessment & Plan  Patient has chronic pain related to L knee OA and spondylolithesis  Home meds: Flexeril 10 TID PRN, Gabapentin 900 mg TID, oxycodone 10 mg q6h PRN  PDMP reviewed, pt appears to use Oxycodone 4 times a day (pt fills 120 tabs per month)  Last CSI of left knee, 11/23/22   Patient still taking Oxy 10 every 8 hours pain still 8-10 out of 10  I have a significant discussion with patient as well overuse history of opioid can cause amatory dysfunction lead to falling and more complication  Plan  - Decrease flexeril 10 qHs PRN and decrease oxycodone 10 mg q8h PRN    Atrial fibrillation (HCC)  Assessment & Plan  On rate control with Toprol 50 mg daily and AC with Eliquis 5 mg BID    Assessment  Pt is in RRR. She has no obvious signs of bleeding and scans are negative s/p fall. Plan  - Continue home meds Toprol 50 mg daily  - Will continue home Eliquis 5 mg BID at this time given CHADVASC score is 5.       * Fall  Assessment & Plan  78 F with of presents to ED after a mechanical fall. No headstrike or LOC. On eliquis. Pt was down for significant amount of time, difficult to quantify because her room remained dark for hours. Pt fell due to her legs giving out, she believes it is related to her Left knee OA. She fell onto her left hip and knee and also bruised her right shoulder. No signs of acute bleeding.   - Pt reports her medications make her dizzy.    · VSS  · Hgb 8.8 downtrending, CK wnl, TSH wnl  · 6/30 CTH, CT spine, Left pelvis/hip, left knee, and right shoulder negative for acute pathology or bleed based, however, pending final results    Plan  - f/u on final results of imaging  - monitor for acute bleed  - PT/OT  - pain control  - Medication reconciliation to decrease dizziness 2/2 medications:  d/c ambien, decrease flexeril to 10 mg qHs PRN, and decrease Oxy 10 mg q8h PRN          VTE Pharmacologic Prophylaxis: Reason for no pharmacologic prophylaxis Downtrending hemoglobin GI cause unknown  VTE Mechanical Prophylaxis: sequential compression device  Diet: Regular house  Code Status: Level 1 full code  Disposition: Patient is treated for UTI, PT OT to see patient for ambulatory dysfunction, anemic on blood work this morning require monitoring  Discussed with attending physician, Casimiro Hayden, and [facility] team.    Subjective:   Patient seen and examined bedside, complains of mild fatigue. Mood is a bit labile. She did consent for blood transfusion given that her hemoglobin this morning is 6.9. She has ongoing constipation, denies any bleeding. Objective:     Vitals: Blood pressure 106/75, pulse 82, temperature 97.9 °F (36.6 °C), resp. rate 18, SpO2 96 %. ,There is no height or weight on file to calculate BMI. Intake/Output Summary (Last 24 hours) at 7/2/2023 1020  Last data filed at 7/2/2023 0401  Gross per 24 hour   Intake 250 ml   Output 1100 ml   Net -850 ml       Physical Exam  Constitutional:       Appearance: She is ill-appearing. HENT:      Head: Normocephalic and atraumatic. Right Ear: External ear normal.      Left Ear: External ear normal.   Eyes:      General: No scleral icterus. Cardiovascular:      Rate and Rhythm: Normal rate and regular rhythm. Pulses: Normal pulses. Heart sounds: Normal heart sounds. No murmur heard. No gallop. Pulmonary:      Effort: Pulmonary effort is normal. No respiratory distress. Breath sounds: No stridor. No wheezing. Abdominal:      General: There is no distension. Tenderness: There is no abdominal tenderness. Genitourinary:     General: Normal vulva. Rectum: Normal. Guaiac result negative.    Musculoskeletal:         General: No swelling. Normal range of motion. Cervical back: No rigidity. Lymphadenopathy:      Cervical: No cervical adenopathy. Skin:     General: Skin is warm. Capillary Refill: Capillary refill takes less than 2 seconds. Coloration: Skin is not jaundiced. Findings: Bruising present. Comments: Bruising observed on right shoulder and bilateral knee   Neurological:      General: No focal deficit present. Mental Status: She is alert and oriented to person, place, and time. Cranial Nerves: No cranial nerve deficit. Psychiatric:         Mood and Affect: Mood normal.         Behavior: Behavior normal.         Invasive Devices     Peripheral Intravenous Line  Duration           Peripheral IV 06/30/23 Left Antecubital 1 day    Peripheral IV 07/01/23 Proximal;Right;Ventral (anterior) Forearm 1 day          Drain  Duration           External Urinary Catheter <1 day                           Lab and other studies:  I have personally reviewed pertinent reports.      Admission on 06/30/2023   Component Date Value   • WBC 06/30/2023 6.62    • RBC 06/30/2023 3.02 (L)    • Hemoglobin 06/30/2023 8.8 (L)    • Hematocrit 06/30/2023 27.9 (L)    • MCV 06/30/2023 92    • MCH 06/30/2023 29.1    • MCHC 06/30/2023 31.5    • RDW 06/30/2023 17.2 (H)    • MPV 06/30/2023 11.3    • Platelets 67/69/0929 200    • nRBC 06/30/2023 0    • Neutrophils Relative 06/30/2023 79 (H)    • Immat GRANS % 06/30/2023 1    • Lymphocytes Relative 06/30/2023 13 (L)    • Monocytes Relative 06/30/2023 5    • Eosinophils Relative 06/30/2023 1    • Basophils Relative 06/30/2023 1    • Neutrophils Absolute 06/30/2023 5.29    • Immature Grans Absolute 06/30/2023 0.03    • Lymphocytes Absolute 06/30/2023 0.86    • Monocytes Absolute 06/30/2023 0.36    • Eosinophils Absolute 06/30/2023 0.04    • Basophils Absolute 06/30/2023 0.04    • Sodium 06/30/2023 143    • Potassium 06/30/2023 3.8    • Chloride 06/30/2023 113 (H)    • CO2 06/30/2023 22 • ANION GAP 06/30/2023 8    • BUN 06/30/2023 45 (H)    • Creatinine 06/30/2023 1.12    • Glucose 06/30/2023 107    • Calcium 06/30/2023 8.7    • Corrected Calcium 06/30/2023 9.5    • AST 06/30/2023 17    • ALT 06/30/2023 17    • Alkaline Phosphatase 06/30/2023 49    • Total Protein 06/30/2023 5.9 (L)    • Albumin 06/30/2023 3.0 (L)    • Total Bilirubin 06/30/2023 0.93    • eGFR 06/30/2023 48    • Total CK 06/30/2023 68    • TSH 3RD GENERATON 06/30/2023 2.082    • Ventricular Rate 06/30/2023 103    • Atrial Rate 06/30/2023 103    • RI Interval 06/30/2023 160    • QRSD Interval 06/30/2023 80    • QT Interval 06/30/2023 358    • QTC Interval 06/30/2023 468    • P Axis 06/30/2023 62    • QRS Axis 06/30/2023 46    • T Wave Axis 06/30/2023 6    • Platelets 36/39/7321 167    • MPV 07/01/2023 11.6    • Sodium 07/01/2023 145    • Potassium 07/01/2023 3.7    • Chloride 07/01/2023 116 (H)    • CO2 07/01/2023 23    • ANION GAP 07/01/2023 6    • BUN 07/01/2023 41 (H)    • Creatinine 07/01/2023 0.91    • Glucose 07/01/2023 104    • Calcium 07/01/2023 8.1 (L)    • eGFR 07/01/2023 61    • WBC 07/01/2023 5.96    • RBC 07/01/2023 2.62 (L)    • Hemoglobin 07/01/2023 7.7 (L)    • Hematocrit 07/01/2023 24.1 (L)    • MCV 07/01/2023 92    • MCH 07/01/2023 29.4    • MCHC 07/01/2023 32.0    • RDW 07/01/2023 17.2 (H)    • Platelets 92/82/3826 195    • MPV 07/01/2023 11.9    • Color, UA 07/01/2023 Yellow    • Clarity, UA 07/01/2023 Clear    • pH, UA 07/01/2023 5.5    • Leukocytes, UA 07/01/2023 Negative    • Nitrite, UA 07/01/2023 Positive (A)    • Protein, UA 07/01/2023 Negative    • Glucose, UA 07/01/2023 Negative    • Ketones, UA 07/01/2023 15 (1+) (A)    • Urobilinogen, UA 07/01/2023 1.0    • Bilirubin, UA 07/01/2023 Negative    • Occult Blood, UA 07/01/2023 Negative    • Specific Gravity, UA 07/01/2023 1.020    • RBC, UA 07/01/2023 1-2    • WBC, UA 07/01/2023 None Seen    • Epithelial Cells 07/01/2023 Occasional    • Bacteria, UA 07/01/2023 Innumerable (A)    • Hyaline Casts, UA 07/01/2023 0-3 (A)    • Iron Saturation 07/01/2023 21    • TIBC 07/01/2023 228 (L)    • Iron 07/01/2023 47 (L)    • Ferritin 07/01/2023 15    • WBC 07/02/2023 4.98    • RBC 07/02/2023 2.31 (L)    • Hemoglobin 07/02/2023 6.9 (LL)    • Hematocrit 07/02/2023 20.9 (L)    • MCV 07/02/2023 94    • MCH 07/02/2023 29.0    • MCHC 07/02/2023 31.0 (L)    • RDW 07/02/2023 17.5 (H)    • Platelets 84/82/9390 167    • MPV 07/02/2023 12.2    • Sodium 07/02/2023 139    • Potassium 07/02/2023 3.5    • Chloride 07/02/2023 114 (H)    • CO2 07/02/2023 22    • ANION GAP 07/02/2023 3    • BUN 07/02/2023 32 (H)    • Creatinine 07/02/2023 0.80    • Glucose 07/02/2023 89    • Calcium 07/02/2023 8.1 (L)    • eGFR 07/02/2023 72    • Hemoglobin 07/02/2023 7.0 (L)    • Hematocrit 07/02/2023 21.3 (L)        Recent Results (from the past 24 hour(s))   CBC and Platelet    Collection Time: 07/02/23  5:06 AM   Result Value Ref Range    WBC 4.98 4.31 - 10.16 Thousand/uL    RBC 2.31 (L) 3.81 - 5.12 Million/uL    Hemoglobin 6.9 (LL) 11.5 - 15.4 g/dL    Hematocrit 20.9 (L) 34.8 - 46.1 %    MCV 94 82 - 98 fL    MCH 29.0 26.8 - 34.3 pg    MCHC 31.0 (L) 31.4 - 37.4 g/dL    RDW 17.5 (H) 11.6 - 15.1 %    Platelets 668 633 - 437 Thousands/uL    MPV 12.2 8.9 - 12.7 fL   Basic metabolic panel    Collection Time: 07/02/23  5:06 AM   Result Value Ref Range    Sodium 139 135 - 147 mmol/L    Potassium 3.5 3.5 - 5.3 mmol/L    Chloride 114 (H) 96 - 108 mmol/L    CO2 22 21 - 32 mmol/L    ANION GAP 3 mmol/L    BUN 32 (H) 5 - 25 mg/dL    Creatinine 0.80 0.60 - 1.30 mg/dL    Glucose 89 65 - 140 mg/dL    Calcium 8.1 (L) 8.3 - 10.1 mg/dL    eGFR 72 ml/min/1.73sq m   Hemoglobin and hematocrit, blood    Collection Time: 07/02/23  9:28 AM   Result Value Ref Range    Hemoglobin 7.0 (L) 11.5 - 15.4 g/dL    Hematocrit 21.3 (L) 34.8 - 46.1 %     Blood Culture: No results found for: "BLOODCX",   Urinalysis:   Lab Results   Component Value Date    COLORU Yellow 07/01/2023    CLARITYU Clear 07/01/2023    SPECGRAV 1.020 07/01/2023    PHUR 5.5 07/01/2023    LEUKOCYTESUR Negative 07/01/2023    NITRITE Positive (A) 07/01/2023    GLUCOSEU Negative 07/01/2023    KETONESU 15 (1+) (A) 07/01/2023    BILIRUBINUR Negative 07/01/2023    BLOODU Negative 07/01/2023   ,   Urine Culture: No results found for: "URINECX",   Wound Culure: No results found for: "WOUNDCULT"      Imaging:  None  XR knee 1 or 2 vw left    Result Date: 7/1/2023  Osteoarthritis No acute osseous abnormality. Workstation performed: [id number]     XR shoulder 2+ views RIGHT    Result Date: 7/1/2023  No acute osseous abnormality. Workstation performed: [id number]     XR hip/pelv 2-3 vws left if performed    Result Date: 7/1/2023  No acute osseous abnormality. Workstation performed: [id number]     CT spine cervical without contrast    Result Date: 7/1/2023  No acute cervical spine fracture or traumatic malalignment. Findings are consistent with the preliminary report from [facility] which was provided shortly after completion of the exam. Workstation performed: [id number]     CT head without contrast    Result Date: 7/1/2023  No acute intracranial abnormality.  Findings are consistent with the preliminary report from [facility] which was provided shortly after completion of the exam. Workstation performed: [id number]        Current Facility-Administered Medications   Medication Dose Route Frequency   • acetaminophen (TYLENOL) tablet 650 mg  650 mg Oral Q6H PRN   • apixaban (ELIQUIS) tablet 5 mg  5 mg Oral BID   • calcium carbonate (TUMS) chewable tablet 1,000 mg  1,000 mg Oral Daily PRN   • cephalexin (KEFLEX) capsule 500 mg  500 mg Oral TID   • cyclobenzaprine (FLEXERIL) tablet 10 mg  10 mg Oral HS   • ergocalciferol (VITAMIN D2) capsule 50,000 Units  50,000 Units Oral Weekly   • escitalopram (LEXAPRO) tablet 10 mg  10 mg Oral Daily   • ferrous sulfate tablet 325 mg  325 mg Oral Daily With Breakfast   • gabapentin (NEURONTIN) capsule 300 mg  300 mg Oral TID   • melatonin tablet 3 mg  3 mg Oral HS   • metoprolol succinate (TOPROL-XL) 24 hr tablet 50 mg  50 mg Oral Daily   • ondansetron (ZOFRAN) injection 4 mg  4 mg Intravenous Q6H PRN   • oxyCODONE (ROXICODONE) immediate release tablet 10 mg  10 mg Oral Q8H PRN   • polyethylene glycol (MIRALAX) packet 17 g  17 g Oral Daily   • senna (SENOKOT) tablet 8.6 mg  8.6 mg Oral HS             Ninfa Stein DO   Family Medicine

## 2023-07-02 NOTE — PLAN OF CARE
Problem: PHYSICAL THERAPY ADULT  Goal: Performs mobility at highest level of function for planned discharge setting. See evaluation for individualized goals. Description: Treatment/Interventions: ADL retraining, Functional transfer training, LE strengthening/ROM, Elevations, Therapeutic exercise, Endurance training, Patient/family training, Equipment eval/education, Bed mobility, Gait training, Spoke to nursing, Spoke to case management          See flowsheet documentation for full assessment, interventions and recommendations. Note: Prognosis: Good  Problem List: Decreased strength, Decreased range of motion, Decreased endurance, Impaired balance, Decreased mobility, Impaired judgement, Decreased safety awareness, Pain  Assessment: Pt is a 78 y.o. female seen for PT evaluation s/p admit to [address] [place] [address] on 6/30/2023. Pt was admitted with a primary dx of: anemia s/p fall with no acute injury/ fx.  PT now consulted for assessment of mobility and d/c needs. Pt with Up and OOB as tolerated  orders. Pts current comorbidities effecting treatment include: a-fib, chronic pain, HTN, depression, insomnia, constipation, CHF, CKD, UTI. Pts current clinical presentation is Unstable/ Unpredictable (high complexity) due to Ongoing medical management for primary dx, Increased reliance on more restrictive AD compared to baseline, Decreased activity tolerance compared to baseline, Fall risk, Increased assistance needed from caregiver at current time, Cog status, Trending lab values, Continuous pulse oximetry monitoring . Prior to admission, pt was residing alone in apartment (elevator access) and was independent using rollator. Upon evaluation, pt currently is requiring min A for bed mobility; mod A for transfers; mod A for ambulation 3 ft w/ RW.  Pt presents at PT eval functioning below baseline and currently w/ overall mobility deficits 2* to: BLE weakness, decreased ROM, impaired balance, decreased endurance, impaired coordination, gait deviations, pain, decreased activity tolerance compared to baseline, decreased functional mobility tolerance compared to baseline, decreased safety awareness, impaired judgement, fall risk. Pt currently at a fall risk 2* to impairments listed above. Pt will continue to benefit from skilled acute PT interventions to address stated impairments; to maximize functional mobility; for ongoing pt/ family training; and DME needs. At conclusion of PT session pt returned back in chair with phone and call bell within reach. Pt denies any further questions at this time. The patient's AM-PAC Basic Mobility Inpatient Short Form Raw Score is 13. A Raw score of less than or equal to 16 suggests the patient may benefit from discharge to post-acute rehabilitation services. Please also refer to the recommendation of the Physical Therapist for safe discharge planning. Recommend STR upon hospital D/C. Barriers to Discharge: Decreased caregiver support     PT Discharge Recommendation: Post acute rehabilitation services    See flowsheet documentation for full assessment.

## 2023-07-02 NOTE — PHYSICAL THERAPY NOTE
Physical Therapy Evaluation     Patient's Name: Debra Kelly    Admitting Diagnosis  Anemia [D64.9]  Fall, initial encounter [W19. XXXA]  Unspecified multiple injuries, initial encounter [T07. XXXA]    Problem List  Patient Active Problem List   Diagnosis    Atrial fibrillation (HCC)    Chronic pain    Age-related osteoporosis without current pathological fracture    Hypertension    Anxiety    Aortic aneurysm ([address])    Depression with anxiety    Insomnia    Left knee pain    Lumbar canal stenosis    Myofascial pain syndrome    Opioid dependence (HCC)    Pain syndrome, chronic    Right shoulder pain    Spondylosis of lumbar region without myelopathy or radiculopathy    Vitamin D deficiency    Healthcare maintenance    Constipation    Eczema of right external ear    Balance disorder    Falls    Musculoskeletal arm pain, right    Primary osteoarthritis of right shoulder    Chronic heart failure with preserved ejection fraction (HCC)    Hypokalemia    Stage 3a chronic kidney disease (HCC)    Ganglion of hand, left    Dry eye of left side    Bladder prolapse, female, acquired    Financial insecurity    Viral illness    Fall    UTI (urinary tract infection)    Anemia       Past Medical History  Past Medical History:   Diagnosis Date    Acute deep vein thrombosis of lower limb ([address])     last assessed [id number]    Aortic aneurysm [facility])     Arthritis     Atrial fibrillation (HCC)     Dislocation of hip ([address])     last assessed [id number]    Hypertension     Psychiatric disorder     anxiety       Past Surgical History  Past Surgical History:   Procedure Laterality Date    HIP SURGERY      JOINT REPLACEMENT      KNEE ARTHROSCOPY Bilateral     x2 rt and 1 on left    TUBAL LIGATION          07/02/23 1322   PT Last Visit   PT Visit Date 07/02/23   Note Type   Note type Evaluation   Pain Assessment   Pain Assessment Tool 0-10   Pain Score 5   Pain Location/Orientation Orientation: Left; Location: Knee; Location: Back   Hospital Pain Intervention(s) Repositioned; Ambulation/increased activity; Emotional support   Restrictions/Precautions   Weight Bearing Precautions Per Order No   Other Precautions Cognitive; Chair Alarm; Bed Alarm;Multiple lines; Fall Risk;Pain   Home Living   Type of [address] One level;Performs ADLs on one level; Able to live on main level with bedroom/bathroom   Bathroom Shower/Tub Walk-in shower   Bathroom Toilet Raised   Bathroom Equipment Grab bars in shower; Shower chair;Grab bars around toilet   Home Equipment Other (Comment)  (rollator- was using PTA)   Additional Comments pt ? historian at times- will need to confirm information with CM. Prior Function   Level of [place] Independent with ADLs; Independent with functional mobility; Independent with IADLS   Lives With (S)  Alone   Receives Help From Family; Neighbor   IADLs Independent with driving; Independent with meal prep; Independent with medication management   Falls in the last 6 months (S)  5 to 10  (4-5 per pt report; 1x leading to current admission)   Vocational Retired   Comments enjoys playing shuffle board   General   Family/Caregiver Present No   Cognition   Arousal/Participation Cooperative   Orientation Level Oriented X4   Memory Decreased recall of precautions   Following Commands Follows one step commands with increased time or repetition   Comments pt pleasant and cooperative, decreased attention noted at times. Anxious regarding mobility- emotional support + encouragement required   Subjective   Subjective "I'm going to fall"   RLE Assessment   RLE Assessment   (functionally 4-/5)   LLE Assessment   LLE Assessment   (funcitonally 3+/5)   Bed Mobility   Supine to Sit 4  Minimal assistance   Additional items Assist x 1;HOB elevated; Bedrails; Increased time required;Verbal cues   Sit to Supine Unable to assess   Additional Comments pt supine in bed upon arrival. Pt sitting EOB with L lateral lean noted- occaisonal min A required to correct. When in recliner at end of session pt tends to lean toward L side- repositioned to midline with pillows. Pt left with all needs within reach- RN updated post session. Transfers   Sit to Stand 3  Moderate assistance   Additional items Assist x 1; Increased time required;Verbal cues   Stand to Sit 3  Moderate assistance   Additional items Assist x 1; Increased time required;Verbal cues   Stand pivot 3  Moderate assistance   Additional items Assist x 1; Increased time required;Verbal cues   Additional Comments transfers with RW, cues for hand placement and sequencing   Ambulation/Elevation   Gait pattern Excessively slow; Short stride; Foward flexed;Decreased foot clearance; Improper Weight shift   Gait Assistance 3  Moderate assist   Additional items Assist x 1;Verbal cues; Tactile cues  (+ SBA of another for safety + line management)   Assistive Device Rolling walker   Distance (S)  3ft  (limited by c/o dizziness; BP taken post transfer 92/63- LE elevated, symptoms resolve with time- RN aware)   Stair Management Assistance Not tested   Balance   Static Sitting Fair -   Dynamic Sitting Poor +   Static Standing Poor   Dynamic Standing Poor   Ambulatory Poor   Endurance Deficit   Endurance Deficit Yes   Endurance Deficit Description generalized weakness, deconditioning, fatigue, LLE pain   Activity Tolerance   Activity Tolerance Patient limited by fatigue;Patient limited by pain   Medical Staff Made Aware PCA   Nurse Made Aware RN cleared pt to participate in therapy session   Assessment   Prognosis Good   Problem List Decreased strength;Decreased range of motion;Decreased endurance; Impaired balance;Decreased mobility; Impaired judgement;Decreased safety awareness;Pain   Assessment Pt is a 78 y.o. female seen for PT evaluation s/p admit to [address] [place] [address] on 6/30/2023. Pt was admitted with a primary dx of: anemia s/p fall with no acute injury/ fx.  PT now consulted for assessment of mobility and d/c needs.  Pt with Up and OOB as tolerated  orders. Pts current comorbidities effecting treatment include: a-fib, chronic pain, HTN, depression, insomnia, constipation, CHF, CKD, UTI. Pts current clinical presentation is Unstable/ Unpredictable (high complexity) due to Ongoing medical management for primary dx, Increased reliance on more restrictive AD compared to baseline, Decreased activity tolerance compared to baseline, Fall risk, Increased assistance needed from caregiver at current time, Cog status, Trending lab values, Continuous pulse oximetry monitoring . Prior to admission, pt was residing alone in apartment (elevator access) and was independent using rollator. Upon evaluation, pt currently is requiring min A for bed mobility; mod A for transfers; mod A for ambulation 3 ft w/ RW. Pt presents at PT eval functioning below baseline and currently w/ overall mobility deficits 2* to: BLE weakness, decreased ROM, impaired balance, decreased endurance, impaired coordination, gait deviations, pain, decreased activity tolerance compared to baseline, decreased functional mobility tolerance compared to baseline, decreased safety awareness, impaired judgement, fall risk. Pt currently at a fall risk 2* to impairments listed above. Pt will continue to benefit from skilled acute PT interventions to address stated impairments; to maximize functional mobility; for ongoing pt/ family training; and DME needs. At conclusion of PT session pt returned back in chair with phone and call bell within reach. Pt denies any further questions at this time. The patient's AM-PAC Basic Mobility Inpatient Short Form Raw Score is 13. A Raw score of less than or equal to 16 suggests the patient may benefit from discharge to post-acute rehabilitation services. Please also refer to the recommendation of the Physical Therapist for safe discharge planning. Recommend STR upon hospital D/C.    Barriers to Discharge Decreased caregiver support   Goals   Patient Goals to feel better   STG Expiration Date 07/16/23   Short Term Goal #1 STG 1. Pt will be able to perform bed mobility tasks with mod I level in order to improve overall functional mobility and assist in safe d/c. STG 2. Pt with sit EOB for at least 25 minutes at mod I level in order to strengthen abdominal musculature and assist in future transfers/ ambulation. STG 3. Pt will be able to perform functional transfer with mod I level in order to improve overall functional mobility and assist in safe d/c. STG 4. Pt will be able to ambulate at least 250 feet with least restrictive device with mod I level A in order to improve overall functional mobility and assist in safe d/c. STG 5. Pt will improve sitting/standing static/dynamic balance 1/2 grade in order to improve functional mobility and assist in safe d/c. STG 6. Pt will improve LE strength by 1/2 grade in order to improve functional mobility and assist in safe d/c. STG 7. Pt will be able to negotiate at least 1 stairs with least restrictive device with mod I level A in order to improve overall functional mobility and assist in safe d/c.   PT Treatment Day 0   Plan   Treatment/Interventions ADL retraining;Functional transfer training;LE strengthening/ROM; Elevations; Therapeutic exercise; Endurance training;Patient/family training;Equipment eval/education; Bed mobility;Gait training;Spoke to nursing;Spoke to case management   PT Frequency 2-3x/wk   Recommendation   PT Discharge Recommendation Post acute rehabilitation services   AM-PAC Basic Mobility Inpatient   Turning in Flat Bed Without Bedrails 3   Lying on Back to Sitting on Edge of Flat Bed Without Bedrails 3   Moving Bed to Chair 2   Standing Up From Chair Using Arms 2   Walk in Room 2   Climb 3-5 Stairs With Railing 1   Basic Mobility Inpatient Raw Score 13   Basic Mobility Standardized Score 33.99   Highest Level Of Mobility   JH-HLM Goal 4: Move to chair/commode   JH-HLM Achieved 4: Move to chair/commode Adela Bermudez, PT DPT

## 2023-07-03 ENCOUNTER — APPOINTMENT (INPATIENT)
Dept: RADIOLOGY | Facility: HOSPITAL | Age: 78
DRG: 690 | End: 2023-07-03
Payer: COMMERCIAL

## 2023-07-03 LAB
ABO GROUP BLD: NORMAL
APTT PPP: 27 SECONDS (ref 23–37)
BACTERIA UR CULT: ABNORMAL
BLD GP AB SCN SERPL QL: NEGATIVE
HCT VFR BLD AUTO: 19.3 % (ref 34.8–46.1)
HCT VFR BLD AUTO: 20.5 % (ref 34.8–46.1)
HCT VFR BLD AUTO: 26.5 % (ref 34.8–46.1)
HGB BLD-MCNC: 6.2 G/DL (ref 11.5–15.4)
HGB BLD-MCNC: 6.3 G/DL (ref 11.5–15.4)
HGB BLD-MCNC: 8.4 G/DL (ref 11.5–15.4)
INR PPP: 1.25 (ref 0.84–1.19)
PROTHROMBIN TIME: 15.9 SECONDS (ref 11.6–14.5)
RH BLD: POSITIVE
SPECIMEN EXPIRATION DATE: NORMAL

## 2023-07-03 PROCEDURE — P9016 RBC LEUKOCYTES REDUCED: HCPCS

## 2023-07-03 PROCEDURE — G1004 CDSM NDSC: HCPCS

## 2023-07-03 PROCEDURE — 86850 RBC ANTIBODY SCREEN: CPT

## 2023-07-03 PROCEDURE — 86921 COMPATIBILITY TEST INCUBATE: CPT

## 2023-07-03 PROCEDURE — 97167 OT EVAL HIGH COMPLEX 60 MIN: CPT

## 2023-07-03 PROCEDURE — 97530 THERAPEUTIC ACTIVITIES: CPT

## 2023-07-03 PROCEDURE — 99233 SBSQ HOSP IP/OBS HIGH 50: CPT | Performed by: FAMILY MEDICINE

## 2023-07-03 PROCEDURE — 85018 HEMOGLOBIN: CPT

## 2023-07-03 PROCEDURE — 85610 PROTHROMBIN TIME: CPT

## 2023-07-03 PROCEDURE — 97110 THERAPEUTIC EXERCISES: CPT

## 2023-07-03 PROCEDURE — 30233N1 TRANSFUSION OF NONAUTOLOGOUS RED BLOOD CELLS INTO PERIPHERAL VEIN, PERCUTANEOUS APPROACH: ICD-10-PCS | Performed by: FAMILY MEDICINE

## 2023-07-03 PROCEDURE — 86900 BLOOD TYPING SEROLOGIC ABO: CPT

## 2023-07-03 PROCEDURE — 86922 COMPATIBILITY TEST ANTIGLOB: CPT

## 2023-07-03 PROCEDURE — 97112 NEUROMUSCULAR REEDUCATION: CPT

## 2023-07-03 PROCEDURE — 86902 BLOOD TYPE ANTIGEN DONOR EA: CPT

## 2023-07-03 PROCEDURE — 85014 HEMATOCRIT: CPT

## 2023-07-03 PROCEDURE — 86901 BLOOD TYPING SEROLOGIC RH(D): CPT

## 2023-07-03 PROCEDURE — 85730 THROMBOPLASTIN TIME PARTIAL: CPT

## 2023-07-03 PROCEDURE — 74176 CT ABD & PELVIS W/O CONTRAST: CPT

## 2023-07-03 RX ADMIN — CEPHALEXIN 500 MG: 500 CAPSULE ORAL at 15:20

## 2023-07-03 RX ADMIN — CYCLOBENZAPRINE 10 MG: 10 TABLET, FILM COATED ORAL at 21:56

## 2023-07-03 RX ADMIN — CYCLOBENZAPRINE 10 MG: 10 TABLET, FILM COATED ORAL at 15:20

## 2023-07-03 RX ADMIN — CYCLOBENZAPRINE 10 MG: 10 TABLET, FILM COATED ORAL at 08:31

## 2023-07-03 RX ADMIN — Medication 1 TABLET: at 08:31

## 2023-07-03 RX ADMIN — CEPHALEXIN 500 MG: 500 CAPSULE ORAL at 08:31

## 2023-07-03 RX ADMIN — ACETAMINOPHEN 650 MG: 325 TABLET, FILM COATED ORAL at 02:25

## 2023-07-03 RX ADMIN — FERROUS SULFATE TAB 325 MG (65 MG ELEMENTAL FE) 325 MG: 325 (65 FE) TAB at 08:31

## 2023-07-03 RX ADMIN — OXYCODONE HYDROCHLORIDE 10 MG: 10 TABLET ORAL at 21:57

## 2023-07-03 RX ADMIN — ESCITALOPRAM OXALATE 10 MG: 10 TABLET ORAL at 08:31

## 2023-07-03 RX ADMIN — CEPHALEXIN 500 MG: 500 CAPSULE ORAL at 21:56

## 2023-07-03 RX ADMIN — GABAPENTIN 300 MG: 300 CAPSULE ORAL at 15:20

## 2023-07-03 RX ADMIN — GABAPENTIN 300 MG: 300 CAPSULE ORAL at 08:31

## 2023-07-03 RX ADMIN — OXYCODONE HYDROCHLORIDE 10 MG: 10 TABLET ORAL at 05:29

## 2023-07-03 RX ADMIN — POLYETHYLENE GLYCOL 3350 17 G: 17 POWDER, FOR SOLUTION ORAL at 08:31

## 2023-07-03 RX ADMIN — MELATONIN TAB 3 MG 3 MG: 3 TAB at 21:56

## 2023-07-03 RX ADMIN — LIDOCAINE 5% 2 PATCH: 700 PATCH TOPICAL at 08:32

## 2023-07-03 RX ADMIN — GABAPENTIN 300 MG: 300 CAPSULE ORAL at 21:56

## 2023-07-03 RX ADMIN — OXYCODONE HYDROCHLORIDE 10 MG: 10 TABLET ORAL at 13:46

## 2023-07-03 NOTE — PLAN OF CARE
Problem: PHYSICAL THERAPY ADULT  Goal: Performs mobility at highest level of function for planned discharge setting. See evaluation for individualized goals. Description: Treatment/Interventions: ADL retraining, Functional transfer training, LE strengthening/ROM, Elevations, Therapeutic exercise, Endurance training, Patient/family training, Equipment eval/education, Bed mobility, Gait training, Spoke to nursing, Spoke to case management          See flowsheet documentation for full assessment, interventions and recommendations. Outcome: Progressing  Note: Prognosis: Good  Problem List: Decreased strength, Decreased range of motion, Decreased endurance, Impaired balance, Decreased mobility, Impaired judgement, Decreased safety awareness, Pain  Assessment: While the patient is demonstrating some improvement in balance, she continues to remain limited with notable decreased activity tolerance. She requires assistance to maintain both sitting and standing balance, and she fatigues easily with only a few steps. She lists to the left while sitting even when supported posteriorly in the chair. She notes continued pain, but that it is tolerable. The patient completed therapeutic exercises with increased time especially with the LLE. She will benefit from continued therapy in order to maximize her functional mobility. Barriers to Discharge: Inaccessible home environment, Decreased caregiver support     PT Discharge Recommendation: Post acute rehabilitation services    See flowsheet documentation for full assessment.

## 2023-07-03 NOTE — CASE MANAGEMENT
Case Management Assessment & Discharge Planning Note    Patient name Tasha David  Location [address] [address][id number] MRN [id number]  : 1945 Date 7/3/2023       Current Admission Date: 2023  Current Admission Diagnosis:Fall   Patient Active Problem List    Diagnosis Date Noted   • Fall 2023   • UTI (urinary tract infection) 2023   • Anemia 2023   • Viral illness 2022   • Financial insecurity 10/10/2022   • Dry eye of left side 2022   • Bladder prolapse, female, acquired 2022   • Ganglion of hand, left 2022   • Stage 3a chronic kidney disease ([address]) 2022   • Hypokalemia 10/01/2021   • Chronic heart failure with preserved ejection fraction ([address]) 2020   • Primary osteoarthritis of right shoulder 2020   • Falls 10/24/2019   • Musculoskeletal arm pain, right 10/24/2019   • Balance disorder 2019   • Eczema of right external ear 2018   • Constipation 2018   • Healthcare maintenance 2018   • Atrial fibrillation ([address]) 10/11/2017   • Chronic pain 10/11/2017   • Age-related osteoporosis without current pathological fracture 10/11/2017   • Hypertension 10/11/2017   • Anxiety 10/11/2017   • Depression with anxiety 2016   • Right shoulder pain 2016   • Opioid dependence ([address]) 2016   • Left knee pain 2015   • Aortic aneurysm ([address]) 2014   • Spondylosis of lumbar region without myelopathy or radiculopathy 2013   • Myofascial pain syndrome 2013   • Insomnia 2012   • Lumbar canal stenosis 06/15/2012   • Vitamin D deficiency 06/15/2012   • Pain syndrome, chronic 2012      LOS (days): 2  Geometric Mean LOS (GMLOS) (days): 2.90  Days to GMLOS:0.7     OBJECTIVE:    Risk of Unplanned Readmission Score: 24.94         Current admission status: Inpatient  Referral Reason: Other Deneen Medici)    Preferred Pharmacy:   [address] [place] [address], [address] [place] [address] [id number] [place]  [id number] [address] [address] [address]  Phone: [phone] Fax: [facility], [address] [facility] [address]  Phone: [phone] Fax: [phone]    Primary Care Provider: Christelle Tse    Primary Insurance: [place] [facility]  Secondary Insurance:     ASSESSMENT:  [place] Proxies    There are no active Health Care Proxies on file. Readmission Root Cause  30 Day Readmission: No    Patient Information  Admitted from[de-identified] Home  Mental Status: Alert  During Assessment patient was accompanied by: Not accompanied during assessment  Assessment information provided by[de-identified] Patient  Primary Caregiver: Self  Support Systems: [address] [facility], [address] of Residence: [facility] [address] [facility] do you live in?: "[employer] entry access options.  Select all that apply.: Ramp, Elevator  Type of Current Residence: Apartment  Floor Level: 5  Upon entering residence, is there a bedroom on the main floor (no further steps)?: Yes  Upon entering residence, is there a bathroom on the main floor (no further steps)?: Yes  In the last 12 months, how many places have you lived?: 1  Homeless/housing insecurity resource given?: N/A  Living Arrangements: Lives Alone    Activities of Daily Living Prior to Admission  Functional Status: Independent  Completes ADLs independently?: Yes  Ambulates independently?: Yes  Does patient use assisted devices?: Yes  Assisted Devices (DME) used: [employer] Ratl[employer]  Does patient currently own DME?: Yes  What DME does the patient currently own?: [employer]  Does patient have a history of Outpatient Therapy (PT/OT)?: Yes  Does the patient have a history of Short-Term Rehab?: Yes  Does patient have a history of HHC?: No  Does patient currently have [address]?: No         Patient Information Continued  Income Source: SSI/SSD  Does patient have prescription coverage?: Yes  Within the past 12 months, you worried that your food would run out before you got the money to buy more.: Never true  Within the past 12 months, the food you bought just didn't last and you didn't have money to get more.: Never true  Food insecurity resource given?: N/A  Does patient receive dialysis treatments?: No  Does patient have a history of substance abuse?: No  Does patient have a history of Mental Health Diagnosis?: No         Means of Transportation  Means of Transport to Appts[de-identified] Friends  In the past 12 months, has lack of transportation kept you from medical appointments or from getting medications?: No  In the past 12 months, has lack of transportation kept you from meetings, work, or from getting things needed for daily living?: No  Was application for public transport provided?: N/A        DISCHARGE DETAILS:    Discharge planning discussed with[de-identified] Patient  Freedom of Choice: Yes  Comments - Freedom of Choice: Pt currently refusing sTR. Wants to go homewith home PT/VNA. She is agreeable to Meals on Wheels. Pt also wants information onf Life Alert.   CM contacted family/caregiver?: No- see comments  Were Treatment Team discharge recommendations reviewed with patient/caregiver?: Yes  Did patient/caregiver verbalize understanding of patient care needs?: Yes  Were patient/caregiver advised of the risks associated with not following Treatment Team discharge recommendations?: Yes    Contacts  Patient Contacts: Espino Arunamallorie (friend)  Relationship to Patient[de-identified] [address]         Is the patient interested in [facility] [address] at discharge?: Yes  [facility] requested[de-identified] Nursing, Physical Therapy, Occupational [address] [facility] Name[de-identified] Jair Provider[de-identified] PCP  Home Health Services Needed[de-identified] Strengthening/Theraputic Exercises to Improve Function, Evaluate Functional Status and Safety, Gait/ADL Training  Homebound Criteria Met[de-identified] Requires the Assistance of Another Person for Safe Ambulation or to Leave the Home, Uses an Assist Device (i.e. cane, walker, etc)  Supporting Clincal Findings[de-identified] Limited Endurance         Other Referral/Resources/Interventions Provided:  Interventions: Meals on Wheels, Other (Specify) (Life Alert information)         Treatment Team Recommendation: Home with [address]            Pt refusing STR. Stated she wants VNA home Pt and life alert information.  She accepted Meals on Wheels

## 2023-07-03 NOTE — PROGRESS NOTES
Nutrition evaluation summary and plan:  Calorie count requested and is scheduled x1 day for 7/3; it is noted that patient was not fond of meals today so intake subsequently decreased; she questioned the purpose of calorie count, RD explained that provider wants to assure intake is adequate but would benefit from further discussion from provider;    described her appetite as fair;  typically eats 2 meals per day; uses home delivery grocery service; poor dentition noted; however patient reported this "does not stop me from eating"; agreed to trial chocolate ensure compact x1 per day on dinner trays; current weight not documented and last noted on 4/27/23 at 170#; was 181# 8/2022; will follow with one day calorie count result

## 2023-07-03 NOTE — PROGRESS NOTES
Progress Notes - Family Medicine Residency, [address] [place] [address] Tanna Arriaga 1945, 78 y.o. female. MRN: [id number]  Unit/Bed#: [facility]-01 Encounter: [id number]  Primary Care Provider: Twylla Shone Metzgar      Admission Date: 6/30/2023 2208  Length of Stay: 2 days  Code Status:  Level 1 - Full Code  Disposition:   Consult:   IP CONSULT TO CASE MANAGEMENT         Assessment/Plan:      Plans discussed with [facility] team and finalization is pending attending physician attestation. Please, call  for any clarification. * Fall  Assessment & Plan  78 F with of presents to ED after a mechanical fall. No headstrike or LOC. On eliquis. Pt was down for significant amount of time, difficult to quantify because her room remained dark for hours. Pt fell due to her legs giving out, she believes it is related to her Left knee OA. She fell onto her left hip and knee and also bruised her right shoulder. No signs of acute bleeding.   - Pt reports her medications make her dizzy.    · VSS  · Hgb 8.8 on admission downtrending, 6.2 this am   · CK wnl, TSH wnl  · 6/30 CTH, CT spine, Left pelvis/hip, left knee, and right shoulder negative for acute pathology or bleed based, however, pending final results    Plan  - f/u on final results of imaging  - monitor for acute bleed  - PT/OT  - pain control  - Medication reconciliation to decrease dizziness 2/2 medications:  d/c ambien, decrease flexeril to 10 mg qHs PRN, and decrease Oxy 10 mg q8h PRN    Anemia  Assessment & Plan  Hgb 8.8 --> 7.7 -->6.9--> 6.3-->6.2  Iron: 47  Pt endorses poor PO intake    Plan  -Hgb low on repeat  -type and screen; consent obtained for blood transfusion Low threshold for transfusion  -1 u PRBc ordered  -Holding Eliquis, Hemoccult order  -200 mg IV iron ordered  -Started patient on 325 mg ferrous sulfate daily   -f/u CBC  -Transfuse hgb > 7  -Ordered CT abdomen pelvis w/o contrast scan to look for source of bleeding    UTI (urinary tract infection)  Assessment & Plan  UA positive for nitrites and bacteria  No dysuria, no polyuria. Stable vital signs  Plan  -cephalexin 500 mg 3 times daily; plan for 5 days total course  -First dose 7/1 1200    Stage 3a chronic kidney disease [facility])  Assessment & Plan  Lab Results   Component Value Date    EGFR 72 07/02/2023    EGFR 61 07/01/2023    EGFR 48 06/30/2023    CREATININE 0.80 07/02/2023    CREATININE 0.91 07/01/2023    CREATININE 1.12 06/30/2023     Plan  - avoid nephrotoxic agents, renal dose medications    Chronic heart failure with preserved ejection fraction [facility])  Assessment & Plan  Wt Readings from Last 3 Encounters:   04/27/23 77.2 kg (170 lb 3.2 oz)   03/27/23 80 kg (176 lb 6.4 oz)   03/20/23 78 kg (172 lb)     Home meds: Toprol 50 mg daily and lasix 40 mg as needed  Last Cardiology visit: 9/19/2022    Assessment  Not in acute heart failure. No fluid overload. Plan  - c/w Toprol 50 mg daily    Constipation  Assessment & Plan  Plan  - Continue home senna qhs.   - Add miralax daily PRN    Insomnia  Assessment & Plan  Pt takes ambien 10 mg at bedtime. She reports dizziness with her current medications. Plan  - D/c ambien; can likely benefit from consultant to pharmacist for polypharmacy  - C/w Flexeril 10 mg qHs  - Try melatonin PRN  - delirium precautions.      Depression with anxiety  Assessment & Plan  Plan  - Continue with home lexapro 10 mg daily    Hypertension  Assessment & Plan  Blood pressure well controlled  Plan  - Continue home Toprol 50 mg daily and Lasix 40 mg PRN for leg edema    Chronic pain  Assessment & Plan  Patient has chronic pain related to L knee OA and spondylolithesis  Home meds: Flexeril 10 TID PRN, Gabapentin 900 mg TID, oxycodone 10 mg q6h PRN  PDMP reviewed, pt appears to use Oxycodone 4 times a day (pt fills 120 tabs per month)  Last CSI of left knee, 11/23/22   Patient still taking Oxy 10 every 8 hours pain still 8-10 out of 10  I have a significant discussion with patient as well overuse history of opioid can cause amatory dysfunction lead to falling and more complication  Plan  - Decrease flexeril 10 qHs PRN and decrease oxycodone 10 mg q8h PRN    Atrial fibrillation (HCC)  Assessment & Plan  On rate control with Toprol 50 mg daily and AC with Eliquis 5 mg BID    Assessment  Pt is in RRR. She has no obvious signs of bleeding and scans are negative s/p fall. Plan  - Continue home meds Toprol 50 mg daily  -We will hold Eliquis for now and concerning of downtrending hemoglobin unknown GI bleed could be a cause          Diet: Diet Regular; Regular House    VTE Pharm PPX: Held eliquis due to bleeding  VTE Mech PPX: sequential compression device      Subjective: Today 07/03/23, HD# 2    • Per handoff, patient without acute events overnight. • Per nursing staff, no concern or report. • Patient seen and examined at bedside this AM. Patient denies CP, SOB, abdominal pain, dizziness, lightheadedness, urinary symptoms at this time. Patient experiencing some pain in right shoulder where she fell and has not had a BM since last week. Patient states she feels "clearer" today and was able to eat some of her breakfast.   • Medical management and treatment was discussed with patient, patient understands and agrees with plan.      Objective:     Vitals:    07/02/23 1504 07/02/23 1700 07/02/23 2158 07/03/23 0829   BP: 111/77  108/76 105/76   Pulse:   104 101   Resp:       Temp: 97.8 °F (36.6 °C)  98.3 °F (36.8 °C) 98 °F (36.7 °C)   TempSrc:       SpO2:  95% 95% 95%     Temp:  [97.8 °F (36.6 °C)-98.3 °F (36.8 °C)] 98 °F (36.7 °C)  HR:  [101-104] 101  BP: (105-111)/(76-77) 105/76  Weight (last 2 days)     None          Intake/Output Summary (Last 24 hours) at 7/3/2023 0841  Last data filed at 7/3/2023 0452  Gross per 24 hour   Intake 225 ml   Output 1400 ml   Net -1175 ml     Invasive Devices     Peripheral Intravenous Line  Duration           Peripheral IV 06/30/23 Left Antecubital 2 days    Peripheral IV 07/01/23 Proximal;Right;Ventral (anterior) Forearm 2 days          Drain  Duration           External Urinary Catheter 1 day                  Physical Exam:     Physical Exam  Constitutional:       Appearance: Normal appearance. HENT:      Head: Normocephalic and atraumatic. Right Ear: External ear normal.      Left Ear: External ear normal.      Nose: Nose normal.      Mouth/Throat:      Mouth: Mucous membranes are moist.   Cardiovascular:      Rate and Rhythm: Normal rate and regular rhythm. Pulmonary:      Effort: Pulmonary effort is normal.      Breath sounds: Normal breath sounds. No wheezing, rhonchi or rales. Abdominal:      General: Abdomen is flat. Bowel sounds are normal.      Palpations: Abdomen is soft. Neurological:      Mental Status: She is alert.    Psychiatric:         Mood and Affect: Mood normal.         Behavior: Behavior normal.           Labs:     CBC:  Results from last 7 days   Lab Units 07/03/23  0620 07/03/23  0445 07/02/23  0928 07/02/23  0506 07/01/23  0402 07/01/23  0401 06/30/23  2335   WBC Thousand/uL  --   --   --  4.98  --  5.96 6.62   HEMOGLOBIN g/dL 6.2* 6.3* 7.0* 6.9*  --  7.7* 8.8*   HEMATOCRIT % 19.3* 20.5* 21.3* 20.9*  --  24.1* 27.9*   PLATELETS Thousands/uL  --   --   --  167 167 195 200   NEUTROS ABS Thousands/µL  --   --   --   --   --   --  5.29       CMP:  Results from last 7 days   Lab Units 07/02/23  0506 07/01/23  0401 06/30/23  2335   POTASSIUM mmol/L 3.5 3.7 3.8   CHLORIDE mmol/L 114* 116* 113*   CO2 mmol/L 22 23 22   BUN mg/dL 32* 41* 45*   CREATININE mg/dL 0.80 0.91 1.12   CALCIUM mg/dL 8.1* 8.1* 8.7   AST U/L  --   --  17   ALT U/L  --   --  17   ALK PHOS U/L  --   --  49   EGFR ml/min/1.73sq m 72 61 48       Sepsis:        Micro:  Lab Results   Component Value Date/Time    Urine Culture >100,000 cfu/ml Gram Negative Reinier Enteric Like (A) 07/01/2023 01:57 PM         Imaging:     XR knee 1 or 2 vw left    Result Date: 7/1/2023  Impression: Osteoarthritis No acute osseous abnormality. Workstation performed: [id number]     XR shoulder 2+ views RIGHT    Result Date: 7/1/2023  Impression: No acute osseous abnormality. Workstation performed: [id number]     XR hip/pelv 2-3 vws left if performed    Result Date: 7/1/2023  Impression: No acute osseous abnormality. Workstation performed: [id number]     CT spine cervical without contrast    Result Date: 7/1/2023  Impression: No acute cervical spine fracture or traumatic malalignment. Findings are consistent with the preliminary report from [facility] which was provided shortly after completion of the exam. Workstation performed: [id number]     CT head without contrast    Result Date: 7/1/2023  Impression: No acute intracranial abnormality.  Findings are consistent with the preliminary report from [facility] which was provided shortly after completion of the exam. Workstation performed: [id number]         Medications:     Current Facility-Administered Medications   Medication Dose Route Frequency   • acetaminophen (TYLENOL) tablet 650 mg  650 mg Oral Q6H PRN   • calcium carbonate (TUMS) chewable tablet 1,000 mg  1,000 mg Oral Daily PRN   • cephalexin (KEFLEX) capsule 500 mg  500 mg Oral TID   • cyclobenzaprine (FLEXERIL) tablet 10 mg  10 mg Oral TID   • ergocalciferol (VITAMIN D2) capsule 50,000 Units  50,000 Units Oral Weekly   • escitalopram (LEXAPRO) tablet 10 mg  10 mg Oral Daily   • ferrous sulfate tablet 325 mg  325 mg Oral Daily With Breakfast   • gabapentin (NEURONTIN) capsule 300 mg  300 mg Oral TID   • lidocaine (LIDODERM) 5 % patch 2 patch  2 patch Topical Daily   • melatonin tablet 3 mg  3 mg Oral HS   • metoprolol succinate (TOPROL-XL) 24 hr tablet 50 mg  50 mg Oral Daily   • multivitamin-minerals (CENTRUM) tablet 1 tablet  1 tablet Oral Daily   • ondansetron (ZOFRAN) injection 4 mg  4 mg Intravenous Q6H PRN   • oxyCODONE (ROXICODONE) immediate release tablet 10 mg  10 mg Oral Q8H PRN   • polyethylene glycol (MIRALAX) packet 17 g  17 g Oral Daily   • senna (SENOKOT) tablet 8.6 mg  8.6 mg Oral HS                   Brooks Whitten MD  Family Medicine, PGY-1  8:41 AM 7/3/2023

## 2023-07-03 NOTE — QUICK NOTE
Received notification from radiology about significant results on CT AP. · Discussed findings with Dr. Severo Mckeon - there are areas of density noted in the scan but given her current use of iron supplements and recent Iron infusion, these likely represent iron fragments. There may be components of gastroenteritis/ileus. Right psoas appears thick but this could be related to patient's scoliosis vs. Small amount of hemorrhage. However, this would not be enough to explain decrease in Hgb. No evidence of hematoma in abdominal wall or abdominal cavity seen elsewhere  · Repeat Hgb after transfusion returned at 8.4    Plan:  - Will continue to monitor for any vital sign changes, symptoms concerning for GI bleed  - If anemia persists, can consider getting GI consult tomorrow   - Continue plan as outlined in today's progress note      Addendum:  Patient was seen and evaluated at bedside during evening rounds. Patient states that she feels better after receiving the transfusion. Was able to sit up in the chair for a while before transferring back to the bed. All questions and concerns addressed.

## 2023-07-03 NOTE — PHYSICAL THERAPY NOTE
Physical Therapy Progress Note     07/03/23 1500   PT Last Visit   PT Visit Date 07/03/23   Note Type   Note Type Treatment   Pain Assessment   Pain Assessment Tool 0-10   Pain Score 8   Pain Location/Orientation Orientation: Left; Location: Knee   Hospital Pain Intervention(s) Repositioned; Emotional support; Ambulation/increased activity   Restrictions/Precautions   Other Precautions Pain; Fall Risk   Subjective   Subjective The patient states that she feels the same after her blood transfusion. She notes continued pain, but she is agreeable to mobilize. Transfers   Sit to Stand 4  Minimal assistance   Additional items Assist x 1; Increased time required   Stand to Sit 4  Minimal assistance   Additional items Assist x 1; Increased time required   Ambulation/Elevation   Gait pattern Excessively slow; Step to;Short stride; Inconsistent yasmin;Decreased foot clearance   Gait Assistance 4  Minimal assist   Additional items Assist x 1;Verbal cues; Tactile cues   Assistive Device Rolling walker   Distance 4 feet. Balance   Static Sitting Fair -   Dynamic Sitting Poor +   Static Standing Poor +   Ambulatory Poor +   Activity Tolerance   Activity Tolerance Patient tolerated treatment well;Patient limited by fatigue;Patient limited by pain   Nurse Made Aware Yes. Exercises   TKR Sitting;10 reps;Bilateral;AROM   Assessment   Prognosis Good   Problem List Decreased strength;Decreased range of motion;Decreased endurance; Impaired balance;Decreased mobility; Impaired judgement;Decreased safety awareness;Pain   Assessment While the patient is demonstrating some improvement in balance, she continues to remain limited with notable decreased activity tolerance. She requires assistance to maintain both sitting and standing balance, and she fatigues easily with only a few steps. She lists to the left while sitting even when supported posteriorly in the chair. She notes continued pain, but that it is tolerable.  The patient completed therapeutic exercises with increased time especially with the LLE. She will benefit from continued therapy in order to maximize her functional mobility. Barriers to Discharge Inaccessible home environment;Decreased caregiver support   Goals   Patient Goals To feel better. STG Expiration Date 07/16/23   PT Treatment Day 1   Plan   Treatment/Interventions LE strengthening/ROM; Functional transfer training; Therapeutic exercise; Endurance training;Patient/family training;Gait training;Bed mobility;Elevations   Progress Slow progress, decreased activity tolerance   PT Frequency 2-3x/wk   Recommendation   PT Discharge Recommendation Post acute rehabilitation services   Equipment Recommended [facility] Recommended Wheeled walker   AM-[facility] Basic Mobility Inpatient   Turning in Flat Bed Without Bedrails 3   Lying on Back to Sitting on Edge of Flat Bed Without Bedrails 3   Moving Bed to Chair 3   Standing Up From Chair Using Arms 3   Walk in Room 2   Climb 3-5 Stairs With Railing 1   Basic Mobility Inpatient Raw Score 15   Basic Mobility Standardized Score 36.97   Highest Level Of Mobility   -HLM Goal 4: Move to chair/commode   JH-HLM Achieved 4: Move to chair/commode         An AM-PAC Basic Mobility raw score less than 16 suggests the patient may benefit from discharge to post-acute rehab services.     Damian Guerra, PTA

## 2023-07-03 NOTE — PLAN OF CARE
Problem: OCCUPATIONAL THERAPY ADULT  Goal: Performs self-care activities at highest level of function for planned discharge setting. See evaluation for individualized goals. Description: Treatment Interventions: ADL retraining, Functional transfer training, UE strengthening/ROM, Endurance training, Cognitive reorientation, Patient/family training, Equipment evaluation/education, Compensatory technique education, Energy conservation, Activityengagement          See flowsheet documentation for full assessment, interventions and recommendations. Note: Limitation: Decreased ADL status, Decreased cognition, Decreased endurance, Decreased self-care trans, Decreased high-level ADLs  Prognosis: Good  Assessment: Pt is a 78 y.o. female who was admitted to [address] [place] [address] on 6/30/2023 with Fall, (-) headstrike and LOC; per EMR, pt was down a significant amount of time. All imagining negative for acute changes. Pt also with anemia; s/p transfusion this AM. Pt  has a past medical history of Acute deep vein thrombosis of lower limb ([address]), Aortic aneurysm ([address]), Arthritis, Atrial fibrillation ([address]), Dislocation of hip ([address]), Hypertension, and Psychiatric disorder. At baseline pt was completing all ADLs IND, ambulating MOD IND with rollator. Pt lives alone in [facility] apartment. Currently pt requires MIN A for overall ADLS and for functional mobility/transfers. Intermittent lightheadedness during session. Pt currently presents with impairments in the following categories -limited home support, difficulty performing ADLS and difficulty performing IADLS  activity tolerance, endurance, standing balance/tolerance, sitting balance/tolerance, memory and attention .  These impairments, as well as pt's fatigue, pain, decreased caregiver support and risk for falls  limit pt's ability to safely engage in all baseline areas of occupation, includinggrooming, bathing, dressing, toileting, functional mobility/transfers, community mobility, meal prep, cleaning and leisure activities . From OT standpoint, recommend POST-ACUTE REHAB SERVICES upon D/C. OT will continue to follow to address the below stated goals.      OT Discharge Recommendation: Post acute rehabilitation services

## 2023-07-03 NOTE — OCCUPATIONAL THERAPY NOTE
Occupational Therapy Evaluation     Patient Name: Tasha David  VWCVE'D Date: 7/3/2023  Problem List  Principal Problem:    Fall  Active Problems:    Atrial fibrillation ([address])    Chronic pain    Hypertension    Depression with anxiety    Insomnia    Constipation    Chronic heart failure with preserved ejection fraction (HCC)    Stage 3a chronic kidney disease ([address])    UTI (urinary tract infection)    Anemia    Past Medical History  Past Medical History:   Diagnosis Date    Acute deep vein thrombosis of lower limb (HCC)     last assessed [id number]    Aortic aneurysm [facility])     Arthritis     Atrial fibrillation (HCC)     Dislocation of hip ([address])     last assessed [id number]    Hypertension     Psychiatric disorder     anxiety     Past Surgical History  Past Surgical History:   Procedure Laterality Date    HIP SURGERY      JOINT REPLACEMENT      KNEE ARTHROSCOPY Bilateral     x2 rt and 1 on left    TUBAL LIGATION             07/03/23 1421   OT Last Visit   OT Visit Date 07/03/23   Note Type   Note type Evaluation   Pain Assessment   Pain Assessment Tool 0-10   Pain Score 8   Pain Location/Orientation Orientation: Left; Location: Leg;Location: Back   Hospital Pain Intervention(s) Repositioned; Ambulation/increased activity   Restrictions/Precautions   Weight Bearing Precautions Per Order No   Other Precautions Cognitive;Multiple lines; Fall Risk;Pain;Bed Alarm; Chair Alarm   Home Living   Type of [address] [place] [address] One level   Bathroom Shower/Tub Walk-in shower   Bathroom Toilet Raised   Bathroom Equipment Grab bars in shower;Grab bars around toilet; Shower chair  (reports use of SC as needed)   Home Equipment Other (Comment)  (Rollator)   Prior Function   Level of [place] Independent with ADLs   Lives With Alone   Receives Help From Family; Neighbor   IADLs Independent with driving; Independent with meal prep; Independent with medication management   Falls in the last 6 months 5 to 10   Vocational Retired Lifestyle   Autonomy At baseline pt was completing all ADLs IND, ambulating MOD IND with rollator. Reciprocal Relationships supportive family   Service to Others retired   Intrinsic Gratification shuffle board, TV   ADL   [place] 7  Independent   Grooming Assistance 5  [address] [facility] 4  Minimal Assistance    [address] 3  Moderate Assistance   [address] [address]  [address] 2  Maximal [address]  2  Maximal Assistance   Bed Mobility   Supine to Sit 4  Minimal assistance   Additional items Assist x 1; Increased time required;Verbal cues   Transfers   Sit to Stand 4  Minimal assistance   Additional items Assist x 1; Increased time required;Verbal cues   Stand to Sit 4  Minimal assistance   Additional items Assist x 1; Increased time required;Verbal cues   Additional Comments RW   Functional Mobility   Functional Mobility 3  Moderate assistance   Additional Comments bed to bedside recliner   Additional items Rolling walker   Balance   Static Sitting Fair -   Dynamic Sitting Poor +   Static Standing Poor   Dynamic Standing Poor   Activity Tolerance   Activity Tolerance Patient limited by fatigue;Patient limited by pain   Medical Staff Made Aware PTA   Nurse Made Aware Yes   RUE Assessment   RUE Assessment WFL   LUE Assessment   LUE Assessment WFL   Hand Function   Gross Motor Coordination Functional   Fine Motor Coordination Functional   Cognition   Arousal/Participation Alert; Cooperative   Attention Attends with cues to redirect   Orientation Level Oriented X4  (grossly oriented to situation)   Memory Decreased recall of recent events   Following Commands Follows multistep commands with increased time or repetition   Assessment   Limitation Decreased ADL status; Decreased cognition;Decreased endurance;Decreased self-care trans;Decreased high-level ADLs   Prognosis Good   Assessment Pt is a 78 y.o. female who was admitted to [facility] on [facility]/30/2023 with Fall, (-) headstrike and LOC; per EMR, pt was down a significant amount of time. All imagining negative for acute changes. Pt also with anemia; s/p transfusion this AM. Pt  has a past medical history of Acute deep vein thrombosis of lower limb ([address]), Aortic aneurysm ([address]), Arthritis, Atrial fibrillation ([address]), Dislocation of hip ([address]), Hypertension, and Psychiatric disorder. At baseline pt was completing all ADLs IND, ambulating MOD IND with rollator. Pt lives alone in [address] [place] [address]. Currently pt requires MIN A for overall ADLS and for functional mobility/transfers. Intermittent lightheadedness during session. Pt currently presents with impairments in the following categories -limited home support, difficulty performing ADLS and difficulty performing IADLS  activity tolerance, endurance, standing balance/tolerance, sitting balance/tolerance, memory and attention . These impairments, as well as pt's fatigue, pain, decreased caregiver support and risk for falls  limit pt's ability to safely engage in all baseline areas of occupation, includinggrooming, bathing, dressing, toileting, functional mobility/transfers, community mobility, meal prep, cleaning and leisure activities . From OT standpoint, recommend POST-ACUTE REHAB SERVICES upon D/C. OT will continue to follow to address the below stated goals. Goals   Patient Goals agreeable to sit up in chair   LT Time Frame 10-14   Long Term Goal #1 see goals below   Plan   Treatment Interventions ADL retraining;Functional transfer training;UE strengthening/ROM; Endurance training;Cognitive reorientation;Patient/family training;Equipment evaluation/education; Compensatory technique education; Energy conservation; Activityengagement   Goal Expiration Date 07/17/23   OT Frequency 2-3x/wk   Recommendation   OT Discharge Recommendation Post acute rehabilitation services   AM-PAC Daily Activity Inpatient   Lower Body Dressing 2   Bathing 2   Toileting 2   Upper Body Dressing 3   Grooming 3   Eating 4   Daily Activity Raw Score 16   Daily Activity Standardized Score (Calc for Raw Score >=11) 35.96   AM-PAC Applied Cognition Inpatient   Following a Speech/Presentation 3   Understanding Ordinary Conversation 4   Taking Medications 3   Remembering Where Things Are Placed or Put Away 3   Remembering List of 4-5 Errands 3   Taking Care of Complicated Tasks 2   Applied Cognition Raw Score 18   Applied Cognition Standardized Score 38.07         The patient's raw score on the AM-PAC Daily Activity Inpatient Short Form is 16. A raw score of less than 19 suggests the patient may benefit from discharge to post-acute rehabilitation services. Please refer to the recommendation of the Occupational Therapist for safe discharge planning. GOALS     Pt will improve activity tolerance to G for min 30 min txment sessions     Pt will complete UB ADLs with MOD IND and use of adaptive device and DME as needed     Pt will complete LB ADLs with MOD IND w/ use of AE and DME as needed     Pt will complete toileting w/ MOD IND w/ G hygiene/thoroughness using DME as needed     Pt will improve functional transfers to Mod I on/off all surfaces using DME as needed w/ G balance/safety      Pt will improve functional mobility during ADL/IADL/leisure tasks to Mod I using DME as needed w/ G balance/safety      Pt will demonstrate G carryover of pt/caregiver education and training as appropriate w/ mod I w/o cues w/ good tolerance     Pt to participate in ongoing functional cognitive assessment with Good attention/participation to assist with safe D/C planning.         Vale Gomes, OTR/L

## 2023-07-04 LAB
ANION GAP SERPL CALCULATED.3IONS-SCNC: 1 MMOL/L
BUN SERPL-MCNC: 21 MG/DL (ref 5–25)
CALCIUM SERPL-MCNC: 8.3 MG/DL (ref 8.3–10.1)
CHLORIDE SERPL-SCNC: 117 MMOL/L (ref 96–108)
CO2 SERPL-SCNC: 25 MMOL/L (ref 21–32)
CREAT SERPL-MCNC: 0.81 MG/DL (ref 0.6–1.3)
ERYTHROCYTE [DISTWIDTH] IN BLOOD BY AUTOMATED COUNT: 18.1 % (ref 11.6–15.1)
GFR SERPL CREATININE-BSD FRML MDRD: 71 ML/MIN/1.73SQ M
GLUCOSE SERPL-MCNC: 92 MG/DL (ref 65–140)
HCT VFR BLD AUTO: 22.6 % (ref 34.8–46.1)
HGB BLD-MCNC: 7.3 G/DL (ref 11.5–15.4)
MCH RBC QN AUTO: 30.9 PG (ref 26.8–34.3)
MCHC RBC AUTO-ENTMCNC: 32.3 G/DL (ref 31.4–37.4)
MCV RBC AUTO: 96 FL (ref 82–98)
PLATELET # BLD AUTO: 169 THOUSANDS/UL (ref 149–390)
PMV BLD AUTO: 12.2 FL (ref 8.9–12.7)
POTASSIUM SERPL-SCNC: 3.6 MMOL/L (ref 3.5–5.3)
RBC # BLD AUTO: 2.36 MILLION/UL (ref 3.81–5.12)
SODIUM SERPL-SCNC: 143 MMOL/L (ref 135–147)
WBC # BLD AUTO: 6.61 THOUSAND/UL (ref 4.31–10.16)

## 2023-07-04 PROCEDURE — 80048 BASIC METABOLIC PNL TOTAL CA: CPT

## 2023-07-04 PROCEDURE — 99232 SBSQ HOSP IP/OBS MODERATE 35: CPT | Performed by: FAMILY MEDICINE

## 2023-07-04 PROCEDURE — 85027 COMPLETE CBC AUTOMATED: CPT

## 2023-07-04 RX ORDER — POLYETHYLENE GLYCOL 3350 17 G/17G
17 POWDER, FOR SOLUTION ORAL 2 TIMES DAILY
Status: DISCONTINUED | OUTPATIENT
Start: 2023-07-04 | End: 2023-07-06 | Stop reason: HOSPADM

## 2023-07-04 RX ORDER — POLYETHYLENE GLYCOL 3350 17 G/17G
17 POWDER, FOR SOLUTION ORAL ONCE
Status: DISCONTINUED | OUTPATIENT
Start: 2023-07-04 | End: 2023-07-04

## 2023-07-04 RX ORDER — BISACODYL 5 MG/1
5 TABLET, DELAYED RELEASE ORAL DAILY
Status: DISCONTINUED | OUTPATIENT
Start: 2023-07-04 | End: 2023-07-06 | Stop reason: HOSPADM

## 2023-07-04 RX ADMIN — CYCLOBENZAPRINE 10 MG: 10 TABLET, FILM COATED ORAL at 16:48

## 2023-07-04 RX ADMIN — GABAPENTIN 300 MG: 300 CAPSULE ORAL at 16:48

## 2023-07-04 RX ADMIN — CEPHALEXIN 500 MG: 500 CAPSULE ORAL at 16:48

## 2023-07-04 RX ADMIN — METOPROLOL SUCCINATE 50 MG: 50 TABLET, EXTENDED RELEASE ORAL at 08:34

## 2023-07-04 RX ADMIN — LIDOCAINE 5% 2 PATCH: 700 PATCH TOPICAL at 08:33

## 2023-07-04 RX ADMIN — BISACODYL 5 MG: 5 TABLET, COATED ORAL at 12:18

## 2023-07-04 RX ADMIN — OXYCODONE HYDROCHLORIDE 10 MG: 10 TABLET ORAL at 08:34

## 2023-07-04 RX ADMIN — Medication 1 TABLET: at 08:31

## 2023-07-04 RX ADMIN — MELATONIN TAB 3 MG 3 MG: 3 TAB at 21:46

## 2023-07-04 RX ADMIN — ACETAMINOPHEN 650 MG: 325 TABLET, FILM COATED ORAL at 21:46

## 2023-07-04 RX ADMIN — OXYCODONE HYDROCHLORIDE 10 MG: 10 TABLET ORAL at 16:51

## 2023-07-04 RX ADMIN — GABAPENTIN 300 MG: 300 CAPSULE ORAL at 08:32

## 2023-07-04 RX ADMIN — GABAPENTIN 300 MG: 300 CAPSULE ORAL at 21:46

## 2023-07-04 RX ADMIN — ESCITALOPRAM OXALATE 10 MG: 10 TABLET ORAL at 08:32

## 2023-07-04 RX ADMIN — POLYETHYLENE GLYCOL 3350 17 G: 17 POWDER, FOR SOLUTION ORAL at 08:33

## 2023-07-04 RX ADMIN — CEPHALEXIN 500 MG: 500 CAPSULE ORAL at 21:46

## 2023-07-04 RX ADMIN — CYCLOBENZAPRINE 10 MG: 10 TABLET, FILM COATED ORAL at 21:46

## 2023-07-04 RX ADMIN — FERROUS SULFATE TAB 325 MG (65 MG ELEMENTAL FE) 325 MG: 325 (65 FE) TAB at 08:31

## 2023-07-04 RX ADMIN — CYCLOBENZAPRINE 10 MG: 10 TABLET, FILM COATED ORAL at 08:32

## 2023-07-04 RX ADMIN — CEPHALEXIN 500 MG: 500 CAPSULE ORAL at 08:31

## 2023-07-04 NOTE — PLAN OF CARE
Problem: MOBILITY - ADULT  Goal: Maintain or return to baseline ADL function  Description: INTERVENTIONS:  -  Assess patient's ability to carry out ADLs; assess patient's baseline for ADL function and identify physical deficits which impact ability to perform ADLs (bathing, care of mouth/teeth, toileting, grooming, dressing, etc.)  - Assess/evaluate cause of self-care deficits   - Assess range of motion  - Assess patient's mobility; develop plan if impaired  - Assess patient's need for assistive devices and provide as appropriate  - Encourage maximum independence but intervene and supervise when necessary  - Involve family in performance of ADLs  - Assess for home care needs following discharge   - Consider OT consult to assist with ADL evaluation and planning for discharge  - Provide patient education as appropriate  Outcome: Progressing     Problem: INFECTION - ADULT  Goal: Absence of fever/infection during neutropenic period  Description: INTERVENTIONS:  - Monitor WBC    Outcome: Progressing

## 2023-07-04 NOTE — ASSESSMENT & PLAN NOTE
78 F with of presents to ED after a mechanical fall. No headstrike or LOC. On eliquis. Pt was down for significant amount of time, difficult to quantify because her room remained dark for hours. Pt fell due to her legs giving out, she believes it is related to her Left knee OA. She fell onto her left hip and knee and also bruised her right shoulder. No signs of acute bleeding.   - Pt reports her medications make her dizzy. · VSS  · Hgb 8.8 on admission downtrending, 6.2 this am   · CK wnl, TSH wnl  · 6/30 CTH, CT spine, Left pelvis/hip, left knee, and right shoulder negative for acute pathology or bleed based, however, pending final results    Plan  - monitor for acute bleed  - PT/OT following, recommend short term rehab, pt declines and would prefer to go home with home PT.  Pt will need PT/OT reevaluation today  - Medication reconciliation to decrease dizziness 2/2 medications:  d/c ambien, decrease flexeril to 10 mg qHs PRN, and decrease Oxy 10 mg q8h PRN

## 2023-07-04 NOTE — PLAN OF CARE
Problem: PAIN - ADULT  Goal: Verbalizes/displays adequate comfort level or baseline comfort level  Description: Interventions:  - Encourage patient to monitor pain and request assistance  - Assess pain using appropriate pain scale  - Administer analgesics based on type and severity of pain and evaluate response  - Implement non-pharmacological measures as appropriate and evaluate response  - Consider cultural and social influences on pain and pain management  - Notify physician/advanced practitioner if interventions unsuccessful or patient reports new pain  Outcome: Progressing     Problem: SAFETY ADULT  Goal: Patient will remain free of falls  Description: INTERVENTIONS:  - Educate patient/family on patient safety including physical limitations  - Instruct patient to call for assistance with activity   - Consult OT/PT to assist with strengthening/mobility   - Keep Call bell within reach  - Keep bed low and locked with side rails adjusted as appropriate  - Keep care items and personal belongings within reach  - Initiate and maintain comfort rounds  - Make Fall Risk Sign visible to staff  - Offer Toileting every  Hours, in advance of need  - Initiate/Maintain alarm  - Obtain necessary fall risk management equipment:   - Apply yellow socks and bracelet for high fall risk patients  - Consider moving patient to room near nurses station  Outcome: Progressing     Problem: Prexisting or High Potential for Compromised Skin Integrity  Goal: Skin integrity is maintained or improved  Description: INTERVENTIONS:  - Identify patients at risk for skin breakdown  - Assess and monitor skin integrity  - Assess and monitor nutrition and hydration status  - Monitor labs   - Assess for incontinence   - Turn and reposition patient  - Assist with mobility/ambulation  - Relieve pressure over bony prominences  - Avoid friction and shearing  - Provide appropriate hygiene as needed including keeping skin clean and dry  - Evaluate need for skin moisturizer/barrier cream  - Collaborate with interdisciplinary team   - Patient/family teaching  - Consider wound care consult   Outcome: Progressing

## 2023-07-04 NOTE — ASSESSMENT & PLAN NOTE
UA 7/1: positive for nitrites and bacteria  Ucx 7/1: >100,000cfu E.coli, pan susceptible   No dysuria, no polyuria.   Stable vital signs    Plan:  -cephalexin 500 mg 3 times daily; plan for 5 days total course (today is day 5 of 5)

## 2023-07-04 NOTE — ASSESSMENT & PLAN NOTE
Blood pressure well controlled  -most recent Blood Pressure: 108/76    Plan  - Continue home Toprol 50 mg daily and Lasix 40 mg PRN for leg edema

## 2023-07-04 NOTE — PROGRESS NOTES
[address] [place] [address]  Progress Note  Name: Aisha Nation  MRN: [id number]  Unit/Bed#: -01 I Date of Admission: 6/30/2023   Date of Service: 7/4/2023 I Hospital Day: 3    Assessment/Plan   UTI (urinary tract infection)  Assessment & Plan  UA 7/1: positive for nitrites and bacteria  Ucx 7/1: >100,000cfu E.coli, pan susceptible   No dysuria, no polyuria. Stable vital signs    Plan:  -cephalexin 500 mg 3 times daily; plan for 5 days total course (today is day 4 of 5)        * Fall  Assessment & Plan  78 F with of presents to ED after a mechanical fall. No headstrike or LOC. On eliquis. Pt was down for significant amount of time, difficult to quantify because her room remained dark for hours. Pt fell due to her legs giving out, she believes it is related to her Left knee OA. She fell onto her left hip and knee and also bruised her right shoulder. No signs of acute bleeding.   - Pt reports her medications make her dizzy.    · VSS  · Hgb 8.8 on admission downtrending, 6.2 this am   · CK wnl, TSH wnl  · 6/30 CTH, CT spine, Left pelvis/hip, left knee, and right shoulder negative for acute pathology or bleed based, however, pending final results    Plan  - monitor for acute bleed  - PT/OT following, recommend short term rehab  - Medication reconciliation to decrease dizziness 2/2 medications:  d/c ambien, decrease flexeril to 10 mg qHs PRN, and decrease Oxy 10 mg q8h PRN    Anemia  Assessment & Plan  -Hgb: 6.3>6.2 >8.4>7.3  -Iron: 47  -s/p 1u PRBC on 7/3, 200 mg IV iron given (7/2)  -Pt endorses poor PO intake  -7/3 CT Ab/Pelv to assess for active source of bleeding: small amount of hemorrhage possibly present in gastric lumen, R psoas    Plan  -Holding Eliquis  -Hemoccult order  -Started patient on 325 mg ferrous sulfate daily   -Transfuse hgb > 7      Chronic pain  Assessment & Plan  Patient has chronic pain related to L knee OA and spondylolithesis  Home meds: Flexeril 10 TID PRN, Gabapentin 900 mg TID, oxycodone 10 mg q6h PRN  PDMP reviewed, pt appears to use Oxycodone 4 times a day (pt fills 120 tabs per month)  Last CSI of left knee, 11/23/22   Patient still taking Oxy 10 every 8 hours pain still 8-10 out of 10  I have a significant discussion with patient as well overuse history of opioid can cause amatory dysfunction lead to falling and more complication  Plan  - Decrease flexeril 10 qHs PRN and decrease oxycodone 10 mg q8h PRN    Stage 3a chronic kidney disease [facility])  Assessment & Plan  Lab Results   Component Value Date    EGFR 71 07/04/2023    EGFR 72 07/02/2023    EGFR 61 07/01/2023    CREATININE 0.81 07/04/2023    CREATININE 0.80 07/02/2023    CREATININE 0.91 07/01/2023     Plan  - avoid nephrotoxic agents, renal dose medications    Chronic heart failure with preserved ejection fraction [facility])  Assessment & Plan  Wt Readings from Last 3 Encounters:   04/27/23 77.2 kg (170 lb 3.2 oz)   03/27/23 80 kg (176 lb 6.4 oz)   03/20/23 78 kg (172 lb)     Home meds: Toprol 50 mg daily and lasix 40 mg as needed  Last Cardiology visit: 9/19/2022    Assessment  Not in acute heart failure. No fluid overload. Plan  - c/w Toprol 50 mg daily    Constipation  Assessment & Plan  Plan  - Continue home senna qhs.   - Add miralax daily PRN    Insomnia  Assessment & Plan  Pt takes ambien 10 mg at bedtime. She reports dizziness with her current medications. Plan  - D/c ambien; can likely benefit from consultant to pharmacist for polypharmacy  - C/w Flexeril 10 mg qHs  - Try melatonin PRN  - delirium precautions.      Depression with anxiety  Assessment & Plan  Plan  - Continue with home lexapro 10 mg daily    Hypertension  Assessment & Plan  Blood pressure well controlled  -most recent Blood Pressure: 108/78    Plan  - Continue home Toprol 50 mg daily and Lasix 40 mg PRN for leg edema    Atrial fibrillation (HCC)  Assessment & Plan  On rate control with Toprol 50 mg daily and AC with Eliquis 5 mg BID    Assessment  Pt is in RRR. She has no obvious signs of bleeding and scans are negative s/p fall. Plan  - Continue home meds Toprol 50 mg daily  -We will hold Eliquis for now and concerning of downtrending hemoglobin unknown GI bleed could be a cause           Subjective:   No acute events overnight. Pt seen and examined at bedside this AM. Complains of chronic pain in back and states she wants to go home. Objective:     Vitals: Blood pressure 108/78, pulse 91, temperature 98.2 °F (36.8 °C), resp. rate 16, height 5' 4" (1.626 m), SpO2 90 %. ,Body mass index is 29.21 kg/m². Intake/Output Summary (Last 24 hours) at 7/4/2023 0827  Last data filed at 7/3/2023 1700  Gross per 24 hour   Intake 610.42 ml   Output --   Net 610.42 ml       Physical Exam:   Physical Exam  Constitutional:       General: She is not in acute distress. Appearance: Normal appearance. She is not ill-appearing or toxic-appearing. HENT:      Head: Normocephalic and atraumatic. Right Ear: External ear normal.      Left Ear: External ear normal.      Mouth/Throat:      Mouth: Mucous membranes are moist.      Pharynx: Oropharynx is clear. Eyes:      General: No scleral icterus. Conjunctiva/sclera: Conjunctivae normal.   Cardiovascular:      Rate and Rhythm: Normal rate and regular rhythm. Heart sounds: Normal heart sounds. No murmur heard. Pulmonary:      Effort: Pulmonary effort is normal. No respiratory distress. Breath sounds: Normal breath sounds. No wheezing, rhonchi or rales. Abdominal:      General: Bowel sounds are normal.      Palpations: Abdomen is soft. Tenderness: There is no abdominal tenderness. There is no guarding. Musculoskeletal:      Right lower leg: No edema. Left lower leg: No edema. Skin:     General: Skin is warm and dry. Coloration: Skin is not jaundiced. Findings: No rash. Neurological:      General: No focal deficit present.       Mental Status: She is alert and oriented to person, place, and time. Psychiatric:         Mood and Affect: Mood normal.         Behavior: Behavior normal.         Invasive Devices     Peripheral Intravenous Line  Duration           Peripheral IV 07/01/23 Proximal;Right;Ventral (anterior) Forearm 3 days          Drain  Duration           External Urinary Catheter 2 days                           Lab and other studies:  I have personally reviewed pertinent reports.      Admission on 06/30/2023   Component Date Value   • WBC 06/30/2023 6.62    • RBC 06/30/2023 3.02 (L)    • Hemoglobin 06/30/2023 8.8 (L)    • Hematocrit 06/30/2023 27.9 (L)    • MCV 06/30/2023 92    • MCH 06/30/2023 29.1    • MCHC 06/30/2023 31.5    • RDW 06/30/2023 17.2 (H)    • MPV 06/30/2023 11.3    • Platelets 70/70/9338 200    • nRBC 06/30/2023 0    • Neutrophils Relative 06/30/2023 79 (H)    • Immat GRANS % 06/30/2023 1    • Lymphocytes Relative 06/30/2023 13 (L)    • Monocytes Relative 06/30/2023 5    • Eosinophils Relative 06/30/2023 1    • Basophils Relative 06/30/2023 1    • Neutrophils Absolute 06/30/2023 5.29    • Immature Grans Absolute 06/30/2023 0.03    • Lymphocytes Absolute 06/30/2023 0.86    • Monocytes Absolute 06/30/2023 0.36    • Eosinophils Absolute 06/30/2023 0.04    • Basophils Absolute 06/30/2023 0.04    • Sodium 06/30/2023 143    • Potassium 06/30/2023 3.8    • Chloride 06/30/2023 113 (H)    • CO2 06/30/2023 22    • ANION GAP 06/30/2023 8    • BUN 06/30/2023 45 (H)    • Creatinine 06/30/2023 1.12    • Glucose 06/30/2023 107    • Calcium 06/30/2023 8.7    • Corrected Calcium 06/30/2023 9.5    • AST 06/30/2023 17    • ALT 06/30/2023 17    • Alkaline Phosphatase 06/30/2023 49    • Total Protein 06/30/2023 5.9 (L)    • Albumin 06/30/2023 3.0 (L)    • Total Bilirubin 06/30/2023 0.93    • eGFR 06/30/2023 48    • Total CK 06/30/2023 68    • TSH 3RD GENERATON 06/30/2023 2.082    • Ventricular Rate 06/30/2023 103    • Atrial Rate 06/30/2023 103    • DE Interval 06/30/2023 160    • QRSD Interval 06/30/2023 80    • QT Interval 06/30/2023 358    • QTC Interval 06/30/2023 468    • P Axis 06/30/2023 62    • QRS Axis 06/30/2023 46    • T Wave Axis 06/30/2023 6    • Platelets 96/24/1000 167    • MPV 07/01/2023 11.6    • Sodium 07/01/2023 145    • Potassium 07/01/2023 3.7    • Chloride 07/01/2023 116 (H)    • CO2 07/01/2023 23    • ANION GAP 07/01/2023 6    • BUN 07/01/2023 41 (H)    • Creatinine 07/01/2023 0.91    • Glucose 07/01/2023 104    • Calcium 07/01/2023 8.1 (L)    • eGFR 07/01/2023 61    • WBC 07/01/2023 5.96    • RBC 07/01/2023 2.62 (L)    • Hemoglobin 07/01/2023 7.7 (L)    • Hematocrit 07/01/2023 24.1 (L)    • MCV 07/01/2023 92    • MCH 07/01/2023 29.4    • MCHC 07/01/2023 32.0    • RDW 07/01/2023 17.2 (H)    • Platelets 02/86/9915 195    • MPV 07/01/2023 11.9    • Color, UA 07/01/2023 Yellow    • Clarity, UA 07/01/2023 Clear    • pH, UA 07/01/2023 5.5    • Leukocytes, UA 07/01/2023 Negative    • Nitrite, UA 07/01/2023 Positive (A)    • Protein, UA 07/01/2023 Negative    • Glucose, UA 07/01/2023 Negative    • Ketones, UA 07/01/2023 15 (1+) (A)    • Urobilinogen, UA 07/01/2023 1.0    • Bilirubin, UA 07/01/2023 Negative    • Occult Blood, UA 07/01/2023 Negative    • Specific Gravity, UA 07/01/2023 1.020    • RBC, UA 07/01/2023 1-2    • WBC, UA 07/01/2023 None Seen    • Epithelial Cells 07/01/2023 Occasional    • Bacteria, UA 07/01/2023 Innumerable (A)    • Hyaline Casts, UA 07/01/2023 0-3 (A)    • Iron Saturation 07/01/2023 21    • TIBC 07/01/2023 228 (L)    • Iron 07/01/2023 47 (L)    • Ferritin 07/01/2023 15    • Urine Culture 07/01/2023 >100,000 cfu/ml Escherichia coli (A)    • WBC 07/02/2023 4.98    • RBC 07/02/2023 2.31 (L)    • Hemoglobin 07/02/2023 6.9 (LL)    • Hematocrit 07/02/2023 20.9 (L)    • MCV 07/02/2023 94    • MCH 07/02/2023 29.0    • MCHC 07/02/2023 31.0 (L)    • RDW 07/02/2023 17.5 (H)    • Platelets 22/96/2353 167    • MPV 07/02/2023 12.2    • Sodium 07/02/2023 139    • Potassium 07/02/2023 3.5    • Chloride 07/02/2023 114 (H)    • CO2 07/02/2023 22    • ANION GAP 07/02/2023 3    • BUN 07/02/2023 32 (H)    • Creatinine 07/02/2023 0.80    • Glucose 07/02/2023 89    • Calcium 07/02/2023 8.1 (L)    • eGFR 07/02/2023 72    • Hemoglobin 07/02/2023 7.0 (L)    • Hematocrit 07/02/2023 21.3 (L)    • Hemoglobin 07/03/2023 6.3 (LL)    • Hematocrit 07/03/2023 20.5 (L)    • Unit Product Code 07/04/2023 D8320I55    • Unit Number 07/04/2023 [id number]    • Unit ABO 07/04/2023 O    • Unit [facility] 07/04/2023 POS    • Crossmatch 07/04/2023 Compatible    • Unit Dispense Status 07/04/2023 Issued    • Unit Product Volume 07/04/2023 350    • ABO Grouping 07/03/2023 O    • Rh Factor 07/03/2023 Positive    • Antibody Screen 07/03/2023 Negative    • Specimen Expiration Date 07/03/2023 [id number]    • Hemoglobin 07/03/2023 6.2 (LL)    • Hematocrit 07/03/2023 19.3 (L)    • Protime 07/03/2023 15.9 (H)    • INR 07/03/2023 1.25 (H)    • PTT 07/03/2023 27    • Hemoglobin 07/03/2023 8.4 (L)    • Hematocrit 07/03/2023 26.5 (L)    • WBC 07/04/2023 6.61    • RBC 07/04/2023 2.36 (L)    • Hemoglobin 07/04/2023 7.3 (L)    • Hematocrit 07/04/2023 22.6 (L)    • MCV 07/04/2023 96    • MCH 07/04/2023 30.9    • MCHC 07/04/2023 32.3    • RDW 07/04/2023 18.1 (H)    • Platelets 88/86/8156 169    • MPV 07/04/2023 12.2    • Sodium 07/04/2023 143    • Potassium 07/04/2023 3.6    • Chloride 07/04/2023 117 (H)    • CO2 07/04/2023 25    • ANION GAP 07/04/2023 1    • BUN 07/04/2023 21    • Creatinine 07/04/2023 0.81    • Glucose 07/04/2023 92    • Calcium 07/04/2023 8.3    • eGFR 07/04/2023 71        Recent Results (from the past 24 hour(s))   Protime-INR    Collection Time: 07/03/23  3:43 PM   Result Value Ref Range    Protime 15.9 (H) 11.6 - 14.5 seconds    INR 1.25 (H) 0.84 - 1.19   APTT    Collection Time: 07/03/23  3:43 PM   Result Value Ref Range    PTT 27 23 - 37 seconds   Hemoglobin and hematocrit, blood Collection Time: 07/03/23  4:22 PM   Result Value Ref Range    Hemoglobin 8.4 (L) 11.5 - 15.4 g/dL    Hematocrit 26.5 (L) 34.8 - 46.1 %   CBC and Platelet    Collection Time: 07/04/23  5:20 AM   Result Value Ref Range    WBC 6.61 4.31 - 10.16 Thousand/uL    RBC 2.36 (L) 3.81 - 5.12 Million/uL    Hemoglobin 7.3 (L) 11.5 - 15.4 g/dL    Hematocrit 22.6 (L) 34.8 - 46.1 %    MCV 96 82 - 98 fL    MCH 30.9 26.8 - 34.3 pg    MCHC 32.3 31.4 - 37.4 g/dL    RDW 18.1 (H) 11.6 - 15.1 %    Platelets 944 440 - 430 Thousands/uL    MPV 12.2 8.9 - 12.7 fL   Basic metabolic panel    Collection Time: 07/04/23  5:20 AM   Result Value Ref Range    Sodium 143 135 - 147 mmol/L    Potassium 3.6 3.5 - 5.3 mmol/L    Chloride 117 (H) 96 - 108 mmol/L    CO2 25 21 - 32 mmol/L    ANION GAP 1 mmol/L    BUN 21 5 - 25 mg/dL    Creatinine 0.81 0.60 - 1.30 mg/dL    Glucose 92 65 - 140 mg/dL    Calcium 8.3 8.3 - 10.1 mg/dL    eGFR 71 ml/min/1.73sq m   Prepare Leukoreduced RBC: 1 Units, Leukoreduced    Collection Time: 07/04/23  7:03 AM   Result Value Ref Range    Unit Product Code H5430Z35     Unit Number [id number]     Unit ABO O     Unit RH POS     Crossmatch Compatible     Unit Dispense Status Issued     Unit Product Volume 350 mL     Blood Culture: No results found for: "BLOODCX",   Urinalysis:   Lab Results   Component Value Date    COLORU Yellow 07/01/2023    CLARITYU Clear 07/01/2023    SPECGRAV 1.020 07/01/2023    PHUR 5.5 07/01/2023    LEUKOCYTESUR Negative 07/01/2023    NITRITE Positive (A) 07/01/2023    GLUCOSEU Negative 07/01/2023    KETONESU 15 (1+) (A) 07/01/2023    BILIRUBINUR Negative 07/01/2023    BLOODU Negative 07/01/2023   ,   Urine Culture:   Lab Results   Component Value Date    URINECX >100,000 cfu/ml Escherichia coli (A) 07/01/2023   ,   Wound Culure: No results found for: "WOUNDCULT"      Imaging:    CT abdomen pelvis wo contrast   Final Result by Davina Contreras MD (07/03 1620)      New left lower lobe consolidation/effusion. Some fluid-filled loops of bowel are identified with gradual tapering and no abrupt zone of transition and may be related to ileus or gastroenteritis. High density within the gastric lumen could be related to pill fragments although small amount of hemorrhage could be present if there is any evidence of intraluminal GI bleeding. Mild asymmetry of the right psoas could be related to scoliosis although small amount of hemorrhage could be present. This is difficult to assess without prior study. No evidence of hematoma in the abdominal wall or abdominal cavity is seen elsewhere. Thickening of the appendix measuring up to 7 mm distally but without surrounding inflammatory change. Correlation with any right lower quadrant tenderness is advised and follow-up may be useful if symptoms persist.      This was discussed with Dr. Duane Jabs at 4:20 p.m. Workstation performed: [id number]         XR shoulder 2+ views RIGHT   Final Result by Ayad Simmons MD (07/01 1105)      No acute osseous abnormality. Workstation performed: [id number]         XR hip/pelv 2-3 vws left if performed   Final Result by Ayad Simmons MD (07/01 1102)      No acute osseous abnormality. Workstation performed: [id number]         XR knee 1 or 2 vw left   Final Result by Ayad Simmons MD (07/01 1106)      Osteoarthritis      No acute osseous abnormality. Workstation performed: [id number]         CT head without contrast   Final Result by Bushra Moffett MD (07/01 1086)      No acute intracranial abnormality. Findings are consistent with the preliminary report from [facility] which was provided shortly after completion of the exam.                     Workstation performed: [id number]         CT spine cervical without contrast   Final Result by Bushra Moffett MD (07/01 1841)      No acute cervical spine fracture or traumatic malalignment.       Findings are consistent with the preliminary report from [facility] which was provided shortly after completion of the exam.               Workstation performed: [id number]                VTE Pharmacologic Prophylaxis: Reason for no pharmacologic prophylaxis held in setting of anemia  VTE Mechanical Prophylaxis: sequential compression device and foot pump applied    Current Facility-Administered Medications   Medication Dose Route Frequency   • acetaminophen (TYLENOL) tablet 650 mg  650 mg Oral Q6H PRN   • calcium carbonate (TUMS) chewable tablet 1,000 mg  1,000 mg Oral Daily PRN   • cephalexin (KEFLEX) capsule 500 mg  500 mg Oral TID   • cyclobenzaprine (FLEXERIL) tablet 10 mg  10 mg Oral TID   • ergocalciferol (VITAMIN D2) capsule 50,000 Units  50,000 Units Oral Weekly   • escitalopram (LEXAPRO) tablet 10 mg  10 mg Oral Daily   • ferrous sulfate tablet 325 mg  325 mg Oral Daily With Breakfast   • gabapentin (NEURONTIN) capsule 300 mg  300 mg Oral TID   • lidocaine (LIDODERM) 5 % patch 2 patch  2 patch Topical Daily   • melatonin tablet 3 mg  3 mg Oral HS   • metoprolol succinate (TOPROL-XL) 24 hr tablet 50 mg  50 mg Oral Daily   • multivitamin-minerals (CENTRUM) tablet 1 tablet  1 tablet Oral Daily   • ondansetron (ZOFRAN) injection 4 mg  4 mg Intravenous Q6H PRN   • oxyCODONE (ROXICODONE) immediate release tablet 10 mg  10 mg Oral Q8H PRN   • polyethylene glycol (MIRALAX) packet 17 g  17 g Oral Daily   • senna (SENOKOT) tablet 8.6 mg  8.6 mg Oral HS         PPX:   Diet: regular house  Code Status: level 1 full code  Dispo: d/c back to [facility] acute rehab    Plan D/W  and [facility] Team      Betina Howard DO  Family Medicine Resident PGY3

## 2023-07-05 ENCOUNTER — HOME HEALTH ADMISSION (OUTPATIENT)
Dept: HOME HEALTH SERVICES | Facility: HOME HEALTHCARE | Age: 78
End: 2023-07-05
Payer: COMMERCIAL

## 2023-07-05 LAB
ABO GROUP BLD BPU: NORMAL
ANION GAP SERPL CALCULATED.3IONS-SCNC: 0 MMOL/L
BPU ID: NORMAL
BUN SERPL-MCNC: 16 MG/DL (ref 5–25)
CALCIUM SERPL-MCNC: 8.5 MG/DL (ref 8.3–10.1)
CHLORIDE SERPL-SCNC: 116 MMOL/L (ref 96–108)
CO2 SERPL-SCNC: 27 MMOL/L (ref 21–32)
CREAT SERPL-MCNC: 0.92 MG/DL (ref 0.6–1.3)
CROSSMATCH: NORMAL
ERYTHROCYTE [DISTWIDTH] IN BLOOD BY AUTOMATED COUNT: 18.9 % (ref 11.6–15.1)
GFR SERPL CREATININE-BSD FRML MDRD: 61 ML/MIN/1.73SQ M
GLUCOSE SERPL-MCNC: 94 MG/DL (ref 65–140)
HCT VFR BLD AUTO: 24.3 % (ref 34.8–46.1)
HGB BLD-MCNC: 7.6 G/DL (ref 11.5–15.4)
MCH RBC QN AUTO: 30.5 PG (ref 26.8–34.3)
MCHC RBC AUTO-ENTMCNC: 31.3 G/DL (ref 31.4–37.4)
MCV RBC AUTO: 98 FL (ref 82–98)
PLATELET # BLD AUTO: 194 THOUSANDS/UL (ref 149–390)
PMV BLD AUTO: 11.8 FL (ref 8.9–12.7)
POTASSIUM SERPL-SCNC: 3.5 MMOL/L (ref 3.5–5.3)
RBC # BLD AUTO: 2.49 MILLION/UL (ref 3.81–5.12)
SODIUM SERPL-SCNC: 143 MMOL/L (ref 135–147)
UNIT DISPENSE STATUS: NORMAL
UNIT PRODUCT CODE: NORMAL
UNIT PRODUCT VOLUME: 350 ML
UNIT RH: NORMAL
WBC # BLD AUTO: 5.64 THOUSAND/UL (ref 4.31–10.16)

## 2023-07-05 PROCEDURE — 85027 COMPLETE CBC AUTOMATED: CPT

## 2023-07-05 PROCEDURE — 99232 SBSQ HOSP IP/OBS MODERATE 35: CPT | Performed by: FAMILY MEDICINE

## 2023-07-05 PROCEDURE — 80048 BASIC METABOLIC PNL TOTAL CA: CPT

## 2023-07-05 RX ORDER — CEPHALEXIN 500 MG/1
500 CAPSULE ORAL 3 TIMES DAILY
Qty: 2 CAPSULE | Refills: 0 | OUTPATIENT
Start: 2023-07-05 | End: 2023-07-06

## 2023-07-05 RX ORDER — CYCLOBENZAPRINE HCL 10 MG
10 TABLET ORAL
Qty: 90 TABLET | Refills: 3 | OUTPATIENT
Start: 2023-07-05

## 2023-07-05 RX ORDER — FERROUS SULFATE 325(65) MG
325 TABLET ORAL
Qty: 30 TABLET | Refills: 0 | OUTPATIENT
Start: 2023-07-05

## 2023-07-05 RX ADMIN — CYCLOBENZAPRINE 10 MG: 10 TABLET, FILM COATED ORAL at 22:03

## 2023-07-05 RX ADMIN — MELATONIN TAB 3 MG 3 MG: 3 TAB at 22:03

## 2023-07-05 RX ADMIN — CYCLOBENZAPRINE 10 MG: 10 TABLET, FILM COATED ORAL at 09:29

## 2023-07-05 RX ADMIN — POLYETHYLENE GLYCOL 3350 17 G: 17 POWDER, FOR SOLUTION ORAL at 22:03

## 2023-07-05 RX ADMIN — GABAPENTIN 300 MG: 300 CAPSULE ORAL at 09:29

## 2023-07-05 RX ADMIN — OXYCODONE HYDROCHLORIDE 10 MG: 10 TABLET ORAL at 13:42

## 2023-07-05 RX ADMIN — LIDOCAINE 5% 2 PATCH: 700 PATCH TOPICAL at 09:30

## 2023-07-05 RX ADMIN — GABAPENTIN 300 MG: 300 CAPSULE ORAL at 16:45

## 2023-07-05 RX ADMIN — ESCITALOPRAM OXALATE 10 MG: 10 TABLET ORAL at 09:29

## 2023-07-05 RX ADMIN — CEPHALEXIN 500 MG: 500 CAPSULE ORAL at 16:45

## 2023-07-05 RX ADMIN — CYCLOBENZAPRINE 10 MG: 10 TABLET, FILM COATED ORAL at 16:45

## 2023-07-05 RX ADMIN — POLYETHYLENE GLYCOL 3350 17 G: 17 POWDER, FOR SOLUTION ORAL at 09:30

## 2023-07-05 RX ADMIN — CEPHALEXIN 500 MG: 500 CAPSULE ORAL at 22:03

## 2023-07-05 RX ADMIN — Medication 1 TABLET: at 09:29

## 2023-07-05 RX ADMIN — OXYCODONE HYDROCHLORIDE 10 MG: 10 TABLET ORAL at 22:10

## 2023-07-05 RX ADMIN — FERROUS SULFATE TAB 325 MG (65 MG ELEMENTAL FE) 325 MG: 325 (65 FE) TAB at 09:34

## 2023-07-05 RX ADMIN — GABAPENTIN 300 MG: 300 CAPSULE ORAL at 22:03

## 2023-07-05 RX ADMIN — CEPHALEXIN 500 MG: 500 CAPSULE ORAL at 09:29

## 2023-07-05 RX ADMIN — BISACODYL 5 MG: 5 TABLET, COATED ORAL at 09:29

## 2023-07-05 RX ADMIN — ACETAMINOPHEN 650 MG: 325 TABLET, FILM COATED ORAL at 16:44

## 2023-07-05 RX ADMIN — OXYCODONE HYDROCHLORIDE 10 MG: 10 TABLET ORAL at 05:18

## 2023-07-05 NOTE — DISCHARGE SUMMARY
DISCHARGE SUMMARY - Family [facility] Residency, Jacinta Hawkins 1945, 78 y.o. female. MRN: [id number]    Unit/Bed#: [facility]-01 Encounter: [id number]  Primary Care Provider: Humaira Tse      Admission Date: 07/01/23  Discharge Date: 07/05/23  Length of Stay: 4 days  Diagnosis:   Principal Problem:    Fall  Active Problems:    UTI (urinary tract infection)    Anemia    Chronic pain    Atrial fibrillation ([address])    Hypertension    Depression with anxiety    Insomnia    Constipation    Chronic heart failure with preserved ejection fraction (HCC)    Stage 3a chronic kidney disease ([address])        ASSESSMENTS & PLANS:   Plans discussed with [facility] team and finalization is pending attending physician attestation. UTI (urinary tract infection)  Assessment & Plan  UA 7/1: positive for nitrites and bacteria  Ucx 7/1: >100,000cfu E.coli, pan susceptible   No dysuria, no polyuria. Stable vital signs    Plan:  -cephalexin 500 mg 3 times daily; plan for 5 days total course (today is day 5 of 5)        * Fall  Assessment & Plan  78 F with of presents to ED after a mechanical fall. No headstrike or LOC. On eliquis. Pt was down for significant amount of time, difficult to quantify because her room remained dark for hours. Pt fell due to her legs giving out, she believes it is related to her Left knee OA. She fell onto her left hip and knee and also bruised her right shoulder. No signs of acute bleeding.   - Pt reports her medications make her dizzy.    · VSS  · Hgb 8.8 on admission downtrending, 6.2 this am   · CK wnl, TSH wnl  · 6/30 CTH, CT spine, Left pelvis/hip, left knee, and right shoulder negative for acute pathology or bleed based, however, pending final results    Plan  - monitor for acute bleed  - PT/OT following, recommend short term rehab, pt declines and would prefer to go home with home PT  - Medication reconciliation to decrease dizziness 2/2 medications:  d/c ambien, decrease flexeril to 10 mg qHs PRN, and decrease Oxy 10 mg q8h PRN    Anemia  Assessment & Plan  -Hgb: 6.3>6.2 >8.4>7.3  -Iron: 47  -s/p 1u PRBC on 7/3, 200 mg IV iron given (7/2)  -Pt endorses poor PO intake  -7/3 CT Ab/Pelv to assess for active source of bleeding: small amount of hemorrhage possibly present in gastric lumen, R psoas    Plan  -Holding Eliquis  -Hemoccult order  -Started patient on 325 mg ferrous sulfate daily   -Transfuse hgb > 7      Chronic pain  Assessment & Plan  Patient has chronic pain related to L knee OA and spondylolithesis  Home meds: Flexeril 10 TID PRN, Gabapentin 900 mg TID, oxycodone 10 mg q6h PRN  PDMP reviewed, pt appears to use Oxycodone 4 times a day (pt fills 120 tabs per month)  Last CSI of left knee, 11/23/22   Patient still taking Oxy 10 every 8 hours pain still 8-10 out of 10  I have a significant discussion with patient as well overuse history of opioid can cause amatory dysfunction lead to falling and more complication  Plan  - Decrease flexeril 10 qHs PRN and decrease oxycodone 10 mg q8h PRN    Stage 3a chronic kidney disease [facility])  Assessment & Plan  Lab Results   Component Value Date    EGFR 71 07/04/2023    EGFR 72 07/02/2023    EGFR 61 07/01/2023    CREATININE 0.81 07/04/2023    CREATININE 0.80 07/02/2023    CREATININE 0.91 07/01/2023     Plan  - avoid nephrotoxic agents, renal dose medications    Chronic heart failure with preserved ejection fraction (HCC)  Assessment & Plan  Wt Readings from Last 3 Encounters:   04/27/23 77.2 kg (170 lb 3.2 oz)   03/27/23 80 kg (176 lb 6.4 oz)   03/20/23 78 kg (172 lb)     Home meds: Toprol 50 mg daily and lasix 40 mg as needed  Last Cardiology visit: 9/19/2022    Assessment  Not in acute heart failure. No fluid overload. Plan  - c/w Toprol 50 mg daily    Constipation  Assessment & Plan  Plan  - Continue home senna qhs.   - Add miralax daily PRN    Insomnia  Assessment & Plan  Pt takes ambien 10 mg at bedtime.  She reports dizziness with her current medications. Plan  - D/c ambien; can likely benefit from consultant to pharmacist for polypharmacy  - C/w Flexeril 10 mg qHs  - Try melatonin PRN  - delirium precautions. Depression with anxiety  Assessment & Plan  Plan  - Continue with home lexapro 10 mg daily    Hypertension  Assessment & Plan  Blood pressure well controlled  -most recent Blood Pressure: 108/76    Plan  - Continue home Toprol 50 mg daily and Lasix 40 mg PRN for leg edema    Atrial fibrillation (HCC)  Assessment & Plan  On rate control with Toprol 50 mg daily and AC with Eliquis 5 mg BID    Assessment  Pt is in RRR. She has no obvious signs of bleeding and scans are negative s/p fall.      Plan  - Continue home meds Toprol 50 mg daily  -We will hold Eliquis for now and concerning of downtrending hemoglobin unknown GI bleed could be a cause        Patient Active Problem List   Diagnosis   • Atrial fibrillation (HCC)   • Chronic pain   • Age-related osteoporosis without current pathological fracture   • Hypertension   • Anxiety   • Aortic aneurysm (HCC)   • Depression with anxiety   • Insomnia   • Left knee pain   • Lumbar canal stenosis   • Myofascial pain syndrome   • Opioid dependence (HCC)   • Pain syndrome, chronic   • Right shoulder pain   • Spondylosis of lumbar region without myelopathy or radiculopathy   • Vitamin D deficiency   • Healthcare maintenance   • Constipation   • Eczema of right external ear   • Balance disorder   • Falls   • Musculoskeletal arm pain, right   • Primary osteoarthritis of right shoulder   • Chronic heart failure with preserved ejection fraction (HCC)   • Hypokalemia   • Stage 3a chronic kidney disease (HCC)   • Ganglion of hand, left   • Dry eye of left side   • Bladder prolapse, female, acquired   • Financial insecurity   • Viral illness   • Fall   • UTI (urinary tract infection)   • Anemia         HPI (per admission H&P note on 7/1/23)     HPI:   Anjum Carter is a 78 y.o. female who presents after a fall on eliquis. PMH includes afib on eliquis, stage III kidney disease, HTN, insomnia, and depression with anxiety. The patient is a poor historian however from the story she tells: she got up in the middle of the night (she thinks it was the middle of the night) to urinate. On her way back from the bathroom she slipped on her floor and fell between her bed and her TV stand table. She was unable to get up, she crawled across her living room to the front door and continued to call for help and band on the walls. She lives at [facility] and one of her neighbors ended up hearing her and called EMS. She was brought to the hospital via EMS, denied headstrike. At this time she has pain in her R shoulder and BL knees where she hit when she fell. She denies CP, SOB, dizziness, or a syncopal episode. Her last BM was approximately 1 week ago.   Jerald Resendez Dr:     Hospital Course:   78 y.o. female admitted on 6/30/2023 after an unwitnessed fall in her home at [facility]. Trauma workup in ED including XR of b/l knees, hip and pelvis were negative for acute pathology/fracture. A urinalysis collected in ED resulted positive for nitrites. Pt was treated with a 5-day course of keflex while inpatient. Urine culture later resulted positive for pan susceptible E.coli. During patient's hospitalization, her hemoglobin dropped from 8.8 to 6.2, requiring 1u PRBC transfusion on 7/3. A CT Abd/pelv was done on 7/3 to look for an acute source of bleeding, which resulted negative. On day of discharge, hemoglobin had stabilized to 7.6. PT and OT evaluated patient and recommended short term rehab. Pt declines, stating she would rather go home with home PT. On 07/05/23, HD# 4, pt remains stable and is medically cleared for discharge home. COMPLICATIONS:     Complications: NONE       PROCEDURES:     Procedures Performed:   No orders of the defined types were placed in this encounter.         SIGNIFICANT FINDINGS / ABNORMAL RESULTS:     Significant Findings/Abnormal Results with this admission:    CT abdomen pelvis wo contrast    Result Date: 7/3/2023  Impression: New left lower lobe consolidation/effusion. Some fluid-filled loops of bowel are identified with gradual tapering and no abrupt zone of transition and may be related to ileus or gastroenteritis. High density within the gastric lumen could be related to pill fragments although small amount of hemorrhage could be present if there is any evidence of intraluminal GI bleeding. Mild asymmetry of the right psoas could be related to scoliosis although small amount of hemorrhage could be present. This is difficult to assess without prior study. No evidence of hematoma in the abdominal wall or abdominal cavity is seen elsewhere. Thickening of the appendix measuring up to 7 mm distally but without surrounding inflammatory change. Correlation with any right lower quadrant tenderness is advised and follow-up may be useful if symptoms persist. This was discussed with Dr. Gustavo Alcala at 4:20 p.m. Workstation performed: [id number]     XR knee 1 or 2 vw left    Result Date: 7/1/2023  Impression: Osteoarthritis No acute osseous abnormality. Workstation performed: [id number]     XR shoulder 2+ views RIGHT    Result Date: 7/1/2023  Impression: No acute osseous abnormality. Workstation performed: [id number]     XR hip/pelv 2-3 vws left if performed    Result Date: 7/1/2023  Impression: No acute osseous abnormality. Workstation performed: [id number]     CT spine cervical without contrast    Result Date: 7/1/2023  Impression: No acute cervical spine fracture or traumatic malalignment. Findings are consistent with the preliminary report from [facility] which was provided shortly after completion of the exam. Workstation performed: [id number]     CT head without contrast    Result Date: 7/1/2023  Impression: No acute intracranial abnormality.  Findings are consistent with the preliminary report from [facility] which was provided shortly after completion of the exam. Workstation performed: [id number]         VITALS ON DISCHARGE DATE:     Vitals  Blood Pressure: 108/76 (07/05/23 0729)  Temperature: (!) 97.2 °F (36.2 °C) (07/05/23 0729)  Temp Source: Oral (07/03/23 1006)  Pulse: 69 (07/05/23 0729)  Respirations: 18 (07/05/23 0729)  SpO2: 93 % (07/05/23 0729)  Height: 5' 4" (162.6 cm) (obtained from EMR) (07/03/23 1217)  Weight - Scale:  (measured weight not obtained this admit) (07/03/23 1217)    Temp:  [97.2 °F (36.2 °C)-97.9 °F (36.6 °C)] 97.2 °F (36.2 °C)  HR:  [69-94] 69  Resp:  [18] 18  BP: (108-110)/(76-77) 108/76    Weight (last 2 days)     Date/Time Weight    07/03/23 1217 --     Weight: measured weight not obtained this admit at 07/03/23 1217          No intake or output data in the 24 hours ending 07/05/23 0839    Invasive Devices     Peripheral Intravenous Line  Duration           Peripheral IV 07/05/23 Left Antecubital <1 day          Drain  Duration           External Urinary Catheter 3 days                  PHYSICAL EXAM ON DAY OF DISCHARGE:     Physical Exam:     General: Pt observed lying comfortably in bed, NAD. Not toxic/ill-appearing. No cachectic or diaphoresis. No obvious sign of trauma or bleeding. Psych: AAOx4, able to converse appropriately. Denies SH/SI. Neuro: no gross neurological deficits, CN 2-12 grossly intact  Head: atraumatic, normocephalic. Eyes: open spontaneously, EOM intact, SIMONA, conjunctiva non-injected, no scleral icterus, no discharge. Ear: normal external ear, no visible drainage at external auditory orifice  Nose: clear, no epistaxis, no rhinorrhea. Throat: clear, no hoarse voice, no cough. Neck: supple, normal ROM. Heart: RRR, no murmur/distant heart sound appreciated. Lungs: LCTABL, nml respiratory effort, no agonal/labored breathing, no accessory muscle use. Abdomen: soft, nontender, nondistended, normal bowel sound  Extremities: +4 strengths b/l. Strong radial/pedal pulses. No weakness/paresthesia/edema. CONDITION AT DISCHARGE:   On day of discharge patient is seen and evaluated at bedside. Patient is stable and without concern. Patient denies any pain or SOB. Patient able to tolerate PO food without N/V/D and had bowel movement and baseline urine output. Patient able to ambulate independently without assistance. Patient is aware of current health status and understand plan of treatment and outpatient follow-up. The patient understood and agreed with the plan. All pertinent lab results, imaging studies, procedures, and/or any incidental findings have been disclosed to the patient. All pertinent questions are answered to patient's satisfaction. On day of discharge, the patient was hemodynamically stable and appropriate for discharge home. Condition at Discharge: good       DISCHARGE MEDICATIONS:     Discharge Medications:  See after visit summary (AVS) for detailed reconciled discharge medications, which was provided to patient and family. Summary of medication changes made with this admission:    · START:  1. Keflex 500mg q8h (pt discharged with 2 doses to complete 5 day course for UTI)  2. Iron 325 mg QD    · STOP:  1. ambien    · CHANGE:  1. Decrease flexeril to 10mg qhs prn  2. Decrease oxycodone to 10mg q8h prn    · RESUME:  3. All other home medications       FOLLOW-UP APPOINTMENTS / INSTRUCTION :     Important Physician Related Follow Up:     Appointment confirmed:  Future Appointments   Date Time Provider [address]  [address]   7/27/2023 11:30  [facility] [place] [address]   9/25/2023 11:30  [facility] [place] [address]       Discharge instructions/Information to patient and family:   See after visit summary (AVS) for information provided to patient and family. Provisions for Follow-Up Care:  See after visit summary for information related to follow-up care and any pertinent home health orders.         DISPOSITION: Disposition: Home    Discharge Statement   I spent 30  minutes discharging the patient. This time was spent on the day of discharge. I had direct contact with the patient on the day of discharge. Additional documentation is required if more than 30 minutes were spent on discharge. Planned Readmission: Marylu Ortiz DO  PGY-3, Family Medicine  07/05/23  8:39 AM    Dear reader, please be aware that portions of my note contain dictated text. I have done my best to proof-read this note prior to signing. However, there may be occasional unnoticed errors pertaining to "sound-alike" words and/or grammar during my dictation process. If there is any words or information that is unclear or appears erroneous, please kindly let me know and I will clarify and/or addend my notes accordingly. Thank you for your understanding.

## 2023-07-05 NOTE — PROGRESS NOTES
-- Patient:  -- MRN: [id number]  -- Aidin Request ID: [id number]  -- Level of care reserved: [address]  -- Partner Reserved: [address] [facility], [address] [phone]  -- Clinical needs requested:  -- Geography searched: [phone]  -- Start of Service:  -- Request sent: 4:51pm EDT on 7/3/2023 by Mauro Singh  -- Partner reserved: 3:07pm EDT on 7/5/2023 by Mauro Singh  -- Choice list shared: 3:07pm EDT on 7/5/2023 by Mauro Singh

## 2023-07-05 NOTE — PROGRESS NOTES
[address] [facility]  Progress Note  Name: Brenda Mai  MRN: [id number]  Unit/Bed#: -01 I Date of Admission: 6/30/2023   Date of Service: 7/5/2023 I Hospital Day: 4    Assessment/Plan   UTI (urinary tract infection)  Assessment & Plan  UA 7/1: positive for nitrites and bacteria  Ucx 7/1: >100,000cfu E.coli, pan susceptible   No dysuria, no polyuria. Stable vital signs    Plan:  -cephalexin 500 mg 3 times daily; plan for 5 days total course (today is day 5 of 5)        * Fall  Assessment & Plan  78 F with of presents to ED after a mechanical fall. No headstrike or LOC. On eliquis. Pt was down for significant amount of time, difficult to quantify because her room remained dark for hours. Pt fell due to her legs giving out, she believes it is related to her Left knee OA. She fell onto her left hip and knee and also bruised her right shoulder. No signs of acute bleeding.   - Pt reports her medications make her dizzy. · VSS  · Hgb 8.8 on admission downtrending, 6.2 this am   · CK wnl, TSH wnl  · 6/30 CTH, CT spine, Left pelvis/hip, left knee, and right shoulder negative for acute pathology or bleed based, however, pending final results    Plan  - monitor for acute bleed  - PT/OT following, recommend short term rehab, pt declines and would prefer to go home with home PT.  Pt will need PT/OT reevaluation today  - Medication reconciliation to decrease dizziness 2/2 medications:  d/c ambien, decrease flexeril to 10 mg qHs PRN, and decrease Oxy 10 mg q8h PRN    Anemia  Assessment & Plan  -Hgb: 6.3>6.2 >8.4>7.3  -Iron: 47  -s/p 1u PRBC on 7/3, 200 mg IV iron given (7/2)  -Pt endorses poor PO intake  -7/3 CT Ab/Pelv to assess for active source of bleeding: small amount of hemorrhage possibly present in gastric lumen, R psoas    Plan  -Holding Eliquis  -Hemoccult order  -Started patient on 325 mg ferrous sulfate daily   -Transfuse hgb > 7      Chronic pain  Assessment & Plan  Patient has chronic pain related to L knee OA and spondylolithesis  Home meds: Flexeril 10 TID PRN, Gabapentin 900 mg TID, oxycodone 10 mg q6h PRN  PDMP reviewed, pt appears to use Oxycodone 4 times a day (pt fills 120 tabs per month)  Last CSI of left knee, 11/23/22   Patient still taking Oxy 10 every 8 hours pain still 8-10 out of 10  I have a significant discussion with patient as well overuse history of opioid can cause amatory dysfunction lead to falling and more complication  Plan  - Decrease flexeril 10 qHs PRN and decrease oxycodone 10 mg q8h PRN    Stage 3a chronic kidney disease [facility])  Assessment & Plan  Lab Results   Component Value Date    EGFR 71 07/04/2023    EGFR 72 07/02/2023    EGFR 61 07/01/2023    CREATININE 0.81 07/04/2023    CREATININE 0.80 07/02/2023    CREATININE 0.91 07/01/2023     Plan  - avoid nephrotoxic agents, renal dose medications    Chronic heart failure with preserved ejection fraction [facility])  Assessment & Plan  Wt Readings from Last 3 Encounters:   04/27/23 77.2 kg (170 lb 3.2 oz)   03/27/23 80 kg (176 lb 6.4 oz)   03/20/23 78 kg (172 lb)     Home meds: Toprol 50 mg daily and lasix 40 mg as needed  Last Cardiology visit: 9/19/2022    Assessment  Not in acute heart failure. No fluid overload. Plan  - c/w Toprol 50 mg daily    Constipation  Assessment & Plan  Plan  - Continue home senna qhs.   - Add miralax daily PRN    Insomnia  Assessment & Plan  Pt takes ambien 10 mg at bedtime. She reports dizziness with her current medications. Plan  - D/c ambien; can likely benefit from consultant to pharmacist for polypharmacy  - C/w Flexeril 10 mg qHs  - Try melatonin PRN  - delirium precautions.      Depression with anxiety  Assessment & Plan  Plan  - Continue with home lexapro 10 mg daily    Hypertension  Assessment & Plan  Blood pressure well controlled  -most recent Blood Pressure: 108/76    Plan  - Continue home Toprol 50 mg daily and Lasix 40 mg PRN for leg edema    Atrial fibrillation [facility])  Assessment & Plan  On rate control with Toprol 50 mg daily and AC with Eliquis 5 mg BID    Assessment  Pt is in RRR. She has no obvious signs of bleeding and scans are negative s/p fall. Plan  - Continue home meds Toprol 50 mg daily  -We will hold Eliquis for now and concerning of downtrending hemoglobin unknown GI bleed could be a cause         Subjective:   Pt seen and examined at bedside this AM. States she is feeling much better and wants to go home. Objective:     Vitals: Blood pressure 108/77, pulse 82, temperature (!) 97.2 °F (36.2 °C), resp. rate 18, height 5' 4" (1.626 m), SpO2 93 %. ,Body mass index is 29.21 kg/m². No intake or output data in the 24 hours ending 07/05/23 1015    Physical Exam:   Physical Exam  Constitutional:       General: She is not in acute distress. Appearance: Normal appearance. She is not ill-appearing or toxic-appearing. HENT:      Head: Normocephalic and atraumatic. Right Ear: External ear normal.      Left Ear: External ear normal.      Nose: Nose normal.      Mouth/Throat:      Mouth: Mucous membranes are moist.      Pharynx: Oropharynx is clear. Eyes:      General: No scleral icterus. Conjunctiva/sclera: Conjunctivae normal.   Cardiovascular:      Rate and Rhythm: Normal rate and regular rhythm. Heart sounds: Normal heart sounds. No murmur heard. Pulmonary:      Effort: Pulmonary effort is normal. No respiratory distress. Breath sounds: Normal breath sounds. No wheezing, rhonchi or rales. Abdominal:      General: Bowel sounds are normal.      Palpations: Abdomen is soft. Tenderness: There is no abdominal tenderness. There is no guarding. Musculoskeletal:      Right lower leg: No edema. Left lower leg: No edema. Skin:     General: Skin is warm and dry. Coloration: Skin is not jaundiced. Neurological:      General: No focal deficit present. Mental Status: She is alert and oriented to person, place, and time. Psychiatric:         Mood and Affect: Mood normal.         Behavior: Behavior normal.         Invasive Devices     Peripheral Intravenous Line  Duration           Peripheral IV 07/05/23 Left Antecubital <1 day          Drain  Duration           External Urinary Catheter 3 days                           Lab and other studies:  I have personally reviewed pertinent reports.      Admission on 06/30/2023   Component Date Value   • WBC 06/30/2023 6.62    • RBC 06/30/2023 3.02 (L)    • Hemoglobin 06/30/2023 8.8 (L)    • Hematocrit 06/30/2023 27.9 (L)    • MCV 06/30/2023 92    • MCH 06/30/2023 29.1    • MCHC 06/30/2023 31.5    • RDW 06/30/2023 17.2 (H)    • MPV 06/30/2023 11.3    • Platelets 92/67/8849 200    • nRBC 06/30/2023 0    • Neutrophils Relative 06/30/2023 79 (H)    • Immat GRANS % 06/30/2023 1    • Lymphocytes Relative 06/30/2023 13 (L)    • Monocytes Relative 06/30/2023 5    • Eosinophils Relative 06/30/2023 1    • Basophils Relative 06/30/2023 1    • Neutrophils Absolute 06/30/2023 5.29    • Immature Grans Absolute 06/30/2023 0.03    • Lymphocytes Absolute 06/30/2023 0.86    • Monocytes Absolute 06/30/2023 0.36    • Eosinophils Absolute 06/30/2023 0.04    • Basophils Absolute 06/30/2023 0.04    • Sodium 06/30/2023 143    • Potassium 06/30/2023 3.8    • Chloride 06/30/2023 113 (H)    • CO2 06/30/2023 22    • ANION GAP 06/30/2023 8    • BUN 06/30/2023 45 (H)    • Creatinine 06/30/2023 1.12    • Glucose 06/30/2023 107    • Calcium 06/30/2023 8.7    • Corrected Calcium 06/30/2023 9.5    • AST 06/30/2023 17    • ALT 06/30/2023 17    • Alkaline Phosphatase 06/30/2023 49    • Total Protein 06/30/2023 5.9 (L)    • Albumin 06/30/2023 3.0 (L)    • Total Bilirubin 06/30/2023 0.93    • eGFR 06/30/2023 48    • Total CK 06/30/2023 68    • TSH 3RD GENERATON 06/30/2023 2.082    • Ventricular Rate 06/30/2023 103    • Atrial Rate 06/30/2023 103    • ID Interval 06/30/2023 160    • QRSD Interval 06/30/2023 80    • QT Interval 06/30/2023 358    • QTC Interval 06/30/2023 468    • P Axis 06/30/2023 62    • QRS Axis 06/30/2023 46    • T Wave Axis 06/30/2023 6    • Platelets 67/57/9780 167    • MPV 07/01/2023 11.6    • Sodium 07/01/2023 145    • Potassium 07/01/2023 3.7    • Chloride 07/01/2023 116 (H)    • CO2 07/01/2023 23    • ANION GAP 07/01/2023 6    • BUN 07/01/2023 41 (H)    • Creatinine 07/01/2023 0.91    • Glucose 07/01/2023 104    • Calcium 07/01/2023 8.1 (L)    • eGFR 07/01/2023 61    • WBC 07/01/2023 5.96    • RBC 07/01/2023 2.62 (L)    • Hemoglobin 07/01/2023 7.7 (L)    • Hematocrit 07/01/2023 24.1 (L)    • MCV 07/01/2023 92    • MCH 07/01/2023 29.4    • MCHC 07/01/2023 32.0    • RDW 07/01/2023 17.2 (H)    • Platelets 76/27/4304 195    • MPV 07/01/2023 11.9    • Color, UA 07/01/2023 Yellow    • Clarity, UA 07/01/2023 Clear    • pH, UA 07/01/2023 5.5    • Leukocytes, UA 07/01/2023 Negative    • Nitrite, UA 07/01/2023 Positive (A)    • Protein, UA 07/01/2023 Negative    • Glucose, UA 07/01/2023 Negative    • Ketones, UA 07/01/2023 15 (1+) (A)    • Urobilinogen, UA 07/01/2023 1.0    • Bilirubin, UA 07/01/2023 Negative    • Occult Blood, UA 07/01/2023 Negative    • Specific Gravity, UA 07/01/2023 1.020    • RBC, UA 07/01/2023 1-2    • WBC, UA 07/01/2023 None Seen    • Epithelial Cells 07/01/2023 Occasional    • Bacteria, UA 07/01/2023 Innumerable (A)    • Hyaline Casts, UA 07/01/2023 0-3 (A)    • Iron Saturation 07/01/2023 21    • TIBC 07/01/2023 228 (L)    • Iron 07/01/2023 47 (L)    • Ferritin 07/01/2023 15    • Urine Culture 07/01/2023 >100,000 cfu/ml Escherichia coli (A)    • WBC 07/02/2023 4.98    • RBC 07/02/2023 2.31 (L)    • Hemoglobin 07/02/2023 6.9 (LL)    • Hematocrit 07/02/2023 20.9 (L)    • MCV 07/02/2023 94    • MCH 07/02/2023 29.0    • MCHC 07/02/2023 31.0 (L)    • RDW 07/02/2023 17.5 (H)    • Platelets 48/33/0691 167    • MPV 07/02/2023 12.2    • Sodium 07/02/2023 139    • Potassium 07/02/2023 3.5    • Chloride 07/02/2023 114 (H)    • CO2 07/02/2023 22    • ANION GAP 07/02/2023 3    • BUN 07/02/2023 32 (H)    • Creatinine 07/02/2023 0.80    • Glucose 07/02/2023 89    • Calcium 07/02/2023 8.1 (L)    • eGFR 07/02/2023 72    • Hemoglobin 07/02/2023 7.0 (L)    • Hematocrit 07/02/2023 21.3 (L)    • Hemoglobin 07/03/2023 6.3 (LL)    • Hematocrit 07/03/2023 20.5 (L)    • Unit Product Code 07/05/2023 M1266T35    • Unit Number 07/05/2023 [id number]    • Unit ABO 07/05/2023 O    • Unit [facility] 07/05/2023 POS    • Crossmatch 07/05/2023 Compatible    • Unit Dispense Status 07/05/2023 Presumed Trans    • Unit Product Volume 07/05/2023 350    • ABO Grouping 07/03/2023 O    • Rh Factor 07/03/2023 Positive    • Antibody Screen 07/03/2023 Negative    • Specimen Expiration Date 07/03/2023 [id number]    • Hemoglobin 07/03/2023 6.2 (LL)    • Hematocrit 07/03/2023 19.3 (L)    • Protime 07/03/2023 15.9 (H)    • INR 07/03/2023 1.25 (H)    • PTT 07/03/2023 27    • Hemoglobin 07/03/2023 8.4 (L)    • Hematocrit 07/03/2023 26.5 (L)    • WBC 07/04/2023 6.61    • RBC 07/04/2023 2.36 (L)    • Hemoglobin 07/04/2023 7.3 (L)    • Hematocrit 07/04/2023 22.6 (L)    • MCV 07/04/2023 96    • MCH 07/04/2023 30.9    • MCHC 07/04/2023 32.3    • RDW 07/04/2023 18.1 (H)    • Platelets 16/67/2423 169    • MPV 07/04/2023 12.2    • Sodium 07/04/2023 143    • Potassium 07/04/2023 3.6    • Chloride 07/04/2023 117 (H)    • CO2 07/04/2023 25    • ANION GAP 07/04/2023 1    • BUN 07/04/2023 21    • Creatinine 07/04/2023 0.81    • Glucose 07/04/2023 92    • Calcium 07/04/2023 8.3    • eGFR 07/04/2023 71    • WBC 07/05/2023 5.64    • RBC 07/05/2023 2.49 (L)    • Hemoglobin 07/05/2023 7.6 (L)    • Hematocrit 07/05/2023 24.3 (L)    • MCV 07/05/2023 98    • MCH 07/05/2023 30.5    • MCHC 07/05/2023 31.3 (L)    • RDW 07/05/2023 18.9 (H)    • Platelets 79/40/2964 194    • MPV 07/05/2023 11.8    • Sodium 07/05/2023 143    • Potassium 07/05/2023 3.5    • Chloride 07/05/2023 116 (H) • CO2 07/05/2023 27    • ANION GAP 07/05/2023 0    • BUN 07/05/2023 16    • Creatinine 07/05/2023 0.92    • Glucose 07/05/2023 94    • Calcium 07/05/2023 8.5    • eGFR 07/05/2023 61        Recent Results (from the past 24 hour(s))   CBC and Platelet    Collection Time: 07/05/23  5:14 AM   Result Value Ref Range    WBC 5.64 4.31 - 10.16 Thousand/uL    RBC 2.49 (L) 3.81 - 5.12 Million/uL    Hemoglobin 7.6 (L) 11.5 - 15.4 g/dL    Hematocrit 24.3 (L) 34.8 - 46.1 %    MCV 98 82 - 98 fL    MCH 30.5 26.8 - 34.3 pg    MCHC 31.3 (L) 31.4 - 37.4 g/dL    RDW 18.9 (H) 11.6 - 15.1 %    Platelets 828 059 - 590 Thousands/uL    MPV 11.8 8.9 - 12.7 fL   Basic metabolic panel    Collection Time: 07/05/23  5:14 AM   Result Value Ref Range    Sodium 143 135 - 147 mmol/L    Potassium 3.5 3.5 - 5.3 mmol/L    Chloride 116 (H) 96 - 108 mmol/L    CO2 27 21 - 32 mmol/L    ANION GAP 0 mmol/L    BUN 16 5 - 25 mg/dL    Creatinine 0.92 0.60 - 1.30 mg/dL    Glucose 94 65 - 140 mg/dL    Calcium 8.5 8.3 - 10.1 mg/dL    eGFR 61 ml/min/1.73sq m   Prepare Leukoreduced RBC: 1 Units, Leukoreduced    Collection Time: 07/05/23  5:55 AM   Result Value Ref Range    Unit Product Code L3560F87     Unit Number [id number]     Unit ABO O     Unit RH POS     Crossmatch Compatible     Unit Dispense Status Presumed Trans     Unit Product Volume 350 mL     Blood Culture: No results found for: "BLOODCX",   Urinalysis:   Lab Results   Component Value Date    COLORU Yellow 07/01/2023    CLARITYU Clear 07/01/2023    SPECGRAV 1.020 07/01/2023    PHUR 5.5 07/01/2023    LEUKOCYTESUR Negative 07/01/2023    NITRITE Positive (A) 07/01/2023    GLUCOSEU Negative 07/01/2023    KETONESU 15 (1+) (A) 07/01/2023    BILIRUBINUR Negative 07/01/2023    BLOODU Negative 07/01/2023   ,   Urine Culture:   Lab Results   Component Value Date    URINECX >100,000 cfu/ml Escherichia coli (A) 07/01/2023   ,   Wound Culure: No results found for: "WOUNDCULT"      Imaging:    CT abdomen pelvis wo contrast   Final Result by Jered Caballero MD (07/03 1620)      New left lower lobe consolidation/effusion. Some fluid-filled loops of bowel are identified with gradual tapering and no abrupt zone of transition and may be related to ileus or gastroenteritis. High density within the gastric lumen could be related to pill fragments although small amount of hemorrhage could be present if there is any evidence of intraluminal GI bleeding. Mild asymmetry of the right psoas could be related to scoliosis although small amount of hemorrhage could be present. This is difficult to assess without prior study. No evidence of hematoma in the abdominal wall or abdominal cavity is seen elsewhere. Thickening of the appendix measuring up to 7 mm distally but without surrounding inflammatory change. Correlation with any right lower quadrant tenderness is advised and follow-up may be useful if symptoms persist.      This was discussed with Dr. Doroteo Garcia at 4:20 p.m. Workstation performed: [id number]         XR shoulder 2+ views RIGHT   Final Result by Amanda Rogers MD (07/01 1105)      No acute osseous abnormality. Workstation performed: [id number]         XR hip/pelv 2-3 vws left if performed   Final Result by Amanda Rogers MD (07/01 1102)      No acute osseous abnormality. Workstation performed: [id number]         XR knee 1 or 2 vw left   Final Result by Amanda Rogers MD (07/01 1106)      Osteoarthritis      No acute osseous abnormality. Workstation performed: [id number]         CT head without contrast   Final Result by Kolton Dye MD (07/01 9703)      No acute intracranial abnormality.       Findings are consistent with the preliminary report from [facility] which was provided shortly after completion of the exam.                     Workstation performed: [id number]         CT spine cervical without contrast   Final Result by Kolton Dye MD (07/01 0709)      No acute cervical spine fracture or traumatic malalignment.       Findings are consistent with the preliminary report from [facility] which was provided shortly after completion of the exam.               Workstation performed: [id number]                  VTE Pharmacologic Prophylaxis: Reason for no pharmacologic prophylaxis holding in setting of anemia   VTE Mechanical Prophylaxis: sequential compression device and foot pump applied    Current Facility-Administered Medications   Medication Dose Route Frequency   • acetaminophen (TYLENOL) tablet 650 mg  650 mg Oral Q6H PRN   • bisacodyl (DULCOLAX) EC tablet 5 mg  5 mg Oral Daily   • calcium carbonate (TUMS) chewable tablet 1,000 mg  1,000 mg Oral Daily PRN   • cephalexin (KEFLEX) capsule 500 mg  500 mg Oral TID   • cyclobenzaprine (FLEXERIL) tablet 10 mg  10 mg Oral TID   • ergocalciferol (VITAMIN D2) capsule 50,000 Units  50,000 Units Oral Weekly   • escitalopram (LEXAPRO) tablet 10 mg  10 mg Oral Daily   • ferrous sulfate tablet 325 mg  325 mg Oral Daily With Breakfast   • gabapentin (NEURONTIN) capsule 300 mg  300 mg Oral TID   • lidocaine (LIDODERM) 5 % patch 2 patch  2 patch Topical Daily   • melatonin tablet 3 mg  3 mg Oral HS   • metoprolol succinate (TOPROL-XL) 24 hr tablet 50 mg  50 mg Oral Daily   • multivitamin-minerals (CENTRUM) tablet 1 tablet  1 tablet Oral Daily   • ondansetron (ZOFRAN) injection 4 mg  4 mg Intravenous Q6H PRN   • oxyCODONE (ROXICODONE) immediate release tablet 10 mg  10 mg Oral Q8H PRN   • polyethylene glycol (MIRALAX) packet 17 g  17 g Oral BID         PPX:   Diet:regular house   Code Status: level 1 full code   Dispo: plan for d/c to home with home health care     Plan D/W  and [facility] Team      Lianne Boston DO  Family Medicine Resident PGY3

## 2023-07-05 NOTE — DISCHARGE SUMMARY
DISCHARGE SUMMARY - Family Medicine Residency, Brittany Connors 1945, 78 y.o. female. MRN: [id number]    Unit/Bed#: [facility]-01 Encounter: [id number]  Primary Care Provider: Justin Tse      Admission Date: 07/01/23  Discharge Date: 07/05/23  Length of Stay: 4 days  Diagnosis:   Principal Problem:    Fall  Active Problems:    UTI (urinary tract infection)    Anemia    Chronic pain    Atrial fibrillation ([address])    Hypertension    Depression with anxiety    Insomnia    Constipation    Chronic heart failure with preserved ejection fraction (HCC)    Stage 3a chronic kidney disease ([address])        ASSESSMENTS & PLANS:   Plans discussed with [facility] team and finalization is pending attending physician attestation. UTI (urinary tract infection)  Assessment & Plan  UA 7/1: positive for nitrites and bacteria  Ucx 7/1: >100,000cfu E.coli, pan susceptible   No dysuria, no polyuria. Stable vital signs    Plan:  -cephalexin 500 mg 3 times daily; plan for 5 days total course (today is day 5 of 5)        * Fall  Assessment & Plan  78 F with of presents to ED after a mechanical fall. No headstrike or LOC. On eliquis. Pt was down for significant amount of time, difficult to quantify because her room remained dark for hours. Pt fell due to her legs giving out, she believes it is related to her Left knee OA. She fell onto her left hip and knee and also bruised her right shoulder. No signs of acute bleeding.   - Pt reports her medications make her dizzy.    · VSS  · Hgb 8.8 on admission downtrending, 6.2 this am   · CK wnl, TSH wnl  · 6/30 CTH, CT spine, Left pelvis/hip, left knee, and right shoulder negative for acute pathology or bleed based, however, pending final results    Plan  - monitor for acute bleed  - PT/OT following, recommend short term rehab, pt declines and would prefer to go home with home PT  - Medication reconciliation to decrease dizziness 2/2 medications:  d/c ambien, decrease flexeril to 10 mg qHs PRN, and decrease Oxy 10 mg q8h PRN    Anemia  Assessment & Plan  -Hgb: 6.3>6.2 >8.4>7.3  -Iron: 47  -s/p 1u PRBC on 7/3, 200 mg IV iron given (7/2)  -Pt endorses poor PO intake  -7/3 CT Ab/Pelv to assess for active source of bleeding: small amount of hemorrhage possibly present in gastric lumen, R psoas    Plan  -Holding Eliquis  -Hemoccult order  -Started patient on 325 mg ferrous sulfate daily   -Transfuse hgb > 7      Chronic pain  Assessment & Plan  Patient has chronic pain related to L knee OA and spondylolithesis  Home meds: Flexeril 10 TID PRN, Gabapentin 900 mg TID, oxycodone 10 mg q6h PRN  PDMP reviewed, pt appears to use Oxycodone 4 times a day (pt fills 120 tabs per month)  Last CSI of left knee, 11/23/22   Patient still taking Oxy 10 every 8 hours pain still 8-10 out of 10  I have a significant discussion with patient as well overuse history of opioid can cause amatory dysfunction lead to falling and more complication  Plan  - Decrease flexeril 10 qHs PRN and decrease oxycodone 10 mg q8h PRN    Stage 3a chronic kidney disease [facility])  Assessment & Plan  Lab Results   Component Value Date    EGFR 71 07/04/2023    EGFR 72 07/02/2023    EGFR 61 07/01/2023    CREATININE 0.81 07/04/2023    CREATININE 0.80 07/02/2023    CREATININE 0.91 07/01/2023     Plan  - avoid nephrotoxic agents, renal dose medications    Chronic heart failure with preserved ejection fraction (HCC)  Assessment & Plan  Wt Readings from Last 3 Encounters:   04/27/23 77.2 kg (170 lb 3.2 oz)   03/27/23 80 kg (176 lb 6.4 oz)   03/20/23 78 kg (172 lb)     Home meds: Toprol 50 mg daily and lasix 40 mg as needed  Last Cardiology visit: 9/19/2022    Assessment  Not in acute heart failure. No fluid overload. Plan  - c/w Toprol 50 mg daily    Constipation  Assessment & Plan  Plan  - Continue home senna qhs.   - Add miralax daily PRN    Insomnia  Assessment & Plan  Pt takes ambien 10 mg at bedtime.  She reports dizziness with her current medications. Plan  - D/c ambien; can likely benefit from consultant to pharmacist for polypharmacy  - C/w Flexeril 10 mg qHs  - Try melatonin PRN  - delirium precautions. Depression with anxiety  Assessment & Plan  Plan  - Continue with home lexapro 10 mg daily    Hypertension  Assessment & Plan  Blood pressure well controlled  -most recent Blood Pressure: 108/76    Plan  - Continue home Toprol 50 mg daily and Lasix 40 mg PRN for leg edema    Atrial fibrillation (HCC)  Assessment & Plan  On rate control with Toprol 50 mg daily and AC with Eliquis 5 mg BID    Assessment  Pt is in RRR. She has no obvious signs of bleeding and scans are negative s/p fall.      Plan  - Continue home meds Toprol 50 mg daily  -We will hold Eliquis for now and concerning of downtrending hemoglobin unknown GI bleed could be a cause        Patient Active Problem List   Diagnosis   • Atrial fibrillation (HCC)   • Chronic pain   • Age-related osteoporosis without current pathological fracture   • Hypertension   • Anxiety   • Aortic aneurysm (HCC)   • Depression with anxiety   • Insomnia   • Left knee pain   • Lumbar canal stenosis   • Myofascial pain syndrome   • Opioid dependence (HCC)   • Pain syndrome, chronic   • Right shoulder pain   • Spondylosis of lumbar region without myelopathy or radiculopathy   • Vitamin D deficiency   • Healthcare maintenance   • Constipation   • Eczema of right external ear   • Balance disorder   • Falls   • Musculoskeletal arm pain, right   • Primary osteoarthritis of right shoulder   • Chronic heart failure with preserved ejection fraction (HCC)   • Hypokalemia   • Stage 3a chronic kidney disease (HCC)   • Ganglion of hand, left   • Dry eye of left side   • Bladder prolapse, female, acquired   • Financial insecurity   • Viral illness   • Fall   • UTI (urinary tract infection)   • Anemia         HPI (per admission H&P note on 7/1/23)     HPI:   Tasha David is a 78 y.o. female who presents after a fall on eliquis. PMH includes afib on eliquis, stage III kidney disease, HTN, insomnia, and depression with anxiety. The patient is a poor historian however from the story she tells: she got up in the middle of the night (she thinks it was the middle of the night) to urinate. On her way back from the bathroom she slipped on her floor and fell between her bed and her TV stand table. She was unable to get up, she crawled across her living room to the front door and continued to call for help and band on the walls. She lives at [facility] and one of her neighbors ended up hearing her and called EMS. She was brought to the hospital via EMS, denied headstrike. At this time she has pain in her R shoulder and BL knees where she hit when she fell. She denies CP, SOB, dizziness, or a syncopal episode. Her last BM was approximately 1 week ago.   Jerald Resendez Dr:     Hospital Course:   78 y.o. female admitted on 6/30/2023 after an unwitnessed fall in her home at [facility]. Trauma workup in ED including XR of b/l knees, hip and pelvis were negative for acute pathology/fracture. A urinalysis collected in ED resulted positive for nitrites. Pt was treated with a 5-day course of keflex while inpatient. Urine culture later resulted positive for pan susceptible E.coli. During patient's hospitalization, her hemoglobin dropped from 8.8 to 6.2, requiring 1u PRBC transfusion on 7/3. A CT Abd/pelv was done on 7/3 to look for an acute source of bleeding, which resulted negative. On day of discharge, hemoglobin had stabilized to 7.6. PT and OT evaluated patient and recommended short term rehab. Pt declines, stating she would rather go home with home PT. On 07/05/23, HD# 4, pt remains stable and is medically cleared for discharge home. COMPLICATIONS:     Complications: NONE       PROCEDURES:     Procedures Performed:   No orders of the defined types were placed in this encounter.         SIGNIFICANT FINDINGS / ABNORMAL RESULTS:     Significant Findings/Abnormal Results with this admission:    CT abdomen pelvis wo contrast    Result Date: 7/3/2023  Impression: New left lower lobe consolidation/effusion. Some fluid-filled loops of bowel are identified with gradual tapering and no abrupt zone of transition and may be related to ileus or gastroenteritis. High density within the gastric lumen could be related to pill fragments although small amount of hemorrhage could be present if there is any evidence of intraluminal GI bleeding. Mild asymmetry of the right psoas could be related to scoliosis although small amount of hemorrhage could be present. This is difficult to assess without prior study. No evidence of hematoma in the abdominal wall or abdominal cavity is seen elsewhere. Thickening of the appendix measuring up to 7 mm distally but without surrounding inflammatory change. Correlation with any right lower quadrant tenderness is advised and follow-up may be useful if symptoms persist. This was discussed with Dr. Selestino Frankel at 4:20 p.m. Workstation performed: [id number]     XR knee 1 or 2 vw left    Result Date: 7/1/2023  Impression: Osteoarthritis No acute osseous abnormality. Workstation performed: [id number]     XR shoulder 2+ views RIGHT    Result Date: 7/1/2023  Impression: No acute osseous abnormality. Workstation performed: [id number]     XR hip/pelv 2-3 vws left if performed    Result Date: 7/1/2023  Impression: No acute osseous abnormality. Workstation performed: [id number]     CT spine cervical without contrast    Result Date: 7/1/2023  Impression: No acute cervical spine fracture or traumatic malalignment. Findings are consistent with the preliminary report from [facility] which was provided shortly after completion of the exam. Workstation performed: [id number]     CT head without contrast    Result Date: 7/1/2023  Impression: No acute intracranial abnormality.  Findings are consistent with the preliminary report from [facility] which was provided shortly after completion of the exam. Workstation performed: [id number]         VITALS ON DISCHARGE DATE:     Vitals  Blood Pressure: 108/77 (07/05/23 0931)  Temperature: (!) 97.2 °F (36.2 °C) (07/05/23 0729)  Temp Source: Oral (07/03/23 1006)  Pulse: 82 (07/05/23 0931)  Respirations: 18 (07/05/23 0729)  SpO2: 93 % (07/05/23 0729)  Height: 5' 4" (162.6 cm) (obtained from EMR) (07/03/23 1217)  Weight - Scale:  (measured weight not obtained this admit) (07/03/23 1217)    Temp:  [97.2 °F (36.2 °C)-97.9 °F (36.6 °C)] 97.2 °F (36.2 °C)  HR:  [69-94] 82  Resp:  [18] 18  BP: (108-110)/(76-77) 108/77    Weight (last 2 days)     Date/Time Weight    07/03/23 1217 --     Weight: measured weight not obtained this admit at 07/03/23 1217          No intake or output data in the 24 hours ending 07/05/23 1002    Invasive Devices     Peripheral Intravenous Line  Duration           Peripheral IV 07/05/23 Left Antecubital <1 day          Drain  Duration           External Urinary Catheter 3 days                  PHYSICAL EXAM ON DAY OF DISCHARGE:     Physical Exam:     General: Pt observed lying comfortably in bed, NAD. Not toxic/ill-appearing. No cachectic or diaphoresis. No obvious sign of trauma or bleeding. Psych: AAOx4, able to converse appropriately. Denies SH/SI. Neuro: no gross neurological deficits, CN 2-12 grossly intact  Head: atraumatic, normocephalic. Eyes: open spontaneously, EOM intact, SIMONA, conjunctiva non-injected, no scleral icterus, no discharge. Ear: normal external ear, no visible drainage at external auditory orifice  Nose: clear, no epistaxis, no rhinorrhea. Throat: clear, no hoarse voice, no cough. Neck: supple, normal ROM. Heart: RRR, no murmur/distant heart sound appreciated. Lungs: LCTABL, nml respiratory effort, no agonal/labored breathing, no accessory muscle use. Abdomen: soft, nontender, nondistended, normal bowel sound  Extremities: +4 strengths b/l. Strong radial/pedal pulses. No weakness/paresthesia/edema. CONDITION AT DISCHARGE:   On day of discharge patient is seen and evaluated at bedside. Patient is stable and without concern. Patient denies any pain or SOB. Patient able to tolerate PO food without N/V/D and had bowel movement and baseline urine output. Patient able to ambulate independently without assistance. Patient is aware of current health status and understand plan of treatment and outpatient follow-up. The patient understood and agreed with the plan. All pertinent lab results, imaging studies, procedures, and/or any incidental findings have been disclosed to the patient. All pertinent questions are answered to patient's satisfaction. On day of discharge, the patient was hemodynamically stable and appropriate for discharge home. Condition at Discharge: good       DISCHARGE MEDICATIONS:     Discharge Medications:  See after visit summary (AVS) for detailed reconciled discharge medications, which was provided to patient and family. Summary of medication changes made with this admission:    · START:  1. Keflex 500mg q8h (pt discharged with 2 doses to complete 5 day course for UTI)  2. Iron 325 mg QD    · STOP:  1. ambien    · CHANGE:  1. Decrease flexeril to 10mg qhs prn  2. Decrease oxycodone to 10mg q8h prn    · RESUME:  3. All other home medications       FOLLOW-UP APPOINTMENTS / INSTRUCTION :     Important Physician Related Follow Up:     Appointment confirmed:  Future Appointments   Date Time Provider [address]  [address]   7/27/2023 11:30  [facility] [address]   9/25/2023 11:30  [facility] [address]       Discharge instructions/Information to patient and family:   See after visit summary (AVS) for information provided to patient and family. Provisions for Follow-Up Care:  See after visit summary for information related to follow-up care and any pertinent home health orders.         DISPOSITION: Disposition: Home    Discharge Statement   I spent 30  minutes discharging the patient. This time was spent on the day of discharge. I had direct contact with the patient on the day of discharge. Additional documentation is required if more than 30 minutes were spent on discharge. Planned Readmission: No        Brigida Arteaga DO  PGY-3, Family Medicine  07/05/23  10:02 AM    Dear reader, please be aware that portions of my note contain dictated text. I have done my best to proof-read this note prior to signing. However, there may be occasional unnoticed errors pertaining to "sound-alike" words and/or grammar during my dictation process. If there is any words or information that is unclear or appears erroneous, please kindly let me know and I will clarify and/or addend my notes accordingly. Thank you for your understanding.

## 2023-07-06 VITALS
TEMPERATURE: 97.5 F | BODY MASS INDEX: 29.21 KG/M2 | RESPIRATION RATE: 17 BRPM | HEIGHT: 64 IN | HEART RATE: 111 BPM | SYSTOLIC BLOOD PRESSURE: 110 MMHG | DIASTOLIC BLOOD PRESSURE: 76 MMHG | OXYGEN SATURATION: 96 %

## 2023-07-06 LAB
HCT VFR BLD AUTO: 24.2 % (ref 34.8–46.1)
HGB BLD-MCNC: 7.6 G/DL (ref 11.5–15.4)

## 2023-07-06 PROCEDURE — 99238 HOSP IP/OBS DSCHRG MGMT 30/<: CPT | Performed by: FAMILY MEDICINE

## 2023-07-06 PROCEDURE — 85018 HEMOGLOBIN: CPT

## 2023-07-06 PROCEDURE — 85014 HEMATOCRIT: CPT

## 2023-07-06 RX ORDER — ACETAMINOPHEN 325 MG/1
650 TABLET ORAL EVERY 6 HOURS PRN
Qty: 14 TABLET | Refills: 0 | Status: SHIPPED | OUTPATIENT
Start: 2023-07-06 | End: 2023-07-13

## 2023-07-06 RX ORDER — LIDOCAINE 50 MG/G
2 PATCH TOPICAL DAILY
Qty: 7 PATCH | Refills: 0 | Status: SHIPPED | OUTPATIENT
Start: 2023-07-07

## 2023-07-06 RX ORDER — GABAPENTIN 300 MG/1
300 CAPSULE ORAL 3 TIMES DAILY
Qty: 21 CAPSULE | Refills: 0 | Status: SHIPPED | OUTPATIENT
Start: 2023-07-06 | End: 2023-07-12

## 2023-07-06 RX ORDER — LANOLIN ALCOHOL/MO/W.PET/CERES
3 CREAM (GRAM) TOPICAL
Qty: 14 TABLET | Refills: 0 | Status: SHIPPED | OUTPATIENT
Start: 2023-07-06

## 2023-07-06 RX ORDER — POLYETHYLENE GLYCOL 3350 17 G/17G
17 POWDER, FOR SOLUTION ORAL 2 TIMES DAILY
Qty: 170 G | Refills: 0 | Status: SHIPPED | OUTPATIENT
Start: 2023-07-06 | End: 2023-07-11

## 2023-07-06 RX ADMIN — ESCITALOPRAM OXALATE 10 MG: 10 TABLET ORAL at 09:07

## 2023-07-06 RX ADMIN — FERROUS SULFATE TAB 325 MG (65 MG ELEMENTAL FE) 325 MG: 325 (65 FE) TAB at 09:07

## 2023-07-06 RX ADMIN — CYCLOBENZAPRINE 10 MG: 10 TABLET, FILM COATED ORAL at 09:07

## 2023-07-06 RX ADMIN — GABAPENTIN 300 MG: 300 CAPSULE ORAL at 16:40

## 2023-07-06 RX ADMIN — CYCLOBENZAPRINE 10 MG: 10 TABLET, FILM COATED ORAL at 16:40

## 2023-07-06 RX ADMIN — OXYCODONE HYDROCHLORIDE 10 MG: 10 TABLET ORAL at 05:45

## 2023-07-06 RX ADMIN — GABAPENTIN 300 MG: 300 CAPSULE ORAL at 09:07

## 2023-07-06 RX ADMIN — BISACODYL 5 MG: 5 TABLET, COATED ORAL at 09:07

## 2023-07-06 RX ADMIN — Medication 1 TABLET: at 09:07

## 2023-07-06 RX ADMIN — OXYCODONE HYDROCHLORIDE 10 MG: 10 TABLET ORAL at 14:07

## 2023-07-06 RX ADMIN — LIDOCAINE 5% 2 PATCH: 700 PATCH TOPICAL at 09:07

## 2023-07-06 NOTE — QUICK NOTE
Patient states she would like her son, Yesenia Iverson to be her primary contact. His number is [phone].

## 2023-07-06 NOTE — DISCHARGE INSTR - AVS FIRST PAGE
-Please get a CBC on Monday to check your hemoglobin before seeing your PCP, will discus resuming eliquis at that time  -Please take oxycodone 10 mg every 8 hours as needed for pain  -Please discontinue aleve and take tyelnol as needed for pain due to bleeding risk associated with aleve  -Please call [phone] to set up Life alert

## 2023-07-06 NOTE — ASSESSMENT & PLAN NOTE
78 F with of presents to ED after a mechanical fall. No headstrike or LOC. On eliquis. Pt was down for significant amount of time, difficult to quantify because her room remained dark for hours. Pt fell due to her legs giving out, she believes it is related to her Left knee OA. She fell onto her left hip and knee and also bruised her right shoulder. No signs of acute bleeding.   - Pt reports her medications make her dizzy. · VSS  · Hgb 8.8 on admission, now stable at 7.6  · CK wnl, TSH wnl  6/30 CTH, CT spine, Left pelvis/hip, left knee, and right shoulder negative for acute pathology or bleed based  Plan  - monitor for acute bleed  - PT/OT following, recommend short term rehab, pt declines and would prefer to go home with home PT.  Pt will need PT/OT reevaluation today  - Medication reconciliation to decrease dizziness 2/2 medications:  d/c ambien, decrease flexeril to 10 mg qHs PRN, and decrease Oxy 10 mg q8h PRN

## 2023-07-06 NOTE — ASSESSMENT & PLAN NOTE
Pt takes ambien 10 mg at bedtime. She reports dizziness with her current medications. Plan  - D/c ambien; can likely benefit from consultant to pharmacist for polypharmacy  - C/w Flexeril 10 mg qHs  - Continue melatonin 3 mg QHS  - delirium precautions.

## 2023-07-06 NOTE — ASSESSMENT & PLAN NOTE
Blood pressure well controlled  -most recent Blood Pressure: 98/64    Plan  - Continue home Toprol 50 mg daily and Lasix 40 mg PRN for leg edema

## 2023-07-06 NOTE — ASSESSMENT & PLAN NOTE
On rate control with Toprol 50 mg daily and AC with Eliquis 5 mg BID    Assessment  Pt is in RRR. Scans are negative s/p fall.  CT (7/3) showed evidence of small GI bleed    Plan  - Continue home meds Toprol 50 mg daily  -Hgb now stable at 7.6  -f/u cbc in 1 week  -Follow up with PCP and discuss resuming eliquis at that time if CBC stable

## 2023-07-06 NOTE — DISCHARGE SUMMARY
DISCHARGE SUMMARY - Family Medicine Residency, Adriano Arriaga 1945, 78 y.o. female. MRN: [id number]    Unit/Bed#: [facility]-01 Encounter: [id number]  Primary Care Provider: Mari Tse      Admission Date: 6/30/23  Discharge Date: 07/06/23  Length of Stay: 5 days  Diagnosis:   Principal Problem:    Fall  Active Problems:    Atrial fibrillation ([address])    Chronic pain    Hypertension    Depression with anxiety    Insomnia    Constipation    Chronic heart failure with preserved ejection fraction (HCC)    Stage 3a chronic kidney disease ([address])    UTI (urinary tract infection)    Anemia        ASSESSMENTS & PLANS:   Plans discussed with [facility] team and finalization is pending attending physician attestation. * Fall  Assessment & Plan  78 F with of presents to ED after a mechanical fall. No headstrike or LOC. On eliquis. Pt was down for significant amount of time, difficult to quantify because her room remained dark for hours. Pt fell due to her legs giving out, she believes it is related to her Left knee OA. She fell onto her left hip and knee and also bruised her right shoulder. No signs of acute bleeding.   - Pt reports her medications make her dizzy. · VSS  · Hgb 8.8 on admission, now stable at 7.6  · CK wnl, TSH wnl  6/30 CTH, CT spine, Left pelvis/hip, left knee, and right shoulder negative for acute pathology or bleed based  Plan  - monitor for acute bleed  - PT/OT following, recommend short term rehab, pt declines and would prefer to go home with home PT.  Pt will need PT/OT reevaluation today  - Medication reconciliation to decrease dizziness 2/2 medications:  d/c ambien, decrease flexeril to 10 mg qHs PRN, and decrease Oxy 10 mg q8h PRN    Anemia  Assessment & Plan  -Hgb: 6.3>6.2 >8.4>7.3>7.6  -Hgb stable at 7.6 this am  -Iron: 47  -s/p 1u PRBC on 7/3, 200 mg IV iron given (7/2)  -Pt endorses poor PO intake  -7/3 CT Ab/Pelv to assess for active source of bleeding: small amount of hemorrhage possibly present in gastric lumen, R psoas    Plan  -Holding Eliquis  -Hemoccult order  -Started patient on 325 mg ferrous sulfate daily   -Transfuse hgb > 7  -f/u cbc Monday and resume eliquis if stable at that time      UTI (urinary tract infection)  Assessment & Plan  UA 7/1: positive for nitrites and bacteria  Ucx 7/1: >100,000cfu E.coli, pan susceptible   No dysuria, no polyuria. Stable vital signs    Plan:  -cephalexin 500 mg 3 times daily; plan for 5 days total course (completed 5 day course 7/5 )        Stage 3a chronic kidney disease [facility])  Assessment & Plan  Lab Results   Component Value Date    EGFR 61 07/05/2023    EGFR 71 07/04/2023    EGFR 72 07/02/2023    CREATININE 0.92 07/05/2023    CREATININE 0.81 07/04/2023    CREATININE 0.80 07/02/2023     Plan  - avoid nephrotoxic agents, renal dose medications    Constipation  Assessment & Plan  Plan  - Continue home senna qhs.   - continue miralax daily PRN    Insomnia  Assessment & Plan  Pt takes ambien 10 mg at bedtime. She reports dizziness with her current medications. Plan  - D/c ambien; can likely benefit from consultant to pharmacist for polypharmacy  - C/w Flexeril 10 mg qHs  - Continue melatonin 3 mg QHS  - delirium precautions.      Depression with anxiety  Assessment & Plan  Plan  - Continue with home lexapro 10 mg daily    Hypertension  Assessment & Plan  Blood pressure well controlled  -most recent Blood Pressure: 98/64    Plan  - Continue home Toprol 50 mg daily and Lasix 40 mg PRN for leg edema    Chronic pain  Assessment & Plan  Patient has chronic pain related to L knee OA and spondylolithesis  Home meds: Flexeril 10 TID PRN, Gabapentin 900 mg TID, oxycodone 10 mg q6h PRN  PDMP reviewed, pt appears to use Oxycodone 4 times a day (pt fills 120 tabs per month)  Last CSI of left knee, 11/23/22   Patient still taking Oxy 10 every 8 hours pain still 8-10 out of 10  I have a significant discussion with patient as well overuse history of opioid can cause amatory dysfunction lead to falling and more complication  Plan  - Decrease flexeril 10 qHs PRN and decrease oxycodone 10 mg q8h PRN  -advised patient to take tylenol PRN instead of aleve due to bleeding risk    Atrial fibrillation (HCC)  Assessment & Plan  On rate control with Toprol 50 mg daily and AC with Eliquis 5 mg BID    Assessment  Pt is in RRR. Scans are negative s/p fall. CT (7/3) showed evidence of small GI bleed    Plan  - Continue home meds Toprol 50 mg daily  -Hgb now stable at 7.6  -f/u cbc in 1 week  -Follow up with PCP and discuss resuming eliquis at that time if CBC stable        Patient Active Problem List   Diagnosis   • Atrial fibrillation ([address])   • Chronic pain   • Age-related osteoporosis without current pathological fracture   • Hypertension   • Anxiety   • Aortic aneurysm ([address])   • Depression with anxiety   • Insomnia   • Left knee pain   • Lumbar canal stenosis   • Myofascial pain syndrome   • Opioid dependence (HCC)   • Pain syndrome, chronic   • Right shoulder pain   • Spondylosis of lumbar region without myelopathy or radiculopathy   • Vitamin D deficiency   • Healthcare maintenance   • Constipation   • Eczema of right external ear   • Balance disorder   • Falls   • Musculoskeletal arm pain, right   • Primary osteoarthritis of right shoulder   • Chronic heart failure with preserved ejection fraction (HCC)   • Hypokalemia   • Stage 3a chronic kidney disease (HCC)   • Ganglion of hand, left   • Dry eye of left side   • Bladder prolapse, female, acquired   • Financial insecurity   • Viral illness   • Fall   • UTI (urinary tract infection)   • Anemia         HPI (per admission H&P note on )     HPI:   "Fadumo Last is a 78 y.o. female who presents after a fall on eliquis. PMH includes afib on eliquis, stage III kidney disease, HTN, insomnia, and depression with anxiety.  The patient is a poor historian however from the story she tells: she got up in the middle of the night (she thinks it was the middle of the night) to urinate. On her way back from the bathroom she slipped on her floor and fell between her bed and her TV stand table. She was unable to get up, she crawled across her living room to the front door and continued to call for help and band on the walls. She lives at [facility] and one of her neighbors ended up hearing her and called EMS. She was brought to the hospital via EMS, denied headstrike. At this time she has pain in her R shoulder and BL knees where she hit when she fell. She denies CP, SOB, dizziness, or a syncopal episode. Her last BM was approximately 1 week ago. "      HOSPITAL COURSE:     Hospital Course:   78 y.o. female admitted on 6/30/2023 after an unwitnessed fall in her home at [facility]. Trauma workup in ED including XR of b/l knees, hip and pelvis were negative for acute pathology/fracture. A urinalysis collected in ED resulted positive for nitrites. Pt was treated with a 5-day course of keflex while inpatient. Urine culture later resulted positive for pan susceptible E.coli. During patient's hospitalization, her hemoglobin dropped from 8.8 to 6.2, requiring 1u PRBC transfusion on 7/3. A CT Abd/pelv was done on 7/3 to look for an acute source of bleeding, which resulted negative. On day of discharge, hemoglobin had stabilized to 7.6. PT and OT evaluated patient and recommended short term rehab. Pt declines, stating she would rather go home with home PT.  has established home VNA care and patient plans to get life alert.     On 07/06/23, HD# 5, pt remains stable and is medically cleared for discharge home. COMPLICATIONS:     Complications: NONE       PROCEDURES:     Procedures Performed:   No orders of the defined types were placed in this encounter.         SIGNIFICANT FINDINGS / ABNORMAL RESULTS:     Significant Findings/Abnormal Results with this admission:    CT abdomen pelvis wo contrast    Result Date: 7/3/2023  Narrative: CT ABDOMEN AND PELVIS WITHOUT IV CONTRAST INDICATION:   GI bleed Evaluate for sources of bleeding. Anemia COMPARISON: CT chest from 3/29/2023 TECHNIQUE:  CT examination of the abdomen and pelvis was performed without intravenous contrast. Multiplanar 2D reformatted images were created from the source data. This examination, like all CT scans performed in the [facility], was performed utilizing techniques to minimize radiation dose exposure, including the use of iterative reconstruction and automated exposure control. Radiation dose length product (DLP) for this visit:  976.65 mGy-cm Enteric contrast was administered. FINDINGS: ABDOMEN LOWER CHEST: Left lower lobe consolidation is evident and new since the prior study. LIVER/BILIARY TREE: Circumscribed hypodensity in the right lobe of the liver probably represents a cyst and similar to the CT from 329. GALLBLADDER:  No calcified gallstones. No pericholecystic inflammatory change. SPLEEN:  Unremarkable. PANCREAS:  Unremarkable. ADRENAL GLANDS:  Unremarkable. KIDNEYS/URETERS: Probable small cyst is seen along the lateral margin of the right kidney demonstrating low density. No hydronephrosis. STOMACH AND BOWEL: The stomach is moderately distended with fluid. The stomach is abutting the left hemidiaphragm with a similar configuration to the prior chest CT exaggerated by scoliosis. Some high density debris is seen within the stomach and could be related to ingested pill fragments. Proximal small bowel demonstrates mild dilatation with fluid without abrupt change in caliber. More distal small bowel loops demonstrate air-fluid levels and do not appear dilated. The colon demonstrates mild fecal stasis in left colon diverticulosis without CT evidence of diverticulitis. APPENDIX: The appendix is partially gas-filled. The tip is mildly dilated at 7 mm with a small amount of fluid but no significant surrounding inflammatory change. ABDOMINOPELVIC CAVITY:  No ascites. No pneumoperitoneum. No lymphadenopathy. There is slight increased density of the right psoas muscle as compared to the left without oz enlargement although this could be related to the patient's scoliosis. VESSELS:  Unremarkable for patient's age. PELVIS REPRODUCTIVE ORGANS: Calcifications in the uterus are identified probably fibroids. URINARY BLADDER: The bladder is distended. ABDOMINAL WALL/INGUINAL REGIONS:  Unremarkable. OSSEOUS STRUCTURES:  No acute fracture or destructive osseous lesion. Bilateral hip replacements limit assessment of the pelvis due to streak artifact. Levoscoliosis and degenerative changes of the spine are noted. Impression: New left lower lobe consolidation/effusion. Some fluid-filled loops of bowel are identified with gradual tapering and no abrupt zone of transition and may be related to ileus or gastroenteritis. High density within the gastric lumen could be related to pill fragments although small amount of hemorrhage could be present if there is any evidence of intraluminal GI bleeding. Mild asymmetry of the right psoas could be related to scoliosis although small amount of hemorrhage could be present. This is difficult to assess without prior study. No evidence of hematoma in the abdominal wall or abdominal cavity is seen elsewhere. Thickening of the appendix measuring up to 7 mm distally but without surrounding inflammatory change. Correlation with any right lower quadrant tenderness is advised and follow-up may be useful if symptoms persist. This was discussed with Dr. Lizett Estrella at 4:20 p.m. Workstation performed: [id number]     XR knee 1 or 2 vw left    Result Date: 7/1/2023  Narrative: LEFT KNEE INDICATION:   left knee pain. COMPARISON: 11/23/2022 VIEWS:  XR KNEE 1 OR 2 VW LEFT FINDINGS: There is no acute fracture or dislocation. There is no joint effusion. Tricompartment narrowing with osteophytes. No lytic or blastic osseous lesion. Soft tissues are unremarkable. Impression: Osteoarthritis No acute osseous abnormality. Workstation performed: [id number]     XR shoulder 2+ views RIGHT    Result Date: 7/1/2023  Narrative: RIGHT SHOULDER INDICATION:   right shoulder pain. COMPARISON:  None VIEWS:  XR SHOULDER 2+ VW RIGHT FINDINGS: There is no acute fracture or dislocation. Mild degenerative AC joint. Marked/severe glenohumeral joint osteoarthritis. Evaluation limited by positioning. No lytic or blastic osseous lesion. Posterior humeral head soft tissue calcification; calcific tendinitis/bursitis. Impression: No acute osseous abnormality. Workstation performed: [id number]     XR hip/pelv 2-3 vws left if performed    Result Date: 7/1/2023  Narrative: LEFT HIP INDICATION:   left hip pain. COMPARISON: 2/15/2013 VIEWS:  XR HIP/PELV 2-3 VWS LEFT  W PELVIS IF PERFORMED FINDINGS: Stable left hip total arthroplasty without hardware complication. Intact appearing right hip total arthroplasty. There is no acute fracture or dislocation. No lytic or blastic osseous lesion. Soft tissues are unremarkable. Scoliotic degenerative lower lumbar spine     Impression: No acute osseous abnormality. Workstation performed: [id number]     CT spine cervical without contrast    Result Date: 7/1/2023  Narrative: CT CERVICAL SPINE - WITHOUT CONTRAST INDICATION:   Neck trauma (Age >= 65y) fall. Found on the floor. COMPARISON: May 13, 2016 TECHNIQUE:  CT examination of the cervical spine was performed without intravenous contrast.  Contiguous axial images were obtained. Multiplanar 2D reformatted images were created from the source data. Radiation dose length product (DLP) for this visit:  409.58 mGy-cm . This examination, like all CT scans performed in the [facility], was performed utilizing techniques to minimize radiation dose exposure, including the use of iterative  reconstruction and automated exposure control. IMAGE QUALITY:  Diagnostic. FINDINGS: ALIGNMENT: Straightening of cervical lordosis. Degenerative grade 1 anterolisthesis of C3 relative to C4. Mild chronic compression deformity at C6, similar from 2016. No acute traumatic subluxation or compression deformity. VERTEBRAE:  No fracture. DEGENERATIVE CHANGES: Advanced multilevel cervical degenerative changes are noted. No critical central canal stenosis. PREVERTEBRAL AND PARASPINAL SOFT TISSUES: Unremarkable THORACIC INLET: Reticular markings noted at the lung apices, similar from 2016. Impression: No acute cervical spine fracture or traumatic malalignment. Findings are consistent with the preliminary report from [place] which was provided shortly after completion of the exam. Workstation performed: [id number]     CT head without contrast    Result Date: 7/1/2023  Narrative: CT BRAIN - WITHOUT CONTRAST INDICATION:   Head trauma, moderate-severe fall. COMPARISON: May 13, 2016 TECHNIQUE:  CT examination of the brain was performed. Multiplanar 2D reformatted images were created from the source data. Radiation dose length product (DLP) for this visit:  919.46 mGy-cm . This examination, like all CT scans performed in the [facility], was performed utilizing techniques to minimize radiation dose exposure, including the use of iterative  reconstruction and automated exposure control. IMAGE QUALITY:  Diagnostic. FINDINGS: PARENCHYMA:  No intracranial mass, mass effect or midline shift. No CT signs of acute infarction. No acute parenchymal hemorrhage. VENTRICLES AND EXTRA-AXIAL SPACES:  Normal for the patient's age. VISUALIZED ORBITS: No acute abnormality identified in the orbits. PARANASAL SINUSES: Normal visualized paranasal sinuses. CALVARIUM AND EXTRACRANIAL SOFT TISSUES:  Normal.     Impression: No acute intracranial abnormality.  Findings are consistent with the preliminary report from [place] which was provided shortly after completion of the exam. Workstation performed: [id number]         VITALS ON DISCHARGE DATE:     Vitals  Blood Pressure: 110/76 (07/06/23 1219)  Temperature: 97.9 °F (36.6 °C) (07/06/23 1219)  Temp Source: Oral (07/03/23 1006)  Pulse: (!) 111 (07/06/23 1219)  Respirations: 17 (07/06/23 0730)  SpO2: 96 % (07/06/23 1219)  Height: 5' 4" (162.6 cm) (obtained from EMR) (07/03/23 1217)  Weight - Scale:  (measured weight not obtained this admit) (07/03/23 1217)    Temp:  [97.2 °F (36.2 °C)-98.1 °F (36.7 °C)] 97.9 °F (36.6 °C)  HR:  [] 111  Resp:  [17-18] 17  BP: ()/(60-76) 110/76    Weight (last 2 days)     None            Intake/Output Summary (Last 24 hours) at 7/6/2023 1225  Last data filed at 7/6/2023 1124  Gross per 24 hour   Intake --   Output 1650 ml   Net -1650 ml       Invasive Devices     Peripheral Intravenous Line  Duration           Peripheral IV 07/05/23 Left Antecubital 1 day          Drain  Duration           External Urinary Catheter 4 days                  PHYSICAL EXAM ON DAY OF DISCHARGE:     Physical Exam  Constitutional:       Appearance: Normal appearance. HENT:      Head: Normocephalic and atraumatic. Right Ear: External ear normal.      Left Ear: External ear normal.      Nose: Nose normal.   Cardiovascular:      Rate and Rhythm: Normal rate and regular rhythm. Pulses: Normal pulses. Heart sounds: No murmur heard. No friction rub. No gallop. Pulmonary:      Effort: Pulmonary effort is normal.      Breath sounds: Normal breath sounds. Abdominal:      General: Abdomen is flat. Bowel sounds are normal.      Palpations: Abdomen is soft. Skin:     General: Skin is warm and dry. Neurological:      Mental Status: She is alert. Psychiatric:         Mood and Affect: Mood normal.         Behavior: Behavior normal.          CONDITION AT DISCHARGE:   On day of discharge patient is seen and evaluated at bedside. Patient is stable and without concern. Patient denies any pain or SOB.  Patient able to tolerate PO food without N/V/D and had bowel movement and baseline urine output. Patient able to ambulate independently without assistance. Patient is aware of current health status and understand plan of treatment and outpatient follow-up. The patient understood and agreed with the plan. All pertinent lab results, imaging studies, procedures, and/or any incidental findings have been disclosed to the patient. All pertinent questions are answered to patient's satisfaction. On day of discharge, the patient was hemodynamically stable and appropriate for discharge home. Condition at Discharge: stable       DISCHARGE MEDICATIONS:     Discharge Medications:  See after visit summary (AVS) for detailed reconciled discharge medications, which was provided to patient and family. Summary of medication changes made with this admission:    · START:  1. Melatonin 3 mg every night for sleep    · STOP:  1. Ambien at night  2. Hold Eliquis until completing CBC and seeing your primary care physician. · CHANGE:  1. Take oxycodone 10 mg every 8 hours as needed for pain. · RESUME:  1. All other home medication      FOLLOW-UP APPOINTMENTS / INSTRUCTION :     Important Physician Related Follow Up:   · Follow up with Dr. Shannan Stone or Dr. Guillaume Cloud within 1 week after discharge. · Get CBC bloodwork on Monday before seeing primary care doctor. Appointment confirmed:  Future Appointments   Date Time Provider [address]   7/27/2023 11:30  [facility] [address]   9/25/2023 11:30  [facility] [address]       Discharge instructions/Information to patient and family:   See after visit summary (AVS) for information provided to patient and family. Provisions for Follow-Up Care:  See after visit summary for information related to follow-up care and any pertinent home health orders.         DISPOSITION:     Disposition: Home    Discharge Statement   I spent 30 minutes discharging the patient. This time was spent on the day of discharge. I had direct contact with the patient on the day of discharge. Additional documentation is required if more than 30 minutes were spent on discharge. Planned Readmission: No        Layne Camara MD  PGY1, Family Medicine  07/06/23  12:25 PM    Dear reader, please be aware that portions of my note contain dictated text. I have done my best to proof-read this note prior to signing. However, there may be occasional unnoticed errors pertaining to "sound-alike" words and/or grammar during my dictation process. If there is any words or information that is unclear or appears erroneous, please kindly let me know and I will clarify and/or addend my notes accordingly. Thank you for your understanding. Did you refresh AND change the service date/time?

## 2023-07-06 NOTE — ASSESSMENT & PLAN NOTE
Lab Results   Component Value Date    EGFR 61 07/05/2023    EGFR 71 07/04/2023    EGFR 72 07/02/2023    CREATININE 0.92 07/05/2023    CREATININE 0.81 07/04/2023    CREATININE 0.80 07/02/2023     Plan  - avoid nephrotoxic agents, renal dose medications

## 2023-07-06 NOTE — ASSESSMENT & PLAN NOTE
UA 7/1: positive for nitrites and bacteria  Ucx 7/1: >100,000cfu E.coli, pan susceptible   No dysuria, no polyuria.   Stable vital signs    Plan:  -cephalexin 500 mg 3 times daily; plan for 5 days total course (completed 5 day course 7/5 )

## 2023-07-06 NOTE — ASSESSMENT & PLAN NOTE
-Hgb: 6.3>6.2 >8.4>7.3>7.6  -Hgb stable at 7.6 this am  -Iron: 47  -s/p 1u PRBC on 7/3, 200 mg IV iron given (7/2)  -Pt endorses poor PO intake  -7/3 CT Ab/Pelv to assess for active source of bleeding: small amount of hemorrhage possibly present in gastric lumen, R psoas    Plan  -Holding Eliquis  -Hemoccult order  -Started patient on 325 mg ferrous sulfate daily   -Transfuse hgb > 7  -f/u cbc Monday and resume eliquis if stable at that time

## 2023-07-06 NOTE — ASSESSMENT & PLAN NOTE
Patient has chronic pain related to L knee OA and spondylolithesis  Home meds: Flexeril 10 TID PRN, Gabapentin 900 mg TID, oxycodone 10 mg q6h PRN  PDMP reviewed, pt appears to use Oxycodone 4 times a day (pt fills 120 tabs per month)  Last CSI of left knee, 11/23/22   Patient still taking Oxy 10 every 8 hours pain still 8-10 out of 10  I have a significant discussion with patient as well overuse history of opioid can cause amatory dysfunction lead to falling and more complication  Plan  - Decrease flexeril 10 qHs PRN and decrease oxycodone 10 mg q8h PRN  -advised patient to take tylenol PRN instead of aleve due to bleeding risk

## 2023-07-06 NOTE — PLAN OF CARE
Problem: MOBILITY - ADULT  Goal: Maintain or return to baseline ADL function  Description: INTERVENTIONS:  -  Assess patient's ability to carry out ADLs; assess patient's baseline for ADL function and identify physical deficits which impact ability to perform ADLs (bathing, care of mouth/teeth, toileting, grooming, dressing, etc.)  - Assess/evaluate cause of self-care deficits   - Assess range of motion  - Assess patient's mobility; develop plan if impaired  - Assess patient's need for assistive devices and provide as appropriate  - Encourage maximum independence but intervene and supervise when necessary  - Involve family in performance of ADLs  - Assess for home care needs following discharge   - Consider OT consult to assist with ADL evaluation and planning for discharge  - Provide patient education as appropriate  Outcome: Progressing     Problem: PAIN - ADULT  Goal: Verbalizes/displays adequate comfort level or baseline comfort level  Description: Interventions:  - Encourage patient to monitor pain and request assistance  - Assess pain using appropriate pain scale  - Administer analgesics based on type and severity of pain and evaluate response  - Implement non-pharmacological measures as appropriate and evaluate response  - Consider cultural and social influences on pain and pain management  - Notify physician/advanced practitioner if interventions unsuccessful or patient reports new pain  Outcome: Progressing     Problem: PAIN - ADULT  Goal: Verbalizes/displays adequate comfort level or baseline comfort level  Description: Interventions:  - Encourage patient to monitor pain and request assistance  - Assess pain using appropriate pain scale  - Administer analgesics based on type and severity of pain and evaluate response  - Implement non-pharmacological measures as appropriate and evaluate response  - Consider cultural and social influences on pain and pain management  - Notify physician/advanced practitioner if interventions unsuccessful or patient reports new pain  Outcome: Progressing

## 2023-07-06 NOTE — PLAN OF CARE
Problem: MOBILITY - ADULT  Goal: Maintain or return to baseline ADL function  Description: INTERVENTIONS:  -  Assess patient's ability to carry out ADLs; assess patient's baseline for ADL function and identify physical deficits which impact ability to perform ADLs (bathing, care of mouth/teeth, toileting, grooming, dressing, etc.)  - Assess/evaluate cause of self-care deficits   - Assess range of motion  - Assess patient's mobility; develop plan if impaired  - Assess patient's need for assistive devices and provide as appropriate  - Encourage maximum independence but intervene and supervise when necessary  - Involve family in performance of ADLs  - Assess for home care needs following discharge   - Consider OT consult to assist with ADL evaluation and planning for discharge  - Provide patient education as appropriate  Outcome: Progressing     Problem: SAFETY ADULT  Goal: Maintain or return to baseline ADL function  Description: INTERVENTIONS:  -  Assess patient's ability to carry out ADLs; assess patient's baseline for ADL function and identify physical deficits which impact ability to perform ADLs (bathing, care of mouth/teeth, toileting, grooming, dressing, etc.)  - Assess/evaluate cause of self-care deficits   - Assess range of motion  - Assess patient's mobility; develop plan if impaired  - Assess patient's need for assistive devices and provide as appropriate  - Encourage maximum independence but intervene and supervise when necessary  - Involve family in performance of ADLs  - Assess for home care needs following discharge   - Consider OT consult to assist with ADL evaluation and planning for discharge  - Provide patient education as appropriate  Outcome: Progressing

## 2023-07-07 ENCOUNTER — HOME CARE VISIT (OUTPATIENT)
Dept: HOME HEALTH SERVICES | Facility: HOME HEALTHCARE | Age: 78
End: 2023-07-07

## 2023-07-07 DIAGNOSIS — G89.4 PAIN SYNDROME, CHRONIC: ICD-10-CM

## 2023-07-07 NOTE — UTILIZATION REVIEW
NOTIFICATION OF ADMISSION DISCHARGE   This is a Notification of Discharge from [address] [facility]. Please be advised that this patient has been discharge from our facility. Below you will find the admission and discharge date and time including the patient’s disposition. UTILIZATION REVIEW CONTACT:  Vernell Esteban  Utilization   Network Utilization Review Department  Phone: [phone] x carefully listen to the prompts. All voicemails are confidential.  Email: [email or web address]     ADMISSION INFORMATION  PRESENTATION DATE: 6/30/2023 10:08 PM  OBERVATION ADMISSION DATE:   INPATIENT ADMISSION DATE: 7/1/23 10:39 AM   DISCHARGE DATE: 7/6/2023  4:40 PM   DISPOSITION:Home with Home Health Care    IMPORTANT INFORMATION:  Send all requests for admission clinical reviews, approved or denied determinations and any other requests to dedicated fax number below belonging to the campus where the patient is receiving treatment.  List of dedicated fax numbers:  [place] DENIALS (Administrative/Medical Necessity) [phone] [address] [facility] (Maternity/NICU/Pediatrics) [phone]   [facility] [phone]   [facility] [phone] [address] [phone]   [address] [place] [address] [phone]   [facility] [phone]   [address] [place] [address] [address] [facility] [phone]   [address] [facility] [phone] [address] [facility] [phone] [address] [facility] [address] [facility] [phone]

## 2023-07-08 ENCOUNTER — HOME CARE VISIT (OUTPATIENT)
Dept: HOME HEALTH SERVICES | Facility: HOME HEALTHCARE | Age: 78
End: 2023-07-08
Payer: COMMERCIAL

## 2023-07-08 VITALS
TEMPERATURE: 99 F | HEART RATE: 87 BPM | RESPIRATION RATE: 18 BRPM | SYSTOLIC BLOOD PRESSURE: 110 MMHG | OXYGEN SATURATION: 96 % | DIASTOLIC BLOOD PRESSURE: 70 MMHG

## 2023-07-08 PROCEDURE — G0299 HHS/HOSPICE OF RN EA 15 MIN: HCPCS

## 2023-07-08 PROCEDURE — 400013 VN SOC

## 2023-07-09 RX ORDER — OXYCODONE HYDROCHLORIDE 10 MG/1
10 TABLET ORAL EVERY 6 HOURS PRN
Qty: 120 TABLET | Refills: 0 | Status: SHIPPED | OUTPATIENT
Start: 2023-07-09 | End: 2023-08-08

## 2023-07-10 ENCOUNTER — LAB REQUISITION (OUTPATIENT)
Dept: LAB | Facility: HOSPITAL | Age: 78
End: 2023-07-10
Payer: COMMERCIAL

## 2023-07-10 ENCOUNTER — HOME CARE VISIT (OUTPATIENT)
Dept: HOME HEALTH SERVICES | Facility: HOME HEALTHCARE | Age: 78
End: 2023-07-10
Payer: COMMERCIAL

## 2023-07-10 VITALS
DIASTOLIC BLOOD PRESSURE: 76 MMHG | RESPIRATION RATE: 20 BRPM | SYSTOLIC BLOOD PRESSURE: 108 MMHG | OXYGEN SATURATION: 95 % | TEMPERATURE: 48.2 F | HEART RATE: 80 BPM

## 2023-07-10 DIAGNOSIS — D64.9 ANEMIA, UNSPECIFIED: ICD-10-CM

## 2023-07-10 LAB
BASOPHILS # BLD AUTO: 0.03 THOUSANDS/ÂΜL (ref 0–0.1)
BASOPHILS NFR BLD AUTO: 1 % (ref 0–1)
EOSINOPHIL # BLD AUTO: 0.2 THOUSAND/ÂΜL (ref 0–0.61)
EOSINOPHIL NFR BLD AUTO: 3 % (ref 0–6)
ERYTHROCYTE [DISTWIDTH] IN BLOOD BY AUTOMATED COUNT: 18.4 % (ref 11.6–15.1)
HCT VFR BLD AUTO: 26.8 % (ref 34.8–46.1)
HGB BLD-MCNC: 8.1 G/DL (ref 11.5–15.4)
IMM GRANULOCYTES # BLD AUTO: 0.03 THOUSAND/UL (ref 0–0.2)
IMM GRANULOCYTES NFR BLD AUTO: 1 % (ref 0–2)
LYMPHOCYTES # BLD AUTO: 1.08 THOUSANDS/ÂΜL (ref 0.6–4.47)
LYMPHOCYTES NFR BLD AUTO: 17 % (ref 14–44)
MCH RBC QN AUTO: 30.2 PG (ref 26.8–34.3)
MCHC RBC AUTO-ENTMCNC: 30.2 G/DL (ref 31.4–37.4)
MCV RBC AUTO: 100 FL (ref 82–98)
MONOCYTES # BLD AUTO: 0.4 THOUSAND/ÂΜL (ref 0.17–1.22)
MONOCYTES NFR BLD AUTO: 6 % (ref 4–12)
NEUTROPHILS # BLD AUTO: 4.77 THOUSANDS/ÂΜL (ref 1.85–7.62)
NEUTS SEG NFR BLD AUTO: 72 % (ref 43–75)
NRBC BLD AUTO-RTO: 0 /100 WBCS
PLATELET # BLD AUTO: 304 THOUSANDS/UL (ref 149–390)
PMV BLD AUTO: 11.5 FL (ref 8.9–12.7)
RBC # BLD AUTO: 2.68 MILLION/UL (ref 3.81–5.12)
WBC # BLD AUTO: 6.51 THOUSAND/UL (ref 4.31–10.16)

## 2023-07-10 PROCEDURE — G0299 HHS/HOSPICE OF RN EA 15 MIN: HCPCS

## 2023-07-10 PROCEDURE — 85025 COMPLETE CBC W/AUTO DIFF WBC: CPT | Performed by: FAMILY MEDICINE

## 2023-07-11 ENCOUNTER — HOME CARE VISIT (OUTPATIENT)
Dept: HOME HEALTH SERVICES | Facility: HOME HEALTHCARE | Age: 78
End: 2023-07-11
Payer: COMMERCIAL

## 2023-07-11 VITALS — OXYGEN SATURATION: 98 % | HEART RATE: 76 BPM | SYSTOLIC BLOOD PRESSURE: 80 MMHG | DIASTOLIC BLOOD PRESSURE: 60 MMHG

## 2023-07-11 DIAGNOSIS — M48.061 SPINAL STENOSIS OF LUMBAR REGION, UNSPECIFIED WHETHER NEUROGENIC CLAUDICATION PRESENT: ICD-10-CM

## 2023-07-11 DIAGNOSIS — M79.601 MUSCULOSKELETAL ARM PAIN, RIGHT: ICD-10-CM

## 2023-07-11 PROCEDURE — G0152 HHCP-SERV OF OT,EA 15 MIN: HCPCS

## 2023-07-12 RX ORDER — GABAPENTIN 300 MG/1
CAPSULE ORAL
Qty: 270 CAPSULE | Refills: 0 | Status: SHIPPED | OUTPATIENT
Start: 2023-07-12

## 2023-07-12 RX ORDER — CYCLOBENZAPRINE HCL 10 MG
10 TABLET ORAL 3 TIMES DAILY PRN
Qty: 90 TABLET | Refills: 3 | Status: SHIPPED | OUTPATIENT
Start: 2023-07-12

## 2023-07-12 NOTE — CASE COMMUNICATION
Informational only    BP on during PT visit 80/60. Patient reports not feeling well and declined wanting to get up. Declined wanting to go to ER. Instructed to drink a lot of water. Apartment without AC while therapist in home.

## 2023-07-13 ENCOUNTER — HOME CARE VISIT (OUTPATIENT)
Dept: HOME HEALTH SERVICES | Facility: HOME HEALTHCARE | Age: 78
End: 2023-07-13
Payer: COMMERCIAL

## 2023-07-13 VITALS
OXYGEN SATURATION: 96 % | HEART RATE: 98 BPM | DIASTOLIC BLOOD PRESSURE: 88 MMHG | RESPIRATION RATE: 18 BRPM | TEMPERATURE: 98 F | SYSTOLIC BLOOD PRESSURE: 116 MMHG

## 2023-07-13 PROCEDURE — G0299 HHS/HOSPICE OF RN EA 15 MIN: HCPCS

## 2023-07-13 PROCEDURE — G0151 HHCP-SERV OF PT,EA 15 MIN: HCPCS

## 2023-07-13 NOTE — CASE COMMUNICATION
1x1 PT evaluation completed. Patient demonstrates indep with transfers and use of rollator indoor surfaces. Patient declines need for further PT intervention feeling she is at her baseline of function.

## 2023-07-18 ENCOUNTER — HOME CARE VISIT (OUTPATIENT)
Dept: HOME HEALTH SERVICES | Facility: HOME HEALTHCARE | Age: 78
End: 2023-07-18
Payer: COMMERCIAL

## 2023-07-18 ENCOUNTER — TELEPHONE (OUTPATIENT)
Dept: FAMILY MEDICINE CLINIC | Facility: CLINIC | Age: 78
End: 2023-07-18

## 2023-07-18 VITALS
OXYGEN SATURATION: 92 % | TEMPERATURE: 97.9 F | RESPIRATION RATE: 20 BRPM | SYSTOLIC BLOOD PRESSURE: 116 MMHG | HEART RATE: 80 BPM | DIASTOLIC BLOOD PRESSURE: 88 MMHG

## 2023-07-18 PROCEDURE — G0299 HHS/HOSPICE OF RN EA 15 MIN: HCPCS

## 2023-07-18 NOTE — TELEPHONE ENCOUNTER
Siddhartha Jiang, from [address] [facility] reporting she was in to see patient who has had a low PB for the a few days due to heat with no AC. She instructed patient to drink more water. Which at the same time patient is not taking Metoprolol during this time. Also Eliquis has been on hold since hospitalization. Labs avail 07/10/23. Caller wants to know if the can be resumed. Patient was discharged with the instructions not to continue Ambien but redirected to take OT two Melatonin 10 mg at bedtime but picked up 5 mg instead. However caller stated, patient wants to continue Ambien because the Melatonin is not working. Please advise.

## 2023-07-20 ENCOUNTER — HOME CARE VISIT (OUTPATIENT)
Dept: HOME HEALTH SERVICES | Facility: HOME HEALTHCARE | Age: 78
End: 2023-07-20
Payer: COMMERCIAL

## 2023-07-20 ENCOUNTER — TELEPHONE (OUTPATIENT)
Dept: FAMILY MEDICINE CLINIC | Facility: CLINIC | Age: 78
End: 2023-07-20

## 2023-07-20 VITALS
RESPIRATION RATE: 20 BRPM | DIASTOLIC BLOOD PRESSURE: 82 MMHG | HEART RATE: 68 BPM | TEMPERATURE: 97.7 F | SYSTOLIC BLOOD PRESSURE: 126 MMHG | OXYGEN SATURATION: 92 %

## 2023-07-20 DIAGNOSIS — I48.0 PAROXYSMAL ATRIAL FIBRILLATION (HCC): Primary | ICD-10-CM

## 2023-07-20 PROCEDURE — G0299 HHS/HOSPICE OF RN EA 15 MIN: HCPCS

## 2023-07-20 NOTE — TELEPHONE ENCOUNTER
Received a forwarded message from Dr. Taylor Car from Karen Jauregui (home health nurse) about restarting the Eliquis. Chart reviewed, hemoglobin is at 8.1 as of 7/10/2023; okay to resume Eliquis. Message [place] texted back to Ruben Barba with above instructions. Advised to call office with further questions.

## 2023-07-20 NOTE — TELEPHONE ENCOUNTER
Patient has been contacted and informed. Patient express understanding and upcoming appt has been confirmed. No questions or concerns at this moment.

## 2023-07-24 ENCOUNTER — HOME CARE VISIT (OUTPATIENT)
Dept: HOME HEALTH SERVICES | Facility: HOME HEALTHCARE | Age: 78
End: 2023-07-24
Payer: COMMERCIAL

## 2023-07-24 VITALS
HEART RATE: 66 BPM | OXYGEN SATURATION: 97 % | TEMPERATURE: 98 F | SYSTOLIC BLOOD PRESSURE: 120 MMHG | DIASTOLIC BLOOD PRESSURE: 60 MMHG

## 2023-07-24 PROCEDURE — G0300 HHS/HOSPICE OF LPN EA 15 MIN: HCPCS

## 2023-07-27 ENCOUNTER — OFFICE VISIT (OUTPATIENT)
Dept: FAMILY MEDICINE CLINIC | Facility: CLINIC | Age: 78
End: 2023-07-27

## 2023-07-27 VITALS
SYSTOLIC BLOOD PRESSURE: 110 MMHG | TEMPERATURE: 98.3 F | DIASTOLIC BLOOD PRESSURE: 79 MMHG | OXYGEN SATURATION: 93 % | HEART RATE: 104 BPM | BODY MASS INDEX: 27.36 KG/M2 | WEIGHT: 159.4 LBS

## 2023-07-27 DIAGNOSIS — G47.00 INSOMNIA, UNSPECIFIED TYPE: Primary | ICD-10-CM

## 2023-07-27 DIAGNOSIS — G89.4 CHRONIC PAIN SYNDROME: ICD-10-CM

## 2023-07-27 DIAGNOSIS — R26.89 BALANCE DISORDER: ICD-10-CM

## 2023-07-27 DIAGNOSIS — F11.20 CONTINUOUS OPIOID DEPENDENCE (HCC): ICD-10-CM

## 2023-07-27 RX ORDER — ZOLPIDEM TARTRATE 5 MG/1
5 TABLET ORAL
Qty: 30 TABLET | Refills: 0 | Status: SHIPPED | OUTPATIENT
Start: 2023-07-27

## 2023-07-27 NOTE — PATIENT INSTRUCTIONS
Goals of care:  Maximize your health and quality of life by:   · Increasing your level of function and activity  · Decreasing the negative effects of pain on your life  · Minimizing the risks and side effects of medications and ensuring safe use of opioid medication     Ways for you to help meet your goals:  Maintain a healthy lifestyle. This includes proper nutrition, regular physical activity as able, try for 8 hours of sleep per night, use stress reduction strategies, avoid triggers. Risks and side effects of opioid use:  Prescription opioids carry serious risks of addiction and  overdose, especially with prolonged use. An opioid overdose,  often marked by slowed breathing, can cause sudden death. The  use of prescription opioids can have a number of side effects as  well, even when taken as directed:  · Tolerance--meaning you might need to take more of a medication for the same pain relief  · Physical dependence--meaning you have symptoms of withdrawal when a medication is stopped  · Increased sensitivity to pain  · Constipation  · Nausea, vomiting, dry mouth  · Sleepiness and dizziness   · Confusion  · Depression  · Low levels of testosterone that can result in lower sex drive, energy, and strength  · Itching and sweating    If you are prescribed opioids for pain:  · Never take opioids in greater amounts or more often than prescribed. · Help prevent misuse and abuse. - Never sell or share prescription opioids.        - Never use another person’s prescription opioids. · ‡Store prescription opioids in a secure place and out of reach of others (this may include visitors, children, friends, and family). · Safely dispose of unused prescription opioids: Find your community drug take-back program or your pharmacy mail-back program, or flush them down the toilet, following guidance from the Food and Drug Administration (www.fda.gov/Drugs/ResourcesForYou).   · ‡Visit www.cdc.gov/drugoverdose to learn about the risks of opioid abuse and overdose. · If you believe you may be struggling with addiction, tell your health care provider and ask for guidance or call [facility]’s National Helpline at 1-838-013-CFNV.

## 2023-07-27 NOTE — PROGRESS NOTES
Assessment/Plan     Problem List Items Addressed This Visit        Other    Chronic pain   Other Visit Diagnoses     Continuous opioid dependence ([address])             Treatment Plan: Continue medications at decreased frequency after hospitalizaiton    Treatment Goals: To be able to do daily activities    Opiate risks  There are risks associated with opioid medications, including dependence, addiction and tolerance. The patient understands and agrees to use these medications only as prescribed. Potential side effects of the medications include, but are not limited to, constipation, drowsiness, addiction, impaired judgment and risk of fatal overdose if not taken as prescribed. The patient was warned against driving while taking sedation medications. Sharing medications is a felony. At this point in time, the patient is showing no signs of addiction, abuse, diversion or suicidal ideation. Pateint is taking concurrent benzodiazepines. Has been counseled on the risks of taking opioids and benzodiazepines including sedation, increased fall risk, dizziness, addictive potential and death. Subjective     Opioid Management:   Type of visit: Follow-up    Pain related diagnoses: knee OA, spondylolithesis    Screening Tools/Assessments:    PHQ-2/9:  Last PHQ-2 score: 2 (Last PHQ-2 date: 4/27/2023)  Last PHQ-9 score: 0 (Last PHQ-9 date: 4/14/2022)    Brief Pain Inventory (BPI):  1) Throughout our lives, most of us have had pain from time to time (such as minor headaches, sprains, and toothaches). Have you had pain other than these everyday kinds of pain today? Yes  2) Where is your pain located? left side of back to knee  3) Rate your pain at its worst in the last 24 hours: 9  4) Rate your pain at its least in the last 24 hours: 3  5) Rate your average level of pain: 7  6) Rate your pain right now: 6  7) What treatments or medications are you receiving for your pain?  oxycodone,  8) In the past 24 hours, how much relief have pain treatments or medication provided? 50%  9) During the past 24 hours, pain has interfered with your:     A) General activity: 1     B) Mood: 7     C) Walking ability: 9     D) Normal work (work outside the home & housework): 6     E) Relations with other people: 8     F) Sleep: 8     G) Enjoyment of life: 8    Drug Screen:  Date of last drug screen: 4/9/2023  Drug screen result based off current prescriptions: consistent    Opioid agreement:  Active Opioid agreement on file?: Yes    Opioid agreement signed date: 11/18/2022  Opioid agreement expiration date: 11/18/2023    Naloxone:  Currently prescribed Naloxone (Narcan): No      HPI  Pain Medications             cyclobenzaprine (FLEXERIL) 10 mg tablet TAKE 1 TABLET (10 MG TOTAL) BY MOUTH 3 (THREE) TIMES A DAY AS NEEDED FOR MUSCLE SPASMS    escitalopram (LEXAPRO) 10 mg tablet TAKE 1 TABLET (10 MG TOTAL) BY MOUTH DAILY    gabapentin (NEURONTIN) 300 mg capsule TAKE 3 CAPSULES (900 MG TOTAL) BY MOUTH 3 (THREE) TIMES A DAY    oxyCODONE (ROXICODONE) 10 MG TABS TAKE 1 TABLET (10 MG TOTAL) BY MOUTH EVERY 6 (SIX) HOURS AS NEEDED FOR MODERATE PAIN MAX DAILY AMOUNT: 40 MG DO NOT START BEFORE JUNE 7, 2023. oxyCODONE (ROXICODONE) 10 MG TABS Take 10 mg by mouth every 6 (six) hours as needed for severe pain.  Indications: Chronic Pain         Outpatient Morphine Milligram Equivalents Per Day     7/27/23 - 8/8/23 120 MME/Day    Order Name Dose Route Frequency Maximum MME/Day     oxyCODONE (ROXICODONE) 10 MG TABS 10 mg Oral Every 6 hours PRN 60 MME/Day     oxyCODONE (ROXICODONE) 10 MG TABS 10 mg Oral Every 6 hours PRN 60 MME/Day    Total Potential Morphine Milligram Equivalents Per Day 120 MME/Day    Calculation Information        oxyCODONE (ROXICODONE) 10 MG TABS    oxyCODONE 10 MG Tabs: maximum daily dose of 40 mg * morphine equivalence factor of 1.5 = 60 MME/Day       oxyCODONE (ROXICODONE) 10 MG TABS    oxyCODONE 10 MG Tabs: single dose of 10 mg * 4 doses per day * morphine equivalence factor of 1.5 = 60 MME/Day                     8/9/23 and after 60 MME/Day    Order Name Dose Route Frequency Maximum MME/Day     oxyCODONE (ROXICODONE) 10 MG TABS 10 mg Oral Every 6 hours PRN 60 MME/Day    Total Potential Morphine Milligram Equivalents Per Day 60 MME/Day    Calculation Information        oxyCODONE (ROXICODONE) 10 MG TABS    oxyCODONE 10 MG Tabs: single dose of 10 mg * 4 doses per day * morphine equivalence factor of 1.5 = 60 MME/Day                         PDMP Review       Value Time User    PDMP Reviewed  Yes 7/1/2023  1:37 AM Amy Morales,          Review of Systems  Objective     /79 (BP Location: Left arm, Patient Position: Sitting, Cuff Size: Standard)   Pulse 104   Temp 98.3 °F (36.8 °C) (Temporal)   Wt 72.3 kg (159 lb 6.4 oz)   SpO2 93%   BMI 27.36 kg/m²     Physical Exam    Eric Hodgson

## 2023-07-28 ENCOUNTER — TELEPHONE (OUTPATIENT)
Dept: HOME HEALTH SERVICES | Facility: HOME HEALTHCARE | Age: 78
End: 2023-07-28

## 2023-07-28 ENCOUNTER — HOME CARE VISIT (OUTPATIENT)
Dept: HOME HEALTH SERVICES | Facility: HOME HEALTHCARE | Age: 78
End: 2023-07-28
Payer: COMMERCIAL

## 2023-07-31 ENCOUNTER — HOME CARE VISIT (OUTPATIENT)
Dept: HOME HEALTH SERVICES | Facility: HOME HEALTHCARE | Age: 78
End: 2023-07-31
Payer: COMMERCIAL

## 2023-07-31 VITALS
HEART RATE: 87 BPM | RESPIRATION RATE: 20 BRPM | TEMPERATURE: 98.1 F | SYSTOLIC BLOOD PRESSURE: 140 MMHG | OXYGEN SATURATION: 93 % | DIASTOLIC BLOOD PRESSURE: 90 MMHG

## 2023-07-31 PROCEDURE — G0299 HHS/HOSPICE OF RN EA 15 MIN: HCPCS

## 2023-08-03 ENCOUNTER — HOME CARE VISIT (OUTPATIENT)
Dept: HOME HEALTH SERVICES | Facility: HOME HEALTHCARE | Age: 78
End: 2023-08-03
Payer: COMMERCIAL

## 2023-08-03 VITALS
TEMPERATURE: 97.7 F | OXYGEN SATURATION: 94 % | SYSTOLIC BLOOD PRESSURE: 104 MMHG | HEART RATE: 80 BPM | RESPIRATION RATE: 20 BRPM | DIASTOLIC BLOOD PRESSURE: 72 MMHG

## 2023-08-03 PROCEDURE — G0299 HHS/HOSPICE OF RN EA 15 MIN: HCPCS

## 2023-08-14 DIAGNOSIS — G89.4 PAIN SYNDROME, CHRONIC: ICD-10-CM

## 2023-08-15 RX ORDER — OXYCODONE HYDROCHLORIDE 10 MG/1
10 TABLET ORAL EVERY 6 HOURS PRN
Qty: 120 TABLET | Refills: 0 | Status: SHIPPED | OUTPATIENT
Start: 2023-08-15 | End: 2023-09-19 | Stop reason: SDUPTHER

## 2023-08-22 ENCOUNTER — TELEPHONE (OUTPATIENT)
Dept: FAMILY MEDICINE CLINIC | Facility: CLINIC | Age: 78
End: 2023-08-22

## 2023-08-23 DIAGNOSIS — G47.00 INSOMNIA, UNSPECIFIED TYPE: ICD-10-CM

## 2023-08-23 RX ORDER — ZOLPIDEM TARTRATE 5 MG/1
5 TABLET ORAL
Qty: 30 TABLET | Refills: 0 | Status: SHIPPED | OUTPATIENT
Start: 2023-08-23 | End: 2023-09-05 | Stop reason: SDUPTHER

## 2023-08-24 ENCOUNTER — TELEPHONE (OUTPATIENT)
Dept: FAMILY MEDICINE CLINIC | Facility: CLINIC | Age: 78
End: 2023-08-24

## 2023-08-24 DIAGNOSIS — G47.00 INSOMNIA, UNSPECIFIED TYPE: ICD-10-CM

## 2023-08-24 RX ORDER — ZOLPIDEM TARTRATE 5 MG/1
5 TABLET ORAL
Qty: 30 TABLET | Refills: 0 | Status: CANCELLED | OUTPATIENT
Start: 2023-08-24

## 2023-08-25 ENCOUNTER — REFRACTION ONLY (OUTPATIENT)
Dept: URBAN - METROPOLITAN AREA CLINIC 6 | Facility: CLINIC | Age: 78
End: 2023-08-25

## 2023-08-25 DIAGNOSIS — H52.4: ICD-10-CM

## 2023-08-25 DIAGNOSIS — H52.13: ICD-10-CM

## 2023-08-25 PROCEDURE — 92015 DETERMINE REFRACTIVE STATE: CPT

## 2023-08-25 ASSESSMENT — KERATOMETRY
OS_AXISANGLE2_DEGREES: 157
OS_K1POWER_DIOPTERS: 44.25
OS_K2POWER_DIOPTERS: 45.50
OD_K1POWER_DIOPTERS: 43.50
OD_AXISANGLE2_DEGREES: 174
OD_AXISANGLE_DEGREES: 084
OS_AXISANGLE_DEGREES: 067
OD_K2POWER_DIOPTERS: 45.00

## 2023-08-25 ASSESSMENT — TONOMETRY
OS_IOP_MMHG: 14
OD_IOP_MMHG: 15

## 2023-08-25 ASSESSMENT — VISUAL ACUITY
OS_CC: 20/30-1
OU_CC: J1
OD_CC: 20/40

## 2023-09-02 PROBLEM — N39.0 UTI (URINARY TRACT INFECTION): Status: RESOLVED | Noted: 2023-07-01 | Resolved: 2023-09-02

## 2023-09-05 DIAGNOSIS — G47.00 INSOMNIA, UNSPECIFIED TYPE: ICD-10-CM

## 2023-09-05 NOTE — TELEPHONE ENCOUNTER
Patient was originally scheduled 8/24/23 to see you, she cancelled. She did schedule today with resident, however appointment had to be cancelled.  She is scheduled to see you 9/25/23

## 2023-09-05 NOTE — TELEPHONE ENCOUNTER
Patient had appointment today but it was cancelled provider not available.     Patient has appointment 9/27 but needs refills for 2 meds      zolpidem (AMBIEN) and  oxyCODONE (ROXICODONE) 10 mg    Patient can be reached at  [phone]

## 2023-09-07 RX ORDER — ZOLPIDEM TARTRATE 5 MG/1
5 TABLET ORAL
Qty: 30 TABLET | Refills: 0 | Status: SHIPPED | OUTPATIENT
Start: 2023-09-07

## 2023-09-14 DIAGNOSIS — I48.0 PAROXYSMAL ATRIAL FIBRILLATION (HCC): ICD-10-CM

## 2023-09-15 RX ORDER — APIXABAN 5 MG/1
TABLET, FILM COATED ORAL
Qty: 60 TABLET | Refills: 0 | Status: SHIPPED | OUTPATIENT
Start: 2023-09-15

## 2023-09-16 ENCOUNTER — APPOINTMENT (EMERGENCY)
Dept: RADIOLOGY | Facility: HOSPITAL | Age: 78
End: 2023-09-16
Payer: COMMERCIAL

## 2023-09-16 ENCOUNTER — HOSPITAL ENCOUNTER (EMERGENCY)
Facility: HOSPITAL | Age: 78
Discharge: HOME | End: 2023-09-16
Attending: SURGERY
Payer: COMMERCIAL

## 2023-09-16 VITALS
OXYGEN SATURATION: 94 % | TEMPERATURE: 97.4 F | RESPIRATION RATE: 20 BRPM | SYSTOLIC BLOOD PRESSURE: 152 MMHG | HEART RATE: 76 BPM | DIASTOLIC BLOOD PRESSURE: 77 MMHG | WEIGHT: 169.53 LBS
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
## 2023-09-16 PROBLEM — M25.562 CHRONIC PAIN OF LEFT KNEE: Status: ACTIVE | Noted: 2023-09-16

## 2023-09-16 PROBLEM — G89.29 CHRONIC PAIN OF LEFT KNEE: Status: ACTIVE | Noted: 2023-09-16

## 2023-09-16 LAB
2HR DELTA HS TROPONIN: -1 NG/L
2HR DELTA HS TROPONIN: -1 NG/L
ALBUMIN SERPL BCP-MCNC: 3.8 G/DL (ref 3.5–5)
ALBUMIN SERPL BCP-MCNC: 3.8 G/DL (ref 3.5–5)
ALP SERPL-CCNC: 97 U/L (ref 34–104)
ALP SERPL-CCNC: 97 U/L (ref 34–104)
ALT SERPL W P-5'-P-CCNC: 10 U/L (ref 7–52)
ALT SERPL W P-5'-P-CCNC: 10 U/L (ref 7–52)
ANION GAP SERPL CALCULATED.3IONS-SCNC: 6 MMOL/L
ANION GAP SERPL CALCULATED.3IONS-SCNC: 6 MMOL/L
APTT PPP: 36 SECONDS (ref 23–37)
APTT PPP: 36 SECONDS (ref 23–37)
AST SERPL W P-5'-P-CCNC: 19 U/L (ref 13–39)
AST SERPL W P-5'-P-CCNC: 19 U/L (ref 13–39)
ATRIAL RATE: 66 BPM
ATRIAL RATE: 66 BPM
BASE EXCESS BLDA CALC-SCNC: 0 MMOL/L (ref -2–3)
BASE EXCESS BLDA CALC-SCNC: 0 MMOL/L (ref -2–3)
BASOPHILS # BLD AUTO: 0.06 THOUSANDS/ÂΜL (ref 0–0.1)
BASOPHILS # BLD AUTO: 0.06 THOUSANDS/ÂΜL (ref 0–0.1)
BASOPHILS NFR BLD AUTO: 1 % (ref 0–1)
BASOPHILS NFR BLD AUTO: 1 % (ref 0–1)
BILIRUB SERPL-MCNC: 0.63 MG/DL (ref 0.2–1)
BILIRUB SERPL-MCNC: 0.63 MG/DL (ref 0.2–1)
BILIRUB UR QL STRIP: NEGATIVE
BILIRUB UR QL STRIP: NEGATIVE
BUN SERPL-MCNC: 14 MG/DL (ref 5–25)
BUN SERPL-MCNC: 14 MG/DL (ref 5–25)
CA-I BLD-SCNC: 1.24 MMOL/L (ref 1.12–1.32)
CA-I BLD-SCNC: 1.24 MMOL/L (ref 1.12–1.32)
CALCIUM SERPL-MCNC: 9 MG/DL (ref 8.4–10.2)
CALCIUM SERPL-MCNC: 9 MG/DL (ref 8.4–10.2)
CARDIAC TROPONIN I PNL SERPL HS: 7 NG/L
CARDIAC TROPONIN I PNL SERPL HS: 7 NG/L
CARDIAC TROPONIN I PNL SERPL HS: 8 NG/L
CARDIAC TROPONIN I PNL SERPL HS: 8 NG/L
CHLORIDE SERPL-SCNC: 105 MMOL/L (ref 96–108)
CHLORIDE SERPL-SCNC: 105 MMOL/L (ref 96–108)
CK SERPL-CCNC: 392 U/L (ref 26–192)
CK SERPL-CCNC: 392 U/L (ref 26–192)
CLARITY UR: CLEAR
CLARITY UR: CLEAR
CO2 SERPL-SCNC: 28 MMOL/L (ref 21–32)
CO2 SERPL-SCNC: 28 MMOL/L (ref 21–32)
COLOR UR: NORMAL
COLOR UR: NORMAL
CREAT SERPL-MCNC: 1.04 MG/DL (ref 0.6–1.3)
CREAT SERPL-MCNC: 1.04 MG/DL (ref 0.6–1.3)
EOSINOPHIL # BLD AUTO: 0.29 THOUSAND/ÂΜL (ref 0–0.61)
EOSINOPHIL # BLD AUTO: 0.29 THOUSAND/ÂΜL (ref 0–0.61)
EOSINOPHIL NFR BLD AUTO: 4 % (ref 0–6)
EOSINOPHIL NFR BLD AUTO: 4 % (ref 0–6)
ERYTHROCYTE [DISTWIDTH] IN BLOOD BY AUTOMATED COUNT: 20 % (ref 11.6–15.1)
ERYTHROCYTE [DISTWIDTH] IN BLOOD BY AUTOMATED COUNT: 20 % (ref 11.6–15.1)
GFR SERPL CREATININE-BSD FRML MDRD: 52 ML/MIN/1.73SQ M
GFR SERPL CREATININE-BSD FRML MDRD: 52 ML/MIN/1.73SQ M
GLUCOSE SERPL-MCNC: 100 MG/DL (ref 65–140)
GLUCOSE SERPL-MCNC: 100 MG/DL (ref 65–140)
GLUCOSE SERPL-MCNC: 105 MG/DL (ref 65–140)
GLUCOSE SERPL-MCNC: 105 MG/DL (ref 65–140)
GLUCOSE UR STRIP-MCNC: NEGATIVE MG/DL
GLUCOSE UR STRIP-MCNC: NEGATIVE MG/DL
HCO3 BLDA-SCNC: 25.9 MMOL/L (ref 24–30)
HCO3 BLDA-SCNC: 25.9 MMOL/L (ref 24–30)
HCT VFR BLD AUTO: 32.7 % (ref 34.8–46.1)
HCT VFR BLD AUTO: 32.7 % (ref 34.8–46.1)
HCT VFR BLD CALC: 32 % (ref 34.8–46.1)
HCT VFR BLD CALC: 32 % (ref 34.8–46.1)
HGB BLD-MCNC: 10 G/DL (ref 11.5–15.4)
HGB BLD-MCNC: 10 G/DL (ref 11.5–15.4)
HGB BLDA-MCNC: 10.9 G/DL (ref 11.5–15.4)
HGB BLDA-MCNC: 10.9 G/DL (ref 11.5–15.4)
HGB UR QL STRIP.AUTO: NEGATIVE
HGB UR QL STRIP.AUTO: NEGATIVE
IMM GRANULOCYTES # BLD AUTO: 0.01 THOUSAND/UL (ref 0–0.2)
IMM GRANULOCYTES # BLD AUTO: 0.01 THOUSAND/UL (ref 0–0.2)
IMM GRANULOCYTES NFR BLD AUTO: 0 % (ref 0–2)
IMM GRANULOCYTES NFR BLD AUTO: 0 % (ref 0–2)
INR PPP: 1.42 (ref 0.84–1.19)
INR PPP: 1.42 (ref 0.84–1.19)
KETONES UR STRIP-MCNC: NEGATIVE MG/DL
KETONES UR STRIP-MCNC: NEGATIVE MG/DL
LEUKOCYTE ESTERASE UR QL STRIP: NEGATIVE
LEUKOCYTE ESTERASE UR QL STRIP: NEGATIVE
LYMPHOCYTES # BLD AUTO: 1.44 THOUSANDS/ÂΜL (ref 0.6–4.47)
LYMPHOCYTES # BLD AUTO: 1.44 THOUSANDS/ÂΜL (ref 0.6–4.47)
LYMPHOCYTES NFR BLD AUTO: 22 % (ref 14–44)
LYMPHOCYTES NFR BLD AUTO: 22 % (ref 14–44)
MCH RBC QN AUTO: 24.4 PG (ref 26.8–34.3)
MCH RBC QN AUTO: 24.4 PG (ref 26.8–34.3)
MCHC RBC AUTO-ENTMCNC: 30.6 G/DL (ref 31.4–37.4)
MCHC RBC AUTO-ENTMCNC: 30.6 G/DL (ref 31.4–37.4)
MCV RBC AUTO: 80 FL (ref 82–98)
MCV RBC AUTO: 80 FL (ref 82–98)
MONOCYTES # BLD AUTO: 0.58 THOUSAND/ÂΜL (ref 0.17–1.22)
MONOCYTES # BLD AUTO: 0.58 THOUSAND/ÂΜL (ref 0.17–1.22)
MONOCYTES NFR BLD AUTO: 9 % (ref 4–12)
MONOCYTES NFR BLD AUTO: 9 % (ref 4–12)
NEUTROPHILS # BLD AUTO: 4.16 THOUSANDS/ÂΜL (ref 1.85–7.62)
NEUTROPHILS # BLD AUTO: 4.16 THOUSANDS/ÂΜL (ref 1.85–7.62)
NEUTS SEG NFR BLD AUTO: 64 % (ref 43–75)
NEUTS SEG NFR BLD AUTO: 64 % (ref 43–75)
NITRITE UR QL STRIP: NEGATIVE
NITRITE UR QL STRIP: NEGATIVE
NRBC BLD AUTO-RTO: 0 /100 WBCS
NRBC BLD AUTO-RTO: 0 /100 WBCS
P AXIS: 76 DEGREES
P AXIS: 76 DEGREES
PCO2 BLD: 27 MMOL/L (ref 21–32)
PCO2 BLD: 27 MMOL/L (ref 21–32)
PCO2 BLD: 46.1 MM HG (ref 42–50)
PCO2 BLD: 46.1 MM HG (ref 42–50)
PH BLD: 7.36 [PH] (ref 7.3–7.4)
PH BLD: 7.36 [PH] (ref 7.3–7.4)
PH UR STRIP.AUTO: 5.5 [PH]
PH UR STRIP.AUTO: 5.5 [PH]
PLATELET # BLD AUTO: 215 THOUSANDS/UL (ref 149–390)
PLATELET # BLD AUTO: 215 THOUSANDS/UL (ref 149–390)
PMV BLD AUTO: 10.8 FL (ref 8.9–12.7)
PMV BLD AUTO: 10.8 FL (ref 8.9–12.7)
PO2 BLD: 20 MM HG (ref 35–45)
PO2 BLD: 20 MM HG (ref 35–45)
POTASSIUM BLD-SCNC: 3.9 MMOL/L (ref 3.5–5.3)
POTASSIUM BLD-SCNC: 3.9 MMOL/L (ref 3.5–5.3)
POTASSIUM SERPL-SCNC: 3.9 MMOL/L (ref 3.5–5.3)
POTASSIUM SERPL-SCNC: 3.9 MMOL/L (ref 3.5–5.3)
PR INTERVAL: 196 MS
PR INTERVAL: 196 MS
PROCALCITONIN SERPL-MCNC: <0.05 NG/ML
PROCALCITONIN SERPL-MCNC: <0.05 NG/ML
PROT SERPL-MCNC: 6.3 G/DL (ref 6.4–8.4)
PROT SERPL-MCNC: 6.3 G/DL (ref 6.4–8.4)
PROT UR STRIP-MCNC: NEGATIVE MG/DL
PROT UR STRIP-MCNC: NEGATIVE MG/DL
PROTHROMBIN TIME: 17.5 SECONDS (ref 11.6–14.5)
PROTHROMBIN TIME: 17.5 SECONDS (ref 11.6–14.5)
QRS AXIS: 54 DEGREES
QRS AXIS: 54 DEGREES
QRSD INTERVAL: 74 MS
QRSD INTERVAL: 74 MS
QT INTERVAL: 424 MS
QT INTERVAL: 424 MS
QTC INTERVAL: 444 MS
QTC INTERVAL: 444 MS
RBC # BLD AUTO: 4.09 MILLION/UL (ref 3.81–5.12)
RBC # BLD AUTO: 4.09 MILLION/UL (ref 3.81–5.12)
SAO2 % BLD FROM PO2: 30 % (ref 60–85)
SAO2 % BLD FROM PO2: 30 % (ref 60–85)
SODIUM BLD-SCNC: 141 MMOL/L (ref 136–145)
SODIUM BLD-SCNC: 141 MMOL/L (ref 136–145)
SODIUM SERPL-SCNC: 139 MMOL/L (ref 135–147)
SODIUM SERPL-SCNC: 139 MMOL/L (ref 135–147)
SP GR UR STRIP.AUTO: 1.02 (ref 1–1.03)
SP GR UR STRIP.AUTO: 1.02 (ref 1–1.03)
SPECIMEN SOURCE: ABNORMAL
SPECIMEN SOURCE: ABNORMAL
T WAVE AXIS: 38 DEGREES
T WAVE AXIS: 38 DEGREES
UROBILINOGEN UR STRIP-ACNC: <2 MG/DL
UROBILINOGEN UR STRIP-ACNC: <2 MG/DL
VENTRICULAR RATE: 66 BPM
VENTRICULAR RATE: 66 BPM
WBC # BLD AUTO: 6.54 THOUSAND/UL (ref 4.31–10.16)
WBC # BLD AUTO: 6.54 THOUSAND/UL (ref 4.31–10.16)

## 2023-09-16 PROCEDURE — 96365 THER/PROPH/DIAG IV INF INIT: CPT

## 2023-09-16 PROCEDURE — 81003 URINALYSIS AUTO W/O SCOPE: CPT

## 2023-09-16 PROCEDURE — 71260 CT THORAX DX C+: CPT

## 2023-09-16 PROCEDURE — 72125 CT NECK SPINE W/O DYE: CPT

## 2023-09-16 PROCEDURE — 85730 THROMBOPLASTIN TIME PARTIAL: CPT | Performed by: SURGERY

## 2023-09-16 PROCEDURE — 99205 OFFICE O/P NEW HI 60 MIN: CPT | Performed by: SURGERY

## 2023-09-16 PROCEDURE — 93005 ELECTROCARDIOGRAM TRACING: CPT

## 2023-09-16 PROCEDURE — 71045 X-RAY EXAM CHEST 1 VIEW: CPT

## 2023-09-16 PROCEDURE — 82947 ASSAY GLUCOSE BLOOD QUANT: CPT

## 2023-09-16 PROCEDURE — 84484 ASSAY OF TROPONIN QUANT: CPT | Performed by: SURGERY

## 2023-09-16 PROCEDURE — 85610 PROTHROMBIN TIME: CPT | Performed by: SURGERY

## 2023-09-16 PROCEDURE — 82803 BLOOD GASES ANY COMBINATION: CPT

## 2023-09-16 PROCEDURE — 96366 THER/PROPH/DIAG IV INF ADDON: CPT

## 2023-09-16 PROCEDURE — 36415 COLL VENOUS BLD VENIPUNCTURE: CPT

## 2023-09-16 PROCEDURE — 99285 EMERGENCY DEPT VISIT HI MDM: CPT

## 2023-09-16 PROCEDURE — 82550 ASSAY OF CK (CPK): CPT | Performed by: SURGERY

## 2023-09-16 PROCEDURE — 84295 ASSAY OF SERUM SODIUM: CPT

## 2023-09-16 PROCEDURE — 84132 ASSAY OF SERUM POTASSIUM: CPT

## 2023-09-16 PROCEDURE — 70450 CT HEAD/BRAIN W/O DYE: CPT

## 2023-09-16 PROCEDURE — 80053 COMPREHEN METABOLIC PANEL: CPT | Performed by: SURGERY

## 2023-09-16 PROCEDURE — 84145 PROCALCITONIN (PCT): CPT | Performed by: PHYSICIAN ASSISTANT

## 2023-09-16 PROCEDURE — 85025 COMPLETE CBC W/AUTO DIFF WBC: CPT | Performed by: SURGERY

## 2023-09-16 PROCEDURE — 73564 X-RAY EXAM KNEE 4 OR MORE: CPT

## 2023-09-16 PROCEDURE — 82330 ASSAY OF CALCIUM: CPT

## 2023-09-16 PROCEDURE — 85014 HEMATOCRIT: CPT

## 2023-09-16 PROCEDURE — EDAIR PR ED AIR: Performed by: EMERGENCY MEDICINE

## 2023-09-16 PROCEDURE — 74177 CT ABD & PELVIS W/CONTRAST: CPT

## 2023-09-16 PROCEDURE — 99417 PROLNG OP E/M EACH 15 MIN: CPT | Performed by: SURGERY

## 2023-09-16 RX ORDER — OXYCODONE HYDROCHLORIDE 5 MG/1
5 TABLET ORAL EVERY 6 HOURS PRN
Status: DISCONTINUED | OUTPATIENT
Start: 2023-09-16 | End: 2023-09-16 | Stop reason: HOSPADM

## 2023-09-16 RX ORDER — SODIUM CHLORIDE, SODIUM GLUCONATE, SODIUM ACETATE, POTASSIUM CHLORIDE, MAGNESIUM CHLORIDE, SODIUM PHOSPHATE, DIBASIC, AND POTASSIUM PHOSPHATE .53; .5; .37; .037; .03; .012; .00082 G/100ML; G/100ML; G/100ML; G/100ML; G/100ML; G/100ML; G/100ML
500 INJECTION, SOLUTION INTRAVENOUS ONCE
Status: COMPLETED | OUTPATIENT
Start: 2023-09-16 | End: 2023-09-16

## 2023-09-16 RX ORDER — ACETAMINOPHEN 325 MG/1
650 TABLET ORAL EVERY 6 HOURS PRN
Status: DISCONTINUED | OUTPATIENT
Start: 2023-09-16 | End: 2023-09-16 | Stop reason: HOSPADM

## 2023-09-16 RX ADMIN — OXYCODONE HYDROCHLORIDE 5 MG: 5 TABLET ORAL at 12:41

## 2023-09-16 RX ADMIN — IOHEXOL 100 ML: 350 INJECTION, SOLUTION INTRAVENOUS at 07:27

## 2023-09-16 RX ADMIN — ACETAMINOPHEN 650 MG: 325 TABLET ORAL at 09:32

## 2023-09-16 RX ADMIN — SODIUM CHLORIDE, SODIUM GLUCONATE, SODIUM ACETATE, POTASSIUM CHLORIDE, MAGNESIUM CHLORIDE, SODIUM PHOSPHATE, DIBASIC, AND POTASSIUM PHOSPHATE 500 ML: .53; .5; .37; .037; .03; .012; .00082 INJECTION, SOLUTION INTRAVENOUS at 09:43

## 2023-09-16 NOTE — ASSESSMENT & PLAN NOTE
- Status post syncopal fall with no acute traumatic injuries  - Fall precautions. - Will discuss with family medicine service for possible admission for syncope work up  - PT and OT evaluation and treatment as indicated. - Case Management consultation for disposition planning.

## 2023-09-16 NOTE — ED PROVIDER NOTES
Emergency Department Airway Evaluation and Management Form    History  Obtained from: EMS and patient    Review of patient's allergies indicates no known allergies. Chief Complaint:  Trauma Alert    HPI: Pt is a 78 y.o. female presents s/p fall on eliquis       I have reviewed and agree with the history as documented. Physical Exam    Vitals:    09/16/23 0708   BP: (!) 176/87   Pulse: 74   Resp: 18   SpO2: 96%     Supplemental Oxygen:6L    GCS: 15    Neuro: Alert and oriented  Psych: not combative, not anxious, cooperative for exam  Neck: In collar, No JVD, No midline tenderness  Cardio:  Normal  Respiratory: Normal  Mouth:  Normal  Pharynx: Normal    Monitor:  NSR      ED Medications    No current facility-administered medications for this encounter. No current outpatient medications on file.       Intubation    No intubation required    Final Diagnosis:  Fall  Head injury       ED Provider  Electronically Signed by         Ladan Benites DO  09/16/23 [id number]

## 2023-09-16 NOTE — H&P
[address] [facility]  H&P  Name: Joni Fischer 78 y.o. female I MRN: [id number]  Unit/Bed#: ED 01 I Date of Admission: 9/16/2023   Date of Service: 9/16/2023 I Hospital Day: 0      Assessment/Plan   Chronic pain of left knee  Assessment & Plan  - Chronic left knee pain aggravated by fall and crawling on the floor.  - Left knee xray pending    Fall  Assessment & Plan  - Status post syncopal fall with no acute traumatic injuries  - Fall precautions. - Will discuss with family medicine service for possible admission for syncope work up  - PT and OT evaluation and treatment as indicated. - Case Management consultation for disposition planning. Trauma Alert: Level B   Model of Arrival: Ambulance    Trauma Team: Attending Faisal Forte and Residents Alina Hendrickson  Consultants:     None     History of Present Illness     Chief Complaint: none  Mechanism:Fall     HPI:    Joni Fischer is a 78 y.o. female with PMHx Afib on Eliquis, HTN, CKD, CHF, presenting as a Level B trauma alert from home s/p unwitnessed fall on [facility]. Patient is a poor historian, unable to obtain a clear history from patient. History taken from EMS. Unclear how long patient was laying on her floor. Patient does not know if she hit her head or not. EMS noted decreased mentation during transport, as well as hypoxia requiring 6L NC to maintain O2 saturation >90%. Patient does not wear oxygen at home. On exam, patient does not have any specific complaints. Review of Systems   Unable to perform ROS: Dementia   Constitutional: Negative for activity change, fatigue and fever. HENT: Negative for congestion, ear pain, facial swelling, nosebleeds, postnasal drip, rhinorrhea, sinus pressure, sinus pain, sneezing and sore throat. Respiratory: Negative. Negative for chest tightness, shortness of breath and wheezing. Cardiovascular: Negative for chest pain and palpitations.    Gastrointestinal: Negative for abdominal distention, abdominal pain, constipation, diarrhea, nausea and vomiting. Genitourinary: Negative for dysuria, flank pain, hematuria and urgency. Musculoskeletal: Positive for arthralgias (Left knee). Negative for back pain, joint swelling, neck pain and neck stiffness. Skin: Negative for color change, rash and wound. Neurological: Negative for dizziness, seizures, weakness, light-headedness, numbness and headaches. poor historian    12-point, complete review of systems was reviewed and negative except as stated above. Historical Information          There is no immunization history on file for this patient. Family History: Non-contributory    1. Before the illness or injury that brought you to the Emergency, did you need someone to help you on a regular basis? 1=Yes   2. Since the illness or injury that brought you to the Emergency, have you needed more help than usual to take care of yourself? 1=Yes   3. Have you been hospitalized for one or more nights during the past 6 months (excluding a stay in the Emergency Department)? 1=Yes   4. In general, do you see well? 1=No   5. In general, do you have serious problems with your memory? 1=Yes   6. Do you take more than three different medications everyday? 1=Yes   TOTAL   6     Did you order a geriatric consult if the score was 2 or greater?: yes     Meds/Allergies   all current active meds have been reviewed  Allergies have not been reviewed;   Not on File    Objective   Initial Vitals:   Temperature: (!) 97.4 °F (36.3 °C) (09/16/23 0714)  Pulse: 74 (09/16/23 0708)  Respirations: 18 (09/16/23 0708)  Blood Pressure: (!) 176/87 (09/16/23 0708)    Primary Survey:   Airway:        Status: patent;        Pre-hospital Interventions: none        Hospital Interventions: none  Breathing:        Pre-hospital Interventions: none       Effort: normal       Right breath sounds: normal       Left breath sounds: normal  Circulation:        Rhythm: regular       Rate: regular Right Pulses Left Pulses    R radial: 2+  R femoral: 2+  R pedal: 2+     L radial: 2+  L femoral: 2+  L pedal: 2+       Disability:        GCS: Eye: 4; Verbal: 5 Motor: 6 Total: 15       Right Pupil: 3 mm;  round;  reactive         Left Pupil:  3 mm;  round;  reactive      R Motor Strength L Motor Strength    R : 5/5  R dorsiflex: 5/5  R plantarflex: 5/5 L : 5/5  L dorsiflex: 5/5  L plantarflex: 5/5        Sensory:  No sensory deficit  Exposure:       Completed: Yes      Secondary Survey:  Physical Exam  Vitals and nursing note reviewed. Constitutional:       General: She is not in acute distress. Appearance: Normal appearance. She is obese. She is not ill-appearing or toxic-appearing. HENT:      Head: Normocephalic and atraumatic. Right Ear: Tympanic membrane and external ear normal.      Left Ear: Tympanic membrane and external ear normal.      Nose: Nose normal. No congestion or rhinorrhea. Mouth/Throat:      Mouth: Mucous membranes are moist.      Pharynx: Oropharynx is clear. No oropharyngeal exudate or posterior oropharyngeal erythema. Eyes:      General: No scleral icterus. Extraocular Movements: Extraocular movements intact. Conjunctiva/sclera: Conjunctivae normal.      Pupils: Pupils are equal, round, and reactive to light. Neck:      Comments: In c collar    Cardiovascular:      Rate and Rhythm: Normal rate. Rhythm irregular. Pulses: Normal pulses. Heart sounds: Normal heart sounds. No murmur heard. No friction rub. No gallop. Pulmonary:      Effort: Pulmonary effort is normal. No respiratory distress. Breath sounds: Normal breath sounds. No wheezing, rhonchi or rales. Chest:      Chest wall: No tenderness. Abdominal:      General: Abdomen is flat. There is no distension. Palpations: Abdomen is soft. Tenderness: There is no abdominal tenderness (RLQ/ LLQ). There is no guarding or rebound.    Musculoskeletal:      Right shoulder: Normal.      Left shoulder: Normal.      Right upper arm: Normal.      Left upper arm: Normal.      Right elbow: Normal.      Left elbow: Normal.      Right forearm: Normal.      Left forearm: Normal.      Right wrist: Normal.      Left wrist: Normal.      Right hand: Normal.      Left hand: Normal.      Cervical back: Normal range of motion. No deformity, signs of trauma, lacerations or tenderness. Thoracic back: No deformity, signs of trauma or tenderness. Lumbar back: No deformity, signs of trauma or tenderness. Right hip: Normal. No deformity or tenderness. Normal range of motion. Left hip: Normal.      Right upper leg: No swelling, deformity or tenderness. Left upper leg: Normal. No swelling, deformity or tenderness. Right knee: Normal.      Left knee: Normal range of motion (pain with ROM). Tenderness (Over patella) present. Right lower leg: Normal.      Left lower leg: Normal.      Right ankle: Normal.      Left ankle: Normal.      Right foot: Normal.      Left foot: Normal.      Comments: Bruise to the R buttock    Pelvis is stable    Abrasions bilateral legs   Skin:     General: Skin is warm and dry. Capillary Refill: Capillary refill takes less than 2 seconds. Findings: No bruising, erythema, lesion or rash. Comments: Skin is cool      Neurological:      General: No focal deficit present. Mental Status: She is alert and oriented to person, place, and time. Cranial Nerves: No cranial nerve deficit. Sensory: No sensory deficit. Motor: No weakness. Comments: Patient alert and oriented x2; unknown baseline; clear speech. No facial droop. Moves all extremities without difficulty, following commands.    Psychiatric:         Mood and Affect: Mood normal.         Invasive Devices     Peripheral Intravenous Line  Duration           Peripheral IV 09/16/23 Left Antecubital <1 day              Lab Results: I have personally reviewed all pertinent laboratory/test results from 09/16/23, including the preceding 24 hours. Recent Labs     09/16/23  0712 09/16/23  0718   WBC  --  6.54   HGB 10.9* 10.0*   HCT 32* 32.7*   PLT  --  215   SODIUM  --  139   K  --  3.9   CL  --  105   CO2 27 28   BUN  --  14   CREATININE  --  1.04   GLUC  --  100   CAIONIZED 1.24  --    AST  --  19   ALT  --  10   ALB  --  3.8   TBILI  --  0.63   ALKPHOS  --  97   PTT  --  36   INR  --  1.42*   HSTNI0  --  8       Imaging Results: I have personally reviewed pertinent images saved in PACS. CT scan findings (and other pertinent positive findings on images) were discussed with radiology. My interpretation of the images/reports are as follows:  Chest Xray(s): see imaging   FAST exam(s): negative for acute findings   CT Scan(s): see imaging   Additional Xray(s): see imaging       Code Status: No Order  Advance Directive and Living Will:      Power of :    POLST:    I have spent 30 minutes with Patient  today in which greater than 50% of this time was spent in counseling/coordination of care regarding Diagnostic results.

## 2023-09-18 ENCOUNTER — TELEPHONE (OUTPATIENT)
Dept: FAMILY MEDICINE CLINIC | Facility: CLINIC | Age: 78
End: 2023-09-18

## 2023-09-19 ENCOUNTER — RA CDI HCC (OUTPATIENT)
Dept: OTHER | Facility: HOSPITAL | Age: 78
End: 2023-09-19

## 2023-09-19 ENCOUNTER — TELEPHONE (OUTPATIENT)
Dept: FAMILY MEDICINE CLINIC | Facility: CLINIC | Age: 78
End: 2023-09-19

## 2023-09-19 DIAGNOSIS — G89.4 PAIN SYNDROME, CHRONIC: ICD-10-CM

## 2023-09-19 RX ORDER — NALOXONE HYDROCHLORIDE 4 MG/.1ML
SPRAY NASAL
Qty: 1 EACH | Refills: 1 | Status: SHIPPED | OUTPATIENT
Start: 2023-09-19 | End: 2023-09-25

## 2023-09-19 RX ORDER — OXYCODONE HYDROCHLORIDE 10 MG/1
10 TABLET ORAL EVERY 6 HOURS PRN
Qty: 120 TABLET | Refills: 0 | Status: SHIPPED | OUTPATIENT
Start: 2023-09-19 | End: 2023-10-19

## 2023-09-19 NOTE — PROGRESS NOTES
[address] [facility] coding opportunities       I13.0   Chart Reviewed number of suggestions sent to Provider: 1     Patients Insurance     [facility]

## 2023-09-20 DIAGNOSIS — M79.601 MUSCULOSKELETAL ARM PAIN, RIGHT: ICD-10-CM

## 2023-09-21 RX ORDER — GABAPENTIN 300 MG/1
CAPSULE ORAL
Qty: 270 CAPSULE | Refills: 0 | Status: SHIPPED | OUTPATIENT
Start: 2023-09-21

## 2023-09-25 ENCOUNTER — OFFICE VISIT (OUTPATIENT)
Dept: FAMILY MEDICINE CLINIC | Facility: CLINIC | Age: 78
End: 2023-09-25

## 2023-09-25 VITALS
BODY MASS INDEX: 26.98 KG/M2 | OXYGEN SATURATION: 100 % | HEART RATE: 79 BPM | WEIGHT: 158 LBS | DIASTOLIC BLOOD PRESSURE: 84 MMHG | HEIGHT: 64 IN | TEMPERATURE: 98.6 F | SYSTOLIC BLOOD PRESSURE: 124 MMHG

## 2023-09-25 DIAGNOSIS — Z23 NEED FOR INFLUENZA VACCINATION: ICD-10-CM
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
## 2023-09-25 DIAGNOSIS — I71.21 ANEURYSM OF ASCENDING AORTA WITHOUT RUPTURE (HCC): Primary | ICD-10-CM

## 2023-09-25 DIAGNOSIS — G47.00 INSOMNIA, UNSPECIFIED TYPE: ICD-10-CM

## 2023-09-25 PROCEDURE — G0008 ADMIN INFLUENZA VIRUS VAC: HCPCS | Performed by: FAMILY MEDICINE

## 2023-09-25 PROCEDURE — 90662 IIV NO PRSV INCREASED AG IM: CPT | Performed by: FAMILY MEDICINE

## 2023-09-25 PROCEDURE — 99214 OFFICE O/P EST MOD 30 MIN: CPT | Performed by: FAMILY MEDICINE

## 2023-09-25 RX ORDER — ZOLPIDEM TARTRATE 5 MG/1
5 TABLET ORAL
Qty: 30 TABLET | Refills: 0 | Status: SHIPPED | OUTPATIENT
Start: 2023-09-25

## 2023-09-25 NOTE — PROGRESS NOTES
Name: Zunilda Sierra      :       MRN: [id number]  Encounter Provider: Silvia Chatman  Encounter Date: 2023   Encounter department: [address]     1. Aneurysm of ascending aorta without rupture (HCC)  Assessment & Plan:  Increased in size to 47mm from 44 and from 41 a few years ago. Some concern that this is symptomatic and associated with her falls. Referred to CT surgery due to increase in size and at recommendation on CT results. Orders:  -     Ambulatory Referral to Thoracic Surgery; Future    2. Fall, subsequent encounter  Assessment & Plan:  Unclear etiology  Reviewed blood work and imaging from hospital including CT of c/a/p and other blood work below. No clear reasons for falls/syncope and history is unclear. Will repeat echo from 3/2023 as first line testing as well as refer to CT surgery and will have patient see cardiology    Orders:  -     Echo complete w/ contrast if indicated; Future; Expected date: 2023    3. Need for influenza vaccination  -     influenza vaccine, high-dose, PF 0.7 mL (FLUZONE HIGH-DOSE)         Subjective      Has had two episodes of fall with possible LOC in the past few months. Was admitted for one episode and went to the ED for the other. When asked about these events she states that she feels slightly dizzy prior to falling and then falls and doesn't know what happens, doesn't know how long she stays down and doesn't remember the fall. Documentation from the hospitals says no LOC and no head injury but patient states today that she was down for an unknown period of time so the history is a little unclear in terms of the details of the falls/ syncope. She says that it "keeps happening" but upon further questioning I think that there are only 2 incidents at this time. She was most recently evaluated in the ED and CT head and CT c/a/p all negative.   She does have a history of a thoracic aortic aneurysm and the most recent CT showed an increased size. Review of Systems   Constitutional: Negative for activity change, chills, fever and unexpected weight change. HENT: Negative for congestion. Eyes: Negative for visual disturbance. Respiratory: Negative for cough, chest tightness, shortness of breath and wheezing. Cardiovascular: Negative for chest pain. Gastrointestinal: Negative for abdominal pain, diarrhea and nausea. Endocrine: Negative for cold intolerance and heat intolerance. Musculoskeletal: Negative for arthralgias and myalgias. Skin: Negative for color change. Neurological: Positive for syncope. Negative for dizziness. Psychiatric/Behavioral: Negative for agitation, dysphoric mood and sleep disturbance. Current Outpatient Medications on File Prior to Visit   Medication Sig   • cyclobenzaprine (FLEXERIL) 10 mg tablet TAKE 1 TABLET (10 MG TOTAL) BY MOUTH 3 (THREE) TIMES A DAY AS NEEDED FOR MUSCLE SPASMS   • Diclofenac Sodium (VOLTAREN) 1 % Apply 1 g topically 4 (four) times a day   • Eliquis 5 MG TAKE 1 TABLET (5 MG TOTAL) BY MOUTH 2 (TWO) TIMES A DAY 7/20/23 RESTART ELIQUIS . • escitalopram (LEXAPRO) 10 mg tablet TAKE 1 TABLET (10 MG TOTAL) BY MOUTH DAILY   • gabapentin (NEURONTIN) 300 mg capsule TAKE 3 CAPSULES (900 MG TOTAL) BY MOUTH 3 (THREE) TIMES A DAY   • hydrocortisone 2.5 % cream Apply topically 2 (two) times a day   • lidocaine (LIDODERM) 5 % Apply 2 patches topically over 12 hours daily Remove & Discard patch within 12 hours or as directed by MD Do not start before July 7, 2023.    • melatonin 3 mg Take 1 tablet (3 mg total) by mouth daily at bedtime   • metoprolol succinate (TOPROL-XL) 50 mg 24 hr tablet Take 1 tablet (50 mg total) by mouth daily   • oxyCODONE (ROXICODONE) 10 MG TABS Take 1 tablet (10 mg total) by mouth every 6 (six) hours as needed for moderate pain Max Daily Amount: 40 mg   • senna (SENOKOT) 8.6 mg Take 1 tablet (8.6 mg total) by mouth daily at bedtime   • zolpidem (AMBIEN) 5 mg tablet Take 1 tablet (5 mg total) by mouth daily at bedtime as needed for sleep   • polyethylene glycol (MIRALAX) 17 g packet Take 17 g by mouth 2 (two) times a day for 5 days   • [DISCONTINUED] naloxone (NARCAN) 4 mg/0.1 mL nasal spray Administer 1 spray into a nostril. If no response after 2-3 minutes, give another dose in the other nostril using a new spray. (Patient not taking: Reported on 9/25/2023)       Objective     /84 (BP Location: Right arm, Patient Position: Sitting, Cuff Size: Standard)   Pulse 79   Temp 98.6 °F (37 °C) (Temporal)   Ht 5' 4" (1.626 m)   Wt 71.7 kg (158 lb)   SpO2 100%   BMI 27.12 kg/m²     Physical Exam  Constitutional:       Appearance: She is well-developed. HENT:      Head: Normocephalic and atraumatic. Cardiovascular:      Rate and Rhythm: Normal rate and regular rhythm. Heart sounds: Normal heart sounds. Pulmonary:      Effort: Pulmonary effort is normal.      Breath sounds: Normal breath sounds. Abdominal:      General: Bowel sounds are normal.      Palpations: Abdomen is soft. Musculoskeletal:         General: Normal range of motion. Skin:     General: Skin is warm and dry. Neurological:      Mental Status: She is alert and oriented to person, place, and time.    Psychiatric:         Behavior: Behavior normal.         Judgment: Judgment normal.       Admission on 09/16/2023, Discharged on 09/16/2023   Component Date Value Ref Range Status   • ph, Michael ISTAT 09/16/2023 7.357  7.300 - 7.400 Final   • pCO2, Michael i-STAT 09/16/2023 46.1  42.0 - 50.0 mm HG Final   • pO2, Michael i-STAT 09/16/2023 20.0 (L)  35.0 - 45.0 mm HG Final   • BE, i-STAT 09/16/2023 0  -2 - 3 mmol/L Final   • HCO3, Michael i-STAT 09/16/2023 25.9  24.0 - 30.0 mmol/L Final   • CO2, i-STAT 09/16/2023 27  21 - 32 mmol/L Final   • O2 Sat, i-STAT 09/16/2023 30 (L)  60 - 85 % Final   • SODIUM, I-STAT 09/16/2023 141  136 - 145 mmol/l Final • Potassium, i-STAT 09/16/2023 3.9  3.5 - 5.3 mmol/L Final   • Calcium, Ionized i-STAT 09/16/2023 1.24  1.12 - 1.32 mmol/L Final   • Hct, i-STAT 09/16/2023 32 (L)  34.8 - 46.1 % Final   • Hgb, i-STAT 09/16/2023 10.9 (L)  11.5 - 15.4 g/dl Final   • Glucose, i-STAT 09/16/2023 105  65 - 140 mg/dl Final   • Specimen Type 09/16/2023 VENOUS   Final   • WBC 09/16/2023 6.54  4.31 - 10.16 Thousand/uL Final   • RBC 09/16/2023 4.09  3.81 - 5.12 Million/uL Final   • Hemoglobin 09/16/2023 10.0 (L)  11.5 - 15.4 g/dL Final   • Hematocrit 09/16/2023 32.7 (L)  34.8 - 46.1 % Final   • MCV 09/16/2023 80 (L)  82 - 98 fL Final   • MCH 09/16/2023 24.4 (L)  26.8 - 34.3 pg Final   • MCHC 09/16/2023 30.6 (L)  31.4 - 37.4 g/dL Final   • RDW 09/16/2023 20.0 (H)  11.6 - 15.1 % Final   • MPV 09/16/2023 10.8  8.9 - 12.7 fL Final   • Platelets 71/63/9764 215  149 - 390 Thousands/uL Final   • nRBC 09/16/2023 0  /100 WBCs Final   • Neutrophils Relative 09/16/2023 64  43 - 75 % Final   • Immat GRANS % 09/16/2023 0  0 - 2 % Final   • Lymphocytes Relative 09/16/2023 22  14 - 44 % Final   • Monocytes Relative 09/16/2023 9  4 - 12 % Final   • Eosinophils Relative 09/16/2023 4  0 - 6 % Final   • Basophils Relative 09/16/2023 1  0 - 1 % Final   • Neutrophils Absolute 09/16/2023 4.16  1.85 - 7.62 Thousands/µL Final   • Immature Grans Absolute 09/16/2023 0.01  0.00 - 0.20 Thousand/uL Final   • Lymphocytes Absolute 09/16/2023 1.44  0.60 - 4.47 Thousands/µL Final   • Monocytes Absolute 09/16/2023 0.58  0.17 - 1.22 Thousand/µL Final   • Eosinophils Absolute 09/16/2023 0.29  0.00 - 0.61 Thousand/µL Final   • Basophils Absolute 09/16/2023 0.06  0.00 - 0.10 Thousands/µL Final   • Sodium 09/16/2023 139  135 - 147 mmol/L Final   • Potassium 09/16/2023 3.9  3.5 - 5.3 mmol/L Final   • Chloride 09/16/2023 105  96 - 108 mmol/L Final   • CO2 09/16/2023 28  21 - 32 mmol/L Final   • ANION GAP 09/16/2023 6  mmol/L Final   • BUN 09/16/2023 14  5 - 25 mg/dL Final   • Creatinine 09/16/2023 1.04  0.60 - 1.30 mg/dL Final    Standardized to IDMS reference method   • Glucose 09/16/2023 100  65 - 140 mg/dL Final    If the patient is fasting, the ADA then defines impaired fasting glucose as > 100 mg/dL and diabetes as > or equal to 123 mg/dL. • Calcium 09/16/2023 9.0  8.4 - 10.2 mg/dL Final   • AST 09/16/2023 19  13 - 39 U/L Final   • ALT 09/16/2023 10  7 - 52 U/L Final    Specimen collection should occur prior to Sulfasalazine administration due to the potential for falsely depressed results. • Alkaline Phosphatase 09/16/2023 97  34 - 104 U/L Final   • Total Protein 09/16/2023 6.3 (L)  6.4 - 8.4 g/dL Final   • Albumin 09/16/2023 3.8  3.5 - 5.0 g/dL Final   • Total Bilirubin 09/16/2023 0.63  0.20 - 1.00 mg/dL Final    Use of this assay is not recommended for patients undergoing treatment with eltrombopag due to the potential for falsely elevated results. N-acetyl-p-benzoquinone imine (metabolite of Acetaminophen) will generate erroneously low results in samples for patients that have taken an overdose of Acetaminophen. • eGFR 09/16/2023 52  ml/min/1.73sq m Final   • Total CK 09/16/2023 392 (H)  26 - 192 U/L Final   • Protime 09/16/2023 17.5 (H)  11.6 - 14.5 seconds Final   • INR 09/16/2023 1.42 (H)  0.84 - 1.19 Final   • PTT 09/16/2023 36  23 - 37 seconds Final    Therapeutic Heparin Range =  60-90 seconds   • hs TnI 0hr 09/16/2023 8  "Refer to ACS Flowchart"- see link ng/L Final    Comment:                                              Initial (time 0) result  If >=50 ng/L, Myocardial injury suggested ;  Type of myocardial injury and treatment strategy  to be determined. If 5-49 ng/L, a delta result at 2 hours and or 4 hours will be needed to further evaluate. If <4 ng/L, and chest pain has been >3 hours since onset, patient may qualify for discharge based on the HEART score in the ED.   If <5 ng/L and <3hours since onset of chest pain, a delta result at 2 hours will be needed to further evaluate. HS Troponin 99th Percentile URL of a Health Population=12 ng/L with a 95% Confidence Interval of 8-18 ng/L. Second Troponin (time 2 hours)  If calculated delta >= 20 ng/L,  Myocardial injury suggested ; Type of myocardial injury and treatment strategy to be determined. If 5-49 ng/L and the calculated delta is 5-19 ng/L, consult medical service for evaluation. Continue evaluation for ischemia on ecg and other possible etiology and repeat hs troponin at 4 hours. If delta                            is <5 ng/L at 2 hours, consider discharge based on risk stratification via the HEART score (if in ED), or BRENDA risk score in IP/Observation. HS Troponin 99th Percentile URL of a Health Population=12 ng/L with a 95% Confidence Interval of 8-18 ng/L. • Color, UA 09/16/2023 Light Yellow   Final   • Clarity, UA 09/16/2023 Clear   Final   • Specific Gravity, UA 09/16/2023 1.025  1.003 - 1.030 Final   • pH, UA 09/16/2023 5.5  4.5, 5.0, 5.5, 6.0, 6.5, 7.0, 7.5, 8.0 Final   • Leukocytes, UA 09/16/2023 Negative  Negative Final   • Nitrite, UA 09/16/2023 Negative  Negative Final   • Protein, UA 09/16/2023 Negative  Negative mg/dl Final   • Glucose, UA 09/16/2023 Negative  Negative mg/dl Final   • Ketones, UA 09/16/2023 Negative  Negative mg/dl Final   • Urobilinogen, UA 09/16/2023 <2.0  <2.0 mg/dl mg/dl Final   • Bilirubin, UA 09/16/2023 Negative  Negative Final   • Occult Blood, UA 09/16/2023 Negative  Negative Final   • hs TnI 2hr 09/16/2023 7  "Refer to ACS Flowchart"- see link ng/L Final    Comment:                                              Initial (time 0) result  If >=50 ng/L, Myocardial injury suggested ;  Type of myocardial injury and treatment strategy  to be determined. If 5-49 ng/L, a delta result at 2 hours and or 4 hours will be needed to further evaluate.   If <4 ng/L, and chest pain has been >3 hours since onset, patient may qualify for discharge based on the HEART score in the ED.  If <5 ng/L and <3hours since onset of chest pain, a delta result at 2 hours will be needed to further evaluate. HS Troponin 99th Percentile URL of a Health Population=12 ng/L with a 95% Confidence Interval of 8-18 ng/L. Second Troponin (time 2 hours)  If calculated delta >= 20 ng/L,  Myocardial injury suggested ; Type of myocardial injury and treatment strategy to be determined. If 5-49 ng/L and the calculated delta is 5-19 ng/L, consult medical service for evaluation. Continue evaluation for ischemia on ecg and other possible etiology and repeat hs troponin at 4 hours. If delta                            is <5 ng/L at 2 hours, consider discharge based on risk stratification via the HEART score (if in ED), or BRENDA risk score in IP/Observation. HS Troponin 99th Percentile URL of a Health Population=12 ng/L with a 95% Confidence Interval of 8-18 ng/L. • Delta 2hr hsTnI 09/16/2023 -1  <20 ng/L Final   • Ventricular Rate 09/16/2023 66  BPM Final   • Atrial Rate 09/16/2023 66  BPM Final   • IN Interval 09/16/2023 196  ms Final   • QRSD Interval 09/16/2023 74  ms Final   • QT Interval 09/16/2023 424  ms Final   • QTC Interval 09/16/2023 444  ms Final   • P Axis 09/16/2023 76  degrees Final   • QRS Axis 09/16/2023 54  degrees Final   • T Wave Axis 09/16/2023 38  degrees Final   • Procalcitonin 09/16/2023 <0.05  <=0.25 ng/ml Final    Comment: Suspected Lower Respiratory Tract Infection (LRTI):  - LESS than or EQUAL to 0.25 ng/mL:   low likelihood for bacterial LRTI; antibiotics DISCOURAGED.  - GREATER than 0.25 ng/mL:   increased likelihood for bacterial LRTI; antibiotics ENCOURAGED. Suspected Sepsis:  - Strongly consider initiating antibiotics in ALL UNSTABLE patients.   - LESS than or EQUAL to 0.5 ng/mL:   low likelihood for bacterial sepsis; antibiotics DISCOURAGED.  - GREATER than 0.5 ng/mL:   increased likelihood for bacterial sepsis; antibiotics ENCOURAGED.  - GREATER than 2 ng/mL:   high risk for severe sepsis / septic shock; antibiotics strongly ENCOURAGED. Decisions on antibiotic use should not be based solely on Procalcitonin (PCT) levels. If PCT is low but uncertainty exists with stopping antibiotics, repeat PCT in 6-24 hours to confirm the low level. If antibiotics are administered (regardless if initial PCT was high or low), repeat PCT every 1-2 days to consider early antibiotic cessation (when GREATER                            than 80% decrease from the peak OR when PCT drops below designated cutoffs, whichever comes first), so long as the infection is NOT one that typically requires prolonged treatment durations (e.g., bone/joint infections, endocarditis, Staph. aureus bacteremia).     Situations of FALSE-POSITIVE Procalcitonin values:  1) Newborns < 67 hours old  2) Massive stress from severe trauma / burns, major surgery, acute pancreatitis, cardiogenic / hemorrhagic shock, sickle cell crisis, or other organ perfusion abnormalities  3) Malaria and some Candidal infections  4) Treatment with agents that stimulate cytokines (e.g., OKT3, anti-lymphocyte globulins, alemtuzumab, IL-2, granulocyte transfusion [NOT GCSFs])  5) Chronic renal disease causes elevated baseline levels (consider GREATER than 0.75 ng/mL as an abnormal cut-off); initiating HD/CRRT may cause transient decreases  6) Paraneoplastic syndromes from medullary thyroid or SCLC, some forms of vasculitis, and acute [id number]                            disease    Situations of FALSE-NEGATIVE Procalcitonin values:  1) Too early in clinical course for PCT to have reached its peak (may repeat in 6-24 hours to confirm low level)  2) Localized infection WITHOUT systemic (SIRS / sepsis) response (e.g., an abscess, osteomyelitis, cystitis)  3) Mycobacteria (e.g., Tuberculosis, MAC)  4) Cystic fibrosis exacerbations         Tobi Jerry

## 2023-09-25 NOTE — ASSESSMENT & PLAN NOTE
Increased in size to 47mm from 44 and from 41 a few years ago. Some concern that this is symptomatic and associated with her falls. Referred to CT surgery due to increase in size and at recommendation on CT results.

## 2023-09-25 NOTE — ASSESSMENT & PLAN NOTE
Unclear etiology  Reviewed blood work and imaging from hospital including CT of c/a/p and other blood work below. No clear reasons for falls/syncope and history is unclear.   Will repeat echo from 3/2023 as first line testing as well as refer to CT surgery and will have patient see cardiology

## 2023-09-26 ENCOUNTER — TELEPHONE (OUTPATIENT)
Dept: CARDIAC SURGERY | Facility: CLINIC | Age: 78
End: 2023-09-26

## 2023-10-18 DIAGNOSIS — M79.601 MUSCULOSKELETAL ARM PAIN, RIGHT: ICD-10-CM

## 2023-10-18 DIAGNOSIS — I48.0 PAROXYSMAL ATRIAL FIBRILLATION (HCC): ICD-10-CM

## 2023-10-18 DIAGNOSIS — F41.8 DEPRESSION WITH ANXIETY: ICD-10-CM

## 2023-10-18 RX ORDER — ESCITALOPRAM OXALATE 10 MG/1
10 TABLET ORAL DAILY
Qty: 90 TABLET | Refills: 1 | Status: SHIPPED | OUTPATIENT
Start: 2023-10-18

## 2023-10-18 RX ORDER — GABAPENTIN 300 MG/1
CAPSULE ORAL
Qty: 270 CAPSULE | Refills: 0 | Status: SHIPPED | OUTPATIENT
Start: 2023-10-18

## 2023-10-18 RX ORDER — METOPROLOL SUCCINATE 50 MG/1
50 TABLET, EXTENDED RELEASE ORAL DAILY
Qty: 90 TABLET | Refills: 3 | Status: SHIPPED | OUTPATIENT
Start: 2023-10-18

## 2023-10-23 DIAGNOSIS — M79.18 MYOFASCIAL PAIN SYNDROME: Primary | ICD-10-CM

## 2023-10-23 RX ORDER — OXYCODONE HYDROCHLORIDE 10 MG/1
TABLET ORAL
Qty: 120 TABLET | Refills: 0 | Status: SHIPPED | OUTPATIENT
Start: 2023-10-23

## 2023-11-07 DIAGNOSIS — Z71.89 COMPLEX CARE COORDINATION: Primary | ICD-10-CM

## 2023-11-15 ENCOUNTER — PATIENT OUTREACH (OUTPATIENT)
Dept: FAMILY MEDICINE CLINIC | Facility: CLINIC | Age: 78
End: 2023-11-15

## 2023-11-15 NOTE — PROGRESS NOTES
Outpatient RN Care Manager Note    RN CM received patient referral for Chronic Care Management outreach. Patient has a medical history of atrial fibrillation, chronic pain, osteoporosis, hypertension, anxiety, depression, myofacial pain syndrome, spondylosis of lumbar region without myelopathy or radiculopathy, chronic heart failure, chronic kidney disease, anemia, chronic pain. Patient was last seen at [address] [facility] on 9/25/23 for aneurysm of ascending aorta without rupture, fall, influenza vaccination. Called patient with no answer and unable to leave message as mailbox is full. Will attempt to contact patient again.

## 2023-11-16 ENCOUNTER — PATIENT OUTREACH (OUTPATIENT)
Dept: FAMILY MEDICINE CLINIC | Facility: CLINIC | Age: 78
End: 2023-11-16

## 2023-11-16 NOTE — LETTER
[facility]  [address] [place] [address] [address] [facility]    Dear Ms. Sai Nina would like to invite you to participate in our Chronic Care Management program.   Care Management is a team that includes your provider, nurse care manager,  care manager, and other members of your primary care office. This team is available to provide or set up resources that help to manage chronic conditions. This helps to reduce complications and improves quality of life. Some services that can be offered include disease management and cope with any stressors that may be affecting the ability to manage chronic conditions. Our goal is to ensure you have the tools and information needed to make healthcare decisions. We will pair you with a Care Manager who will reach out via telephone and help to create and achieve health goals for yourself. Please review the flyer on the Chronic Care Management program. If you wish to enroll in the program or have further questions, please call the [facility] Care Manager, Yamileth Romero RN at [phone]. The Care Managers are available M-F 8a to 4:30p. We are happy to assist you in becoming a healthier you. Sincerely,      Your Care Team                Chronic Care Management Program:  [facility] Chronic Care Management program is a way to ensure you are receiving the most comprehensive care possible. You will have a dedicated team that includes your Primary Care Provider, office staff, and care management staff who will work with you to develop a care plan that meets your health care goals. Chronic Care Management (CCM) is defined as the non-face-to-face services provided to Medicare recipients who have multiple (two or more), significant chronic conditions. Chronic conditions are ongoing medical problems that must be managed effectively in a partnership between you and your health care team to maintain the best possible health.   Examples of chronic conditions include but are not limited to: Arthritis High Blood Pressure   Asthma Heart Disease   Chronic Obstructive Pulmonary Disease (COPD) Obesity   Depression Osteoporosis   Diabetes      What is Chronic Care Management? By participating in a call each month, your physician and primary wellness team will carefully monitor and provide comprehensive care for your chronic health conditions in a thoughtful and personalized way. This call will be in addition to the care received during your regular office visits. Federal regulations now enable Medicare to pay for chronic care management. What are the benefits of Chronic Care Management? Enhanced access to your primary care team  Close monitoring of your medication  Personalized, comprehensive care plan for all of your health needs  Coordinated care by your primary care wellness team and other health providers, including care you may receive at other locations, such as the hospital, other care facilities or your home. What do you need to know before signing up? Based on your current insurance plan, this type of personalized care may require you to pay $8 or $9 (your Medicare co-pay amount) to your primary care practice each month that you receive chronic care management. The service is also subject to your Medicare deductible. You must also sign an agreement to receive this type of chronic care management. You may withdraw from this program at any time. [facility] provides comprehensive care, excellent service, along with the compassion and personal attention you deserve. Managing your chronic conditions is an important part of your health care, at any age.

## 2023-11-16 NOTE — PROGRESS NOTES
Outpatient RN Care Manager Note    RN VERONICA received patient referral for Chronic Care Management outreach. Patient has a medical history of atrial fibrillation, chronic pain, osteoporosis, hypertension, anxiety, depression, myofacial pain syndrome, spondylosis of lumbar region without myelopathy or radiculopathy, chronic heart failure, chronic kidney disease, anemia, chronic pain. Patient was last seen at [facility] on 9/25/23 for aneurysm of ascending aorta without rupture, fall, influenza vaccination. RN VERONICA attempted to contact the patient with no answer and unable to leave message due to mail box full. Chronic Care Management letter mailed to patient.

## 2023-11-21 DIAGNOSIS — G47.00 INSOMNIA, UNSPECIFIED TYPE: ICD-10-CM

## 2023-11-21 DIAGNOSIS — M79.601 MUSCULOSKELETAL ARM PAIN, RIGHT: ICD-10-CM

## 2023-11-22 RX ORDER — GABAPENTIN 300 MG/1
CAPSULE ORAL
Qty: 270 CAPSULE | Refills: 0 | Status: SHIPPED | OUTPATIENT
Start: 2023-11-22

## 2023-11-22 RX ORDER — ZOLPIDEM TARTRATE 5 MG/1
5 TABLET ORAL
Qty: 30 TABLET | Refills: 0 | Status: SHIPPED | OUTPATIENT
Start: 2023-11-22

## 2023-11-24 DIAGNOSIS — M79.18 MYOFASCIAL PAIN SYNDROME: ICD-10-CM

## 2023-11-27 RX ORDER — OXYCODONE HYDROCHLORIDE 10 MG/1
10 TABLET ORAL EVERY 6 HOURS PRN
Qty: 120 TABLET | Refills: 0 | Status: SHIPPED | OUTPATIENT
Start: 2023-11-27 | End: 2023-12-05 | Stop reason: SDUPTHER

## 2023-11-30 ENCOUNTER — PATIENT OUTREACH (OUTPATIENT)
Dept: FAMILY MEDICINE CLINIC | Facility: CLINIC | Age: 78
End: 2023-11-30

## 2023-11-30 NOTE — PROGRESS NOTES
Outpatient RN Care Manager Note    RN VERONICA received patient referral for Chronic Care Management outreach. Patient has a medical history of atrial fibrillation, chronic pain, osteoporosis, hypertension, anxiety, depression, myofacial pain syndrome, spondylosis of lumbar region without myelopathy or radiculopathy, chronic heart failure, chronic kidney disease, anemia, chronic pain. Patient was last seen at [address] [facility] on 9/25/23 for aneurysm of ascending aorta without rupture, fall, influenza vaccination. RN CM attempted to contact the patient by phone on 11/15 and 11/16 and letter mailed on 11/16. No response to patient outreach. Will close episode to Chronic care Management.

## 2023-12-05 ENCOUNTER — TELEPHONE (OUTPATIENT)
Dept: FAMILY MEDICINE CLINIC | Facility: CLINIC | Age: 78
End: 2023-12-05

## 2023-12-05 DIAGNOSIS — M79.18 MYOFASCIAL PAIN SYNDROME: ICD-10-CM

## 2023-12-05 RX ORDER — OXYCODONE HYDROCHLORIDE 5 MG/1
10 TABLET ORAL EVERY 6 HOURS PRN
Qty: 80 TABLET | Refills: 0 | Status: SHIPPED | OUTPATIENT
Start: 2023-12-05 | End: 2023-12-05

## 2023-12-05 NOTE — PROGRESS NOTES
Clarification of prescriptions :Pharmacy called saying they didn't have 10mg oxycodone pills. Asked us to send in 5mg prescription. I did a prescription for 10 days and when clinical team called to inform the pharmacy, they stated that the 10mg pills will be there today so they don't need the new Rx. I have cancelled the Rx.

## 2023-12-05 NOTE — TELEPHONE ENCOUNTER
Called the pharmacy and spoke with pharmacist on sight. Advised him that a script for 5mg oxycodone was sent over for patient due to shortage of the 10mg oxycodone. He states they have received the 10mg oxycodone shipment this afternoon so we can cancel the script for 5mg. I apologize for the confusion.

## 2023-12-05 NOTE — TELEPHONE ENCOUNTER
Patient's pharmacy left message on the clinical line on 12/1/23 stating that they are currently out of stock on the oxycodone 10 milligrams and they will not be receiving shipment until the end of this week. They are requesting that you send a script for 5 milligrams, just enough to hold her over until they receive the correct dosage. They are currently experiencing a shortage. Please review and advise so I may call the pharmacy back and also let the patient know. Thank you.

## 2023-12-11 ENCOUNTER — TELEPHONE (OUTPATIENT)
Dept: FAMILY MEDICINE CLINIC | Facility: CLINIC | Age: 78
End: 2023-12-11

## 2023-12-11 NOTE — TELEPHONE ENCOUNTER
----- Message from [address] sent at 12/7/2023 12:09 PM EST -----  Gave one month of rx but needs to make and keep an opioid follow up appt prior to further refills. This visit would be for her medication only. Thanks.

## 2023-12-19 DIAGNOSIS — I48.0 PAROXYSMAL ATRIAL FIBRILLATION (HCC): ICD-10-CM

## 2023-12-19 DIAGNOSIS — G47.00 INSOMNIA, UNSPECIFIED TYPE: ICD-10-CM

## 2023-12-19 DIAGNOSIS — M79.601 MUSCULOSKELETAL ARM PAIN, RIGHT: ICD-10-CM

## 2023-12-21 RX ORDER — APIXABAN 5 MG/1
5 TABLET, FILM COATED ORAL 2 TIMES DAILY
Qty: 60 TABLET | Refills: 0 | Status: SHIPPED | OUTPATIENT
Start: 2023-12-21

## 2023-12-21 RX ORDER — ZOLPIDEM TARTRATE 5 MG/1
5 TABLET ORAL
Qty: 30 TABLET | Refills: 0 | Status: SHIPPED | OUTPATIENT
Start: 2023-12-21

## 2023-12-21 RX ORDER — GABAPENTIN 300 MG/1
CAPSULE ORAL
Qty: 270 CAPSULE | Refills: 0 | Status: SHIPPED | OUTPATIENT
Start: 2023-12-21

## 2024-01-03 ENCOUNTER — TELEPHONE (OUTPATIENT)
Dept: FAMILY MEDICINE CLINIC | Facility: CLINIC | Age: 79
End: 2024-01-03

## 2024-01-05 DIAGNOSIS — M79.18 MYOFASCIAL PAIN SYNDROME: ICD-10-CM

## 2024-01-05 RX ORDER — OXYCODONE HYDROCHLORIDE 10 MG/1
10 TABLET ORAL EVERY 6 HOURS PRN
Qty: 120 TABLET | Refills: 0 | Status: SHIPPED | OUTPATIENT
Start: 2024-01-05

## 2024-01-22 ENCOUNTER — OFFICE VISIT (OUTPATIENT)
Dept: FAMILY MEDICINE CLINIC | Facility: CLINIC | Age: 79
End: 2024-01-22

## 2024-01-22 VITALS
HEIGHT: 64 IN | BODY MASS INDEX: 26.36 KG/M2 | RESPIRATION RATE: 18 BRPM | SYSTOLIC BLOOD PRESSURE: 93 MMHG | TEMPERATURE: 98 F | DIASTOLIC BLOOD PRESSURE: 72 MMHG | HEART RATE: 87 BPM | OXYGEN SATURATION: 96 % | WEIGHT: 154.4 LBS

## 2024-01-22 DIAGNOSIS — Z79.899 HIGH RISK MEDICATION USE: ICD-10-CM

## 2024-01-22 DIAGNOSIS — G89.4 CHRONIC PAIN SYNDROME: ICD-10-CM

## 2024-01-22 DIAGNOSIS — F11.20 CONTINUOUS OPIOID DEPENDENCE (HCC): Primary | ICD-10-CM

## 2024-01-22 PROCEDURE — 99213 OFFICE O/P EST LOW 20 MIN: CPT | Performed by: FAMILY MEDICINE

## 2024-01-22 NOTE — PROGRESS NOTES
Assessment/Plan     Problem List Items Addressed This Visit        Other    Chronic pain   Other Visit Diagnoses     Continuous opioid dependence (HCC)    -  Primary    High risk medication use               Treatment Plan: Continue oxycodone for now  Will make another appt to discuss insomnia  Home exercises continue    Treatment Goals: To be able to do daily activities.    Opiate risks  There are risks associated with opioid medications, including dependence, addiction and tolerance. The patient understands and agrees to use these medications only as prescribed. Potential side effects of the medications include, but are not limited to, constipation, drowsiness, addiction, impaired judgment and risk of fatal overdose if not taken as prescribed. The patient was warned against driving while taking sedation medications.  Sharing medications is a felony. At this point in time, the patient is showing no signs of addiction, abuse, diversion or suicidal ideation.      Patient has a high risk condition (age > 65, MARIBEL, renal or hepatic impairment, mental health condition, use of alcohol or other substances). Has been counseled on the specific risks of taking opioids with these conditions and the increased risks including including sedation, increased fall risk, dizziness, addictive potential and death.      Opioid agreement  Pain management agreement was reviewed with patient and signed/updated during visit      PDMP review  [place] or [place]  reviewed. No red flags were identified; safe to proceed with prescription          Subjective     Opioid Management:   Type of visit: Follow-up    Pain related diagnoses: knee OA, spondylolithesis    Screening Tools/Assessments:    PHQ-2/9:  PHQ-2 score: 0      Drug Screen:  Date of last drug screen: 4/9/2023  Drug screen result based off current prescriptions: consistent    Opioid agreement:  Active Opioid agreement on file?: No    Opioid agreement signed date: 11/18/2022  Opioid  "agreement expiration date: 11/18/2023    Naloxone:  Currently prescribed Naloxone (Narcan): No    Reason not prescribing Naloxone: patient declines    HPI  Pain Medications             cyclobenzaprine (FLEXERIL) 10 mg tablet TAKE 1 TABLET (10 MG TOTAL) BY MOUTH 3 (THREE) TIMES A DAY AS NEEDED FOR MUSCLE SPASMS    escitalopram (LEXAPRO) 10 mg tablet TAKE 1 TABLET (10 MG TOTAL) BY MOUTH DAILY    gabapentin (NEURONTIN) 300 mg capsule TAKE 3 CAPSULES (900 MG TOTAL) BY MOUTH 3 (THREE) TIMES A DAY    oxyCODONE (ROXICODONE) 10 MG TABS Take 1 tablet (10 mg total) by mouth every 6 (six) hours as needed for moderate pain Max Daily Amount: 40 mg         Outpatient Morphine Milligram Equivalents Per Day     1/22/24 and after 60 MME/Day    Order Name Dose Route Frequency Maximum MME/Day     oxyCODONE (ROXICODONE) 10 MG TABS 10 mg Oral Every 6 hours PRN 60 MME/Day    Total Potential Morphine Milligram Equivalents Per Day 60 MME/Day    Calculation Information        oxyCODONE (ROXICODONE) 10 MG TABS    oxyCODONE 10 MG Tabs: maximum daily dose of 40 mg * morphine equivalence factor of 1.5 = 60 MME/Day                         PDMP Review       Value Time User    PDMP Reviewed  Yes 1/22/2024 11:38 AM Gilma Tse         Review of Systems  Objective     BP 93/72 (BP Location: Right arm, Patient Position: Sitting, Cuff Size: Standard)   Pulse 87   Temp 98 °F (36.7 °C) (Temporal)   Resp 18   Ht 5' 4\" (1.626 m)   Wt 70 kg (154 lb 6.4 oz)   SpO2 96%   BMI 26.50 kg/m²     Physical Exam    Gilma Tse          "

## 2024-01-22 NOTE — PATIENT INSTRUCTIONS
Goals of care:  Maximize your health and quality of life by:   Increasing your level of function and activity  Decreasing the negative effects of pain on your life  Minimizing the risks and side effects of medications and ensuring safe use of opioid medication     Ways for you to help meet your goals:  Maintain a healthy lifestyle. This includes proper nutrition, regular physical activity as able, try for 8 hours of sleep per night, use stress reduction strategies, avoid triggers.      Risks and side effects of opioid use:  Prescription opioids carry serious risks of addiction and  overdose, especially with prolonged use. An opioid overdose,  often marked by slowed breathing, can cause sudden death. The  use of prescription opioids can have a number of side effects as  well, even when taken as directed:  Tolerance--meaning you might need to take more of a medication for the same pain relief  Physical dependence--meaning you have symptoms of withdrawal when a medication is stopped  Increased sensitivity to pain  Constipation  Nausea, vomiting, dry mouth  Sleepiness and dizziness   Confusion  Depression  Low levels of testosterone that can result in lower sex drive, energy, and strength  Itching and sweating    If you are prescribed opioids for pain:  Never take opioids in greater amounts or more often than prescribed.  Help prevent misuse and abuse.        - Never sell or share prescription opioids.        - Never use another person’s prescription opioids.  ‡Store prescription opioids in a secure place and out of reach of others (this may include visitors, children, friends, and family).  Safely dispose of unused prescription opioids: Find your community drug take-back program or your pharmacy mail-back program, or flush them down the toilet, following guidance from the Food and Drug Administration (www.fda.gov/Drugs/ResourcesForYou).  ‡Visit www.cdc.gov/drugoverdose to learn about the risks of opioid abuse and  overdose.  If you believe you may be struggling with addiction, tell your health care provider and ask for guidance or call [facility]’s National Helpline at 8-028-960-NMQB.

## 2024-01-23 ENCOUNTER — TELEPHONE (OUTPATIENT)
Dept: FAMILY MEDICINE CLINIC | Facility: CLINIC | Age: 79
End: 2024-01-23

## 2024-01-23 NOTE — TELEPHONE ENCOUNTER
Patient called asking for her Ambien to be switch to 10mg instead of 5 mg tabs. Patient states she has been taking the 5 mg tabs and also 4-5 melatonin and she still has problems sleeping. Patient would like them sent to [place] [facility]. Please advise thank you

## 2024-01-24 DIAGNOSIS — M79.601 MUSCULOSKELETAL ARM PAIN, RIGHT: ICD-10-CM

## 2024-01-24 DIAGNOSIS — G47.00 INSOMNIA, UNSPECIFIED TYPE: ICD-10-CM

## 2024-01-24 DIAGNOSIS — M48.061 SPINAL STENOSIS OF LUMBAR REGION, UNSPECIFIED WHETHER NEUROGENIC CLAUDICATION PRESENT: ICD-10-CM

## 2024-01-24 DIAGNOSIS — F41.8 DEPRESSION WITH ANXIETY: ICD-10-CM

## 2024-01-24 DIAGNOSIS — G47.00 INSOMNIA: ICD-10-CM

## 2024-01-24 DIAGNOSIS — I48.0 PAROXYSMAL ATRIAL FIBRILLATION (HCC): ICD-10-CM

## 2024-01-24 RX ORDER — ZOLPIDEM TARTRATE 5 MG/1
5 TABLET ORAL
Qty: 30 TABLET | Refills: 0 | Status: SHIPPED | OUTPATIENT
Start: 2024-01-24

## 2024-01-24 RX ORDER — ESCITALOPRAM OXALATE 10 MG/1
10 TABLET ORAL DAILY
Qty: 90 TABLET | Refills: 1 | Status: SHIPPED | OUTPATIENT
Start: 2024-01-24

## 2024-01-24 RX ORDER — LANOLIN ALCOHOL/MO/W.PET/CERES
3 CREAM (GRAM) TOPICAL
Qty: 14 TABLET | Refills: 0 | Status: SHIPPED | OUTPATIENT
Start: 2024-01-24

## 2024-01-24 RX ORDER — CYCLOBENZAPRINE HCL 10 MG
10 TABLET ORAL 3 TIMES DAILY PRN
Qty: 90 TABLET | Refills: 3 | Status: SHIPPED | OUTPATIENT
Start: 2024-01-24

## 2024-01-24 RX ORDER — GABAPENTIN 300 MG/1
900 CAPSULE ORAL 3 TIMES DAILY
Qty: 270 CAPSULE | Refills: 1 | Status: SHIPPED | OUTPATIENT
Start: 2024-01-24

## 2024-01-24 NOTE — TELEPHONE ENCOUNTER
Called patient about her request for Ambien. Patient was very upset that Dr. Tse did not call in her eliquis in from her last visit. Patient was not satisfied with her last visit, stated all she talked about was her opioid medication. I advised the patient that her visit on Monday was strictly for her opioid in order for her to continue to take the medication. As far as the Ambien it will be addressed at her next visit per Dr. Tse. I asked patient if she would like to set up a sooner appointment to address the Ambien. Patient declined and stated she will not spend more money for useless appointment. Patient requested the following medications to be refilled, Please advise thank you

## 2024-01-31 DIAGNOSIS — M79.18 MYOFASCIAL PAIN SYNDROME: ICD-10-CM

## 2024-01-31 NOTE — TELEPHONE ENCOUNTER
Patient called clinical line asking for a refill of her Oxycodone. She states she is taking 1 tablet 4 times a day. Patient is asking if it can be filled by Monday February 5th. If there is any questions please call patient at [phone].

## 2024-02-01 RX ORDER — OXYCODONE HYDROCHLORIDE 10 MG/1
10 TABLET ORAL EVERY 6 HOURS PRN
Qty: 120 TABLET | Refills: 0 | Status: SHIPPED | OUTPATIENT
Start: 2024-02-07 | End: 2024-02-05 | Stop reason: SDUPTHER

## 2024-02-05 DIAGNOSIS — M79.18 MYOFASCIAL PAIN SYNDROME: ICD-10-CM

## 2024-02-05 RX ORDER — OXYCODONE HYDROCHLORIDE 10 MG/1
10 TABLET ORAL EVERY 6 HOURS PRN
Qty: 120 TABLET | Refills: 0 | Status: SHIPPED | OUTPATIENT
Start: 2024-02-07

## 2024-02-05 NOTE — TELEPHONE ENCOUNTER
[facility] called asking for her script for Oxycodone 10mg be sent to them. It was sent to [facility] who then tried to send it to [facility]. [facility] called stating they need an original script to be sent to them directly. Please advise thank you

## 2024-02-21 ENCOUNTER — TELEPHONE (OUTPATIENT)
Dept: FAMILY MEDICINE CLINIC | Facility: CLINIC | Age: 79
End: 2024-02-21

## 2024-02-21 ENCOUNTER — OFFICE VISIT (OUTPATIENT)
Dept: FAMILY MEDICINE CLINIC | Facility: CLINIC | Age: 79
End: 2024-02-21

## 2024-02-21 VITALS
WEIGHT: 156.4 LBS | SYSTOLIC BLOOD PRESSURE: 132 MMHG | DIASTOLIC BLOOD PRESSURE: 85 MMHG | HEART RATE: 86 BPM | TEMPERATURE: 98.1 F | BODY MASS INDEX: 26.85 KG/M2 | RESPIRATION RATE: 18 BRPM | OXYGEN SATURATION: 97 %

## 2024-02-21 DIAGNOSIS — G89.29 CHRONIC PAIN OF LEFT KNEE: Primary | ICD-10-CM

## 2024-02-21 DIAGNOSIS — M47.816 SPONDYLOSIS OF LUMBAR REGION WITHOUT MYELOPATHY OR RADICULOPATHY: ICD-10-CM

## 2024-02-21 DIAGNOSIS — I71.20 THORACIC AORTIC ANEURYSM WITHOUT RUPTURE, UNSPECIFIED PART (HCC): ICD-10-CM

## 2024-02-21 DIAGNOSIS — M25.562 CHRONIC PAIN OF LEFT KNEE: Primary | ICD-10-CM

## 2024-02-21 DIAGNOSIS — G47.00 INSOMNIA, UNSPECIFIED TYPE: ICD-10-CM

## 2024-02-21 DIAGNOSIS — M79.18 MYOFASCIAL PAIN SYNDROME: ICD-10-CM

## 2024-02-21 DIAGNOSIS — I48.0 PAROXYSMAL ATRIAL FIBRILLATION (HCC): ICD-10-CM

## 2024-02-21 PROBLEM — Z00.00 HEALTHCARE MAINTENANCE: Status: RESOLVED | Noted: 2018-06-28 | Resolved: 2024-02-21

## 2024-02-21 PROCEDURE — 99213 OFFICE O/P EST LOW 20 MIN: CPT | Performed by: FAMILY MEDICINE

## 2024-02-21 RX ORDER — ZOLPIDEM TARTRATE 5 MG/1
5 TABLET ORAL
Qty: 30 TABLET | Refills: 0 | Status: SHIPPED | OUTPATIENT
Start: 2024-02-21

## 2024-02-21 RX ORDER — OXYCODONE HYDROCHLORIDE 10 MG/1
10 TABLET ORAL EVERY 6 HOURS PRN
Qty: 120 TABLET | Refills: 0 | Status: SHIPPED | OUTPATIENT
Start: 2024-03-06

## 2024-02-21 NOTE — ASSESSMENT & PLAN NOTE
Patient with chronic knee pain for which she takes oxycodone for.  Patient's symptoms are not worse and swelling is not currently there and actually better than usual.  Patient with no lower extremity swelling on exam today.

## 2024-02-21 NOTE — ASSESSMENT & PLAN NOTE
Patient with long-term oxycodone and Ambien use.  Patient reports that she is unable to sleep without the Ambien and unable to move around without the oxycodone.  Patient has both prescribed by Dr. Tse.  Patient was evaluated this and this was the primary reason for the visit today.

## 2024-02-21 NOTE — ASSESSMENT & PLAN NOTE
Patient previously noted to have increase in aortic aneurysm from 47 mm from 44 mm over 41 mm a few years ago.  Patient is reporting symptoms and has been since this summer.  Patient previously referred to CT surgery however did not make follow-up.  Patient reports that she lost paperwork and was unable to schedule appointment.  Paperwork for CT surgery reprinted for patient.  Will also have patient see social work complex management to help with appointments and to help follow-up with specialist.  ER and return precautions discussed with patient.  Patient to call or go to ED if she begins to have any chest pain, becomes lightheaded, dizziness, shortness of breath palpitations.  Patient also reminded to follow-up on echo.

## 2024-02-21 NOTE — TELEPHONE ENCOUNTER
Patient requesting refills on the following medications:  -Eliquis 5mg tablet   -Oxycodone 10mg tablet  -Ambien 5mg tablet    Please review and refill if appropriate

## 2024-02-21 NOTE — PROGRESS NOTES
Name: Lisa Marques      : 1945      MRN: [id number]  Encounter Provider: Leilani Stewart MD  Encounter Date: 2024   Encounter department: [facility]    Assessment & Plan     1. Chronic pain of left knee  Assessment & Plan:  Patient with chronic knee pain for which she takes oxycodone for.  Patient's symptoms are not worse and swelling is not currently there and actually better than usual.  Patient with no lower extremity swelling on exam today.      2. Thoracic aortic aneurysm without rupture, unspecified part (HCC)  Assessment & Plan:  Patient previously noted to have increase in aortic aneurysm from 47 mm from 44 mm over 41 mm a few years ago.  Patient is reporting symptoms and has been since this summer.  Patient previously referred to CT surgery however did not make follow-up.  Patient reports that she lost paperwork and was unable to schedule appointment.  Paperwork for CT surgery reprinted for patient.  Will also have patient see social work complex management to help with appointments and to help follow-up with specialist.  ER and return precautions discussed with patient.  Patient to call or go to ED if she begins to have any chest pain, becomes lightheaded, dizziness, shortness of breath palpitations    Orders:  -     Ambulatory Referral to Complex Care Management Program; Future    3. Spondylosis of lumbar region without myelopathy or radiculopathy  Assessment & Plan:  Patient with long-term oxycodone and Ambien use.  Patient reports that she is unable to sleep without the Ambien and unable to move around without the oxycodone.  Patient has both prescribed by Dr. Tse.  Patient was evaluated this and this was the primary reason for the visit today.             Subjective      Patient here for medication follow-up.  However patient medication refilled by Dr. German this morning.  Otherwise Patient Would like to discuss future  follow-ups.      Review of Systems   Constitutional:  Negative for chills and fever.   HENT:  Negative for ear pain and sore throat.    Eyes:  Negative for pain and visual disturbance.   Respiratory:  Negative for cough and shortness of breath.    Cardiovascular:  Negative for chest pain and palpitations.   Gastrointestinal:  Negative for abdominal pain and vomiting.   Genitourinary:  Negative for dysuria and hematuria.   Musculoskeletal:  Negative for arthralgias and back pain.   Skin:  Negative for color change and rash.   Neurological:  Negative for seizures and syncope.   All other systems reviewed and are negative.      Current Outpatient Medications on File Prior to Visit   Medication Sig    cyclobenzaprine (FLEXERIL) 10 mg tablet Take 1 tablet (10 mg total) by mouth 3 (three) times a day as needed for muscle spasms    Diclofenac Sodium (VOLTAREN) 1 % Apply 1 g topically 4 (four) times a day    escitalopram (LEXAPRO) 10 mg tablet Take 1 tablet (10 mg total) by mouth daily    gabapentin (NEURONTIN) 300 mg capsule Take 3 capsules (900 mg total) by mouth 3 (three) times a day    hydrocortisone 2.5 % cream Apply topically 2 (two) times a day    lidocaine (LIDODERM) 5 % Apply 2 patches topically over 12 hours daily Remove & Discard patch within 12 hours or as directed by MD Do not start before July 7, 2023.    melatonin 3 mg Take 1 tablet (3 mg total) by mouth daily at bedtime    metoprolol succinate (TOPROL-XL) 50 mg 24 hr tablet TAKE 1 TABLET (50 MG TOTAL) BY MOUTH DAILY    senna (SENOKOT) 8.6 mg Take 1 tablet (8.6 mg total) by mouth daily at bedtime    polyethylene glycol (MIRALAX) 17 g packet Take 17 g by mouth 2 (two) times a day for 5 days    [DISCONTINUED] apixaban (Eliquis) 5 mg Take 1 tablet (5 mg total) by mouth 2 (two) times a day    [DISCONTINUED] oxyCODONE (ROXICODONE) 10 MG TABS Take 1 tablet (10 mg total) by mouth every 6 (six) hours as needed for moderate pain Max Daily Amount: 40 mg Do not start  before February 7, 2024.    [DISCONTINUED] zolpidem (AMBIEN) 5 mg tablet Take 1 tablet (5 mg total) by mouth daily at bedtime as needed for sleep       Objective     /85 (BP Location: Left arm, Patient Position: Sitting, Cuff Size: Standard)   Pulse 86   Temp 98.1 °F (36.7 °C)   Resp 18   Wt 70.9 kg (156 lb 6.4 oz)   SpO2 97%   BMI 26.85 kg/m²     Physical Exam  Constitutional:       General: She is awake.      Appearance: Normal appearance.   HENT:      Head: Normocephalic.   Eyes:      General: Lids are normal.   Cardiovascular:      Rate and Rhythm: Normal rate and regular rhythm.      Heart sounds: Normal heart sounds, S1 normal and S2 normal.   Pulmonary:      Effort: Pulmonary effort is normal. No tachypnea or bradypnea.      Breath sounds: Normal breath sounds and air entry.   Abdominal:      General: Bowel sounds are normal.      Palpations: Abdomen is soft.      Tenderness: There is no abdominal tenderness.   Musculoskeletal:         General: No swelling.      Right lower leg: No edema.      Left lower leg: No edema.   Skin:     General: Skin is warm.   Neurological:      Mental Status: She is alert and oriented to person, place, and time.   Psychiatric:         Attention and Perception: Attention normal.         Behavior: Behavior is cooperative.       Leilani Stewart MD

## 2024-02-22 NOTE — TELEPHONE ENCOUNTER
Called patient and left her a message advising her of the medication refills. Advised her that the oxycodone will start on 3/6/24.

## 2024-03-06 ENCOUNTER — PATIENT OUTREACH (OUTPATIENT)
Dept: FAMILY MEDICINE CLINIC | Facility: CLINIC | Age: 79
End: 2024-03-06

## 2024-03-06 ENCOUNTER — TELEPHONE (OUTPATIENT)
Dept: FAMILY MEDICINE CLINIC | Facility: CLINIC | Age: 79
End: 2024-03-06

## 2024-03-06 NOTE — PROGRESS NOTES
Outpatient RN Care Manager Note    RN CM received referral for complex care management outreach. Patient was referred to Cardio thoracic surgery due to thoracic aortic aneurysm without rupture. Patient states she did not make follow up appointment.  PCP requesting assistance with patient scheduling follow up appointments.    Called patient with no answer. Left message, this is Sandi the Outpatient Nurse Care Manager at [facility] office calling to follow up with you. Requested return phone call at [phone].

## 2024-03-07 ENCOUNTER — PATIENT OUTREACH (OUTPATIENT)
Dept: FAMILY MEDICINE CLINIC | Facility: CLINIC | Age: 79
End: 2024-03-07

## 2024-03-07 NOTE — PROGRESS NOTES
Outpatient RN Care Manager Note    RN CM received IB reminder to place second outreach attempt.    Called patient with no answer. Left message, this is Sandi the Outpatient Nurse Care Manager at [facility] office calling to follow up with you. Requested return phone call at [phone].      Called patient's son Raza Marques, on communication consent, and spoke to with success.  RN CM introduced self and explained role as an RN CM.  Advised son RN CM was attempting to contact patient to assist with scheduling appointments.  RN CM provided contact phone number for patient to return call.

## 2024-03-08 ENCOUNTER — PATIENT OUTREACH (OUTPATIENT)
Dept: FAMILY MEDICINE CLINIC | Facility: CLINIC | Age: 79
End: 2024-03-08

## 2024-03-08 NOTE — PROGRESS NOTES
Outpatient RN Care Manager Note    RN VERONICA received IB reminder to place third outreach attempt.    RN VERONICA received referral for complex care management outreach. Patient was referred to Cardio thoracic surgery due to thoracic aortic aneurysm without rupture. Patient states she did not make follow up appointment. PCP requesting assistance with patient scheduling follow up appointments.     Called patient with no answer. Left message, this is Sandi the Outpatient Nurse Care Manager at [facility] office calling to follow up with you. Requested return phone call at [phone]     [place] letter mailed to patient.

## 2024-03-08 NOTE — LETTER
Date: 03/08/24    Dear Lisa Marquse,   My name is Sandi Roman; I am a Registered Nurse Care Manager working with [facility].  I have not been able to reach you and would like to set a time that I can talk with you over the phone or in-person.  My work is to help patients that have complex medical conditions get the care they need. This includes patients who may have been in the hospital or emergency room.    Please call me with any questions you may have. I look forward to speaking with you.  Sincerely,  Sandi Roman  [phone]  Outpatient Care Manager

## 2024-03-20 DIAGNOSIS — G47.00 INSOMNIA, UNSPECIFIED TYPE: ICD-10-CM

## 2024-03-20 DIAGNOSIS — M79.601 MUSCULOSKELETAL ARM PAIN, RIGHT: ICD-10-CM

## 2024-03-20 RX ORDER — LIDOCAINE 50 MG/G
2 PATCH TOPICAL DAILY
Qty: 7 PATCH | Refills: 0 | Status: SHIPPED | OUTPATIENT
Start: 2024-03-20

## 2024-03-20 RX ORDER — ZOLPIDEM TARTRATE 5 MG/1
5 TABLET ORAL
Qty: 30 TABLET | Refills: 0 | Status: SHIPPED | OUTPATIENT
Start: 2024-03-20

## 2024-04-04 DIAGNOSIS — M79.18 MYOFASCIAL PAIN SYNDROME: ICD-10-CM

## 2024-04-04 DIAGNOSIS — I48.0 PAROXYSMAL ATRIAL FIBRILLATION (HCC): ICD-10-CM

## 2024-04-04 RX ORDER — OXYCODONE HYDROCHLORIDE 10 MG/1
10 TABLET ORAL EVERY 6 HOURS PRN
Qty: 120 TABLET | Refills: 0 | Status: SHIPPED | OUTPATIENT
Start: 2024-04-04 | End: 2024-04-05 | Stop reason: SDUPTHER

## 2024-04-04 RX ORDER — NALOXONE HYDROCHLORIDE 4 MG/.1ML
SPRAY NASAL
Qty: 1 EACH | Refills: 1 | Status: SHIPPED | OUTPATIENT
Start: 2024-04-04 | End: 2025-04-04

## 2024-04-04 NOTE — TELEPHONE ENCOUNTER
Patient calling requesting refill on eliquis and     oxyCODONE (ROXICODONE) 10 MG TABS   Please refill if appropriate

## 2024-04-05 ENCOUNTER — TELEPHONE (OUTPATIENT)
Dept: FAMILY MEDICINE CLINIC | Facility: CLINIC | Age: 79
End: 2024-04-05

## 2024-04-05 ENCOUNTER — PATIENT OUTREACH (OUTPATIENT)
Dept: FAMILY MEDICINE CLINIC | Facility: CLINIC | Age: 79
End: 2024-04-05

## 2024-04-05 DIAGNOSIS — M79.18 MYOFASCIAL PAIN SYNDROME: ICD-10-CM

## 2024-04-05 RX ORDER — OXYCODONE HYDROCHLORIDE 10 MG/1
10 TABLET ORAL EVERY 6 HOURS PRN
Qty: 120 TABLET | Refills: 0 | Status: SHIPPED | OUTPATIENT
Start: 2024-04-05

## 2024-04-05 RX ORDER — OXYCODONE HYDROCHLORIDE 10 MG/1
10 TABLET ORAL EVERY 6 HOURS PRN
Qty: 120 TABLET | OUTPATIENT
Start: 2024-04-05

## 2024-04-05 NOTE — TELEPHONE ENCOUNTER
Hosea from [facility] pharmacy called about the medication, he stated that the medication Oxycodone is the only medication that they need sent to there pharmacy and the order sent for [place] can not be filled by them the other two medications are fine to stay routed to jayce bryan,

## 2024-04-05 NOTE — PROGRESS NOTES
Outpatient RN Care Manager Note    RN VERONICA received call form patient stating she received RN CM unable to reach letter.  RN CM introduced self and explained her role as an RN CM. Advised patient during her recent office visit it was advised the patient schedule an appointment with Cardiothoracic Surgery and have an Echo complete performed.  Patient in agreement for appointment scheduling.    RN CM called Cardiothoracic Surgery and spoke patient scheduling.  Patients  chart will need to be reviewed and then the patient will be contacted to schedule an appointment.    RN CM called patient to advise she will be contacted by Cardiothoracic Surgery directly.  Confirmed patient's upcoming appointment with Dr. Tse at 4/15.  Patient has no further care needs and has RN CM contact information prn.

## 2024-04-08 ENCOUNTER — TELEPHONE (OUTPATIENT)
Dept: CARDIAC SURGERY | Facility: CLINIC | Age: 79
End: 2024-04-08

## 2024-04-08 NOTE — TELEPHONE ENCOUNTER
*Inbasket message sent to [place] [facility] providers.    We received a call referring this patient for a aneurysm consult.  She was referred back in September 2023 and did not respond to our calls.   Her last CT CAP scan was 9/16/23.  She would need a updated scan prior to her appointment.  Once this is complete , we will reach out to the patient again.

## 2024-04-09 DIAGNOSIS — I71.20 THORACIC AORTIC ANEURYSM WITHOUT RUPTURE, UNSPECIFIED PART (HCC): Primary | ICD-10-CM

## 2024-04-09 NOTE — PROGRESS NOTES
Patient referred to thoracic surgery for increasing size of thoracic aortic aneursym.  Referral placed last year and patient did not return their calls. Referral placed again and message received from CTS that they would need an updated CT prior to seeing patient.    CT CAP with contrast ordered.

## 2024-04-10 ENCOUNTER — TELEPHONE (OUTPATIENT)
Dept: FAMILY MEDICINE CLINIC | Facility: CLINIC | Age: 79
End: 2024-04-10

## 2024-04-10 DIAGNOSIS — I10 PRIMARY HYPERTENSION: Primary | ICD-10-CM

## 2024-04-10 NOTE — TELEPHONE ENCOUNTER
Patient called asking if her apt for 4/15/24 can be cancelled as she does not have her CT apt until 4/16/24 she wanted to see is she can be seen on her 5/2/24 apt to follow up instead.     Lisa  [phone]

## 2024-04-15 ENCOUNTER — HOSPITAL ENCOUNTER (OUTPATIENT)
Facility: HOSPITAL | Age: 79
Setting detail: OBSERVATION
Discharge: HOME | End: 2024-04-16
Attending: STUDENT IN AN ORGANIZED HEALTH CARE EDUCATION/TRAINING PROGRAM | Admitting: HOSPITALIST
Payer: COMMERCIAL

## 2024-04-15 ENCOUNTER — OFFICE VISIT (OUTPATIENT)
Dept: FAMILY MEDICINE CLINIC | Facility: CLINIC | Age: 79
End: 2024-04-15

## 2024-04-15 ENCOUNTER — APPOINTMENT (EMERGENCY)
Dept: RADIOLOGY | Facility: HOSPITAL | Age: 79
End: 2024-04-15
Payer: COMMERCIAL

## 2024-04-15 ENCOUNTER — APPOINTMENT (EMERGENCY)
Dept: CT IMAGING | Facility: HOSPITAL | Age: 79
End: 2024-04-15
Payer: COMMERCIAL

## 2024-04-15 VITALS
TEMPERATURE: 97.5 F | DIASTOLIC BLOOD PRESSURE: 54 MMHG | OXYGEN SATURATION: 96 % | HEART RATE: 100 BPM | SYSTOLIC BLOOD PRESSURE: 90 MMHG

## 2024-04-15 DIAGNOSIS — N17.9 AKI (ACUTE KIDNEY INJURY) (HCC): ICD-10-CM

## 2024-04-15 DIAGNOSIS — N81.10 BLADDER PROLAPSE, FEMALE, ACQUIRED: ICD-10-CM

## 2024-04-15 DIAGNOSIS — I48.0 PAROXYSMAL ATRIAL FIBRILLATION (HCC): ICD-10-CM

## 2024-04-15 DIAGNOSIS — I48.91 ATRIAL FIBRILLATION WITH RVR (HCC): Primary | ICD-10-CM

## 2024-04-15 DIAGNOSIS — Z59.41 FOOD INSECURITY: ICD-10-CM

## 2024-04-15 DIAGNOSIS — G89.4 CHRONIC PAIN SYNDROME: ICD-10-CM

## 2024-04-15 DIAGNOSIS — R55 SYNCOPE: ICD-10-CM

## 2024-04-15 DIAGNOSIS — R53.1 GENERALIZED WEAKNESS: ICD-10-CM

## 2024-04-15 DIAGNOSIS — I95.9 HYPOTENSION, UNSPECIFIED HYPOTENSION TYPE: Primary | ICD-10-CM

## 2024-04-15 DIAGNOSIS — F11.20 CONTINUOUS OPIOID DEPENDENCE (HCC): ICD-10-CM

## 2024-04-15 DIAGNOSIS — R09.02 HYPOXIA: ICD-10-CM

## 2024-04-15 PROBLEM — J96.01 ACUTE HYPOXIC RESPIRATORY FAILURE (HCC): Status: ACTIVE | Noted: 2024-04-15

## 2024-04-15 PROBLEM — R79.89 ELEVATED D-DIMER: Status: ACTIVE | Noted: 2024-04-15

## 2024-04-15 LAB
2HR DELTA HS TROPONIN: 5 NG/L
4HR DELTA HS TROPONIN: 16 NG/L
ALBUMIN SERPL BCP-MCNC: 3.6 G/DL (ref 3.5–5)
ALP SERPL-CCNC: 81 U/L (ref 34–104)
ALT SERPL W P-5'-P-CCNC: 11 U/L (ref 7–52)
ANION GAP SERPL CALCULATED.3IONS-SCNC: 7 MMOL/L (ref 4–13)
AST SERPL W P-5'-P-CCNC: 16 U/L (ref 13–39)
BASOPHILS # BLD AUTO: 0.04 THOUSANDS/ÂΜL (ref 0–0.1)
BASOPHILS NFR BLD AUTO: 1 % (ref 0–1)
BILIRUB SERPL-MCNC: 0.64 MG/DL (ref 0.2–1)
BILIRUB UR QL STRIP: NEGATIVE
BUN SERPL-MCNC: 17 MG/DL (ref 5–25)
CALCIUM SERPL-MCNC: 8.7 MG/DL (ref 8.4–10.2)
CARDIAC TROPONIN I PNL SERPL HS: 16 NG/L
CARDIAC TROPONIN I PNL SERPL HS: 21 NG/L
CARDIAC TROPONIN I PNL SERPL HS: 32 NG/L
CHLORIDE SERPL-SCNC: 105 MMOL/L (ref 96–108)
CLARITY UR: CLEAR
CO2 SERPL-SCNC: 25 MMOL/L (ref 21–32)
COLOR UR: NORMAL
CREAT SERPL-MCNC: 1.45 MG/DL (ref 0.6–1.3)
D DIMER PPP FEU-MCNC: 1.01 UG/ML FEU
EOSINOPHIL # BLD AUTO: 0.02 THOUSAND/ÂΜL (ref 0–0.61)
EOSINOPHIL NFR BLD AUTO: 0 % (ref 0–6)
ERYTHROCYTE [DISTWIDTH] IN BLOOD BY AUTOMATED COUNT: 20.3 % (ref 11.6–15.1)
FLUAV RNA RESP QL NAA+PROBE: NEGATIVE
FLUBV RNA RESP QL NAA+PROBE: NEGATIVE
GFR SERPL CREATININE-BSD FRML MDRD: 35 ML/MIN/1.73SQ M
GLUCOSE SERPL-MCNC: 82 MG/DL (ref 65–140)
GLUCOSE UR STRIP-MCNC: NEGATIVE MG/DL
HCT VFR BLD AUTO: 34.3 % (ref 34.8–46.1)
HGB BLD-MCNC: 10.5 G/DL (ref 11.5–15.4)
HGB UR QL STRIP.AUTO: NEGATIVE
IMM GRANULOCYTES # BLD AUTO: 0.02 THOUSAND/UL (ref 0–0.2)
IMM GRANULOCYTES NFR BLD AUTO: 0 % (ref 0–2)
KETONES UR STRIP-MCNC: NEGATIVE MG/DL
LEUKOCYTE ESTERASE UR QL STRIP: NEGATIVE
LYMPHOCYTES # BLD AUTO: 1.12 THOUSANDS/ÂΜL (ref 0.6–4.47)
LYMPHOCYTES NFR BLD AUTO: 24 % (ref 14–44)
MCH RBC QN AUTO: 25.3 PG (ref 26.8–34.3)
MCHC RBC AUTO-ENTMCNC: 30.6 G/DL (ref 31.4–37.4)
MCV RBC AUTO: 83 FL (ref 82–98)
MONOCYTES # BLD AUTO: 0.39 THOUSAND/ÂΜL (ref 0.17–1.22)
MONOCYTES NFR BLD AUTO: 8 % (ref 4–12)
NEUTROPHILS # BLD AUTO: 3.05 THOUSANDS/ÂΜL (ref 1.85–7.62)
NEUTS SEG NFR BLD AUTO: 67 % (ref 43–75)
NITRITE UR QL STRIP: NEGATIVE
NRBC BLD AUTO-RTO: 0 /100 WBCS
PH UR STRIP.AUTO: 5 [PH]
PLATELET # BLD AUTO: 188 THOUSANDS/UL (ref 149–390)
PMV BLD AUTO: 10.8 FL (ref 8.9–12.7)
POTASSIUM SERPL-SCNC: 3.9 MMOL/L (ref 3.5–5.3)
PROT SERPL-MCNC: 6.2 G/DL (ref 6.4–8.4)
PROT UR STRIP-MCNC: NEGATIVE MG/DL
RBC # BLD AUTO: 4.15 MILLION/UL (ref 3.81–5.12)
RSV RNA RESP QL NAA+PROBE: NEGATIVE
SARS-COV-2 RNA RESP QL NAA+PROBE: NEGATIVE
SODIUM SERPL-SCNC: 137 MMOL/L (ref 135–147)
SP GR UR STRIP.AUTO: 1.02 (ref 1–1.03)
UROBILINOGEN UR STRIP-ACNC: <2 MG/DL
WBC # BLD AUTO: 4.64 THOUSAND/UL (ref 4.31–10.16)

## 2024-04-15 PROCEDURE — 84484 ASSAY OF TROPONIN QUANT: CPT

## 2024-04-15 PROCEDURE — 71045 X-RAY EXAM CHEST 1 VIEW: CPT

## 2024-04-15 PROCEDURE — 85379 FIBRIN DEGRADATION QUANT: CPT

## 2024-04-15 PROCEDURE — 99213 OFFICE O/P EST LOW 20 MIN: CPT | Performed by: FAMILY MEDICINE

## 2024-04-15 PROCEDURE — 96374 THER/PROPH/DIAG INJ IV PUSH: CPT

## 2024-04-15 PROCEDURE — 96361 HYDRATE IV INFUSION ADD-ON: CPT

## 2024-04-15 PROCEDURE — 36415 COLL VENOUS BLD VENIPUNCTURE: CPT

## 2024-04-15 PROCEDURE — 99285 EMERGENCY DEPT VISIT HI MDM: CPT | Performed by: STUDENT IN AN ORGANIZED HEALTH CARE EDUCATION/TRAINING PROGRAM

## 2024-04-15 PROCEDURE — 71275 CT ANGIOGRAPHY CHEST: CPT

## 2024-04-15 PROCEDURE — 99222 1ST HOSP IP/OBS MODERATE 55: CPT | Performed by: HOSPITALIST

## 2024-04-15 PROCEDURE — 81003 URINALYSIS AUTO W/O SCOPE: CPT

## 2024-04-15 PROCEDURE — G2211 COMPLEX E/M VISIT ADD ON: HCPCS | Performed by: FAMILY MEDICINE

## 2024-04-15 PROCEDURE — 80053 COMPREHEN METABOLIC PANEL: CPT

## 2024-04-15 PROCEDURE — 99285 EMERGENCY DEPT VISIT HI MDM: CPT

## 2024-04-15 PROCEDURE — 85025 COMPLETE CBC W/AUTO DIFF WBC: CPT

## 2024-04-15 PROCEDURE — 93005 ELECTROCARDIOGRAM TRACING: CPT

## 2024-04-15 PROCEDURE — 0241U HB NFCT DS VIR RESP RNA 4 TRGT: CPT

## 2024-04-15 RX ORDER — SENNOSIDES 8.6 MG
8.6 TABLET ORAL
Status: DISCONTINUED | OUTPATIENT
Start: 2024-04-15 | End: 2024-04-16 | Stop reason: HOSPADM

## 2024-04-15 RX ORDER — LANOLIN ALCOHOL/MO/W.PET/CERES
3 CREAM (GRAM) TOPICAL
Status: DISCONTINUED | OUTPATIENT
Start: 2024-04-15 | End: 2024-04-16 | Stop reason: HOSPADM

## 2024-04-15 RX ORDER — OXYCODONE HYDROCHLORIDE 10 MG/1
10 TABLET ORAL EVERY 6 HOURS PRN
Status: DISCONTINUED | OUTPATIENT
Start: 2024-04-15 | End: 2024-04-16 | Stop reason: HOSPADM

## 2024-04-15 RX ORDER — METOPROLOL SUCCINATE 25 MG/1
50 TABLET, EXTENDED RELEASE ORAL DAILY
Qty: 30 TABLET | Refills: 1 | Status: CANCELLED | OUTPATIENT
Start: 2024-04-15

## 2024-04-15 RX ORDER — ESCITALOPRAM OXALATE 10 MG/1
10 TABLET ORAL DAILY
Status: DISCONTINUED | OUTPATIENT
Start: 2024-04-16 | End: 2024-04-16 | Stop reason: HOSPADM

## 2024-04-15 RX ORDER — ZOLPIDEM TARTRATE 5 MG/1
5 TABLET ORAL
Status: DISCONTINUED | OUTPATIENT
Start: 2024-04-15 | End: 2024-04-15

## 2024-04-15 RX ORDER — ACETAMINOPHEN 325 MG/1
650 TABLET ORAL EVERY 6 HOURS PRN
Status: DISCONTINUED | OUTPATIENT
Start: 2024-04-15 | End: 2024-04-16 | Stop reason: HOSPADM

## 2024-04-15 RX ORDER — ACETAMINOPHEN 10 MG/ML
1000 INJECTION, SOLUTION INTRAVENOUS ONCE
Status: COMPLETED | OUTPATIENT
Start: 2024-04-15 | End: 2024-04-15

## 2024-04-15 RX ORDER — METOPROLOL SUCCINATE 50 MG/1
50 TABLET, EXTENDED RELEASE ORAL DAILY
Status: DISCONTINUED | OUTPATIENT
Start: 2024-04-16 | End: 2024-04-16 | Stop reason: HOSPADM

## 2024-04-15 RX ORDER — GABAPENTIN 300 MG/1
600 CAPSULE ORAL 3 TIMES DAILY
Status: DISCONTINUED | OUTPATIENT
Start: 2024-04-15 | End: 2024-04-15

## 2024-04-15 RX ORDER — CALCIUM CARBONATE 500 MG/1
1000 TABLET, CHEWABLE ORAL DAILY PRN
Status: DISCONTINUED | OUTPATIENT
Start: 2024-04-15 | End: 2024-04-16 | Stop reason: HOSPADM

## 2024-04-15 RX ORDER — POLYETHYLENE GLYCOL 3350 17 G/17G
17 POWDER, FOR SOLUTION ORAL DAILY PRN
Status: DISCONTINUED | OUTPATIENT
Start: 2024-04-15 | End: 2024-04-16 | Stop reason: HOSPADM

## 2024-04-15 RX ORDER — CYCLOBENZAPRINE HCL 10 MG
10 TABLET ORAL 3 TIMES DAILY PRN
Status: DISCONTINUED | OUTPATIENT
Start: 2024-04-15 | End: 2024-04-15

## 2024-04-15 RX ORDER — SODIUM CHLORIDE 9 MG/ML
75 INJECTION, SOLUTION INTRAVENOUS CONTINUOUS
Status: DISPENSED | OUTPATIENT
Start: 2024-04-15 | End: 2024-04-16

## 2024-04-15 RX ADMIN — APIXABAN 5 MG: 5 TABLET, FILM COATED ORAL at 17:06

## 2024-04-15 RX ADMIN — OXYCODONE HYDROCHLORIDE 10 MG: 10 TABLET ORAL at 21:40

## 2024-04-15 RX ADMIN — Medication 3 MG: at 21:39

## 2024-04-15 RX ADMIN — SENNOSIDES 8.6 MG: 8.6 TABLET, FILM COATED ORAL at 21:04

## 2024-04-15 RX ADMIN — SODIUM CHLORIDE 1000 ML: 0.9 INJECTION, SOLUTION INTRAVENOUS at 14:07

## 2024-04-15 RX ADMIN — SODIUM CHLORIDE 75 ML/HR: 0.9 INJECTION, SOLUTION INTRAVENOUS at 17:06

## 2024-04-15 RX ADMIN — ACETAMINOPHEN 1000 MG: 1000 INJECTION INTRAVENOUS at 13:34

## 2024-04-15 RX ADMIN — IOHEXOL 55 ML: 350 INJECTION, SOLUTION INTRAVENOUS at 14:36

## 2024-04-15 SDOH — ECONOMIC STABILITY - FOOD INSECURITY: FOOD INSECURITY: Z59.41

## 2024-04-15 NOTE — ASSESSMENT & PLAN NOTE
Patient was found to have acute hypoxic respiratory failure with SpO2 in 80s in the ED.  Patient was put on 3 L/min of oxygen.  Patient denies any cough, shortness of breath, fever, chills.  Denies any history of asthma, COPD.  Most likely related to A-fib with RVR with hypotension.  Patient was recommended to wean down the oxygen supplementation.  Lower COVID test

## 2024-04-15 NOTE — PATIENT INSTRUCTIONS
Goals of care:  Maximize your health and quality of life by:   Increasing your level of function and activity  Decreasing the negative effects of pain on your life  Minimizing the risks and side effects of medications and ensuring safe use of opioid medication     Ways for you to help meet your goals:  Maintain a healthy lifestyle. This includes proper nutrition, regular physical activity as able, try for 8 hours of sleep per night, use stress reduction strategies, avoid triggers.      Risks and side effects of opioid use:  Prescription opioids carry serious risks of addiction and  overdose, especially with prolonged use. An opioid overdose,  often marked by slowed breathing, can cause sudden death. The  use of prescription opioids can have a number of side effects as  well, even when taken as directed:  Tolerance--meaning you might need to take more of a medication for the same pain relief  Physical dependence--meaning you have symptoms of withdrawal when a medication is stopped  Increased sensitivity to pain  Constipation  Nausea, vomiting, dry mouth  Sleepiness and dizziness   Confusion  Depression  Low levels of testosterone that can result in lower sex drive, energy, and strength  Itching and sweating    If you are prescribed opioids for pain:  Never take opioids in greater amounts or more often than prescribed.  Help prevent misuse and abuse.        - Never sell or share prescription opioids.        - Never use another person’s prescription opioids.  ‡Store prescription opioids in a secure place and out of reach of others (this may include visitors, children, friends, and family).  Safely dispose of unused prescription opioids: Find your community drug take-back program or your pharmacy mail-back program, or flush them down the toilet, following guidance from the Food and Drug Administration (www.fda.gov/Drugs/ResourcesForYou).  ‡Visit www.cdc.gov/drugoverdose to learn about the risks of opioid abuse and  overdose.  If you believe you may be struggling with addiction, tell your health care provider and ask for guidance or call [facility]’s National Helpline at 8-419-408-LWDC.

## 2024-04-15 NOTE — ASSESSMENT & PLAN NOTE
In 09/2022, her aortic root was 4.4 cm and the ascending aorta was mildly dilated at 4.4 cm.   CTA chest PE on admission showed 4.7 cm aortic aneurysm.  Patient denied any chest pain, shortness of breath, palpitation..  Vitals are stable.  Monitor symptoms.  Follow-up with her primary cardiologist as an outpatient.

## 2024-04-15 NOTE — ASSESSMENT & PLAN NOTE
Patient has history of DVT and has been taking Eliquis at home.    D-dimer 1.01 in the ED.  Denied any leg pain, leg swellings, chest pain, shortness of breath, palpitation.  CTA chest PE did not show any pulmonary embolism.  Home med Eliquis 5 mg twice daily.

## 2024-04-15 NOTE — ASSESSMENT & PLAN NOTE
POA MARU.  Elevated creatinine 1.45 on admission with baseline creatinine 0.8-0.9  Received 1 L of IV fluid bolus in the ED    Lab Results   Component Value Date    CREATININE 1.45 (H) 04/15/2024    CREATININE 1.04 09/16/2023    CREATININE 0.92 07/05/2023        Plans:  Gentle IV fluids with normal saline 75 cc/h for 12 hours  Monitor creatinine in the morning.  Avoid nephrotoxins

## 2024-04-15 NOTE — ED NOTES
Transported patient to [place] [id number], receiving RN (Joellen) and PCA (Lorraine) were at bedside.     John Grewal  04/15/24 [id number]

## 2024-04-15 NOTE — PROGRESS NOTES
Assessment/Plan     Problem List Items Addressed This Visit        Cardiovascular and Mediastinum    Atrial fibrillation (HCC)       Care Coordination    Food insecurity       Surgery/Wound/Pain    Chronic pain   Other Visit Diagnoses     Continuous opioid dependence (HCC)    -  Primary           Treatment Plan: Has had several other treatment modalities. Was stable on this medication by pain management and changed to this office due to transportation issues that were legitimized. Continue medications at this time and follow up in 3 months    Treatment Goals: To be able to live her life as normally as possible    Opiate risks  There are risks associated with opioid medications, including dependence, addiction and tolerance. The patient understands and agrees to use these medications only as prescribed. Potential side effects of the medications include, but are not limited to, constipation, drowsiness, addiction, impaired judgment and risk of fatal overdose if not taken as prescribed. The patient was warned against driving while taking sedation medications.  Sharing medications is a felony. At this point in time, the patient is showing no signs of addiction, abuse, diversion or suicidal ideation.      Patient has a high risk condition (age > 65, MARIBEL, renal or hepatic impairment, mental health condition, use of alcohol or other substances). Has been counseled on the specific risks of taking opioids with these conditions and the increased risks including including sedation, increased fall risk, dizziness, addictive potential and death.      PDMP review  [place]MP or [place]  reviewed. No red flags were identified; safe to proceed with prescription          Subjective     Opioid Management:   Type of visit: Follow-up    Pain related diagnoses: knee OA, spondylolithesis    Screening Tools/Assessments:    PHQ-2/9:  Last PHQ-2 score: 0 (Last PHQ-2 date: 1/22/2024)      Opioid agreement:  Active Opioid agreement on file?: No     Opioid agreement signed date: 11/18/2022  Opioid agreement expiration date: 11/18/2023    Naloxone:  Currently prescribed Naloxone (Narcan): Yes      HPI  Pain Medications             cyclobenzaprine (FLEXERIL) 10 mg tablet Take 1 tablet (10 mg total) by mouth 3 (three) times a day as needed for muscle spasms    escitalopram (LEXAPRO) 10 mg tablet Take 1 tablet (10 mg total) by mouth daily    gabapentin (NEURONTIN) 300 mg capsule Take 3 capsules (900 mg total) by mouth 3 (three) times a day    oxyCODONE (ROXICODONE) 10 MG TABS Take 1 tablet (10 mg total) by mouth every 6 (six) hours as needed for moderate pain Max Daily Amount: 40 mg         Outpatient Morphine Milligram Equivalents Per Day     4/15/24 and after 60 MME/Day    Order Name Dose Route Frequency Maximum MME/Day     oxyCODONE (ROXICODONE) 10 MG TABS 10 mg Oral Every 6 hours PRN 60 MME/Day    Total Potential Morphine Milligram Equivalents Per Day 60 MME/Day    Calculation Information        oxyCODONE (ROXICODONE) 10 MG TABS    oxyCODONE 10 MG Tabs: maximum daily dose of 40 mg * morphine equivalence factor of 1.5 = 60 MME/Day                         PDMP Review       Value Time User    PDMP Reviewed  Yes 4/4/2024  4:45 PM Gilma Tse DO         Review of Systems  Objective     BP 90/54   Pulse 100   Temp 97.5 °F (36.4 °C)   SpO2 96%     Physical Exam    Gilma Tse DO

## 2024-04-15 NOTE — ED ATTENDING ATTESTATION
4/15/2024  I, Drew Smart DO, saw and evaluated the patient. I have discussed the patient with the resident/non-physician practitioner and agree with the resident's/non-physician practitioner's findings, Plan of Care, and MDM as documented in the resident's/non-physician practitioner's note, except where noted. All available labs and Radiology studies were reviewed.  I was present for key portions of any procedure(s) performed by the resident/non-physician practitioner and I was immediately available to provide assistance.       At this point I agree with the current assessment done in the Emergency Department.  I have conducted an independent evaluation of this patient a history and physical is as follows:    78-year-old female presents to the ED for evaluation of generalized fatigue/weakness.  Was seen at primary care office due to symptoms for 1 week.  There was found to be in atrial fibrillation with RVR as well as hypotension with systolic blood pressures into the 70s.  EMS was called and patient is brought to the ED for evaluation.  On arrival, was in atrial fibrillation with RVR, had fluid administered by EMS with improvement in blood pressure systolic blood pressures into the 100s.  Fluids were continued.  Patient has no complaints of pain, just generalized fatigue and not feeling like herself.  Became hypoxic to 89% on room air and so was started on oxygen supplementation by nasal cannula with appropriate increase.  EKG nonischemic as interpreted by myself, initially atrial fibrillation with RVR.  She converted to normal sinus rhythm spontaneously and without pharmacologic intervention.  Labs showed no leukocytosis, stable hemoglobin, no acute electrolyte abnormalities, impaired renal function with creatinine of 1.45 compared to baseline of 0.8-1.0, no acute LFT abnormalities, indeterminate troponin of 16, elevated D-dimer.  Chest x-ray shows no acute cardiopulmonary pathology as interpreted by myself.   Given elevated D-dimer and hypoxia, CT PE was obtained which is negative for PE, does show some chronic changes regarding a ascending aortic aneurysm.  There is also low attenuation in the left atrial appendage concerning for possible artifact versus thrombus.  She started on fluids.  Admitting team consulted by myself.  After discussion, accepted onto their service for further care and management.  Observation MedSurg.  Stable at the time of disposition    ED Course         Critical Care Time  Procedures

## 2024-04-15 NOTE — ASSESSMENT & PLAN NOTE
Patient states she's not eating as much because she was cooking with her friend but they moved and she hasn't been doing this as much and does not have a good routine for eating now.

## 2024-04-15 NOTE — ASSESSMENT & PLAN NOTE
Patient noticed generalized weakness for a couple of days.  Denied falls, loss of appetite.  Will get PT OT evaluation.

## 2024-04-15 NOTE — ED PROVIDER NOTES
History  Chief Complaint   Patient presents with    Weakness - Generalized     Pt at PCP for routine care and found to be in Afib/RVR and hypotensive with SBP in the 70's.  Received 600 NSS prehospital.  Pt c/o fatique and weakness over the past week.     Patient is a 78-year-old female who presented to the ED for abnormal vitals at clinic today.  Patient was at doctor's office for checkup visit and had no complaints other than fatigue at the time but was found to have SBP 70s as well as HR 140s.  EMS was called and patient was found on scene to have A-fib RVR rate 140s.  She was given fluid bolus with 600 cc NS given en route.  She was also noted to be hypoxic with SpO2 high 80s and was placed on 2 L nasal cannula.  Notably, patient does not have any home oxygen requirement and does not have any lung disease that she is aware of for CHF. On arrival to ED patient remained in A-fib RVR with rate 140s and initial /52.  She stated that she has been feeling weak for the last couple days but otherwise denies any symptoms including no shortness of breath, no chest pain, no fevers, chills, or other systemic symptoms.  On evaluation, patient was in no acute distress and did not endorse any symptoms other than the weakness and fatigue.  Initially oxygen was taken off but patient desaturated to high 80s and was placed back on 2 L NC.        Prior to Admission Medications   Prescriptions Last Dose Informant Patient Reported? Taking?   Diclofenac Sodium (VOLTAREN) 1 % Past Week Self No Yes   Sig: Apply 1 g topically 4 (four) times a day   apixaban (Eliquis) 5 mg 4/14/2024  No Yes   Sig: Take 1 tablet (5 mg total) by mouth 2 (two) times a day   cyclobenzaprine (FLEXERIL) 10 mg tablet Past Month  No Yes   Sig: Take 1 tablet (10 mg total) by mouth 3 (three) times a day as needed for muscle spasms   escitalopram (LEXAPRO) 10 mg tablet 4/14/2024  No Yes   Sig: Take 1 tablet (10 mg total) by mouth daily   gabapentin (NEURONTIN)  300 mg capsule 4/14/2024  No Yes   Sig: Take 3 capsules (900 mg total) by mouth 3 (three) times a day   hydrocortisone 2.5 % cream  Self No No   Sig: Apply topically 2 (two) times a day   lidocaine (LIDODERM) 5 % 4/14/2024  No Yes   Sig: Apply 2 patches topically over 12 hours daily Remove & Discard patch within 12 hours or as directed by MD   melatonin 3 mg 4/14/2024  No Yes   Sig: Take 1 tablet (3 mg total) by mouth daily at bedtime   metoprolol succinate (TOPROL-XL) 50 mg 24 hr tablet 4/14/2024  No Yes   Sig: TAKE 1 TABLET (50 MG TOTAL) BY MOUTH DAILY   naloxone (NARCAN) 4 mg/0.1 mL nasal spray Not Taking  No No   Sig: Administer 1 spray into a nostril. If no response after 2-3 minutes, give another dose in the other nostril using a new spray.   Patient not taking: Reported on 4/15/2024   oxyCODONE (ROXICODONE) 10 MG TABS 4/14/2024  No Yes   Sig: Take 1 tablet (10 mg total) by mouth every 6 (six) hours as needed for moderate pain Max Daily Amount: 40 mg   polyethylene glycol (MIRALAX) 17 g packet   No No   Sig: Take 17 g by mouth 2 (two) times a day for 5 days   senna (SENOKOT) 8.6 mg Past Week Self No Yes   Sig: Take 1 tablet (8.6 mg total) by mouth daily at bedtime   zolpidem (AMBIEN) 5 mg tablet 4/14/2024  No Yes   Sig: Take 1 tablet (5 mg total) by mouth daily at bedtime as needed for sleep      Facility-Administered Medications: None       Past Medical History:   Diagnosis Date    Acute deep vein thrombosis of lower limb (HCC)     last assessed [id number]    Aortic aneurysm (HCC)     Arthritis     Atrial fibrillation (HCC)     Dislocation of hip (HCC)     last assessed [id number]    Hypertension     Psychiatric disorder     anxiety       Past Surgical History:   Procedure Laterality Date    HIP SURGERY      JOINT REPLACEMENT      KNEE ARTHROSCOPY Bilateral     x2 rt and 1 on left    TUBAL LIGATION         Family History   Problem Relation Age of Onset    Colon cancer Mother     Breast cancer Family     Thyroid  cancer Family     Colon cancer Family     Hypertension Family     Thyroid disease Family     Hypertension Father     Dementia Father      I have reviewed and agree with the history as documented.    E-Cigarette/Vaping    E-Cigarette Use Never User      E-Cigarette/Vaping Substances    Nicotine No     THC No     CBD No     Flavoring No     Other No     Unknown No      Social History     Tobacco Use    Smoking status: Never    Smokeless tobacco: Never   Vaping Use    Vaping status: Never Used   Substance Use Topics    Alcohol use: No     Comment: being a social drinker per [employer]    Drug use: No        Review of Systems   Constitutional:  Positive for fatigue. Negative for chills and fever.   HENT: Negative.     Respiratory:  Negative for cough and shortness of breath.    Cardiovascular:  Negative for chest pain and palpitations.   Gastrointestinal:  Negative for abdominal pain, nausea and vomiting.   Genitourinary:  Negative for dysuria, frequency and urgency.   Musculoskeletal: Negative.    Skin:  Negative for color change and pallor.   Neurological:  Negative for dizziness, syncope and headaches.   Psychiatric/Behavioral: Negative.     All other systems reviewed and are negative.      Physical Exam  ED Triage Vitals   Temperature Pulse Respirations Blood Pressure SpO2   04/15/24 1317 04/15/24 1307 04/15/24 1307 04/15/24 1307 04/15/24 1307   97.6 °F (36.4 °C) (!) 140 18 105/52 91 %      Temp Source Heart Rate Source Patient Position - Orthostatic VS BP Location FiO2 (%)   04/15/24 1317 04/15/24 1307 04/15/24 1307 04/15/24 1307 --   Oral Monitor Lying Right arm       Pain Score       04/15/24 1307       No Pain             Orthostatic Vital Signs  Vitals:    04/16/24 1450 04/16/24 1500 04/16/24 1503 04/16/24 1520   BP: 123/81   115/84   Pulse: 90  68    Patient Position - Orthostatic VS:  Sitting - Orthostatic VS Standing - Orthostatic VS Standing for 3 minutes - Orthostatic VS       Physical Exam  Vitals and  nursing note reviewed.   Constitutional:       Appearance: Normal appearance.      Comments: Chronically ill appearing   HENT:      Head: Normocephalic and atraumatic.      Nose: Nose normal.      Mouth/Throat:      Mouth: Mucous membranes are moist.      Pharynx: Oropharynx is clear.   Eyes:      Extraocular Movements: Extraocular movements intact.      Conjunctiva/sclera: Conjunctivae normal.      Pupils: Pupils are equal, round, and reactive to light.   Cardiovascular:      Rate and Rhythm: Regular rhythm. Tachycardia present.      Pulses: Normal pulses.      Heart sounds: Normal heart sounds.   Pulmonary:      Effort: Pulmonary effort is normal.      Breath sounds: Normal breath sounds.   Abdominal:      General: Abdomen is flat. Bowel sounds are normal.      Palpations: Abdomen is soft.   Neurological:      General: No focal deficit present.      Mental Status: She is alert and oriented to person, place, and time.         ED Medications  Medications   sodium chloride 0.9 % infusion (0 mL/hr Intravenous Stopped 4/16/24 0800)   acetaminophen (Ofirmev) injection 1,000 mg (0 mg Intravenous Stopped 4/15/24 1349)   sodium chloride 0.9 % bolus 1,000 mL (0 mL Intravenous Stopped 4/15/24 1507)   iohexol (OMNIPAQUE) 350 MG/ML injection (MULTI-DOSE) 55 mL (55 mL Intravenous Given 4/15/24 1436)       Diagnostic Studies  Results Reviewed       Procedure Component Value Units Date/Time    Basic metabolic panel [[id number]] Collected: 04/16/24 0600    Lab Status: Final result Specimen: Blood from Arm, Left Updated: 04/16/24 0624     Sodium 137 mmol/L      Potassium 4.1 mmol/L      Chloride 108 mmol/L      CO2 25 mmol/L      ANION GAP 4 mmol/L      BUN 14 mg/dL      Creatinine 1.05 mg/dL      Glucose 83 mg/dL      Glucose, Fasting 83 mg/dL      Calcium 8.6 mg/dL      eGFR 51 ml/min/1.73sq m     Narrative:      National Kidney Disease Foundation guidelines for Chronic Kidney Disease (CKD):     Stage 1 with normal or high GFR  (GFR > 90 mL/min/1.73 square meters)    Stage 2 Mild CKD (GFR = 60-89 mL/min/1.73 square meters)    Stage 3A Moderate CKD (GFR = 45-59 mL/min/1.73 square meters)    Stage 3B Moderate CKD (GFR = 30-44 mL/min/1.73 square meters)    Stage 4 Severe CKD (GFR = 15-29 mL/min/1.73 square meters)    Stage 5 End Stage CKD (GFR <15 mL/min/1.73 square meters)  Note: GFR calculation is accurate only with a steady state creatinine    CBC (With Platelets) [[id number]]  (Abnormal) Collected: 04/16/24 0600    Lab Status: Final result Specimen: Blood from Arm, Left Updated: 04/16/24 0615     WBC 3.07 Thousand/uL      RBC 3.94 Million/uL      Hemoglobin 10.0 g/dL      Hematocrit 33.1 %      MCV 84 fL      MCH 25.4 pg      MCHC 30.2 g/dL      RDW 20.3 %      Platelets 149 Thousands/uL      MPV 10.6 fL     UA w Reflex to Microscopic w Reflex to Culture [[id number]] Collected: 04/15/24 1909    Lab Status: Final result Specimen: Urine, Other Updated: 04/1945     Color, UA Light Yellow     Clarity, UA Clear     Specific Gravity, UA 1.020     pH, UA 5.0     Leukocytes, UA Negative     Nitrite, UA Negative     Protein, UA Negative mg/dl      Glucose, UA Negative mg/dl      Ketones, UA Negative mg/dl      Urobilinogen, UA <2.0 mg/dl      Bilirubin, UA Negative     Occult Blood, UA Negative    HS Troponin I 4hr [[id number]]  (Normal) Collected: 04/15/24 1755    Lab Status: Final result Specimen: Blood from Arm, Right Updated: 04/15/24 1833     hs TnI 4hr 32 ng/L      Delta 4hr hsTnI 16 ng/L     HS Troponin I 2hr [[id number]]  (Normal) Collected: 04/15/24 1534    Lab Status: Final result Specimen: Blood from Arm, Right Updated: 04/15/24 1610     hs TnI 2hr 21 ng/L      Delta 2hr hsTnI 5 ng/L     D-dimer, quantitative [[id number]]  (Abnormal) Collected: 04/15/24 1330    Lab Status: Final result Specimen: Blood from Arm, Left Updated: 04/15/24 1411     D-Dimer, Quant 1.01 ug/ml FEU     Narrative:      In the evaluation for possible pulmonary  embolism, in the appropriate (Well's Score of 4 or less) patient, the age adjusted d-dimer cutoff for this patient can be calculated as:    Age x 0.01 (in ug/mL) for Age-adjusted D-dimer exclusion threshold for a patient over 50 years.    HS Troponin 0hr (reflex protocol) [[id number]]  (Normal) Collected: 04/15/24 1330    Lab Status: Final result Specimen: Blood from Arm, Left Updated: 04/15/24 1402     hs TnI 0hr 16 ng/L     Comprehensive metabolic panel [[id number]]  (Abnormal) Collected: 04/15/24 1330    Lab Status: Final result Specimen: Blood from Arm, Left Updated: 04/15/24 1355     Sodium 137 mmol/L      Potassium 3.9 mmol/L      Chloride 105 mmol/L      CO2 25 mmol/L      ANION GAP 7 mmol/L      BUN 17 mg/dL      Creatinine 1.45 mg/dL      Glucose 82 mg/dL      Calcium 8.7 mg/dL      AST 16 U/L      ALT 11 U/L      Alkaline Phosphatase 81 U/L      Total Protein 6.2 g/dL      Albumin 3.6 g/dL      Total Bilirubin 0.64 mg/dL      eGFR 35 ml/min/1.73sq m     Narrative:      National Kidney Disease Foundation guidelines for Chronic Kidney Disease (CKD):     Stage 1 with normal or high GFR (GFR > 90 mL/min/1.73 square meters)    Stage 2 Mild CKD (GFR = 60-89 mL/min/1.73 square meters)    Stage 3A Moderate CKD (GFR = 45-59 mL/min/1.73 square meters)    Stage 3B Moderate CKD (GFR = 30-44 mL/min/1.73 square meters)    Stage 4 Severe CKD (GFR = 15-29 mL/min/1.73 square meters)    Stage 5 End Stage CKD (GFR <15 mL/min/1.73 square meters)  Note: GFR calculation is accurate only with a steady state creatinine    CBC and differential [[id number]]  (Abnormal) Collected: 04/15/24 1330    Lab Status: Final result Specimen: Blood from Arm, Left Updated: 04/15/24 1344     WBC 4.64 Thousand/uL      RBC 4.15 Million/uL      Hemoglobin 10.5 g/dL      Hematocrit 34.3 %      MCV 83 fL      MCH 25.3 pg      MCHC 30.6 g/dL      RDW 20.3 %      MPV 10.8 fL      Platelets 188 Thousands/uL      nRBC 0 /100 WBCs      Segmented % 67 %       Immature Grans % 0 %      Lymphocytes % 24 %      Monocytes % 8 %      Eosinophils Relative 0 %      Basophils Relative 1 %      Absolute Neutrophils 3.05 Thousands/µL      Absolute Immature Grans 0.02 Thousand/uL      Absolute Lymphocytes 1.12 Thousands/µL      Absolute Monocytes 0.39 Thousand/µL      Eosinophils Absolute 0.02 Thousand/µL      Basophils Absolute 0.04 Thousands/µL                    CTA ED chest PE Study   Final Result by Mary Colon MD (04/15 1512)      No pulmonary embolus.      No acute pulmonary disease.      Low-attenuation in the nondependent portion of the left atrial appendage. It cannot be determined if this is due to mixing artifact or left atrial appendage thrombus.      Stable 4.7 cm ascending aortic aneurysm. Cardiothoracic surgery received a referral for this patient in September 2023 but patient has not been evaluated. Again recommend nonemergent cardiothoracic consultation and follow-up with a chest CT with no    contrast in 1 year.      Pulmonary artery enlargement which can be a normal variant or can be seen with pulmonary hypertension.      This study was marked in Epic for immediate notification and follow-up.            Workstation performed: [id number]         XR chest 1 view portable   Final Result by Mary Colon MD (04/15 1654)      No acute cardiopulmonary disease.            Workstation performed: [id number]               Procedures  ECG 12 Lead Documentation Only    Date/Time: 4/15/2024 1:17 PM    Performed by: Iglesia Shahid MD  Authorized by: Iglesia Shahid MD    Indications / Diagnosis:  Found to be in atrial fibrillation  ECG reviewed by me, the ED Provider: yes    Patient location:  ED  Previous ECG:     Comparison to cardiac monitor: Yes    Interpretation:     Interpretation: abnormal    Rate:     ECG rate:  129    ECG rate assessment: tachycardic    Rhythm:     Rhythm: atrial fibrillation    Ectopy:     Ectopy: none    QRS:     QRS axis:   Normal    QRS intervals:  Normal  Conduction:     Conduction: normal    ST segments:     ST segments:  Normal  T waves:     T waves: normal    ECG 12 Lead Documentation Only    Date/Time: 4/15/2024 1:35 PM    Performed by: Iglesia Shahid MD  Authorized by: Iglesia Shahid MD    Indications / Diagnosis:  Conversion to NSR from Afib  ECG reviewed by me, the ED Provider: yes    Patient location:  ED  Interpretation:     Interpretation: normal    Rate:     ECG rate:  78    ECG rate assessment: normal    Rhythm:     Rhythm: sinus rhythm    Ectopy:     Ectopy: none    QRS:     QRS axis:  Normal    QRS intervals:  Normal  Conduction:     Conduction: normal    ST segments:     ST segments:  Normal        ED Course                             SBIRT 22yo+      Flowsheet Row Most Recent Value   Initial Alcohol Screen: US AUDIT-C     1. How often do you have a drink containing alcohol? 0 Filed at: 04/15/2024 1320   2. How many drinks containing alcohol do you have on a typical day you are drinking?  0 Filed at: 04/15/2024 1320   3b. FEMALE Any Age, or MALE 65+: How often do you have 4 or more drinks on one occassion? 0 Filed at: 04/15/2024 1320   Audit-C Score 0 Filed at: 04/15/2024 1320   RAGHAVENDRA: How many times in the past year have you...    Used an illegal drug or used a prescription medication for non-medical reasons? Never Filed at: 04/15/2024 1320                  Medical Decision Making  Patient is a 78-year-old female who presented to the ED for abnormal vitals at clinic today.  Patient was at doctor's office for checkup visit and had no complaints other than fatigue at the time but was found to have SBP 70s as well as HR 140s.  EMS was called and patient was found on scene to have A-fib RVR rate 140s.  She was given fluid bolus with 600 cc NS given en route.  She was also noted to be hypoxic with SpO2 high 80s and was placed on 2 L nasal cannula.  Notably, patient does not have any home oxygen requirement and does not  have any lung disease that she is aware of for CHF. On arrival to ED patient remained in A-fib RVR with rate 140s and initial /52.  She stated that she has been feeling weak for the last couple days but otherwise denies any symptoms including no shortness of breath, no chest pain, no fevers, chills, or other systemic symptoms.  On evaluation, patient was in no acute distress and did not endorse any symptoms other than the weakness and fatigue.  Initially oxygen was taken off but patient desaturated to high 80s and was placed back on 2 L NC.    Ddx includes but is not limited to Afib RVR, infection including sepsis from pneumonia, UTI, or other source, PE, electrolyte abnormality, anemia, ACS.  EKG without ischemic changes, troponin initial 16, delta 2 hour +5, doubt ACS but will continue to follow with serial troponin. WBC WNL, CXR unremarkable, UA not suggestive of UTI, less likely infection given current workup. CMP without gross electrolyte abnormalities. Cr increase suggestive of new MARU. Mild anemia, likely not enough to explain symptoms. D-dimer was elevated and given this and hypoxia CTA PE study was performed which was unremarkable for PE or other acute process. Left atrial appendage thrombus possibly visualized, may be explained in context of Afib. Stable ascending aneurysm visualized from previous study.     Notably, patient converted to NSR from afib RVR spontaneously early in ED course and remained in NSR. Patient was admitted to medicine for further workup and management of MARU, hypoxia, and earlier Afib RVR.      Amount and/or Complexity of Data Reviewed  Labs: ordered.  Radiology: ordered.    Risk  Prescription drug management.  Decision regarding hospitalization.          Disposition  Final diagnoses:   Atrial fibrillation with RVR (HCC)   Hypoxia   MARU (acute kidney injury) (HCC)     Time reflects when diagnosis was documented in both MDM as applicable and the Disposition within this note        Time User Action Codes Description Comment    4/15/2024  3:27 PM Drew Smart Add [I48.91] Atrial fibrillation with RVR ([facility])     4/15/2024  3:27 PM Drew Smart Add [R09.02] Hypoxia     4/15/2024  3:27 PM rDew Smart Add [N17.9] MARU (acute kidney injury) ([facility])     4/16/2024  4:22 AM Gene Crow Add [N81.10] Bladder prolapse, female, acquired     4/16/2024  3:53 PM Allen Mahendraketurah Add [R53.1] Generalized weakness     4/16/2024  4:08 PM Kenroy Jacobs Add [R55] Syncope           ED Disposition       ED Disposition   Admit    Condition   Stable    Date/Time   Mon Apr 15, 2024 2843    Comment   Case was discussed with ANGI and the patient's admission status was agreed to be Admission Status: observation status to the service of Dr. Shelli Gill .               Follow-up Information       Follow up With Specialties Details Why Contact Info    Gilma Tse DO Family Medicine Schedule an appointment as soon as possible for a visit in 1 week(s)  [address] [facility] [address]  [phone]      Nadege Key MD Urogynecology Follow up  [address] [facility]  [address]  [facility] [address]  [phone]      Mike Caraballo MD Cardiology, Multidisciplinary Go on 5/28/2024 appt at 10:40 am, please arrive 10:25 am [address] [place] [address]  [address]  [facility] [address]  [phone]              Discharge Medication List as of 4/16/2024  4:47 PM        CONTINUE these medications which have NOT CHANGED    Details   apixaban (Eliquis) 5 mg Take 1 tablet (5 mg total) by mouth 2 (two) times a day, Starting Thu 4/4/2024, Normal      Diclofenac Sodium (VOLTAREN) 1 % Apply 1 g topically 4 (four) times a day, Starting Mon 8/15/2022, Normal      escitalopram (LEXAPRO) 10 mg tablet Take 1 tablet (10 mg total) by mouth daily, Starting Wed 1/24/2024, Normal      gabapentin (NEURONTIN) 300 mg capsule Take 3 capsules (900 mg total) by mouth 3 (three) times a day, Starting Wed 1/24/2024, Normal      lidocaine  (LIDODERM) 5 % Apply 2 patches topically over 12 hours daily Remove & Discard patch within 12 hours or as directed by MD, Starting Wed 3/20/2024, Normal      melatonin 3 mg Take 1 tablet (3 mg total) by mouth daily at bedtime, Starting Wed 1/24/2024, Normal      metoprolol succinate (TOPROL-XL) 50 mg 24 hr tablet TAKE 1 TABLET (50 MG TOTAL) BY MOUTH DAILY, Starting Wed 10/18/2023, Normal      oxyCODONE (ROXICODONE) 10 MG TABS Take 1 tablet (10 mg total) by mouth every 6 (six) hours as needed for moderate pain Max Daily Amount: 40 mg, Starting Fri 4/5/2024, Normal      senna (SENOKOT) 8.6 mg Take 1 tablet (8.6 mg total) by mouth daily at bedtime, Starting Fri 10/1/2021, Normal      zolpidem (AMBIEN) 5 mg tablet Take 1 tablet (5 mg total) by mouth daily at bedtime as needed for sleep, Starting Wed 3/20/2024, Normal      hydrocortisone 2.5 % cream Apply topically 2 (two) times a day, Starting Mon 8/15/2022, Normal      polyethylene glycol (MIRALAX) 17 g packet Take 17 g by mouth 2 (two) times a day for 5 days, Starting Thu 7/6/2023, Until Tue 7/11/2023, Normal           STOP taking these medications       cyclobenzaprine (FLEXERIL) 10 mg tablet Comments:   Reason for Stopping:         naloxone (NARCAN) 4 mg/0.1 mL nasal spray Comments:   Reason for Stopping:             Outpatient Discharge Orders   Ambulatory Referral to Physical Therapy   Standing Status: Future Standing Exp. Date: 04/16/25      Ambulatory Referral to Urogynecology   Standing Status: Future Standing Exp. Date: 04/16/25      AMB extended holter monitor   Standing Status: Future Standing Exp. Date: 04/16/28       PDMP Review         Value Time User    PDMP Reviewed  Yes 4/4/2024  4:45 PM Gilma Tse DO             ED Provider  Attending physically available and evaluated Lisa KANIKA Marques. I managed the patient along with the ED Attending.    Electronically Signed by           Iglesia Shahid MD  04/18/24 [id number]

## 2024-04-15 NOTE — ASSESSMENT & PLAN NOTE
78 y.o. female with a PMH of hypertension, paroxysmal A-fib on Eliquis presented to the ED from her PCP office with atrial fibrillation with RVR and hypotension.  Had received IV fluid bolus en route and blood pressure became stable.  Heart rate became normalized without any intervention in the ED.   Patient is asymptomatic.  Telemetry shows sinus rhythm.  Primary cardiologist Dr. Caraballo, last office visit in 09/2022.  Home medications: Toprol XL 50 mg daily and Eliquis 5 mg twice daily.  Last echo in 03/2023, LVEF 60%  A-fib has been stabilized in the ED.    Plans:  Monitor telemetry  Continue Eliquis 5 mg twice daily and metoprolol  Hold off cardiology consult while inpatient  Recommended to continue follow-up with her cardiologist as an outpatient

## 2024-04-15 NOTE — ASSESSMENT & PLAN NOTE
Patients initial visit was for opioid monitoring however on presentation, her BP was 90/54 and she didn't feel steady on her feet. After my exam she felt better and BP was 105/50 so was going to decrease metoprolol and have her follow up closely as well as check CBCD   However, upon rising to leave the office, she again became unsteady, almost fell, became pale and BP decreased again. Given that she lives alone, we agreed that an ED evaluation would be in her best interest. We did not complete the opioid visit at this time but we did get her opioid agreement signed. She also requested that I increase the dose of her ambien again but given her current state, I declined.  Will follow up after ED visit.

## 2024-04-15 NOTE — H&P
[facility]  H&P  Name: Lisa Marques 78 y.o. female I MRN: [id number]  Unit/Bed#: S -01 I Date of Admission: 4/15/2024   Date of Service: 4/15/2024 I Hospital Day: 0      Assessment/Plan   * Atrial fibrillation with RVR (HCC)  Assessment & Plan  78 y.o. female with a PMH of hypertension, paroxysmal A-fib on Eliquis presented to the ED from her PCP office with atrial fibrillation with RVR and hypotension.  Had received IV fluid bolus en route and blood pressure became stable.  Heart rate became normalized without any intervention in the ED.   Patient is asymptomatic.  Telemetry shows sinus rhythm.  Primary cardiologist Dr. Caraballo, last office visit in 09/2022.  Home medications: Toprol XL 50 mg daily and Eliquis 5 mg twice daily.  Last echo in 03/2023, LVEF 60%  A-fib has been stabilized in the ED.    Plans:  Monitor telemetry  Continue Eliquis 5 mg twice daily and metoprolol  Hold off cardiology consult while inpatient  Recommended to continue follow-up with her cardiologist as an outpatient          Generalized weakness  Assessment & Plan  Patient noticed generalized weakness for a couple of days.  Denied falls, loss of appetite.  Will get PT OT evaluation.    Elevated d-dimer  Assessment & Plan  Patient has history of DVT and has been taking Eliquis at home.    D-dimer 1.01 in the ED.  Denied any leg pain, leg swellings, chest pain, shortness of breath, palpitation.  CTA chest PE did not show any pulmonary embolism.  Home med Eliquis 5 mg twice daily.    Acute hypoxic respiratory failure (HCC)  Assessment & Plan  Patient was found to have acute hypoxic respiratory failure with SpO2 in 80s in the ED.  Patient was put on 3 L/min of oxygen.  Patient denies any cough, shortness of breath, fever, chills.  Denies any history of asthma, COPD.  Most likely related to A-fib with RVR with hypotension.  Patient was recommended to wean down the oxygen supplementation.  Lower COVID  test    Insomnia  Assessment & Plan  Has been taking Ambien and melatonin at home for insomnia.  Will hold off sleep medications while patient is high risks for fall.    Depression with anxiety  Assessment & Plan  Taking Lexapro at home.  Continue home med Lexapro    Aortic aneurysm ([facility])  Assessment & Plan   In 09/2022, her aortic root was 4.4 cm and the ascending aorta was mildly dilated at 4.4 cm.   CTA chest PE on admission showed 4.7 cm aortic aneurysm.  Patient denied any chest pain, shortness of breath, palpitation..  Vitals are stable.  Monitor symptoms.  Follow-up with her primary cardiologist as an outpatient.    MARU (acute kidney injury) ([facility])  Assessment & Plan  POA MARU.  Elevated creatinine 1.45 on admission with baseline creatinine 0.8-0.9  Received 1 L of IV fluid bolus in the ED    Lab Results   Component Value Date    CREATININE 1.45 (H) 04/15/2024    CREATININE 1.04 09/16/2023    CREATININE 0.92 07/05/2023        Plans:  Gentle IV fluids with normal saline 75 cc/h for 12 hours  Monitor creatinine in the morning.  Avoid nephrotoxins         VTE Pharmacologic Prophylaxis: VTE Score: 4 Moderate Risk (Score 3-4) - Pharmacological DVT Prophylaxis Ordered: apixaban (Eliquis).  Code Status: Level 1 - Full Code   Discussion with family: Attempted to update  (son) via phone. Unable to contact.    Anticipated Length of Stay: Patient will be admitted on an observation basis with an anticipated length of stay of less than 2 midnights secondary to Afib with RVR and MARU.    Chief Complaint: A-fib with RVR and hypotension    History of Present Illness:  Lisa Marques is a 78 y.o. female with a PMH of hypertension, paroxysmal A-fib on Eliquis, DVT and stable aortic aneurysm who presents to the ED from her primary care physician office with atrial fibrillation with RVR and hypotension.    Patient said that she went to her PCP this morning for 3-month follow-up visit.  She did not have any chest pain,  palpitation, shortness of breath, fever, chills.  She denies cough, GI symptoms and urinary symptoms.  She had generalized weakness only.  Diet was good and she ate meatballs and mashed potatos the day before.  She denied drinking alcohol, cigarette smoking or drinking caffeine.     She has been following with the cardiologist Dr. Caraballo for her cardiac issues including A-fib and aortic aneurysm.  She is supposed to see a cardiologist around March-April, that she has not seen her cardiologist yet this year. Patient was hospitalized for 6 days at [facility] in September 2023 due to fall.    She lives alone in an apartment.  She uses a walker for ambulation.  She denied any fall prior to admission.    She was of A-fib with RVR and heart rates 140s and low blood pressure with systolic blood pressure in 70s this morning at PCP office and patient was brought to the ED. she was given IV fluid bolus en route and her blood pressure has improved.    In the ED, she was found to have elevated D-dimer 1.01 and checked CTA chest PE which did not show any pulmonary embolism.  She was found to have acute hypoxic respiratory failure with SpO2 89% and was put on 3 L/min of oxygen.  She was found to have MARU with elevated creatinine 1.45 on admission.  She had received IV fluid bolus for low blood pressure.  Patient was admitted for observation for A-fib with RVR and MARU.    Review of Systems:  Review of Systems   Constitutional:  Positive for fatigue. Negative for chills and fever.        Generalized weakness   HENT:  Negative for ear pain and sore throat.    Eyes:  Negative for pain and visual disturbance.   Respiratory:  Negative for cough, chest tightness, shortness of breath and wheezing.    Cardiovascular:  Negative for chest pain, palpitations and leg swelling.   Gastrointestinal:  Negative for abdominal pain, diarrhea, nausea and vomiting.   Genitourinary:  Negative for dysuria, frequency and hematuria.   Musculoskeletal:   Negative for arthralgias and back pain.   Skin:  Negative for color change and rash.   Neurological:  Positive for weakness. Negative for dizziness, seizures, syncope and headaches.   Hematological:  Negative for adenopathy. Does not bruise/bleed easily.   Psychiatric/Behavioral:  Negative for confusion.    All other systems reviewed and are negative.      Past Medical and Surgical History:   Past Medical History:   Diagnosis Date    Acute deep vein thrombosis of lower limb (HCC)     last assessed [id number]    Aortic aneurysm (HCC)     Arthritis     Atrial fibrillation (HCC)     Dislocation of hip (HCC)     last assessed [id number]    Hypertension     Psychiatric disorder     anxiety       Past Surgical History:   Procedure Laterality Date    HIP SURGERY      JOINT REPLACEMENT      KNEE ARTHROSCOPY Bilateral     x2 rt and 1 on left    TUBAL LIGATION         Meds/Allergies:  Prior to Admission medications    Medication Sig Start Date End Date Taking? Authorizing Provider   apixaban (Eliquis) 5 mg Take 1 tablet (5 mg total) by mouth 2 (two) times a day 4/4/24  Yes Gilma Tse DO   cyclobenzaprine (FLEXERIL) 10 mg tablet Take 1 tablet (10 mg total) by mouth 3 (three) times a day as needed for muscle spasms 1/24/24  Yes Gilma Tse DO   Diclofenac Sodium (VOLTAREN) 1 % Apply 1 g topically 4 (four) times a day 8/15/22  Yes Gilma Tse DO   escitalopram (LEXAPRO) 10 mg tablet Take 1 tablet (10 mg total) by mouth daily 1/24/24  Yes Gilma Tse DO   gabapentin (NEURONTIN) 300 mg capsule Take 3 capsules (900 mg total) by mouth 3 (three) times a day 1/24/24  Yes Gilma Tse DO   lidocaine (LIDODERM) 5 % Apply 2 patches topically over 12 hours daily Remove & Discard patch within 12 hours or as directed by MD 3/20/24  Yes Gilma Tse DO   melatonin 3 mg Take 1 tablet (3 mg total) by mouth daily at bedtime 1/24/24  Yes Gilma Tse DO   metoprolol  succinate (TOPROL-XL) 50 mg 24 hr tablet TAKE 1 TABLET (50 MG TOTAL) BY MOUTH DAILY 10/18/23  Yes Gilma Tse DO   oxyCODONE (ROXICODONE) 10 MG TABS Take 1 tablet (10 mg total) by mouth every 6 (six) hours as needed for moderate pain Max Daily Amount: 40 mg 4/5/24  Yes Gilma Tse DO   senna (SENOKOT) 8.6 mg Take 1 tablet (8.6 mg total) by mouth daily at bedtime 10/1/21  Yes Minh Mckenzie MD   zolpidem (AMBIEN) 5 mg tablet Take 1 tablet (5 mg total) by mouth daily at bedtime as needed for sleep 3/20/24  Yes Gilma Tse DO   hydrocortisone 2.5 % cream Apply topically 2 (two) times a day 8/15/22   Gilma Tse DO   naloxone (NARCAN) 4 mg/0.1 mL nasal spray Administer 1 spray into a nostril. If no response after 2-3 minutes, give another dose in the other nostril using a new spray.  Patient not taking: Reported on 4/15/2024 4/4/24 4/4/25  Gilma Tse DO   polyethylene glycol (MIRALAX) 17 g packet Take 17 g by mouth 2 (two) times a day for 5 days 7/6/23 7/11/23  Andrzej Hebert DO     I have reviewed home medications with patient personally.    Allergies: No Known Allergies    Social History:  Marital Status:    Occupation:   Patient Pre-hospital Living Situation: Apartment  Patient Pre-hospital Level of Mobility: walks with walker  Patient Pre-hospital Diet Restrictions: none  Substance Use History:   Social History     Substance and Sexual Activity   Alcohol Use No    Comment: being a social drinker per [employer]     Social History     Tobacco Use   Smoking Status Never   Smokeless Tobacco Never     Social History     Substance and Sexual Activity   Drug Use No       Family History:  Family History   Problem Relation Age of Onset    Colon cancer Mother     Breast cancer Family     Thyroid cancer Family     Colon cancer Family     Hypertension Family     Thyroid disease Family     Hypertension Father     Dementia Father        Physical Exam:     Vitals:  "  Blood Pressure: 136/94 (04/15/24 1705)  Pulse: 72 (04/15/24 1705)  Temperature: 97.6 °F (36.4 °C) (04/15/24 1705)  Temp Source: Oral (04/15/24 1317)  Respirations: 17 (04/15/24 1705)  Height: 5' 4\" (162.6 cm) (04/15/24 1307)  Weight - Scale: 70.8 kg (156 lb 1.4 oz) (04/15/24 1307)  SpO2: 100 % (04/15/24 1705)    Physical Exam  Vitals and nursing note reviewed.   Constitutional:       General: She is not in acute distress.     Appearance: She is well-developed. She is ill-appearing. She is not toxic-appearing.   HENT:      Head: Normocephalic and atraumatic.      Mouth/Throat:      Mouth: Mucous membranes are moist.   Eyes:      Extraocular Movements: Extraocular movements intact.      Conjunctiva/sclera: Conjunctivae normal.      Pupils: Pupils are equal, round, and reactive to light.   Cardiovascular:      Rate and Rhythm: Normal rate. Rhythm irregular.      Heart sounds: Normal heart sounds. No murmur heard.  Pulmonary:      Effort: Pulmonary effort is normal. No respiratory distress.      Breath sounds: Normal breath sounds. No wheezing or rales.   Abdominal:      General: Bowel sounds are normal. There is no distension.      Palpations: Abdomen is soft.      Tenderness: There is no abdominal tenderness.   Musculoskeletal:         General: No swelling.      Cervical back: Neck supple.      Right lower leg: No edema.      Left lower leg: No edema.   Skin:     General: Skin is warm and dry.      Capillary Refill: Capillary refill takes less than 2 seconds.      Coloration: Skin is not jaundiced or pale.      Findings: No lesion or rash.   Neurological:      Mental Status: She is alert and oriented to person, place, and time.   Psychiatric:         Mood and Affect: Mood normal.          Additional Data:     Lab Results:  Results from last 7 days   Lab Units 04/15/24  1330   WBC Thousand/uL 4.64   HEMOGLOBIN g/dL 10.5*   HEMATOCRIT % 34.3*   PLATELETS Thousands/uL 188   SEGS PCT % 67   LYMPHO PCT % 24   MONO PCT % " 8   EOS PCT % 0     Results from last 7 days   Lab Units 04/15/24  1330   SODIUM mmol/L 137   POTASSIUM mmol/L 3.9   CHLORIDE mmol/L 105   CO2 mmol/L 25   BUN mg/dL 17   CREATININE mg/dL 1.45*   ANION GAP mmol/L 7   CALCIUM mg/dL 8.7   ALBUMIN g/dL 3.6   TOTAL BILIRUBIN mg/dL 0.64   ALK PHOS U/L 81   ALT U/L 11   AST U/L 16   GLUCOSE RANDOM mg/dL 82                       Lines/Drains:  Invasive Devices       Peripheral Intravenous Line  Duration             Peripheral IV 04/15/24 Left Antecubital <1 day              Drain  Duration             External Urinary Catheter 288 days                        Imaging: Reviewed radiology reports from this admission including: CTA chest PE  CTA ED chest PE Study   Final Result by Mary Colon MD (04/15 1512)      No pulmonary embolus.      No acute pulmonary disease.      Low-attenuation in the nondependent portion of the left atrial appendage. It cannot be determined if this is due to mixing artifact or left atrial appendage thrombus.      Stable 4.7 cm ascending aortic aneurysm. Cardiothoracic surgery received a referral for this patient in September 2023 but patient has not been evaluated. Again recommend nonemergent cardiothoracic consultation and follow-up with a chest CT with no    contrast in 1 year.      Pulmonary artery enlargement which can be a normal variant or can be seen with pulmonary hypertension.      This study was marked in Epic for immediate notification and follow-up.            Workstation performed: [id number]         XR chest 1 view portable   Final Result by Mary Colon MD (04/15 7224)      No acute cardiopulmonary disease.            Workstation performed: [id number]             EKG and Other Studies Reviewed on Admission:   EKG: Atrial fibrillation. .  Recheck EKG became normal sinus rhythm with heart rate 78  Nutrition Assessment and Intervention:     Reviewed food recall journal      Other interventions: She said that she  does not drink alcohol, coffee or tea.    Emotional and Mental Well-being, Sleep, Connectedness Assessment and Intervention:    Sleep/stress assessment performed      Other interventions: Has been taking Ambien and melatonin for insomnia.    Tobacco and Toxic Substance Assessment and Intervention:     Tobacco use screening performed    Alcohol and drug use screening performed      Other interventions: For smoking and alcohol drinking.       ** Please Note: This note has been constructed using a voice recognition system. **

## 2024-04-15 NOTE — PROGRESS NOTES
Name: Lisa Marques      : 1945      MRN: [id number]  Encounter Provider: Gilma Tse DO  Encounter Date: 4/15/2024   Encounter department: [facility]    Assessment & Plan     1. Hypotension, unspecified hypotension type  Assessment & Plan:  Patients initial visit was for opioid monitoring however on presentation, her BP was 90/54 and she didn't feel steady on her feet. After my exam she felt better and BP was 105/50 so was going to decrease metoprolol and have her follow up closely as well as check CBCD   However, upon rising to leave the office, she again became unsteady, almost fell, became pale and BP decreased again. Given that she lives alone, we agreed that an ED evaluation would be in her best interest. We did not complete the opioid visit at this time but we did get her opioid agreement signed. She also requested that I increase the dose of her ambien again but given her current state, I declined.  Will follow up after ED visit.      2. Continuous opioid dependence (HCC)    3. Chronic pain syndrome  Assessment & Plan:  See opioid note      4. Food insecurity  Assessment & Plan:  Patient states she's not eating as much because she was cooking with her friend but they moved and she hasn't been doing this as much and does not have a good routine for eating now.      5. Paroxysmal atrial fibrillation (HCC)           Subjective     Was scheduled for opioid follow up but comes in stating that she is feeling poorly  She did not drive herself today because of feeling not well.     Feeling dizzy, light headed.   No diarrhea/constipation/nausea/vomiting  No fever/chills  No other new symptoms besides just feeling not well.     She has not been eating or drinking as well. Her friend moved away and they were supporting each other.  Offered meals on wheels etc - will refer to         Review of Systems   Constitutional:  Positive for appetite change. Negative  for activity change, chills, fever and unexpected weight change.   HENT:  Negative for congestion.    Eyes:  Negative for visual disturbance.   Respiratory:  Negative for cough, chest tightness, shortness of breath and wheezing.    Cardiovascular:  Negative for chest pain.   Gastrointestinal:  Negative for abdominal pain, diarrhea and nausea.   Endocrine: Negative for cold intolerance and heat intolerance.   Musculoskeletal:  Negative for arthralgias and myalgias.   Skin:  Negative for color change.   Neurological:  Positive for dizziness and light-headedness.   Psychiatric/Behavioral:  Negative for agitation, dysphoric mood and sleep disturbance.        Past Medical History:   Diagnosis Date   • Acute deep vein thrombosis of lower limb (HCC)     last assessed [id number]   • Aortic aneurysm (HCC)    • Arthritis    • Atrial fibrillation (HCC)    • Dislocation of hip (HCC)     last assessed [id number]   • Hypertension    • Psychiatric disorder     anxiety     Past Surgical History:   Procedure Laterality Date   • HIP SURGERY     • JOINT REPLACEMENT     • KNEE ARTHROSCOPY Bilateral     x2 rt and 1 on left   • TUBAL LIGATION       Family History   Problem Relation Age of Onset   • Colon cancer Mother    • Breast cancer Family    • Thyroid cancer Family    • Colon cancer Family    • Hypertension Family    • Thyroid disease Family    • Hypertension Father    • Dementia Father      Social History     Socioeconomic History   • Marital status:      Spouse name: None   • Number of children: None   • Years of education: None   • Highest education level: None   Occupational History   • None   Tobacco Use   • Smoking status: Never   • Smokeless tobacco: Never   Vaping Use   • Vaping status: Never Used   Substance and Sexual Activity   • Alcohol use: No     Comment: being a social drinker per [employer]   • Drug use: No   • Sexual activity: Not Currently     Partners: Male   Other Topics Concern   • None   Social History  Narrative   • None     Social Determinants of Health     Financial Resource Strain: Low Risk  (1/22/2024)    Overall Financial Resource Strain (CARDIA)    • Difficulty of Paying Living Expenses: Not hard at all   Food Insecurity: No Food Insecurity (1/22/2024)    Hunger Vital Sign    • Worried About Running Out of Food in the Last Year: Never true    • Ran Out of Food in the Last Year: Never true   Transportation Needs: No Transportation Needs (1/22/2024)    PRAPARE - Transportation    • Lack of Transportation (Medical): No    • Lack of Transportation (Non-Medical): No   Physical Activity: Inactive (1/22/2024)    Exercise Vital Sign    • Days of Exercise per Week: 0 days    • Minutes of Exercise per Session: 0 min   Stress: No Stress Concern Present (1/22/2024)    [place] [employer] - Occupational Stress Questionnaire    • Feeling of Stress : Only a little   Social Connections: Not on file   Intimate Partner Violence: Not At Risk (1/22/2024)    Humiliation, Afraid, Rape, and Kick questionnaire    • Fear of Current or Ex-Partner: No    • Emotionally Abused: No    • Physically Abused: No    • Sexually Abused: No   Housing Stability: Low Risk  (1/22/2024)    Housing Stability Vital Sign    • Unable to Pay for Housing in the Last Year: No    • Number of Places Lived in the Last Year: 1    • Unstable Housing in the Last Year: No     Current Outpatient Medications on File Prior to Visit   Medication Sig   • apixaban (Eliquis) 5 mg Take 1 tablet (5 mg total) by mouth 2 (two) times a day   • cyclobenzaprine (FLEXERIL) 10 mg tablet Take 1 tablet (10 mg total) by mouth 3 (three) times a day as needed for muscle spasms   • Diclofenac Sodium (VOLTAREN) 1 % Apply 1 g topically 4 (four) times a day   • escitalopram (LEXAPRO) 10 mg tablet Take 1 tablet (10 mg total) by mouth daily   • gabapentin (NEURONTIN) 300 mg capsule Take 3 capsules (900 mg total) by mouth 3 (three) times a day   • hydrocortisone 2.5 %  cream Apply topically 2 (two) times a day   • lidocaine (LIDODERM) 5 % Apply 2 patches topically over 12 hours daily Remove & Discard patch within 12 hours or as directed by MD   • melatonin 3 mg Take 1 tablet (3 mg total) by mouth daily at bedtime   • metoprolol succinate (TOPROL-XL) 50 mg 24 hr tablet TAKE 1 TABLET (50 MG TOTAL) BY MOUTH DAILY   • naloxone (NARCAN) 4 mg/0.1 mL nasal spray Administer 1 spray into a nostril. If no response after 2-3 minutes, give another dose in the other nostril using a new spray.   • oxyCODONE (ROXICODONE) 10 MG TABS Take 1 tablet (10 mg total) by mouth every 6 (six) hours as needed for moderate pain Max Daily Amount: 40 mg   • senna (SENOKOT) 8.6 mg Take 1 tablet (8.6 mg total) by mouth daily at bedtime   • zolpidem (AMBIEN) 5 mg tablet Take 1 tablet (5 mg total) by mouth daily at bedtime as needed for sleep   • polyethylene glycol (MIRALAX) 17 g packet Take 17 g by mouth 2 (two) times a day for 5 days     No Known Allergies  Immunization History   Administered Date(s) Administered   • COVID-19 MODERNA VACC 0.5 ML IM 01/30/2021, 02/27/2021, 12/03/2021   • INFLUENZA 12/01/2016, 10/10/2022   • Influenza Split High Dose Preservative Free IM 10/27/2015, 12/13/2016, 01/17/2017, 10/23/2017   • Influenza, high dose seasonal 0.7 mL 11/26/2018, 10/24/2019, 10/13/2020, 11/04/2021, 10/10/2022, 09/25/2023   • Influenza, seasonal, injectable 12/19/2012   • Pneumococcal Conjugate 13-Valent 12/13/2016   • Pneumococcal Polysaccharide PPV23 12/01/2016   • Tdap 04/27/2017       Objective     BP 90/54   Pulse 100   Temp 97.5 °F (36.4 °C)   SpO2 96%     Physical Exam  Constitutional:       Appearance: She is well-developed. She is ill-appearing.   HENT:      Head: Normocephalic and atraumatic.   Cardiovascular:      Rate and Rhythm: Normal rate and regular rhythm.      Heart sounds: Normal heart sounds.   Pulmonary:      Effort: Pulmonary effort is normal.      Breath sounds: Normal breath  sounds.   Abdominal:      General: Bowel sounds are normal.      Palpations: Abdomen is soft.   Musculoskeletal:         General: Normal range of motion.   Skin:     General: Skin is warm and dry.      Coloration: Skin is pale.   Neurological:      Mental Status: She is alert and oriented to person, place, and time.      Motor: Weakness present.      Gait: Gait abnormal.   Psychiatric:         Behavior: Behavior normal.         Judgment: Judgment normal.       Gilma Tse DO

## 2024-04-15 NOTE — ASSESSMENT & PLAN NOTE
Has been taking Ambien and melatonin at home for insomnia.  Will hold off sleep medications while patient is high risks for fall.

## 2024-04-15 NOTE — ED PROCEDURE NOTE
Procedure  POC Cardiac US    Date/Time: 4/15/2024 3:12 PM    Performed by: Dante Arenas DO  Authorized by: Dante Arenas DO    Patient location:  ED  Procedure details:     Exam Type:  Educational    Indications: chest pain      Assessment / Evaluation for: cardiac function and right heart strain (suspected pulmonary embolism)      Exam Type: initial exam      Image quality: non-diagnostic      Image availability:  Images available in PACS and video obtained  Patient Details:     Cardiac Rhythm:  Regular  Cardiac findings:     Echo technique: limited 2D      Views obtained: parasternal long axis, parasternal short axis, subcostal and apical      Pericardial effusion: absent      Tamponade physiology: absent      Wall motion: normal      LV systolic function: normal      RV dilation: none    IVC findings:     IVC Size: dilated      IVC Inspiratory Collapse: normal    Interpretation:     Fluid Status:  Euvolemic                   Dante Arenas DO  04/15/24 1513

## 2024-04-16 VITALS
RESPIRATION RATE: 18 BRPM | WEIGHT: 156.09 LBS | HEART RATE: 68 BPM | OXYGEN SATURATION: 90 % | HEIGHT: 64 IN | SYSTOLIC BLOOD PRESSURE: 115 MMHG | DIASTOLIC BLOOD PRESSURE: 84 MMHG | BODY MASS INDEX: 26.65 KG/M2 | TEMPERATURE: 96.9 F

## 2024-04-16 LAB
ANION GAP SERPL CALCULATED.3IONS-SCNC: 4 MMOL/L (ref 4–13)
ATRIAL RATE: 120 BPM
ATRIAL RATE: 78 BPM
BUN SERPL-MCNC: 14 MG/DL (ref 5–25)
CALCIUM SERPL-MCNC: 8.6 MG/DL (ref 8.4–10.2)
CHLORIDE SERPL-SCNC: 108 MMOL/L (ref 96–108)
CO2 SERPL-SCNC: 25 MMOL/L (ref 21–32)
CREAT SERPL-MCNC: 1.05 MG/DL (ref 0.6–1.3)
ERYTHROCYTE [DISTWIDTH] IN BLOOD BY AUTOMATED COUNT: 20.3 % (ref 11.6–15.1)
GFR SERPL CREATININE-BSD FRML MDRD: 51 ML/MIN/1.73SQ M
GLUCOSE P FAST SERPL-MCNC: 83 MG/DL (ref 65–99)
GLUCOSE SERPL-MCNC: 83 MG/DL (ref 65–140)
HCT VFR BLD AUTO: 33.1 % (ref 34.8–46.1)
HGB BLD-MCNC: 10 G/DL (ref 11.5–15.4)
MCH RBC QN AUTO: 25.4 PG (ref 26.8–34.3)
MCHC RBC AUTO-ENTMCNC: 30.2 G/DL (ref 31.4–37.4)
MCV RBC AUTO: 84 FL (ref 82–98)
P AXIS: 61 DEGREES
PLATELET # BLD AUTO: 149 THOUSANDS/UL (ref 149–390)
PMV BLD AUTO: 10.6 FL (ref 8.9–12.7)
POTASSIUM SERPL-SCNC: 4.1 MMOL/L (ref 3.5–5.3)
PR INTERVAL: 174 MS
QRS AXIS: 22 DEGREES
QRS AXIS: 31 DEGREES
QRSD INTERVAL: 80 MS
QRSD INTERVAL: 82 MS
QT INTERVAL: 326 MS
QT INTERVAL: 404 MS
QTC INTERVAL: 460 MS
QTC INTERVAL: 477 MS
RBC # BLD AUTO: 3.94 MILLION/UL (ref 3.81–5.12)
SODIUM SERPL-SCNC: 137 MMOL/L (ref 135–147)
T WAVE AXIS: -1 DEGREES
T WAVE AXIS: 18 DEGREES
VENTRICULAR RATE: 129 BPM
VENTRICULAR RATE: 78 BPM
WBC # BLD AUTO: 3.07 THOUSAND/UL (ref 4.31–10.16)

## 2024-04-16 PROCEDURE — 80048 BASIC METABOLIC PNL TOTAL CA: CPT

## 2024-04-16 PROCEDURE — 93010 ELECTROCARDIOGRAM REPORT: CPT | Performed by: INTERNAL MEDICINE

## 2024-04-16 PROCEDURE — 97167 OT EVAL HIGH COMPLEX 60 MIN: CPT

## 2024-04-16 PROCEDURE — 97130 THER IVNTJ EA ADDL 15 MIN: CPT

## 2024-04-16 PROCEDURE — 97129 THER IVNTJ 1ST 15 MIN: CPT

## 2024-04-16 PROCEDURE — 99204 OFFICE O/P NEW MOD 45 MIN: CPT | Performed by: INTERNAL MEDICINE

## 2024-04-16 PROCEDURE — 99238 HOSP IP/OBS DSCHRG MGMT 30/<: CPT | Performed by: INTERNAL MEDICINE

## 2024-04-16 PROCEDURE — 85027 COMPLETE CBC AUTOMATED: CPT

## 2024-04-16 PROCEDURE — 97163 PT EVAL HIGH COMPLEX 45 MIN: CPT

## 2024-04-16 RX ADMIN — APIXABAN 5 MG: 5 TABLET, FILM COATED ORAL at 17:40

## 2024-04-16 RX ADMIN — OXYCODONE HYDROCHLORIDE 10 MG: 10 TABLET ORAL at 12:05

## 2024-04-16 RX ADMIN — APIXABAN 5 MG: 5 TABLET, FILM COATED ORAL at 09:40

## 2024-04-16 RX ADMIN — ESCITALOPRAM OXALATE 10 MG: 10 TABLET ORAL at 09:40

## 2024-04-16 RX ADMIN — OXYCODONE HYDROCHLORIDE 10 MG: 10 TABLET ORAL at 04:57

## 2024-04-16 RX ADMIN — METOPROLOL SUCCINATE 50 MG: 50 TABLET, EXTENDED RELEASE ORAL at 09:40

## 2024-04-16 RX ADMIN — ACETAMINOPHEN 650 MG: 325 TABLET, FILM COATED ORAL at 09:40

## 2024-04-16 NOTE — ASSESSMENT & PLAN NOTE
POA MARU.  Elevated creatinine 1.45 on admission with baseline creatinine 0.8-0.9  Received 1 L of IV fluid bolus in the ED    Lab Results   Component Value Date    CREATININE 1.05 04/16/2024    CREATININE 1.45 (H) 04/15/2024    CREATININE 1.04 09/16/2023        Plans:  Gentle IV fluids with normal saline 75 cc/h for 12 hours  Monitor creatinine in the morning.  Avoid nephrotoxins

## 2024-04-16 NOTE — DISCHARGE SUMMARY
Formerly [facility], [facility]  Discharge- Lisa Marques 1945, 78 y.o. female MRN: [id number]  Unit/Bed#: S -01 Encounter: [id number]  Primary Care Provider: Gilma Tse DO   Date and time admitted to hospital: 4/15/2024  1:01 PM    Generalized weakness  Assessment & Plan  Patient noticed generalized weakness for a couple of days.  Denied falls, loss of appetite.  She was seen by PT, she requires minimum resource intensity    Elevated d-dimer  Assessment & Plan  Patient has history of DVT and has been taking Eliquis at home.    D-dimer 1.01 in the ED.  Denied any leg pain, leg swellings, chest pain, shortness of breath, palpitation.  CTA chest PE did not show any pulmonary embolism.  Home med Eliquis 5 mg twice daily.    Acute hypoxic respiratory failure (HCC)  Assessment & Plan  Patient was found to have acute hypoxic respiratory failure with SpO2 in 80s in the ED.  Patient was put on 3 L/min of oxygen.  Patient denies any cough, shortness of breath, fever, chills.  Denies any history of asthma, COPD.  Most likely related to A-fib with RVR with hypotension.  Patient was recommended to wean down the oxygen supplementation.  Lower COVID test    Depression with anxiety  Assessment & Plan  Continue home med Lexapro    Aortic aneurysm (HCC)  Assessment & Plan   In 09/2022, her aortic root was 4.4 cm and the ascending aorta was mildly dilated at 4.4 cm.   CTA chest PE on admission showed 4.7 cm aortic aneurysm.  Patient denied any chest pain, shortness of breath, palpitation..  Vitals are stable.  Monitor symptoms.  Follow-up with her primary cardiologist as an outpatient.    MARU (acute kidney injury) (HCC)  Assessment & Plan  POA MARU.  Elevated creatinine 1.45 on admission with baseline creatinine 0.8-0.9  Received 1 L of IV fluid bolus in the ED    Lab Results   Component Value Date    CREATININE 1.05 04/16/2024    CREATININE 1.45 (H) 04/15/2024    CREATININE 1.04 09/16/2023         Plans:   Creatinine at 1.05 today   Resolved    * Atrial fibrillation with RVR (HCC)  Assessment & Plan  78 y.o. female with a PMH of hypertension, paroxysmal A-fib on Eliquis presented to the ED from her PCP office with atrial fibrillation with RVR and hypotension.  Had received IV fluid bolus en route and blood pressure became stable.  Heart rate became normalized without any intervention in the ED.   Patient is asymptomatic.  Telemetry shows sinus rhythm.  Primary cardiologist Dr. Caraballo, last office visit in 09/2022.  Home medications: Toprol XL 50 mg daily and Eliquis 5 mg twice daily.  Last echo in 03/2023, LVEF 60%  A-fib has been stabilized in the ED.  Patient complains of falls, and dizziness can be secondary to orthostatic hypotension versus A-fib    Plans:  Continue Eliquis 5 mg twice daily and metoprolol  Cardiology consulted for Holter monitor versus zio patch during  Recommended to continue follow-up with her cardiologist as an outpatient    Medical Problems       Resolved Problems  Date Reviewed: 4/16/2024   None       Discharging Physician / Practitioner: Zeeshan Villa MD  PCP: Gilma Tse DO  Admission Date:   Admission Orders (From admission, onward)       Ordered        04/15/24 1530  Place in Observation  Once                          Discharge Date: 04/16/24    Consultations During Hospital Stay:  Cardiology    Procedures Performed:   None    Significant Findings / Test Results:   XR chest 1 view portable    Result Date: 4/15/2024  Impression: No acute cardiopulmonary disease. Workstation performed: [id number]     CTA ED chest PE Study    Result Date: 4/15/2024  Impression: No pulmonary embolus. No acute pulmonary disease. Low-attenuation in the nondependent portion of the left atrial appendage. It cannot be determined if this is due to mixing artifact or left atrial appendage thrombus. Stable 4.7 cm ascending aortic aneurysm. Cardiothoracic surgery received a referral for  this patient in September 2023 but patient has not been evaluated. Again recommend nonemergent cardiothoracic consultation and follow-up with a chest CT with no contrast in 1 year. Pulmonary artery enlargement which can be a normal variant or can be seen with pulmonary hypertension. This study was marked in Epic for immediate notification and follow-up. Workstation performed: [id number]       XR chest 1 view portable    Result Date: 4/15/2024  Impression No acute cardiopulmonary disease. Workstation performed: [id number]         Incidental Findings:   None     Test Results Pending at Discharge (will require follow up):   None      Outpatient Tests Requested:  Holter monitoring     Complications:  None     Reason for Admission: Afib with RVR    Hospital Course:   Lisa Marques is a 78 y.o. female patient who originally presented to the hospital on 4/15/2024 due to A-fib with RVR and hypotension.  Patient denies any chest pain, palpitation, shortness of breath, dizziness.  Patient endorses generalized weakness.  Patient denies any recent sickness, alcohol consumption, cigarette smoking or drinking caffeine.  Patient was admitted with heart rates of 140s and low blood pressure with systolic blood pressure being in the 70s.  Patient was given fluids and route to the ED which improved her blood pressure.  Patient was found to have elevated D-dimer of 1.01 and was checked for pulmonary embolism by CTA chest PE which was negative for pulmonary embolism.  Patient was found to be hypoxic saturating around 89% and was put on 3 L of oxygen.  Patient was also found to have MARU which can be secondary to her decreased oral intake and dehydration.  Patient received IV fluid bolus in the ED and was admitted for A-fib with RVR.  Patient was continued with her home medication of Toprol-XL 50 Mg and Eliquis 5 Mg twice daily which led to improvement of her symptoms.  MARU was also resolved after fluid repletion.  During the night  "patient had episode of bladder prolapse.  Patient endorses history of recurrent bladder prolapse however has not been following up with anyone.  With the trial of pushing back the prolapsed bladder the issue was resolved.  Gynecology will follow the patient outpatient. Pt is being discharged in stable condition and will follow up with cardiology regarding zio patch.    Please see above list of diagnoses and related plan for additional information.     Condition at Discharge: stable    Discharge Day Visit / Exam:   Subjective: Patient is alert, oriented to time place and person.  Patient denies any palpitation, chest pain, shortness of breath.  Patient denies any dizziness or falls in the recent time.  Vitals: Blood Pressure: 134/87 (04/16/24 0940)  Pulse: 80 (04/16/24 0940)  Temperature: (!) 96.9 °F (36.1 °C) (04/16/24 0744)  Temp Source: Oral (04/15/24 1705)  Respirations: 18 (04/16/24 0744)  Height: 5' 4\" (162.6 cm) (04/15/24 1307)  Weight - Scale: 70.8 kg (156 lb 1.4 oz) (04/15/24 1307)  SpO2: 94 % (04/16/24 0744)  Exam:   Physical Exam  Constitutional:       Appearance: Normal appearance.   HENT:      Head: Normocephalic and atraumatic.      Right Ear: External ear normal.      Left Ear: External ear normal.      Nose: Nose normal.      Mouth/Throat:      Pharynx: Oropharynx is clear.   Eyes:      Extraocular Movements: Extraocular movements intact.      Conjunctiva/sclera: Conjunctivae normal.      Pupils: Pupils are equal, round, and reactive to light.   Cardiovascular:      Rate and Rhythm: Normal rate and regular rhythm.      Pulses: Normal pulses.      Heart sounds: Normal heart sounds.   Pulmonary:      Effort: Pulmonary effort is normal.      Breath sounds: Normal breath sounds.   Abdominal:      General: Bowel sounds are normal.      Palpations: Abdomen is soft.   Musculoskeletal:         General: Normal range of motion.      Cervical back: Normal range of motion.   Skin:     General: Skin is warm.      " Capillary Refill: Capillary refill takes less than 2 seconds.   Neurological:      General: No focal deficit present.      Mental Status: She is alert and oriented to person, place, and time.   Psychiatric:         Mood and Affect: Mood normal.         Behavior: Behavior normal.          Discussion with Family: Attempted to update  (son) via phone. Left voicemail.     Discharge instructions/Information to patient and family:   See after visit summary for information provided to patient and family.      Provisions for Follow-Up Care:  See after visit summary for information related to follow-up care and any pertinent home health orders.      Mobility at time of Discharge:   Basic Mobility Inpatient Raw Score: 17  JH-HLM Goal: 5: Stand one or more mins  JH-HLM Achieved: 7: Walk 25 feet or more  HLM Goal achieved. Continue to encourage appropriate mobility.     Disposition:   Home    Planned Readmission: No     Discharge Statement:  I spent 45 minutes discharging the patient. This time was spent on the day of discharge. I had direct contact with the patient on the day of discharge. Greater than 50% of the total time was spent examining patient, answering all patient questions, arranging and discussing plan of care with patient as well as directly providing post-discharge instructions.  Additional time then spent on discharge activities.    Discharge Medications:  See after visit summary for reconciled discharge medications provided to patient and/or family.      **Please Note: This note may have been constructed using a voice recognition system**

## 2024-04-16 NOTE — DISCHARGE INSTR - AVS FIRST PAGE
Dear Lisa Marques,     It was our pleasure to care for you here at [facility].  It is our hope that we were always able to exceed the expected standards for your care during your stay.  You were hospitalized due to A-fib with RVR.  You were cared for on the 3 rd floor by Zeeshan Villa MD under the service of Marcelino Crane MD with the [facility] who covers for your primary care physician (PCP), Gilma Tse DO, while you were hospitalized.  If you have any questions or concerns related to this hospitalization, you may contact us at .  For follow up as well as any medication refills, we recommend that you follow up with your primary care physician.  A registered nurse will reach out to you by phone within a few days after your discharge to answer any additional questions that you may have after going home.  However, at this time we provide for you here, the most important instructions / recommendations at discharge:     Notable Medication Adjustments -   Please continue taking your home medications as prescribed  No changes made to the medications  Testing Required after Discharge -   None  ** Please contact your PCP to request testing orders for any of the testing recommended here **  Important follow up information -   Please follow-up with your PCP within 1 week upon discharge  Please follow-up with cardiology  Please follow up with Gynecology for bladder issues  Other Instructions -   Please come back to the hospital if you experience any palpitation, chest pain or shortness of breath  Please review this entire after visit summary as additional general instructions including medication list, appointments, activity, diet, any pertinent wound care, and other additional recommendations from your care team that may be provided for you.      Sincerely,     Zeeshan Villa MD

## 2024-04-16 NOTE — PLAN OF CARE
Problem: PHYSICAL THERAPY ADULT  Goal: Performs mobility at highest level of function for planned discharge setting.  See evaluation for individualized goals.  Description: Treatment/Interventions: Functional transfer training, LE strengthening/ROM, Elevations, Therapeutic exercise, Cognitive reorientation, Patient/family training, Equipment eval/education, Bed mobility, Gait training, Spoke to nursing, Spoke to case management, OT          See flowsheet documentation for full assessment, interventions and recommendations.  Note: Prognosis: Fair  Problem List: Decreased strength, Decreased endurance, Decreased mobility, Impaired balance, Decreased cognition, Impaired judgement, Decreased safety awareness, Obesity, Pain  Assessment: Pt is a 78 y.o. female seen for PT evaluation s/p admit to [facility] on 4/15/2024. Pt was admitted with a primary dx of: atrial fibrillation with RVR, weakness, hypoxia, MARU, atrial fibrillation with RVR.  PT now consulted for assessment of mobility and d/c needs. Pt with Up and OOB as tolerated orders.  Pts current comorbidities and personal factors effecting treatment include: BMI, depression with anxiety, HTN, CKD, . Pts current clinical presentation is Unstable/Unpredictable (high complexity) due to Ongoing medical management for primary dx, Decreased activity tolerance compared to baseline, Fall risk, Ongoing telemetry monitoring, Increased O2 via NC from pts baseline. Prior to admission, pt was independent with use of rollator. Upon evaluation, pt currently is requiring Supervision for bed mobility; CGA for transfers and CGA for ambulation 75 ft w/ RW. Pt presents at PT eval functioning below baseline and currently w/ overall mobility deficits 2* to: BLE weakness, impaired balance, decreased endurance, gait deviations, decreased activity tolerance compared to baseline, decreased functional mobility tolerance compared to baseline, decreased safety awareness, impaired  judgement, fall risk, decreased cognition. Pt currently at a fall risk 2* to impairments listed above.  Pt will continue to benefit from skilled acute PT interventions to address stated impairments; to maximize functional mobility; for ongoing pt/ family training; and DME needs. At conclusion of PT session pt returned BTB, bed alarm engaged, all needs in reach, and RN notified of session findings/recommendations with phone and call bell within reach. Pt denies any further questions at this time. Recommend Level III (Minimum Resource Intensity)  upon hospital D/C.        Rehab Resource Intensity Level, PT: III (Minimum Resource Intensity)    See flowsheet documentation for full assessment.

## 2024-04-16 NOTE — OCCUPATIONAL THERAPY NOTE
Occupational Therapy Evaluation/Treatment     Patient Name: Lisa Marques  Today's Date: 4/16/2024  Problem List  Principal Problem:    Atrial fibrillation with RVR (HCC)  Active Problems:    MARU (acute kidney injury) (HCC)    Aortic aneurysm (HCC)    Depression with anxiety    Insomnia    Lumbar canal stenosis    Constipation    Hypotension    Acute hypoxic respiratory failure (HCC)    Elevated d-dimer    Generalized weakness    Past Medical History  Past Medical History:   Diagnosis Date    Acute deep vein thrombosis of lower limb (HCC)     last assessed [id number]    Aortic aneurysm (HCC)     Arthritis     Atrial fibrillation (HCC)     Dislocation of hip (HCC)     last assessed [id number]    Hypertension     Psychiatric disorder     anxiety     Past Surgical History  Past Surgical History:   Procedure Laterality Date    HIP SURGERY      JOINT REPLACEMENT      KNEE ARTHROSCOPY Bilateral     x2 rt and 1 on left    TUBAL LIGATION          04/16/24 0154   OT Last Visit   OT Visit Date 04/16/24   Note Type   Note type Evaluation  (and treatment)   Pain Assessment   Pain Assessment Tool FLACC   Pain Location/Orientation Location: Back   Pain Onset/Description Frequency: Intermittent;Descriptor: Aching   Effect of Pain on Daily Activities limits comfort   Patient's Stated Pain Goal No pain   Hospital Pain Intervention(s) Repositioned;Ambulation/increased activity;Emotional support   Pain Rating: FLACC (Rest) - Face 0   Pain Rating: FLACC (Rest) - Legs 0   Pain Rating: FLACC (Rest) - Activity 0   Pain Rating: FLACC (Rest) - Cry 1   Pain Rating: FLACC (Rest) - Consolability 1   Score: FLACC (Rest) 2   Pain Rating: FLACC (Activity) - Face 0   Pain Rating: FLACC (Activity) - Legs 0   Pain Rating: FLACC (Activity) - Activity 0   Pain Rating: FLACC (Activity) - Cry 0   Pain Rating: FLACC (Activity) - Consolability 0   Score: FLACC (Activity) 0   Restrictions/Precautions   Weight Bearing Precautions Per Order No   Other  "Precautions Impulsive;Cognitive;Chair Alarm;Bed Alarm;Telemetry;Fall Risk   Home Living   Type of Home Apartment  (Senior Living: [facility] per pt)   Home Layout One level;Performs ADLs on one level;Able to live on main level with bedroom/bathroom;Elevator  (5th floor apartment)   Bathroom Shower/Tub Walk-in shower   Bathroom Toilet Raised   Bathroom Equipment Grab bars in shower;Shower chair;Grab bars around toilet   Bathroom Accessibility Accessible via walker  (Uses rollator)   Home Equipment Walker;Life alert  (Rollator)   Prior Function   Level of [place] Independent with ADLs;Independent with functional mobility;Independent with IADLS  (pt reports cooking, cleaning, doing laundry, and managing meds (I), driving \"depends on how I feel that day\")   Lives With (S)  Alone   Receives Help From Family  (Brother assists w/ transportation)   IADLs Independent with meal prep;Independent with medication management;Family/Friend/Other provides transportation  (Her brother drives if needed)   Falls in the last 6 months 1 to 4  (pt reported 2-3)   Vocational Retired   Comments Pt cooks, cleans, and does her own laundry. Pt reports she tries to go to grocery store if not she gets them delivered. Pt drives if she feels up to it.   Lifestyle   Autonomy Pt lives ALONE in a 5th floor apartment with elevator access at [facility]. Pt reports PLOF as (I) for ADLs, mod (I) w/ rollator for functional mobility, and (I) for IADLs, except for driving. (+) for falls and (+) for driving.   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 PMH includes: MARU, aortic aneurysm, depression w/ anxiety, insomnia, hypotension, hx of falls, CKD, and CHF.   Family/Caregiver Present No   Subjective   Subjective \"I guess I might as well " "try to go to the bathroom\"   ADL   Where Assessed Other (Comment)  (BR)   Eating Assistance 7  Independent   Grooming Assistance 5  Supervision/Setup   UB Bathing Assistance 5  Supervision/Setup   LB Bathing Assistance 5  Supervision/Setup   UB Dressing Assistance 5  Supervision/Setup   LB Dressing Assistance 5  Supervision/Setup   Toileting Assistance  5  Supervision/Setup   Toileting Deficit Verbal cueing;Supervison/safety;Increased time to complete;Grab bar use;Clothing management up;Clothing management down;Perineal hygiene;Impulsive  ((I) for clothing management. Unsafely leaned forward for perineal hygiene. Impulsive/lack of safety awareness for utilizing steadying support during task.)   Bed Mobility   Supine to Sit 5  Supervision   Additional items Assist x 1;Bedrails;Increased time required;Verbal cues  (v/c needed to initiate task/motivate pt)   Sit to Supine Unable to assess   Additional Comments Pt ended session in bedside recliner.   Transfers   Sit to Stand 5  Supervision   Additional items Assist x 1;Armrests;Increased time required;Impulsive;Verbal cues  (Pt did not wait for cueing from therapist prior to standing. v/c for hand placement)   Stand to Sit 5  Supervision   Additional items Assist x 1;Verbal cues;Increased time required  (v/c for hand placement, observed controlled descent to chair)   Toilet transfer 5  Supervision   Additional items Assist x 1;Increased time required;Impulsive;Verbal cues  (v/c for alerting therapist when done 2* to standing on own previously/ hand placement)   Additional Comments Demonstrates impulsivity w/ transfers, does not wait for (S) from others despite v/cs. Notable L sided lean/posture in stance, no LOB   Functional Mobility   Functional Mobility 5  Supervision   Additional Comments Ambulated short household distance from EOB>BR>Recliner. Once close to recliner, pt abandoned RW and took few steps back to recliner unsupported, reaching for furniture to steady, " unsafe, impulsive   Additional items Rolling walker  (Rollator)   Balance   Static Sitting Good   Dynamic Sitting Fair +   Static Standing Fair   Dynamic Standing Fair   Activity Tolerance   Activity Tolerance Patient limited by fatigue  (pt reported being tired prior to session start)   Medical Staff Made Aware Spoke w/ PT Lc   Nurse Made Aware erika Aldridge to see pt   RUE Assessment   RUE Assessment WFL   LUE Assessment   LUE Assessment WFL   Hand Function   Gross Motor Coordination Functional   Fine Motor Coordination Functional   Sensation   Light Touch No apparent deficits   Vision-Basic Assessment   Current Vision Wears glasses only for reading   Cognition   Overall Cognitive Status Impaired  (grossly WFL in conversation, impaired insight and higher level functions)   Arousal/Participation Alert;Responsive;Cooperative   Attention Attends with cues to redirect   Orientation Level Oriented X4   Memory Decreased recall of precautions;Decreased short term memory   Following Commands Follows multistep commands with increased time or repetition   Comments Able to recall basic orientation and recent events. Questionable historian w/ name of senior living facility. Tangential at times, able to be redirected for focus. Demonstrated decreased safety awareness/insight. Impulsive. Continue to evaluate.   Cognition Assessment Tools (S)  ACLS   Score (S)  4.4   Assessment   Limitation Decreased ADL status;Decreased Safe judgement during ADL;Decreased cognition;Decreased self-care trans;Decreased high-level ADLs   Prognosis Good   Assessment Pt is a 78 y.o. female admitted to [facility] after episode of a-fib w/ RVR at PCP's office. PMH includes: MARU, aortic aneurysm, depression w/ anxiety, insomnia, hypotension, hx of falls, CKD, and CHF. Pt currently lives ALONE in a senior living apartment on the 5th floor w/ elevator access. Pt reports PLOF as (I) for ADLs, (I) for IADLs except occasional help w/ driving, and mod(I) w/  rollator for functional mobility. Based upon this evaluation, pt is showing deficits in the following areas: decreased cognition, impulsivity/poor safety awareness, and decreased performance for ADLs/IADLs/functional mobility. Limiting personal and environmental factors include: limited social support and limited access to the community. Supporting factors include a supportive brother, FFSU, accessible home, and PLOF as (I) for ADLs and IADLs. Pt will benefit from continued skilled therapy interventions to address above deficits. Following sessions will be focused on continued cognition evaluations, performance in ADLs, safe ADL transfer techniques, compensatory strategies, ambulation of short household distances w/ RW to increase performance in ADLs and functional mobility.   Goals   Patient Goals to go home   LTG Time Frame 7-10   Long Term Goal See goals below.   Plan   Treatment Interventions ADL retraining;Functional transfer training;Cognitive reorientation;Patient/family training;Equipment evaluation/education;Continued evaluation;Activityengagement;Compensatory technique education   Goal Expiration Date 04/26/24   OT Treatment Day 0   OT Frequency 3-5x/wk   Discharge Recommendation   Rehab Resource Intensity Level, OT (S)  II (Moderate Resource Intensity)  (Per ACLS results: pt may live with someone who does a daily check on the environment to remove any safety hazards and solve problems. The person may be alone for part of the day with a pre-established procedure for getting help from a neighbor.)   Additional Comments  The patient's raw score on the AM-PAC Daily Activity Inpatient Short Form is 20. A raw score of greater than or equal to 19 suggests the patient may benefit from discharge to home. This DOES NOT coincide w/ this writer's recommendation. Please refer to the recommendation of the Occupational Therapist for safe discharge planning.   AM-PAC Daily Activity Inpatient   Lower Body Dressing 3  "  Bathing 3   Toileting 3   Upper Body Dressing 4   Grooming 3   Eating 4   Daily Activity Raw Score 20   Daily Activity Standardized Score (Calc for Raw Score >=11) 42.03   AM-PAC Applied Cognition Inpatient   Following a Speech/Presentation 3   Understanding Ordinary Conversation 3   Taking Medications 2   Remembering Where Things Are Placed or Put Away 3   Remembering List of 4-5 Errands 2   Taking Care of Complicated Tasks 2   Applied Cognition Raw Score 15   Applied Cognition Standardized Score 33.54   Abisai Cognitive Level   Abisai Cognitive Level Score (S)  4.4   Abisai Cognitive Score Recommendation (S)  Daily checks, maybe alone part of the day   Abisai Cognitive Level Comments (S)  Standard ACLS used for this assessment. To note: pt reports she is proficient in knitting/crocheting which may inflate pt overall score. Pt states \"when am I ever going to use this stitch, to make moccasins?\". Pt indicated poor understanding of purpose of assessment despite multiple explanations. Pt was able to complete running stitch w/o difficulty. Pt completed x 3 whipstitches however required VC to end task (distracted by phone call). Within completed whipstitches pt made cross in back error however she was able to spontaneously correct w/o cues. Pt was able to correct twisted lace error however pt attempted task prior to instruction, performance may have varied if pt had been given entire command. Pt attempted cordovan stitch however was unsure of performance, looking to therapist for assistance/affirmation. Pt initially entered through back making reverse whipstitch and then entered through loop as if it were one step the patient accepted initial demonstration however performance did not improve. Pt provided w/ second demonstration w/ slight improvement in performance, now entering through front of lace however enters through back of loop for one step performance. Pt demonstrated unsuccessful problem solving methods despite " increased time. Assessment ended. Please refer to below recommendations for activity modifications to match pt current cognitive level.     4.4    Administered Abisai Cognitive Level Screen (ACLS).  Pt scored 4.4/6.0 indicating it is recommended that the person live with someone who does a daily check on the environment to remove any safety hazards and solve problems when minor changes in the home occur.  The person may be alone for part of the day with a pre-established procedure for getting help from a neighbor.    Behavior:  Knows day/date.  Follows routine sequence of activities.  Will learn schedule and follow daily routine.  Hazards are not anticipated.  Memorization is slow.  Will need long term repetitive training for all new tasks.  May become upset if routine is altered.  May seek assist if lost.  Do not expect to be aware of needs of others.  May need reminders to keep appointments.  Grooming:  [place], brushes and styles hair.  Applies makeup.  May insist on familiar products.  Finds supplies in familiar locations.  May be unable to master use of new tools.  Hazards are not anticipated.  Dressing: Finds garments in familiar locations.  Initiates dressing at usual times.  Selects familiar combinations of outfits and may wear same outfit over and over.  Resists new combinations.  May fail to consider weather conditions, season or occasion when selecting clothing.  May argue with suggested corrections of errors.  Bathing:  Initiates bath/shower at customary times and follows typical routine.  May collect supplies from a familiar location.  May not vary rate.  May want to use same products.  May use excessive amounts of product.  May have difficulty opening small or unusual containers.  May leave towels on floor.  Protect from unseen hazards (wet floors, electrical appliances near water).  Walking/exercising:  Ambulates within fitness capacity in neighborhood.  Chooses to go by familiar routes.  May fail to  attend to signs or activities outside visual field.  Needs new route identified by others and may learn after several weeks of practice.  May become anxious in high stimulus environments (malls, airports, casinos).  Eating: May notice and clean highly visible dropped food items.  May not be able to eat and converse at the same time.  May resist changes in diet and menu.  Watch handling of hot foods/liquids and heavy items.  Toileting:  Follows a routine of toileting in a familiar environment.  Needs to have public rest rooms pointed out.  May use too much paper.  Does not consider needs of others.  May spend excessive time in rest room.  Medications:  May follow routine rigidly and resist changes in prescriptions based on erroneous beliefs of effects.  Does not note adverse side effects.  Does not anticipate need to renew prescriptions.  May be able to open child proof containers.  Can use DAILY pill box marked with day/time (filled by another person).  Use of adaptive equipment:  May be trained to use adaptive equipment that requires a sequence of familiar actions.  Does not anticipate hazards.  Once learned, may resist changes in equipment.  May abandon use of assistive device or use in unsafe manner.  Housekeeping:  Sweeps, polishes and puts away objects to match previous performance.  Fails to see dirt or dust in corners.  May use excessive amounts of , polish or water.  May resist changes in routine or products used.  May fail to differentiate between similar cleaning products.  Food preparation:  May follow a fixed diet and go hungry if usual food items are not available.  Does not check inventory and may run out of essentials frequently.  Does not consider nutritional needs.  Goes to familiar restaurants of grocery stores. Is able to prepare simple hot/cold dishes.  Spending money:  Manages routine purchases for immediate needs.  May count fabian very slowly.  May use credit cards or checks.  May need  assistance for making monthly budget.  May not remember to account for taxes or tips. Can manage a daily allowance.  Shopping:  Goes to familiar stores for routine items.  Does not compare product prices or quality.  May not consider cost of item in relation to overall budget.  May overspend.  Laundry:  Initiates and completes laundry routine at usual time.  May sort items by color.  May follow schedule rigidly.  May forget to clean lint traps.  May forget to turn iron off.  Traveling:  Needs new routes and travel procedures identified by others.  May express interest in driving a car but fails to attend to all environmental cues to do so safely.  May not allow adequate time for travel.  Telephone:  Writes down messages and new numbers slowly.  May attempt to use an address book.  May make calls at odd hours without consideration of other’s schedule or cost of call.  May run up large telephone bills.  Driving: Should NOT operate a motor vehicle.   Additional Treatment Session   Start Time 1420   End Time 1446   Treatment Assessment Pt seen for additional treatment to assess cognition to guide safe d/c planning. Therapist administered ACLS, see comments above for scoring and recommendations   Additional Treatment Day 1   End of Consult   Education Provided Yes  (hand placement during transfers, having supervision during ambulation)   Patient Position at End of Consult Bedside chair;Bed/Chair alarm activated;All needs within reach   Nurse Communication Nurse aware of consult      Goals:    Pt will complete oral hygiene routine standing at sink w/ mod(I) to increase independence in ADLs.     Pt will complete LB dressing/bathing (w/ or w/o AE) mod(I) to increase independence in ADLs.     Pt will complete toileting activity w/ mod(I) to increase independence in ADLs.     Pt will complete ADL transfers mod(I) w/ RW to increase safety for ADL tasks.     Pt will increase dynamic standing balance to fair+ to improve  safety/independence w/ standing purposeful tasks      Patient will ambulate short household distances w/ RW mod(I) to increase performance in ADLs.     Patient will engage in continued cognitive assessment to assist in safe d/c planning.    Patient will increase standing tolerance to 5-10 minutes for improved performance in light meal prep activities.    Liliana Rausch OTS

## 2024-04-16 NOTE — UTILIZATION REVIEW
Initial Clinical Review    Admission: Date/Time/Statement:   Admission Orders (From admission, onward)       Ordered        04/15/24 1530  Place in Observation  Once                          Orders Placed This Encounter   Procedures    Place in Observation     Standing Status:   Standing     Number of Occurrences:   1     Order Specific Question:   Level of Care     Answer:   Med Surg [16]     ED Arrival Information       Expected   -    Arrival   4/15/2024 13:01    Acuity   Emergent              Means of arrival   Ambulance    Escorted by   [facility]    Service   Hospitalist    Admission type   Emergency              Arrival complaint   ems             Chief Complaint   Patient presents with    Weakness - Generalized     Pt at PCP for routine care and found to be in Afib/RVR and hypotensive with SBP in the 70's.  Received 600 NSS prehospital.  Pt c/o fatique and weakness over the past week.       Initial Presentation: 78 y.o. female who presented from doctor's office by EMS to [facility] ED. Admitted in observation status for evaluation and treatment of afib w/ RVR. PMHx: DVT, aortic aneurysm, arthritis, afib, HTN, psychiatric disorder, prolapsed bladder. Presented w/ afib w/ RVR and hypotension of SBP in 70s at PCP office. Notes generalized weakness. On exam, irregular rhythm, 3L O2 NC (baseline room air), . MARU --Crt 1.45. Plan: telemetry, continue Eliquis, metoprolol, PT/OT evals, Supplemental O2, weaned as tolerated; check respiratory panel, continue home Lexapro, IVF, Trend labs, replete electrolytes as needed. OB/GYN consulted.      ED Triage Vitals   Temperature Pulse Respirations Blood Pressure SpO2   04/15/24 1317 04/15/24 1307 04/15/24 1307 04/15/24 1307 04/15/24 1307   97.6 °F (36.4 °C) (!) 140 18 105/52 91 %      Temp Source Heart Rate Source Patient Position - Orthostatic VS BP Location FiO2 (%)   04/15/24 1317 04/15/24 1307 04/15/24 1307 04/15/24 1307 --   Oral Monitor  Lying Right arm       Pain Score       04/15/24 1307       No Pain          Wt Readings from Last 1 Encounters:   04/15/24 70.8 kg (156 lb 1.4 oz)     Additional Vital Signs:   Date/Time Temp Pulse Resp BP MAP (mmHg) SpO2 Calculated FIO2 (%) - Nasal Cannula Nasal Cannula O2 Flow Rate (L/min) O2 Device   04/16/24 07:44:12 96.9 °F (36.1 °C) Abnormal  64 18 138/93 108 94 % -- -- --   04/15/24 22:11:40 98.2 °F (36.8 °C) 57 18 131/92 105 93 % -- -- --   04/15/24 17:05:54 97.6 °F (36.4 °C) 72 17 136/94 108 100 % 28 2 L/min Nasal cannula   04/15/24 1500 -- 62 18 110/76 90 96 % 28 2 L/min Nasal cannula   04/15/24 1349 -- 68 -- 92/61 71 90 % -- -- --   04/15/24 1331 -- -- -- -- -- 89 % Abnormal  -- -- None (Room air)     Pertinent Labs/Diagnostic Test Results:   CTA ED chest PE Study   Final Result by Mray Colon MD (04/15 1512)      No pulmonary embolus.      No acute pulmonary disease.      Low-attenuation in the nondependent portion of the left atrial appendage. It cannot be determined if this is due to mixing artifact or left atrial appendage thrombus.      Stable 4.7 cm ascending aortic aneurysm. Cardiothoracic surgery received a referral for this patient in September 2023 but patient has not been evaluated. Again recommend nonemergent cardiothoracic consultation and follow-up with a chest CT with no    contrast in 1 year.      Pulmonary artery enlargement which can be a normal variant or can be seen with pulmonary hypertension.      This study was marked in Epic for immediate notification and follow-up.            Workstation performed: [id number]         XR chest 1 view portable   Final Result by Mary Colon MD (04/15 1654)      No acute cardiopulmonary disease.            Workstation performed: [id number]           Results from last 7 days   Lab Units 04/15/24  1755   SARS-COV-2  Negative     Results from last 7 days   Lab Units 04/16/24  0600 04/15/24  1330   WBC Thousand/uL 3.07* 4.64   HEMOGLOBIN  "g/dL 10.0* 10.5*   HEMATOCRIT % 33.1* 34.3*   PLATELETS Thousands/uL 149 188   TOTAL NEUT ABS Thousands/µL  --  3.05         Results from last 7 days   Lab Units 04/16/24  0600 04/15/24  1330   SODIUM mmol/L 137 137   POTASSIUM mmol/L 4.1 3.9   CHLORIDE mmol/L 108 105   CO2 mmol/L 25 25   ANION GAP mmol/L 4 7   BUN mg/dL 14 17   CREATININE mg/dL 1.05 1.45*   EGFR ml/min/1.73sq m 51 35   CALCIUM mg/dL 8.6 8.7     Results from last 7 days   Lab Units 04/15/24  1330   AST U/L 16   ALT U/L 11   ALK PHOS U/L 81   TOTAL PROTEIN g/dL 6.2*   ALBUMIN g/dL 3.6   TOTAL BILIRUBIN mg/dL 0.64         Results from last 7 days   Lab Units 04/16/24  0600 04/15/24  1330   GLUCOSE RANDOM mg/dL 83 82             No results found for: \"BETA-HYDROXYBUTYRATE\"                   Results from last 7 days   Lab Units 04/15/24  1755 04/15/24  1534 04/15/24  1330   HS TNI 0HR ng/L  --   --  16   HS TNI 2HR ng/L  --  21  --    HSTNI D2 ng/L  --  5  --    HS TNI 4HR ng/L 32  --   --    HSTNI D4 ng/L 16  --   --      Results from last 7 days   Lab Units 04/15/24  1330   D-DIMER QUANTITATIVE ug/ml FEU 1.01*                                                                 Results from last 7 days   Lab Units 04/15/24  1909   CLARITY UA  Clear   COLOR UA  Light Yellow   SPEC GRAV UA  1.020   PH UA  5.0   GLUCOSE UA mg/dl Negative   KETONES UA mg/dl Negative   BLOOD UA  Negative   PROTEIN UA mg/dl Negative   NITRITE UA  Negative   BILIRUBIN UA  Negative   UROBILINOGEN UA (BE) mg/dl <2.0   LEUKOCYTES UA  Negative     Results from last 7 days   Lab Units 04/15/24  1755   INFLUENZA A PCR  Negative   INFLUENZA B PCR  Negative   RSV PCR  Negative                                               ED Treatment:   Medication Administration from 04/15/2024 1301 to 04/15/2024 1657         Date/Time Order Dose Route Action     04/15/2024 1334 EDT acetaminophen (Ofirmev) injection 1,000 mg 1,000 mg Intravenous New Bag     04/15/2024 1407 EDT sodium chloride 0.9 % " bolus 1,000 mL 1,000 mL Intravenous New Bag     04/15/2024 1436 EDT iohexol (OMNIPAQUE) 350 MG/ML injection (MULTI-DOSE) 55 mL 55 mL Intravenous Given          Past Medical History:   Diagnosis Date    Acute deep vein thrombosis of lower limb (HCC)     last assessed [id number]    Aortic aneurysm (HCC)     Arthritis     Atrial fibrillation (HCC)     Dislocation of hip (HCC)     last assessed [id number]    Hypertension     Psychiatric disorder     anxiety     Present on Admission:   Atrial fibrillation with RVR (HCC)   Aortic aneurysm (HCC)   Depression with anxiety   Insomnia   Lumbar canal stenosis   Constipation   Hypotension   MARU (acute kidney injury) (HCC)      Admitting Diagnosis: Weakness [R53.1]  Hypoxia [R09.02]  MARU (acute kidney injury) (HCC) [N17.9]  Atrial fibrillation with RVR (HCC) [I48.91]  Age/Sex: 78 y.o. female  Admission Orders:  Regular Diet.  Fall precautions.  Up & OOB as tolerated.  Telemetry. SCDs.    Scheduled Medications:  apixaban, 5 mg, Oral, BID  escitalopram, 10 mg, Oral, Daily  melatonin, 3 mg, Oral, HS  metoprolol succinate, 50 mg, Oral, Daily  senna, 8.6 mg, Oral, HS    Continuous IV Infusions: none    PRN Meds:  acetaminophen, 650 mg, Oral, Q6H PRN  calcium carbonate, 1,000 mg, Oral, Daily PRN  oxyCODONE, 10 mg, Oral, Q6H PRN  polyethylene glycol, 17 g, Oral, Daily PRN        IP CONSULT TO OB GYN    Network Utilization Review Department  ATTENTION: Please call with any questions or concerns to [phone] and carefully listen to the prompts so that you are directed to the right person. All voicemails are confidential.   For Discharge needs, contact Care Management DC Support Team at [phone] opt. [phone]  Send all requests for admission clinical reviews, approved or denied determinations and any other requests to dedicated fax number below belonging to the campus where the patient is receiving treatment. List of dedicated fax numbers for the Facilities:  FACILITY NAME UR FAX NUMBER    ADMISSION DENIALS (Administrative/Medical Necessity) [phone]   DISCHARGE SUPPORT TEAM (NETWORK) [phone]   PARENT CHILD HEALTH (Maternity/NICU/Pediatrics) [phone]   [facility] [phone]   [facility] [phone]   [facility] [phone]   [facility] [phone]   [facility] [phone]   [facility] [phone]   [facility] [phone]   [facility] [phone]   [facility] [phone]   [facility] [phone]   [facility] [phone]   [facility] [phone]

## 2024-04-16 NOTE — PLAN OF CARE
Problem: Potential for Falls  Goal: Patient will remain free of falls  Description: INTERVENTIONS:  - Educate patient/family on patient safety including physical limitations  - Instruct patient to call for assistance with activity   - Consult OT/PT to assist with strengthening/mobility   - Keep Call bell within reach  - Keep bed low and locked with side rails adjusted as appropriate  - Keep care items and personal belongings within reach  - Initiate and maintain comfort rounds  - Make Fall Risk Sign visible to staff  - Apply yellow socks and bracelet for high fall risk patients  - Consider moving patient to room near nurses station  Outcome: Adequate for Discharge     Problem: Prexisting or High Potential for Compromised Skin Integrity  Goal: Skin integrity is maintained or improved  Description: INTERVENTIONS:  - Identify patients at risk for skin breakdown  - Assess and monitor skin integrity  - Assess and monitor nutrition and hydration status  - Monitor labs   - Assess for incontinence   - Turn and reposition patient  - Assist with mobility/ambulation  - Relieve pressure over bony prominences  - Avoid friction and shearing  - Provide appropriate hygiene as needed including keeping skin clean and dry  - Evaluate need for skin moisturizer/barrier cream  - Collaborate with interdisciplinary team   - Patient/family teaching  - Consider wound care consult   Outcome: Adequate for Discharge

## 2024-04-16 NOTE — CONSULTS
Cardiology   Lisa Marques 78 y.o. female MRN: [id number]  Unit/Bed#: S -01 Encounter: [id number]      Reason for Consult / Principal Problem: Afib w/RVR,     Physician Requesting Consult:  Marcelino Crane MD    Outpatient Cardiologist:Dr. Rashmi FLETCHER.     Assessment  1.  Paroxysmal atrial fibrillation atrial fibrillation w/ RVR -- likely provoked in the presence of # 2.   -Denies CP or palpitations.   -ECG on admission demonstrated atrial fibrillation, rate 129 bpm.  -Telemetry review-currently maintaining NSR.  -On metoprolol succinate 50 mg daily.  -On apixaban 5 mg twice daily for systemic AC.  -TSH level 2.1.  2. MARU / dehydration   -Resolved.    -S/p IV fluid resuscitation.  3. Prior syncopal episodes   -(2) prior syncopal episodes within the past 6 months. One episode occurred in 9/2023 (hospitalized for this) and most recent episode occurred about 3 weeks ago (not hospitalized). Unclear etiology for these events. She describes both events occurring while standing/performing light activity around her house, denies any preceding symptoms (of dizziness/lightlessness, tunnel vision, palpitations, or CP), and does not remember falling down to the ground. She believes her LOC is brief.  4. Ascending aortic aneurysm  -Outpatient surveillance, referral to CT surgery.  -CT chest stable 4.7 cm ascending aortic aneurysm.  5. Hypertension  -BP last recorded at 134/87, HR 80.  -Outpatient BP regimen; metoprolol succinate 50 mg daily  -Inpatient BP regimen; metoprolol succinate 50 mg daily.    Plan  -Pt denies any specific cardiac complaints at the time of my evaluation.  -BP and renal function improved w/IVF's.   -Suspect tachyarrhythmia was provoked in the setting ogf dehydration/MARU. -Currently maintaining NSR. Continue metoprolol succinate 50 mg daily. -Continue Eliquis 5 mg BID.   -Check orthostatic BPs today. Suspect the more likely etiology of her recent syncopal episodes is likely orthostatic in nature but  would recommend a 2 week zio patch monitor to further quantify burden of Afib and to assess for any other tachyarrhythmias, bradyarrhythmias or pauses to explain recent syncopal episodes.     HPI: Lisa Marques 78 y.o. year old female with a medical history of paroxysmal atrial fibrillation, ascending aortic aneurysm, and hypertension.  Non-smoker, denies excessive alcohol or recreational/illicit drug use.  She routinely follows with Dr. Caraballo with the  Cardiology service last seen as an outpatient about 2 years ago.  She has had 2 prior syncopal episodes within the past 6 months. One episode occurred in 9/2023 (hospitalized for this) and most recent episode occurred about 3 weeks ago (not hospitalized). She describes both events occurring while standing/performing light activity around her house, denies any preceding symptoms, and does not remember falling down to the ground. With each event she states her LOC is brief.   She presented to the [facility] ED on 4/15/24 after having been sent over from her PCP office w/rapid atrial fibrillation and hypotension. Initial ECG in the ED demonstrated afib, w/rvr, /52. She was also noted to have an MARU w/ a creatinine level of 1.45. She was resuscitated w/IVFs w/improvement in her BP and renal function. She reverted back to NSR w/o any additional medical intervention. Cardiology was consulted for further treatment recommendations/management.     Family History:   Family History   Problem Relation Age of Onset    Colon cancer Mother     Breast cancer Family     Thyroid cancer Family     Colon cancer Family     Hypertension Family     Thyroid disease Family     Hypertension Father     Dementia Father      Historical Information   Past Medical History:   Diagnosis Date    Acute deep vein thrombosis of lower limb (HCC)     last assessed [id number]    Aortic aneurysm (HCC)     Arthritis     Atrial fibrillation (HCC)     Dislocation of hip (HCC)     last assessed  [id number]    Hypertension     Psychiatric disorder     anxiety     Past Surgical History:   Procedure Laterality Date    HIP SURGERY      JOINT REPLACEMENT      KNEE ARTHROSCOPY Bilateral     x2 rt and 1 on left    TUBAL LIGATION       Social History   Social History     Substance and Sexual Activity   Alcohol Use No    Comment: being a social drinker per [employer]     Social History     Substance and Sexual Activity   Drug Use No     Social History     Tobacco Use   Smoking Status Never   Smokeless Tobacco Never     Family History:   Family History   Problem Relation Age of Onset    Colon cancer Mother     Breast cancer Family     Thyroid cancer Family     Colon cancer Family     Hypertension Family     Thyroid disease Family     Hypertension Father     Dementia Father        Review of Systems:  Review of Systems   Constitutional:  Negative for chills, fatigue and fever.   Eyes:  Negative for visual disturbance.   Respiratory:  Negative for cough, chest tightness and shortness of breath.    Cardiovascular:  Negative for chest pain, palpitations and leg swelling.   Gastrointestinal:  Negative for abdominal pain.   Neurological:  Negative for dizziness, light-headedness and headaches.   All other systems reviewed and are negative.          Scheduled Meds:  Current Facility-Administered Medications   Medication Dose Route Frequency Provider Last Rate    acetaminophen  650 mg Oral Q6H PRN May Nicole Muniz MD      apixaban  5 mg Oral BID May Nicole Muniz MD      calcium carbonate  1,000 mg Oral Daily PRN May Nicole Muniz MD      escitalopram  10 mg Oral Daily May Nicole Muniz MD      melatonin  3 mg Oral HS May Nicole uMniz MD      metoprolol succinate  50 mg Oral Daily May Nicole Muniz MD      oxyCODONE  10 mg Oral Q6H PRN May Nicole Muniz MD      polyethylene glycol  17 g Oral Daily PRN May Nicole Muniz MD      senna  8.6 mg Oral HS May Nicole Muniz MD       Continuous Infusions:   PRN Meds:.  acetaminophen    calcium carbonate    oxyCODONE     "polyethylene glycol  all current active meds have been reviewed and current meds:   Current Facility-Administered Medications   Medication Dose Route Frequency    acetaminophen (TYLENOL) tablet 650 mg  650 mg Oral Q6H PRN    apixaban (ELIQUIS) tablet 5 mg  5 mg Oral BID    calcium carbonate (TUMS) chewable tablet 1,000 mg  1,000 mg Oral Daily PRN    escitalopram (LEXAPRO) tablet 10 mg  10 mg Oral Daily    melatonin tablet 3 mg  3 mg Oral HS    metoprolol succinate (TOPROL-XL) 24 hr tablet 50 mg  50 mg Oral Daily    oxyCODONE (ROXICODONE) immediate release tablet 10 mg  10 mg Oral Q6H PRN    polyethylene glycol (MIRALAX) packet 17 g  17 g Oral Daily PRN    senna (SENOKOT) tablet 8.6 mg  8.6 mg Oral HS       No Known Allergies    Objective   Vitals: Blood pressure 134/87, pulse 80, temperature (!) 96.9 °F (36.1 °C), resp. rate 18, height 5' 4\" (1.626 m), weight 70.8 kg (156 lb 1.4 oz), SpO2 94%., Body mass index is 26.79 kg/m².,   Orthostatic Blood Pressures      Flowsheet Row Most Recent Value   Blood Pressure 134/87 filed at 04/16/2024 0940   Patient Position - Orthostatic VS Sitting filed at 04/15/2024 1705              Intake/Output Summary (Last 24 hours) at 4/16/2024 1139  Last data filed at 4/16/2024 0818  Gross per 24 hour   Intake 2300 ml   Output 400 ml   Net 1900 ml       Invasive Devices       Peripheral Intravenous Line  Duration             Peripheral IV 04/15/24 Left Antecubital <1 day              Drain  Duration             External Urinary Catheter 289 days                    Physical Exam:  Physical Exam  Vitals and nursing note reviewed.   Constitutional:       General: She is not in acute distress.     Appearance: She is not diaphoretic.   HENT:      Head: Normocephalic and atraumatic.   Eyes:      General: No scleral icterus.  Cardiovascular:      Rate and Rhythm: Normal rate and regular rhythm.      Pulses: Normal pulses.      Heart sounds: Normal heart sounds.   Pulmonary:      Effort: " Pulmonary effort is normal.      Breath sounds: Normal breath sounds. No wheezing or rales.   Abdominal:      Palpations: Abdomen is soft.   Musculoskeletal:      Right lower leg: No edema.      Left lower leg: No edema.   Skin:     General: Skin is warm and dry.      Capillary Refill: Capillary refill takes less than 2 seconds.   Neurological:      General: No focal deficit present.      Mental Status: She is alert and oriented to person, place, and time.   Psychiatric:         Mood and Affect: Mood normal.         Lab Results:   Recent Results (from the past 24 hour(s))   ECG 12 lead    Collection Time: 04/15/24  1:17 PM   Result Value Ref Range    Ventricular Rate 129 BPM    Atrial Rate 120 BPM    DE Interval  ms    QRSD Interval 82 ms    QT Interval 326 ms    QTC Interval 477 ms    P Axis  degrees    QRS Axis 31 degrees    T Wave Axis -1 degrees   CBC and differential    Collection Time: 04/15/24  1:30 PM   Result Value Ref Range    WBC 4.64 4.31 - 10.16 Thousand/uL    RBC 4.15 3.81 - 5.12 Million/uL    Hemoglobin 10.5 (L) 11.5 - 15.4 g/dL    Hematocrit 34.3 (L) 34.8 - 46.1 %    MCV 83 82 - 98 fL    MCH 25.3 (L) 26.8 - 34.3 pg    MCHC 30.6 (L) 31.4 - 37.4 g/dL    RDW 20.3 (H) 11.6 - 15.1 %    MPV 10.8 8.9 - 12.7 fL    Platelets 188 149 - 390 Thousands/uL    nRBC 0 /100 WBCs    Segmented % 67 43 - 75 %    Immature Grans % 0 0 - 2 %    Lymphocytes % 24 14 - 44 %    Monocytes % 8 4 - 12 %    Eosinophils Relative 0 0 - 6 %    Basophils Relative 1 0 - 1 %    Absolute Neutrophils 3.05 1.85 - 7.62 Thousands/µL    Absolute Immature Grans 0.02 0.00 - 0.20 Thousand/uL    Absolute Lymphocytes 1.12 0.60 - 4.47 Thousands/µL    Absolute Monocytes 0.39 0.17 - 1.22 Thousand/µL    Eosinophils Absolute 0.02 0.00 - 0.61 Thousand/µL    Basophils Absolute 0.04 0.00 - 0.10 Thousands/µL   Comprehensive metabolic panel    Collection Time: 04/15/24  1:30 PM   Result Value Ref Range    Sodium 137 135 - 147 mmol/L    Potassium 3.9 3.5 -  "5.3 mmol/L    Chloride 105 96 - 108 mmol/L    CO2 25 21 - 32 mmol/L    ANION GAP 7 4 - 13 mmol/L    BUN 17 5 - 25 mg/dL    Creatinine 1.45 (H) 0.60 - 1.30 mg/dL    Glucose 82 65 - 140 mg/dL    Calcium 8.7 8.4 - 10.2 mg/dL    AST 16 13 - 39 U/L    ALT 11 7 - 52 U/L    Alkaline Phosphatase 81 34 - 104 U/L    Total Protein 6.2 (L) 6.4 - 8.4 g/dL    Albumin 3.6 3.5 - 5.0 g/dL    Total Bilirubin 0.64 0.20 - 1.00 mg/dL    eGFR 35 ml/min/1.73sq m   HS Troponin 0hr (reflex protocol)    Collection Time: 04/15/24  1:30 PM   Result Value Ref Range    hs TnI 0hr 16 \"Refer to ACS Flowchart\"- see link ng/L   D-dimer, quantitative    Collection Time: 04/15/24  1:30 PM   Result Value Ref Range    D-Dimer, Quant 1.01 (H) <0.50 ug/ml FEU   ECG 12 lead    Collection Time: 04/15/24  1:35 PM   Result Value Ref Range    Ventricular Rate 78 BPM    Atrial Rate 78 BPM    AZ Interval 174 ms    QRSD Interval 80 ms    QT Interval 404 ms    QTC Interval 460 ms    P Axis 61 degrees    QRS Axis 22 degrees    T Wave [place] 18 degrees   HS Troponin I 2hr    Collection Time: 04/15/24  3:34 PM   Result Value Ref Range    hs TnI 2hr 21 \"Refer to ACS Flowchart\"- see link ng/L    Delta 2hr hsTnI 5 <20 ng/L   HS Troponin I 4hr    Collection Time: 04/15/24  5:55 PM   Result Value Ref Range    hs TnI 4hr 32 \"Refer to ACS Flowchart\"- see link ng/L    Delta 4hr hsTnI 16 <20 ng/L   COVID/FLU/RSV    Collection Time: 04/15/24  5:55 PM    Specimen: Nose; Nares   Result Value Ref Range    SARS-CoV-2 Negative Negative    INFLUENZA A PCR Negative Negative    INFLUENZA B PCR Negative Negative    RSV PCR Negative Negative   UA w Reflex to Microscopic w Reflex to Culture    Collection Time: 04/15/24  7:09 PM    Specimen: Urine, Other   Result Value Ref Range    Color, UA Light Yellow     Clarity, UA Clear     Specific Gravity, UA 1.020 1.003 - 1.030    pH, UA 5.0 4.5, 5.0, 5.5, 6.0, 6.5, 7.0, 7.5, 8.0    Leukocytes, UA Negative Negative    Nitrite, UA Negative Negative "    Protein, UA Negative Negative mg/dl    Glucose, UA Negative Negative mg/dl    Ketones, UA Negative Negative mg/dl    Urobilinogen, UA <2.0 <2.0 mg/dl mg/dl    Bilirubin, UA Negative Negative    Occult Blood, UA Negative Negative   Basic metabolic panel    Collection Time: 04/16/24  6:00 AM   Result Value Ref Range    Sodium 137 135 - 147 mmol/L    Potassium 4.1 3.5 - 5.3 mmol/L    Chloride 108 96 - 108 mmol/L    CO2 25 21 - 32 mmol/L    ANION GAP 4 4 - 13 mmol/L    BUN 14 5 - 25 mg/dL    Creatinine 1.05 0.60 - 1.30 mg/dL    Glucose 83 65 - 140 mg/dL    Glucose, Fasting 83 65 - 99 mg/dL    Calcium 8.6 8.4 - 10.2 mg/dL    eGFR 51 ml/min/1.73sq m   CBC (With Platelets)    Collection Time: 04/16/24  6:00 AM   Result Value Ref Range    WBC 3.07 (L) 4.31 - 10.16 Thousand/uL    RBC 3.94 3.81 - 5.12 Million/uL    Hemoglobin 10.0 (L) 11.5 - 15.4 g/dL    Hematocrit 33.1 (L) 34.8 - 46.1 %    MCV 84 82 - 98 fL    MCH 25.4 (L) 26.8 - 34.3 pg    MCHC 30.2 (L) 31.4 - 37.4 g/dL    RDW 20.3 (H) 11.6 - 15.1 %    Platelets 149 149 - 390 Thousands/uL    MPV 10.6 8.9 - 12.7 fL     Imaging: I have personally reviewed pertinent reports.   and I have personally reviewed pertinent films in PACS  Code Status: LVL 1 Full Code  Epic/ [employer]/Care Everywhere records reviewed: Yes    * Please Note: Fluency DirectDictation voice to text software may have been used in the creation of this document. **

## 2024-04-16 NOTE — QUICK NOTE
Received message from nursing regarding patient bladder scan with 600 cc.  Urinary retention protocol placed.    H/o recurrent bladder prolapse noticed.  No formal follow-up.    However, nursing was not able to place a straight cath in the setting of bladder prolapse (see pic).  Reached out to urology on-call with recommendation of [place] positioning to release the pressure and trial of pushing back the prolapsed bladder. Patient tried to push back the prolapsed bladder/urethra in the bathroom and was able to urinate successfully.    Per urology oncall- if patient can urinate properly now- would recommend urogyn or gyn. Gyn consult placed for recurrent bladder prolapse.

## 2024-04-16 NOTE — CASE MANAGEMENT
Case Management Assessment & Discharge Planning Note    Patient name Lisa Marques  Location S /S -01 MRN [id number]  : 1945 Date 2024       Current Admission Date: 4/15/2024  Current Admission Diagnosis:Atrial fibrillation with RVR ([facility])   Patient Active Problem List    Diagnosis Date Noted    Food insecurity 04/15/2024    Hypotension 04/15/2024    Acute hypoxic respiratory failure ([facility]) 04/15/2024    Elevated d-dimer 04/15/2024    Generalized weakness 04/15/2024    Fall 2023    Chronic pain of left knee 2023    Fall 2023    Anemia 2023    Viral illness 2022    Financial insecurity 10/10/2022    Dry eye of left side 2022    Bladder prolapse, female, acquired 2022    Ganglion of hand, left 2022    Stage 3a chronic kidney disease ([facility]) 2022    Hypokalemia 10/01/2021    Chronic heart failure with preserved ejection fraction ([facility]) 2020    Primary osteoarthritis of right shoulder 2020    Falls 10/24/2019    Musculoskeletal arm pain, right 10/24/2019    Balance disorder 2019    Eczema of right external ear 2018    Constipation 2018    Atrial fibrillation with RVR ([facility]) 10/11/2017    MARU (acute kidney injury) ([facility]) 10/11/2017    Chronic pain 10/11/2017    Age-related osteoporosis without current pathological fracture 10/11/2017    Hypertension 10/11/2017    Anxiety 10/11/2017    Depression with anxiety 2016    Right shoulder pain 2016    Opioid dependence ([facility]) 2016    Left knee pain 2015    Aortic aneurysm ([facility]) 2014    Spondylosis of lumbar region without myelopathy or radiculopathy 2013    Myofascial pain syndrome 2013    Insomnia 2012    Lumbar canal stenosis 06/15/2012    Vitamin D deficiency 06/15/2012    Pain syndrome, chronic 2012      LOS (days): 0  Geometric Mean LOS (GMLOS) (days):   Days to GMLOS:     OBJECTIVE:              Current admission  status: Observation       Preferred Pharmacy:   [facility] - [place], [place] - [address]  [address]  [place] [address]  Phone: [phone] Fax: [phone]    [facility] - [place], [place] - [address]  [address]  [place] [address]  Phone: [phone] Fax: [phone]    [facility] - [place], [place] - [address]  [address]  [place] [address]  Phone: [phone] Fax: [phone]    Primary Care Provider: Gilma Tse DO    Primary Insurance: [employer] REP  Secondary Insurance:     ASSESSMENT:  Active Health Care Proxies    There are no active Health Care Proxies on file.                      Patient Information  Admitted from:: Home  Mental Status: Alert  During Assessment patient was accompanied by: Not accompanied during assessment  Assessment information provided by:: Patient  Primary Caregiver: Self  Support Systems: Self, Family members  Type of Current Residence: Apartment  Living Arrangements: Lives Alone    Activities of Daily Living Prior to Admission  Functional Status: Independent  Completes ADLs independently?: Yes  Ambulates independently?: Yes  Does patient use assisted devices?: Yes  Assisted Devices (DME) used: Rollator  Does patient currently own DME?: Yes  What DME does the patient currently own?: Rollator  Does patient have a history of Outpatient Therapy (PT/OT)?: No  Does the patient have a history of Short-Term Rehab?: No  Does patient have a history of HHC?: No  Does patient currently have HHC?: No         Patient Information Continued  Does patient have prescription coverage?: Yes  Does patient receive dialysis treatments?: No  Does patient have a history of substance abuse?: No  Does patient have a history of Mental Health Diagnosis?: No         Means of Transportation  Means of Transport [facility] [place]:: Family transport          DISCHARGE DETAILS:    Discharge planning discussed with:: patient at  bedside  Freedom of Choice: Yes (re: home care)  Comments - Freedom of Choice: declining any services at this time  CM contacted family/caregiver?: No- see comments (declined)  Were Treatment Team discharge recommendations reviewed with patient/caregiver?: Yes  Did patient/caregiver verbalize understanding of patient care needs?: Yes  Were patient/caregiver advised of the risks associated with not following Treatment Team discharge recommendations?: Yes         Requested Home Health Care         Is the patient interested in HHC at discharge?: No    DME Referral Provided  Referral made for DME?: No    Other Referral/Resources/Interventions Provided:  Interventions: HHC  Referral Comments: Patient admitted under observation due to a fib with RVR - PT/OT recommending level II, level III. Met with patient at bedside to discuss same, but patient declining any wish for services at this time. Relays preference to return home and states she feels as though she can manage on her own. Patient reports that she's independent with ADLs and ambulation. Family assists with transportation, and she has medications and groceries delivered to the home. Denies any history of home care services, but patient not interested in same. Aware she can follow-up with PCP if she feels same needed after returning home. Patient does report that family is unable to pick her up. Reviewed wheelchair transport and patient confirmed wish for same (unable to utilize Lyft, as she does not feel she can get into the apartment building without rollator and prefers door to door service). W/c transport requested for 5:30pm, but confirmed with [employer] for 7:20pm. Patient aware of same. MDs aware patient declining services at this time.    Would you like to participate in our [facility] service program?  : No - Declined    Treatment Team Recommendation: Home with Home Health Care  Discharge Destination Plan:: Home

## 2024-04-16 NOTE — PLAN OF CARE
Problem: OCCUPATIONAL THERAPY ADULT  Goal: Performs self-care activities at highest level of function for planned discharge setting.  See evaluation for individualized goals.  Description: Treatment Interventions: ADL retraining, Functional transfer training, Cognitive reorientation, Patient/family training, Equipment evaluation/education, Continued evaluation, Activityengagement, Compensatory technique education          See flowsheet documentation for full assessment, interventions and recommendations.   Note: Limitation: Decreased ADL status, Decreased Safe judgement during ADL, Decreased cognition, Decreased self-care trans, Decreased high-level ADLs  Prognosis: Good  Assessment: Pt is a 78 y.o. female admitted to [facility] after episode of a-fib w/ RVR at PCP's office. PMH includes: MARU, aortic aneurysm, depression w/ anxiety, insomnia, hypotension, hx of falls, CKD, and CHF. Pt currently lives ALONE in a senior living apartment on the 5th floor w/ elevator access. Pt reports PLOF as (I) for ADLs, (I) for IADLs except occasional help w/ driving, and mod(I) w/ rollator for functional mobility. Based upon this evaluation, pt is showing deficits in the following areas: decreased cognition, impulsivity/poor safety awareness, and decreased performance for ADLs/IADLs/functional mobility. Limiting personal and environmental factors include: limited social support and limited access to the community. Supporting factors include a supportive brother, [employer], accessible home, and PLOF as (I) for ADLs and IADLs. Pt will benefit from continued skilled therapy interventions to address above deficits. Following sessions will be focused on continued cognition evaluations, performance in ADLs, safe ADL transfer techniques, compensatory strategies, ambulation of short household distances w/ RW to increase performance in ADLs and functional mobility.     Rehab Resource Intensity Level, OT: (S) II (Moderate Resource Intensity) (Per  ACLS results: pt may live with someone who does a daily check on the environment to remove any safety hazards and solve problems. The person may be alone for part of the day with a pre-established procedure for getting help from a neighbor.)     HANSA Arreaga

## 2024-04-16 NOTE — PLAN OF CARE
Problem: Potential for Falls  Goal: Patient will remain free of falls  Description: INTERVENTIONS:  - Educate patient/family on patient safety including physical limitations  - Instruct patient to call for assistance with activity   - Consult OT/PT to assist with strengthening/mobility   - Keep Call bell within reach  - Keep bed low and locked with side rails adjusted as appropriate  - Keep care items and personal belongings within reach  - Initiate and maintain comfort rounds  - Make Fall Risk Sign visible to staff  - Offer Toileting every 2  Hours, in advance of need  - Initiate/Maintain alarm  - Obtain necessary fall risk management equipment:   - Apply yellow socks and bracelet for high fall risk patients  - Consider moving patient to room near nurses station  Outcome: Progressing

## 2024-04-16 NOTE — PHYSICAL THERAPY NOTE
Physical Therapy Evaluation    Patient's Name: Lisa Marques    Admitting Diagnosis  Weakness [R53.1]  Hypoxia [R09.02]  MARU (acute kidney injury) (HCC) [N17.9]  Atrial fibrillation with RVR (HCC) [I48.91]    Problem List  Patient Active Problem List   Diagnosis    Atrial fibrillation with RVR (HCC)    MARU (acute kidney injury) (HCC)    Chronic pain    Age-related osteoporosis without current pathological fracture    Hypertension    Anxiety    Aortic aneurysm (HCC)    Depression with anxiety    Insomnia    Left knee pain    Lumbar canal stenosis    Myofascial pain syndrome    Opioid dependence (HCC)    Pain syndrome, chronic    Right shoulder pain    Spondylosis of lumbar region without myelopathy or radiculopathy    Vitamin D deficiency    Constipation    Eczema of right external ear    Balance disorder    Falls    Musculoskeletal arm pain, right    Primary osteoarthritis of right shoulder    Chronic heart failure with preserved ejection fraction (HCC)    Hypokalemia    Stage 3a chronic kidney disease (HCC)    Ganglion of hand, left    Dry eye of left side    Bladder prolapse, female, acquired    Financial insecurity    Viral illness    Fall    Anemia    Fall    Chronic pain of left knee    Food insecurity    Hypotension    Acute hypoxic respiratory failure (HCC)    Elevated d-dimer    Generalized weakness       Past Medical History  Past Medical History:   Diagnosis Date    Acute deep vein thrombosis of lower limb (HCC)     last assessed [id number]    Aortic aneurysm (HCC)     Arthritis     Atrial fibrillation (HCC)     Dislocation of hip (HCC)     last assessed [id number]    Hypertension     Psychiatric disorder     anxiety       Past Surgical History  Past Surgical History:   Procedure Laterality Date    HIP SURGERY      JOINT REPLACEMENT      KNEE ARTHROSCOPY Bilateral     x2 rt and 1 on left    TUBAL LIGATION          04/16/24 0818   PT Last Visit   PT Visit Date 04/16/24   Note Type   Note type Evaluation    Pain Assessment   Pain Assessment Tool 0-10   Pain Score 5   Pain Location/Orientation Orientation: Lower;Location: Back   Effect of Pain on Daily Activities limits comfort, limits mobility   Hospital Pain Intervention(s) Repositioned;Ambulation/increased activity   Multiple Pain Sites No   Restrictions/Precautions   Weight Bearing Precautions Per Order No   Other Precautions Chair Alarm;Bed Alarm;O2;Telemetry;Fall Risk;Multiple lines;Cognitive  (2 L O2 via NC)   Home Living   Type of Home Apartment   Home Layout One level;Performs ADLs on one level;Elevator  (5th floor apartment)   Bathroom Shower/Tub Walk-in shower   Bathroom Toilet Raised   Bathroom Equipment Grab bars in shower;Shower chair;Grab bars around toilet   Home Equipment Walker  (rollator)   Prior Function   Lives With Alone   Receives Help From Family;Neighbor   IADLs Independent with meal prep;Independent with medication management;Family/Friend/Other provides transportation  (per pt will receive rides from brother, does state she drives if she has to)   Falls in the last 6 months 1 to 4  (x2 since  per pt)   Comments at baseline pt ambulates with rollator,   Cognition   Arousal/Participation Cooperative   Orientation Level Oriented to person;Oriented to time;Oriented to place;Disoriented to situation   Following Commands Follows one step commands with increased time or repetition   Comments pt ID by wristband, name and    Subjective   Subjective pt agreeable to PT evaluation, states she did not get much sleep over night   Strength RLE   R Hip Flexion 3+/5   R Hip ABduction 3+/5   R Knee Flexion 3/5   R Knee Extension 3/5   R Ankle Dorsiflexion 3+/5   R Ankle Plantar Flexion 3+/5   Strength LLE   L Hip Flexion 3+/5   L Hip ABduction 3+/5   L Knee Flexion 3/5   L Knee Extension 3/5   L Ankle Dorsiflexion 3+/5   L Ankle Plantar Flexion 3+/5   Bed Mobility   Supine to Sit 5  Supervision   Additional items Assist x 1;Increased time  required;HOB elevated   Sit to Supine 5  Supervision   Additional items Assist x 1;Increased time required;HOB elevated   Additional Comments excessive time required for performance of supine to sit, sits EOB with supervision, denies light headedness/dizziness with positional change   Transfers   Sit to Stand 4  Minimal assistance  (CGA for safety)   Additional items Assist x 1;Increased time required;Verbal cues   Stand to Sit 5  Supervision   Additional items Increased time required;Verbal cues   Additional Comments demonstrates good hand placement and initiation of transfer. pt stands with close supervision, able to adjust clothing with BL UE off of walker   Ambulation/Elevation   Gait pattern Decreased foot clearance;Decreased hip extension;Decreased heel strike;Foward flexed;Short stride   Gait Assistance 4  Minimal assist  (CGA for safety)   Additional items Assist x 1;Verbal cues   Assistive Device Rolling walker   Distance 45'x1, 30'x1   Stair Management Assistance Not tested   Ambulation/Elevation Additional Comments at times pt steers RW in serpentine pattern, stating difficulty with managing RW vs her rollator at baseline. encouraged pt to utilize RW for increased support and stability, decreased speed of performance   Balance   Static Sitting Good   Dynamic Sitting Good   Static Standing Fair +   Dynamic Standing Fair   Ambulatory Fair -  (RW)   Endurance Deficit   Endurance Deficit Yes   Endurance Deficit Description mildly SOB post ambulation, SP O2 to 90% once virginia correctly placed, as pt was wearing virginia with sensor on palmar aspect of hand   Activity Tolerance   Activity Tolerance Patient limited by fatigue;Treatment limited secondary to medical complications (Comment)  (cognition)   Medical Staff Made Aware spoke with CM, OT   Nurse Made Aware [facility] spaulding pre/post   Assessment   Prognosis Fair   Problem List Decreased strength;Decreased endurance;Decreased mobility;Impaired balance;Decreased  cognition;Impaired judgement;Decreased safety awareness;Obesity;Pain   Assessment Pt is a 78 y.o. female seen for PT evaluation s/p admit to [facility] on 4/15/2024. Pt was admitted with a primary dx of: atrial fibrillation with RVR, weakness, hypoxia, MARU, atrial fibrillation with RVR.  PT now consulted for assessment of mobility and d/c needs. Pt with Up and OOB as tolerated orders.  Pts current comorbidities and personal factors effecting treatment include: BMI, depression with anxiety, HTN, CKD, . Pts current clinical presentation is Unstable/Unpredictable (high complexity) due to Ongoing medical management for primary dx, Decreased activity tolerance compared to baseline, Fall risk, Ongoing telemetry monitoring, Increased O2 via NC from pts baseline. Prior to admission, pt was independent with use of rollator. Upon evaluation, pt currently is requiring Supervision for bed mobility; CGA for transfers and CGA for ambulation 75 ft w/ RW. Pt presents at PT eval functioning below baseline and currently w/ overall mobility deficits 2* to: BLE weakness, impaired balance, decreased endurance, gait deviations, decreased activity tolerance compared to baseline, decreased functional mobility tolerance compared to baseline, decreased safety awareness, impaired judgement, fall risk, decreased cognition. Pt currently at a fall risk 2* to impairments listed above.  Pt will continue to benefit from skilled acute PT interventions to address stated impairments; to maximize functional mobility; for ongoing pt/ family training; and DME needs. At conclusion of PT session pt returned BTB, bed alarm engaged, all needs in reach, and RN notified of session findings/recommendations with phone and call bell within reach. Pt denies any further questions at this time. Recommend Level III (Minimum Resource Intensity)  upon hospital D/C.   Goals   Patient Goals to go home   STG Expiration Date 04/30/24   Short Term Goal #1 In 14 days  pt will be able to: 1. Demonstrate ability to perform all aspects of bed mobility independently to improve functional safety.  2. Perform functional transfers independently to facilitate safe return to previous living environment.  3.  Ambulate 150 ft with LRAD independently with stable vitals to improve safety with household distances and reduce fall risk.  4. Improve LE strength grades by 1 to increase ease of functional mobility with transfers and gait. 5. Pt will demonstrate improved balance by one grade in order to decrease risk of falls.   PT Treatment Day 0   Plan   Treatment/Interventions Functional transfer training;LE strengthening/ROM;Elevations;Therapeutic exercise;Cognitive reorientation;Patient/family training;Equipment eval/education;Bed mobility;Gait training;Spoke to nursing;Spoke to case management;OT   PT Frequency 2-3x/wk   Discharge Recommendation   Rehab Resource Intensity Level, PT III (Minimum Resource Intensity)   AM-PAC Basic Mobility Inpatient   Turning in Flat Bed Without Bedrails 3   Lying on Back to Sitting on Edge of Flat Bed Without Bedrails 3   Moving Bed to Chair 3   Standing Up From Chair Using Arms 3   Walk in Room 3   Climb 3-5 Stairs With Railing 2   Basic Mobility Inpatient Raw Score 17   Basic Mobility Standardized Score 39.67   [facility] Highest Level Of Mobility   -HLM Goal 5: Stand one or more mins   -HLM Achieved 7: Walk 25 feet or more   End of Consult   Patient Position at End of Consult Bed/Chair alarm activated;All needs within reach;Supine  (no bed side recliner available)   The patient's AM-PAC Basic Mobility Inpatient Short Form Raw Score is 17. A Raw score of greater than 16 suggests the patient may benefit from discharge to home. Please also refer to the recommendation of the Physical Therapist for safe discharge planning.    Giovany Berkowitz, PT

## 2024-04-18 ENCOUNTER — TELEPHONE (OUTPATIENT)
Dept: OBGYN CLINIC | Facility: CLINIC | Age: 79
End: 2024-04-18

## 2024-04-18 ENCOUNTER — TRANSITIONAL CARE MANAGEMENT (OUTPATIENT)
Dept: FAMILY MEDICINE CLINIC | Facility: CLINIC | Age: 79
End: 2024-04-18

## 2024-04-18 DIAGNOSIS — I48.0 PAROXYSMAL ATRIAL FIBRILLATION (HCC): ICD-10-CM

## 2024-04-18 DIAGNOSIS — G47.00 INSOMNIA, UNSPECIFIED TYPE: ICD-10-CM

## 2024-04-18 DIAGNOSIS — M79.601 MUSCULOSKELETAL ARM PAIN, RIGHT: ICD-10-CM

## 2024-04-18 RX ORDER — GABAPENTIN 300 MG/1
900 CAPSULE ORAL 3 TIMES DAILY
Qty: 270 CAPSULE | Refills: 1 | Status: SHIPPED | OUTPATIENT
Start: 2024-04-18

## 2024-04-18 NOTE — TELEPHONE ENCOUNTER
Patient requesting refills for gabapentin 300 mg, Eliquis 5 mg, Ambien 5 mg, cyclobenzaprine to be sent to [facility]. Please advise and refill if appropriate

## 2024-04-23 ENCOUNTER — TELEPHONE (OUTPATIENT)
Dept: CARDIOLOGY CLINIC | Facility: CLINIC | Age: 79
End: 2024-04-23

## 2024-04-23 DIAGNOSIS — G47.00 INSOMNIA, UNSPECIFIED TYPE: ICD-10-CM

## 2024-04-23 RX ORDER — ZOLPIDEM TARTRATE 5 MG/1
5 TABLET ORAL
Qty: 30 TABLET | Refills: 0 | Status: SHIPPED | OUTPATIENT
Start: 2024-04-23

## 2024-04-23 NOTE — TELEPHONE ENCOUNTER
Per Hosp visit on 4/15 for Afib with RVR, 14 day Zio XT has been ordered and will be placed for Dr. Caraballo to recive the results.     Per pt's insurance, prior auth is not required for cpt code [id number]. Zio has now been ordered and will be sent to pt's home address.

## 2024-04-25 ENCOUNTER — RA CDI HCC (OUTPATIENT)
Dept: OTHER | Facility: HOSPITAL | Age: 79
End: 2024-04-25
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
## 2024-05-02 ENCOUNTER — OFFICE VISIT (OUTPATIENT)
Dept: OBGYN CLINIC | Facility: CLINIC | Age: 79
End: 2024-05-02

## 2024-05-02 ENCOUNTER — OFFICE VISIT (OUTPATIENT)
Dept: FAMILY MEDICINE CLINIC | Facility: CLINIC | Age: 79
End: 2024-05-02

## 2024-05-02 ENCOUNTER — TELEPHONE (OUTPATIENT)
Dept: CARDIOLOGY CLINIC | Facility: CLINIC | Age: 79
End: 2024-05-02

## 2024-05-02 VITALS
SYSTOLIC BLOOD PRESSURE: 122 MMHG | WEIGHT: 153.2 LBS | BODY MASS INDEX: 26.15 KG/M2 | TEMPERATURE: 98.2 F | OXYGEN SATURATION: 100 % | DIASTOLIC BLOOD PRESSURE: 84 MMHG | HEART RATE: 75 BPM | HEIGHT: 64 IN | RESPIRATION RATE: 18 BRPM

## 2024-05-02 VITALS
HEART RATE: 92 BPM | DIASTOLIC BLOOD PRESSURE: 55 MMHG | RESPIRATION RATE: 18 BRPM | HEIGHT: 64 IN | BODY MASS INDEX: 26.12 KG/M2 | WEIGHT: 153 LBS | SYSTOLIC BLOOD PRESSURE: 87 MMHG

## 2024-05-02 DIAGNOSIS — Z00.00 MEDICARE ANNUAL WELLNESS VISIT, SUBSEQUENT: Primary | ICD-10-CM

## 2024-05-02 DIAGNOSIS — N81.4 CYSTOCELE WITH PROLAPSE: Primary | ICD-10-CM

## 2024-05-02 DIAGNOSIS — M79.18 MYOFASCIAL PAIN SYNDROME: ICD-10-CM

## 2024-05-02 PROCEDURE — A4561 PESSARY RUBBER, ANY TYPE: HCPCS | Performed by: OBSTETRICS & GYNECOLOGY

## 2024-05-02 PROCEDURE — 99203 OFFICE O/P NEW LOW 30 MIN: CPT | Performed by: OBSTETRICS & GYNECOLOGY

## 2024-05-02 PROCEDURE — G0439 PPPS, SUBSEQ VISIT: HCPCS | Performed by: FAMILY MEDICINE

## 2024-05-02 PROCEDURE — 57160 INSERT PESSARY/OTHER DEVICE: CPT | Performed by: OBSTETRICS & GYNECOLOGY

## 2024-05-02 RX ORDER — OXYCODONE HYDROCHLORIDE 10 MG/1
10 TABLET ORAL EVERY 6 HOURS PRN
Qty: 120 TABLET | Refills: 0 | Status: SHIPPED | OUTPATIENT
Start: 2024-05-02 | End: 2024-05-02 | Stop reason: SDUPTHER

## 2024-05-02 RX ORDER — OXYCODONE HYDROCHLORIDE 10 MG/1
10 TABLET ORAL EVERY 6 HOURS PRN
Qty: 120 TABLET | Refills: 0 | Status: SHIPPED | OUTPATIENT
Start: 2024-05-02

## 2024-05-02 NOTE — PROGRESS NOTES
Pessary    Date/Time: 2024 2:30 PM    Performed by: Rosalee Vasquez MD  Authorized by: Rosalee Vasquez MD  Universal Protocol:  Procedure performed by: (Dr. King)  Consent: Verbal consent obtained.  Consent given by: patient  Patient understanding: patient states understanding of the procedure being performed  Patient identity confirmed: verbally with patient    Indication:     Indication for pessary: uterine prolapse and cystocele    Pre-procedure:     Pessary procedure type:  Insertion  Problems:     Pessary complications: none    Procedure:     Pessary type:  Ring w/ support    Pessary size:  5    Patient tolerance of procedure:  Tolerated well, no immediate complications  Comments:     Procedure comments:  Lisa Marques is a 78 y.o..female who presents for standing history of bladder prolapse.  Patient was recently hospitalized for A-fib with RVR at which time it was noted that she had a prolapse of her bladder that was making it so that she was unable to void.  Prolapse was self reduced and patient was subsequently able to void.   Patient says that this has been going on for 20 years, she is unsure if it has gotten worse over time. She says that it is difficult for her to urinate, she denies incontence.  Past obstetrical history is significant for 2 vaginal deliveries 58 and 60ish years ago .   We discussed different options including pessary placement as well surgical management.  Patient states that she would like to proceed with the least invasive option and we also discussed that at this time given her medical comorbidities, she would not be a great surgical candidate.  RTO in 3 months for pessary check or sooner if complications arise.      Rosalee Vasquez MD  OBGYN PGY-2  2024 3:20 PM

## 2024-05-02 NOTE — PROGRESS NOTES
"OB/GYN VISIT  Lisa Marques  5/2/2024  2:48 PM    Subjective:     Lisa Marques is a 78 y.o. No obstetric history on file. female who presents for standing history of bladder prolapse.  Patient was recently hospitalized for A-fib with RVR at which time it was noted that she had a prolapse of her bladder that was making it so that she was unable to void.  Lapse was self reduced and patient was subsequently able to void.   Patient says that this has been going on for 20 years, she is unsure if it has gotten worse over time. She says that it is difficult for her to urinate, she denies incontence.    Past obstetrical history is significant for 2 vaginal deliveries 58 and 60ish years ago .       Past Medical History:   Diagnosis Date   • Acute deep vein thrombosis of lower limb (HCC)     last assessed [id number]   • Aortic aneurysm (HCC)    • Arthritis    • Atrial fibrillation (HCC)    • Dislocation of hip (HCC)     last assessed [id number]   • Hypertension    • Psychiatric disorder     anxiety     Past Surgical History:   Procedure Laterality Date   • HIP SURGERY     • JOINT REPLACEMENT     • KNEE ARTHROSCOPY Bilateral     x2 rt and 1 on left   • TUBAL LIGATION         Objective:    Vitals: Blood pressure (!) 87/55, pulse 92, resp. rate 18, height 5' 4\" (1.626 m), weight 69.4 kg (153 lb).Body mass index is 26.26 kg/m².    Physical Exam      Assessment/Plan:  Problem List Items Addressed This Visit    None      D/w  ***    Rosalee Vasquez MD  5/2/2024  2:48 PM        Please note that while the recent CURES Act permits medical records be visible for patient review, clinical documentation is intended for utilization by healthcare professionals.  All test results are immediately available through [facility] as well, and we will review results at earliest opportunity and contact you with the appropriate urgency.  If you have a question about something you see in your chart, please don't hesitate to ask about it at your next " appointment.

## 2024-05-02 NOTE — PROGRESS NOTES
Assessment and Plan:     Problem List Items Addressed This Visit        Musculoskeletal and Integument    Myofascial pain syndrome    Relevant Medications    oxyCODONE (ROXICODONE) 10 MG TABS   Other Visit Diagnoses     Medicare annual wellness visit, subsequent    -  Primary            Falls Plan of Care: balance, strength, and gait training instructions were provided.     Urinary Incontinence Plan of Care: counseling topics discussed: practice Kegel (pelvic floor strengthening) exercises, limit alcohol, caffeine, spicy foods, and acidic foods and limiting fluid intake 3-4 hours before bed.       Preventive health issues were discussed with patient, and age appropriate screening tests were ordered as noted in patient's After Visit Summary.  Personalized health advice and appropriate referrals for health education or preventive services given if needed, as noted in patient's After Visit Summary.     History of Present Illness:     Patient presents for a Medicare Wellness Visit    HPI   Patient Care Team:  Gilma Tse DO as PCP - General  Junito Ruiz MD as PCP - PCP-[facility] ([facility])  DO Virgen Neff DO Daryl Gordon, CRNP Daniel Sawares, MD     Review of Systems:     Review of Systems     Problem List:     Patient Active Problem List   Diagnosis   • Atrial fibrillation with RVR ([facility])   • MARU (acute kidney injury) ([facility])   • Chronic pain   • Age-related osteoporosis without current pathological fracture   • Hypertension   • Anxiety   • Aortic aneurysm ([facility])   • Depression with anxiety   • Insomnia   • Left knee pain   • Lumbar canal stenosis   • Myofascial pain syndrome   • Opioid dependence ([facility])   • Pain syndrome, chronic   • Right shoulder pain   • Spondylosis of lumbar region without myelopathy or radiculopathy   • Vitamin D deficiency   • Constipation   • Eczema of right external ear   • Balance disorder   • Musculoskeletal arm pain, right   • Primary osteoarthritis of  right shoulder   • Chronic heart failure with preserved ejection fraction (HCC)   • Hypokalemia   • Stage 3a chronic kidney disease (HCC)   • Ganglion of hand, left   • Dry eye of left side   • Bladder prolapse, female, acquired   • Financial insecurity   • Viral illness   • Anemia   • Fall   • Chronic pain of left knee   • Food insecurity   • Hypotension   • Acute hypoxic respiratory failure (HCC)   • Elevated d-dimer   • Generalized weakness      Past Medical and Surgical History:     Past Medical History:   Diagnosis Date   • Acute deep vein thrombosis of lower limb (HCC)     last assessed [id number]   • Aortic aneurysm (HCC)    • Arthritis    • Atrial fibrillation (HCC)    • Dislocation of hip (HCC)     last assessed [id number]   • Hypertension    • Psychiatric disorder     anxiety     Past Surgical History:   Procedure Laterality Date   • HIP SURGERY     • JOINT REPLACEMENT     • KNEE ARTHROSCOPY Bilateral     x2 rt and 1 on left   • TUBAL LIGATION        Family History:     Family History   Problem Relation Age of Onset   • Colon cancer Mother    • Breast cancer Family    • Thyroid cancer Family    • Colon cancer Family    • Hypertension Family    • Thyroid disease Family    • Hypertension Father    • Dementia Father       Social History:     Social History     Socioeconomic History   • Marital status:      Spouse name: None   • Number of children: None   • Years of education: None   • Highest education level: None   Occupational History   • None   Tobacco Use   • Smoking status: Never   • Smokeless tobacco: Never   Vaping Use   • Vaping status: Never Used   Substance and Sexual Activity   • Alcohol use: No     Comment: being a social drinker per [employer]   • Drug use: No   • Sexual activity: Not Currently     Partners: Male   Other Topics Concern   • None   Social History Narrative   • None     Social Determinants of Health     Financial Resource Strain: Low Risk  (1/22/2024)    Overall Financial  Resource Strain (CARDIA)    • Difficulty of Paying Living Expenses: Not hard at all   Food Insecurity: No Food Insecurity (1/22/2024)    Hunger Vital Sign    • Worried About Running Out of Food in the Last Year: Never true    • Ran Out of Food in the Last Year: Never true   Transportation Needs: No Transportation Needs (1/22/2024)    PRAPARE - Transportation    • Lack of Transportation (Medical): No    • Lack of Transportation (Non-Medical): No   Physical Activity: Inactive (1/22/2024)    Exercise Vital Sign    • Days of Exercise per Week: 0 days    • Minutes of Exercise per Session: 0 min   Stress: No Stress Concern Present (1/22/2024)    [employer] - Occupational Stress Questionnaire    • Feeling of Stress : Only a little   Social Connections: Not on file   Intimate Partner Violence: Not At Risk (1/22/2024)    Humiliation, Afraid, Rape, and Kick questionnaire    • Fear of Current or Ex-Partner: No    • Emotionally Abused: No    • Physically Abused: No    • Sexually Abused: No   Housing Stability: Low Risk  (1/22/2024)    Housing Stability Vital Sign    • Unable to Pay for Housing in the Last Year: No    • Number of Places Lived in the Last Year: 1    • Unstable Housing in the Last Year: No      Medications and Allergies:     Current Outpatient Medications   Medication Sig Dispense Refill   • apixaban (Eliquis) 5 mg Take 1 tablet (5 mg total) by mouth 2 (two) times a day 60 tablet 0   • Diclofenac Sodium (VOLTAREN) 1 % Apply 1 g topically 4 (four) times a day 20 g 0   • escitalopram (LEXAPRO) 10 mg tablet Take 1 tablet (10 mg total) by mouth daily 90 tablet 1   • gabapentin (NEURONTIN) 300 mg capsule Take 3 capsules (900 mg total) by mouth 3 (three) times a day 270 capsule 1   • hydrocortisone 2.5 % cream Apply topically 2 (two) times a day 30 g 2   • lidocaine (LIDODERM) 5 % Apply 2 patches topically over 12 hours daily Remove & Discard patch within 12 hours or as directed by MD Roldan  patch 0   • melatonin 3 mg Take 1 tablet (3 mg total) by mouth daily at bedtime 14 tablet 0   • metoprolol succinate (TOPROL-XL) 50 mg 24 hr tablet TAKE 1 TABLET (50 MG TOTAL) BY MOUTH DAILY 90 tablet 3   • oxyCODONE (ROXICODONE) 10 MG TABS Take 1 tablet (10 mg total) by mouth every 6 (six) hours as needed for moderate pain Max Daily Amount: 40 mg 120 tablet 0   • senna (SENOKOT) 8.6 mg Take 1 tablet (8.6 mg total) by mouth daily at bedtime 30 tablet 3   • zolpidem (AMBIEN) 5 mg tablet Take 1 tablet (5 mg total) by mouth daily at bedtime as needed for sleep 30 tablet 0   • polyethylene glycol (MIRALAX) 17 g packet Take 17 g by mouth 2 (two) times a day for 5 days 170 g 0     No current facility-administered medications for this visit.     No Known Allergies   Immunizations:     Immunization History   Administered Date(s) Administered   • COVID-19 MODERNA VACC 0.5 ML IM 01/30/2021, 02/27/2021, 12/03/2021   • INFLUENZA 12/01/2016, 10/10/2022   • Influenza Split High Dose Preservative Free IM 10/27/2015, 12/13/2016, 01/17/2017, 10/23/2017   • Influenza, high dose seasonal 0.7 mL 11/26/2018, 10/24/2019, 10/13/2020, 11/04/2021, 10/10/2022, 09/25/2023   • Influenza, seasonal, injectable 12/19/2012   • Pneumococcal Conjugate 13-Valent 12/13/2016   • Pneumococcal Polysaccharide PPV23 12/01/2016   • Tdap 04/27/2017      Health Maintenance:         Topic Date Due   • Breast Cancer Screening: Mammogram  Never done   • Hepatitis C Screening  Completed         Topic Date Due   • COVID-19 Vaccine (4 - 2023-24 season) 09/01/2023      Medicare Screening Tests and Risk Assessments:     Lisa is here for her Subsequent Wellness visit. Last Medicare Wellness visit information reviewed, patient interviewed and updates made to the record today.      Health Risk Assessment:   Patient rates overall health as fair. Patient feels that their physical health rating is same. Patient is satisfied with their life. Eyesight was rated as slightly  worse. Hearing was rated as same. Patient feels that their emotional and mental health rating is same. Patients states they are never, rarely angry. Patient states they are sometimes unusually tired/fatigued. Pain experienced in the last 7 days has been a lot. Patient's pain rating has been 8/10. Patient states that she has experienced weight loss or gain in last 6 months.     Depression Screening:   PHQ-2 Score: 0      Fall Risk Screening:   In the past year, patient has experienced: history of falling in past year    Number of falls: 2 or more  Injured during fall?: No    Feels unsteady when standing or walking?: Yes    Worried about falling?: No      Urinary Incontinence Screening:   Patient has leaked urine accidently in the last six months.     Home Safety:  Patient has trouble with stairs inside or outside of their home. Patient has working smoke alarms and has working carbon monoxide detector. Home safety hazards include: none.     Nutrition:   Current diet is Regular.     Medications:   Patient is not currently taking any over-the-counter supplements. Patient is able to manage medications.     Activities of Daily Living (ADLs)/Instrumental Activities of Daily Living (IADLs):   Walk and transfer into and out of bed and chair?: Yes  Dress and groom yourself?: Yes    Bathe or shower yourself?: Yes    Feed yourself? Yes  Do your laundry/housekeeping?: Yes  Manage your money, pay your bills and track your expenses?: Yes  Make your own meals?: Yes    Do your own shopping?: Yes    Previous Hospitalizations:   Any hospitalizations or ED visits within the last 12 months?: Yes    How many hospitalizations have you had in the last year?: 3-4    Advance Care Planning:   Living will: Yes    Durable POA for healthcare: Yes    Advanced directive: Yes      Cognitive Screening:   Provider or family/friend/caregiver concerned regarding cognition?: No    PREVENTIVE SCREENINGS      Cardiovascular Screening:    General:  "Screening Current      Diabetes Screening:     General: Screening Current      Colorectal Cancer Screening:     General: Patient Declines      Breast Cancer Screening:     General: Patient Declines      Cervical Cancer Screening:    General: Screening Not Indicated      Osteoporosis Screening:    General: Screening Not Indicated and History Osteoporosis      Lung Cancer Screening:     General: Screening Not Indicated      Hepatitis C Screening:    General: Screening Current    Screening, Brief Intervention, and Referral to Treatment (SBIRT)    Screening  Typical number of drinks in a day: 0  Typical number of drinks in a week: 0  Interpretation: Low risk drinking behavior.    Single Item Drug Screening:  How often have you used an illegal drug (including marijuana) or a prescription medication for non-medical reasons in the past year? never    Single Item Drug Screen Score: 0  Interpretation: Negative screen for possible drug use disorder    Review of Current Opioid Use  Opioid Risk Tool (ORT) Score: 0  Opioid Risk Tool (ORT) Interpretation: Score 0-3: Low risk for opioid misuse    No results found.     Physical Exam:     /84 (BP Location: Left arm, Patient Position: Sitting, Cuff Size: Standard)   Pulse 75   Temp 98.2 °F (36.8 °C) (Temporal)   Resp 18   Ht 5' 3.5\" (1.613 m)   Wt 69.5 kg (153 lb 3.2 oz)   SpO2 100%   BMI 26.71 kg/m²     Physical Exam   .tanya Tse,   "

## 2024-05-02 NOTE — TELEPHONE ENCOUNTER
Left vm asking patient to call back and schedule a nurse visit for a zio placement within the next couple of days per mary

## 2024-05-02 NOTE — PATIENT INSTRUCTIONS
Medicare Preventive Visit Patient Instructions  Thank you for completing your Welcome to Medicare Visit or Medicare Annual Wellness Visit today. Your next wellness visit will be due in one year (5/3/2025).  The screening/preventive services that you may require over the next 5-10 years are detailed below. Some tests may not apply to you based off risk factors and/or age. Screening tests ordered at today's visit but not completed yet may show as past due. Also, please note that scanned in results may not display below.  Preventive Screenings:  Service Recommendations Previous Testing/Comments   Colorectal Cancer Screening  * Colonoscopy    * Fecal Occult Blood Test (FOBT)/Fecal Immunochemical Test (FIT)  * Fecal DNA/Cologuard Test  * Flexible Sigmoidoscopy Age: 45-75 years old   Colonoscopy: every 10 years (may be performed more frequently if at higher risk)  OR  FOBT/FIT: every 1 year  OR  Cologuard: every 3 years  OR  Sigmoidoscopy: every 5 years  Screening may be recommended earlier than age 45 if at higher risk for colorectal cancer. Also, an individualized decision between you and your healthcare provider will decide whether screening between the ages of 76-85 would be appropriate. Colonoscopy: Not on file  FOBT/FIT: Not on file  Cologuard: Not on file  Sigmoidoscopy: Not on file          Breast Cancer Screening Age: 40+ years old  Frequency: every 1-2 years  Not required if history of left and right mastectomy Mammogram: Not on file        Cervical Cancer Screening Between the ages of 21-29, pap smear recommended once every 3 years.   Between the ages of 30-65, can perform pap smear with HPV co-testing every 5 years.   Recommendations may differ for women with a history of total hysterectomy, cervical cancer, or abnormal pap smears in past. Pap Smear: Not on file        Hepatitis C Screening Once for adults born between 1945 and 1965  More frequently in patients at high risk for Hepatitis C Hep C Antibody:  09/27/2018        Diabetes Screening 1-2 times per year if you're at risk for diabetes or have pre-diabetes Fasting glucose: 83 mg/dL (4/16/2024)  A1C: No results in last 5 years (No results in last 5 years)      Cholesterol Screening Once every 5 years if you don't have a lipid disorder. May order more often based on risk factors. Lipid panel: 03/29/2023          Other Preventive Screenings Covered by Medicare:  Abdominal Aortic Aneurysm (AAA) Screening: covered once if your at risk. You're considered to be at risk if you have a family history of AAA.  Lung Cancer Screening: covers low dose CT scan once per year if you meet all of the following conditions: (1) Age 55-77; (2) No signs or symptoms of lung cancer; (3) Current smoker or have quit smoking within the last 15 years; (4) You have a tobacco smoking history of at least 20 pack years (packs per day multiplied by number of years you smoked); (5) You get a written order from a healthcare provider.  Glaucoma Screening: covered annually if you're considered high risk: (1) You have diabetes OR (2) Family history of glaucoma OR (3)  aged 50 and older OR (4)  American aged 65 and older  Osteoporosis Screening: covered every 2 years if you meet one of the following conditions: (1) You're estrogen deficient and at risk for osteoporosis based off medical history and other findings; (2) Have a vertebral abnormality; (3) On glucocorticoid therapy for more than 3 months; (4) Have primary hyperparathyroidism; (5) On osteoporosis medications and need to assess response to drug therapy.   Last bone density test (DXA Scan): 11/17/2018.  HIV Screening: covered annually if you're between the age of 15-65. Also covered annually if you are younger than 15 and older than 65 with risk factors for HIV infection. For pregnant patients, it is covered up to 3 times per pregnancy.    Immunizations:  Immunization Recommendations   Influenza Vaccine Annual  influenza vaccination during flu season is recommended for all persons aged >= 6 months who do not have contraindications   Pneumococcal Vaccine   * Pneumococcal conjugate vaccine = PCV13 (Prevnar 13), PCV15 (Vaxneuvance), PCV20 (Prevnar 20)  * Pneumococcal polysaccharide vaccine = PPSV23 (Pneumovax) Adults 19-63 yo with certain risk factors or if 65+ yo  If never received any pneumonia vaccine: recommend Prevnar 20 (PCV20)  Give PCV20 if previously received 1 dose of PCV13 or PPSV23   Hepatitis B Vaccine 3 dose series if at intermediate or high risk (ex: diabetes, end stage renal disease, liver disease)   Respiratory syncytial virus (RSV) Vaccine - COVERED BY MEDICARE PART D  * RSVPreF3 (Arexvy) CDC recommends that adults 60 years of age and older may receive a single dose of RSV vaccine using shared clinical decision-making (SCDM)   Tetanus (Td) Vaccine - COST NOT COVERED BY MEDICARE PART B Following completion of primary series, a booster dose should be given every 10 years to maintain immunity against tetanus. Td may also be given as tetanus wound prophylaxis.   Tdap Vaccine - COST NOT COVERED BY MEDICARE PART B Recommended at least once for all adults. For pregnant patients, recommended with each pregnancy.   Shingles Vaccine (Shingrix) - COST NOT COVERED BY MEDICARE PART B  2 shot series recommended in those 19 years and older who have or will have weakened immune systems or those 50 years and older     Health Maintenance Due:      Topic Date Due   • Breast Cancer Screening: Mammogram  Never done   • Hepatitis C Screening  Completed     Immunizations Due:      Topic Date Due   • COVID-19 Vaccine (4 - 2023-24 season) 09/01/2023     Advance Directives   What are advance directives?  Advance directives are legal documents that state your wishes and plans for medical care. These plans are made ahead of time in case you lose your ability to make decisions for yourself. Advance directives can apply to any medical  decision, such as the treatments you want, and if you want to donate organs.   What are the types of advance directives?  There are many types of advance directives, and each state has rules about how to use them. You may choose a combination of any of the following:  Living will:  This is a written record of the treatment you want. You can also choose which treatments you do not want, which to limit, and which to stop at a certain time. This includes surgery, medicine, IV fluid, and tube feedings.   Durable power of  for healthcare (DPAHC):  This is a written record that states who you want to make healthcare choices for you when you are unable to make them for yourself. This person, called a proxy, is usually a family member or a friend. You may choose more than 1 proxy.  Do not resuscitate (DNR) order:  A DNR order is used in case your heart stops beating or you stop breathing. It is a request not to have certain forms of treatment, such as CPR. A DNR order may be included in other types of advance directives.  Medical directive:  This covers the care that you want if you are in a coma, near death, or unable to make decisions for yourself. You can list the treatments you want for each condition. Treatment may include pain medicine, surgery, blood transfusions, dialysis, IV or tube feedings, and a ventilator (breathing machine).  Values history:  This document has questions about your views, beliefs, and how you feel and think about life. This information can help others choose the care that you would choose.  Why are advance directives important?  An advance directive helps you control your care. Although spoken wishes may be used, it is better to have your wishes written down. Spoken wishes can be misunderstood, or not followed. Treatments may be given even if you do not want them. An advance directive may make it easier for your family to make difficult choices about your care.   Weight Management   Why  it is important to manage your weight:  Being overweight increases your risk of health conditions such as heart disease, high blood pressure, type 2 diabetes, and certain types of cancer. It can also increase your risk for osteoarthritis, sleep apnea, and other respiratory problems. Aim for a slow, steady weight loss. Even a small amount of weight loss can lower your risk of health problems.  How to lose weight safely:  A safe and healthy way to lose weight is to eat fewer calories and get regular exercise. You can lose up about 1 pound a week by decreasing the number of calories you eat by 500 calories each day.   Healthy meal plan for weight management:  A healthy meal plan includes a variety of foods, contains fewer calories, and helps you stay healthy. A healthy meal plan includes the following:  Eat whole-grain foods more often.  A healthy meal plan should contain fiber. Fiber is the part of grains, fruits, and vegetables that is not broken down by your body. Whole-grain foods are healthy and provide extra fiber in your diet. Some examples of whole-grain foods are whole-wheat breads and pastas, oatmeal, brown rice, and bulgur.  Eat a variety of vegetables every day.  Include dark, leafy greens such as spinach, kale, amarilis greens, and mustard greens. Eat yellow and orange vegetables such as carrots, sweet potatoes, and winter squash.   Eat a variety of fruits every day.  Choose fresh or canned fruit (canned in its own juice or light syrup) instead of juice. Fruit juice has very little or no fiber.  Eat low-fat dairy foods.  Drink fat-free (skim) milk or 1% milk. Eat fat-free yogurt and low-fat cottage cheese. Try low-fat cheeses such as mozzarella and other reduced-fat cheeses.  Choose meat and other protein foods that are low in fat.  Choose beans or other legumes such as split peas or lentils. Choose fish, skinless poultry (chicken or turkey), or lean cuts of red meat (beef or pork). Before you cook meat or  poultry, cut off any visible fat.   Use less fat and oil.  Try baking foods instead of frying them. Add less fat, such as margarine, sour cream, regular salad dressing and mayonnaise to foods. Eat fewer high-fat foods. Some examples of high-fat foods include french fries, doughnuts, ice cream, and cakes.  Eat fewer sweets.  Limit foods and drinks that are high in sugar. This includes candy, cookies, regular soda, and sweetened drinks.  Exercise:  Exercise at least 30 minutes per day on most days of the week. Some examples of exercise include walking, biking, dancing, and swimming. You can also fit in more physical activity by taking the stairs instead of the elevator or parking farther away from stores. Ask your healthcare provider about the best exercise plan for you.      © Copyright [employer] 2018 Information is for End User's use only and may not be sold, redistributed or otherwise used for commercial purposes. All illustrations and images included in CareNotes® are the copyrighted property of [employer] or [employer]

## 2024-05-02 NOTE — ASSESSMENT & PLAN NOTE
"Patient with prominent cystocele/uterine prolapse  States that has been going on \"about 20 years\"  Size 5 ring with support pessary inserted 5/2/2024  Return to office in 3 months for check  "

## 2024-05-06 ENCOUNTER — CLINICAL SUPPORT (OUTPATIENT)
Dept: CARDIOLOGY CLINIC | Facility: CLINIC | Age: 79
End: 2024-05-06

## 2024-05-06 DIAGNOSIS — R55 SYNCOPE, UNSPECIFIED SYNCOPE TYPE: ICD-10-CM

## 2024-05-06 DIAGNOSIS — I48.91 ATRIAL FIBRILLATION WITH RVR (HCC): Primary | ICD-10-CM

## 2024-05-06 PROCEDURE — RECHECK

## 2024-05-06 NOTE — PROGRESS NOTES
Pt needed assistance with Zio placement. Zio was applied to pt in office and directions have been given. Lisa is aware and understood. Asked that pt call Zio or the office with any further questions and to let us know if patch falls off less then 24 hrs after wearing it.

## 2024-05-10 ENCOUNTER — OFFICE VISIT (OUTPATIENT)
Dept: CARDIAC SURGERY | Facility: CLINIC | Age: 79
End: 2024-05-10
Payer: COMMERCIAL

## 2024-05-10 VITALS
SYSTOLIC BLOOD PRESSURE: 110 MMHG | HEART RATE: 86 BPM | DIASTOLIC BLOOD PRESSURE: 69 MMHG | OXYGEN SATURATION: 95 % | HEIGHT: 64 IN | BODY MASS INDEX: 26.46 KG/M2 | WEIGHT: 155 LBS

## 2024-05-10 DIAGNOSIS — I71.21 ANEURYSM OF ASCENDING AORTA WITHOUT RUPTURE (HCC): Primary | ICD-10-CM

## 2024-05-10 PROCEDURE — 99204 OFFICE O/P NEW MOD 45 MIN: CPT | Performed by: THORACIC SURGERY (CARDIOTHORACIC VASCULAR SURGERY)

## 2024-05-10 NOTE — PROGRESS NOTES
Consultation - Cardiothoracic Surgery   Lisa Marques 78 y.o. female MRN: [id number]    Physician Requesting Consult: Gilma Tse DO    Reason for Consult / Principal Problem: Ascending aortic aneurysm    History of Present Illness: Lisa Marques is a 78 y.o. year old female who presents today for surgical consultation for ascending aortic aneurysm.  She has a PMHx HTN, Afib, DVT (15+ years ago), CKD3a, anemia, anxiety, chronic pain on opioids (10 mg 3-4 times a day). She has had a known aneurysm since at least 2013, and has been intermittently monitored with CT scans since that time. She was admitted to [facility] in April due to Afib with RVR and generalized weakness. CTA chest PE was negative for PE, but did again demonstrate an aneurysm measuring 4.7 cm. She followed up with her PCP last week, and a referral was placed for cardiac surgery.     Upon interview today, patient denies chest pain, new or worsening back pain, shortness of breath, numbness/tingling/paresthesias, LE edema, lightheadedness, syncope. No known family history of premature SCD, aneurysms or dissections. Patient lives at [facility]. She drives and tolerates her ADL's independently. She is a lifelong nonsmoker, admits to social alcohol use, and does not use drugs.     Past Medical History:  Past Medical History:   Diagnosis Date    Acute deep vein thrombosis of lower limb (HCC)     last assessed [id number]    Aortic aneurysm (HCC)     Arthritis     Atrial fibrillation (HCC)     Dislocation of hip (HCC)     last assessed [id number]    Hypertension     Psychiatric disorder     anxiety         Past Surgical History:   Past Surgical History:   Procedure Laterality Date    HIP SURGERY      JOINT REPLACEMENT      KNEE ARTHROSCOPY Bilateral     x2 rt and 1 on left    TUBAL LIGATION           Family History:  Family History   Problem Relation Age of Onset    Colon cancer Mother     Breast cancer Family     Thyroid cancer  Family     Colon cancer Family     Hypertension Family     Thyroid disease Family     Hypertension Father     Dementia Father          Social History:    Social History     Substance and Sexual Activity   Alcohol Use No    Comment: none     Social History     Substance and Sexual Activity   Drug Use No     Social History     Tobacco Use   Smoking Status Never   Smokeless Tobacco Never       Marital Status: [unfilled]    Home Medications:   Prior to Admission medications    Medication Sig Start Date End Date Taking? Authorizing Provider   apixaban (Eliquis) 5 mg Take 1 tablet (5 mg total) by mouth 2 (two) times a day 4/18/24  Yes Gilma Tse DO   Diclofenac Sodium (VOLTAREN) 1 % Apply 1 g topically 4 (four) times a day 8/15/22  Yes Gilma Tse DO   escitalopram (LEXAPRO) 10 mg tablet Take 1 tablet (10 mg total) by mouth daily 1/24/24  Yes Gilma Tse DO   gabapentin (NEURONTIN) 300 mg capsule Take 3 capsules (900 mg total) by mouth 3 (three) times a day 4/18/24  Yes Gilma Tse DO   hydrocortisone 2.5 % cream Apply topically 2 (two) times a day  Patient taking differently: Apply topically 2 (two) times a day As needed 8/15/22  Yes Gilma Tse DO   lidocaine (LIDODERM) 5 % Apply 2 patches topically over 12 hours daily Remove & Discard patch within 12 hours or as directed by MD  Patient taking differently: Apply 2 patches topically daily Remove & Discard patch within 12 hours or as directed by MD  as needed 3/20/24  Yes Gilma Tse DO   melatonin 3 mg Take 1 tablet (3 mg total) by mouth daily at bedtime  Patient taking differently: Take 3 mg by mouth daily at bedtime As needed 1/24/24  Yes Gilma Tse DO   metoprolol succinate (TOPROL-XL) 50 mg 24 hr tablet TAKE 1 TABLET (50 MG TOTAL) BY MOUTH DAILY 10/18/23  Yes Gilma Tse DO   oxyCODONE (ROXICODONE) 10 MG TABS Take 1 tablet (10 mg total) by mouth every 6 (six) hours  "as needed for moderate pain Max Daily Amount: 40 mg  Patient taking differently: Take 10 mg by mouth every 6 (six) hours as needed for moderate pain As needed 5/2/24  Yes Gilma Tse DO   senna (SENOKOT) 8.6 mg Take 1 tablet (8.6 mg total) by mouth daily at bedtime 10/1/21  Yes Minh Mckenzie MD   zolpidem (AMBIEN) 5 mg tablet Take 1 tablet (5 mg total) by mouth daily at bedtime as needed for sleep 4/23/24  Yes Gilma Tse DO   polyethylene glycol (MIRALAX) 17 g packet Take 17 g by mouth 2 (two) times a day for 5 days  Patient not taking: Reported on 5/10/2024 7/6/23 7/11/23  Andrzej Hebert DO       Allergies:  No Known Allergies    Review of Systems:     Review of Systems   Constitutional:  Negative for chills and fever.   HENT:  Negative for ear pain and sore throat.    Eyes:  Negative for pain and visual disturbance.   Respiratory:  Negative for cough and shortness of breath.    Cardiovascular:  Negative for chest pain and palpitations.   Gastrointestinal:  Negative for abdominal pain and vomiting.   Genitourinary:  Negative for dysuria and hematuria.   Musculoskeletal:  Negative for arthralgias and back pain.   Skin:  Negative for color change and rash.   Neurological:  Negative for seizures and syncope.   All other systems reviewed and are negative.      Vital Signs:     Vitals:    05/10/24 1135 05/10/24 1141   BP: 125/77 110/69   BP Location: Left arm Right arm   Patient Position: Sitting Sitting   Cuff Size: Standard Standard   Pulse: 86    SpO2: 95%    Weight: 70.3 kg (155 lb)    Height: 5' 3.5\" (1.613 m)        Physical Exam:     Physical Exam  Vitals reviewed.   Constitutional:       Appearance: Normal appearance.   HENT:      Head: Normocephalic and atraumatic.   Eyes:      Pupils: Pupils are equal, round, and reactive to light.   Neck:      Vascular: No carotid bruit or JVD.   Cardiovascular:      Rate and Rhythm: Normal rate and regular rhythm.      Pulses:           Carotid pulses " "are 2+ on the right side and 2+ on the left side.       Radial pulses are 2+ on the right side and 2+ on the left side.        Dorsalis pedis pulses are 2+ on the right side and 2+ on the left side.      Heart sounds: Normal heart sounds. No murmur heard.     No friction rub. No gallop.   Pulmonary:      Effort: Pulmonary effort is normal.      Breath sounds: Normal breath sounds. No wheezing, rhonchi or rales.   Abdominal:      Palpations: Abdomen is soft.      Tenderness: There is no abdominal tenderness.   Musculoskeletal:      Cervical back: Normal range of motion.   Skin:     General: Skin is warm and dry.      Capillary Refill: Capillary refill takes less than 2 seconds.   Neurological:      General: No focal deficit present.      Mental Status: She is alert.      Sensory: Sensation is intact.   Psychiatric:         Attention and Perception: Attention normal.         Mood and Affect: Mood normal.         Behavior: Behavior normal. Behavior is cooperative.         Lab Results:               Invalid input(s): \"LABGLOM\"      No results found for: \"HGBA1C\"  Lab Results   Component Value Date    CKTOTAL 392 (H) 09/16/2023    TROPONINI <0.02 05/14/2016       Imaging Studies:     CTA Chest PE Study: 4/15/24  Narrative & Impression   CTA - CHEST WITH IV CONTRAST - PULMONARY ANGIOGRAM     INDICATION:   SOB, elevated dimer. At PCP for routine care, found to be in A-fib/RVR, hypotensive with SBP in the 70s. Fatigue and weakness over the past week. No history of lung disease.     COMPARISON: CXR from today, chest CT 9/16/2023.     TECHNIQUE: CT angiogram timed for optimal opacification of the pulmonary arteries.  Axial, sagittal, and coronal 2D reformats created from source data.  Coronal 3D MIP postprocessing on the acquisition scanner.     Radiation dose length product (DLP):  307 mGy-cm .  Radiation dose exposure minimized using iterative reconstruction and automated exposure control.     IV Contrast:  55 mL of iohexol " (OMNIPAQUE)     FINDINGS:     PULMONARY ARTERIES:  No pulmonary embolus.     LUNGS: No acute disease. Mild dependent atelectasis in the left lung.     AIRWAYS: No significant filling defects.     PLEURA:  Unremarkable.     HEART/GREAT VESSELS: Mild cardiomegaly. Low-attenuation in the nondependent portion of the left atrial appendage. Redemonstration of 4.7 cm ascending aortic aneurysm. Pulmonary artery enlargement.     MEDIASTINUM AND DENIS:  Unremarkable.     CHEST WALL AND LOWER NECK: Unremarkable.     UPPER ABDOMEN: Chronic elevation of the left diaphragm. Hepatic cyst. Benign calcified hepatic granuloma. Stable right renal cyst. Left renal scar.     OSSEOUS STRUCTURES: Mild degenerative disease in the spine with mild curvature. Moderate right glenohumeral degenerative disease.     IMPRESSION:     No pulmonary embolus.     No acute pulmonary disease.     Low-attenuation in the nondependent portion of the left atrial appendage. It cannot be determined if this is due to mixing artifact or left atrial appendage thrombus.     Stable 4.7 cm ascending aortic aneurysm. Cardiothoracic surgery received a referral for this patient in September 2023 but patient has not been evaluated. Again recommend nonemergent cardiothoracic consultation and follow-up with a chest CT with no   contrast in 1 year.     Pulmonary artery enlargement which can be a normal variant or can be seen with pulmonary hypertension.     This study was marked in Epic for immediate notification and follow-up.       Echocardiogram: 3/20/23  Left Ventricle Left ventricular cavity size is normal. Wall thickness is moderately increased. There is moderate concentric hypertrophy. The left ventricular ejection fraction is 60%. Systolic function is normal.  Wall motion is normal. Diastolic function is mildly abnormal, consistent with grade I (abnormal) relaxation.   Interventricular Septum There is sigmoid appearance of the septum.   Right Ventricle Right  ventricular cavity size is normal. Systolic function is normal.   Left Atrium The atrium is mildly dilated.   Right Atrium The atrium is normal in size.   Aortic Valve The aortic valve is trileaflet. The leaflets are not thickened. The leaflets are not calcified. The leaflets exhibit normal mobility. There is no evidence of regurgitation. The aortic valve has no significant stenosis.   Mitral Valve Mitral valve structure is normal. There is trace regurgitation. There is no evidence of stenosis.   Tricuspid Valve Tricuspid valve structure is normal. There is mild regurgitation. There is no evidence of stenosis. The estimated right ventricular systolic pressure is 33.00 mmHg.   Pulmonic Valve Pulmonic valve structure is normal. There is mild regurgitation. There is no evidence of stenosis.   Ascending Aorta The aortic root is moderately dilated. The ascending aorta is moderately dilated. The aortic root is 4.40 cm. The ascending aorta is 4.4 cm.   IVC/SVC The right atrial pressure is estimated at 8.0 mmHg. The inferior vena cava is not well visualized.   Pericardium There is no pericardial effusion. There is an echogenic circumscribed structure posterior to the pericardium of unclear etiology. Recommend CT for better characterization and visualization.       I have personally reviewed pertinent reports.      Assessment:  Ascending aortic aneurysm; Ongoing ascending aortic replacement workup    Plan:    CTA chest performed prior to the visit today was reviewed.  Radiographic findings of ascending aorta aneurysm measuring 47 mm at it's greatest diameter, were confirmed and shared with the patient today.    Aneurysm does not meet surgical criteria, and will likely not ever require surgery. As a result, patient does not need to continue routine follow up with our office.     Lisa Marques was comfortable with our recommendations, and their questions were answered to their satisfaction.  Thank you for allowing us to  participate in the care of this patient.     Aortic Aneurysm Instructions were provided to the patient as follows:    1. No heavy sustained lifting which would require excessive straining  2. Maintain a controlled blood pressure with a goal of 120/80.  3. Follow up in Aortic Clinic as recommended with radiology follow up as instructed  4. Report to the ER or call 911 immediately with the following signs / symptoms: sudden onset of back pain, chest pain or shortness of breath.    Routine referral to gastroenterology for colonoscopy screening was not indicated, as the patient is over 77 years old    SIGNATURE: Tamanna Mendez PA-C  DATE: 05/10/24  TIME: 11:54 AM

## 2024-05-17 ENCOUNTER — TELEPHONE (OUTPATIENT)
Age: 79
End: 2024-05-17

## 2024-05-17 NOTE — TELEPHONE ENCOUNTER
Caller: Lisa     Doctor: Dr. Caraballo     Reason for call: patient had zio placed on 5/6 for 2 weeks and needs to be removed on 5/20. She called to schedule a nurse visit for 5/20 to have help removing it. Please call patient to schedule nurse visit.     Call back#: [phone]

## 2024-05-20 ENCOUNTER — CLINICAL SUPPORT (OUTPATIENT)
Dept: CARDIOLOGY CLINIC | Facility: CLINIC | Age: 79
End: 2024-05-20

## 2024-05-20 DIAGNOSIS — I48.0 PAROXYSMAL ATRIAL FIBRILLATION (HCC): ICD-10-CM

## 2024-05-20 DIAGNOSIS — R55 SYNCOPE, UNSPECIFIED SYNCOPE TYPE: Primary | ICD-10-CM

## 2024-05-20 PROCEDURE — RECHECK

## 2024-05-20 NOTE — PROGRESS NOTES
Lisa is here today for her Zio patch removal after 2 weeks per the Hospital. Placed Zio patch in our mailbox to be sent back to [facility].     Pt was concerned that she recently saw a different cardiologist on 5/10 and was concerned that she had to see a new cardiologist. I confirmed with her that she had seen a vascular surgeon for a consult visit to see if surgery was needed or not in regards to (ascending aortic aneurysm) and her current cardiologist is Dr. Caraballo in which she is overdue for an ov with (was supposed to be seen in Sept 2023).     I had Lisa schedule a non-urgent ov with Dr. Caraballo which is now currently scheduled for 5/28.  
141

## 2024-05-21 DIAGNOSIS — G47.00 INSOMNIA, UNSPECIFIED TYPE: ICD-10-CM

## 2024-05-21 RX ORDER — ZOLPIDEM TARTRATE 5 MG/1
5 TABLET ORAL
Qty: 30 TABLET | Refills: 0 | Status: SHIPPED | OUTPATIENT
Start: 2024-05-21

## 2024-05-29 ENCOUNTER — TELEPHONE (OUTPATIENT)
Dept: CARDIOLOGY CLINIC | Facility: CLINIC | Age: 79
End: 2024-05-29

## 2024-05-29 ENCOUNTER — CLINICAL SUPPORT (OUTPATIENT)
Dept: CARDIOLOGY CLINIC | Facility: CLINIC | Age: 79
End: 2024-05-29
Payer: COMMERCIAL

## 2024-05-29 DIAGNOSIS — M79.18 MYOFASCIAL PAIN SYNDROME: ICD-10-CM

## 2024-05-29 DIAGNOSIS — R55 SYNCOPE: ICD-10-CM

## 2024-05-29 DIAGNOSIS — I48.0 PAROXYSMAL ATRIAL FIBRILLATION (HCC): ICD-10-CM

## 2024-05-29 DIAGNOSIS — I48.91 ATRIAL FIBRILLATION WITH RVR (HCC): ICD-10-CM

## 2024-05-29 PROCEDURE — 93248 EXT ECG>7D<15D REV&INTERPJ: CPT | Performed by: INTERNAL MEDICINE

## 2024-05-29 NOTE — RESULT ENCOUNTER NOTE
Please call patient.  Monitor showed episode of atrial fibrillation with rapid heart rate.  Please ask her to increase metoprolol succinate from 50 mg q day to 50 mg BID.  Will review with her in detail at her next office visit.  She should continue Eliquis.  Thank you.    Lisa Marques 1945

## 2024-05-29 NOTE — RESULT ENCOUNTER NOTE
Patient had a min HR of 45 bpm, max HR of 214 bpm, and avg HR of 72 bpm. Predominant underlying rhythm was Sinus Rhythm. First Degree AV Block was present. 1 run of Ventricular Tachycardia occurred lasting 7 beats with a max rate of 128 bpm (avg 110 bpm). 84 Supraventricular Tachycardia runs occurred, the run with the fastest interval lasting 8 beats with a max rate of 214 bpm, the longest lasting 15.1 secs with an avg rate of 112 bpm. Atrial Fibrillation occurred (<1% burden), ranging from  bpm (avg of 142 bpm), the longest lasting 49 mins 11 secs with an avg rate of 142 bpm. Isolated SVEs were occasional (1.2%, 45993), SVE Couplets were rare (<1.0%, 596), and SVE Triplets were rare (<1.0%, 108). Isolated VEs were occasional (1.0%, 34480), VE Couplets were rare (<1.0%, 134), and VE Triplets were rare (<1.0%, 5). Ventricular Bigeminy and Trigeminy were present. MD notification criteria for Rapid Atrial Fibrillation met - report posted prior to notification per account request (MS).    Impression:  1.  NSR with average heart rate of 72.  2.  Occasional PVCs noted.  One 7 beat run of NSVT was noted.  Rare couplets and triplets noted.  3.  Occasional PACs noted.  Episodes of PAF were noted with intermittent RVR.  Longest episode was 49 minutes and 11 seconds with an average heart rate of 142.  PAF comprised less than 1% of the monitoring interval.  4.  No significant bradycardia.  5.  No symptoms reported during the monitoring interval.  6.  Patient's dose of metoprolol succinate will be increased to twice daily.  She continues on anticoagulation with Eliquis.

## 2024-05-29 NOTE — TELEPHONE ENCOUNTER
Spoke with Lisa and relayed message as given per Dr. Caraballo, Lisa did not want any refills at this time, conf ov for June 3rd at 1pm.    Lisa is aware and understood.

## 2024-05-29 NOTE — TELEPHONE ENCOUNTER
Received call from [facility] regarding Abnormal result. Rapid AFIB recorded. 172 pulse for 60 seconds. This occurred may 9 at 8:36pm on page 17 and 18 strip 6.     Patient and Doctor aware

## 2024-05-29 NOTE — TELEPHONE ENCOUNTER
----- Message from Mike Caraballo MD sent at 5/29/2024 11:58 AM EDT -----  Please call patient.  Monitor showed episode of atrial fibrillation with rapid heart rate.  Please ask her to increase metoprolol succinate from 50 mg q day to 50 mg BID.  Will review with her in detail at her next office visit.  She should continue Eliquis.  Thank you.    Lisa Marques 1945

## 2024-05-30 RX ORDER — OXYCODONE HYDROCHLORIDE 10 MG/1
10 TABLET ORAL EVERY 6 HOURS PRN
Qty: 120 TABLET | Refills: 0 | Status: SHIPPED | OUTPATIENT
Start: 2024-05-30

## 2024-05-30 NOTE — PROGRESS NOTES
Cardiology Follow Up    Lisa Marques  1945  [id number]  [facility] [place]  [address]  [place] [id number] [phone]    1. Paroxysmal atrial fibrillation (HCC)  metoprolol succinate (TOPROL-XL) 50 mg 24 hr tablet      2. Essential hypertension        3. Chronic heart failure with preserved ejection fraction (HCC)        4. Aneurysm of ascending aorta without rupture (HCC)        5. Chronic kidney disease, stage 3a (HCC)            Interval History: Patient is here for a f/u visit. Patient has HTN, DVT, PAF (on rate control and Eliquis) and ascending aortic aneurysm.  CT scan of the chest 3/2019 demonstrated a mild ascending aortic aneurysm of 4.3 cm.  Echocardiogram 9/2021 demonstrated preserved LV systolic function. The aortic root was 4.4 cm and the ascending aorta was dilated at 4.4 cm.  Patient was hospitalized April 2024 with PAF.  This was in the setting of volume depletion and MARU.  She converted to NSR with hydration. She had had 3 episodes of syncope prior to this admission with routine activity.  CT of the chest 4/15/2024 demonstrated no pulmonary embolism.  Stable 4.7 cm ascending aortic aneurysm was noted.  This was comparable to prior study 9/16/2023.  CV surgery evaluation was recommended and patient was seen 5/10/2024.  No intervention was recommended given her frailty,  she would not be a surgical candidate.  Zio patch May 2024 demonstrated NSR with average heart rate of 72.  Episodes of PAF with intermittent RVR were noted.  Longest episode was 49 minutes and 11 seconds.  Patient continues on Eliquis and dose of metoprolol succinate was increased to twice daily.  Patient has had no chest pain or significant dyspnea.  Her vital signs are stable today.  Her HTN and PAF are controlled on her current medicines.    Patient Active Problem List   Diagnosis   • Atrial fibrillation with RVR (HCC)   • MARU (acute  kidney injury) ([facility])   • Chronic pain   • Age-related osteoporosis without current pathological fracture   • Hypertension   • Anxiety   • Aortic aneurysm ([facility])   • Depression with anxiety   • Insomnia   • Left knee pain   • Lumbar canal stenosis   • Myofascial pain syndrome   • Opioid dependence ([facility])   • Pain syndrome, chronic   • Right shoulder pain   • Spondylosis of lumbar region without myelopathy or radiculopathy   • Vitamin D deficiency   • Constipation   • Eczema of right external ear   • Balance disorder   • Musculoskeletal arm pain, right   • Primary osteoarthritis of right shoulder   • Chronic heart failure with preserved ejection fraction ([facility])   • Hypokalemia   • Stage 3a chronic kidney disease ([facility])   • Ganglion of hand, left   • Dry eye of left side   • Bladder prolapse, female, acquired   • Financial insecurity   • Viral illness   • Anemia   • Fall   • Chronic pain of left knee   • Food insecurity   • Hypotension   • Acute hypoxic respiratory failure ([facility])   • Elevated d-dimer   • Generalized weakness   • Cystocele with prolapse     Past Medical History:   Diagnosis Date   • Acute deep vein thrombosis of lower limb ([facility])     last assessed [id number]   • Aortic aneurysm ([facility])    • Arthritis    • Atrial fibrillation ([facility])    • Dislocation of hip ([facility])     last assessed [id number]   • Hypertension    • Psychiatric disorder     anxiety     Social History     Socioeconomic History   • Marital status:      Spouse name: Not on file   • Number of children: Not on file   • Years of education: Not on file   • Highest education level: Not on file   Occupational History   • Not on file   Tobacco Use   • Smoking status: Never   • Smokeless tobacco: Never   Vaping Use   • Vaping status: Never Used   Substance and Sexual Activity   • Alcohol use: No     Comment: none   • Drug use: No   • Sexual activity: Not Currently     Partners: Male     Birth control/protection: Female Sterilization   Other Topics Concern    • Not on file   Social History Narrative   • Not on file     Social Determinants of Health     Financial Resource Strain: Low Risk  (1/22/2024)    Overall Financial Resource Strain (CARDIA)    • Difficulty of Paying Living Expenses: Not hard at all   Food Insecurity: No Food Insecurity (1/22/2024)    Hunger Vital Sign    • Worried About Running Out of Food in the Last Year: Never true    • Ran Out of Food in the Last Year: Never true   Transportation Needs: No Transportation Needs (1/22/2024)    PRAPARE - Transportation    • Lack of Transportation (Medical): No    • Lack of Transportation (Non-Medical): No   Physical Activity: Inactive (1/22/2024)    Exercise Vital Sign    • Days of Exercise per Week: 0 days    • Minutes of Exercise per Session: 0 min   Stress: No Stress Concern Present (1/22/2024)    [employer] [place] [employer] - Occupational Stress Questionnaire    • Feeling of Stress : Only a little   Social Connections: Not on file   Intimate Partner Violence: Not At Risk (1/22/2024)    Humiliation, Afraid, Rape, and Kick questionnaire    • Fear of Current or Ex-Partner: No    • Emotionally Abused: No    • Physically Abused: No    • Sexually Abused: No   Housing Stability: Low Risk  (1/22/2024)    Housing Stability Vital Sign    • Unable to Pay for Housing in the Last Year: No    • Number of Places Lived in the Last Year: 1    • Unstable Housing in the Last Year: No      Family History   Problem Relation Age of Onset   • Colon cancer Mother    • Breast cancer Family    • Thyroid cancer Family    • Colon cancer Family    • Hypertension Family    • Thyroid disease Family    • Hypertension Father    • Dementia Father      Past Surgical History:   Procedure Laterality Date   • HIP SURGERY     • JOINT REPLACEMENT     • KNEE ARTHROSCOPY Bilateral     x2 rt and 1 on left   • TUBAL LIGATION         Current Outpatient Medications:   •  apixaban (Eliquis) 5 mg, Take 1 tablet (5 mg total) by mouth 2 (two)  "times a day, Disp: 60 tablet, Rfl: 0  •  Diclofenac Sodium (VOLTAREN) 1 %, Apply 1 g topically 4 (four) times a day, Disp: 20 g, Rfl: 0  •  escitalopram (LEXAPRO) 10 mg tablet, Take 1 tablet (10 mg total) by mouth daily, Disp: 90 tablet, Rfl: 1  •  gabapentin (NEURONTIN) 300 mg capsule, Take 3 capsules (900 mg total) by mouth 3 (three) times a day, Disp: 270 capsule, Rfl: 1  •  hydrocortisone 2.5 % cream, Apply topically 2 (two) times a day (Patient taking differently: Apply topically 2 (two) times a day As needed), Disp: 30 g, Rfl: 2  •  lidocaine (LIDODERM) 5 %, Apply 2 patches topically over 12 hours daily Remove & Discard patch within 12 hours or as directed by MD (Patient taking differently: Apply 2 patches topically daily Remove & Discard patch within 12 hours or as directed by MD  as needed), Disp: 7 patch, Rfl: 0  •  melatonin 3 mg, Take 1 tablet (3 mg total) by mouth daily at bedtime (Patient taking differently: Take 3 mg by mouth daily at bedtime As needed), Disp: 14 tablet, Rfl: 0  •  metoprolol succinate (TOPROL-XL) 50 mg 24 hr tablet, Take 1 tablet (50 mg total) by mouth 2 (two) times a day, Disp: 180 tablet, Rfl: 3  •  oxyCODONE (ROXICODONE) 10 MG TABS, Take 1 tablet (10 mg total) by mouth every 6 (six) hours as needed for moderate pain As needed Max Daily Amount: 40 mg, Disp: 120 tablet, Rfl: 0  •  senna (SENOKOT) 8.6 mg, Take 1 tablet (8.6 mg total) by mouth daily at bedtime, Disp: 30 tablet, Rfl: 3  •  zolpidem (AMBIEN) 5 mg tablet, TAKE 1 TABLET (5 MG TOTAL) BY MOUTH DAILY AT BEDTIME AS NEEDED FOR SLEEP, Disp: 30 tablet, Rfl: 0  No Known Allergies    Labs:not applicable  Imaging: No results found.    Review of Systems:  Review of Systems   All other systems reviewed and are negative.      Physical Exam:  /76 (BP Location: Left arm, Patient Position: Sitting, Cuff Size: Standard)   Pulse 72   Ht 5' 3.5\" (1.613 m)   Wt 67.7 kg (149 lb 3.2 oz)   SpO2 96%   BMI 26.02 kg/m²   Physical " Exam  Vitals reviewed.   Constitutional:       Appearance: She is well-developed.   HENT:      Head: Normocephalic and atraumatic.   Eyes:      Conjunctiva/sclera: Conjunctivae normal.      Pupils: Pupils are equal, round, and reactive to light.   Cardiovascular:      Rate and Rhythm: Normal rate.      Heart sounds: Normal heart sounds.   Pulmonary:      Effort: Pulmonary effort is normal.      Breath sounds: Normal breath sounds.   Musculoskeletal:      Cervical back: Normal range of motion and neck supple.   Skin:     General: Skin is warm and dry.   Neurological:      Mental Status: She is alert and oriented to person, place, and time.         Discussion/Summary:I will continue the patient's present medical regimen.  The patient appears well compensated.  I have asked the patient to call if there is a problem in the interim otherwise I will see the patient in six months time.

## 2024-05-31 ENCOUNTER — TELEPHONE (OUTPATIENT)
Dept: FAMILY MEDICINE CLINIC | Facility: CLINIC | Age: 79
End: 2024-05-31

## 2024-06-03 ENCOUNTER — OFFICE VISIT (OUTPATIENT)
Dept: CARDIOLOGY CLINIC | Facility: CLINIC | Age: 79
End: 2024-06-03
Payer: COMMERCIAL

## 2024-06-03 VITALS
HEIGHT: 64 IN | BODY MASS INDEX: 25.47 KG/M2 | HEART RATE: 72 BPM | SYSTOLIC BLOOD PRESSURE: 108 MMHG | WEIGHT: 149.2 LBS | DIASTOLIC BLOOD PRESSURE: 76 MMHG | OXYGEN SATURATION: 96 %

## 2024-06-03 DIAGNOSIS — I71.21 ANEURYSM OF ASCENDING AORTA WITHOUT RUPTURE (HCC): ICD-10-CM

## 2024-06-03 DIAGNOSIS — I50.32 CHRONIC HEART FAILURE WITH PRESERVED EJECTION FRACTION (HCC): ICD-10-CM

## 2024-06-03 DIAGNOSIS — I48.0 PAROXYSMAL ATRIAL FIBRILLATION (HCC): Primary | ICD-10-CM

## 2024-06-03 DIAGNOSIS — N18.31 CHRONIC KIDNEY DISEASE, STAGE 3A (HCC): ICD-10-CM

## 2024-06-03 DIAGNOSIS — I10 ESSENTIAL HYPERTENSION: ICD-10-CM

## 2024-06-03 PROCEDURE — 99214 OFFICE O/P EST MOD 30 MIN: CPT | Performed by: INTERNAL MEDICINE

## 2024-06-03 RX ORDER — METOPROLOL SUCCINATE 50 MG/1
50 TABLET, EXTENDED RELEASE ORAL 2 TIMES DAILY
Qty: 180 TABLET | Refills: 3 | Status: SHIPPED | OUTPATIENT
Start: 2024-06-03

## 2024-06-14 DIAGNOSIS — G47.00 INSOMNIA, UNSPECIFIED TYPE: ICD-10-CM

## 2024-06-14 DIAGNOSIS — M48.061 SPINAL STENOSIS OF LUMBAR REGION, UNSPECIFIED WHETHER NEUROGENIC CLAUDICATION PRESENT: ICD-10-CM

## 2024-06-14 DIAGNOSIS — M79.601 MUSCULOSKELETAL ARM PAIN, RIGHT: ICD-10-CM

## 2024-06-14 NOTE — TELEPHONE ENCOUNTER
Patient called asking for a refill of the following medication. Patient is requesting to have her Ambien increased to 10mg instead of the 5mg. Please advise thank you

## 2024-06-14 NOTE — TELEPHONE ENCOUNTER
Yes, this is Shelly Niharika calling birthday [birth year]. Listen, I called Tuesday, I called yesterday and I called the medical team you have on when you press 2 on your answering system. And I talked to Juanpablo over there and I can't get my medications. They're denying me my medications. I wanna know why I'm getting denied my medications. I need my oxycodone and clonazepam to do the 18th. Why aren't? Why aren't the? I just called [facility] and they told me that you are denying me my medications. Why am I getting denied my medications? I'll tell you what, I'm gonna come over there and give help. I mean, I am mad now. I don't know what this is going, what's going

## 2024-06-17 RX ORDER — LIDOCAINE 50 MG/G
2 PATCH TOPICAL DAILY
Qty: 7 PATCH | Refills: 0 | Status: SHIPPED | OUTPATIENT
Start: 2024-06-17 | End: 2024-06-27 | Stop reason: SDUPTHER

## 2024-06-17 RX ORDER — ZOLPIDEM TARTRATE 5 MG/1
5 TABLET ORAL
Qty: 30 TABLET | Refills: 0 | Status: SHIPPED | OUTPATIENT
Start: 2024-06-17

## 2024-06-27 DIAGNOSIS — M79.18 MYOFASCIAL PAIN SYNDROME: ICD-10-CM

## 2024-06-27 DIAGNOSIS — M79.601 MUSCULOSKELETAL ARM PAIN, RIGHT: ICD-10-CM

## 2024-06-28 RX ORDER — LIDOCAINE 50 MG/G
2 PATCH TOPICAL DAILY
Qty: 7 PATCH | Refills: 0 | Status: SHIPPED | OUTPATIENT
Start: 2024-06-28

## 2024-06-28 RX ORDER — OXYCODONE HYDROCHLORIDE 10 MG/1
10 TABLET ORAL EVERY 6 HOURS PRN
Qty: 120 TABLET | Refills: 0 | Status: SHIPPED | OUTPATIENT
Start: 2024-06-28 | End: 2024-07-03 | Stop reason: SDUPTHER

## 2024-07-03 DIAGNOSIS — I48.0 PAROXYSMAL ATRIAL FIBRILLATION (HCC): ICD-10-CM

## 2024-07-03 DIAGNOSIS — M79.18 MYOFASCIAL PAIN SYNDROME: ICD-10-CM

## 2024-07-03 RX ORDER — OXYCODONE HYDROCHLORIDE 10 MG/1
10 TABLET ORAL EVERY 6 HOURS PRN
Qty: 120 TABLET | Refills: 0 | Status: SHIPPED | OUTPATIENT
Start: 2024-07-03

## 2024-07-03 NOTE — TELEPHONE ENCOUNTER
Patient called for refills of the following medications the oxycodone was sent to [facility] however needed to be sent to [facility], patient also requesting eliquis to be sent to [facility]

## 2024-07-05 DIAGNOSIS — M79.601 MUSCULOSKELETAL ARM PAIN, RIGHT: ICD-10-CM

## 2024-07-08 DIAGNOSIS — M79.601 MUSCULOSKELETAL ARM PAIN, RIGHT: ICD-10-CM

## 2024-07-08 DIAGNOSIS — G47.00 INSOMNIA, UNSPECIFIED TYPE: ICD-10-CM

## 2024-07-08 RX ORDER — ZOLPIDEM TARTRATE 5 MG/1
5 TABLET ORAL
Qty: 30 TABLET | Refills: 0 | OUTPATIENT
Start: 2024-07-08

## 2024-07-08 RX ORDER — GABAPENTIN 300 MG/1
900 CAPSULE ORAL 3 TIMES DAILY
Qty: 270 CAPSULE | Refills: 1 | Status: SHIPPED | OUTPATIENT
Start: 2024-07-08

## 2024-07-08 NOTE — TELEPHONE ENCOUNTER
As per PDMP, last fill of Ambien was on 6/18/2024 with 1 month supply.  Patient requesting Ambien too soon.  Gabapentin sent to the pharmacy

## 2024-07-09 ENCOUNTER — RA CDI HCC (OUTPATIENT)
Dept: OTHER | Facility: HOSPITAL | Age: 79
End: 2024-07-09

## 2024-07-12 ENCOUNTER — OFFICE VISIT (OUTPATIENT)
Dept: FAMILY MEDICINE CLINIC | Facility: CLINIC | Age: 79
End: 2024-07-12

## 2024-07-12 VITALS
SYSTOLIC BLOOD PRESSURE: 131 MMHG | BODY MASS INDEX: 21.34 KG/M2 | HEART RATE: 60 BPM | RESPIRATION RATE: 18 BRPM | DIASTOLIC BLOOD PRESSURE: 86 MMHG | HEIGHT: 64 IN | WEIGHT: 125 LBS | OXYGEN SATURATION: 98 % | TEMPERATURE: 97.7 F

## 2024-07-12 DIAGNOSIS — M62.838 MUSCLE SPASM: Primary | ICD-10-CM

## 2024-07-12 DIAGNOSIS — G47.00 INSOMNIA, UNSPECIFIED TYPE: ICD-10-CM

## 2024-07-12 RX ORDER — CYCLOBENZAPRINE HCL 5 MG
5 TABLET ORAL
Qty: 5 TABLET | Refills: 0 | Status: SHIPPED | OUTPATIENT
Start: 2024-07-12

## 2024-07-12 RX ORDER — ZOLPIDEM TARTRATE 5 MG/1
5 TABLET ORAL
Qty: 30 TABLET | Refills: 0 | Status: SHIPPED | OUTPATIENT
Start: 2024-07-12

## 2024-07-12 NOTE — ASSESSMENT & PLAN NOTE
Chronic Ambien use for years.  States she uses 10 mg however PDMP review she has been using 5 mg for about a year.  Patient is agreeable to start with 5 mg.  We will follow-up in 1 month to reassess her insomnia and if she is sleeping.  I encouraged her to continue with the melatonin.    I have a high threshold to escalate to 10 mg of Ambien due to the fact that this medication is contraindicated on the beers list but is also not recommended due to the patient's age.  She has been on this medication for so long the risk versus benefit slightly favors benefit of continuing this medication as this patient's insomnia will likely be refractory  to any other pharmacologic or behavioral modalities besides resuming her home Ambien.    No refills at this time until the patient follows up.  If she does okay with the 5 mg dose we can continue this with refills.

## 2024-07-12 NOTE — ASSESSMENT & PLAN NOTE
Secondary to the patient not sleeping and tossing and turning on in bed she has subsequently injured the right side of her neck she states.  There is palpable cervical paraspinal hypertonicity without any red flag signs.  The patient is asking for Flexeril medication.  This is not a home medication for her and this will be new to her regimen.    This medication is contraindicated due to her heart failure and cardiac history with paroxysmal atrial fibrillation however given the clinical situation I will send her with 5 pills of 5 mg of Flexeril with 0 refills to alleviate this acute muscle strain.    Follow-up and reassess in 1 month in conjunction with her insomnia visit.

## 2024-07-12 NOTE — PROGRESS NOTES
Ambulatory Visit  Name: Lisa Marques      : 1945      MRN: [id number]  Encounter Provider: Raz Dickens DO  Encounter Date: 2024   Encounter department: [facility]    Assessment & Plan   1. Muscle spasm  Assessment & Plan:  Secondary to the patient not sleeping and tossing and turning on in bed she has subsequently injured the right side of her neck she states.  There is palpable cervical paraspinal hypertonicity without any red flag signs.  The patient is asking for Flexeril medication.  This is not a home medication for her and this will be new to her regimen.    This medication is contraindicated due to her heart failure and cardiac history with paroxysmal atrial fibrillation however given the clinical situation I will send her with 5 pills of 5 mg of Flexeril with 0 refills to alleviate this acute muscle strain.    Follow-up and reassess in 1 month in conjunction with her insomnia visit.  Orders:  -     cyclobenzaprine (FLEXERIL) 5 mg tablet; Take 1 tablet (5 mg total) by mouth daily at bedtime  2. Insomnia, unspecified type  Assessment & Plan:  Chronic Ambien use for years.  States she uses 10 mg however PDMP review she has been using 5 mg for about a year.  Patient is agreeable to start with 5 mg.  We will follow-up in 1 month to reassess her insomnia and if she is sleeping.  I encouraged her to continue with the melatonin.    I have a high threshold to escalate to 10 mg of Ambien due to the fact that this medication is contraindicated on the beers list but is also not recommended due to the patient's age.  She has been on this medication for so long the risk versus benefit slightly favors benefit of continuing this medication as this patient's insomnia will likely be refractory  to any other pharmacologic or behavioral modalities besides resuming her home Ambien.    No refills at this time until the patient follows up.  If she does okay with the 5 mg  dose we can continue this with refills.  Orders:  -     zolpidem (AMBIEN) 5 mg tablet; Take 1 tablet (5 mg total) by mouth daily at bedtime as needed for sleep       History of Present Illness     Patient is a 78-year-old female with a past medical history of paroxysmal atrial fibrillation follows with cardiology managed now on twice daily metoprolol tartrate 50 mg due to recent A-fib with RVR hospitalization, insomnia on chronic Ambien for multiple years, opioid dependence, osteoporosis depression heart failure with preserved ejection fraction stage IIIa chronic kidney disease bladder prolapse presents today to the office for concerns of insomnia in relation to her not having her Ambien medication.    This patient is an individual who had been on Ambien for many years and was getting refills from this practice however from a previous provider who has now left the practice.  In the interim, in conjunction with hospitalizations the patient has been lost to medication refills.  She states she used to take 10 mg of Ambien however on PDMP review she used to do 10 mg however there was a change in de-escalation to 5 mg a few months ago.  I cannot find the reason why for this however the patient is asking for 10 mg tabs.  When she was hospitalized she was only put on melatonin for sleeping at night and has not had a refill in her Ambien for over a month per PDMP review.    She states she is not sleeping at all and this is caused her to develop a muscle spasm on the right side of her neck which she is also complaining about today.    She is also complaining of left ear hearing loss and thinks that there is wax in there.        Review of Systems   Constitutional:  Negative for fatigue, fever and unexpected weight change.   HENT:  Positive for hearing loss. Negative for congestion, sinus pressure, sinus pain and sore throat.    Eyes:  Negative for visual disturbance.   Respiratory:  Negative for cough, chest tightness,  shortness of breath and wheezing.    Cardiovascular:  Negative for chest pain, palpitations and leg swelling.   Gastrointestinal:  Negative for abdominal pain, constipation, diarrhea, nausea and vomiting.   Endocrine: Negative for polydipsia, polyphagia and polyuria.   Genitourinary:  Negative for difficulty urinating, dysuria and urgency.   Musculoskeletal:  Positive for neck pain. Negative for arthralgias and back pain.   Skin:  Negative for rash.   Neurological:  Negative for dizziness, weakness, light-headedness, numbness and headaches.   Psychiatric/Behavioral:  Negative for behavioral problems and confusion. The patient is nervous/anxious.      Current Outpatient Medications on File Prior to Visit   Medication Sig Dispense Refill    apixaban (Eliquis) 5 mg Take 1 tablet (5 mg total) by mouth 2 (two) times a day 60 tablet 0    Diclofenac Sodium (VOLTAREN) 1 % Apply 1 g topically 4 (four) times a day 20 g 0    escitalopram (LEXAPRO) 10 mg tablet Take 1 tablet (10 mg total) by mouth daily 90 tablet 1    gabapentin (NEURONTIN) 300 mg capsule Take 3 capsules (900 mg total) by mouth 3 (three) times a day 270 capsule 1    hydrocortisone 2.5 % cream Apply topically 2 (two) times a day (Patient taking differently: Apply topically 2 (two) times a day As needed) 30 g 2    lidocaine (LIDODERM) 5 % Apply 2 patches topically over 12 hours daily Remove & Discard patch within 12 hours or as directed by MD 7 patch 0    melatonin 3 mg Take 1 tablet (3 mg total) by mouth daily at bedtime (Patient taking differently: Take 3 mg by mouth daily at bedtime As needed) 14 tablet 0    metoprolol succinate (TOPROL-XL) 50 mg 24 hr tablet Take 1 tablet (50 mg total) by mouth 2 (two) times a day 180 tablet 3    oxyCODONE (ROXICODONE) 10 MG TABS Take 1 tablet (10 mg total) by mouth every 6 (six) hours as needed for moderate pain As needed Max Daily Amount: 40 mg 120 tablet 0    senna (SENOKOT) 8.6 mg Take 1 tablet (8.6 mg total) by mouth  "daily at bedtime 30 tablet 3    [DISCONTINUED] zolpidem (AMBIEN) 5 mg tablet Take 1 tablet (5 mg total) by mouth daily at bedtime as needed for sleep 30 tablet 0     No current facility-administered medications on file prior to visit.      Objective     /86 (BP Location: Left arm, Patient Position: Sitting, Cuff Size: Standard)   Pulse 60   Temp 97.7 °F (36.5 °C) (Temporal)   Resp 18   Ht 5' 3.5\" (1.613 m)   Wt 56.7 kg (125 lb)   SpO2 98%   BMI 21.80 kg/m²     Physical Exam  Constitutional:       General: She is not in acute distress.     Appearance: Normal appearance. She is normal weight. She is not ill-appearing, toxic-appearing or diaphoretic.   HENT:      Head: Normocephalic and atraumatic.      Right Ear: Ear canal and external ear normal.      Left Ear: Ear canal and external ear normal. There is impacted cerumen.      Nose: Nose normal.   Eyes:      Conjunctiva/sclera: Conjunctivae normal.   Neck:      Comments: Right-sided cervical paraspinal hypertonicity  Cardiovascular:      Rate and Rhythm: Normal rate and regular rhythm.      Pulses: Normal pulses.      Heart sounds: Normal heart sounds. No murmur heard.     No friction rub. No gallop.   Pulmonary:      Effort: Pulmonary effort is normal. No respiratory distress.      Breath sounds: Normal breath sounds. No stridor. No wheezing, rhonchi or rales.   Abdominal:      General: Abdomen is flat. There is no distension.      Palpations: Abdomen is soft.      Tenderness: There is no abdominal tenderness. There is no guarding or rebound.   Musculoskeletal:         General: Tenderness present.      Cervical back: Tenderness present.      Comments: Right cervical paraspinal muscle tenderness   Skin:     General: Skin is warm and dry.      Capillary Refill: Capillary refill takes less than 2 seconds.   Neurological:      General: No focal deficit present.      Mental Status: She is alert and oriented to person, place, and time. Mental status is at " baseline.   Psychiatric:         Mood and Affect: Mood normal.         Behavior: Behavior normal.         Thought Content: Thought content normal.         Judgment: Judgment normal.       Administrative Statements   I have spent a total time of 30 minutes in caring for this patient on the day of the visit/encounter including Diagnostic results, Prognosis, Impressions, Reviewing / ordering tests, medicine, procedures  , and Obtaining or reviewing history  .    Raz Dickens DO  07/12/24, 12:20 PM

## 2024-08-02 DIAGNOSIS — M79.601 MUSCULOSKELETAL ARM PAIN, RIGHT: ICD-10-CM

## 2024-08-02 DIAGNOSIS — H60.541 ECZEMA OF RIGHT EXTERNAL EAR: ICD-10-CM

## 2024-08-02 DIAGNOSIS — M62.838 MUSCLE SPASM: ICD-10-CM

## 2024-08-02 DIAGNOSIS — M48.061 SPINAL STENOSIS OF LUMBAR REGION, UNSPECIFIED WHETHER NEUROGENIC CLAUDICATION PRESENT: ICD-10-CM

## 2024-08-02 RX ORDER — CYCLOBENZAPRINE HCL 5 MG
5 TABLET ORAL
Qty: 5 TABLET | Refills: 0 | OUTPATIENT
Start: 2024-08-02

## 2024-08-05 DIAGNOSIS — M79.18 MYOFASCIAL PAIN SYNDROME: ICD-10-CM

## 2024-08-05 RX ORDER — OXYCODONE HYDROCHLORIDE 10 MG/1
10 TABLET ORAL EVERY 6 HOURS PRN
Qty: 120 TABLET | Refills: 0 | Status: CANCELLED | OUTPATIENT
Start: 2024-08-05

## 2024-08-05 RX ORDER — OXYCODONE HYDROCHLORIDE 10 MG/1
10 TABLET ORAL EVERY 6 HOURS PRN
Qty: 8 TABLET | Refills: 0 | Status: SHIPPED | OUTPATIENT
Start: 2024-08-05 | End: 2024-08-06 | Stop reason: SDUPTHER

## 2024-08-05 NOTE — TELEPHONE ENCOUNTER
Patient is calling for a refill of the following medication, she is completely out of the medication. Please advise thank you

## 2024-08-06 ENCOUNTER — OFFICE VISIT (OUTPATIENT)
Dept: FAMILY MEDICINE CLINIC | Facility: CLINIC | Age: 79
End: 2024-08-06

## 2024-08-06 VITALS
SYSTOLIC BLOOD PRESSURE: 88 MMHG | WEIGHT: 147.2 LBS | TEMPERATURE: 97.2 F | BODY MASS INDEX: 25.67 KG/M2 | HEART RATE: 74 BPM | DIASTOLIC BLOOD PRESSURE: 60 MMHG | OXYGEN SATURATION: 96 % | RESPIRATION RATE: 18 BRPM

## 2024-08-06 DIAGNOSIS — M62.838 MUSCLE SPASM: ICD-10-CM

## 2024-08-06 DIAGNOSIS — K59.03 DRUG-INDUCED CONSTIPATION: ICD-10-CM

## 2024-08-06 DIAGNOSIS — Z79.891 LONG TERM (CURRENT) USE OF OPIATE ANALGESIC: Primary | ICD-10-CM

## 2024-08-06 DIAGNOSIS — M79.18 MYOFASCIAL PAIN SYNDROME: ICD-10-CM

## 2024-08-06 DIAGNOSIS — G47.00 INSOMNIA, UNSPECIFIED TYPE: ICD-10-CM

## 2024-08-06 DIAGNOSIS — F41.8 DEPRESSION WITH ANXIETY: ICD-10-CM

## 2024-08-06 DIAGNOSIS — M79.601 MUSCULOSKELETAL ARM PAIN, RIGHT: ICD-10-CM

## 2024-08-06 DIAGNOSIS — G89.4 CHRONIC PAIN SYNDROME: ICD-10-CM

## 2024-08-06 PROCEDURE — 99213 OFFICE O/P EST LOW 20 MIN: CPT | Performed by: FAMILY MEDICINE

## 2024-08-06 PROCEDURE — G2211 COMPLEX E/M VISIT ADD ON: HCPCS | Performed by: FAMILY MEDICINE

## 2024-08-06 RX ORDER — GABAPENTIN 300 MG/1
900 CAPSULE ORAL 3 TIMES DAILY
Qty: 270 CAPSULE | Refills: 1 | Status: SHIPPED | OUTPATIENT
Start: 2024-08-06

## 2024-08-06 RX ORDER — POLYETHYLENE GLYCOL 3350 17 G/17G
17 POWDER, FOR SOLUTION ORAL DAILY
Qty: 20 EACH | Refills: 1 | Status: SHIPPED | OUTPATIENT
Start: 2024-08-06

## 2024-08-06 RX ORDER — CYCLOBENZAPRINE HCL 5 MG
5 TABLET ORAL
Qty: 5 TABLET | Refills: 0 | Status: SHIPPED | OUTPATIENT
Start: 2024-08-06

## 2024-08-06 RX ORDER — OXYCODONE HYDROCHLORIDE 10 MG/1
10 TABLET ORAL EVERY 8 HOURS PRN
Qty: 8 TABLET | Refills: 0 | Status: SHIPPED | OUTPATIENT
Start: 2024-08-06 | End: 2024-08-07

## 2024-08-06 RX ORDER — LIDOCAINE 50 MG/G
2 PATCH TOPICAL DAILY
Qty: 7 PATCH | Refills: 0 | Status: SHIPPED | OUTPATIENT
Start: 2024-08-06

## 2024-08-06 RX ORDER — ZOLPIDEM TARTRATE 5 MG/1
5 TABLET ORAL
Qty: 30 TABLET | Refills: 0 | Status: SHIPPED | OUTPATIENT
Start: 2024-08-06

## 2024-08-06 RX ORDER — ESCITALOPRAM OXALATE 10 MG/1
10 TABLET ORAL DAILY
Qty: 90 TABLET | Refills: 1 | Status: SHIPPED | OUTPATIENT
Start: 2024-08-06

## 2024-08-06 NOTE — ASSESSMENT & PLAN NOTE
Refilled cyclobenzaprine 5 tablets with 0 refills.  This medication is technically contraindicated in her case but she is asking for another brief course for her muscle spasms in her back.  It is not the best choice also in the setting of her A-fib and heart disease as well.  I will give her 5 times a day with 0 refills after this I will not be giving her any further muscle relaxants.

## 2024-08-06 NOTE — PROGRESS NOTES
Ambulatory Visit  Name: Lisa Marques      : 1945      MRN: [id number]  Encounter Provider: Raz Dickens DO  Encounter Date: 2024   Encounter department: [facility]    Assessment & Plan   1. Long term (current) use of opiate analgesic  Assessment & Plan:  Patient initially not agreeable to decreasing oxycodone to 3 times per day however I emphasized that this is what we we will be doing moving forward to try to de-escalate her opioid dependence and use.  She is eventually agreeable    Refilled other relevant medications.    Patient is also asking about ear cleaning and I told her she needs to schedule a separate visit for that    Will also send for a urine oxycodone at this appointment.  Tried to clinic collect with patient unable to give a urine sample in clinic today.      Orders:  -     Oxycodone/Oxymorphone urine; Future  2. Muscle spasm  Assessment & Plan:  Refilled cyclobenzaprine 5 tablets with 0 refills.  This medication is technically contraindicated in her case but she is asking for another brief course for her muscle spasms in her back.  It is not the best choice also in the setting of her A-fib and heart disease as well.  I will give her 5 times a day with 0 refills after this I will not be giving her any further muscle relaxants.  Orders:  -     cyclobenzaprine (FLEXERIL) 5 mg tablet; Take 1 tablet (5 mg total) by mouth daily at bedtime  3. Musculoskeletal arm pain, right  -     gabapentin (NEURONTIN) 300 mg capsule; Take 3 capsules (900 mg total) by mouth 3 (three) times a day  -     lidocaine (LIDODERM) 5 %; Apply 2 patches topically over 12 hours daily Remove & Discard patch within 12 hours or as directed by MD  4. Depression with anxiety  -     escitalopram (LEXAPRO) 10 mg tablet; Take 1 tablet (10 mg total) by mouth daily  5. Drug-induced constipation  Assessment & Plan:  MiraLAX daily for drug-induced constipation secondary to chronic opioid  use  Orders:  -     polyethylene glycol (MIRALAX) 17 g packet; Take 17 g by mouth daily  6. Insomnia, unspecified type  Assessment & Plan:  Ambien refilled.  5 mg only not necessary to escalate to 10 mg as patient continually asks for  7. Chronic pain syndrome       History of Present Illness     78-year-old female past medical history of continued opioid dependence managed for years with 10 mg oxycodone 4 times a day presenting for mainly refill of this medication today.  She was given an interim refill of 8 tablets to bridge her to her for her appointment today.  She is verbally upset and asking why she was only giving 8 pills instead of 120 pills and I explained to her that this is because she had an appointment today with me to discuss her opioid dependence.  We discussed alternate modalities to decrease her opioid use as she is taking a significant dose.  I emphasized scheduling out her oxycodone to every 8 hours instead of every 6 although the patient was verbally adamant about not doing that.  She is also asking for refill of her Ambien 5 mg today.  She is also asking for refill of her cyclobenzaprine gabapentin and lidocaine patches.  She has no physical complaints and is here for medication refill.        Review of Systems   Constitutional:  Negative for fatigue, fever and unexpected weight change.   HENT:  Negative for congestion, sinus pressure, sinus pain and sore throat.    Eyes:  Negative for visual disturbance.   Respiratory:  Negative for cough, chest tightness, shortness of breath and wheezing.    Cardiovascular:  Negative for chest pain, palpitations and leg swelling.   Gastrointestinal:  Negative for abdominal pain, constipation, diarrhea, nausea and vomiting.   Endocrine: Negative for polydipsia, polyphagia and polyuria.   Genitourinary:  Negative for difficulty urinating, dysuria and urgency.   Musculoskeletal:  Positive for back pain. Negative for arthralgias.   Skin:  Negative for rash.    Neurological:  Negative for dizziness, weakness, light-headedness, numbness and headaches.   Psychiatric/Behavioral:  Negative for behavioral problems and confusion. The patient is not nervous/anxious.      Current Outpatient Medications on File Prior to Visit   Medication Sig Dispense Refill    apixaban (Eliquis) 5 mg Take 1 tablet (5 mg total) by mouth 2 (two) times a day 60 tablet 0    Diclofenac Sodium (VOLTAREN) 1 % Apply 1 g topically 4 (four) times a day 20 g 0    hydrocortisone 2.5 % cream Apply topically 2 (two) times a day As needed 28 g 1    melatonin 3 mg Take 1 tablet (3 mg total) by mouth daily at bedtime (Patient taking differently: Take 3 mg by mouth daily at bedtime As needed) 14 tablet 0    metoprolol succinate (TOPROL-XL) 50 mg 24 hr tablet Take 1 tablet (50 mg total) by mouth 2 (two) times a day 180 tablet 3    senna (SENOKOT) 8.6 mg Take 1 tablet (8.6 mg total) by mouth daily at bedtime 30 tablet 3    [DISCONTINUED] cyclobenzaprine (FLEXERIL) 5 mg tablet Take 1 tablet (5 mg total) by mouth daily at bedtime 5 tablet 0    [DISCONTINUED] escitalopram (LEXAPRO) 10 mg tablet Take 1 tablet (10 mg total) by mouth daily 90 tablet 1    [DISCONTINUED] gabapentin (NEURONTIN) 300 mg capsule Take 3 capsules (900 mg total) by mouth 3 (three) times a day 270 capsule 1    [DISCONTINUED] lidocaine (LIDODERM) 5 % Apply 2 patches topically over 12 hours daily Remove & Discard patch within 12 hours or as directed by MD 7 patch 0    [DISCONTINUED] oxyCODONE (ROXICODONE) 10 MG TABS Take 1 tablet (10 mg total) by mouth every 6 (six) hours as needed for moderate pain for up to 2 days As needed Max Daily Amount: 40 mg 8 tablet 0    [DISCONTINUED] zolpidem (AMBIEN) 5 mg tablet Take 1 tablet (5 mg total) by mouth daily at bedtime as needed for sleep 30 tablet 0     No current facility-administered medications on file prior to visit.      Objective     BP (!) 88/60   Pulse 74   Temp (!) 97.2 °F (36.2 °C)   Resp 18    Wt 66.8 kg (147 lb 3.2 oz)   SpO2 96%   BMI 25.67 kg/m²     Physical Exam  Constitutional:       General: She is not in acute distress.     Appearance: Normal appearance. She is normal weight. She is not ill-appearing or toxic-appearing.   HENT:      Head: Normocephalic and atraumatic.      Right Ear: External ear normal.      Left Ear: External ear normal.      Nose: Nose normal.      Mouth/Throat:      Pharynx: Oropharynx is clear.   Eyes:      Conjunctiva/sclera: Conjunctivae normal.   Cardiovascular:      Rate and Rhythm: Normal rate and regular rhythm.      Pulses: Normal pulses.      Heart sounds: Normal heart sounds. No murmur heard.     No friction rub. No gallop.   Pulmonary:      Effort: Pulmonary effort is normal. No respiratory distress.      Breath sounds: Normal breath sounds. No wheezing.   Abdominal:      General: Abdomen is flat.      Palpations: Abdomen is soft.      Tenderness: There is no abdominal tenderness.   Musculoskeletal:         General: Tenderness present.      Cervical back: Normal range of motion and neck supple.      Comments: Cervical paraspinal hypertonicity.    Skin:     General: Skin is warm and dry.      Capillary Refill: Capillary refill takes less than 2 seconds.   Neurological:      General: No focal deficit present.      Mental Status: She is alert and oriented to person, place, and time. Mental status is at baseline.   Psychiatric:         Mood and Affect: Mood normal.         Behavior: Behavior normal.         Thought Content: Thought content normal.         Judgment: Judgment normal.       Administrative Statements   I have spent a total time of 30 minutes in caring for this patient on the day of the visit/encounter including Diagnostic results, Risks and benefits of tx options, Patient and family education, Risk factor reductions, Impressions, Documenting in the medical record, Reviewing / ordering tests, medicine, procedures  , and Obtaining or reviewing history   .    Raz Dickens,   PGY-2 [facility] [place]   08/06/24, 6:17 PM

## 2024-08-06 NOTE — ASSESSMENT & PLAN NOTE
Patient initially not agreeable to decreasing oxycodone to 3 times per day however I emphasized that this is what we we will be doing moving forward to try to de-escalate her opioid dependence and use.  She is eventually agreeable    Refilled other relevant medications.    Patient is also asking about ear cleaning and I told her she needs to schedule a separate visit for that    Will also send for a urine oxycodone at this appointment.  Tried to clinic collect with patient unable to give a urine sample in clinic today.

## 2024-08-07 DIAGNOSIS — M79.18 MYOFASCIAL PAIN SYNDROME: ICD-10-CM

## 2024-08-07 RX ORDER — OXYCODONE HYDROCHLORIDE 10 MG/1
10 TABLET ORAL EVERY 8 HOURS PRN
Qty: 90 TABLET | Refills: 0 | Status: SHIPPED | OUTPATIENT
Start: 2024-08-07 | End: 2024-09-06

## 2024-08-13 ENCOUNTER — OFFICE VISIT (OUTPATIENT)
Dept: FAMILY MEDICINE CLINIC | Facility: CLINIC | Age: 79
End: 2024-08-13

## 2024-08-13 VITALS
HEIGHT: 64 IN | WEIGHT: 150 LBS | TEMPERATURE: 98 F | HEART RATE: 66 BPM | RESPIRATION RATE: 20 BRPM | DIASTOLIC BLOOD PRESSURE: 96 MMHG | SYSTOLIC BLOOD PRESSURE: 138 MMHG | OXYGEN SATURATION: 99 % | BODY MASS INDEX: 25.61 KG/M2

## 2024-08-13 DIAGNOSIS — H61.22 IMPACTED CERUMEN OF LEFT EAR: Primary | ICD-10-CM

## 2024-08-13 PROCEDURE — 69209 REMOVE IMPACTED EAR WAX UNI: CPT | Performed by: FAMILY MEDICINE

## 2024-08-13 PROCEDURE — 99213 OFFICE O/P EST LOW 20 MIN: CPT | Performed by: FAMILY MEDICINE

## 2024-08-13 NOTE — PROGRESS NOTES
Ambulatory Visit  Name: Lisa Marques      : 1945      MRN: [id number]  Encounter Provider: Raz Dickens DO  Encounter Date: 2024   Encounter department: [facility]    Assessment & Plan   1. Impacted cerumen of left ear  Assessment & Plan:  Impacted cerumen.  External canal normal and tympanic membrane visualized normal.  Flushed with warm water no complications patient tolerated procedure well    Ear cerumen removal    Date/Time: 2024 1:40 PM    Performed by: Raz Dickens DO  Authorized by: Raz Dickens DO  Universal Protocol:  Consent: Verbal consent obtained.  Consent given by: patient  Patient understanding: patient states understanding of the procedure being performed  Patient identity confirmed: verbally with patient    Patient location:  Clinic  Procedure details:     Local anesthetic:  None    Location:  L ear and R ear    Procedure type: irrigation only      Approach:  External  Post-procedure details:     Complication:  None    Hearing quality:  Improved    Patient tolerance of procedure:  Tolerated well, no immediate complications         History of Present Illness     Patient here for bilateral ear flushing.  She states left worse than right sensation of fullness and decreased hearing.  No other complaints.        Review of Systems   Constitutional:  Negative for fatigue, fever and unexpected weight change.   HENT:  Negative for congestion, sinus pressure, sinus pain and sore throat.    Eyes:  Negative for visual disturbance.   Respiratory:  Negative for cough, chest tightness, shortness of breath and wheezing.    Cardiovascular:  Negative for chest pain, palpitations and leg swelling.   Gastrointestinal:  Negative for abdominal pain, constipation, diarrhea, nausea and vomiting.   Endocrine: Negative for polydipsia, polyphagia and polyuria.   Genitourinary:  Negative for difficulty urinating, dysuria and urgency.   Musculoskeletal:   Negative for arthralgias and back pain.   Skin:  Negative for rash.   Neurological:  Negative for dizziness, weakness, light-headedness, numbness and headaches.   Psychiatric/Behavioral:  Negative for behavioral problems and confusion. The patient is not nervous/anxious.      Current Outpatient Medications on File Prior to Visit   Medication Sig Dispense Refill    apixaban (Eliquis) 5 mg Take 1 tablet (5 mg total) by mouth 2 (two) times a day 60 tablet 0    cyclobenzaprine (FLEXERIL) 5 mg tablet Take 1 tablet (5 mg total) by mouth daily at bedtime 5 tablet 0    Diclofenac Sodium (VOLTAREN) 1 % Apply 1 g topically 4 (four) times a day 20 g 0    escitalopram (LEXAPRO) 10 mg tablet Take 1 tablet (10 mg total) by mouth daily 90 tablet 1    gabapentin (NEURONTIN) 300 mg capsule Take 3 capsules (900 mg total) by mouth 3 (three) times a day 270 capsule 1    hydrocortisone 2.5 % cream Apply topically 2 (two) times a day As needed 28 g 1    lidocaine (LIDODERM) 5 % Apply 2 patches topically over 12 hours daily Remove & Discard patch within 12 hours or as directed by MD 7 patch 0    melatonin 3 mg Take 1 tablet (3 mg total) by mouth daily at bedtime (Patient taking differently: Take 3 mg by mouth daily at bedtime As needed) 14 tablet 0    metoprolol succinate (TOPROL-XL) 50 mg 24 hr tablet Take 1 tablet (50 mg total) by mouth 2 (two) times a day 180 tablet 3    oxyCODONE (ROXICODONE) 10 MG TABS Take 1 tablet (10 mg total) by mouth every 8 (eight) hours as needed for moderate pain Max Daily Amount: 30 mg 90 tablet 0    polyethylene glycol (MIRALAX) 17 g packet Take 17 g by mouth daily 20 each 1    senna (SENOKOT) 8.6 mg Take 1 tablet (8.6 mg total) by mouth daily at bedtime 30 tablet 3    zolpidem (AMBIEN) 5 mg tablet Take 1 tablet (5 mg total) by mouth daily at bedtime as needed for sleep 30 tablet 0     No current facility-administered medications on file prior to visit.      Objective     /96   Pulse 66   Temp 98 °F  "(36.7 °C) (Temporal)   Resp 20   Ht 5' 3.5\" (1.613 m)   Wt 68 kg (150 lb)   SpO2 99%   BMI 26.15 kg/m²     Physical Exam  Constitutional:       General: She is not in acute distress.     Appearance: Normal appearance. She is normal weight.   HENT:      Head: Normocephalic and atraumatic.      Right Ear: Tympanic membrane, ear canal and external ear normal.      Left Ear: Tympanic membrane, ear canal and external ear normal. There is impacted cerumen.      Nose: Nose normal.      Mouth/Throat:      Mouth: Mucous membranes are moist.      Pharynx: Oropharynx is clear.   Eyes:      Conjunctiva/sclera: Conjunctivae normal.      Pupils: Pupils are equal, round, and reactive to light.   Cardiovascular:      Rate and Rhythm: Normal rate and regular rhythm.      Pulses: Normal pulses.   Pulmonary:      Effort: Pulmonary effort is normal.   Abdominal:      General: Abdomen is flat.   Musculoskeletal:      Cervical back: Normal range of motion and neck supple.   Skin:     General: Skin is warm and dry.      Capillary Refill: Capillary refill takes less than 2 seconds.   Neurological:      General: No focal deficit present.      Mental Status: She is alert and oriented to person, place, and time. Mental status is at baseline.   Psychiatric:         Mood and Affect: Mood normal.         Behavior: Behavior normal.         Thought Content: Thought content normal.         Judgment: Judgment normal.       Administrative Statements   I have spent a total time of 30 minutes in caring for this patient on the day of the visit/encounter including Reviewing / ordering tests, medicine, procedures  .    Raz Dickens DO  PGY-2 [facility] - [place]   08/13/24, 2:28 PM    "

## 2024-08-13 NOTE — ASSESSMENT & PLAN NOTE
Impacted cerumen.  External canal normal and tympanic membrane visualized normal.  Flushed with warm water no complications patient tolerated procedure well

## 2024-08-29 ENCOUNTER — TELEPHONE (OUTPATIENT)
Dept: FAMILY MEDICINE CLINIC | Facility: CLINIC | Age: 79
End: 2024-08-29

## 2024-08-29 NOTE — TELEPHONE ENCOUNTER
This is Lisa Marques calling date of birth [birth year]. Phone numbers for [phone] through I need, I mean my cyclobenzaprine let me see the strength 5 milligrams the quantities 5 but I need to go back. I was on 90. I don't know. I can't do 5 so I went that increase to at least 30. I need my sleeping pill, the Ambien 5 milligrams #31 at bedtime. I need generic lasix. That's you're mystified. 40 milligrams #30 and I just take those as needed. My fingers are swollen now. I can't even get my rings off I need. Those are all from [facility]. Have phone number [phone]. I also need my oxycodone 10 milligram tablets cut me down to 90 instead of 120. I'd like to go back to 120 again, I mean the appointment to see her, but I couldn't get in till the 26th of September. But I've missed my daughters party for her marriage, which is gonna be on September 21st because I just can't get through the day. And it's faking to do decorations and other things that I need to go back to 120. And I can discuss that with her when I come in on Doctor Iman Gardner saying I don't know. I think there's one other thing I need, but I'm not sure. I'll call. I'll call back if I can find it. All right, so the last one, the oxycodone that got called in [facility] at [phone]. These are all for delivery. There's a problem with any of this. I need you to call me and not just let me not know. I'd like to call Didn't know later Friday so that they can get the ride on Tuesday the end of this week if it's possible. Alright, thank you.

## 2024-08-29 NOTE — TELEPHONE ENCOUNTER
Same Day appointment scheduled   8/30 at 10:10 with PCP    Patient can be scheduled at [phone]

## 2024-08-30 ENCOUNTER — OFFICE VISIT (OUTPATIENT)
Dept: FAMILY MEDICINE CLINIC | Facility: CLINIC | Age: 79
End: 2024-08-30

## 2024-08-30 VITALS
BODY MASS INDEX: 25.44 KG/M2 | SYSTOLIC BLOOD PRESSURE: 107 MMHG | TEMPERATURE: 97.1 F | DIASTOLIC BLOOD PRESSURE: 71 MMHG | HEART RATE: 59 BPM | RESPIRATION RATE: 18 BRPM | HEIGHT: 64 IN | OXYGEN SATURATION: 97 % | WEIGHT: 149 LBS

## 2024-08-30 DIAGNOSIS — I48.0 PAROXYSMAL ATRIAL FIBRILLATION (HCC): ICD-10-CM

## 2024-08-30 DIAGNOSIS — I50.32 CHRONIC HEART FAILURE WITH PRESERVED EJECTION FRACTION (HCC): ICD-10-CM

## 2024-08-30 DIAGNOSIS — R60.9 SWELLING: ICD-10-CM

## 2024-08-30 DIAGNOSIS — K59.03 DRUG-INDUCED CONSTIPATION: ICD-10-CM

## 2024-08-30 DIAGNOSIS — M79.18 MYOFASCIAL PAIN SYNDROME: ICD-10-CM

## 2024-08-30 DIAGNOSIS — G47.00 INSOMNIA, UNSPECIFIED TYPE: ICD-10-CM

## 2024-08-30 DIAGNOSIS — G47.00 INSOMNIA: ICD-10-CM

## 2024-08-30 DIAGNOSIS — M62.838 MUSCLE SPASM: ICD-10-CM

## 2024-08-30 DIAGNOSIS — M79.601 MUSCULOSKELETAL ARM PAIN, RIGHT: ICD-10-CM

## 2024-08-30 DIAGNOSIS — M48.061 SPINAL STENOSIS OF LUMBAR REGION, UNSPECIFIED WHETHER NEUROGENIC CLAUDICATION PRESENT: Primary | ICD-10-CM

## 2024-08-30 PROCEDURE — G2211 COMPLEX E/M VISIT ADD ON: HCPCS | Performed by: FAMILY MEDICINE

## 2024-08-30 PROCEDURE — 99213 OFFICE O/P EST LOW 20 MIN: CPT | Performed by: FAMILY MEDICINE

## 2024-08-30 RX ORDER — METOPROLOL SUCCINATE 50 MG/1
50 TABLET, EXTENDED RELEASE ORAL 2 TIMES DAILY
Qty: 180 TABLET | Refills: 3 | Status: SHIPPED | OUTPATIENT
Start: 2024-08-30

## 2024-08-30 RX ORDER — FUROSEMIDE 20 MG
20 TABLET ORAL AS NEEDED
Qty: 30 TABLET | Refills: 0 | Status: SHIPPED | OUTPATIENT
Start: 2024-08-30

## 2024-08-30 RX ORDER — DOCUSATE SODIUM 100 MG/1
100 CAPSULE, LIQUID FILLED ORAL 2 TIMES DAILY
Qty: 90 CAPSULE | Refills: 0 | Status: SHIPPED | OUTPATIENT
Start: 2024-08-30

## 2024-08-30 RX ORDER — CYCLOBENZAPRINE HCL 5 MG
5 TABLET ORAL
Qty: 15 TABLET | Refills: 0 | Status: SHIPPED | OUTPATIENT
Start: 2024-08-30

## 2024-08-30 RX ORDER — ZOLPIDEM TARTRATE 5 MG/1
5 TABLET ORAL
Qty: 30 TABLET | Refills: 0 | Status: SHIPPED | OUTPATIENT
Start: 2024-08-30

## 2024-08-30 RX ORDER — LIDOCAINE 50 MG/G
2 PATCH TOPICAL DAILY
Qty: 10 PATCH | Refills: 0 | Status: SHIPPED | OUTPATIENT
Start: 2024-08-30

## 2024-08-30 RX ORDER — OXYCODONE HYDROCHLORIDE 10 MG/1
10 TABLET ORAL EVERY 8 HOURS PRN
Qty: 100 TABLET | Refills: 0 | Status: SHIPPED | OUTPATIENT
Start: 2024-08-30 | End: 2024-09-29

## 2024-08-30 NOTE — ASSESSMENT & PLAN NOTE
Patient here for refill of her oxycodone.  She is here before she is due technically for her 30-day refill but she states this is because she is intermittently been taking 4 pills a day instead of 3 and she is going to be out of town and would like to get a refill before she goes.    I stated to her that we will send her with 100 pills instead of 90 to help bridge her but she is in the process of titrating down to 3 times daily instead of 4 times a day.  I stated this will take time but to continue trying her best with this de-escalation of opioid therapy process.  She is agreeable.  I will see her again as needed for refill and hopefully at the next scheduled refill we can send her with 1 month supply of 3 times daily dosing of her oxycodone.

## 2024-08-30 NOTE — ASSESSMENT & PLAN NOTE
Patient continues to ask for Robaxin.  She has history of A-fib on Eliquis and heart failure however she is regular rate and rhythm in the office today she also takes Toprol-XL for her rate control.  She is not exacerbated into A-fib per patient since I started prescribing some brief courses of it.  I will send her with another 15 pills today to use as needed however I did note to her that this is not a long-term medication that she will be put on.

## 2024-08-30 NOTE — ASSESSMENT & PLAN NOTE
Drug-induced constipation secondary to her opioid use.  She states she only moves her bowels once per week.  I we will add on Colace today for her regimen.  She is agreeable to try this medication.

## 2024-08-30 NOTE — PROGRESS NOTES
Ambulatory Visit  Name: Lisa Marques      : 1945      MRN: [id number]  Encounter Provider: Raz Dickens DO  Encounter Date: 2024   Encounter department: [facility]    Assessment & Plan   1. Spinal stenosis of lumbar region, unspecified whether neurogenic claudication present  -     Diclofenac Sodium (VOLTAREN) 1 %; Apply 1 g topically 4 (four) times a day  2. Myofascial pain syndrome  Assessment & Plan:  Patient here for refill of her oxycodone.  She is here before she is due technically for her 30-day refill but she states this is because she is intermittently been taking 4 pills a day instead of 3 and she is going to be out of town and would like to get a refill before she goes.    I stated to her that we will send her with 100 pills instead of 90 to help bridge her but she is in the process of titrating down to 3 times daily instead of 4 times a day.  I stated this will take time but to continue trying her best with this de-escalation of opioid therapy process.  She is agreeable.  I will see her again as needed for refill and hopefully at the next scheduled refill we can send her with 1 month supply of 3 times daily dosing of her oxycodone.  Orders:  -     oxyCODONE (ROXICODONE) 10 MG TABS; Take 1 tablet (10 mg total) by mouth every 8 (eight) hours as needed for moderate pain Max Daily Amount: 30 mg  3. Insomnia, unspecified type  Assessment & Plan:  Taking 20 mg of melatonin nightly in conjunction with Unisom and her 5 mg of Ambien.  Her Ambien was recently titrated down from 10 mg.  I told her to continue on course even though this is frustrating it will take time for her to get used to the new dose of Ambien but her 10 mg have been in the past was too high.  I will send her with 20 mg of melatonin prescription show she does not have to pay for it.  I hope eventually she can titrate off of this high dose of melatonin as well.  Will continue to follow  Psychiatric Discharge Summary    Medical Record Number: [id number]  Encounter: [id number]    Discharge diagnosis:  Bipolar affective disorder, currently depressed, moderate ([place] [address] )    Secondary diagnoses:  Problem List     * (Principal)Bipolar affective disorder, currently depressed, moderate ([place] [address] )    Bipolar 2 disorder, major depressive episode ([place] [address] )    Alcohol intoxication ([place] [address] )    Post-traumatic stress disorder, chronic (Chronic)    Hyperlipidemia (Chronic)    ADHD (attention deficit hyperactivity disorder) (Chronic)    Chronic skin ulcer ([place] [address] )    Cigarette nicotine dependence without complication    Dermatomycosis    Lichenification and lichen simplex chronicus    Suicidal ideation    Fall    Essential hypertension          HPI:     Jordan Albrecht is an 63 y o , male, admitted to the psychiatric unit under a 201 status to Dr Luis Daniel Sorenson' service with the chief complaint of  severe depression and feeling suicidal   [place], he may have fallen once but is not clear if that is true, and he was say he feels suicidal   He has said my life is a mess  He has been hospitalized at [facility] approximately a month ago, but apparently was not compliant with his medication or any aftercare and started drinking alcohol up to 2 6 packs a day and more      He has been in a rehab about 5 years ago at would consider going into another 1 at this time  His BAL was 427 on admission      Patient is homeless he has been living in his car eating very intermittently  He feels that life is not worth living, he is hopeless, and depressed  He reports that the medication he was taking during his last hospitalization helped him  Brief Hospital Course:     After the patient was admitted to the psychiatric unit  the patient had several psychotropic medication adjustments made to help stabilize their mood  Patient had Seroquel decreased and Effexor added   Effexor was then along.  Orders:  -     Melatonin 5 MG TABS; Take 2 tablets (10 mg total) by mouth daily at bedtime as needed (as needed for insomnia)  -     zolpidem (AMBIEN) 5 mg tablet; Take 1 tablet (5 mg total) by mouth daily at bedtime as needed for sleep  4. Muscle spasm  Assessment & Plan:  Patient continues to ask for Robaxin.  She has history of A-fib on Eliquis and heart failure however she is regular rate and rhythm in the office today she also takes Toprol-XL for her rate control.  She is not exacerbated into A-fib per patient since I started prescribing some brief courses of it.  I will send her with another 15 pills today to use as needed however I did note to her that this is not a long-term medication that she will be put on.  Orders:  -     cyclobenzaprine (FLEXERIL) 5 mg tablet; Take 1 tablet (5 mg total) by mouth daily at bedtime  5. Swelling  -     furosemide (LASIX) 20 mg tablet; Take 1 tablet (20 mg total) by mouth as needed (as needed weekly)  6. Insomnia  Assessment & Plan:  Taking 20 mg of melatonin nightly in conjunction with Unisom and her 5 mg of Ambien.  Her Ambien was recently titrated down from 10 mg.  I told her to continue on course even though this is frustrating it will take time for her to get used to the new dose of Ambien but her 10 mg have been in the past was too high.  I will send her with 20 mg of melatonin prescription show she does not have to pay for it.  I hope eventually she can titrate off of this high dose of melatonin as well.  Will continue to follow along.  7. Musculoskeletal arm pain, right  -     lidocaine (LIDODERM) 5 %; Apply 2 patches topically over 12 hours daily Remove & Discard patch within 12 hours or as directed by MD  8. Paroxysmal atrial fibrillation (HCC)  -     apixaban (Eliquis) 5 mg; Take 1 tablet (5 mg total) by mouth 2 (two) times a day  -     metoprolol succinate (TOPROL-XL) 50 mg 24 hr tablet; Take 1 tablet (50 mg total) by mouth 2 (two) times a day  9.  increased over the course of his stay  Following these medication changes, the patient started to stabilize  Finally on 1/14/2019, the patient was found to be stable for discharge  On the day of discharge the patient reported their symptoms as significantly improved  All psychiatric medications were being tolerated without side effect or adverse event  No suicidal or homicidal ideations were present  The patient expressed their readiness and willingness to be discharged and referral to outpatient treatment was made  The case was discussed with Dr Alicia Sneed and he has deemed the patient stable for discharge        Condition on discharge:     Improved    Medications Upon Discharge:       folic acid (FOLVITE) tablet 1 mg, 1 mg, Oral, Daily, Radha Tejeda PA-C, 1 mg at 01/14/19 0911    nicotine (NICODERM CQ) 14 mg/24hr TD 24 hr patch 1 patch, 1 patch, Transdermal, Daily, Socorro Hutton PA-C, 1 patch at 01/14/19 0912    pravastatin (PRAVACHOL) tablet 10 mg, 10 mg, Oral, Daily With Maldonado Cui MD, 10 mg at 01/13/19 1729    QUEtiapine (SEROquel) tablet 300 mg, 300 mg, Oral, HS, Nury Gordillo PA-C, 300 mg at 01/13/19 2119    thiamine (VITAMIN B1) tablet 100 mg, 100 mg, Oral, Daily, Socorro Hutton PA-C, 100 mg at 01/14/19 0911    venlafaxine (EFFEXOR-XR) 24 hr capsule 225 mg, 225 mg, Oral, Daily, Socorro Hutton PA-C, 225 mg at 01/14/19 3054    Socorro Hutton PA-C Drug-induced constipation  Assessment & Plan:  Drug-induced constipation secondary to her opioid use.  She states she only moves her bowels once per week.  I we will add on Colace today for her regimen.  She is agreeable to try this medication.  Orders:  -     docusate sodium (COLACE) 100 mg capsule; Take 1 capsule (100 mg total) by mouth 2 (two) times a day  10. Chronic heart failure with preserved ejection fraction (HCC)  Assessment & Plan:  Wt Readings from Last 3 Encounters:   08/30/24 67.6 kg (149 lb)   08/13/24 68 kg (150 lb)   08/06/24 66.8 kg (147 lb 3.2 oz)       Patient states she used to take as needed Lasix for extremity swelling.  She states she is only take this medication once per week.  It was discontinued at a former time due to concerns of orthostatic hypotension  secondary to this medication.  She is inquiring about it again today as she notes she has been swelling again.  Most notably in her hands rather than her legs.  No shortness of breath chest pain or palpitations or other signs of systemic overload however I will send her with 30 days of as needed weekly Lasix for her to take.  She is 107/71 for her blood pressure today in the office and I did give her strict precautions to only take the medication the comfort of her own home and only on a weekly basis as to not precipitate more hypotension and orthostasis.  Patient agreeable to this plan.           History of Present Illness     78-year-old female here for multiple medication refills.  She has no other complaints at this time.  She normally gets prescribed oxycodone 10 mg which she takes 3 times a day but however this is a recent titration down from 4 times a day and she has intermittently been taking 4 pills/day instead of the prescribed 3.  She states that she is running out of her medication sooner than is appropriate for refill time.  She is also taking her Ambien 5 mg at night this is also decreased down from her 10 mg she had  previously been taking for many years.  I challenged her previous visits to try this dose in hopes of weaning her off.  However she is taking 24 mg of melatonin nightly in conjunction with Unisom with this Ambien to achieve sleep and she often does not fall asleep until many hours later after initially trying to fall asleep.         Review of Systems   Constitutional:  Negative for fatigue, fever and unexpected weight change.   HENT:  Negative for congestion, sinus pressure, sinus pain and sore throat.    Eyes:  Negative for visual disturbance.   Respiratory:  Negative for cough, chest tightness, shortness of breath and wheezing.    Cardiovascular:  Negative for chest pain, palpitations and leg swelling.   Gastrointestinal:  Positive for constipation. Negative for abdominal pain, diarrhea, nausea and vomiting.   Endocrine: Negative for polydipsia, polyphagia and polyuria.   Genitourinary:  Negative for difficulty urinating, dysuria and urgency.   Musculoskeletal:  Negative for arthralgias and back pain.   Skin:  Negative for rash.   Neurological:  Negative for dizziness, weakness, light-headedness, numbness and headaches.   Psychiatric/Behavioral:  Negative for behavioral problems and confusion. The patient is not nervous/anxious.      Current Outpatient Medications on File Prior to Visit   Medication Sig Dispense Refill    escitalopram (LEXAPRO) 10 mg tablet Take 1 tablet (10 mg total) by mouth daily 90 tablet 1    gabapentin (NEURONTIN) 300 mg capsule Take 3 capsules (900 mg total) by mouth 3 (three) times a day 270 capsule 1    hydrocortisone 2.5 % cream Apply topically 2 (two) times a day As needed 28 g 1    polyethylene glycol (MIRALAX) 17 g packet Take 17 g by mouth daily 20 each 1    senna (SENOKOT) 8.6 mg Take 1 tablet (8.6 mg total) by mouth daily at bedtime 30 tablet 3    [DISCONTINUED] apixaban (Eliquis) 5 mg Take 1 tablet (5 mg total) by mouth 2 (two) times a day 60 tablet 0    [DISCONTINUED]  "cyclobenzaprine (FLEXERIL) 5 mg tablet Take 1 tablet (5 mg total) by mouth daily at bedtime 5 tablet 0    [DISCONTINUED] Diclofenac Sodium (VOLTAREN) 1 % Apply 1 g topically 4 (four) times a day 20 g 0    [DISCONTINUED] lidocaine (LIDODERM) 5 % Apply 2 patches topically over 12 hours daily Remove & Discard patch within 12 hours or as directed by MD 7 patch 0    [DISCONTINUED] metoprolol succinate (TOPROL-XL) 50 mg 24 hr tablet Take 1 tablet (50 mg total) by mouth 2 (two) times a day 180 tablet 3    [DISCONTINUED] oxyCODONE (ROXICODONE) 10 MG TABS Take 1 tablet (10 mg total) by mouth every 8 (eight) hours as needed for moderate pain Max Daily Amount: 30 mg 90 tablet 0    [DISCONTINUED] zolpidem (AMBIEN) 5 mg tablet Take 1 tablet (5 mg total) by mouth daily at bedtime as needed for sleep 30 tablet 0    [DISCONTINUED] melatonin 3 mg Take 1 tablet (3 mg total) by mouth daily at bedtime (Patient taking differently: Take 3 mg by mouth daily at bedtime As needed) 14 tablet 0     No current facility-administered medications on file prior to visit.      Objective     /71   Pulse 59   Temp (!) 97.1 °F (36.2 °C) (Temporal)   Resp 18   Ht 5' 3.5\" (1.613 m)   Wt 67.6 kg (149 lb)   SpO2 97%   BMI 25.98 kg/m²     Physical Exam  Constitutional:       General: She is not in acute distress.     Appearance: Normal appearance. She is normal weight.   HENT:      Head: Normocephalic and atraumatic.      Right Ear: External ear normal.      Left Ear: External ear normal.      Nose: Nose normal.      Mouth/Throat:      Pharynx: Oropharynx is clear.   Eyes:      Conjunctiva/sclera: Conjunctivae normal.   Cardiovascular:      Rate and Rhythm: Normal rate and regular rhythm.      Pulses: Normal pulses.      Heart sounds: Normal heart sounds. No murmur heard.     No friction rub. No gallop.   Pulmonary:      Effort: Pulmonary effort is normal. No respiratory distress.      Breath sounds: Normal breath sounds. No stridor. No " wheezing, rhonchi or rales.   Abdominal:      General: Abdomen is flat.      Palpations: Abdomen is soft.      Tenderness: There is no abdominal tenderness.   Musculoskeletal:      Cervical back: Normal range of motion and neck supple.      Right lower leg: No edema.      Left lower leg: No edema.   Skin:     General: Skin is warm and dry.      Capillary Refill: Capillary refill takes less than 2 seconds.   Neurological:      General: No focal deficit present.      Mental Status: She is alert and oriented to person, place, and time. Mental status is at baseline.   Psychiatric:         Mood and Affect: Mood normal.         Behavior: Behavior normal.         Thought Content: Thought content normal.         Judgment: Judgment normal.       Administrative Statements   I have spent a total time of 30 minutes in caring for this patient on the day of the visit/encounter including Diagnostic results, Prognosis, Risks and benefits of tx options, Instructions for management, Patient and family education, Importance of tx compliance, Risk factor reductions, Impressions, Counseling / Coordination of care, Documenting in the medical record, Reviewing / ordering tests, medicine, procedures  , and Obtaining or reviewing history  .    Raz Dickens DO  PGY-2 Family Medicine - [place]   08/30/24, 12:37 PM

## 2024-08-30 NOTE — ASSESSMENT & PLAN NOTE
Wt Readings from Last 3 Encounters:   08/30/24 67.6 kg (149 lb)   08/13/24 68 kg (150 lb)   08/06/24 66.8 kg (147 lb 3.2 oz)       Patient states she used to take as needed Lasix for extremity swelling.  She states she is only take this medication once per week.  It was discontinued at a former time due to concerns of orthostatic hypotension  secondary to this medication.  She is inquiring about it again today as she notes she has been swelling again.  Most notably in her hands rather than her legs.  No shortness of breath chest pain or palpitations or other signs of systemic overload however I will send her with 30 days of as needed weekly Lasix for her to take.  She is 107/71 for her blood pressure today in the office and I did give her strict precautions to only take the medication the comfort of her own home and only on a weekly basis as to not precipitate more hypotension and orthostasis.  Patient agreeable to this plan.

## 2024-08-30 NOTE — ASSESSMENT & PLAN NOTE
Taking 20 mg of melatonin nightly in conjunction with Unisom and her 5 mg of Ambien.  Her Ambien was recently titrated down from 10 mg.  I told her to continue on course even though this is frustrating it will take time for her to get used to the new dose of Ambien but her 10 mg have been in the past was too high.  I will send her with 20 mg of melatonin prescription show she does not have to pay for it.  I hope eventually she can titrate off of this high dose of melatonin as well.  Will continue to follow along.

## 2024-09-12 PROBLEM — H61.22 IMPACTED CERUMEN OF LEFT EAR: Status: RESOLVED | Noted: 2024-08-13 | Resolved: 2024-09-12

## 2024-09-26 ENCOUNTER — OFFICE VISIT (OUTPATIENT)
Dept: FAMILY MEDICINE CLINIC | Facility: CLINIC | Age: 79
End: 2024-09-26

## 2024-09-26 VITALS
TEMPERATURE: 98.1 F | RESPIRATION RATE: 16 BRPM | SYSTOLIC BLOOD PRESSURE: 94 MMHG | HEIGHT: 64 IN | HEART RATE: 78 BPM | BODY MASS INDEX: 26.12 KG/M2 | DIASTOLIC BLOOD PRESSURE: 65 MMHG | OXYGEN SATURATION: 97 % | WEIGHT: 153 LBS

## 2024-09-26 DIAGNOSIS — G47.00 INSOMNIA, UNSPECIFIED TYPE: ICD-10-CM

## 2024-09-26 DIAGNOSIS — M62.838 MUSCLE SPASM: ICD-10-CM

## 2024-09-26 DIAGNOSIS — M79.18 MYOFASCIAL PAIN SYNDROME: ICD-10-CM

## 2024-09-26 PROCEDURE — G2211 COMPLEX E/M VISIT ADD ON: HCPCS | Performed by: FAMILY MEDICINE

## 2024-09-26 PROCEDURE — 99213 OFFICE O/P EST LOW 20 MIN: CPT | Performed by: FAMILY MEDICINE

## 2024-09-26 RX ORDER — CYCLOBENZAPRINE HCL 5 MG
5 TABLET ORAL
Qty: 15 TABLET | Refills: 0 | Status: SHIPPED | OUTPATIENT
Start: 2024-09-26

## 2024-09-26 RX ORDER — ZOLPIDEM TARTRATE 5 MG/1
5 TABLET ORAL
Qty: 30 TABLET | Refills: 0 | Status: SHIPPED | OUTPATIENT
Start: 2024-09-26

## 2024-09-26 RX ORDER — OXYCODONE HYDROCHLORIDE 10 MG/1
10 TABLET ORAL EVERY 8 HOURS PRN
Qty: 93 TABLET | Refills: 0 | Status: SHIPPED | OUTPATIENT
Start: 2024-09-26 | End: 2024-10-26

## 2024-09-26 NOTE — ASSESSMENT & PLAN NOTE
Patient presents for discussion on refill of controlled substances.  We discussed unsafe use of Oxy, Ambien and muscle relaxants together and future plan to safely wean off   Patient was initially frustrated with wean but later acceptable  PDMP reviewed  Refilled Oxy 10 mg Q 8 hr( previously on 10 mg q 6), did not sign/update contract today due to lack of time a today's visit and Ambien 5 mg daily( previously on 5 mg daily)  Patient was unable to provide urine for drug screen today.  Patient will prefer follow up visits with Dr Dickens      Orders:    oxyCODONE (ROXICODONE) 10 MG TABS; Take 1 tablet (10 mg total) by mouth every 8 (eight) hours as needed for moderate pain Max Daily Amount: 30 mg

## 2024-09-26 NOTE — ASSESSMENT & PLAN NOTE
Orders:    cyclobenzaprine (FLEXERIL) 5 mg tablet; Take 1 tablet (5 mg total) by mouth daily at bedtime

## 2024-09-26 NOTE — PROGRESS NOTES
Ambulatory Visit  Name: Lisa Marques      : 1945      MRN: [id number]  Encounter Provider: Felicity Aviles MD  Encounter Date: 2024   Encounter department: [facility]    Assessment & Plan  Insomnia, unspecified type    Orders:    zolpidem (AMBIEN) 5 mg tablet; Take 1 tablet (5 mg total) by mouth daily at bedtime as needed for sleep    Muscle spasm    Orders:    cyclobenzaprine (FLEXERIL) 5 mg tablet; Take 1 tablet (5 mg total) by mouth daily at bedtime    Myofascial pain syndrome  Patient presents for discussion on refill of controlled substances.  We discussed unsafe use of Oxy, Ambien and muscle relaxants together and future plan to safely wean off   Patient was initially frustrated with wean but later acceptable  PDMP reviewed  Refilled Oxy 10 mg Q 8 hr( previously on 10 mg q 6), did not sign/update contract today due to lack of time a today's visit and Ambien 5 mg daily( previously on 5 mg daily)  Patient was unable to provide urine for drug screen today.  Patient will prefer follow up visits with Dr Dickens      Orders:    oxyCODONE (ROXICODONE) 10 MG TABS; Take 1 tablet (10 mg total) by mouth every 8 (eight) hours as needed for moderate pain Max Daily Amount: 30 mg       History of Present Illness     HPI  Patient here to discuss refill of controlled substances.    History obtained from : patient  Review of Systems  Pertinent Medical History         Medical History Reviewed by provider this encounter:       Past Medical History   Past Medical History:   Diagnosis Date    Acute deep vein thrombosis of lower limb (HCC)     last assessed [id number]    Aortic aneurysm (HCC)     Arthritis     Atrial fibrillation (HCC)     Dislocation of hip (HCC)     last assessed [id number]    Hypertension     Psychiatric disorder     anxiety     Past Surgical History:   Procedure Laterality Date    HIP SURGERY      JOINT REPLACEMENT      KNEE ARTHROSCOPY Bilateral     x2 rt and  1 on left    TUBAL LIGATION       Family History   Problem Relation Age of Onset    Colon cancer Mother     Breast cancer Family     Thyroid cancer Family     Colon cancer Family     Hypertension Family     Thyroid disease Family     Hypertension Father     Dementia Father      Current Outpatient Medications on File Prior to Visit   Medication Sig Dispense Refill    apixaban (Eliquis) 5 mg Take 1 tablet (5 mg total) by mouth 2 (two) times a day 60 tablet 0    Diclofenac Sodium (VOLTAREN) 1 % Apply 1 g topically 4 (four) times a day 20 g 0    docusate sodium (COLACE) 100 mg capsule Take 1 capsule (100 mg total) by mouth 2 (two) times a day 90 capsule 0    escitalopram (LEXAPRO) 10 mg tablet Take 1 tablet (10 mg total) by mouth daily 90 tablet 1    furosemide (LASIX) 20 mg tablet Take 1 tablet (20 mg total) by mouth as needed (as needed weekly) 30 tablet 0    gabapentin (NEURONTIN) 300 mg capsule Take 3 capsules (900 mg total) by mouth 3 (three) times a day 270 capsule 1    hydrocortisone 2.5 % cream Apply topically 2 (two) times a day As needed 28 g 1    lidocaine (LIDODERM) 5 % Apply 2 patches topically over 12 hours daily Remove & Discard patch within 12 hours or as directed by MD 10 patch 0    Melatonin 5 MG TABS Take 2 tablets (10 mg total) by mouth daily at bedtime as needed (as needed for insomnia) 60 tablet 0    metoprolol succinate (TOPROL-XL) 50 mg 24 hr tablet Take 1 tablet (50 mg total) by mouth 2 (two) times a day 180 tablet 3    polyethylene glycol (MIRALAX) 17 g packet Take 17 g by mouth daily 20 each 1    senna (SENOKOT) 8.6 mg Take 1 tablet (8.6 mg total) by mouth daily at bedtime 30 tablet 3    [DISCONTINUED] cyclobenzaprine (FLEXERIL) 5 mg tablet Take 1 tablet (5 mg total) by mouth daily at bedtime 15 tablet 0    [DISCONTINUED] oxyCODONE (ROXICODONE) 10 MG TABS Take 1 tablet (10 mg total) by mouth every 8 (eight) hours as needed for moderate pain Max Daily Amount: 30 mg 100 tablet 0     [DISCONTINUED] zolpidem (AMBIEN) 5 mg tablet Take 1 tablet (5 mg total) by mouth daily at bedtime as needed for sleep 30 tablet 0     No current facility-administered medications on file prior to visit.   No Known Allergies   Current Outpatient Medications on File Prior to Visit   Medication Sig Dispense Refill    apixaban (Eliquis) 5 mg Take 1 tablet (5 mg total) by mouth 2 (two) times a day 60 tablet 0    Diclofenac Sodium (VOLTAREN) 1 % Apply 1 g topically 4 (four) times a day 20 g 0    docusate sodium (COLACE) 100 mg capsule Take 1 capsule (100 mg total) by mouth 2 (two) times a day 90 capsule 0    escitalopram (LEXAPRO) 10 mg tablet Take 1 tablet (10 mg total) by mouth daily 90 tablet 1    furosemide (LASIX) 20 mg tablet Take 1 tablet (20 mg total) by mouth as needed (as needed weekly) 30 tablet 0    gabapentin (NEURONTIN) 300 mg capsule Take 3 capsules (900 mg total) by mouth 3 (three) times a day 270 capsule 1    hydrocortisone 2.5 % cream Apply topically 2 (two) times a day As needed 28 g 1    lidocaine (LIDODERM) 5 % Apply 2 patches topically over 12 hours daily Remove & Discard patch within 12 hours or as directed by MD 10 patch 0    Melatonin 5 MG TABS Take 2 tablets (10 mg total) by mouth daily at bedtime as needed (as needed for insomnia) 60 tablet 0    metoprolol succinate (TOPROL-XL) 50 mg 24 hr tablet Take 1 tablet (50 mg total) by mouth 2 (two) times a day 180 tablet 3    polyethylene glycol (MIRALAX) 17 g packet Take 17 g by mouth daily 20 each 1    senna (SENOKOT) 8.6 mg Take 1 tablet (8.6 mg total) by mouth daily at bedtime 30 tablet 3    [DISCONTINUED] cyclobenzaprine (FLEXERIL) 5 mg tablet Take 1 tablet (5 mg total) by mouth daily at bedtime 15 tablet 0    [DISCONTINUED] oxyCODONE (ROXICODONE) 10 MG TABS Take 1 tablet (10 mg total) by mouth every 8 (eight) hours as needed for moderate pain Max Daily Amount: 30 mg 100 tablet 0    [DISCONTINUED] zolpidem (AMBIEN) 5 mg tablet Take 1 tablet (5 mg  "total) by mouth daily at bedtime as needed for sleep 30 tablet 0     No current facility-administered medications on file prior to visit.      Social History     Tobacco Use    Smoking status: Never    Smokeless tobacco: Never   Vaping Use    Vaping status: Never Used   Substance and Sexual Activity    Alcohol use: No     Comment: none    Drug use: No    Sexual activity: Not Currently     Partners: Male     Birth control/protection: Female Sterilization         Objective     BP 94/65 (BP Location: Right arm, Patient Position: Sitting, Cuff Size: Standard)   Pulse 78   Temp 98.1 °F (36.7 °C) (Temporal)   Resp 16   Ht 5' 3.5\" (1.613 m)   Wt 69.4 kg (153 lb)   SpO2 97%   BMI 26.68 kg/m²     Physical Exam  Constitutional:       Appearance: Normal appearance.   HENT:      Head: Normocephalic and atraumatic.   Cardiovascular:      Rate and Rhythm: Normal rate.      Pulses: Normal pulses.   Pulmonary:      Effort: Pulmonary effort is normal. No respiratory distress.      Breath sounds: No wheezing or rales.   Musculoskeletal:      Cervical back: Normal range of motion.   Skin:     General: Skin is warm and dry.         "

## 2024-09-26 NOTE — ASSESSMENT & PLAN NOTE
Orders:    zolpidem (AMBIEN) 5 mg tablet; Take 1 tablet (5 mg total) by mouth daily at bedtime as needed for sleep

## 2024-09-30 DIAGNOSIS — M79.18 MYOFASCIAL PAIN SYNDROME: ICD-10-CM

## 2024-09-30 RX ORDER — OXYCODONE HYDROCHLORIDE 10 MG/1
10 TABLET ORAL EVERY 8 HOURS PRN
Qty: 93 TABLET | Refills: 0 | Status: SHIPPED | OUTPATIENT
Start: 2024-09-30 | End: 2024-10-30

## 2024-09-30 NOTE — TELEPHONE ENCOUNTER
Pharmacy called asking if its possible to send the Oxycodone to [facility]'s pharmacy because [facility] cannot fill the prescription.  Address: [address], [place] [address]

## 2024-10-24 ENCOUNTER — TELEPHONE (OUTPATIENT)
Dept: FAMILY MEDICINE CLINIC | Facility: CLINIC | Age: 79
End: 2024-10-24

## 2024-10-24 NOTE — TELEPHONE ENCOUNTER
Patient left message for refill of Oxycodone and Zolpidem. She knows it's early but she stated pharmacy has to order Oxycodone. The oxycodone is to go to [facility].

## 2024-10-25 DIAGNOSIS — G47.00 INSOMNIA, UNSPECIFIED TYPE: ICD-10-CM

## 2024-10-25 DIAGNOSIS — M79.18 MYOFASCIAL PAIN SYNDROME: ICD-10-CM

## 2024-10-25 RX ORDER — ZOLPIDEM TARTRATE 5 MG/1
5 TABLET ORAL
Qty: 30 TABLET | Refills: 0 | Status: SHIPPED | OUTPATIENT
Start: 2024-10-25

## 2024-10-25 RX ORDER — OXYCODONE HYDROCHLORIDE 10 MG/1
10 TABLET ORAL EVERY 8 HOURS PRN
Qty: 90 TABLET | Refills: 0 | Status: SHIPPED | OUTPATIENT
Start: 2024-10-27 | End: 2024-11-26

## 2024-10-25 NOTE — TELEPHONE ENCOUNTER
Please call patient, Rx has been ordered, should be seen before next refill is due. Please schedule.

## 2024-10-29 ENCOUNTER — RA CDI HCC (OUTPATIENT)
Dept: OTHER | Facility: HOSPITAL | Age: 79
End: 2024-10-29

## 2024-11-01 ENCOUNTER — OFFICE VISIT (OUTPATIENT)
Dept: FAMILY MEDICINE CLINIC | Facility: CLINIC | Age: 79
End: 2024-11-01

## 2024-11-01 ENCOUNTER — TELEPHONE (OUTPATIENT)
Dept: FAMILY MEDICINE CLINIC | Facility: CLINIC | Age: 79
End: 2024-11-01

## 2024-11-01 VITALS
HEIGHT: 64 IN | DIASTOLIC BLOOD PRESSURE: 56 MMHG | TEMPERATURE: 98 F | RESPIRATION RATE: 16 BRPM | HEART RATE: 102 BPM | BODY MASS INDEX: 24.96 KG/M2 | SYSTOLIC BLOOD PRESSURE: 77 MMHG | WEIGHT: 146.2 LBS | OXYGEN SATURATION: 96 %

## 2024-11-01 DIAGNOSIS — M62.838 MUSCLE SPASM: ICD-10-CM

## 2024-11-01 DIAGNOSIS — E86.1 HYPOTENSION DUE TO HYPOVOLEMIA: ICD-10-CM

## 2024-11-01 DIAGNOSIS — G47.00 INSOMNIA, UNSPECIFIED TYPE: ICD-10-CM

## 2024-11-01 DIAGNOSIS — M79.601 MUSCULOSKELETAL ARM PAIN, RIGHT: ICD-10-CM

## 2024-11-01 DIAGNOSIS — Z79.891 LONG TERM (CURRENT) USE OF OPIATE ANALGESIC: Primary | ICD-10-CM

## 2024-11-01 DIAGNOSIS — I48.0 PAROXYSMAL ATRIAL FIBRILLATION (HCC): ICD-10-CM

## 2024-11-01 DIAGNOSIS — E55.9 VITAMIN D DEFICIENCY: ICD-10-CM

## 2024-11-01 PROCEDURE — 99214 OFFICE O/P EST MOD 30 MIN: CPT | Performed by: FAMILY MEDICINE

## 2024-11-01 PROCEDURE — G2211 COMPLEX E/M VISIT ADD ON: HCPCS | Performed by: FAMILY MEDICINE

## 2024-11-01 RX ORDER — OXYCODONE HYDROCHLORIDE 10 MG/1
10 TABLET ORAL EVERY 8 HOURS PRN
Qty: 90 TABLET | Refills: 0 | Status: CANCELLED | OUTPATIENT
Start: 2024-11-01 | End: 2024-12-01

## 2024-11-01 RX ORDER — CYCLOBENZAPRINE HCL 5 MG
5 TABLET ORAL
Qty: 15 TABLET | Refills: 0 | Status: SHIPPED | OUTPATIENT
Start: 2024-11-01

## 2024-11-01 RX ORDER — GABAPENTIN 300 MG/1
900 CAPSULE ORAL 3 TIMES DAILY
Qty: 270 CAPSULE | Refills: 1 | Status: SHIPPED | OUTPATIENT
Start: 2024-11-01

## 2024-11-01 NOTE — ASSESSMENT & PLAN NOTE
Refill given  Orders:    gabapentin (NEURONTIN) 300 mg capsule; Take 3 capsules (900 mg total) by mouth 3 (three) times a day

## 2024-11-01 NOTE — TELEPHONE ENCOUNTER
Attempted made to collect urine sample at appointment time today 11/1/24 for Opioid Management follow up to sent to lab for the Oxycodone/Oxymorphone and patient was unable to provided it due to be dehydrated. Water was provided to patient and also patient have a water bottle to hydrated herself. After three hours waiting to collect the urine sample patient still not feeling the urge to urinate. Patient state same situation happen last time a urine sample was attempt to collect at the appointment time. Patient inform me was able to go to the lab and have the urine test completed.     After my conversation with Dr. Dickens  urine ordered placed to be collected at the lab. Patient has been informed.

## 2024-11-01 NOTE — ASSESSMENT & PLAN NOTE
Oxycodone refilled 10/28. No refills today  Pt unable to give urine today... sent for lab collect of urine for oxy   Orders:    Oxycodone/Oxymorphone urine; Future    Magnesium; Future

## 2024-11-01 NOTE — PROGRESS NOTES
Ambulatory Visit  Name: Lisa Marques      : 1945      MRN: [id number]  Encounter Provider: Raz Dickens DO  Encounter Date: 2024   Encounter department: [facility]    Assessment & Plan  Long term (current) use of opiate analgesic  Oxycodone refilled 10/28. No refills today  Pt unable to give urine today... sent for lab collect of urine for oxy   Orders:    Oxycodone/Oxymorphone urine; Future    Magnesium; Future    Hypotension due to hypovolemia  Patient hypotensive 70s over 40s in the office.  This is not a new problem for her.  She has previously been to the hospital for hypovolemia induced hypotension.    In discussion with her she is not drinking much water during the day  We discussed normal amount of p.o. intake of fluid consumption per day.  Patient agreeable to plan to drink more water.    She has no symptoms including no lightheadedness dizziness or presyncopal symptoms.  No chest pain shortness of breath abdominal pain or other symptomatology to contribute to her presentation here.    Blood pressure rechecked and accurate initial reading.    Patient has been on Lasix as needed for lower extremity swelling.  I discontinued this medication today.    Check CMP for end organ symptoms of hypoperfusion  Also check CBC for hx of anemia as contributing etiology  Check mag     Orders:    Comprehensive metabolic panel; Future    CBC and differential; Future    Paroxysmal atrial fibrillation (HCC)  In sinus today on exam. Rate controlled   Continues with BID eliquis   Saw cardio in   Continues with Metoprolol as well    Orders:    apixaban (Eliquis) 5 mg; Take 1 tablet (5 mg total) by mouth 2 (two) times a day    Musculoskeletal arm pain, right  Refill given  Orders:    gabapentin (NEURONTIN) 300 mg capsule; Take 3 capsules (900 mg total) by mouth 3 (three) times a day    Muscle spasm  15 pills zero refills.  Emphasized not the best med to be on with her  "afib, benzo and opioid use and frailty.  She voiced understanding     Orders:    cyclobenzaprine (FLEXERIL) 5 mg tablet; Take 1 tablet (5 mg total) by mouth daily at bedtime    Vitamin D deficiency  Reckeck level. Hx of deficiency   Orders:    Vitamin D 25 hydroxy; Future    Insomnia, unspecified type         Treatment Plan: - continually working on wean. Recently downtitrated to 5mg q8 hours of oxycodone 5mg     - also downtitrated ambien to 5mg from 10mg     Treatment Goals: Intensively counseled on wean and downtitration    Opiate risks  There are risks associated with opioid medications, including dependence, addiction and tolerance. The patient understands and agrees to use these medications only as prescribed. Potential side effects of the medications include, but are not limited to, constipation, drowsiness, addiction, impaired judgment and risk of fatal overdose if not taken as prescribed. The patient was warned against driving while taking sedation medications.  Sharing medications is a felony. At this point in time, the patient is showing no signs of addiction, abuse, diversion or suicidal ideation.      Pateint is taking concurrent benzodiazepines. Has been counseled on the risks of taking opioids and benzodiazepines including sedation, increased fall risk, dizziness, addictive potential and death.      Patient is taking concurrent muscle relaxers.  Has been counseled on the risks of taking opioids and muscle relaxers including sedation, increased fall risk, dizziness, addictive potential and death.      Drug screen  Drug screen collected during today's visit      PDMP review  [place] or [place]  reviewed. No red flags were identified; safe to proceed with prescription      discussing prognosis, risk factor reductions, impressions, documenting in the medical record and obtaining or reviewing history.           History of Present Illness         \"I need a refill\"    Current pain description: Back pain that " affects daily ADLs    Functional status: Limited. Largely is at home by herself most days     Goals of care: Continued wean off of opioids.     Social Support System  Patient receives support from their: daughter    Lives alone     Screening Tools/Assessments:    PHQ-2/9:  Last PHQ-2 score: 0 (Last PHQ-2 date: 5/2/2024)    Brief Pain Inventory (BPI):  1) Throughout our lives, most of us have had pain from time to time (such as minor headaches, sprains, and toothaches). Have you had pain other than these everyday kinds of pain today? Yes  2) Where is your pain located? back  3) Rate your pain at its worst in the last 24 hours: 7  4) Rate your pain at its least in the last 24 hours: 5  5) Rate your average level of pain: 6  6) Rate your pain right now: 5  7) What treatments or medications are you receiving for your pain? oxycodone  8) In the past 24 hours, how much relief have pain treatments or medication provided? 80%  9) During the past 24 hours, pain has interfered with your:     A) General activity: 7     B) Mood: 5     C) Walking ability: 8     D) Normal work (work outside the home & housework): 5     E) Relations with other people: 5     F) Sleep: 8     G) Enjoyment of life: 8    Drug Screen:  Last drug screen was performed more than 365 days ago    Drug screen comments: Ordered urine specemin to be collected in office today    Opioid agreement:  Active Opioid agreement on file?: No    Opioid agreement signed date: 11/18/2022  Opioid agreement expiration date: 11/18/2023    Naloxone:  Currently prescribed Naloxone (Narcan): No    Reason not prescribing Naloxone: patient declines    Outpatient Morphine Milligram Equivalents Per Day       11/1/24 - 11/26/24 45 MME/Day      Order Name Dose Route Frequency Maximum MME/Day     oxyCODONE (ROXICODONE) 10 MG TABS 10 mg Oral Every 8 hours PRN 45 MME/Day    Total Potential Morphine Milligram Equivalents Per Day 45 MME/Day      Calculation Information          oxyCODONE  "(ROXICODONE) 10 MG TABS    oxyCODONE 10 MG Tabs: maximum daily dose of 30 mg * morphine equivalence factor of 1.5 = 45 MME/Day                             11/27/24 and after None                  PDMP Review         Value Time User    PDMP Reviewed  Yes 11/1/2024  2:58 PM Kacie Chisholm MD           Review of Systems   Constitutional:  Negative for fatigue, fever and unexpected weight change.   HENT:  Negative for congestion, sinus pressure, sinus pain and sore throat.    Eyes:  Negative for visual disturbance.   Respiratory:  Negative for cough, chest tightness, shortness of breath and wheezing.    Cardiovascular:  Negative for chest pain, palpitations and leg swelling.   Gastrointestinal:  Negative for abdominal pain, blood in stool, constipation, diarrhea, nausea, rectal pain and vomiting.   Endocrine: Negative for polydipsia, polyphagia and polyuria.   Genitourinary:  Negative for difficulty urinating, dysuria and urgency.   Musculoskeletal:  Negative for arthralgias and back pain.   Skin:  Negative for rash.   Neurological:  Negative for dizziness, weakness, light-headedness, numbness and headaches.   Psychiatric/Behavioral:  Negative for behavioral problems and confusion. The patient is not nervous/anxious.      Objective     BP (!) 77/56 (BP Location: Left arm, Patient Position: Sitting, Cuff Size: Standard)   Pulse 102   Temp 98 °F (36.7 °C) (Temporal)   Resp 16   Ht 5' 3.5\" (1.613 m)   Wt 66.3 kg (146 lb 3.2 oz)   SpO2 96%   BMI 25.49 kg/m²     Physical Exam  Constitutional:       General: She is not in acute distress.     Appearance: Normal appearance. She is normal weight. She is not ill-appearing or toxic-appearing.   HENT:      Head: Normocephalic and atraumatic.      Right Ear: External ear normal.      Left Ear: External ear normal.      Nose: Nose normal.      Mouth/Throat:      Pharynx: Oropharynx is clear.   Eyes:      Conjunctiva/sclera: Conjunctivae normal.   Cardiovascular:      " Rate and Rhythm: Normal rate and regular rhythm.      Pulses: Normal pulses.      Heart sounds: Normal heart sounds. No murmur heard.  Pulmonary:      Effort: Pulmonary effort is normal. No respiratory distress.      Breath sounds: Normal breath sounds. No stridor. No wheezing, rhonchi or rales.   Abdominal:      Palpations: Abdomen is soft.   Musculoskeletal:         General: Normal range of motion.      Cervical back: Normal range of motion and neck supple.   Skin:     General: Skin is warm and dry.      Capillary Refill: Capillary refill takes less than 2 seconds.   Neurological:      General: No focal deficit present.      Mental Status: She is alert and oriented to person, place, and time. Mental status is at baseline.   Psychiatric:         Mood and Affect: Mood normal.         Behavior: Behavior normal.         Thought Content: Thought content normal.         Judgment: Judgment normal.         Raz Dickens DO  PGY-2 Family Medicine - [place]   11/01/24, 3:16 PM

## 2024-11-01 NOTE — ASSESSMENT & PLAN NOTE
Patient hypotensive 70s over 40s in the office.  This is not a new problem for her.  She has previously been to the hospital for hypovolemia induced hypotension.    In discussion with her she is not drinking much water during the day  We discussed normal amount of p.o. intake of fluid consumption per day.  Patient agreeable to plan to drink more water.    She has no symptoms including no lightheadedness dizziness or presyncopal symptoms.  No chest pain shortness of breath abdominal pain or other symptomatology to contribute to her presentation here.    Blood pressure rechecked and accurate initial reading.    Patient has been on Lasix as needed for lower extremity swelling.  I discontinued this medication today.    Check CMP for end organ symptoms of hypoperfusion  Also check CBC for hx of anemia as contributing etiology  Check mag     Orders:    Comprehensive metabolic panel; Future    CBC and differential; Future

## 2024-11-01 NOTE — ASSESSMENT & PLAN NOTE
15 pills zero refills.  Emphasized not the best med to be on with her afib, benzo and opioid use and frailty.  She voiced understanding     Orders:    cyclobenzaprine (FLEXERIL) 5 mg tablet; Take 1 tablet (5 mg total) by mouth daily at bedtime

## 2024-11-01 NOTE — PROGRESS NOTES
Ambulatory Visit  Name: Lisa Marques      : 1945      MRN: [id number]  Encounter Provider: Raz Dickens DO  Encounter Date: 2024   Encounter department: [facility]       Opioid Management Plan        History of Present Illness     Opioid Management:   Type of visit: Follow-up    Pain related diagnoses: knee OA, spondylolithesis    Screening Tools/Assessments:    PHQ-2/9:  Last PHQ-2 score: 0 (Last PHQ-2 date: 2024)      Opioid agreement:  Active Opioid agreement on file?: No    Opioid agreement signed date: 2022  Opioid agreement expiration date: 2023    Naloxone:  Currently prescribed Naloxone (Narcan): No        Outpatient Morphine Milligram Equivalents Per Day       24 - 24 45 MME/Day      Order Name Dose Route Frequency Maximum MME/Day     oxyCODONE (ROXICODONE) 10 MG TABS 10 mg Oral Every 8 hours PRN 45 MME/Day    Total Potential Morphine Milligram Equivalents Per Day 45 MME/Day      Calculation Information          oxyCODONE (ROXICODONE) 10 MG TABS    oxyCODONE 10 MG Tabs: maximum daily dose of 30 mg * morphine equivalence factor of 1.5 = 45 MME/Day                             24 and after None                  PDMP Review         Value Time User    PDMP Reviewed  Yes 10/25/2024  1:14 PM Felicity Aviles MD           Review of Systems  Objective     There were no vitals taken for this visit.       Physical Exam

## 2024-11-19 ENCOUNTER — OFFICE VISIT (OUTPATIENT)
Dept: FAMILY MEDICINE CLINIC | Facility: CLINIC | Age: 79
End: 2024-11-19

## 2024-11-19 VITALS
BODY MASS INDEX: 26.19 KG/M2 | TEMPERATURE: 98 F | RESPIRATION RATE: 16 BRPM | DIASTOLIC BLOOD PRESSURE: 71 MMHG | SYSTOLIC BLOOD PRESSURE: 101 MMHG | WEIGHT: 150.2 LBS | OXYGEN SATURATION: 96 % | HEART RATE: 97 BPM

## 2024-11-19 DIAGNOSIS — G47.00 INSOMNIA, UNSPECIFIED TYPE: ICD-10-CM

## 2024-11-19 DIAGNOSIS — M62.838 MUSCLE SPASM: ICD-10-CM

## 2024-11-19 DIAGNOSIS — E86.1 HYPOTENSION DUE TO HYPOVOLEMIA: ICD-10-CM

## 2024-11-19 DIAGNOSIS — M79.18 MYOFASCIAL PAIN SYNDROME: ICD-10-CM

## 2024-11-19 DIAGNOSIS — F11.220 OPIOID DEPENDENCE WITH UNCOMPLICATED INTOXICATION (HCC): Primary | ICD-10-CM

## 2024-11-19 PROBLEM — D69.6 THROMBOCYTOPENIA (HCC): Status: ACTIVE | Noted: 2024-11-19

## 2024-11-19 PROCEDURE — 80365 DRUG SCREENING OXYCODONE: CPT

## 2024-11-19 PROCEDURE — G2211 COMPLEX E/M VISIT ADD ON: HCPCS

## 2024-11-19 PROCEDURE — 99213 OFFICE O/P EST LOW 20 MIN: CPT

## 2024-11-19 PROCEDURE — 80307 DRUG TEST PRSMV CHEM ANLYZR: CPT

## 2024-11-19 RX ORDER — ZOLPIDEM TARTRATE 5 MG/1
5 TABLET ORAL
Qty: 30 TABLET | Refills: 0 | Status: SHIPPED | OUTPATIENT
Start: 2024-11-19

## 2024-11-19 RX ORDER — OXYCODONE HYDROCHLORIDE 10 MG/1
10 TABLET ORAL EVERY 8 HOURS PRN
Qty: 90 TABLET | Refills: 0 | Status: SHIPPED | OUTPATIENT
Start: 2024-11-19 | End: 2024-11-21

## 2024-11-19 RX ORDER — CYCLOBENZAPRINE HCL 5 MG
5 TABLET ORAL
Qty: 30 TABLET | Refills: 1 | Status: SHIPPED | OUTPATIENT
Start: 2024-11-19

## 2024-11-19 NOTE — ASSESSMENT & PLAN NOTE
Doing better here. Blood pressure improved today  Her etiology is decrease PO intake.   Last visit I encouraged her to drink more water, and today her pressures are much improves, she also notes that she has been feeling better  Lasix was discontinued last visit, she is doing well off of it

## 2024-11-19 NOTE — ASSESSMENT & PLAN NOTE
Refilled cyclobenzaprine.   Orders:    cyclobenzaprine (FLEXERIL) 5 mg tablet; Take 1 tablet (5 mg total) by mouth daily at bedtime

## 2024-11-19 NOTE — PROGRESS NOTES
Name: Lisa Marques      : 1945      MRN: [id number]  Encounter Provider: Raz Dickens DO  Encounter Date: 2024   Encounter department: [facility]  :  Assessment & Plan  Opioid dependence with uncomplicated intoxication (HCC)  PDMP reviewed. Sent for 90 tabs of 10mg oxycodone every 8 hours.   Pt asking for 100 tabs but this is not appropriate   She has been weaned down from 120 tabs monthly recently.   Doing well with weaning regemin   Anxious about going away for the holidays in the setting of not having her pain meds. We discussed a plan and pt feels more calm at the endd of visit     Orders:    Oxycodone/Oxymorphone urine; Future    Muscle spasm  Refilled cyclobenzaprine.   Orders:    cyclobenzaprine (FLEXERIL) 5 mg tablet; Take 1 tablet (5 mg total) by mouth daily at bedtime    Hypotension due to hypovolemia  Doing better here. Blood pressure improved today  Her etiology is decrease PO intake.   Last visit I encouraged her to drink more water, and today her pressures are much improves, she also notes that she has been feeling better  Lasix was discontinued last visit, she is doing well off of it       Insomnia, unspecified type  Refill ambien 30 tabs 5mg HS   Doing well from recent wean off of 10mg HS   Orders:    zolpidem (AMBIEN) 5 mg tablet; Take 1 tablet (5 mg total) by mouth daily at bedtime as needed for sleep    Myofascial pain syndrome    Orders:    oxyCODONE (ROXICODONE) 10 MG TABS; Take 1 tablet (10 mg total) by mouth every 8 (eight) hours as needed for moderate pain Max Daily Amount: 30 mg           History of Present Illness     Pt following up today for concerns about medication refills. She is traveling for [employer] and is anxious about not getting her oxycodone before she travels. She is also inquiring about ambien and flexeril refills.Otherwise no concerns. She has been drinking more water and her BP is improved here. I discontinued her  lasix last visit.      Review of Systems   Constitutional:  Negative for fatigue, fever and unexpected weight change.   HENT:  Negative for congestion, sinus pressure, sinus pain and sore throat.    Eyes:  Negative for visual disturbance.   Respiratory:  Negative for cough, chest tightness, shortness of breath and wheezing.    Cardiovascular:  Negative for chest pain, palpitations and leg swelling.   Gastrointestinal:  Negative for abdominal pain, constipation, diarrhea, nausea and vomiting.   Endocrine: Negative for polydipsia, polyphagia and polyuria.   Genitourinary:  Negative for difficulty urinating, dysuria and urgency.   Musculoskeletal:  Negative for arthralgias and back pain.   Skin:  Negative for rash.   Neurological:  Negative for dizziness, weakness, light-headedness, numbness and headaches.   Psychiatric/Behavioral:  Negative for behavioral problems and confusion. The patient is not nervous/anxious.      Current Outpatient Medications on File Prior to Visit   Medication Sig Dispense Refill    apixaban (Eliquis) 5 mg Take 1 tablet (5 mg total) by mouth 2 (two) times a day 60 tablet 0    Diclofenac Sodium (VOLTAREN) 1 % Apply 1 g topically 4 (four) times a day 20 g 0    docusate sodium (COLACE) 100 mg capsule Take 1 capsule (100 mg total) by mouth 2 (two) times a day 90 capsule 0    escitalopram (LEXAPRO) 10 mg tablet Take 1 tablet (10 mg total) by mouth daily 90 tablet 1    gabapentin (NEURONTIN) 300 mg capsule Take 3 capsules (900 mg total) by mouth 3 (three) times a day 270 capsule 1    hydrocortisone 2.5 % cream Apply topically 2 (two) times a day As needed 28 g 1    lidocaine (LIDODERM) 5 % Apply 2 patches topically over 12 hours daily Remove & Discard patch within 12 hours or as directed by MD 10 patch 0    Melatonin 5 MG TABS Take 2 tablets (10 mg total) by mouth daily at bedtime as needed (as needed for insomnia) 60 tablet 0    metoprolol succinate (TOPROL-XL) 50 mg 24 hr tablet Take 1 tablet (50  mg total) by mouth 2 (two) times a day 180 tablet 3    polyethylene glycol (MIRALAX) 17 g packet Take 17 g by mouth daily 20 each 1    senna (SENOKOT) 8.6 mg Take 1 tablet (8.6 mg total) by mouth daily at bedtime 30 tablet 3    [DISCONTINUED] cyclobenzaprine (FLEXERIL) 5 mg tablet Take 1 tablet (5 mg total) by mouth daily at bedtime 15 tablet 0    [DISCONTINUED] oxyCODONE (ROXICODONE) 10 MG TABS Take 1 tablet (10 mg total) by mouth every 8 (eight) hours as needed for moderate pain Max Daily Amount: 30 mg Do not start before October 27, 2024. 90 tablet 0    [DISCONTINUED] zolpidem (AMBIEN) 5 mg tablet Take 1 tablet (5 mg total) by mouth daily at bedtime as needed for sleep 30 tablet 0     No current facility-administered medications on file prior to visit.         Objective   /71 (BP Location: Left arm, Patient Position: Sitting, Cuff Size: Standard)   Pulse 97   Temp 98 °F (36.7 °C)   Resp 16   Wt 68.1 kg (150 lb 3.2 oz)   SpO2 96%   BMI 26.19 kg/m²      Physical Exam  Constitutional:       General: She is not in acute distress.     Appearance: Normal appearance. She is normal weight. She is not ill-appearing or toxic-appearing.   HENT:      Head: Normocephalic and atraumatic.      Right Ear: External ear normal.      Left Ear: External ear normal.      Nose: Nose normal.      Mouth/Throat:      Pharynx: Oropharynx is clear.   Eyes:      Conjunctiva/sclera: Conjunctivae normal.   Cardiovascular:      Rate and Rhythm: Normal rate and regular rhythm.      Pulses: Normal pulses.      Heart sounds: Normal heart sounds. No murmur heard.     No friction rub. No gallop.   Pulmonary:      Effort: Pulmonary effort is normal. No respiratory distress.      Breath sounds: Normal breath sounds. No stridor. No wheezing, rhonchi or rales.   Abdominal:      General: Abdomen is flat.      Palpations: Abdomen is soft.   Musculoskeletal:         General: Normal range of motion.      Cervical back: Normal range of motion  and neck supple.   Skin:     General: Skin is warm and dry.      Capillary Refill: Capillary refill takes less than 2 seconds.   Neurological:      Mental Status: She is alert and oriented to person, place, and time. Mental status is at baseline.   Psychiatric:         Mood and Affect: Mood normal.         Behavior: Behavior normal.         Thought Content: Thought content normal.         Judgment: Judgment normal.       Raz Dickens DO  PGY-2 [facility]   11/19/24, 4:01 PM

## 2024-11-19 NOTE — ASSESSMENT & PLAN NOTE
Refill ambien 30 tabs 5mg HS   Doing well from recent wean off of 10mg HS   Orders:    zolpidem (AMBIEN) 5 mg tablet; Take 1 tablet (5 mg total) by mouth daily at bedtime as needed for sleep

## 2024-11-19 NOTE — ASSESSMENT & PLAN NOTE
Orders:    oxyCODONE (ROXICODONE) 10 MG TABS; Take 1 tablet (10 mg total) by mouth every 8 (eight) hours as needed for moderate pain Max Daily Amount: 30 mg

## 2024-11-19 NOTE — ASSESSMENT & PLAN NOTE
PDMP reviewed. Sent for 90 tabs of 10mg oxycodone every 8 hours.   Pt asking for 100 tabs but this is not appropriate   She has been weaned down from 120 tabs monthly recently.   Doing well with weaning regemin   Anxious about going away for the holidays in the setting of not having her pain meds. We discussed a plan and pt feels more calm at the endd of visit     Orders:    Oxycodone/Oxymorphone urine; Future

## 2024-11-21 DIAGNOSIS — M79.18 MYOFASCIAL PAIN SYNDROME: ICD-10-CM

## 2024-11-21 RX ORDER — OXYCODONE HYDROCHLORIDE 10 MG/1
10 TABLET ORAL EVERY 8 HOURS PRN
Qty: 90 TABLET | Refills: 0 | Status: SHIPPED | OUTPATIENT
Start: 2024-11-21 | End: 2024-12-21

## 2024-11-23 LAB
OXYCODONE UR QL CFM: 1145 NG/ML
OXYCODONE+OXYMORPHONE SERPLBLD QL SCN: POSITIVE
OXYCODONE+OXYMORPHONE UR QL SCN: NORMAL NG/ML
OXYCODONE+OXYMORPHONE UR QL SCN: POSITIVE
OXYMORPHONE UR CFM-MCNC: 1432 NG/ML
OXYMORPHONE UR QL CFM: POSITIVE

## 2024-12-02 DIAGNOSIS — I48.0 PAROXYSMAL ATRIAL FIBRILLATION (HCC): ICD-10-CM

## 2024-12-02 RX ORDER — APIXABAN 5 MG/1
5 TABLET, FILM COATED ORAL 2 TIMES DAILY
Qty: 90 TABLET | Refills: 3 | Status: SHIPPED | OUTPATIENT
Start: 2024-12-02

## 2024-12-16 DIAGNOSIS — G47.00 INSOMNIA, UNSPECIFIED TYPE: ICD-10-CM

## 2024-12-16 DIAGNOSIS — M79.18 MYOFASCIAL PAIN SYNDROME: ICD-10-CM

## 2024-12-17 RX ORDER — OXYCODONE HYDROCHLORIDE 10 MG/1
10 TABLET ORAL EVERY 8 HOURS PRN
Qty: 90 TABLET | Refills: 0 | Status: SHIPPED | OUTPATIENT
Start: 2024-12-17 | End: 2025-01-16

## 2024-12-17 RX ORDER — ZOLPIDEM TARTRATE 5 MG/1
5 TABLET ORAL
Qty: 30 TABLET | Refills: 0 | Status: SHIPPED | OUTPATIENT
Start: 2024-12-17

## 2025-01-10 DIAGNOSIS — M62.838 MUSCLE SPASM: ICD-10-CM

## 2025-01-10 DIAGNOSIS — F41.8 DEPRESSION WITH ANXIETY: ICD-10-CM

## 2025-01-10 DIAGNOSIS — M79.18 MYOFASCIAL PAIN SYNDROME: ICD-10-CM

## 2025-01-10 DIAGNOSIS — G47.00 INSOMNIA, UNSPECIFIED TYPE: ICD-10-CM

## 2025-01-10 DIAGNOSIS — I48.0 PAROXYSMAL ATRIAL FIBRILLATION (HCC): ICD-10-CM

## 2025-01-10 RX ORDER — OXYCODONE HYDROCHLORIDE 10 MG/1
10 TABLET ORAL EVERY 8 HOURS PRN
Qty: 90 TABLET | Refills: 0 | Status: CANCELLED | OUTPATIENT
Start: 2025-01-10 | End: 2025-02-09

## 2025-01-10 RX ORDER — ZOLPIDEM TARTRATE 5 MG/1
5 TABLET ORAL
Qty: 30 TABLET | Refills: 0 | Status: CANCELLED | OUTPATIENT
Start: 2025-01-10

## 2025-01-10 RX ORDER — ESCITALOPRAM OXALATE 10 MG/1
10 TABLET ORAL DAILY
Qty: 90 TABLET | Refills: 1 | Status: SHIPPED | OUTPATIENT
Start: 2025-01-10

## 2025-01-10 RX ORDER — OXYCODONE HYDROCHLORIDE 10 MG/1
10 TABLET ORAL EVERY 8 HOURS PRN
Qty: 90 TABLET | Refills: 0 | Status: SHIPPED | OUTPATIENT
Start: 2025-01-10 | End: 2025-01-13 | Stop reason: SDUPTHER

## 2025-01-10 RX ORDER — ZOLPIDEM TARTRATE 5 MG/1
5 TABLET ORAL
Qty: 30 TABLET | Refills: 0 | Status: SHIPPED | OUTPATIENT
Start: 2025-01-10

## 2025-01-10 RX ORDER — CYCLOBENZAPRINE HCL 5 MG
5 TABLET ORAL
Qty: 30 TABLET | Refills: 1 | Status: SHIPPED | OUTPATIENT
Start: 2025-01-10

## 2025-01-13 DIAGNOSIS — M79.18 MYOFASCIAL PAIN SYNDROME: ICD-10-CM

## 2025-01-13 NOTE — TELEPHONE ENCOUNTER
[facility] called asking for the script for Oxycodone to be sent to them. It was originally filled for [facility] and they can not fill that medication at that pharmacy. Please review thank you

## 2025-01-14 RX ORDER — OXYCODONE HYDROCHLORIDE 10 MG/1
10 TABLET ORAL EVERY 8 HOURS PRN
Qty: 90 TABLET | Refills: 0 | Status: SHIPPED | OUTPATIENT
Start: 2025-01-14 | End: 2025-02-13

## 2025-01-15 ENCOUNTER — TELEPHONE (OUTPATIENT)
Dept: FAMILY MEDICINE CLINIC | Facility: CLINIC | Age: 80
End: 2025-01-15

## 2025-01-15 NOTE — TELEPHONE ENCOUNTER
Prior Auth submitted to [facility] for Oxycodone 5mg. Pharmacy called, patient's insurance changed 1/1/25.  [facility]  ID: [id number]  Phone: [phone]

## 2025-01-16 NOTE — TELEPHONE ENCOUNTER
Patient called clinical line asking about the Prior Auth for her Oxycodone medication. Called [facility] to check on it and the Prior Auth is still pending. Called patient and advised the Prior Auth for the medication is still pending and there is nothing we can do at this time. Patient stated she is not waiting for the results and will just pay out of pocket until it goes through. Patient was going to call the pharmacy and let them know to fill the medication.

## 2025-01-22 ENCOUNTER — APPOINTMENT (EMERGENCY)
Dept: RADIOLOGY | Facility: HOSPITAL | Age: 80
DRG: 377 | End: 2025-01-22
Payer: COMMERCIAL

## 2025-01-22 ENCOUNTER — HOSPITAL ENCOUNTER (INPATIENT)
Facility: HOSPITAL | Age: 80
LOS: 2 days | Discharge: HOME HEALTH | DRG: 377 | End: 2025-01-24
Attending: EMERGENCY MEDICINE | Admitting: FAMILY MEDICINE
Payer: COMMERCIAL

## 2025-01-22 DIAGNOSIS — N17.9 AKI (ACUTE KIDNEY INJURY) (HCC): ICD-10-CM

## 2025-01-22 DIAGNOSIS — M79.601 MUSCULOSKELETAL ARM PAIN, RIGHT: ICD-10-CM

## 2025-01-22 DIAGNOSIS — K92.1 BLACK STOOL: ICD-10-CM

## 2025-01-22 DIAGNOSIS — I50.32 CHRONIC HEART FAILURE WITH PRESERVED EJECTION FRACTION (HCC): ICD-10-CM

## 2025-01-22 DIAGNOSIS — M62.838 MUSCLE SPASM: ICD-10-CM

## 2025-01-22 DIAGNOSIS — A41.9 SEPSIS (HCC): ICD-10-CM

## 2025-01-22 DIAGNOSIS — I71.21 ANEURYSM OF ASCENDING AORTA WITHOUT RUPTURE (HCC): ICD-10-CM

## 2025-01-22 DIAGNOSIS — D64.9 ACUTE ON CHRONIC ANEMIA: ICD-10-CM

## 2025-01-22 DIAGNOSIS — F11.220 OPIOID DEPENDENCE WITH UNCOMPLICATED INTOXICATION (HCC): ICD-10-CM

## 2025-01-22 DIAGNOSIS — E87.20 LACTIC ACIDOSIS: ICD-10-CM

## 2025-01-22 DIAGNOSIS — K26.9 DUODENAL ULCER: ICD-10-CM

## 2025-01-22 DIAGNOSIS — I48.91 ATRIAL FIBRILLATION WITH RAPID VENTRICULAR RESPONSE (HCC): Primary | ICD-10-CM

## 2025-01-22 PROBLEM — Z80.0 FAMILY HISTORY OF COLON CANCER: Status: ACTIVE | Noted: 2025-01-22

## 2025-01-22 PROBLEM — F19.90 EXCESSIVE USE OF NONSTEROIDAL ANTI-INFLAMMATORY DRUGS (NSAIDS): Status: ACTIVE | Noted: 2025-01-22

## 2025-01-22 LAB
ABO GROUP BLD: NORMAL
ALBUMIN SERPL BCG-MCNC: 3.7 G/DL (ref 3.5–5)
ALP SERPL-CCNC: 60 U/L (ref 34–104)
ALT SERPL W P-5'-P-CCNC: 39 U/L (ref 7–52)
ANION GAP SERPL CALCULATED.3IONS-SCNC: 12 MMOL/L (ref 4–13)
APTT PPP: 29 SECONDS (ref 23–34)
AST SERPL W P-5'-P-CCNC: 53 U/L (ref 13–39)
ATRIAL RATE: 166 BPM
ATRIAL RATE: 90 BPM
BASE EXCESS BLDA CALC-SCNC: -6 MMOL/L (ref -2–3)
BASE EXCESS BLDA CALC-SCNC: -9 MMOL/L (ref -2–3)
BASOPHILS # BLD AUTO: 0.04 THOUSANDS/ΜL (ref 0–0.1)
BASOPHILS NFR BLD AUTO: 0 % (ref 0–1)
BILIRUB SERPL-MCNC: 0.72 MG/DL (ref 0.2–1)
BILIRUB UR QL STRIP: NEGATIVE
BLD GP AB SCN SERPL QL: NEGATIVE
BUN SERPL-MCNC: 52 MG/DL (ref 5–25)
CA-I BLD-SCNC: 1.01 MMOL/L (ref 1.12–1.32)
CA-I BLD-SCNC: 1.16 MMOL/L (ref 1.12–1.32)
CALCIUM SERPL-MCNC: 8.9 MG/DL (ref 8.4–10.2)
CHLORIDE SERPL-SCNC: 109 MMOL/L (ref 96–108)
CLARITY UR: CLEAR
CO2 SERPL-SCNC: 21 MMOL/L (ref 21–32)
COLOR UR: NORMAL
CREAT SERPL-MCNC: 1.34 MG/DL (ref 0.6–1.3)
EOSINOPHIL # BLD AUTO: 0 THOUSAND/ΜL (ref 0–0.61)
EOSINOPHIL NFR BLD AUTO: 0 % (ref 0–6)
ERYTHROCYTE [DISTWIDTH] IN BLOOD BY AUTOMATED COUNT: 16.6 % (ref 11.6–15.1)
FERRITIN SERPL-MCNC: 16 NG/ML (ref 11–307)
GFR SERPL CREATININE-BSD FRML MDRD: 38 ML/MIN/1.73SQ M
GLUCOSE SERPL-MCNC: 111 MG/DL (ref 65–140)
GLUCOSE SERPL-MCNC: 133 MG/DL (ref 65–140)
GLUCOSE SERPL-MCNC: 89 MG/DL (ref 65–140)
GLUCOSE UR STRIP-MCNC: NEGATIVE MG/DL
HCO3 BLDA-SCNC: 15.8 MMOL/L (ref 24–30)
HCO3 BLDA-SCNC: 20.5 MMOL/L (ref 24–30)
HCT VFR BLD AUTO: 26.4 % (ref 34.8–46.1)
HCT VFR BLD AUTO: 30.3 % (ref 34.8–46.1)
HCT VFR BLD CALC: 22 % (ref 34.8–46.1)
HCT VFR BLD CALC: <15 % (ref 34.8–46.1)
HGB BLD-MCNC: 8.5 G/DL (ref 11.5–15.4)
HGB BLD-MCNC: 9.6 G/DL (ref 11.5–15.4)
HGB BLDA-MCNC: 7.5 G/DL (ref 11.5–15.4)
HGB UR QL STRIP.AUTO: NEGATIVE
IMM GRANULOCYTES # BLD AUTO: 0.04 THOUSAND/UL (ref 0–0.2)
IMM GRANULOCYTES NFR BLD AUTO: 0 % (ref 0–2)
INR PPP: 1.24 (ref 0.85–1.19)
IRON SATN MFR SERPL: 13 % (ref 15–50)
IRON SERPL-MCNC: 44 UG/DL (ref 50–212)
KETONES UR STRIP-MCNC: NEGATIVE MG/DL
LACTATE SERPL-SCNC: 2.4 MMOL/L (ref 0.5–2)
LACTATE SERPL-SCNC: 4.4 MMOL/L (ref 0.5–2)
LEUKOCYTE ESTERASE UR QL STRIP: NEGATIVE
LYMPHOCYTES # BLD AUTO: 1.69 THOUSANDS/ΜL (ref 0.6–4.47)
LYMPHOCYTES NFR BLD AUTO: 19 % (ref 14–44)
MCH RBC QN AUTO: 29.1 PG (ref 26.8–34.3)
MCHC RBC AUTO-ENTMCNC: 31.7 G/DL (ref 31.4–37.4)
MCV RBC AUTO: 92 FL (ref 82–98)
MONOCYTES # BLD AUTO: 0.56 THOUSAND/ΜL (ref 0.17–1.22)
MONOCYTES NFR BLD AUTO: 6 % (ref 4–12)
NEUTROPHILS # BLD AUTO: 6.79 THOUSANDS/ΜL (ref 1.85–7.62)
NEUTS SEG NFR BLD AUTO: 75 % (ref 43–75)
NITRITE UR QL STRIP: NEGATIVE
NRBC BLD AUTO-RTO: 0 /100 WBCS
P AXIS: 122 DEGREES
PCO2 BLD: 17 MMOL/L (ref 21–32)
PCO2 BLD: 22 MMOL/L (ref 21–32)
PCO2 BLD: 29.4 MM HG (ref 42–50)
PCO2 BLD: 44.2 MM HG (ref 42–50)
PH BLD: 7.28 [PH] (ref 7.3–7.4)
PH BLD: 7.34 [PH] (ref 7.3–7.4)
PH UR STRIP.AUTO: 6 [PH]
PLATELET # BLD AUTO: 249 THOUSANDS/UL (ref 149–390)
PMV BLD AUTO: 11.4 FL (ref 8.9–12.7)
PO2 BLD: 24 MM HG (ref 35–45)
PO2 BLD: 32 MM HG (ref 35–45)
POTASSIUM BLD-SCNC: 3.8 MMOL/L (ref 3.5–5.3)
POTASSIUM BLD-SCNC: 4.1 MMOL/L (ref 3.5–5.3)
POTASSIUM SERPL-SCNC: 4.2 MMOL/L (ref 3.5–5.3)
PR INTERVAL: 188 MS
PROCALCITONIN SERPL-MCNC: 0.08 NG/ML
PROT SERPL-MCNC: 6.7 G/DL (ref 6.4–8.4)
PROT UR STRIP-MCNC: NEGATIVE MG/DL
PROTHROMBIN TIME: 15.9 SECONDS (ref 12.3–15)
QRS AXIS: 61 DEGREES
QRS AXIS: 73 DEGREES
QRSD INTERVAL: 72 MS
QRSD INTERVAL: 76 MS
QT INTERVAL: 260 MS
QT INTERVAL: 388 MS
QTC INTERVAL: 417 MS
QTC INTERVAL: 474 MS
RBC # BLD AUTO: 3.3 MILLION/UL (ref 3.81–5.12)
RH BLD: POSITIVE
SAO2 % BLD FROM PO2: 35 % (ref 60–85)
SAO2 % BLD FROM PO2: 59 % (ref 60–85)
SODIUM BLD-SCNC: 145 MMOL/L (ref 136–145)
SODIUM BLD-SCNC: 147 MMOL/L (ref 136–145)
SODIUM SERPL-SCNC: 142 MMOL/L (ref 135–147)
SP GR UR STRIP.AUTO: 1.01 (ref 1–1.03)
SPECIMEN EXPIRATION DATE: NORMAL
SPECIMEN SOURCE: ABNORMAL
SPECIMEN SOURCE: ABNORMAL
T WAVE AXIS: 223 DEGREES
T WAVE AXIS: 58 DEGREES
TIBC SERPL-MCNC: 350 UG/DL (ref 250–450)
TRANSFERRIN SERPL-MCNC: 250 MG/DL (ref 203–362)
UIBC SERPL-MCNC: 306 UG/DL (ref 155–355)
UROBILINOGEN UR STRIP-ACNC: <2 MG/DL
VENTRICULAR RATE: 155 BPM
VENTRICULAR RATE: 90 BPM
WBC # BLD AUTO: 9.12 THOUSAND/UL (ref 4.31–10.16)

## 2025-01-22 PROCEDURE — 93005 ELECTROCARDIOGRAM TRACING: CPT

## 2025-01-22 PROCEDURE — 85018 HEMOGLOBIN: CPT

## 2025-01-22 PROCEDURE — 82947 ASSAY GLUCOSE BLOOD QUANT: CPT

## 2025-01-22 PROCEDURE — 85014 HEMATOCRIT: CPT

## 2025-01-22 PROCEDURE — 74177 CT ABD & PELVIS W/CONTRAST: CPT

## 2025-01-22 PROCEDURE — 83550 IRON BINDING TEST: CPT

## 2025-01-22 PROCEDURE — 82728 ASSAY OF FERRITIN: CPT

## 2025-01-22 PROCEDURE — 96365 THER/PROPH/DIAG IV INF INIT: CPT

## 2025-01-22 PROCEDURE — 99285 EMERGENCY DEPT VISIT HI MDM: CPT

## 2025-01-22 PROCEDURE — 99291 CRITICAL CARE FIRST HOUR: CPT | Performed by: EMERGENCY MEDICINE

## 2025-01-22 PROCEDURE — 82330 ASSAY OF CALCIUM: CPT

## 2025-01-22 PROCEDURE — 86921 COMPATIBILITY TEST INCUBATE: CPT

## 2025-01-22 PROCEDURE — 36430 TRANSFUSION BLD/BLD COMPNT: CPT

## 2025-01-22 PROCEDURE — 85610 PROTHROMBIN TIME: CPT

## 2025-01-22 PROCEDURE — 99223 1ST HOSP IP/OBS HIGH 75: CPT | Performed by: INTERNAL MEDICINE

## 2025-01-22 PROCEDURE — NC001 PR NO CHARGE: Performed by: FAMILY MEDICINE

## 2025-01-22 PROCEDURE — 93010 ELECTROCARDIOGRAM REPORT: CPT | Performed by: INTERNAL MEDICINE

## 2025-01-22 PROCEDURE — 85730 THROMBOPLASTIN TIME PARTIAL: CPT

## 2025-01-22 PROCEDURE — 86900 BLOOD TYPING SEROLOGIC ABO: CPT

## 2025-01-22 PROCEDURE — 83540 ASSAY OF IRON: CPT

## 2025-01-22 PROCEDURE — 83605 ASSAY OF LACTIC ACID: CPT

## 2025-01-22 PROCEDURE — 82803 BLOOD GASES ANY COMBINATION: CPT

## 2025-01-22 PROCEDURE — 84295 ASSAY OF SERUM SODIUM: CPT

## 2025-01-22 PROCEDURE — 85025 COMPLETE CBC W/AUTO DIFF WBC: CPT

## 2025-01-22 PROCEDURE — 81003 URINALYSIS AUTO W/O SCOPE: CPT

## 2025-01-22 PROCEDURE — 87040 BLOOD CULTURE FOR BACTERIA: CPT

## 2025-01-22 PROCEDURE — 71045 X-RAY EXAM CHEST 1 VIEW: CPT

## 2025-01-22 PROCEDURE — 71260 CT THORAX DX C+: CPT

## 2025-01-22 PROCEDURE — 80053 COMPREHEN METABOLIC PANEL: CPT

## 2025-01-22 PROCEDURE — 86922 COMPATIBILITY TEST ANTIGLOB: CPT

## 2025-01-22 PROCEDURE — 70450 CT HEAD/BRAIN W/O DYE: CPT

## 2025-01-22 PROCEDURE — 96360 HYDRATION IV INFUSION INIT: CPT

## 2025-01-22 PROCEDURE — 86850 RBC ANTIBODY SCREEN: CPT

## 2025-01-22 PROCEDURE — 84132 ASSAY OF SERUM POTASSIUM: CPT

## 2025-01-22 PROCEDURE — 86902 BLOOD TYPE ANTIGEN DONOR EA: CPT

## 2025-01-22 PROCEDURE — 86901 BLOOD TYPING SEROLOGIC RH(D): CPT

## 2025-01-22 PROCEDURE — 84145 PROCALCITONIN (PCT): CPT

## 2025-01-22 PROCEDURE — 96361 HYDRATE IV INFUSION ADD-ON: CPT

## 2025-01-22 PROCEDURE — 30233N1 TRANSFUSION OF NONAUTOLOGOUS RED BLOOD CELLS INTO PERIPHERAL VEIN, PERCUTANEOUS APPROACH: ICD-10-PCS | Performed by: FAMILY MEDICINE

## 2025-01-22 PROCEDURE — P9016 RBC LEUKOCYTES REDUCED: HCPCS

## 2025-01-22 PROCEDURE — 36415 COLL VENOUS BLD VENIPUNCTURE: CPT

## 2025-01-22 RX ORDER — METOPROLOL SUCCINATE 50 MG/1
50 TABLET, EXTENDED RELEASE ORAL 2 TIMES DAILY
Status: DISCONTINUED | OUTPATIENT
Start: 2025-01-22 | End: 2025-01-24 | Stop reason: HOSPADM

## 2025-01-22 RX ORDER — SODIUM CHLORIDE, SODIUM LACTATE, POTASSIUM CHLORIDE, CALCIUM CHLORIDE 600; 310; 30; 20 MG/100ML; MG/100ML; MG/100ML; MG/100ML
125 INJECTION, SOLUTION INTRAVENOUS CONTINUOUS
Status: CANCELLED | OUTPATIENT
Start: 2025-01-22

## 2025-01-22 RX ORDER — ESCITALOPRAM OXALATE 10 MG/1
10 TABLET ORAL DAILY
Status: DISCONTINUED | OUTPATIENT
Start: 2025-01-23 | End: 2025-01-24 | Stop reason: HOSPADM

## 2025-01-22 RX ORDER — ZOLPIDEM TARTRATE 5 MG/1
5 TABLET ORAL
Status: DISCONTINUED | OUTPATIENT
Start: 2025-01-22 | End: 2025-01-24 | Stop reason: HOSPADM

## 2025-01-22 RX ORDER — PANTOPRAZOLE SODIUM 40 MG/10ML
40 INJECTION, POWDER, LYOPHILIZED, FOR SOLUTION INTRAVENOUS EVERY 12 HOURS SCHEDULED
Status: DISCONTINUED | OUTPATIENT
Start: 2025-01-22 | End: 2025-01-23

## 2025-01-22 RX ORDER — DOCUSATE SODIUM 100 MG/1
100 CAPSULE, LIQUID FILLED ORAL 2 TIMES DAILY
Status: DISCONTINUED | OUTPATIENT
Start: 2025-01-22 | End: 2025-01-24 | Stop reason: HOSPADM

## 2025-01-22 RX ORDER — GABAPENTIN 300 MG/1
300 CAPSULE ORAL 3 TIMES DAILY
Status: DISCONTINUED | OUTPATIENT
Start: 2025-01-22 | End: 2025-01-24 | Stop reason: HOSPADM

## 2025-01-22 RX ORDER — POLYETHYLENE GLYCOL 3350 17 G/17G
17 POWDER, FOR SOLUTION ORAL DAILY
Status: DISCONTINUED | OUTPATIENT
Start: 2025-01-23 | End: 2025-01-24 | Stop reason: HOSPADM

## 2025-01-22 RX ORDER — OXYCODONE HYDROCHLORIDE 10 MG/1
10 TABLET ORAL EVERY 8 HOURS PRN
Status: DISCONTINUED | OUTPATIENT
Start: 2025-01-22 | End: 2025-01-24 | Stop reason: HOSPADM

## 2025-01-22 RX ORDER — NOREPINEPHRINE BITARTRATE 1 MG/ML
INJECTION, SOLUTION INTRAVENOUS
Status: COMPLETED
Start: 2025-01-22 | End: 2025-01-22

## 2025-01-22 RX ORDER — EPINEPHRINE 0.1 MG/ML
INJECTION INTRAVENOUS
Status: COMPLETED
Start: 2025-01-22 | End: 2025-01-22

## 2025-01-22 RX ORDER — SODIUM CHLORIDE 9 MG/ML
125 INJECTION, SOLUTION INTRAVENOUS CONTINUOUS
Status: DISCONTINUED | OUTPATIENT
Start: 2025-01-22 | End: 2025-01-23

## 2025-01-22 RX ORDER — ACETAMINOPHEN 325 MG/1
975 TABLET ORAL EVERY 8 HOURS SCHEDULED
Status: DISCONTINUED | OUTPATIENT
Start: 2025-01-22 | End: 2025-01-24 | Stop reason: HOSPADM

## 2025-01-22 RX ORDER — GABAPENTIN 300 MG/1
900 CAPSULE ORAL 3 TIMES DAILY
Status: DISCONTINUED | OUTPATIENT
Start: 2025-01-22 | End: 2025-01-22

## 2025-01-22 RX ORDER — LIDOCAINE 50 MG/G
2 PATCH TOPICAL DAILY
Status: DISCONTINUED | OUTPATIENT
Start: 2025-01-23 | End: 2025-01-24 | Stop reason: HOSPADM

## 2025-01-22 RX ORDER — SENNOSIDES 8.6 MG
8.6 TABLET ORAL
Status: DISCONTINUED | OUTPATIENT
Start: 2025-01-22 | End: 2025-01-24 | Stop reason: HOSPADM

## 2025-01-22 RX ADMIN — GABAPENTIN 300 MG: 300 CAPSULE ORAL at 21:15

## 2025-01-22 RX ADMIN — SODIUM CHLORIDE 1000 ML: 0.9 INJECTION, SOLUTION INTRAVENOUS at 14:51

## 2025-01-22 RX ADMIN — ACETAMINOPHEN 975 MG: 325 TABLET, FILM COATED ORAL at 17:36

## 2025-01-22 RX ADMIN — IOHEXOL 100 ML: 350 INJECTION, SOLUTION INTRAVENOUS at 14:02

## 2025-01-22 RX ADMIN — CEFEPIME 2000 MG: 2 INJECTION, POWDER, FOR SOLUTION INTRAVENOUS at 13:27

## 2025-01-22 RX ADMIN — NOREPINEPHRINE BITARTRATE: 1 INJECTION, SOLUTION, CONCENTRATE INTRAVENOUS at 13:35

## 2025-01-22 RX ADMIN — ZOLPIDEM TARTRATE 5 MG: 5 TABLET, COATED ORAL at 21:15

## 2025-01-22 RX ADMIN — SODIUM CHLORIDE 1000 ML: 0.9 INJECTION, SOLUTION INTRAVENOUS at 12:12

## 2025-01-22 RX ADMIN — SODIUM CHLORIDE 125 ML/HR: 0.9 INJECTION, SOLUTION INTRAVENOUS at 17:39

## 2025-01-22 RX ADMIN — Medication 9 MG: at 23:43

## 2025-01-22 RX ADMIN — PANTOPRAZOLE SODIUM 40 MG: 40 INJECTION, POWDER, FOR SOLUTION INTRAVENOUS at 21:15

## 2025-01-22 RX ADMIN — OXYCODONE HYDROCHLORIDE 10 MG: 10 TABLET ORAL at 17:35

## 2025-01-22 RX ADMIN — METOPROLOL SUCCINATE 50 MG: 50 TABLET, EXTENDED RELEASE ORAL at 21:15

## 2025-01-22 RX ADMIN — EPINEPHRINE: 0.1 INJECTION INTRAVENOUS at 13:35

## 2025-01-22 NOTE — CONSULTS
Consultation - Gastroenterology   Name: Lisa Marques 79 y.o. female I MRN: [id number]  Unit/Bed#: ED 26 I Date of Admission: 1/22/2025   Date of Service: 1/22/2025 I Hospital Day: 0   Inpatient consult to gastroenterology  Consult performed by: Pia Soto DO  Consult ordered by: Nori Finn MD        Physician Requesting Evaluation: Isa Garnde MD   Reason for Evaluation / Principal Problem: Anemia    Assessment & Plan  Acute on chronic anemia  Patient presenting with weakness and fatigue that has been ongoing for the last couple of days and now resulting in a fall.  Patient also endorses dark black bowel movements but no accompanying abdominal pain or nausea/vomiting.  Bowel movements have been dark in appearance for the last 2 days.  She is currently anticoagulated with Eliquis and also admits to heavy, daily Aleve use over the course of the last year.  No prior EGD/colonoscopy.  On admission, noted to be hypotensive and in A-fib with RVR.  Fortunately hemodynamics improved with transient vasopressor support and IV fluids.  Hemoglobin found to be 9.6 initially but later recheck showed hemoglobin of 7.5.  Suspect that patient may have been hemoconcentrated on initial hemoglobin check.  Labs also revealing lactic acidosis and elevated BUN/serum creatinine.  On examination, patient with benign abdomen.  JOHAN performed and did show dark brown stool - no blood/melena.  CT chest/abdomen/pelvis with contrast with no acute abdominal pelvic pathology.      At this time, low concern for active GI bleed.  However, do believe that patient would certainly benefit from endoscopic evaluation given her acute on chronic anemia.  Due to her heavy NSAID use and reported dark stools prior to admission, we will plan to proceed with upper endoscopy tomorrow.  Recommend monitoring on PPI in the interim.  If patient were to show signs of overt GI bleeding with hemodynamic instability and need for PRBC transfusion, may  require more urgent endoscopy.    Plan:  Will plan for endoscopic intervention with EGD tomorrow  Okay for clear liquid diet today but NPO at midnight  Okay to continue on Protonix 40 mg IV BID  Hold AP/AC; avoid use of all NSAIDs  Trend H&H; transfuse for goal of >7.0   Monitor hemodynamics; avoid episodes of hypotension   Maintain adequate IV access with 2 large bore Ivs  Monitor bowel movements; alert GI team of signs of overt GIB  If EGD negative, will need to have ongoing discussions regarding possible colonoscopy  Additional pain and symptom management per primary team   Atrial fibrillation (HCC)  Patient with a history of Afib for which she is chronically anticoagulated with Eliquis. Found to be in Afib with RVR on arrival and hypotension. Hemodynamics and heart rates now improved but will defer further management to primary team. Ask that Eliquis be held in setting of acute on chronic anemia and in anticipation of EGD tomorrow.     Family history of colon cancer  Patient with reported family history of colon cancer in her mother.  States that mother was diagnosed at the age of 43.  Patient has never had a colonoscopy performed.  Would ultimately benefit from colonoscopy, especially pending results of EGD to be performed tomorrow.    Excessive use of nonsteroidal anti-inflammatory drugs (NSAIDs)  Patient admits to daily use of Aleve.  States that she has been taking Aleve 4 times daily for the last year due to her chronic joint pains.  Long-term NSAID use ablation patient at high risk of peptic ulcer disease.  Will plan for EGD to further evaluate.  However, will require alternative pain therapy regimen moving forward.  Strongly recommend complete avoidance of all NSAIDs.    Medications reviewed. Continue current medications at prescribed doses.    History of Present Illness   HPI:  Lisa Marques is a 79 y.o. female with a past medical history of atrial fibrillation on Eliquis, insomnia, myofascial pain  syndrome who presented to [facility] ED status post fall.  GI consulted for anemia and concern for GI bleed.    Patient states that over the weekend, she began to experience increased weakness and fatigue.  Felt a sense of heaviness in her legs.  Also has been having reported black almost tarry stools for the last 2 days.  She explains that this morning, she was unable to get out of bed which resulted in a fall.  She used her life support to call for help if she was unable to get up unassisted.  Afterwards, admits to feeling ongoing fatigue.  Denies any lightheadedness/dizziness.  Denies any abdominal pain.  No nausea/vomiting or diarrhea.  She is currently anticoagulated chronically with Eliquis due to her history of A-fib.  She explains that she has been taking Aleve 4 times daily for her joint pain for the last year.  She denies any prior EGD/colonoscopy.  Does admit to family history of colon cancer in her mom that was diagnosed in her 40s.    Of note: Patient with prior admission in 7/2023. Found to be anemic with hemoglobin of 6s requiring pRBC transition. No overt bleeding identified and patient never had EGD/colonoscopy performed after to evaluate.     At time of ED arrival, patient hypotensive with systolic blood pressures in the 60s.  Also noted to be in A-fib with RVR and hypothermic requiring Amanda hugger.  She initially required Levophed for her hypotension but this was quickly titrated off after her blood pressure improved.  Labs on admission reveal hemoglobin of 9.6 with prior baseline of 8-10.Hemoglobin later found to decline to 7.5.  Labs also demonstrating elevated BUN/serum creatinine of 52/1.34.  LFTs within normal limits.  Normal WBC but lactic acidosis of 4.4.  CT chest/abdomen/pelvis with contrast was completed which showed no acute abdominal pelvic pathology.    Review of Systems  I have reviewed the patient's PMH, PSH, Social History, Family History, Meds, and Allergies  Social History     Tobacco  Use    Smoking status: Never    Smokeless tobacco: Never   Vaping Use    Vaping status: Never Used   Substance and Sexual Activity    Alcohol use: No     Comment: none    Drug use: No    Sexual activity: Not Currently     Partners: Male     Birth control/protection: Female Sterilization       Current Facility-Administered Medications:     iohexol (OMNIPAQUE) 350 MG/ML injection (MULTI-DOSE) 100 mL, Once in imaging    norepinephrine (LEVOPHED) 4 mg (STANDARD CONCENTRATION) IV in sodium chloride 0.9% 250 mL, Titrated, Last Rate: Stopped (01/22/25 1350)  Patient has no known allergies.    Objective :  Temp:  [94.7 °F (34.8 °C)-96.4 °F (35.8 °C)] 96.4 °F (35.8 °C)  HR:  [] 94  BP: ()/() 143/102  Resp:  [20] 20  SpO2:  [94 %-100 %] 97 %  O2 Device: Nasal cannula  Nasal Cannula O2 Flow Rate (L/min):  [4 L/min] 4 L/min    Physical Exam  Constitutional:       General: She is not in acute distress.     Appearance: She is normal weight. She is not ill-appearing or toxic-appearing.   HENT:      Head: Normocephalic.      Nose: Nose normal. No congestion.   Eyes:      General: No scleral icterus.  Cardiovascular:      Rate and Rhythm: Normal rate.      Pulses: Normal pulses.   Pulmonary:      Effort: Pulmonary effort is normal. No respiratory distress.   Abdominal:      General: Abdomen is flat. There is no distension.      Palpations: Abdomen is soft.      Tenderness: There is no abdominal tenderness. There is no guarding or rebound.   Genitourinary:     Comments: JOHAN with brown stool  Musculoskeletal:      Cervical back: Normal range of motion.   Skin:     General: Skin is warm.      Capillary Refill: Capillary refill takes less than 2 seconds.      Coloration: Skin is not pale.   Neurological:      Mental Status: She is alert and oriented to person, place, and time. Mental status is at baseline.   Psychiatric:         Mood and Affect: Mood normal.         Behavior: Behavior normal.           Lab Results: I  have reviewed the following results:CBC/BMP:   .     01/22/25  1218 01/22/25  1250 01/22/25  1348   WBC 9.12  --   --    HGB 9.6*  --  7.5*   HCT 30.3*   < > 22*     --   --    SODIUM 142  --   --    K 4.2  --   --    *  --   --    CO2 21   < > 22   BUN 52*  --   --    CREATININE 1.34*  --   --    GLUC 133  --   --    CAIONIZED  --    < > 1.16    < > = values in this interval not displayed.    , Creatinine Clearance: Estimated Creatinine Clearance: 33.2 mL/min (A) (by C-G formula based on SCr of 1.34 mg/dL (H))., LFTs:   .     01/22/25  1218   AST 53*   ALT 39   ALB 3.7   TBILI 0.72   ALKPHOS 60    , PTT/INR:  .     01/22/25  1223   PTT 29   INR 1.24*        Imaging Results Review: I reviewed radiology reports from this admission including: CT chest and CT abdomen/pelvis.

## 2025-01-22 NOTE — ASSESSMENT & PLAN NOTE
Takes oxycodone 10 mg every 8 hours at home.  She has been on this dose for many years.    Continue home oxycodone

## 2025-01-22 NOTE — ASSESSMENT & PLAN NOTE
Continue home Ambien 5 mg.  This is recently cut by her PCP from 10 mg.  The patient does have a history of trouble sleeping and takes multiple 10 mg melatonin tablets to sleep.    Continue 5 mg Ambien and melatonin at night    Can consider adding on mirtazapine or trazodone rather than uptitrating Ambien if remains an issue

## 2025-01-22 NOTE — ASSESSMENT & PLAN NOTE
MARU on CKD stage III.  Patient has a history of low fluid intake in the outpatient setting.  This is likely etiology of her prerenal azotemia and MARU.  Will likely correct with fluid repletion.  Will place on 125 cc/h of fluid repletion after 2 L bolus in ED.  2 L of blood are also running at the time my exam and will also help with her fluid repletion    - switch fluids to isolyte. Hyperchloremic in BMP this AM 1/23  - renal function markedly improved with overnight UV hydration

## 2025-01-22 NOTE — ASSESSMENT & PLAN NOTE
Per GI HPI.  Apparently young family history of colon cancer.  Patient's PCP has been trying to get her colonoscopy for many months but she has been overtly refusing and not even amenable to Cologuard or FIT testing.    -EGD todayGI may want to do colonoscopy as well.  Follow-up with their recommendations

## 2025-01-22 NOTE — ASSESSMENT & PLAN NOTE
There is a known 4.8 cm aortic aneurysm.  Seen on CT chest.  Recommend nonurgent outpatient CT surgical monitoring.  No concerns this hospitalization

## 2025-01-22 NOTE — ED PROCEDURE NOTE
Procedure  POC Cardiac US    Date/Time: 1/22/2025 1:54 PM    Performed by: Melinda Gage DO  Authorized by: Melinda Gage DO    Patient location:  ED  Procedure details:     Exam Type:  Diagnostic    Indications: hypotension and suspected volume depletion      Assessment / Evaluation for: cardiac function, pericardial effusion and intravascular volume status      Exam Type: initial exam      Image quality: limited diagnostic      Image availability:  Images available in PACS  Patient Details:     Cardiac Rhythm:  Irregular  Cardiac findings:     Echo technique: limited 2D      Views obtained: parasternal long axis and parasternal short axis    Pulmonary findings:     Left Lung Findings: left lung sliding      Right lung findings: right lung sliding      B-lines: no B-lines present    IVC findings:     IVC Size: small                     Melinda Gage DO  01/22/25 [id number]

## 2025-01-22 NOTE — ASSESSMENT & PLAN NOTE
Rapid A-fib with RVR on admission.  A-fib is not a new problem for her she has had it for many years takes Eliquis 5 mg twice daily as outpatient.  Patient has a history of poor p.o. intake from a volume status on an outpatient basis.  This likely exacerbated her A-fib and with fluid repletion she corrected to normal sinus.    At the time of my exam she had received 2 L of fluids and she was in normal sinus rhythm.  She did not need to be rate controlled in the ED and broke rhythm on her own back to sinus.    -Hold Eliquis for EGD tomorrow per GI  -Continue home Toprol-XL  -Continue to monitor hemodynamics  -If back of the A-fib can add on as needed Lopressor IV  -Telemetry

## 2025-01-22 NOTE — ASSESSMENT & PLAN NOTE
Patient with a history of Afib for which she is chronically anticoagulated with Eliquis. Found to be in Afib with RVR on arrival and hypotension. Hemodynamics and heart rates now improved but will defer further management to primary team. Ask that Eliquis be held in setting of acute on chronic anemia and in anticipation of EGD tomorrow.

## 2025-01-22 NOTE — ASSESSMENT & PLAN NOTE
This is a 79-year-old female with history of A-fib on Eliquis who presented via EMS after a fall unwitnessed head strike.  Found by EMS unable to get up.  Found to be hypothermic placed on Amanda hugger in ED CMP remarkable for MARU on CKD.  CBC showing stable hemoglobin in the eights however repeat by i-STAT showed decreased to 7 so in the setting of melena provided to ED physician she was transfused with 2 units of packed red blood cells.  Hemodynamics remained stable.  There is a brief episode of hypotension requiring levo however patient responded with profound hypotension requiring monitoring off.  Procalcitonin normal however in the setting of hypothermia tachycardia A-fib with RVR septic workup was started.  Lactic acid 4.4 procalcitonin normal chest x-ray normal CT chest abdomen pelvis showing stable known aortic aneurysm and distended gallbladder otherwise normal.  CT head without any acute abnormalities.  Was not rate controlled and given 2 L of fluid bolus and corrected out of A-fib to sinus after that.    -Continue cefepime renally dosed to creatinine clearance every 12 hours 1 g   -Blood cultures collected follow-up  -Urine collected urinalysis normal will not reflex to culture this is not a source of a potential infection  -In the setting of negative Pro-Srini unlikely to be sepsis picture is likely hypovolemia driving lactic acidosis and reactive A-fib with RVR now corrected with fluid repletion.  Will hydrate the patient overnight and check renal function in the morning.  -Check BNP>>> slight elevation but likely due to acidosis and afib rvr. Do not suspect fluid overload. Will check echo to be throrough

## 2025-01-22 NOTE — ASSESSMENT & PLAN NOTE
Chronically anemic and outpatient.  Concerns for i-STAT hemoglobin less than 7 today in the setting of history provided by patient stating she had dark melenic stools prompting administration of 2 units of packed red blood cells.    GI was consulted who was not convinced the patient was having an acute GI bleed.  Hemodynamics were stable  .  They recommended n.p.o. at midnight and clears for now for EGD scope today.  Patient will also likely require colonoscopy.    -sp 2 units prbc  - no further evidence of bleeding. Morning hg remains stable   -Monitor vitals and for any acute signs of hypotension or bright red blood per rectum.  -Avoid NSAIDs  -Anemia is chronic to her kidney disease  -Hold Eliquis forEGD today

## 2025-01-22 NOTE — ASSESSMENT & PLAN NOTE
Patient presenting with weakness and fatigue that has been ongoing for the last couple of days and now resulting in a fall.  Patient also endorses dark black bowel movements but no accompanying abdominal pain or nausea/vomiting.  Bowel movements have been dark in appearance for the last 2 days.  She is currently anticoagulated with Eliquis and also admits to heavy, daily Aleve use over the course of the last year.  No prior EGD/colonoscopy.  On admission, noted to be hypotensive and in A-fib with RVR.  Fortunately hemodynamics improved with transient vasopressor support and IV fluids.  Hemoglobin found to be 9.6 initially but later recheck showed hemoglobin of 7.5.  Suspect that patient may have been hemoconcentrated on initial hemoglobin check.  Labs also revealing lactic acidosis and elevated BUN/serum creatinine.  On examination, patient with benign abdomen.  JOHAN performed and did show dark brown stool - no blood/melena.  CT chest/abdomen/pelvis with contrast with no acute abdominal pelvic pathology.      At this time, low concern for active GI bleed.  However, do believe that patient would certainly benefit from endoscopic evaluation given her acute on chronic anemia.  Due to her heavy NSAID use and reported dark stools prior to admission, we will plan to proceed with upper endoscopy tomorrow.  Recommend monitoring on PPI in the interim.  If patient were to show signs of overt GI bleeding with hemodynamic instability and need for PRBC transfusion, may require more urgent endoscopy.    Plan:  Will plan for endoscopic intervention with EGD tomorrow  Okay for clear liquid diet today but NPO at midnight  Okay to continue on Protonix 40 mg IV BID  Hold AP/AC; avoid use of all NSAIDs  Trend H&H; transfuse for goal of >7.0   Monitor hemodynamics; avoid episodes of hypotension   Maintain adequate IV access with 2 large bore Ivs  Monitor bowel movements; alert GI team of signs of overt GIB  If EGD negative, will need to  have ongoing discussions regarding possible colonoscopy  Additional pain and symptom management per primary team

## 2025-01-22 NOTE — ASSESSMENT & PLAN NOTE
Patient admits to daily use of Aleve.  States that she has been taking Aleve 4 times daily for the last year due to her chronic joint pains.  Long-term NSAID use ablation patient at high risk of peptic ulcer disease.  Will plan for EGD to further evaluate.  However, will require alternative pain therapy regimen moving forward.  Strongly recommend complete avoidance of all NSAIDs.

## 2025-01-22 NOTE — H&P
H&P - Family [facility] Res[facility], [place] 1945, 79 y.o. female.  MRN: [id number]  Unit/Bed#: ED 26 Encounter: [id number]  Primary Care Provider: Raz Dickens DO      Admission Date: 1/22/2025 1136    Assessments & Plans:   Plans discussed with [facility] team and finalization is pending attending physician attestation.    * Fall  Assessment & Plan  This is a 79-year-old female with history of A-fib on Eliquis who presented via EMS after a fall unwitnessed head strike.  Found by EMS unable to get up.  Found to be hypothermic placed on Amanda hugger in ED CMP remarkable for MARU on CKD.  CBC showing stable hemoglobin in the eights however repeat by i-STAT showed decreased to 7 so in the setting of melena provided to ED physician she was transfused with 2 units of packed red blood cells.  Hemodynamics remained stable.  There is a brief episode of hypotension requiring levo however patient responded with profound hypotension requiring monitoring off.  Procalcitonin normal however in the setting of hypothermia tachycardia A-fib with RVR septic workup was started.  Lactic acid 4.4 procalcitonin normal chest x-ray normal CT chest abdomen pelvis showing stable known aortic aneurysm and distended gallbladder otherwise normal.  CT head without any acute abnormalities.  Was not rate controlled and given 2 L of fluid bolus and corrected out of A-fib to sinus after that.    -Continue cefepime renally dosed to creatinine clearance every 12 hours 1 g next dose to be at 1 AM tonight  -Blood cultures collected follow-up  -Urine collected urinalysis normal will not reflex to culture this is not a source of a potential infection  -In the setting of negative Pro-Srini unlikely to be sepsis picture is likely hypovolemia driving lactic acidosis and reactive A-fib with RVR now corrected with fluid repletion.  Will hydrate the patient overnight and check renal function in the morning.  -Check BNP    Excessive use of  nonsteroidal anti-inflammatory drugs (NSAIDs)  Assessment & Plan  Patient has a history of melena in the outpatient setting.  She is voices to her PCP many times.  She takes chronic NSAIDs although has been told not to by her PCP for aches and pains.    -This is likely the cause of her melanotic stools.  Low concern for acute GI bleed  -Per GI service will do EGD tomorrow.  N.p.o. at midnight and clear diet until then.  -No NSAIDs    Family history of colon cancer  Assessment & Plan  Per GI HPI.  Apparently young family history of colon cancer.  Patient's PCP has been trying to get her colonoscopy for many months but she has been overtly refusing and not even amenable to Cologuard or FIT testing.    -EGD tomorrow GI may want to do colonoscopy as well.  Follow-up with their recommendations    Atrial fibrillation (HCC)  Assessment & Plan  Rapid A-fib with RVR on admission.  A-fib is not a new problem for her she has had it for many years takes Eliquis 5 mg twice daily as outpatient.  Patient has a history of poor p.o. intake from a volume status on an outpatient basis.  This likely exacerbated her A-fib and with fluid repletion she corrected to normal sinus.    At the time of my exam she had received 2 L of fluids and she was in normal sinus rhythm.  She did not need to be rate controlled in the ED and broke rhythm on her own back to sinus.    -Hold Eliquis for EGD tomorrow per GI  -Continue home Toprol-XL  -Continue to monitor hemodynamics  -If back of the A-fib can add on as needed Lopressor  -Telemetry    Acute on chronic anemia  Assessment & Plan  Chronically anemic and outpatient.  Concerns for i-STAT hemoglobin less than 7 today in the setting of history provided by patient stating she had dark melenic stools prompting administration of 2 units of packed red blood cells.    GI was consulted who was not convinced the patient was having an acute GI bleed.  Hemodynamics were stable  .  They recommended n.p.o. at  midnight and clears for now for EGD scope tomorrow.  Patient will also likely require colonoscopy.    -Can let the 2 units of blood finish running and trend hemoglobin in the morning  -Monitor vitals and for any acute signs of hypotension or bright red blood per rectum.  -Avoid NSAIDs  -Anemia is chronic to her kidney disease  -Hold Eliquis for EGD tomorrow        Constipation  Assessment & Plan  Continue home constipation medication    Insomnia  Assessment & Plan  Continue home Ambien 5 mg.  This is recently cut by her PCP from 10 mg.  The patient does have a history of trouble sleeping and takes multiple 10 mg melatonin tablets to sleep.    Continue 5 mg Ambien and melatonin at night    Can consider adding on mirtazapine or trazodone rather than uptitrating Ambien if remains an issue    Aortic aneurysm (HCC)  Assessment & Plan  There is a known 4.8 cm aortic aneurysm.  Seen on CT chest.  Recommend nonurgent outpatient CT surgical monitoring.  No concerns this hospitalization    Anxiety  Assessment & Plan  Continue home Lexapro    Chronic pain  Assessment & Plan  Takes oxycodone 10 mg every 8 hours at home.  She has been on this dose for many years.    Continue home oxycodone    MARU (acute kidney injury) (Formerly [facility])  Assessment & Plan  MARU on CKD stage III.  Patient has a history of low fluid intake in the outpatient setting.  This is likely etiology of her prerenal azotemia and MARU.  Will likely correct with fluid repletion.  Will place on 125 cc/h of fluid repletion after 2 L bolus in ED.  2 L of blood are also running at the time my exam and will also help with her fluid repletion        Patient Active Problem List   Diagnosis    Atrial fibrillation with RVR (Formerly [facility])    MARU (acute kidney injury) (Formerly [facility])    Chronic pain    Age-related osteoporosis without current pathological fracture    Hypertension    Anxiety    Aortic aneurysm (HCC)    Depression with anxiety    Insomnia    Left knee pain    Lumbar canal stenosis     Myofascial pain syndrome    Opioid dependence (HCC)    Pain syndrome, chronic    Right shoulder pain    Spondylosis of lumbar region without myelopathy or radiculopathy    Vitamin D deficiency    Constipation    Eczema of right external ear    Balance disorder    Musculoskeletal arm pain, right    Primary osteoarthritis of right shoulder    Chronic heart failure with preserved ejection fraction (HCC)    Hypokalemia    Stage 3a chronic kidney disease (HCC)    Ganglion of hand, left    Dry eye of left side    Bladder prolapse, female, acquired    Financial insecurity    Viral illness    Anemia    Fall    Chronic pain of left knee    Food insecurity    Hypotension    Acute hypoxic respiratory failure (HCC)    Elevated d-dimer    Generalized weakness    Cystocele with prolapse    Muscle spasm    Long term (current) use of opiate analgesic    Thrombocytopenia (HCC)    Acute on chronic anemia    Atrial fibrillation (HCC)    Family history of colon cancer    Excessive use of nonsteroidal anti-inflammatory drugs (NSAIDs)       Diet: Diet Clear Liquid  Diet NPO; Sips with meds  VTE Pharm PPX: Reason for no pharmacologic prophylaxis held for GI procedure   VTE Mech PPX: sequential compression device    Code Status:  Level 1 - Full Code    Advance Directive and Living Will:      Power of :    POLST:    Emergency contact:  Primary Emergency Contact: Jerald   Extended Emergency Contact Information  Primary Emergency Contact: Jerald  Mobile Phone: [phone]  Relation: Son  Preferred language: English   needed? No  Secondary Emergency Contact: [facility]  Mobile Phone: [phone]  Relation: Son    Disposition: Admit to Inpatient under SDL2 for fall.    Consult: IP CONSULT TO GASTROENTEROLOGY  IP CONSULT TO CASE MANAGEMENT    Chief Complaints:   Rapid Heart Rate (Per ems pt was found on the floor, when hooked up to the monitor was found in rapid afib, unknown HS, +eliquis, -LOC )      History of Presenting  Illness:   79-year-old female history of A-fib on Eliquis presenting after fall.  It was an unwitnessed fall with unknown head strike.  She was evaluated by EMS and transported to the hospital.  EKG showing A-fib with RVR initially which is known for the patient she was given fluid resuscitation due to hypothermia and tachycardia meeting sepsis criteria.  Blood cultures were collected and cefepime was started for broad coverage.  The patient corrected out of A-fib to sinus after that.  Procalcitonin normal.  Lactic acidosis of 4.4.  Blood gas showing pCO2 in the 20s normal pH 7.3 reassessment with later blood gas showing pH 7.2 and correction of pCO2 to the 40s.  Patient was given 2 units of packed red blood cells after voicing she has had recent melanotic stools.  She was evaluated by the GI service who recommended n.p.o. at midnight with EGD tomorrow and holding Eliquis and continuing clear diet tonight.  He required brief pressors for an isolated episode of hypotension but quickly corrected to profound hypertension after so she was immediately taken off of pressors.  UA within normal limits CT chest abdomen pelvis showing distended gallbladder and known aortic aneurysm 4.8 cm CT head within normal limits 2-hour lactic acid pending.    On exam, the patient is alert and oriented and recognizes me as her PCP.  She states she is in pain and asking for her pain medications.  Otherwise, she feels well she has no concerns does not endorse any abdominal tenderness denies shortness of breath chest pain swelling in her legs dyspnea on exertion.    ED Management:     ED Triage Vitals   Temperature Pulse Respirations Blood Pressure SpO2   01/22/25 1208 01/22/25 1138 01/22/25 1138 01/22/25 1138 01/22/25 1234   (!) 94.7 °F (34.8 °C) (!) 117 20 124/77 94 %      Temp Source Heart Rate Source Patient Position - Orthostatic VS BP Location FiO2 (%)   01/22/25 1208 01/22/25 1138 01/22/25 1138 01/22/25 1138 --   Rectal Monitor Lying  Right arm       Pain Score       01/22/25 1158       10 - Worst Possible Pain         Medications   iohexol (OMNIPAQUE) 350 MG/ML injection (MULTI-DOSE) 100 mL (has no administration in time range)   pantoprazole (PROTONIX) injection 40 mg (has no administration in time range)   apixaban (ELIQUIS) tablet 5 mg ( Oral Held Dose 1/25/25 1800)   docusate sodium (COLACE) capsule 100 mg (100 mg Oral Not Given 1/22/25 1737)   escitalopram (LEXAPRO) tablet 10 mg (has no administration in time range)   lidocaine (LIDODERM) 5 % patch 2 patch (has no administration in time range)   melatonin tablet 9 mg (has no administration in time range)   metoprolol succinate (TOPROL-XL) 24 hr tablet 50 mg (has no administration in time range)   oxyCODONE (ROXICODONE) immediate release tablet 10 mg (10 mg Oral Given 1/22/25 1735)   polyethylene glycol (MIRALAX) packet 17 g (has no administration in time range)   senna (SENOKOT) tablet 8.6 mg (has no administration in time range)   zolpidem (AMBIEN) tablet 5 mg (has no administration in time range)   sodium chloride 0.9 % infusion (125 mL/hr Intravenous New Bag 1/22/25 1739)   acetaminophen (TYLENOL) tablet 975 mg (975 mg Oral Given 1/22/25 1736)   gabapentin (NEURONTIN) capsule 300 mg (has no administration in time range)   cefepime (MAXIPIME) 1,000 mg in dextrose 5 % 50 mL IVPB (has no administration in time range)   sodium chloride 0.9 % bolus 1,000 mL (0 mL Intravenous Stopped 1/22/25 1312)   sodium chloride 0.9 % bolus 1,000 mL (0 mL Intravenous Stopped 1/22/25 1551)   cefepime (MAXIPIME) 2 g/50 mL dextrose IVPB (0 mg Intravenous Stopped 1/22/25 1357)   norepinephrine (LEVOPHED) 1 mg/mL injection **ADS Override Pull** (  Given 1/22/25 1335)   EPINEPHrine (ADRENALIN) 0.1 mg/mL injection **ADS Override Pull** (  Given 1/22/25 1335)   iohexol (OMNIPAQUE) 350 MG/ML injection (MULTI-DOSE) 100 mL (100 mL Intravenous Given 1/22/25 1402)       Review of System:   Review of Systems    Constitutional:  Negative for fatigue, fever and unexpected weight change.   HENT:  Negative for congestion, sinus pressure, sinus pain and sore throat.    Eyes:  Negative for visual disturbance.   Respiratory:  Negative for cough, chest tightness, shortness of breath and wheezing.    Cardiovascular:  Negative for chest pain, palpitations and leg swelling.   Gastrointestinal:  Positive for blood in stool. Negative for abdominal pain, constipation, diarrhea, nausea and vomiting.   Endocrine: Negative for polydipsia, polyphagia and polyuria.   Genitourinary:  Negative for difficulty urinating, dysuria and urgency.   Musculoskeletal:  Negative for arthralgias and back pain.   Skin:  Negative for rash.   Neurological:  Positive for light-headedness. Negative for dizziness, weakness, numbness and headaches.   Psychiatric/Behavioral:  Negative for behavioral problems and confusion. The patient is not nervous/anxious.        History:     Past Medical History:   Diagnosis Date    Acute deep vein thrombosis of lower limb (HCC)     last assessed [id number]    Aortic aneurysm (HCC)     Arthritis     Atrial fibrillation (HCC)     Dislocation of hip (HCC)     last assessed [id number]    Hypertension     Psychiatric disorder     anxiety     Past Surgical History:   Procedure Laterality Date    HIP SURGERY      JOINT REPLACEMENT      KNEE ARTHROSCOPY Bilateral     x2 rt and 1 on left    TUBAL LIGATION       Social History     Socioeconomic History    Marital status:      Spouse name: None    Number of children: None    Years of education: None    Highest education level: None   Occupational History    None   Tobacco Use    Smoking status: Never    Smokeless tobacco: Never   Vaping Use    Vaping status: Never Used   Substance and Sexual Activity    Alcohol use: No     Comment: none    Drug use: No    Sexual activity: Not Currently     Partners: Male     Birth control/protection: Female Sterilization   Other Topics Concern     None   Social History Narrative    None     Social Drivers of Health     Financial Resource Strain: Low Risk  (1/22/2024)    Overall Financial Resource Strain (CARDIA)     Difficulty of Paying Living Expenses: Not hard at all   Food Insecurity: No Food Insecurity (1/22/2024)    Nursing - Inadequate Food Risk Classification     Worried About Running Out of Food in the Last Year: Never true     Ran Out of Food in the Last Year: Never true     Ran Out of Food in the Last Year: Not on file   Transportation Needs: No Transportation Needs (1/22/2024)    PRAPARE - Transportation     Lack of Transportation (Medical): No     Lack of Transportation (Non-Medical): No   Physical Activity: Inactive (1/22/2024)    Exercise Vital Sign     Days of Exercise per Week: 0 days     Minutes of Exercise per Session: 0 min   Stress: No Stress Concern Present (1/22/2024)    [place] [employer] - Occupational Stress Questionnaire     Feeling of Stress : Only a little   Social Connections: Not on file   Intimate Partner Violence: Not At Risk (1/22/2024)    Humiliation, Afraid, Rape, and Kick questionnaire     Fear of Current or Ex-Partner: No     Emotionally Abused: No     Physically Abused: No     Sexually Abused: No   Housing Stability: Low Risk  (1/22/2024)    Housing Stability Vital Sign     Unable to Pay for Housing in the Last Year: No     Number of Times Moved in the Last Year: 1     Homeless in the Last Year: No     Family History   Problem Relation Age of Onset    Colon cancer Mother     Breast cancer Family     Thyroid cancer Family     Colon cancer Family     Hypertension Family     Thyroid disease Family     Hypertension Father     Dementia Father        Medications & Allergies:   all medications and allergies reviewed  No Known Allergies    PTA Medications:  No current facility-administered medications on file prior to encounter.     Current Outpatient Medications on File Prior to Encounter   Medication Sig  "Dispense Refill    apixaban (Eliquis) 5 mg Take 1 tablet (5 mg total) by mouth 2 (two) times a day 90 tablet 3    cyclobenzaprine (FLEXERIL) 5 mg tablet Take 1 tablet (5 mg total) by mouth daily at bedtime 30 tablet 1    Diclofenac Sodium (VOLTAREN) 1 % Apply 1 g topically 4 (four) times a day 20 g 0    docusate sodium (COLACE) 100 mg capsule Take 1 capsule (100 mg total) by mouth 2 (two) times a day 90 capsule 0    escitalopram (LEXAPRO) 10 mg tablet Take 1 tablet (10 mg total) by mouth daily 90 tablet 1    gabapentin (NEURONTIN) 300 mg capsule Take 3 capsules (900 mg total) by mouth 3 (three) times a day 270 capsule 1    hydrocortisone 2.5 % cream Apply topically 2 (two) times a day As needed 28 g 1    lidocaine (LIDODERM) 5 % Apply 2 patches topically over 12 hours daily Remove & Discard patch within 12 hours or as directed by MD 10 patch 0    Melatonin 5 MG TABS Take 2 tablets (10 mg total) by mouth daily at bedtime as needed (as needed for insomnia) 60 tablet 0    metoprolol succinate (TOPROL-XL) 50 mg 24 hr tablet Take 1 tablet (50 mg total) by mouth 2 (two) times a day 180 tablet 3    oxyCODONE (ROXICODONE) 10 MG TABS Take 1 tablet (10 mg total) by mouth every 8 (eight) hours as needed for moderate pain Max Daily Amount: 30 mg 90 tablet 0    polyethylene glycol (MIRALAX) 17 g packet Take 17 g by mouth daily 20 each 1    senna (SENOKOT) 8.6 mg Take 1 tablet (8.6 mg total) by mouth daily at bedtime 30 tablet 3    zolpidem (AMBIEN) 5 mg tablet Take 1 tablet (5 mg total) by mouth daily at bedtime as needed for sleep 30 tablet 0         Objective & Vitals:     Vitals: /81   Pulse 75   Temp 99 °F (37.2 °C) (Bladder)   Resp 20   Ht 5' 4\" (1.626 m)   Wt 67.6 kg (149 lb)   SpO2 100%   BMI 25.58 kg/m²       Intake/Output Summary (Last 24 hours) at 1/22/2025 1822  Last data filed at 1/22/2025 1551  Gross per 24 hour   Intake 2050 ml   Output --   Net 2050 ml       Invasive Devices       Peripheral " Intravenous Line  Duration             Peripheral IV 01/22/25 Left Antecubital <1 day    Peripheral IV 01/22/25 Proximal;Right;Ventral (anterior) Forearm <1 day              Drain  Duration             External Urinary Catheter 570 days    Urethral Catheter Temperature probe 16 Fr. <1 day                      Labs:   Results Review Statement: I personally reviewed the following image studies/reports in PACS and discussed pertinent findings with Radiology: CT chest, CT abdomen/pelvis, and CT head. My interpretation of the radiology images/reports is: see report.  Recent Results (from the past 24 hours)   ECG 12 lead    Collection Time: 01/22/25 11:44 AM   Result Value Ref Range    Ventricular Rate 155 BPM    Atrial Rate 166 BPM    OR Interval  ms    QRSD Interval 72 ms    QT Interval 260 ms    QTC Interval 417 ms    P Axis  degrees    QRS Axis 61 degrees    T Wave Axis 223 degrees   CBC and differential    Collection Time: 01/22/25 12:18 PM   Result Value Ref Range    WBC 9.12 4.31 - 10.16 Thousand/uL    RBC 3.30 (L) 3.81 - 5.12 Million/uL    Hemoglobin 9.6 (L) 11.5 - 15.4 g/dL    Hematocrit 30.3 (L) 34.8 - 46.1 %    MCV 92 82 - 98 fL    MCH 29.1 26.8 - 34.3 pg    MCHC 31.7 31.4 - 37.4 g/dL    RDW 16.6 (H) 11.6 - 15.1 %    MPV 11.4 8.9 - 12.7 fL    Platelets 249 149 - 390 Thousands/uL    nRBC 0 /100 WBCs    Segmented % 75 43 - 75 %    Immature Grans % 0 0 - 2 %    Lymphocytes % 19 14 - 44 %    Monocytes % 6 4 - 12 %    Eosinophils Relative 0 0 - 6 %    Basophils Relative 0 0 - 1 %    Absolute Neutrophils 6.79 1.85 - 7.62 Thousands/µL    Absolute Immature Grans 0.04 0.00 - 0.20 Thousand/uL    Absolute Lymphocytes 1.69 0.60 - 4.47 Thousands/µL    Absolute Monocytes 0.56 0.17 - 1.22 Thousand/µL    Eosinophils Absolute 0.00 0.00 - 0.61 Thousand/µL    Basophils Absolute 0.04 0.00 - 0.10 Thousands/µL   Comprehensive metabolic panel    Collection Time: 01/22/25 12:18 PM   Result Value Ref Range    Sodium 142 135 - 147  mmol/L    Potassium 4.2 3.5 - 5.3 mmol/L    Chloride 109 (H) 96 - 108 mmol/L    CO2 21 21 - 32 mmol/L    ANION GAP 12 4 - 13 mmol/L    BUN 52 (H) 5 - 25 mg/dL    Creatinine 1.34 (H) 0.60 - 1.30 mg/dL    Glucose 133 65 - 140 mg/dL    Calcium 8.9 8.4 - 10.2 mg/dL    AST 53 (H) 13 - 39 U/L    ALT 39 7 - 52 U/L    Alkaline Phosphatase 60 34 - 104 U/L    Total Protein 6.7 6.4 - 8.4 g/dL    Albumin 3.7 3.5 - 5.0 g/dL    Total Bilirubin 0.72 0.20 - 1.00 mg/dL    eGFR 38 ml/min/1.73sq m   Procalcitonin    Collection Time: 01/22/25 12:18 PM   Result Value Ref Range    Procalcitonin 0.08 <=0.25 ng/ml   Blood culture #1    Collection Time: 01/22/25 12:18 PM    Specimen: Arm, Left; Blood   Result Value Ref Range    Blood Culture Received in Microbiology Lab. Culture in Progress.    Blood culture #2    Collection Time: 01/22/25 12:18 PM    Specimen: Hand, Right; Blood   Result Value Ref Range    Blood Culture Received in Microbiology Lab. Culture in Progress.    Type and screen    Collection Time: 01/22/25 12:23 PM   Result Value Ref Range    ABO Grouping O     Rh Factor Positive     Antibody Screen Negative     Specimen Expiration Date 20250125    Lactic acid    Collection Time: 01/22/25 12:23 PM   Result Value Ref Range    LACTIC ACID 4.4 (HH) 0.5 - 2.0 mmol/L   Protime-INR    Collection Time: 01/22/25 12:23 PM   Result Value Ref Range    Protime 15.9 (H) 12.3 - 15.0 seconds    INR 1.24 (H) 0.85 - 1.19   APTT    Collection Time: 01/22/25 12:23 PM   Result Value Ref Range    PTT 29 23 - 34 seconds   UA w Reflex to Microscopic w Reflex to Culture    Collection Time: 01/22/25 12:35 PM    Specimen: Urine, Indwelling Aguilar Catheter   Result Value Ref Range    Color, UA Light Yellow     Clarity, UA Clear     Specific Gravity, UA 1.008 1.003 - 1.030    pH, UA 6.0 4.5, 5.0, 5.5, 6.0, 6.5, 7.0, 7.5, 8.0    Leukocytes, UA Negative Negative    Nitrite, UA Negative Negative    Protein, UA Negative Negative mg/dl    Glucose, UA Negative  Negative mg/dl    Ketones, UA Negative Negative mg/dl    Urobilinogen, UA <2.0 <2.0 mg/dl mg/dl    Bilirubin, UA Negative Negative    Occult Blood, UA Negative Negative   POCT Blood Gas (CG8+)    Collection Time: 01/22/25 12:50 PM   Result Value Ref Range    ph, Michael ISTAT 7.339 7.300 - 7.400    pCO2, Michael i-STAT 29.4 (L) 42.0 - 50.0 mm HG    pO2, Michael i-STAT 32.0 (L) 35.0 - 45.0 mm HG    BE, i-STAT -9 (L) -2 - 3 mmol/L    HCO3, Michael i-STAT 15.8 (LL) 24.0 - 30.0 mmol/L    CO2, i-STAT 17 (L) 21 - 32 mmol/L    O2 Sat, i-STAT 59 (L) 60 - 85 %    SODIUM, I-STAT 147 (H) 136 - 145 mmol/l    Potassium, i-STAT 3.8 3.5 - 5.3 mmol/L    Calcium, Ionized i-STAT 1.01 (L) 1.12 - 1.32 mmol/L    Hct, i-STAT <15 (L) 34.8 - 46.1 %    Hgb, i-STAT      Glucose, i-STAT 89 65 - 140 mg/dl    Specimen Type VENOUS    ECG 12 lead    Collection Time: 01/22/25  1:37 PM   Result Value Ref Range    Ventricular Rate 90 BPM    Atrial Rate 90 BPM    NH Interval 188 ms    QRSD Interval 76 ms    QT Interval 388 ms    QTC Interval 474 ms    P Axis 122 degrees    QRS Axis 73 degrees    T Wave Axis 58 degrees   POCT Blood Gas (CG8+)    Collection Time: 01/22/25  1:48 PM   Result Value Ref Range    ph, Michael ISTAT 7.275 (L) 7.300 - 7.400    pCO2, Michael i-STAT 44.2 42.0 - 50.0 mm HG    pO2, Michael i-STAT 24.0 (L) 35.0 - 45.0 mm HG    BE, i-STAT -6 (L) -2 - 3 mmol/L    HCO3, Michael i-STAT 20.5 (L) 24.0 - 30.0 mmol/L    CO2, i-STAT 22 21 - 32 mmol/L    O2 Sat, i-STAT 35 (L) 60 - 85 %    SODIUM, I-STAT 145 136 - 145 mmol/l    Potassium, i-STAT 4.1 3.5 - 5.3 mmol/L    Calcium, Ionized i-STAT 1.16 1.12 - 1.32 mmol/L    Hct, i-STAT 22 (L) 34.8 - 46.1 %    Hgb, i-STAT 7.5 (L) 11.5 - 15.4 g/dl    Glucose, i-STAT 111 65 - 140 mg/dl    Specimen Type VENOUS    Hemoglobin and hematocrit, blood    Collection Time: 01/22/25  5:24 PM   Result Value Ref Range    Hemoglobin 8.5 (L) 11.5 - 15.4 g/dL    Hematocrit 26.4 (L) 34.8 - 46.1 %   Prepare Leukoreduced RBC: 2 Units    Collection  Time: 01/22/25  5:47 PM   Result Value Ref Range    Unit Product Code B5867M32     Unit Number [id number]     Unit ABO O     Unit RH POS     Crossmatch Compatible     Unit Dispense Status Issued     Unit Product Volume 350 mL    Unit Product Code O6717K56     Unit Number [id number]     Unit ABO O     Unit RH POS     Crossmatch Compatible     Unit Dispense Status Issued     Unit Product Volume 300 ml       Imaging:     Results Review Statement: I reviewed radiology reports from this admission including: chest xray, CT chest, CT abdomen/pelvis, and CT head.  CT chest abdomen pelvis w contrast  Result Date: 1/22/2025  Impression: 1. Distended gallbladder without adjacent inflammatory changes possibly related to physiologic distention. Further clinical assessment advised. 2. 4.8 cm ascending aortic aneurysm redemonstrated. Nonemergent cardiothoracic surgery surveillance is advised. Workstation performed: [id number]     CT head without contrast  Result Date: 1/22/2025  Impression: No acute intracranial hemorrhage, mass effect or edema. Stable chronic atrophy with asymmetric prominence of the right extra-axial spaces. Mild chronic microangiopathic changes. Resident: Mike Reardon I, the attending radiologist, have reviewed the images and agree with the final report above. Workstation performed: [id number]     XR chest 1 view portable  Result Date: 1/22/2025  Impression: No focal airspace consolidation. Workstation performed: [id number]       EKG, Pathology, and Other Studies:   Results Review Statement: I reviewed radiology reports from this admission including: chest xray, CT chest, CT abdomen/pelvis, and CT head.    Physical Exam:   Physical Exam  Constitutional:       General: She is not in acute distress.     Appearance: Normal appearance. She is ill-appearing. She is not diaphoretic.   HENT:      Head: Normocephalic and atraumatic.      Right Ear: External ear normal.      Left Ear: External ear normal.       Nose: Nose normal.      Mouth/Throat:      Pharynx: Oropharynx is clear.      Comments: Poor dentition  Eyes:      Conjunctiva/sclera: Conjunctivae normal.   Cardiovascular:      Rate and Rhythm: Normal rate and regular rhythm.      Pulses: Normal pulses.      Heart sounds: Normal heart sounds.   Pulmonary:      Effort: Pulmonary effort is normal. No respiratory distress.      Breath sounds: No stridor. Rhonchi present. No wheezing.   Abdominal:      General: There is no distension.      Palpations: Abdomen is soft.      Tenderness: There is no abdominal tenderness.   Musculoskeletal:         General: Normal range of motion.      Cervical back: Normal range of motion and neck supple.   Skin:     General: Skin is warm and dry.      Capillary Refill: Capillary refill takes less than 2 seconds.      Coloration: Skin is pale.   Neurological:      Mental Status: She is alert and oriented to person, place, and time. Mental status is at baseline.   Psychiatric:         Mood and Affect: Mood normal.         Behavior: Behavior normal.         Thought Content: Thought content normal.         Judgment: Judgment normal.           Raz Dickens DO  PGY-2, Family Medicine  01/22/25  6:22 PM

## 2025-01-22 NOTE — ASSESSMENT & PLAN NOTE
Patient with reported family history of colon cancer in her mother.  States that mother was diagnosed at the age of 43.  Patient has never had a colonoscopy performed.  Would ultimately benefit from colonoscopy, especially pending results of EGD to be performed tomorrow.

## 2025-01-22 NOTE — ASSESSMENT & PLAN NOTE
Patient has a history of melena in the outpatient setting.  She is voices to her PCP many times.  She takes chronic NSAIDs although has been told not to by her PCP for aches and pains.    -This is likely the cause of her melanotic stools.  Low concern for acute GI bleed  -Per GI service will do EGD today.  N.p.o. at midnight and clear diet until then.  -No NSAIDs

## 2025-01-22 NOTE — ED ATTENDING ATTESTATION
1/22/2025  I, Nori Finn MD, saw and evaluated the patient. I have discussed the patient with the resident/non-physician practitioner and agree with the resident's/non-physician practitioner's findings, Plan of Care, and MDM as documented in the resident's/non-physician practitioner's note, except where noted. All available labs and Radiology studies were reviewed.  I was present for key portions of any procedure(s) performed by the resident/non-physician practitioner and I was immediately available to provide assistance.       At this point I agree with the current assessment done in the Emergency Department.  I have conducted an independent evaluation of this patient a history and physical is as follows:  This is a 79-year-old woman who presents to the emergency department with a fall.  Patient apparently fell this morning, did not strike her head, but was unable to get off the ground.  The patient was still on the ground when EMS arrived.  Patient states that she has not been feeling well for a couple of days.  States that she has not had a good appetite.  States that she has had several episodes of black stool over the last 2 days.  Patient has history of atrial fibrillation and is on Eliquis.  She denies fevers, but states that she has felt very cold.  She has not had cough.  She does not have headache or chest pain.  She does not have abdominal pain.  When EMS arrived, the patient was in a rapid A-fib with a rate of 200.  She had adequate blood pressure.  She was transported here.  On exam here, the patient is pale and ill-appearing.  In HEENT exam, she has conjunctival pallor.  Her mucous membranes are dry.  Her neck is supple and nontender.  Her heart is tachycardic with a rate of about 150 in the room.  She has narrow complex on the monitor.  Her lungs are clear.  Abdomen is soft and nontender.  Her extremities are intact.  She does have intact pulses distally and is neurologically nonfocal.  .kita  MEDICAL DECISION MAKING    Number and Complexity of Problems  Differential diagnosis: Atrial fibrillation with RVR, concern for GI bleeding, concern for dehydration, concern for sepsis    Medical Decision Making Data  External documents reviewed: Prior family practice notes reviewed  My EKG interpretation: Atrial fibrillation with RVR, narrow complex, EMS records also reviewed  My CT interpretation:   My X-ray interpretation:   My ultrasound interpretation:     CT head without contrast   Final Result      No acute intracranial hemorrhage, mass effect or edema.      Stable chronic atrophy with asymmetric prominence of the right extra-axial spaces. Mild chronic microangiopathic changes.                  Resident: Mike Reardon I, the attending radiologist, have reviewed the images and agree with the final report above.      Workstation performed: [id number]         CT chest abdomen pelvis w contrast   Final Result         1. Distended gallbladder without adjacent inflammatory changes possibly related to physiologic distention. Further clinical assessment advised.   2. 4.8 cm ascending aortic aneurysm redemonstrated. Nonemergent cardiothoracic surgery surveillance is advised.               Workstation performed: [id number]         XR chest 1 view portable   Final Result      No focal airspace consolidation.            Workstation performed: [id number]             Labs Reviewed   CBC AND DIFFERENTIAL - Abnormal       Result Value Ref Range Status    WBC 9.12  4.31 - 10.16 Thousand/uL Final    RBC 3.30 (*) 3.81 - 5.12 Million/uL Final    Hemoglobin 9.6 (*) 11.5 - 15.4 g/dL Final    Hematocrit 30.3 (*) 34.8 - 46.1 % Final    MCV 92  82 - 98 fL Final    MCH 29.1  26.8 - 34.3 pg Final    MCHC 31.7  31.4 - 37.4 g/dL Final    RDW 16.6 (*) 11.6 - 15.1 % Final    MPV 11.4  8.9 - 12.7 fL Final    Platelets 249  149 - 390 Thousands/uL Final    nRBC 0  /100 WBCs Final    Segmented % 75  43 - 75 % Final    Immature  Grans % 0  0 - 2 % Final    Lymphocytes % 19  14 - 44 % Final    Monocytes % 6  4 - 12 % Final    Eosinophils Relative 0  0 - 6 % Final    Basophils Relative 0  0 - 1 % Final    Absolute Neutrophils 6.79  1.85 - 7.62 Thousands/µL Final    Absolute Immature Grans 0.04  0.00 - 0.20 Thousand/uL Final    Absolute Lymphocytes 1.69  0.60 - 4.47 Thousands/µL Final    Absolute Monocytes 0.56  0.17 - 1.22 Thousand/µL Final    Eosinophils Absolute 0.00  0.00 - 0.61 Thousand/µL Final    Basophils Absolute 0.04  0.00 - 0.10 Thousands/µL Final   COMPREHENSIVE METABOLIC PANEL - Abnormal    Sodium 142  135 - 147 mmol/L Final    Potassium 4.2  3.5 - 5.3 mmol/L Final    Chloride 109 (*) 96 - 108 mmol/L Final    CO2 21  21 - 32 mmol/L Final    ANION GAP 12  4 - 13 mmol/L Final    BUN 52 (*) 5 - 25 mg/dL Final    Creatinine 1.34 (*) 0.60 - 1.30 mg/dL Final    Comment: Standardized to IDMS reference method    Glucose 133  65 - 140 mg/dL Final    Comment: If the patient is fasting, the ADA then defines impaired fasting glucose as > 100 mg/dL and diabetes as > or equal to 123 mg/dL.    Calcium 8.9  8.4 - 10.2 mg/dL Final    AST 53 (*) 13 - 39 U/L Final    ALT 39  7 - 52 U/L Final    Comment: Specimen collection should occur prior to Sulfasalazine administration due to the potential for falsely depressed results.     Alkaline Phosphatase 60  34 - 104 U/L Final    Total Protein 6.7  6.4 - 8.4 g/dL Final    Albumin 3.7  3.5 - 5.0 g/dL Final    Total Bilirubin 0.72  0.20 - 1.00 mg/dL Final    Comment: Use of this assay is not recommended for patients undergoing treatment with eltrombopag due to the potential for falsely elevated results.  N-acetyl-p-benzoquinone imine (metabolite of Acetaminophen) will generate erroneously low results in samples for patients that have taken an overdose of Acetaminophen.    eGFR 38  ml/min/1.73sq m Final    Narrative:     [employer] guidelines for Chronic Kidney Disease (CKD):      Stage 1 with normal or high GFR (GFR > 90 mL/min/1.73 square meters)    Stage 2 Mild CKD (GFR = 60-89 mL/min/1.73 square meters)    Stage 3A Moderate CKD (GFR = 45-59 mL/min/1.73 square meters)    Stage 3B Moderate CKD (GFR = 30-44 mL/min/1.73 square meters)    Stage 4 Severe CKD (GFR = 15-29 mL/min/1.73 square meters)    Stage 5 End Stage CKD (GFR <15 mL/min/1.73 square meters)  Note: GFR calculation is accurate only with a steady state creatinine   LACTIC ACID, PLASMA (W/REFLEX IF RESULT > 2.0) - Abnormal    LACTIC ACID 4.4 (*) 0.5 - 2.0 mmol/L Final    Narrative:     Result may be elevated if tourniquet was used during collection.   PROTIME-INR - Abnormal    Protime 15.9 (*) 12.3 - 15.0 seconds Final    INR 1.24 (*) 0.85 - 1.19 Final    Narrative:     INR Therapeutic Range    Indication                                             INR Range      Atrial Fibrillation                                               2.0-3.0  Hypercoagulable State                                    2.0.2.3  Left Ventricular Asist Device                            2.0-3.0  Mechanical Heart Valve                                  -    Aortic(with afib, MI, embolism, HF, LA enlargement,    and/or coagulopathy)                                     2.0-3.0 (2.5-3.5)     Mitral                                                             2.5-3.5  Prosthetic/Bioprosthetic Heart Valve               2.0-3.0  Venous thromboembolism (VTE: VT, PE        2.0-3.0   POCT BLOOD GAS (CG8+) - Abnormal    ph, Michael ISTAT 7.339  7.300 - 7.400 Final    pCO2, Michael i-STAT 29.4 (*) 42.0 - 50.0 mm HG Final    pO2, Michael i-STAT 32.0 (*) 35.0 - 45.0 mm HG Final    BE, i-STAT -9 (*) -2 - 3 mmol/L Final    HCO3, Michael i-STAT 15.8 (*) 24.0 - 30.0 mmol/L Final    CO2, i-STAT 17 (*) 21 - 32 mmol/L Final    O2 Sat, i-STAT 59 (*) 60 - 85 % Final    SODIUM, I-STAT 147 (*) 136 - 145 mmol/l Final    Potassium, i-STAT 3.8  3.5 - 5.3 mmol/L Final    Calcium, Ionized i-STAT 1.01 (*)  1.12 - 1.32 mmol/L Final    Hct, i-STAT <15 (*) 34.8 - 46.1 % Final    Hgb, i-STAT     Final    Comment: Out of Reportable Range    Glucose, i-STAT 89  65 - 140 mg/dl Final    Specimen Type VENOUS   Final   POCT BLOOD GAS (CG8+) - Abnormal    ph, Michael ISTAT 7.275 (*) 7.300 - 7.400 Final    pCO2, Michael i-STAT 44.2  42.0 - 50.0 mm HG Final    pO2, Michael i-STAT 24.0 (*) 35.0 - 45.0 mm HG Final    BE, i-STAT -6 (*) -2 - 3 mmol/L Final    HCO3, Michael i-STAT 20.5 (*) 24.0 - 30.0 mmol/L Final    CO2, i-STAT 22  21 - 32 mmol/L Final    O2 Sat, i-STAT 35 (*) 60 - 85 % Final    SODIUM, I-STAT 145  136 - 145 mmol/l Final    Potassium, i-STAT 4.1  3.5 - 5.3 mmol/L Final    Calcium, Ionized i-STAT 1.16  1.12 - 1.32 mmol/L Final    Hct, i-STAT 22 (*) 34.8 - 46.1 % Final    Hgb, i-STAT 7.5 (*) 11.5 - 15.4 g/dl Final    Glucose, i-STAT 111  65 - 140 mg/dl Final    Specimen Type VENOUS   Final   PROCALCITONIN TEST - Normal    Procalcitonin 0.08  <=0.25 ng/ml Final    Comment: Suspected Lower Respiratory Tract Infection (LRTI):  - LESS than or EQUAL to 0.25 ng/mL:   low likelihood for bacterial LRTI; antibiotics DISCOURAGED.  - GREATER than 0.25 ng/mL:   increased likelihood for bacterial LRTI; antibiotics ENCOURAGED.    Suspected Sepsis:  - Strongly consider initiating antibiotics in ALL UNSTABLE patients.  - LESS than or EQUAL to 0.5 ng/mL:   low likelihood for bacterial sepsis; antibiotics DISCOURAGED.  - GREATER than 0.5 ng/mL:   increased likelihood for bacterial sepsis; antibiotics ENCOURAGED.  - GREATER than 2 ng/mL:   high risk for severe sepsis / septic shock; antibiotics strongly ENCOURAGED.    Decisions on antibiotic use should not be based solely on Procalcitonin (PCT) levels. If PCT is low but uncertainty exists with stopping antibiotics, repeat PCT in 6-24 hours to confirm the low level. If antibiotics are administered (regardless if initial PCT was high or low), repeat PCT every 1-2 days to consider early antibiotic cessation  (when GREATER than 80% decrease from the peak OR when PCT drops below designated cutoffs, whichever comes first), so long as the infection is NOT one that typically requires prolonged treatment durations (e.g., bone/joint infections, endocarditis, Staph. aureus bacteremia).    Situations of FALSE-POSITIVE Procalcitonin values:  1) Newborns < 72 hours old  2) Massive stress from severe trauma / burns, major surgery, acute pancreatitis, cardiogenic / hemorrhagic shock, sickle cell crisis, or other organ perfusion abnormalities  3) Malaria and some Candidal infections  4) Treatment with agents that stimulate cytokines (e.g., OKT3, anti-lymphocyte globulins, alemtuzumab, IL-2, granulocyte transfusion [NOT GCSFs])  5) Chronic renal disease causes elevated baseline levels (consider GREATER than 0.75 ng/mL as an abnormal cut-off); initiating HD/CRRT may cause transient decreases  6) Paraneoplastic syndromes from medullary thyroid or SCLC, some forms of vasculitis, and acute gitko-ih-cijv disease    Situations of FALSE-NEGATIVE Procalcitonin values:  1) Too early in clinical course for PCT to have reached its peak (may repeat in 6-24 hours to confirm low level)  2) Localized infection WITHOUT systemic (SIRS / sepsis) response (e.g., an abscess, osteomyelitis, cystitis)  3) Mycobacteria (e.g., Tuberculosis, MAC)  4) Cystic fibrosis exacerbations     APTT - Normal    PTT 29  23 - 34 seconds Final    Comment: Therapeutic Heparin Range =  60-90 seconds   BLOOD CULTURE   BLOOD CULTURE   UA W REFLEX TO MICROSCOPIC WITH REFLEX TO CULTURE    Color, UA Light Yellow   Final    Clarity, UA Clear   Final    Specific Gravity, UA 1.008  1.003 - 1.030 Final    pH, UA 6.0  4.5, 5.0, 5.5, 6.0, 6.5, 7.0, 7.5, 8.0 Final    Leukocytes, UA Negative  Negative Final    Nitrite, UA Negative  Negative Final    Protein, UA Negative  Negative mg/dl Final    Glucose, UA Negative  Negative mg/dl Final    Ketones, UA Negative  Negative mg/dl Final     Urobilinogen, UA <2.0  <2.0 mg/dl mg/dl Final    Bilirubin, UA Negative  Negative Final    Occult Blood, UA Negative  Negative Final   LACTIC ACID 2 HOUR   TIBC PANEL (INCL. IRON, TIBC, % IRON SATURATION)   FERRITIN   HEMOGLOBIN AND HEMATOCRIT, BLOOD   TYPE AND SCREEN    ABO Grouping O   Final    Rh Factor Positive   Final    Antibody Screen Negative   Final    Specimen Expiration Date 20250125   Final   PREPARE LEUKOREDUCED RBC    Unit Product Code [id number]       Unit Number [id number]       Unit ABO O       Unit RH POS       Crossmatch Compatible   Final    Unit Dispense Status Issued       Unit Product Volume 350  mL    IRON PANEL (INCLUDES FERRITIN,IRON SAT%, IRON, AND TIBC)    Narrative:     The following orders were created for panel order Iron Panel (Includes Ferritin, Iron Sat%, Iron, and TIBC).  Procedure                               Abnormality         Status                     ---------                               -----------         ------                     TIBC Panel (incl. Iron, ...[[id number]]                      In process                 Ferritin[[id number]]                                         In process                   Please view results for these tests on the individual orders.       Labs reviewed by me are significant for: Lactic acidosis, acute kidney injury    Clinical decision rules/scores are significant for:     Discussed case with:   Considered admission for:     Treatment and Disposition  ED course: Patient seen examined.  IV access established.  Patient given IV fluid bolus.  Placed on pads.  Did not attempt to rate control given that I believe this is secondary to either bleeding or sepsis.  Patient given IV antibiotics, type and screen, CT head for fall.  Reassessment: Patient with improving heart rate with IV fluids, hemoglobin is acceptable.  Consult GI for GI bleeding with black stools x 2.  Will admit to the hospital with diagnosis of 1 lactic acidosis, 2 A-fib with  RVR, 3 dehydration, 4 sepsis  Shared decision making:   Code status:     ED Course  ED Course as of 01/22/25 1715   Wed Jan 22, 2025   1403 Called to see patient, patient with blood pressure of 60.  Levophed started.  Ultrasound with cardiomegaly.  Poor squeeze.  No pericardial effusion.  Patient spontaneously cardioverted during ultrasound, sinus rhythm on the monitor, repeat EKG without ischemia.  Patient with improving blood pressure, altered mental status.  Repeat i-STAT with mixed acidosis.  Patient with falling hemoglobin.  Consult to GI, patient to CT   1404 Will transfuse patient given hypotension, falling hemoglobin, report of black stools         Critical Care Time  CriticalCare Time    Date/Time: 1/22/2025 1:27 PM    Performed by: Nori Finn MD  Authorized by: Nori Finn MD    Critical care provider statement:     Critical care time (minutes):  46    Critical care time was exclusive of:  Separately billable procedures and treating other patients and teaching time    Critical care was necessary to treat or prevent imminent or life-threatening deterioration of the following conditions:  Circulatory failure and shock    Critical care was time spent personally by me on the following activities:  Blood draw for specimens, obtaining history from patient or surrogate, development of treatment plan with patient or surrogate, discussions with consultants, discussions with primary provider, evaluation of patient's response to treatment, examination of patient, review of old charts, re-evaluation of patient's condition, ordering and review of radiographic studies, ordering and review of laboratory studies and ordering and performing treatments and interventions

## 2025-01-23 ENCOUNTER — APPOINTMENT (INPATIENT)
Dept: GASTROENTEROLOGY | Facility: HOSPITAL | Age: 80
DRG: 377 | End: 2025-01-23
Payer: COMMERCIAL

## 2025-01-23 ENCOUNTER — ANESTHESIA (INPATIENT)
Dept: GASTROENTEROLOGY | Facility: HOSPITAL | Age: 80
DRG: 377 | End: 2025-01-23
Payer: COMMERCIAL

## 2025-01-23 ENCOUNTER — ANESTHESIA EVENT (INPATIENT)
Dept: GASTROENTEROLOGY | Facility: HOSPITAL | Age: 80
DRG: 377 | End: 2025-01-23
Payer: COMMERCIAL

## 2025-01-23 ENCOUNTER — APPOINTMENT (INPATIENT)
Dept: NON INVASIVE DIAGNOSTICS | Facility: HOSPITAL | Age: 80
DRG: 377 | End: 2025-01-23
Payer: COMMERCIAL

## 2025-01-23 PROBLEM — R57.1 HYPOVOLEMIC SHOCK (HCC): Status: RESOLVED | Noted: 2025-01-23 | Resolved: 2025-01-23

## 2025-01-23 PROBLEM — R57.1 HYPOVOLEMIC SHOCK (HCC): Status: ACTIVE | Noted: 2025-01-23

## 2025-01-23 LAB
ABO GROUP BLD BPU: NORMAL
ABO GROUP BLD BPU: NORMAL
ALBUMIN SERPL BCG-MCNC: 2.9 G/DL (ref 3.5–5)
ALP SERPL-CCNC: 51 U/L (ref 34–104)
ALT SERPL W P-5'-P-CCNC: 29 U/L (ref 7–52)
ANION GAP SERPL CALCULATED.3IONS-SCNC: 6 MMOL/L (ref 4–13)
AORTIC ROOT: 4.2 CM
ASCENDING AORTA: 4.3 CM
AST SERPL W P-5'-P-CCNC: 38 U/L (ref 13–39)
BASOPHILS # BLD AUTO: 0.04 THOUSANDS/ΜL (ref 0–0.1)
BASOPHILS NFR BLD AUTO: 1 % (ref 0–1)
BILIRUB SERPL-MCNC: 0.78 MG/DL (ref 0.2–1)
BNP SERPL-MCNC: 485 PG/ML (ref 0–100)
BPU ID: NORMAL
BPU ID: NORMAL
BSA FOR ECHO PROCEDURE: 1.75 M2
BUN SERPL-MCNC: 30 MG/DL (ref 5–25)
CALCIUM ALBUM COR SERPL-MCNC: 8.7 MG/DL (ref 8.3–10.1)
CALCIUM SERPL-MCNC: 7.8 MG/DL (ref 8.4–10.2)
CHLORIDE SERPL-SCNC: 113 MMOL/L (ref 96–108)
CO2 SERPL-SCNC: 22 MMOL/L (ref 21–32)
CREAT SERPL-MCNC: 0.9 MG/DL (ref 0.6–1.3)
CROSSMATCH: NORMAL
CROSSMATCH: NORMAL
DOP CALC LVOT AREA: 3.46 CM2
DOP CALC LVOT DIAMETER: 2.1 CM
E WAVE DECELERATION TIME: 244 MS
E/A RATIO: 1.46
EOSINOPHIL # BLD AUTO: 0.02 THOUSAND/ΜL (ref 0–0.61)
EOSINOPHIL NFR BLD AUTO: 1 % (ref 0–6)
ERYTHROCYTE [DISTWIDTH] IN BLOOD BY AUTOMATED COUNT: 16.4 % (ref 11.6–15.1)
FRACTIONAL SHORTENING: 32 (ref 28–44)
GFR SERPL CREATININE-BSD FRML MDRD: 61 ML/MIN/1.73SQ M
GLUCOSE SERPL-MCNC: 83 MG/DL (ref 65–140)
HCT VFR BLD AUTO: 29.1 % (ref 34.8–46.1)
HGB BLD-MCNC: 9.2 G/DL (ref 11.5–15.4)
IMM GRANULOCYTES # BLD AUTO: 0.01 THOUSAND/UL (ref 0–0.2)
IMM GRANULOCYTES NFR BLD AUTO: 0 % (ref 0–2)
INTERVENTRICULAR SEPTUM IN DIASTOLE (PARASTERNAL SHORT AXIS VIEW): 0.9 CM
INTERVENTRICULAR SEPTUM: 0.9 CM (ref 0.6–1.1)
LAAS-AP2: 22 CM2
LAAS-AP4: 17 CM2
LACTATE SERPL-SCNC: 1 MMOL/L (ref 0.5–2)
LEFT ATRIUM SIZE: 2.9 CM
LEFT ATRIUM VOLUME (MOD BIPLANE): 60 ML
LEFT ATRIUM VOLUME INDEX (MOD BIPLANE): 34.3 ML/M2
LEFT INTERNAL DIMENSION IN SYSTOLE: 3.2 CM (ref 2.1–4)
LEFT VENTRICULAR INTERNAL DIMENSION IN DIASTOLE: 4.7 CM (ref 3.5–6)
LEFT VENTRICULAR POSTERIOR WALL IN END DIASTOLE: 0.8 CM
LEFT VENTRICULAR STROKE VOLUME: 65 ML
LV EF US.2D.A4C+ESTIMATED: 61 %
LVSV (TEICH): 65 ML
LYMPHOCYTES # BLD AUTO: 1.16 THOUSANDS/ΜL (ref 0.6–4.47)
LYMPHOCYTES NFR BLD AUTO: 29 % (ref 14–44)
MAGNESIUM SERPL-MCNC: 1.6 MG/DL (ref 1.9–2.7)
MCH RBC QN AUTO: 29.1 PG (ref 26.8–34.3)
MCHC RBC AUTO-ENTMCNC: 31.6 G/DL (ref 31.4–37.4)
MCV RBC AUTO: 92 FL (ref 82–98)
MONOCYTES # BLD AUTO: 0.41 THOUSAND/ΜL (ref 0.17–1.22)
MONOCYTES NFR BLD AUTO: 10 % (ref 4–12)
MV E'TISSUE VEL-LAT: 7 CM/S
MV E'TISSUE VEL-SEP: 7 CM/S
MV PEAK A VEL: 0.61 M/S
MV PEAK E VEL: 89 CM/S
MV STENOSIS PRESSURE HALF TIME: 71 MS
MV VALVE AREA P 1/2 METHOD: 3.1
NEUTROPHILS # BLD AUTO: 2.33 THOUSANDS/ΜL (ref 1.85–7.62)
NEUTS SEG NFR BLD AUTO: 59 % (ref 43–75)
NRBC BLD AUTO-RTO: 0 /100 WBCS
PHOSPHATE SERPL-MCNC: 2.7 MG/DL (ref 2.3–4.1)
PLATELET # BLD AUTO: 136 THOUSANDS/UL (ref 149–390)
PLATELET BLD QL SMEAR: ABNORMAL
PMV BLD AUTO: 11.6 FL (ref 8.9–12.7)
POTASSIUM SERPL-SCNC: 4.3 MMOL/L (ref 3.5–5.3)
PROT SERPL-MCNC: 4.8 G/DL (ref 6.4–8.4)
RA PRESSURE ESTIMATED: 15 MMHG
RBC # BLD AUTO: 3.16 MILLION/UL (ref 3.81–5.12)
RBC MORPH BLD: NORMAL
RIGHT ATRIUM AREA SYSTOLE A4C: 15.6 CM2
RIGHT VENTRICLE ID DIMENSION: 4.7 CM
RV PSP: 49 MMHG
SINOTUBULAR JUNCTION: 4 CM
SL CV LEFT ATRIUM LENGTH A2C: 6.7 CM
SL CV LV EF: 61
SL CV PED ECHO LEFT VENTRICLE DIASTOLIC VOLUME (MOD BIPLANE) 2D: 105 ML
SL CV PED ECHO LEFT VENTRICLE SYSTOLIC VOLUME (MOD BIPLANE) 2D: 40 ML
SL CV SINUS OF VALSALVA 2D: 4.4 CM
SODIUM SERPL-SCNC: 141 MMOL/L (ref 135–147)
STJ: 4 CM
TR MAX PG: 34 MMHG
TR PEAK VELOCITY: 2.9 M/S
TRICUSPID ANNULAR PLANE SYSTOLIC EXCURSION: 2.4 CM
TRICUSPID VALVE PEAK REGURGITATION VELOCITY: 2.89 M/S
UNIT DISPENSE STATUS: NORMAL
UNIT DISPENSE STATUS: NORMAL
UNIT PRODUCT CODE: NORMAL
UNIT PRODUCT CODE: NORMAL
UNIT PRODUCT VOLUME: 300 ML
UNIT PRODUCT VOLUME: 350 ML
UNIT RH: NORMAL
UNIT RH: NORMAL
WBC # BLD AUTO: 3.97 THOUSAND/UL (ref 4.31–10.16)

## 2025-01-23 PROCEDURE — 83735 ASSAY OF MAGNESIUM: CPT

## 2025-01-23 PROCEDURE — 83605 ASSAY OF LACTIC ACID: CPT

## 2025-01-23 PROCEDURE — 93306 TTE W/DOPPLER COMPLETE: CPT

## 2025-01-23 PROCEDURE — 93306 TTE W/DOPPLER COMPLETE: CPT | Performed by: INTERNAL MEDICINE

## 2025-01-23 PROCEDURE — 0DB68ZX EXCISION OF STOMACH, VIA NATURAL OR ARTIFICIAL OPENING ENDOSCOPIC, DIAGNOSTIC: ICD-10-PCS | Performed by: INTERNAL MEDICINE

## 2025-01-23 PROCEDURE — 43239 EGD BIOPSY SINGLE/MULTIPLE: CPT | Performed by: INTERNAL MEDICINE

## 2025-01-23 PROCEDURE — 85025 COMPLETE CBC W/AUTO DIFF WBC: CPT

## 2025-01-23 PROCEDURE — 84100 ASSAY OF PHOSPHORUS: CPT

## 2025-01-23 PROCEDURE — 97167 OT EVAL HIGH COMPLEX 60 MIN: CPT

## 2025-01-23 PROCEDURE — 99232 SBSQ HOSP IP/OBS MODERATE 35: CPT | Performed by: FAMILY MEDICINE

## 2025-01-23 PROCEDURE — 83880 ASSAY OF NATRIURETIC PEPTIDE: CPT

## 2025-01-23 PROCEDURE — 80053 COMPREHEN METABOLIC PANEL: CPT

## 2025-01-23 PROCEDURE — 88305 TISSUE EXAM BY PATHOLOGIST: CPT | Performed by: STUDENT IN AN ORGANIZED HEALTH CARE EDUCATION/TRAINING PROGRAM

## 2025-01-23 PROCEDURE — 97163 PT EVAL HIGH COMPLEX 45 MIN: CPT

## 2025-01-23 RX ORDER — LANOLIN ALCOHOL/MO/W.PET/CERES
400 CREAM (GRAM) TOPICAL 2 TIMES DAILY
Status: DISCONTINUED | OUTPATIENT
Start: 2025-01-24 | End: 2025-01-24 | Stop reason: HOSPADM

## 2025-01-23 RX ORDER — SODIUM CHLORIDE, SODIUM GLUCONATE, SODIUM ACETATE, POTASSIUM CHLORIDE, MAGNESIUM CHLORIDE, SODIUM PHOSPHATE, DIBASIC, AND POTASSIUM PHOSPHATE .53; .5; .37; .037; .03; .012; .00082 G/100ML; G/100ML; G/100ML; G/100ML; G/100ML; G/100ML; G/100ML
125 INJECTION, SOLUTION INTRAVENOUS CONTINUOUS
Status: DISPENSED | OUTPATIENT
Start: 2025-01-23 | End: 2025-01-23

## 2025-01-23 RX ORDER — PROPOFOL 10 MG/ML
INJECTION, EMULSION INTRAVENOUS AS NEEDED
Status: DISCONTINUED | OUTPATIENT
Start: 2025-01-23 | End: 2025-01-23

## 2025-01-23 RX ORDER — PROPOFOL 10 MG/ML
INJECTION, EMULSION INTRAVENOUS CONTINUOUS PRN
Status: DISCONTINUED | OUTPATIENT
Start: 2025-01-23 | End: 2025-01-23

## 2025-01-23 RX ORDER — MAGNESIUM SULFATE HEPTAHYDRATE 40 MG/ML
2 INJECTION, SOLUTION INTRAVENOUS ONCE
Status: COMPLETED | OUTPATIENT
Start: 2025-01-23 | End: 2025-01-24

## 2025-01-23 RX ORDER — PANTOPRAZOLE SODIUM 40 MG/1
40 TABLET, DELAYED RELEASE ORAL
Status: DISCONTINUED | OUTPATIENT
Start: 2025-01-24 | End: 2025-01-24 | Stop reason: HOSPADM

## 2025-01-23 RX ADMIN — ACETAMINOPHEN 975 MG: 325 TABLET, FILM COATED ORAL at 05:15

## 2025-01-23 RX ADMIN — ACETAMINOPHEN 975 MG: 325 TABLET, FILM COATED ORAL at 21:01

## 2025-01-23 RX ADMIN — SODIUM CHLORIDE 125 ML/HR: 0.9 INJECTION, SOLUTION INTRAVENOUS at 02:06

## 2025-01-23 RX ADMIN — GABAPENTIN 300 MG: 300 CAPSULE ORAL at 08:18

## 2025-01-23 RX ADMIN — Medication 9 MG: at 21:05

## 2025-01-23 RX ADMIN — CEFEPIME 1000 MG: 1 INJECTION, POWDER, FOR SOLUTION INTRAMUSCULAR; INTRAVENOUS at 12:14

## 2025-01-23 RX ADMIN — POLYETHYLENE GLYCOL 3350 17 G: 17 POWDER, FOR SOLUTION ORAL at 08:18

## 2025-01-23 RX ADMIN — CEFEPIME 1000 MG: 1 INJECTION, POWDER, FOR SOLUTION INTRAMUSCULAR; INTRAVENOUS at 01:26

## 2025-01-23 RX ADMIN — OXYCODONE HYDROCHLORIDE 10 MG: 10 TABLET ORAL at 08:37

## 2025-01-23 RX ADMIN — PROPOFOL 100 MG: 10 INJECTION, EMULSION INTRAVENOUS at 14:43

## 2025-01-23 RX ADMIN — OXYCODONE HYDROCHLORIDE 10 MG: 10 TABLET ORAL at 16:44

## 2025-01-23 RX ADMIN — GABAPENTIN 300 MG: 300 CAPSULE ORAL at 16:45

## 2025-01-23 RX ADMIN — SODIUM CHLORIDE, SODIUM GLUCONATE, SODIUM ACETATE, POTASSIUM CHLORIDE, MAGNESIUM CHLORIDE, SODIUM PHOSPHATE, DIBASIC, AND POTASSIUM PHOSPHATE 125 ML/HR: .53; .5; .37; .037; .03; .012; .00082 INJECTION, SOLUTION INTRAVENOUS at 08:13

## 2025-01-23 RX ADMIN — DOCUSATE SODIUM 100 MG: 100 CAPSULE, LIQUID FILLED ORAL at 08:17

## 2025-01-23 RX ADMIN — ESCITALOPRAM OXALATE 10 MG: 10 TABLET ORAL at 08:16

## 2025-01-23 RX ADMIN — METOPROLOL SUCCINATE 50 MG: 50 TABLET, EXTENDED RELEASE ORAL at 08:16

## 2025-01-23 RX ADMIN — GABAPENTIN 300 MG: 300 CAPSULE ORAL at 21:02

## 2025-01-23 RX ADMIN — ZOLPIDEM TARTRATE 5 MG: 5 TABLET, COATED ORAL at 21:02

## 2025-01-23 RX ADMIN — PANTOPRAZOLE SODIUM 40 MG: 40 INJECTION, POWDER, FOR SOLUTION INTRAVENOUS at 08:16

## 2025-01-23 RX ADMIN — ACETAMINOPHEN 975 MG: 325 TABLET, FILM COATED ORAL at 12:49

## 2025-01-23 RX ADMIN — PROPOFOL 50 MCG/KG/MIN: 10 INJECTION, EMULSION INTRAVENOUS at 14:43

## 2025-01-23 RX ADMIN — MAGNESIUM SULFATE HEPTAHYDRATE 2 G: 40 INJECTION, SOLUTION INTRAVENOUS at 08:19

## 2025-01-23 RX ADMIN — METOPROLOL SUCCINATE 50 MG: 50 TABLET, EXTENDED RELEASE ORAL at 21:02

## 2025-01-23 RX ADMIN — OXYCODONE HYDROCHLORIDE 10 MG: 10 TABLET ORAL at 03:30

## 2025-01-23 NOTE — ANESTHESIA PREPROCEDURE EVALUATION
Procedure:  EGD    Relevant Problems   CARDIO   (+) Aortic aneurysm (HCC)   (+) Atrial fibrillation (HCC)   (+) Atrial fibrillation with RVR (HCC)   (+) Hypertension      /RENAL   (+) MARU (acute kidney injury) (HCC)   (+) Stage 3a chronic kidney disease (HCC)      HEMATOLOGY   (+) Acute on chronic anemia   (+) Anemia   (+) Thrombocytopenia (HCC)      MUSCULOSKELETAL   (+) Myofascial pain syndrome   (+) Primary osteoarthritis of right shoulder   (+) Spondylosis of lumbar region without myelopathy or radiculopathy      NEURO/PSYCH   (+) Anxiety   (+) Chronic pain   (+) Depression with anxiety   (+) Myofascial pain syndrome   (+) Pain syndrome, chronic      PULMONARY   (+) Acute hypoxic respiratory failure (HCC)        Physical Exam    Airway    Mallampati score: II         Dental        Cardiovascular      Pulmonary      Other Findings  post-pubertal.      Anesthesia Plan  ASA Score- 3     Anesthesia Type- IV sedation with anesthesia with ASA Monitors.         Additional Monitors:     Airway Plan:     Comment: I, Dr. Jack, the attending physician, have personally seen and evaluated the patient prior to anesthetic care.  I have reviewed the pre-anesthetic record, and other medical records if appropriate to the anesthetic care.  If a CRNA is involved in the case, I have reviewed the CRNA assessment, if present, and agree.  The patient is in a suitable condition to proceed with my formulated anesthetic plan.  .       Plan Factors-    Chart reviewed.                      Induction- intravenous.    Postoperative Plan-     Perioperative Resuscitation Plan - Level 1 - Full Code.       Informed Consent- Anesthetic plan and risks discussed with patient.  I personally reviewed this patient with the CRNA. Discussed and agreed on the Anesthesia Plan with the CRNA..      NPO Status:  Vitals Value Taken Time   Date of last liquid 01/23/25 01/23/25 1304   Time of last liquid 1249 01/23/25 1304   Date of last solid 01/22/25  01/23/25 5455   Time of last solid

## 2025-01-23 NOTE — CASE MANAGEMENT
Case Management Assessment & Discharge Planning Note    Patient name Lisa Marques  Location [facility] 414/[facility]-01 MRN [id number]  : 1945 Date 2025       Current Admission Date: 2025  Current Admission Diagnosis:Fall   Patient Active Problem List    Diagnosis Date Noted Date Diagnosed    Acute on chronic anemia 2025     Atrial fibrillation (HCC) 2025     Family history of colon cancer 2025     Excessive use of nonsteroidal anti-inflammatory drugs (NSAIDs) 2025     Thrombocytopenia (HCC) 2024     Long term (current) use of opiate analgesic 2024     Muscle spasm 2024     Cystocele with prolapse 2024     Food insecurity 04/15/2024     Hypotension 04/15/2024     Acute hypoxic respiratory failure (HCC) 04/15/2024     Elevated d-dimer 04/15/2024     Generalized weakness 04/15/2024     Chronic pain of left knee 2023     Anemia 2023     Viral illness 2022     Financial insecurity 10/10/2022     Dry eye of left side 2022     Bladder prolapse, female, acquired 2022     Ganglion of hand, left 2022     Stage 3a chronic kidney disease ([facility]) 2022     Hypokalemia 10/01/2021     Chronic heart failure with preserved ejection fraction ([facility]) 2020     Primary osteoarthritis of right shoulder 2020     Musculoskeletal arm pain, right 10/24/2019     Fall 10/24/2019     Balance disorder 2019     Eczema of right external ear 2018     Constipation 2018     Atrial fibrillation with RVR ([facility]) 10/11/2017     MARU (acute kidney injury) ([facility]) 10/11/2017     Chronic pain 10/11/2017     Age-related osteoporosis without current pathological fracture 10/11/2017     Hypertension 10/11/2017     Anxiety 10/11/2017     Depression with anxiety 2016     Right shoulder pain 2016     Opioid dependence ([facility]) 2016     Left knee pain 2015     Aortic aneurysm ([facility]) 2014     Spondylosis of  lumbar region without myelopathy or radiculopathy 05/09/2013     Myofascial pain syndrome 01/11/2013     Insomnia 12/19/2012     Lumbar canal stenosis 06/15/2012     Vitamin D deficiency 06/15/2012     Pain syndrome, chronic 05/16/2012       LOS (days): 1  Geometric Mean LOS (GMLOS) (days): 2.9  Days to GMLOS:1.9     OBJECTIVE:    Risk of Unplanned Readmission Score: 29.67         Current admission status: Inpatient       Preferred Pharmacy:   [facility] - [place], [place] - [address]  [address]  [place] [address]  Phone: [phone] Fax: [phone]    [facility] - [place], [place] - [address]  [address] [place] [address]  Phone: [phone] Fax: [phone]    [facility] [place] - [place] - [address]  [address]  [place] [address]  Phone: [phone] Fax: [phone]    Primary Care Provider: Raz Dickens DO    Primary Insurance: [facility]  Secondary Insurance:     ASSESSMENT:  Active Health Care Proxies    There are no active Health Care Proxies on file.                 Readmission Root Cause  30 Day Readmission: No    Patient Information  Admitted from:: Home  Mental Status: Sedated  During Assessment patient was accompanied by: Not accompanied during assessment  Assessment information provided by:: Patient  Primary Caregiver: Self  Support Systems: Self  County of Residence: [place]  [place]  Home entry access options. Select all that apply.: Ramp, Stairs, Elevator  Number of steps to enter home.: 4  Do the steps have railings?: Yes  Type of Current Residence: Apartment  Floor Level: 5  Upon entering residence, is there a bedroom on the main floor (no further steps)?: Yes  Upon entering residence, is there a bathroom on the main floor (no further steps)?: Yes  Living Arrangements: Lives Alone    Activities of Daily Living Prior to Admission  Functional Status:  "Independent  Completes ADLs independently?: Yes  Ambulates independently?: Yes  Does patient use assisted devices?: Yes  Assisted Devices (DME) used: Rollator  Does patient currently own DME?: Yes  What DME does the patient currently own?: Rollator  Does patient have a history of Outpatient Therapy (PT/OT)?: No  Does the patient have a history of Short-Term Rehab?: No  Does patient have a history of HHC?: No  Does patient currently have HHC?: No         Patient Information Continued  Income Source: Pension/group home  Does patient have prescription coverage?: Yes  Does patient receive dialysis treatments?: No  Does patient have a history of substance abuse?: No  Does patient have a history of Mental Health Diagnosis?: Yes (Anxiety, depression)  Is patient receiving treatment for mental health?: No. Patient declined treatment information. (\"I have a primary doctor\")  Has patient received inpatient treatment related to mental health in the last 2 years?: No         Means of Transportation  Means of Transport to Appts:: Family transport          DISCHARGE DETAILS:    Discharge planning discussed with:: Pt  Freedom of Choice: Yes  Comments - Freedom of Choice: Discussed FOC  CM contacted family/caregiver?: No- see comments (Pt alert and oriented, but slightly sedated from EGD, does not want CM to contact family at this time)  Were Treatment Team discharge recommendations reviewed with patient/caregiver?: Yes          Contacts  Patient Contacts: rosio Gonzalez  Relationship to Patient:: Other (Comment) (Self)  Contact Method: In Person  Reason/Outcome: Continuity of Care, Discharge Planning              Other Referral/Resources/Interventions Provided:  Referral Comments: Met with pt at bedside to discuss Level III recs, pt declined HHC. CM will return to assess SDOH (pt was only nodding and shaking head by the end of open).    Additional Comments: Met with pt at bedside to introduce self and role. Pt resides alone in [facility] " floor apartment with ramp and elevator access. Pt was IPTA, and uses a rollator for ambulation. Pt denies hx of STR/HHC/OPPT. Denies hx of substance use, stated that she was experiencing anxiety and depression after her grandson committed suicide. CM was unable to gather SDOH information during assessment due to sedated state. CM to return and follow up and provide resources if approrpriate.

## 2025-01-23 NOTE — PROGRESS NOTES
Progress Note - Family Medicine   Name: Lisa Marques 79 y.o. female I MRN: [id number]  Unit/Bed#: [facility] 414-01 I Date of Admission: 1/22/2025   Date of Service: 1/23/2025 I Hospital Day: 1     Assessment & Plan  Fall  This is a 79-year-old female with history of A-fib on Eliquis who presented via EMS after a fall unwitnessed head strike.  Found by EMS unable to get up.  Found to be hypothermic placed on Amanda hugger in ED CMP remarkable for MARU on CKD.  CBC showing stable hemoglobin in the eights however repeat by i-STAT showed decreased to 7 so in the setting of melena provided to ED physician she was transfused with 2 units of packed red blood cells.  Hemodynamics remained stable.  There is a brief episode of hypotension requiring levo however patient responded with profound hypotension requiring monitoring off.  Procalcitonin normal however in the setting of hypothermia tachycardia A-fib with RVR septic workup was started.  Lactic acid 4.4 procalcitonin normal chest x-ray normal CT chest abdomen pelvis showing stable known aortic aneurysm and distended gallbladder otherwise normal.  CT head without any acute abnormalities.  Was not rate controlled and given 2 L of fluid bolus and corrected out of A-fib to sinus after that.    -Continue cefepime renally dosed to creatinine clearance every 12 hours 1 g   -Blood cultures collected follow-up  -Urine collected urinalysis normal will not reflex to culture this is not a source of a potential infection  -In the setting of negative Pro-Srini unlikely to be sepsis picture is likely hypovolemia driving lactic acidosis and reactive A-fib with RVR now corrected with fluid repletion.  Will hydrate the patient overnight and check renal function in the morning.  -Check BNP>>> slight elevation but likely due to acidosis and afib rvr. Do not suspect fluid overload. Will check echo to be throrough   MARU (acute kidney injury) (HCC)  MARU on CKD stage III.  Patient has a history of low  fluid intake in the outpatient setting.  This is likely etiology of her prerenal azotemia and MARU.  Will likely correct with fluid repletion.  Will place on 125 cc/h of fluid repletion after 2 L bolus in ED.  2 L of blood are also running at the time my exam and will also help with her fluid repletion    - switch fluids to isolyte. Hyperchloremic in BMP this AM 1/23  - renal function markedly improved with overnight UV hydration   Chronic pain  Takes oxycodone 10 mg every 8 hours at home.  She has been on this dose for many years.    Continue home oxycodone  Anxiety  Continue home Lexapro  Aortic aneurysm (HCC)  There is a known 4.8 cm aortic aneurysm.  Seen on CT chest.  Recommend nonurgent outpatient CT surgical monitoring.  No concerns this hospitalization  Insomnia  Continue home Ambien 5 mg.  This is recently cut by her PCP from 10 mg.  The patient does have a history of trouble sleeping and takes multiple 10 mg melatonin tablets to sleep.    Continue 5 mg Ambien and melatonin at night    Can consider adding on mirtazapine or trazodone rather than uptitrating Ambien if remains an issue  Constipation  Continue home constipation medication  Acute on chronic anemia  Chronically anemic and outpatient.  Concerns for i-STAT hemoglobin less than 7 today in the setting of history provided by patient stating she had dark melenic stools prompting administration of 2 units of packed red blood cells.    GI was consulted who was not convinced the patient was having an acute GI bleed.  Hemodynamics were stable  .  They recommended n.p.o. at midnight and clears for now for EGD scope today.  Patient will also likely require colonoscopy.    -sp 2 units prbc  - no further evidence of bleeding. Morning hg remains stable   -Monitor vitals and for any acute signs of hypotension or bright red blood per rectum.  -Avoid NSAIDs  -Anemia is chronic to her kidney disease  -Hold Eliquis forEGD today       Atrial fibrillation (HCC)  Rapid  A-fib with RVR on admission.  A-fib is not a new problem for her she has had it for many years takes Eliquis 5 mg twice daily as outpatient.  Patient has a history of poor p.o. intake from a volume status on an outpatient basis.  This likely exacerbated her A-fib and with fluid repletion she corrected to normal sinus.    At the time of my exam she had received 2 L of fluids and she was in normal sinus rhythm.  She did not need to be rate controlled in the ED and broke rhythm on her own back to sinus.    -Hold Eliquis for EGD tomorrow per GI  -Continue home Toprol-XL  -Continue to monitor hemodynamics  -If back of the A-fib can add on as needed Lopressor IV  -Telemetry  Family history of colon cancer  Per GI HPI.  Apparently young family history of colon cancer.  Patient's PCP has been trying to get her colonoscopy for many months but she has been overtly refusing and not even amenable to Cologuard or FIT testing.    -EGD todayGI may want to do colonoscopy as well.  Follow-up with their recommendations  Excessive use of nonsteroidal anti-inflammatory drugs (NSAIDs)  Patient has a history of melena in the outpatient setting.  She is voices to her PCP many times.  She takes chronic NSAIDs although has been told not to by her PCP for aches and pains.    -This is likely the cause of her melanotic stools.  Low concern for acute GI bleed  -Per GI service will do EGD today.  N.p.o. at midnight and clear diet until then.  -No NSAIDs  Hypovolemic shock (HCC) (Resolved: 1/23/2025)  Hypovolemic shock(resolved),POA due to likely GI bleed, treated with transfusion of 2 units PRBC and Levophed.        Findings: brief episode of hypotension requiring levo in the ED then weaned off immediately due to profound responsive hypertension  - has not needed further blood products   - stable hemodynamically   - downgrade to med surg   - likely due to hypovolemia entirely.   - doubt sepsis but will continue cefepime until cultures  resolve.  - lactctate resolved  - renal function markedly improved  BP-61/53    24 Hour Events : did well. Renal function markedly improved. No hypotension   Subjective : feels well. Awake and alert. Sinus on monitor.     Objective :  Temp:  [95.7 °F (35.4 °C)-99 °F (37.2 °C)] 98.1 °F (36.7 °C)  HR:  [] 75  BP: ()/() 120/66  Resp:  [17-28] 19  SpO2:  [91 %-100 %] 95 %  O2 Device: None (Room air)  Nasal Cannula O2 Flow Rate (L/min):  [2 L/min-4 L/min] 2 L/min    Physical Exam  Constitutional:       General: She is not in acute distress.     Appearance: Normal appearance. She is normal weight. She is ill-appearing. She is not toxic-appearing or diaphoretic.   HENT:      Head: Normocephalic and atraumatic.      Right Ear: External ear normal.      Left Ear: External ear normal.      Nose: Nose normal.      Mouth/Throat:      Comments: Poor dentition  Eyes:      Conjunctiva/sclera: Conjunctivae normal.   Cardiovascular:      Rate and Rhythm: Normal rate and regular rhythm.      Pulses: Normal pulses.      Heart sounds: Normal heart sounds. No murmur heard.     No friction rub. No gallop.   Pulmonary:      Effort: Pulmonary effort is normal. No respiratory distress.      Breath sounds: No stridor. Rhonchi present. No wheezing or rales.   Abdominal:      General: Abdomen is flat. There is no distension.      Palpations: Abdomen is soft.      Tenderness: There is no abdominal tenderness. There is no guarding or rebound.   Musculoskeletal:         General: Normal range of motion.      Cervical back: Normal range of motion and neck supple.   Skin:     General: Skin is warm and dry.      Capillary Refill: Capillary refill takes less than 2 seconds.   Neurological:      General: No focal deficit present.      Mental Status: She is alert and oriented to person, place, and time. Mental status is at baseline.   Psychiatric:         Mood and Affect: Mood normal.         Behavior: Behavior normal.         Thought  Content: Thought content normal.         Judgment: Judgment normal.         Lab Results: I have reviewed the following results: reviewed    VTE Pharmacologic Prophylaxis: Reason for no pharmacologic prophylaxis egd today   VTE Mechanical Prophylaxis: sequential compression device    Raz Dickens DO  PGY-2 [facility]   01/23/25, 1:02 PM

## 2025-01-23 NOTE — PLAN OF CARE
Problem: PAIN - ADULT  Goal: Verbalizes/displays adequate comfort level or baseline comfort level  Description: Interventions:  - Encourage patient to monitor pain and request assistance  - Assess pain using appropriate pain scale  - Administer analgesics based on type and severity of pain and evaluate response  - Implement non-pharmacological measures as appropriate and evaluate response  - Consider cultural and social influences on pain and pain management  - Notify physician/advanced practitioner if interventions unsuccessful or patient reports new pain  Outcome: Progressing     Problem: INFECTION - ADULT  Goal: Absence or prevention of progression during hospitalization  Description: INTERVENTIONS:  - Assess and monitor for signs and symptoms of infection  - Monitor lab/diagnostic results  - Monitor all insertion sites, i.e. indwelling lines, tubes, and drains  - Monitor endotracheal if appropriate and nasal secretions for changes in amount and color  - [place] appropriate cooling/warming therapies per order  - Administer medications as ordered  - Instruct and encourage patient and family to use good hand hygiene technique  - Identify and instruct in appropriate isolation precautions for identified infection/condition  Outcome: Progressing     Problem: SAFETY ADULT  Goal: Patient will remain free of falls  Description: INTERVENTIONS:  - Educate patient/family on patient safety including physical limitations  - Instruct patient to call for assistance with activity   - Consult OT/PT to assist with strengthening/mobility   - Keep Call bell within reach  - Keep bed low and locked with side rails adjusted as appropriate  - Keep care items and personal belongings within reach  - Initiate and maintain comfort rounds  - Make Fall Risk Sign visible to staff  - Offer Toileting every  Hours, in advance of need  - Initiate/Maintain alarm  - Obtain necessary fall risk management equipment:   - Apply yellow socks and  bracelet for high fall risk patients  - Consider moving patient to room near nurses station  Outcome: Progressing     Problem: DISCHARGE PLANNING  Goal: Discharge to home or other facility with appropriate resources  Description: INTERVENTIONS:  - Identify barriers to discharge w/patient and caregiver  - Arrange for needed discharge resources and transportation as appropriate  - Identify discharge learning needs (meds, wound care, etc.)  - Arrange for interpretive services to assist at discharge as needed  - Refer to Case Management Department for coordinating discharge planning if the patient needs post-hospital services based on physician/advanced practitioner order or complex needs related to functional status, cognitive ability, or social support system  Outcome: Progressing     Problem: Knowledge Deficit  Goal: Patient/family/caregiver demonstrates understanding of disease process, treatment plan, medications, and discharge instructions  Description: Complete learning assessment and assess knowledge base.  Interventions:  - Provide teaching at level of understanding  - Provide teaching via preferred learning methods  Outcome: Progressing

## 2025-01-23 NOTE — CASE MANAGEMENT
Case Management Discharge Planning Note    Patient name Lisa Marques  Location [facility] 414/[facility] 414-01 MRN [id number]  : 1945 Date 2025       Current Admission Date: 2025  Current Admission Diagnosis:Fall   Patient Active Problem List    Diagnosis Date Noted Date Diagnosed    Acute on chronic anemia 2025     Atrial fibrillation (HCC) 2025     Family history of colon cancer 2025     Excessive use of nonsteroidal anti-inflammatory drugs (NSAIDs) 2025     Thrombocytopenia (HCC) 2024     Long term (current) use of opiate analgesic 2024     Muscle spasm 2024     Cystocele with prolapse 2024     Food insecurity 04/15/2024     Hypotension 04/15/2024     Acute hypoxic respiratory failure (HCC) 04/15/2024     Elevated d-dimer 04/15/2024     Generalized weakness 04/15/2024     Chronic pain of left knee 2023     Anemia 2023     Viral illness 2022     Financial insecurity 10/10/2022     Dry eye of left side 2022     Bladder prolapse, female, acquired 2022     Ganglion of hand, left 2022     Stage 3a chronic kidney disease ([facility]) 2022     Hypokalemia 10/01/2021     Chronic heart failure with preserved ejection fraction ([facility]) 2020     Primary osteoarthritis of right shoulder 2020     Musculoskeletal arm pain, right 10/24/2019     Fall 10/24/2019     Balance disorder 2019     Eczema of right external ear 2018     Constipation 2018     Atrial fibrillation with RVR ([facility]) 10/11/2017     MARU (acute kidney injury) ([facility]) 10/11/2017     Chronic pain 10/11/2017     Age-related osteoporosis without current pathological fracture 10/11/2017     Hypertension 10/11/2017     Anxiety 10/11/2017     Depression with anxiety 2016     Right shoulder pain 2016     Opioid dependence ([facility]) 2016     Left knee pain 2015     Aortic aneurysm ([facility]) 2014     Spondylosis of lumbar region  without myelopathy or radiculopathy 05/09/2013     Myofascial pain syndrome 01/11/2013     Insomnia 12/19/2012     Lumbar canal stenosis 06/15/2012     Vitamin D deficiency 06/15/2012     Pain syndrome, chronic 05/16/2012       LOS (days): 1  Geometric Mean LOS (GMLOS) (days): 2.9  Days to GMLOS:2     OBJECTIVE:  Risk of Unplanned Readmission Score: 31.2         Current admission status: Inpatient   Preferred Pharmacy:   [facility] - [place], [place] - [address]  [address]  [place] [address]  Phone: [phone] Fax: [phone]    [facility] - [place] - [address]  [address] [facility] [place] [address]  Phone: [phone] Fax: [phone]    [facility] [place] - [place] - [address]  [address]  [place] [address]  Phone: [phone] Fax: [phone]    Primary Care Provider: Raz Dickens DO    Primary Insurance: [facility]  Secondary Insurance:     DISCHARGE DETAILS:     Additional Comments: Attempted open, pt off unit for EGD. CM will continue to follow and assist with DCP.

## 2025-01-23 NOTE — ANESTHESIA POSTPROCEDURE EVALUATION
Post-Op Assessment Note    CV Status:  Stable  Pain Score: 0    Pain management: adequate       Mental Status:  Alert and awake   Hydration Status:  Euvolemic   PONV Controlled:  Controlled   Airway Patency:  Patent     Post Op Vitals Reviewed: Yes    No anethesia notable event occurred.    Staff: Anesthesiologist, CRNA           Last Filed PACU Vitals:  Vitals Value Taken Time   Temp 96.5 °F (35.8 °C) 01/23/25 1457   Pulse 64 01/23/25 1457   /77 01/23/25 1457   Resp 17 01/23/25 1457   SpO2 95 % 01/23/25 1457       Modified [place]:     Vitals Value Taken Time   Activity 2 01/23/25 1457   Respiration 2 01/23/25 1457   Circulation 2 01/23/25 1457   Consciousness 1 01/23/25 1457   Oxygen Saturation 2 01/23/25 1457     Modified [place] Score: 9

## 2025-01-23 NOTE — PLAN OF CARE
Problem: PHYSICAL THERAPY ADULT  Goal: Performs mobility at highest level of function for planned discharge setting.  See evaluation for individualized goals.  Description: Treatment/Interventions: ADL retraining, Functional transfer training, LE strengthening/ROM, Elevations, Therapeutic exercise, Endurance training, Gait training, Bed mobility, Spoke to nursing, OT          See flowsheet documentation for full assessment, interventions and recommendations.  Note: Prognosis: Good  Problem List: Decreased strength, Decreased range of motion, Decreased endurance, Impaired balance, Decreased mobility, Pain  Assessment: PT orders received and acknowledged. Patient was seen today for high complexity PT evaluation. High complexity evaluation due to Ongoing medical management for primary dx, Increased reliance on more restrictive AD compared to baseline, Decreased activity tolerance compared to baseline, Fall risk, Ongoing telemetry monitoring, Continuous pulse oximetry monitoring  Patient is a 79 y.o. female  who was [facility] to [facility]  with Fall . Pt presents to hospitals following a fall at home . At baseline, pt resides alone in apartment and was independent prior to hospital admission. Currently, upon initial examination, pt  is requiring min assist x1 for bed mobility skills;  supervision   for functional transfers and  supervision   for ambulation with RW.  Patient currently presents below baseline with limitations in gait, balance, and transfers. Patient will benefit from continued PT services while in hospital in order to address remaining limitations. The patients AM-PAC Basic Mobility Inpatient Short From Raw Score is 18 . Based on AM-PAC scoring and patient presentation, PT currently recommending Level III (Minimum Resource Intensity). Please also refer to the recommendation of the Physical Therapist for safe discharge planning.  Barriers to Discharge: Decreased caregiver support      Rehab Resource Intensity Level, PT: III (Minimum Resource Intensity)    See flowsheet documentation for full assessment.

## 2025-01-23 NOTE — ASSESSMENT & PLAN NOTE
Hypovolemic shock(resolved),POA due to likely GI bleed, treated with transfusion of 2 units PRBC and Levophed.        Findings: brief episode of hypotension requiring levo in the ED then weaned off immediately due to profound responsive hypertension  - has not needed further blood products   - stable hemodynamically   - downgrade to med surg   - likely due to hypovolemia entirely.   - doubt sepsis but will continue cefepime until cultures resolve.  - lactctate resolved  - renal function markedly improved  BP-61/53

## 2025-01-23 NOTE — UTILIZATION REVIEW
Initial Clinical Review    Admission: Date/Time/Statement:   Admission Orders (From admission, onward)       Ordered        01/22/25 1534  INPATIENT ADMISSION  Once                          Orders Placed This Encounter   Procedures    INPATIENT ADMISSION     Standing Status:   Standing     Number of Occurrences:   1     Level of Care:   Level 2 Stepdown / HOT [14]     Estimated length of stay:   More than 2 Midnights     Certification:   I certify that inpatient services are medically necessary for this patient for a duration of greater than two midnights. See H&P and MD Progress Notes for additional information about the patient's course of treatment.     ED Arrival Information       Expected   -    Arrival   1/22/2025 11:34    Acuity   Emergent              Means of arrival   Ambulance    Escorted by   [facility]    Service   Geriatric Medicine    Admission type   Emergency              Arrival complaint   EMS             Chief Complaint   Patient presents with    Rapid Heart Rate     Per ems pt was found on the floor, when hooked up to the monitor was found in rapid afib, unknown HS, +eliquis, -LOC        Initial Presentation: 79 y.o. female who presented by EMS to [facility] ED. Admitted as Inpatient for evaluation and treatment of S/P Fall      PMHx:  has a past medical history of Acute deep vein thrombosis of lower limb (HCC), Aortic aneurysm (HCC), Arthritis, Atrial fibrillation (HCC), Dislocation of hip (HCC), Hypertension, and Psychiatric disorder.      Presented w/ S/P unwitnessed fall w/ unknown head strike. Pt on Eliquis. In ED, pt hypothermic, EKG showing YISEL. Placed on campos hugger. On exam, Rhonchi present Abnormal Labs: Bun/Creat 2/1.34, AST 53, Iron 44, Iron Sat 13, Lactic acid 4.4, H/H 9.6/30.3, VBG pCO2 29.4, pO2 32.0, O2 sat 59,  repeat VBG @ 1348: ph 7.275, pO2 24.0, O2 sat 35. CT chest abdomen pelvis showing distended gallbladder and known aortic aneurysm  4.8 cm. See below med list for meds given in ED.  Pt was given 2 PRBC after pt reports she has had some recent melanotic stools. In ED, pt required brief Vasopressors for an isolated episode of hypotension but quickly corrected to profound HTN therefore vasopressor dc'd.     Plan: CLD for now then NPO at [facility] for EGD tomorrow. Hold Eliquis. Continue Cefepime, F/U blood cultures, IV fluids, Check BNP. CBC and CMP in am. No NSAIDs. Continue Toprol -XL, Tele monitoring. GI consulted.    Date: 1/23/25   Day 2:   Pt feels well, awake and alert. /86, T 97.5. On exam, rhonchi present. Abnormal labs: Bun 30, Ca 7.8, T protein 4.8, Albumin 2.9, Mg 1.6, . H/H 9.2/29.1, Platelets 136, Platelet estim decreased. BNP elevated likely due to acidosis and YISEL, do not suspect fluid overload. S/P 2 units PRBCs, no further evidence of bleeding,hgb stable.  Plan; Continue Cefepime IV, F/U blood cultures, Obtain Echo. Switch IV fluids to Isolyte, NPO for EGD today.  Avoid NSAIDs. Hold Eliquis for today. Continue home Toprol-XL. Tele monitoring. Replet Mg IV x1, CBC and CMP ,Mg level in am.     Date: 1/24/25  Day 3: Has surpassed a 2nd midnight with active treatments and services.  Pt stable for discharge home today. EGD performed on 1/23 showing nonbleeding duodenal ulcer GI recommended daily PPI therapy and cessation of all NSAID therapy which the patient has been taking. MARU, Renal functions resolved at time of dc. Pt's blood cultures were negative at 24 hours and her urinalysis was negative. She had no further fevers or signs of hemodynamic instability while in the hospital. Not sent home on abx as it was felt not an infectious source was causing lactic acidosis and believed hypovolemia induced. Pt to f/u outpatient w/ GI for Colonoscopy. F/U CT surgeon for known aortic aneurysm. F/U w/ cardiologist and PCP.       ED Treatment-Medication Administration from 01/22/2025 4334 to 01/22/2025 8014         Date/Time Order Dose  Route Action     01/22/2025 1212 sodium chloride 0.9 % bolus 1,000 mL 1,000 mL Intravenous New Bag     01/22/2025 1451 sodium chloride 0.9 % bolus 1,000 mL 1,000 mL Intravenous New Bag     01/22/2025 1327 cefepime (MAXIPIME) 2 g/50 mL dextrose IVPB 2,000 mg Intravenous New Bag     01/22/2025 1335 norepinephrine (LEVOPHED) 1 mg/mL injection **ADS Override Pull** --  Given     01/22/2025 1335 EPINEPHrine (ADRENALIN) 0.1 mg/mL injection **ADS Override Pull** --  Given     01/22/2025 1402 iohexol (OMNIPAQUE) 350 MG/ML injection (MULTI-DOSE) 100 mL 100 mL Intravenous Given     01/22/2025 2115 pantoprazole (PROTONIX) injection 40 mg 40 mg Intravenous Given     01/22/2025 1800 apixaban (ELIQUIS) tablet 5 mg -- Oral Held Dose     01/22/2025 2115 metoprolol succinate (TOPROL-XL) 24 hr tablet 50 mg 50 mg Oral Given     01/22/2025 1735 oxyCODONE (ROXICODONE) immediate release tablet 10 mg 10 mg Oral Given     01/22/2025 2115 zolpidem (AMBIEN) tablet 5 mg 5 mg Oral Given     01/22/2025 1739 sodium chloride 0.9 % infusion 125 mL/hr Intravenous New Bag     01/22/2025 1736 acetaminophen (TYLENOL) tablet 975 mg 975 mg Oral Given     01/22/2025 2115 gabapentin (NEURONTIN) capsule 300 mg 300 mg Oral Given            Scheduled Medications:  acetaminophen, 975 mg, Oral, Q8H ALEKS  [Held by provider] apixaban, 5 mg, Oral, BID  cefepime, 1,000 mg, Intravenous, Q12H  docusate sodium, 100 mg, Oral, BID  escitalopram, 10 mg, Oral, Daily  gabapentin, 300 mg, Oral, TID  lidocaine, 2 patch, Topical, Daily  [START ON 1/24/2025] magnesium Oxide, 400 mg, Oral, BID  magnesium sulfate, 2 g, Intravenous, Once  metoprolol succinate, 50 mg, Oral, BID  pantoprazole, 40 mg, Intravenous, Q12H ALEKS  polyethylene glycol, 17 g, Oral, Daily  senna, 8.6 mg, Oral, HS  zolpidem, 5 mg, Oral, HS  magnesium sulfate 2 g/50 mL IVPB (premix) 2 g  Dose: 2 g  Freq: Once Route: IV  Last Dose: 2 g (01/23/25 0819)  Start: 01/23/25 0800 End: 01/23/25 1019    Continuous IV  Infusions:  multi-electrolyte (PLASMALYTE-A/ISOLYTE-S PH 7.4) IV solution  Rate: 125 mL/hr Dose: 125 mL/hr  Freq: Continuous Route: IV  Last Dose: 125 mL/hr (01/23/25 0813)  Start: 01/23/25 0800 End: 01/23/25 2012  sodium chloride 0.9 % infusion  Rate: 125 mL/hr Dose: 125 mL/hr  Freq: Continuous Route: IV  Last Dose: Stopped (01/23/25 0813)  Start: 01/22/25 1645 End: 01/23/25 0746  norepinephrine (LEVOPHED) 4 mg (STANDARD CONCENTRATION) IV in sodium chloride 0.9% 250 mL  Rate: 3.8-112.5 mL/hr Dose: 1-30 mcg/min  Freq: Titrated Route: IV  Last Dose: Stopped (01/22/25 1350)  Start: 01/22/25 1345 End: 01/22/25 1640         PRN Meds:  iohexol, 100 mL, Intravenous, Once in imaging  melatonin, 9 mg, Oral, HS PRN  oxyCODONE, 10 mg, Oral, Q8H PRN      Completed transfusions    Ordered   Start   01/22/25 1349  Transfuse Leukoreduced RBC: 2 Units  Transfusion       01/22/25 1349       ED Triage Vitals   Temperature Pulse Respirations Blood Pressure SpO2 Pain Score   01/22/25 1208 01/22/25 1138 01/22/25 1138 01/22/25 1138 01/22/25 1234 01/22/25 1158   (!) 94.7 °F (34.8 °C) (!) 117 20 124/77 94 % 10 - Worst Possible Pain     Weight (last 2 days)       Date/Time Weight    01/23/25 1010 71.6 (157.85)    01/22/25 2329 71.6 (157.85)    01/22/25 1158 67.6 (149)            Vital Signs (last 3 days)       Date/Time Temp Pulse Resp BP MAP (mmHg) SpO2 Calculated FIO2 (%) - Nasal Cannula Nasal Cannula O2 Flow Rate (L/min) O2 Device Patient Position - Orthostatic VS Frank Coma Scale Score Pain    01/24/25 1443 -- -- -- -- -- -- -- -- -- -- -- 7    01/24/25 0903 -- -- -- -- -- -- -- -- -- -- -- 8    01/24/25 0902 -- 58 -- 129/90 -- -- -- -- -- -- -- --    01/24/25 0900 -- -- -- -- -- 95 % -- -- None (Room air) -- 15 --    01/24/25 07:32:21 97.6 °F (36.4 °C) 61 18 132/82 99 94 % -- -- None (Room air) Lying -- --    01/24/25 0528 -- -- -- -- -- -- -- -- -- -- -- 3    01/24/25 0017 -- -- -- -- -- -- -- -- -- -- -- 8 01/23/25 22:48:11  97.8 °F (36.6 °C) 69 -- 161/93 116 96 % -- -- -- -- -- --    01/23/25 2101 -- -- -- -- -- -- -- -- -- -- -- 5 01/23/25 2030 -- -- -- -- -- -- -- -- -- -- 15 --    01/23/25 20:19:21 -- 78 -- 127/74 92 100 % -- -- -- -- -- --    01/23/25 1845 97.2 °F (36.2 °C) 75 -- 143/81 108 96 % -- -- None (Room air) Lying -- --    01/23/25 1720 97.7 °F (36.5 °C) 60 -- 137/82 105 95 % -- -- -- -- -- --    01/23/25 1644 -- -- -- -- -- -- -- -- -- -- -- 8    01/23/25 1513 -- 58 18 134/80 -- 99 % -- -- None (Room air) -- -- --    01/23/25 1457 96.5 °F (35.8 °C) 64 17 133/77 -- 95 % -- -- None (Room air) -- -- --    01/23/25 1306 96.3 °F (35.7 °C) -- -- -- -- -- -- -- -- -- -- --    01/23/25 1302 -- 67 20 154/72 -- 92 % -- -- None (Room air) -- -- --    01/23/25 1249 -- -- -- -- -- -- -- -- -- -- -- 4    01/23/25 1010 -- 75 -- 120/66 -- -- -- -- -- -- -- --    01/23/25 1000 98.1 °F (36.7 °C) 75 19 120/66 87 95 % -- -- -- -- -- --    01/23/25 0935 -- -- -- -- -- -- -- -- -- -- -- No Pain    01/23/25 0934 -- -- -- -- -- -- -- -- -- -- -- No Pain    01/23/25 0837 -- -- -- -- -- -- -- -- -- -- -- 6    01/23/25 0816 97.5 °F (36.4 °C) 74 17 153/86 112 93 % -- -- None (Room air) Lying -- --    01/23/25 0800 -- -- -- -- -- -- -- -- -- -- 15 --    01/23/25 0405 -- -- -- -- -- -- -- -- -- -- 15 --    01/23/25 0330 97.7 °F (36.5 °C) 72 20 138/92 111 95 % -- -- -- -- -- 8    01/23/25 0300 97.9 °F (36.6 °C) 75 21 145/88 112 98 % -- -- -- Lying -- --    01/23/25 0220 97.7 °F (36.5 °C) 67 22 132/79 101 95 % -- -- -- -- -- --    01/23/25 0135 97.9 °F (36.6 °C) 70 20 -- -- 97 % -- -- -- -- -- --    01/23/25 0130 97.9 °F (36.6 °C) 72 20 -- -- 100 % -- -- -- -- -- --    01/23/25 0125 97.9 °F (36.6 °C) 69 22 -- -- 99 % -- -- -- -- -- --    01/23/25 0120 97.9 °F (36.6 °C) 69 20 -- -- 93 % -- -- -- -- -- --    01/23/25 0000 97.9 °F (36.6 °C) 66 28 -- -- 100 % -- -- -- -- 15 --    01/22/25 2060 -- -- -- -- -- -- -- -- -- -- 15 No Pain    01/22/25 9691  97.9 °F (36.6 °C) 76 20 156/94 117 91 % -- -- -- -- -- No Pain    01/22/25 2300 97.9 °F (36.6 °C) 84 -- 148/90 120 99 % -- -- -- -- -- --    01/22/25 2200 97.9 °F (36.6 °C) 70 18 116/77 92 95 % 28 2 L/min Nasal cannula Lying -- --    01/22/25 2130 97.9 °F (36.6 °C) 74 20 133/88 101 94 % 28 2 L/min Nasal cannula Lying -- --    01/22/25 1945 98.2 °F (36.8 °C) 86 20 119/76 89 94 % 36 4 L/min Nasal cannula Lying -- --    01/22/25 1845 98.6 °F (37 °C) 74 20 129/81 93 100 % 36 4 L/min Nasal cannula Lying -- --    01/22/25 1830 98.6 °F (37 °C) 76 20 128/80 93 100 % 36 4 L/min Nasal cannula Lying -- --    01/22/25 1815 99 °F (37.2 °C) 75 20 124/81 -- 100 % 36 4 L/min Nasal cannula -- -- --    01/22/25 1803 99 °F (37.2 °C) 78 20 113/72 -- -- -- -- -- -- -- --    01/22/25 1800 98.6 °F (37 °C) 82 20 113/72 90 97 % 36 4 L/min Nasal cannula Lying -- --    01/22/25 1745 98.6 °F (37 °C) 86 20 120/81 93 98 % 36 4 L/min Nasal cannula Lying -- --    01/22/25 1735 -- -- -- -- -- -- -- -- -- -- -- 10 - Worst Possible Pain    01/22/25 1725 98.6 °F (37 °C) 98 20 105/75 -- 91 % 36 4 L/min Nasal cannula -- -- --    01/22/25 1700 98.2 °F (36.8 °C) 90 20 123/81 95 93 % 36 4 L/min Nasal cannula Lying -- --    01/22/25 1645 97.9 °F (36.6 °C) 102 20 119/86 97 95 % 36 4 L/min Nasal cannula Lying -- --    01/22/25 1630 97.2 °F (36.2 °C) 88 20 110/79 88 92 % 36 4 L/min Nasal cannula Lying -- --    01/22/25 1615 97.3 °F (36.3 °C) 97 20 98/66 -- -- -- -- -- -- -- --    01/22/25 1600 97.2 °F (36.2 °C) 92 20 128/93 -- -- -- -- -- -- -- --    01/22/25 1545 96.8 °F (36 °C) 100 20 128/93 105 94 % 36 4 L/min Nasal cannula Lying -- --    01/22/25 1530 96.4 °F (35.8 °C) 94 20 143/102 125 -- -- -- -- Lying -- --    01/22/25 1515 96.4 °F (35.8 °C) 78 20 134/82 99 97 % 36 4 L/min Nasal cannula Lying -- --    01/22/25 1500 96.1 °F (35.6 °C) 76 20 119/79 93 96 % 36 4 L/min Nasal cannula Lying -- --    01/22/25 1445 96.1 °F (35.6 °C) 90 20 119/73 86 100 % 36 4  L/min Nasal cannula Lying -- --    01/22/25 1436 -- -- -- -- -- -- -- -- Nasal cannula -- -- --    01/22/25 1430 96.1 °F (35.6 °C) 90 20 128/80 93 95 % -- -- None (Room air) Lying -- --    01/22/25 1415 95.7 °F (35.4 °C) 90 20 142/91 106 -- -- -- -- Lying -- --    01/22/25 1345 95.7 °F (35.4 °C) 90 20 137/91 108 99 % -- -- None (Room air) Lying -- --    01/22/25 1330 95.7 °F (35.4 °C) 122 20 61/53 71 -- -- -- -- Lying -- --    01/22/25 1315 95.7 °F (35.4 °C) 124 20 -- 72 99 % -- -- None (Room air) -- -- --    01/22/25 1300 95.7 °F (35.4 °C) 118 20 95/69 89 -- -- -- -- Lying -- --    01/22/25 1245 95.7 °F (35.4 °C) 128 20 100/71 79 99 % -- -- None (Room air) Lying -- --    01/22/25 1240 95.7 °F (35.4 °C) -- -- -- -- -- -- -- -- -- -- --    01/22/25 1234 94.7 °F (34.8 °C) -- -- -- -- 94 % -- -- None (Room air) -- -- --    01/22/25 1208 94.7 °F (34.8 °C) -- -- -- -- -- -- -- -- -- -- --    01/22/25 1158 -- -- -- -- -- -- -- -- -- -- -- 10 - Worst Possible Pain    01/22/25 1138 -- 117 20 124/77 93 -- -- -- -- Lying -- --              Pertinent Labs/Diagnostic Test Results:   Radiology:  CT head without contrast   Final Interpretation by Pallav N Shah, MD (01/22 1444)      No acute intracranial hemorrhage, mass effect or edema.      Stable chronic atrophy with asymmetric prominence of the right extra-axial spaces. Mild chronic microangiopathic changes.                  Resident: Mike Reardon I, the attending radiologist, have reviewed the images and agree with the final report above.      Workstation performed: [id number]         CT chest abdomen pelvis w contrast   Final Interpretation by Tim Larsen MD (01/22 8199)         1. Distended gallbladder without adjacent inflammatory changes possibly related to physiologic distention. Further clinical assessment advised.   2. 4.8 cm ascending aortic aneurysm redemonstrated. Nonemergent cardiothoracic surgery surveillance is advised.               Workstation  performed: [id number]         XR chest 1 view portable   Final Interpretation by Kendall Kumar MD (01/22 1442)      No focal airspace consolidation.            Workstation performed: [id number]           Cardiology:  ECG 12 lead   Final Result by Kendall Montilla MD (01/22 1541)   Sinus rhythm   Normal ECG   When compared with ECG of 22-Jan-2025 11:44, (unconfirmed)   Sinus rhythm has replaced Atrial fibrillation   Vent. rate has decreased by  65 bpm   ST no longer depressed in Anterior leads   T wave inversion no longer evident in Inferior leads   T wave inversion no longer evident in Anterior leads   Confirmed by Kendall Montilla ([id number]) on 1/22/2025 3:41:14 PM      ECG 12 lead   Final Result by Kendall Montilla MD (01/22 1540)   Age and gender specific ECG analysis    Atrial fibrillation with rapid ventricular response   Nonspecific ST and T wave abnormality   Abnormal ECG   Confirmed by Kendall Montilla ([id number]) on 1/22/2025 3:40:42 PM            Results from last 7 days   Lab Units 01/24/25 0527 01/23/25 0511 01/22/25  1724 01/22/25  1348 01/22/25  1250 01/22/25  1218   WBC Thousand/uL 3.68* 3.97*  --   --   --  9.12   HEMOGLOBIN g/dL 9.3* 9.2* 8.5*  --   --  9.6*   I STAT HEMOGLOBIN g/dl  --   --   --  7.5*  --   --    HEMATOCRIT % 29.9* 29.1* 26.4*  --   --  30.3*   HEMATOCRIT, ISTAT %  --   --   --  22* <15*  --    PLATELETS Thousands/uL 148* 136*  --   --   --  249   TOTAL NEUT ABS Thousands/µL 2.04 2.33  --   --   --  6.79         Results from last 7 days   Lab Units 01/24/25 0527 01/23/25  0511 01/22/25  1348 01/22/25  1250 01/22/25  1218   SODIUM mmol/L 137 141  --   --  142   POTASSIUM mmol/L 3.8 4.3  --   --  4.2   CHLORIDE mmol/L 111* 113*  --   --  109*   CO2 mmol/L 23 22  --   --  21   CO2, I-STAT mmol/L  --   --  22 17*  --    ANION GAP mmol/L 3* 6  --   --  12   BUN mg/dL 19 30*  --   --  52*   CREATININE mg/dL 0.88 0.90  --   --  1.34*   EGFR ml/min/1.73sq m 63 61  --   --  38   CALCIUM mg/dL 8.0* 7.8*  --   --   8.9   CALCIUM, IONIZED, ISTAT mmol/L  --   --  1.16 1.01*  --    MAGNESIUM mg/dL 2.2 1.6*  --   --   --    PHOSPHORUS mg/dL  --  2.7  --   --   --      Results from last 7 days   Lab Units 01/23/25  0511 01/22/25  1218   AST U/L 38 53*   ALT U/L 29 39   ALK PHOS U/L 51 60   TOTAL PROTEIN g/dL 4.8* 6.7   ALBUMIN g/dL 2.9* 3.7   TOTAL BILIRUBIN mg/dL 0.78 0.72         Results from last 7 days   Lab Units 01/24/25  0527 01/23/25  0511 01/22/25  1218   GLUCOSE RANDOM mg/dL 85 83 133     Results from last 7 days   Lab Units 01/22/25  1348 01/22/25  1250   PH, IGNACIO I-STAT  7.275* 7.339   PCO2, IGNACIO ISTAT mm HG 44.2 29.4*   PO2, IGNACIO ISTAT mm HG 24.0* 32.0*   HCO3, IGNACIO ISTAT mmol/L 20.5* 15.8*   I STAT BASE EXC mmol/L -6* -9*   I STAT O2 SAT % 35* 59*     Results from last 7 days   Lab Units 01/22/25  1223   PROTIME seconds 15.9*   INR  1.24*   PTT seconds 29         Results from last 7 days   Lab Units 01/22/25  1218   PROCALCITONIN ng/ml 0.08     Results from last 7 days   Lab Units 01/23/25  0511 01/22/25  1448 01/22/25  1223   LACTIC ACID mmol/L 1.0 2.4* 4.4*     Results from last 7 days   Lab Units 01/23/25  0511   BNP pg/mL 485*     Results from last 7 days   Lab Units 01/22/25  1218   FERRITIN ng/mL 16   IRON SATURATION % 13*   IRON ug/dL 44*   TIBC ug/dL 350     Results from last 7 days   Lab Units 01/22/25  1218   TRANSFERRIN mg/dL 250     Results from last 7 days   Lab Units 01/23/25  0555   UNIT PRODUCT CODE  [id number]  [id number]   UNIT NUMBER  [id number]  [id number]   UNITABO  O  O   UNITRH  POS  POS   CROSSMATCH  Compatible  Compatible   UNIT DISPENSE STATUS  Presumed Trans  Presumed Trans   UNIT PRODUCT VOL ml 350  300     Results from last 7 days   Lab Units 01/22/25  1235   CLARITY UA  Clear   COLOR UA  Light Yellow   SPEC GRAV UA  1.008   PH UA  6.0   GLUCOSE UA mg/dl Negative   KETONES UA mg/dl Negative   BLOOD UA  Negative   PROTEIN UA mg/dl Negative   NITRITE UA  Negative   BILIRUBIN UA   Negative   UROBILINOGEN UA (BE) mg/dl <2.0   LEUKOCYTES UA  Negative       Results from last 7 days   Lab Units 01/22/25  1218   BLOOD CULTURE  No Growth at 24 hrs.  No Growth at 24 hrs.                   Past Medical History:   Diagnosis Date    Acute deep vein thrombosis of lower limb (HCC)     last assessed [id number]    Aortic aneurysm (HCC)     Arthritis     Atrial fibrillation (HCC)     Dislocation of hip (HCC)     last assessed [id number]    Hypertension     Psychiatric disorder     anxiety     Present on Admission:   Acute on chronic anemia   MARU (acute kidney injury) (HCC)   Chronic pain   Anxiety   Aortic aneurysm (HCC)   Insomnia   Constipation   Fall      Admitting Diagnosis: Lactic acidosis [E87.20]  Rapid heart rate [R00.0]  Black stool [K92.1]  MARU (acute kidney injury) (HCC) [N17.9]  Atrial fibrillation with rapid ventricular response (HCC) [I48.91]  Sepsis (HCC) [A41.9]  Acute on chronic anemia [D64.9]  Age/Sex: 79 y.o. female    Network Utilization Review Department  ATTENTION: Please call with any questions or concerns to [phone] and carefully listen to the prompts so that you are directed to the right person. All voicemails are confidential.   For Discharge needs, contact Care Management DC Support Team at [phone] opt. [phone]  Send all requests for admission clinical reviews, approved or denied determinations and any other requests to dedicated fax number below belonging to the campus where the patient is receiving treatment. List of dedicated fax numbers for the Facilities:  FACILITY NAME UR FAX NUMBER   ADMISSION DENIALS (Administrative/Medical Necessity) [phone]   DISCHARGE SUPPORT TEAM (NETWORK) [phone]   PARENT CHILD HEALTH (Maternity/NICU/Pediatrics) [phone]   [facility] [phone]   [facility] [phone]   [facility] [phone]   [facility] [phone]   [facility]  [facility] [phone]   [facility] [phone]   [facility] [phone]   [facility] [phone]   [facility] [phone]   [facility] [phone]   [facility] [phone]   [facility] [phone]

## 2025-01-23 NOTE — OCCUPATIONAL THERAPY NOTE
Occupational Therapy Evaluation     Patient Name: Lisa Marques  Today's Date: 1/23/2025  Problem List  Principal Problem:    Fall  Active Problems:    MARU (acute kidney injury) (HCC)    Chronic pain    Anxiety    Aortic aneurysm (HCC)    Insomnia    Constipation    Acute on chronic anemia    Atrial fibrillation (HCC)    Family history of colon cancer    Excessive use of nonsteroidal anti-inflammatory drugs (NSAIDs)    Past Medical History  Past Medical History:   Diagnosis Date    Acute deep vein thrombosis of lower limb (HCC)     last assessed [id number]    Aortic aneurysm (HCC)     Arthritis     Atrial fibrillation (HCC)     Dislocation of hip (HCC)     last assessed [id number]    Hypertension     Psychiatric disorder     anxiety     Past Surgical History  Past Surgical History:   Procedure Laterality Date    HIP SURGERY      JOINT REPLACEMENT      KNEE ARTHROSCOPY Bilateral     x2 rt and 1 on left    TUBAL LIGATION           01/23/25 0935   OT Last Visit   OT Visit Date 01/23/25   Note Type   Note type Evaluation   Pain Assessment   Pain Assessment Tool 0-10   Pain Score No Pain   Restrictions/Precautions   Weight Bearing Precautions Per Order No   Other Precautions Chair Alarm;Bed Alarm;Multiple lines;Telemetry;O2;Fall Risk   Home Living   Type of Home Apartment   Home Layout One level;Elevator;Stairs to enter with rails;Ramped entrance  (5 OLRY vs ramp)   Bathroom Shower/Tub Walk-in shower   Bathroom Toilet Raised   Bathroom Equipment Grab bars in shower;Built-in shower seat;Grab bars around toilet   Home Equipment Walker  (uses as needed at baseline)   Prior Function   Level of [place] Independent with ADLs;Independent with functional mobility;Independent with IADLS   Lives With Alone   Receives Help From Family   IADLs Independent with driving;Independent with meal prep;Independent with medication management   Falls in the last 6 months 1 to 4   Vocational Retired   Lifestyle   Autonomy Pt reports (I)  with ADLs, IADLs, and functional mobility with rw. Pt +  and retired   Reciprocal Relationships family   Service to Others retired   ADL   Where Assessed Edge of bed   Eating Assistance 6  Modified independent   Grooming Assistance 6  Modified Independent   UB Bathing Assistance 5  Supervision/Setup   LB Bathing Assistance 4  Minimal Assistance   UB Dressing Assistance 5  Supervision/Setup   LB Dressing Assistance 4  Minimal Assistance   Toileting Assistance  4  Minimal Assistance   Functional Assistance 4  Minimal Assistance   Bed Mobility   Supine to Sit 4  Minimal assistance   Additional items Assist x 1;Increased time required;LE management   Transfers   Sit to Stand 5  Supervision   Additional items Increased time required;Verbal cues   Stand to Sit 5  Supervision   Additional items Increased time required;Verbal cues   Additional Comments with rw   Functional Mobility   Functional Mobility 5  Supervision   Additional Comments Pt requires supervision to take steps with rw   Additional items Rolling walker   Balance   Static Sitting Fair +   Dynamic Sitting Fair +   Static Standing Fair   Dynamic Standing Fair -   Ambulatory Fair -   Activity Tolerance   Activity Tolerance Patient limited by fatigue   Medical Staff Made Aware PT   Nurse Made Aware RN Cleared   RUE Assessment   RUE Assessment WFL   LUE Assessment   LUE Assessment WFL   Hand Function   Gross Motor Coordination Functional   Fine Motor Coordination Functional   Cognition   Overall Cognitive Status WFL   Arousal/Participation Alert;Responsive;Cooperative   Attention Within functional limits   Orientation Level Oriented X4   Memory Within functional limits   Following Commands Follows all commands and directions without difficulty   Comments Pt agreeable to therapy, requires occasional cues for safety   Assessment   Limitation Decreased ADL status;Decreased endurance;Decreased self-care trans;Decreased high-level ADLs   Prognosis Good    Assessment Pt is a 78 y/o female that was admitted to [facility] 1/22/20[facility] with a fall. Pt with active OT orders and activity orders. Pt  has a past medical history of Acute deep vein thrombosis of lower limb (HCC), Aortic aneurysm (HCC), Arthritis, Atrial fibrillation (HCC), Dislocation of hip (HCC), Hypertension, and Psychiatric disorder. Pt lives alone in a one level apartment with 5 ORLY vs ramped entrance, elevator access, raised toilet with grab bars, and walk in shower with grab bars and shower chair. Pt reports using rw as needed at baseline. Prior to admission pt (I) ADLs, IADLs, and functional mobility. Pt currently requires supervision for safety to complete transfers and take steps with rw. Pt requires MIN A to complete LB ADLs and toileting. Pt limited by decreased ADL status, functional transfers, functional mobility, and activity tolerance. Pt supine in bed at begning of session, pt seated in bedside chair at end of session with alarm set and items within reach. The patient's raw score on the AM-PAC Daily Activity Inpatient Short Form is 19. A raw score of greater than or equal to 19 suggests the patient may benefit from discharge to home. Please refer to the recommendation of the Occupational Therapist for safe discharge planning. Recommend Level III minimum intensity OT services  at d/c to maximize pt function.   Goals   Patient Goals to go home   LTG Time Frame 10-14   Plan   Treatment Interventions ADL retraining;Functional transfer training;UE strengthening/ROM;Endurance training;Patient/family training;Equipment evaluation/education;Compensatory technique education;Continued evaluation;Energy conservation;Activityengagement   Goal Expiration Date 02/06/25   OT Frequency 2-3x/wk   Discharge Recommendation   Rehab Resource Intensity Level, OT III (Minimum Resource Intensity)   -PAC Daily Activity Inpatient   Lower Body Dressing 3   Bathing 3   Toileting 3   Upper Body Dressing 3    Grooming 3   Eating 4   Daily Activity Raw Score 19   Daily Activity Standardized Score (Calc for Raw Score >=11) 40.22   AM-PAC Applied Cognition Inpatient   Following a Speech/Presentation 3   Understanding Ordinary Conversation 4   Taking Medications 4   Remembering Where Things Are Placed or Put Away 4   Remembering List of 4-5 Errands 3   Taking Care of Complicated Tasks 3   Applied Cognition Raw Score 21   Applied Cognition Standardized Score 44.3   End of Consult   Education Provided Yes   Patient Position at End of Consult Bedside chair;Bed/Chair alarm activated;All needs within reach   Nurse Communication Nurse aware of consult     Goals:    Pt will complete functional transfers with MOD IND and appropriate AD to maximize pt safety.    Pt will complete bed mobility with MOD IND  to maximize pt safety.    Pt will complete grooming tasks with XXX to maximize pt independence.    Pt will complete LB ADLs with MOD IND  to maximize pt independence.    Pt will complete UB ADLs with MOD IND to maximize pt independence.    Pt will complete toileting with MOD IND to maximize pt independence.    Pt will complete functional household distance mobility with MOD IND and appropriate AD to maximize pt safety.    Pt will complete simulated IADL tasks with MOD IND to maximize pt independence.     Pt will be able to tolerate 30 minutes of functional activity during therapy session.    Pt will participate in continued cognitive evaluation for safe discharge planning.        KAYA Hernandez, OTR/L

## 2025-01-23 NOTE — PHYSICAL THERAPY NOTE
Physical Therapy Evaluation     Patient's Name: Lisa Marques    Admitting Diagnosis  Lactic acidosis [E87.20]  Rapid heart rate [R00.0]  Black stool [K92.1]  MARU (acute kidney injury) (HCC) [N17.9]  Atrial fibrillation with rapid ventricular response (HCC) [I48.91]  Sepsis (HCC) [A41.9]  Acute on chronic anemia [D64.9]    Problem List  Patient Active Problem List   Diagnosis    Atrial fibrillation with RVR (HCC)    MARU (acute kidney injury) (HCC)    Chronic pain    Age-related osteoporosis without current pathological fracture    Hypertension    Anxiety    Aortic aneurysm (HCC)    Depression with anxiety    Insomnia    Left knee pain    Lumbar canal stenosis    Myofascial pain syndrome    Opioid dependence (HCC)    Pain syndrome, chronic    Right shoulder pain    Spondylosis of lumbar region without myelopathy or radiculopathy    Vitamin D deficiency    Constipation    Eczema of right external ear    Balance disorder    Musculoskeletal arm pain, right    Primary osteoarthritis of right shoulder    Chronic heart failure with preserved ejection fraction (HCC)    Hypokalemia    Stage 3a chronic kidney disease (HCC)    Ganglion of hand, left    Dry eye of left side    Bladder prolapse, female, acquired    Financial insecurity    Viral illness    Anemia    Fall    Chronic pain of left knee    Food insecurity    Hypotension    Acute hypoxic respiratory failure (HCC)    Elevated d-dimer    Generalized weakness    Cystocele with prolapse    Muscle spasm    Long term (current) use of opiate analgesic    Thrombocytopenia (HCC)    Acute on chronic anemia    Atrial fibrillation (HCC)    Family history of colon cancer    Excessive use of nonsteroidal anti-inflammatory drugs (NSAIDs)       Past Medical History  Past Medical History:   Diagnosis Date    Acute deep vein thrombosis of lower limb (HCC)     last assessed [id number]    Aortic aneurysm (HCC)     Arthritis     Atrial fibrillation (HCC)     Dislocation of hip (HCC)      last assessed [id number]    Hypertension     Psychiatric disorder     anxiety       Past Surgical History  Past Surgical History:   Procedure Laterality Date    HIP SURGERY      JOINT REPLACEMENT      KNEE ARTHROSCOPY Bilateral     x2 rt and 1 on left    TUBAL LIGATION            01/23/25 0934   PT Last Visit   PT Visit Date 01/23/25   Note Type   Note type Evaluation   Pain Assessment   Pain Assessment Tool 0-10   Pain Score No Pain   Restrictions/Precautions   Weight Bearing Precautions Per Order No   Other Precautions Chair Alarm;Bed Alarm;Multiple lines;Telemetry;O2;Fall Risk   Home Living   Type of Home Apartment   Home Layout One level;Stairs to enter with rails;Elevator  (5STE vs ramped entrance)   Bathroom Shower/Tub Walk-in shower   Bathroom Toilet Raised   Bathroom Equipment Grab bars in shower;Shower chair;Grab bars around toilet   Bathroom Accessibility Accessible   Home Equipment Walker  (used PRN PTA)   Prior Function   Level of [place] Independent with ADLs;Independent with functional mobility;Independent with IADLS   Lives With Alone   Receives Help From Family   IADLs Independent with driving;Independent with meal prep;Independent with medication management   Falls in the last 6 months 1 to 4   Vocational Retired   General   Family/Caregiver Present No   Cognition   Overall Cognitive Status WFL   Arousal/Participation Alert   Orientation Level Oriented X4   Memory Within functional limits   Following Commands Follows all commands and directions without difficulty   Subjective   Subjective pt pleasant and cooperative throughout therapy session. pt received supine in bed   RLE Assessment   RLE Assessment X  (4-/5 grossly)   LLE Assessment   LLE Assessment X  (4-/5 grossly)   Bed Mobility   Supine to Sit 4  Minimal assistance   Additional items Assist x 1;Increased time required;Verbal cues;LE management   Sit to Supine Unable to assess   Transfers   Sit to Stand 5  Supervision   Additional items  Increased time required;Verbal cues   Stand to Sit 5  Supervision   Additional items Increased time required;Verbal cues   Additional Comments transfers w RW   Ambulation/Elevation   Gait pattern Improper Weight shift;Narrow ESME;Forward Flexion;Shuffling;Short stride;Excessively slow   Gait Assistance 5  Supervision   Additional items Verbal cues   Assistive Device Rolling walker   Distance 5'   Stair Management Assistance Not tested   Balance   Static Sitting Fair +   Dynamic Sitting Fair +   Static Standing Fair   Dynamic Standing Fair   Ambulatory Fair -   Endurance Deficit   Endurance Deficit Yes   Endurance Deficit Description generalized weakness, decreased exercise tolerance   Activity Tolerance   Activity Tolerance Patient limited by fatigue   Medical Staff Made Aware kumar Chand due to medical complexity and multiple comorbidities   Nurse Made Aware RN cleared and updated   Assessment   Prognosis Good   Problem List Decreased strength;Decreased range of motion;Decreased endurance;Impaired balance;Decreased mobility;Pain   Assessment PT orders received and acknowledged. Patient was seen today for high complexity PT evaluation. High complexity evaluation due to Ongoing medical management for primary dx, Increased reliance on more restrictive AD compared to baseline, Decreased activity tolerance compared to baseline, Fall risk, Ongoing telemetry monitoring, Continuous pulse oximetry monitoring  Patient is a 79 y.o. female  who was admitted to [facility] on 1/22/2025  with Fall . Pt presents to [facility] following a fall at home . At baseline, pt resides alone in apartment and was independent prior to hospital admission. Currently, upon initial examination, pt  is requiring min assist x1 for bed mobility skills;  supervision   for functional transfers and  supervision   for ambulation with RW.  Patient currently presents below baseline with limitations in gait, balance, and transfers. Patient will  benefit from continued PT services while in hospital in order to address remaining limitations. The patients AM-PAC Basic Mobility Inpatient Short From Raw Score is 18 . Based on AM-PAC scoring and patient presentation, PT currently recommending Level III (Minimum Resource Intensity). Please also refer to the recommendation of the Physical Therapist for safe discharge planning.   Barriers to Discharge Decreased caregiver support   Goals   Patient Goals to go home   STG Expiration Date 02/06/25   Short Term Goal #1 Patient will be able to perform bed mobility tasks with  modified independent   in order to improve overall functional mobility and assist in safe d/c. STG 2 Patient will sit EOB for at least 25 minutes at  modified independent   level in order to strengthen abdominal musculature and assist in future transfers and ambulation. STG 3 Patient will be able to perform functional transfer with  modified independent   in order to improve overall functional mobility and assist in safe discharge. STG 4 Patient will be able to ambulate at least 300 feet with least restrictive device with  modified independent   assist in order to improve overall functional mobility and assist in safe discharge. STG 5 Patient will improve sitting/standing static/dynamic balance 1/2 grade in order to improve functional mobility and assist in safe discharge. STG 6 Patient will improve LE strength by 1/2 grade in order to improve functional mobility and assist in safe discharge.   PT Treatment Day 0   Plan   Treatment/Interventions ADL retraining;Functional transfer training;LE strengthening/ROM;Elevations;Therapeutic exercise;Endurance training;Gait training;Bed mobility;Spoke to nursing;OT   PT Frequency 2-3x/wk   Discharge Recommendation   Rehab Resource Intensity Level, PT III (Minimum Resource Intensity)   AM-PAC Basic Mobility Inpatient   Turning in Flat Bed Without Bedrails 3   Lying on Back to Sitting on Edge of Flat Bed Without  Bedrails 3   Moving Bed to Chair 3   Standing Up From Chair Using Arms 3   Walk in Room 3   Climb 3-5 Stairs With Railing 3   Basic Mobility Inpatient Raw Score 18   Basic Mobility Standardized Score 41.05   [facility] Highest Level Of Mobility   -HL Goal 6: Walk 10 steps or more   -HLM Achieved 6: Walk 10 steps or more   End of Consult   Patient Position at End of Consult Bedside chair;Bed/Chair alarm activated;All needs within reach         Wong Polk, PT

## 2025-01-23 NOTE — PLAN OF CARE
Problem: OCCUPATIONAL THERAPY ADULT  Goal: Performs self-care activities at highest level of function for planned discharge setting.  See evaluation for individualized goals.  Description: Treatment Interventions: ADL retraining, Functional transfer training, UE strengthening/ROM, Endurance training, Patient/family training, Equipment evaluation/education, Compensatory technique education, Continued evaluation, Energy conservation, Activityengagement          See flowsheet documentation for full assessment, interventions and recommendations.   Outcome: Progressing  Note: Limitation: Decreased ADL status, Decreased endurance, Decreased self-care trans, Decreased high-level ADLs  Prognosis: Good  Assessment: Pt is a 80 y/o female that was admitted to [facility] 1/22/2025 with a fall. Pt with active OT orders and activity orders. Pt  has a past medical history of Acute deep vein thrombosis of lower limb (HCC), Aortic aneurysm (HCC), Arthritis, Atrial fibrillation (HCC), Dislocation of hip (HCC), Hypertension, and Psychiatric disorder. Pt lives alone in a one level apartment with 5 ORLY vs ramped entrance, elevator access, raised toilet with grab bars, and walk in shower with grab bars and shower chair. Pt reports using rw as needed at baseline. Prior to admission pt (I) ADLs, IADLs, and functional mobility. Pt currently requires supervision for safety to complete transfers and take steps with rw. Pt requires MIN A to complete LB ADLs and toileting. Pt limited by decreased ADL status, functional transfers, functional mobility, and activity tolerance. Pt supine in bed at begning of session, pt seated in bedside chair at end of session with alarm set and items within reach. The patient's raw score on the -PAC Daily Activity Inpatient Short Form is 19. A raw score of greater than or equal to 19 suggests the patient may benefit from discharge to home. Please refer to the recommendation of the Occupational  Therapist for safe discharge planning. Recommend Level III minimum intensity OT services  at d/c to maximize pt function.     Rehab Resource Intensity Level, OT: III (Minimum Resource Intensity)

## 2025-01-24 ENCOUNTER — HOME HEALTH ADMISSION (OUTPATIENT)
Dept: HOME HEALTH SERVICES | Facility: HOME HEALTHCARE | Age: 80
End: 2025-01-24
Payer: COMMERCIAL

## 2025-01-24 VITALS
BODY MASS INDEX: 27.97 KG/M2 | TEMPERATURE: 97.6 F | HEIGHT: 63 IN | SYSTOLIC BLOOD PRESSURE: 129 MMHG | HEART RATE: 58 BPM | WEIGHT: 157.85 LBS | RESPIRATION RATE: 18 BRPM | DIASTOLIC BLOOD PRESSURE: 90 MMHG | OXYGEN SATURATION: 95 %

## 2025-01-24 PROBLEM — K26.9 DUODENAL ULCER: Status: ACTIVE | Noted: 2025-01-24

## 2025-01-24 PROBLEM — E44.0 MODERATE PROTEIN-CALORIE MALNUTRITION (HCC): Status: ACTIVE | Noted: 2025-01-24

## 2025-01-24 LAB
ANION GAP SERPL CALCULATED.3IONS-SCNC: 3 MMOL/L (ref 4–13)
BASOPHILS # BLD AUTO: 0.03 THOUSANDS/ΜL (ref 0–0.1)
BASOPHILS NFR BLD AUTO: 1 % (ref 0–1)
BUN SERPL-MCNC: 19 MG/DL (ref 5–25)
CALCIUM SERPL-MCNC: 8 MG/DL (ref 8.4–10.2)
CHLORIDE SERPL-SCNC: 111 MMOL/L (ref 96–108)
CO2 SERPL-SCNC: 23 MMOL/L (ref 21–32)
CREAT SERPL-MCNC: 0.88 MG/DL (ref 0.6–1.3)
EOSINOPHIL # BLD AUTO: 0 THOUSAND/ΜL (ref 0–0.61)
EOSINOPHIL NFR BLD AUTO: 0 % (ref 0–6)
ERYTHROCYTE [DISTWIDTH] IN BLOOD BY AUTOMATED COUNT: 16.8 % (ref 11.6–15.1)
GFR SERPL CREATININE-BSD FRML MDRD: 63 ML/MIN/1.73SQ M
GLUCOSE SERPL-MCNC: 85 MG/DL (ref 65–140)
HCT VFR BLD AUTO: 29.9 % (ref 34.8–46.1)
HGB BLD-MCNC: 9.3 G/DL (ref 11.5–15.4)
IMM GRANULOCYTES # BLD AUTO: 0.01 THOUSAND/UL (ref 0–0.2)
IMM GRANULOCYTES NFR BLD AUTO: 0 % (ref 0–2)
LYMPHOCYTES # BLD AUTO: 1.16 THOUSANDS/ΜL (ref 0.6–4.47)
LYMPHOCYTES NFR BLD AUTO: 32 % (ref 14–44)
MAGNESIUM SERPL-MCNC: 2.2 MG/DL (ref 1.9–2.7)
MCH RBC QN AUTO: 29.1 PG (ref 26.8–34.3)
MCHC RBC AUTO-ENTMCNC: 31.1 G/DL (ref 31.4–37.4)
MCV RBC AUTO: 93 FL (ref 82–98)
MONOCYTES # BLD AUTO: 0.44 THOUSAND/ΜL (ref 0.17–1.22)
MONOCYTES NFR BLD AUTO: 12 % (ref 4–12)
NEUTROPHILS # BLD AUTO: 2.04 THOUSANDS/ΜL (ref 1.85–7.62)
NEUTS SEG NFR BLD AUTO: 55 % (ref 43–75)
NRBC BLD AUTO-RTO: 0 /100 WBCS
PLATELET # BLD AUTO: 148 THOUSANDS/UL (ref 149–390)
PMV BLD AUTO: 11.8 FL (ref 8.9–12.7)
POTASSIUM SERPL-SCNC: 3.8 MMOL/L (ref 3.5–5.3)
RBC # BLD AUTO: 3.2 MILLION/UL (ref 3.81–5.12)
SODIUM SERPL-SCNC: 137 MMOL/L (ref 135–147)
WBC # BLD AUTO: 3.68 THOUSAND/UL (ref 4.31–10.16)

## 2025-01-24 PROCEDURE — 80048 BASIC METABOLIC PNL TOTAL CA: CPT

## 2025-01-24 PROCEDURE — NC001 PR NO CHARGE: Performed by: FAMILY MEDICINE

## 2025-01-24 PROCEDURE — 85025 COMPLETE CBC W/AUTO DIFF WBC: CPT

## 2025-01-24 PROCEDURE — 99238 HOSP IP/OBS DSCHRG MGMT 30/<: CPT | Performed by: FAMILY MEDICINE

## 2025-01-24 PROCEDURE — 83735 ASSAY OF MAGNESIUM: CPT

## 2025-01-24 RX ORDER — PANTOPRAZOLE SODIUM 40 MG/1
40 TABLET, DELAYED RELEASE ORAL
Qty: 90 TABLET | Refills: 1 | Status: SHIPPED | OUTPATIENT
Start: 2025-01-24

## 2025-01-24 RX ORDER — ACETAMINOPHEN 325 MG/1
975 TABLET ORAL EVERY 8 HOURS SCHEDULED
Qty: 270 TABLET | Refills: 3 | Status: SHIPPED | OUTPATIENT
Start: 2025-01-24

## 2025-01-24 RX ADMIN — PANTOPRAZOLE SODIUM 40 MG: 40 TABLET, DELAYED RELEASE ORAL at 05:27

## 2025-01-24 RX ADMIN — ACETAMINOPHEN 975 MG: 325 TABLET, FILM COATED ORAL at 14:43

## 2025-01-24 RX ADMIN — OXYCODONE HYDROCHLORIDE 10 MG: 10 TABLET ORAL at 00:17

## 2025-01-24 RX ADMIN — GABAPENTIN 300 MG: 300 CAPSULE ORAL at 08:58

## 2025-01-24 RX ADMIN — LIDOCAINE 2 PATCH: 700 PATCH TOPICAL at 08:58

## 2025-01-24 RX ADMIN — CEFEPIME 1000 MG: 1 INJECTION, POWDER, FOR SOLUTION INTRAMUSCULAR; INTRAVENOUS at 00:32

## 2025-01-24 RX ADMIN — METOPROLOL SUCCINATE 50 MG: 50 TABLET, EXTENDED RELEASE ORAL at 09:02

## 2025-01-24 RX ADMIN — ACETAMINOPHEN 975 MG: 325 TABLET, FILM COATED ORAL at 05:28

## 2025-01-24 RX ADMIN — APIXABAN 5 MG: 5 TABLET, FILM COATED ORAL at 08:59

## 2025-01-24 RX ADMIN — MAGNESIUM OXIDE TAB 400 MG (241.3 MG ELEMENTAL MG) 400 MG: 400 (241.3 MG) TAB at 08:59

## 2025-01-24 RX ADMIN — OXYCODONE HYDROCHLORIDE 10 MG: 10 TABLET ORAL at 09:03

## 2025-01-24 RX ADMIN — ESCITALOPRAM OXALATE 10 MG: 10 TABLET ORAL at 08:59

## 2025-01-24 NOTE — ED PROVIDER NOTES
Time reflects when diagnosis was documented in both MDM as applicable and the Disposition within this note       Time User Action Codes Description Comment    1/22/2025  1:52 PM Facchiano, Brooke Add [K92.1] Black stool     1/22/2025  3:30 PM Facchiano, Brooke Add [I48.91] Atrial fibrillation with rapid ventricular response (HCC)     1/22/2025  3:30 PM Facchiano, Brooke Add [E87.20] Lactic acidosis     1/22/2025  3:30 PM Facchiano, Brooke Modify [K92.1] Black stool     1/22/2025  3:30 PM Facchiano, Brooke Modify [I48.91] Atrial fibrillation with rapid ventricular response (HCC)     1/22/2025  3:32 PM Facchiano, Brooke Add [N17.9] MARU (acute kidney injury) (HCC)     1/22/2025  3:33 PM Facchiano, Brooke Add [A41.9] Sepsis (HCC)     1/22/2025  4:24 PM Pia Soto Add [D64.9] Acute on chronic anemia     1/23/2025  2:52 PM Alix Power Modify [K92.1] Black stool     1/23/2025  2:52 PM Alix Power Modify [I48.91] Atrial fibrillation with rapid ventricular response (HCC)     1/23/2025  2:52 PM Alix Power Modify [E87.20] Lactic acidosis     1/23/2025  2:52 PM Alix Power Modify [N17.9] MARU (acute kidney injury) (HCC)     1/23/2025  2:52 PM Alix Power Modify [A41.9] Sepsis (HCC)     1/23/2025  2:52 PM Alix Power Modify [D64.9] Acute on chronic anemia           ED Disposition       ED Disposition   Admit    Condition   Stable    Date/Time   Wed Jan 22, 2025  3:30 PM    Comment   Case was discussed with family medicine and the patient's admission status was agreed to be Admission Status: inpatient status to the service of Dr. Grande .               Assessment & Plan   {Hyperlinks  Risk Stratification - NIHSS - HEART SCORE - Fill out sepsis note and make sure you call [id number] if severe or septic shock:[id number]}    Medical Decision Making  Amount and/or Complexity of Data Reviewed  Labs: ordered.  Radiology: ordered.    Risk  Prescription drug management.  Decision regarding hospitalization.              Medications   iohexol (OMNIPAQUE) 350 MG/ML injection (MULTI-DOSE) 100 mL (has no administration in time range)   docusate sodium (COLACE) capsule 100 mg (100 mg Oral Not Given 1/22/25 1737)   escitalopram (LEXAPRO) tablet 10 mg (has no administration in time range)   lidocaine (LIDODERM) 5 % patch 2 patch (has no administration in time range)   melatonin tablet 9 mg (has no administration in time range)   metoprolol succinate (TOPROL-XL) 24 hr tablet 50 mg (50 mg Oral Given 1/22/25 2115)   oxyCODONE (ROXICODONE) immediate release tablet 10 mg (10 mg Oral Given 1/22/25 1735)   polyethylene glycol (MIRALAX) packet 17 g (has no administration in time range)   senna (SENOKOT) tablet 8.6 mg (8.6 mg Oral Not Given 1/22/25 2114)   zolpidem (AMBIEN) tablet 5 mg (5 mg Oral Given 1/22/25 2115)   acetaminophen (TYLENOL) tablet 975 mg (975 mg Oral Not Given 1/22/25 2110)   gabapentin (NEURONTIN) capsule 300 mg (300 mg Oral Given 1/22/25 2115)   cefepime (MAXIPIME) 1,000 mg in dextrose 5 % 50 mL IVPB (has no administration in time range)   sodium chloride 0.9 % bolus 1,000 mL (0 mL Intravenous Stopped 1/22/25 1312)   sodium chloride 0.9 % bolus 1,000 mL (0 mL Intravenous Stopped 1/22/25 1551)   cefepime (MAXIPIME) 2 g/50 mL dextrose IVPB (0 mg Intravenous Stopped 1/22/25 1357)   norepinephrine (LEVOPHED) 1 mg/mL injection **ADS Override Pull** (  Given 1/22/25 1335)   EPINEPHrine (ADRENALIN) 0.1 mg/mL injection **ADS Override Pull** (  Given 1/22/25 1335)   iohexol (OMNIPAQUE) 350 MG/ML injection (MULTI-DOSE) 100 mL (100 mL Intravenous Given 1/22/25 1402)       ED Risk Strat Scores                          SBIRT 20yo+      Flowsheet Row Most Recent Value   Initial Alcohol Screen: US AUDIT-C     1. How often do you have a drink containing alcohol? 0 Filed at: 01/22/2025 1201   2. How many drinks containing alcohol do you have on a typical day you are drinking?  0 Filed at: 01/22/2025 1201   3b. FEMALE Any Age, or MALE 65+:  How often do you have 4 or more drinks on one occassion? 0 Filed at: 01/22/2025 1201   Audit-C Score 0 Filed at: 01/22/2025 1201   RAGHAVENDRA: How many times in the past year have you...    Used an illegal drug or used a prescription medication for non-medical reasons? Never Filed at: 01/22/2025 1201                            History of Present Illness   {Hyperlinks  History (Med, Surg, Fam, Social) - Current Medications - Allergies  :[id number]}    Chief Complaint   Patient presents with    Rapid Heart Rate     Per ems pt was found on the floor, when hooked up to the monitor was found in rapid afib, unknown HS, +eliquis, -LOC        Past Medical History:   Diagnosis Date    Acute deep vein thrombosis of lower limb (HCC)     last assessed [id number]    Aortic aneurysm (HCC)     Arthritis     Atrial fibrillation (HCC)     Dislocation of hip (HCC)     last assessed [id number]    Hypertension     Psychiatric disorder     anxiety      Past Surgical History:   Procedure Laterality Date    HIP SURGERY      JOINT REPLACEMENT      KNEE ARTHROSCOPY Bilateral     x2 rt and 1 on left    TUBAL LIGATION        Family History   Problem Relation Age of Onset    Colon cancer Mother     Breast cancer Family     Thyroid cancer Family     Colon cancer Family     Hypertension Family     Thyroid disease Family     Hypertension Father     Dementia Father       Social History     Tobacco Use    Smoking status: Never    Smokeless tobacco: Never   Vaping Use    Vaping status: Never Used   Substance Use Topics    Alcohol use: No     Comment: none    Drug use: No      E-Cigarette/Vaping    E-Cigarette Use Never User       E-Cigarette/Vaping Substances    Nicotine No     THC No     CBD No     Flavoring No     Other No     Unknown No       I have reviewed and agree with the history as documented.     HPI    Review of Systems        Objective   {Hyperlinks  Historical Vitals - Historical Labs - Chart Review/Microbiology - Last Echo - Code Status   :[id number]}    ED Triage Vitals   Temperature Pulse Blood Pressure Respirations SpO2 Patient Position - Orthostatic VS   01/22/25 1208 01/22/25 1138 01/22/25 1138 01/22/25 1138 01/22/25 1234 01/22/25 1138   (!) 94.7 °F (34.8 °C) (!) 117 124/77 20 94 % Lying      Temp Source Heart Rate Source BP Location FiO2 (%) Pain Score    01/22/25 1208 01/22/25 1138 01/22/25 1138 -- 01/22/25 1158    Rectal Monitor Right arm  10 - Worst Possible Pain      Vitals      Date and Time Temp Pulse SpO2 Resp BP Pain Score FACES Pain Rating User   01/23/25 1845 97.2 °F (36.2 °C) 75 96 % -- 143/81 -- -- JENNIFER   01/23/25 1720 97.7 °F (36.5 °C) 60 95 % -- 137/82 -- -- NC   01/23/25 1644 -- -- -- -- -- 8 -- NC   01/23/25 1513 -- 58 99 % 18 134/80 -- -- LS   01/23/25 1457 96.5 °F (35.8 °C) 64 95 % 17 133/77 -- 0 JMD   01/23/25 1306 96.3 °F (35.7 °C) -- -- -- -- -- -- RSK   01/23/25 1302 -- 67 92 % 20 154/72 -- -- RSK   01/23/25 1249 -- -- -- -- -- 4 -- NC   01/23/25 1010 -- 75 -- -- 120/66 -- -- IT   01/23/25 1000 98.1 °F (36.7 °C) 75 95 % 19 120/66 -- -- NC   01/23/25 0935 -- -- -- -- -- No Pain -- MV   01/23/25 0934 -- -- -- -- -- No Pain -- PD   01/23/25 0837 -- -- -- -- -- 6 -- NC   01/23/25 0816 97.5 °F (36.4 °C) 74 93 % 17 153/86 -- -- NC   01/23/25 0330 97.7 °F (36.5 °C) 72 95 % 20 138/92 8 -- NR   01/23/25 0300 97.9 °F (36.6 °C) 75 98 % 21 145/88 -- -- NR   01/23/25 0220 97.7 °F (36.5 °C) 67 95 % 22 132/79 -- -- NR   01/23/25 0135 97.9 °F (36.6 °C) 70 97 % 20 -- -- -- NR   01/23/25 0130 97.9 °F (36.6 °C) 72 100 % 20 -- -- -- NR   01/23/25 0125 97.9 °F (36.6 °C) 69 99 % 22 -- -- -- NR   01/23/25 0120 97.9 °F (36.6 °C) 69 93 % 20 -- -- -- NR   01/23/25 0000 97.9 °F (36.6 °C) 66 100 % 28 -- -- -- NR   01/22/25 2330 -- -- -- -- -- No Pain -- NR   01/22/25 2329 97.9 °F (36.6 °C) 76 91 % 20 156/94 No Pain -- NR   01/22/25 2300 97.9 °F (36.6 °C) 84 99 % -- 148/90 -- -- NR   01/22/25 2200 97.9 °F (36.6 °C) 70 95 % 18 116/77 -- -- KR   01/22/25  2130 97.9 °F (36.6 °C) 74 94 % 20 133/88 -- -- KR   01/22/25 1945 98.2 °F (36.8 °C) 86 94 % 20 119/76 -- -- KR   01/22/25 1845 98.6 °F (37 °C) 74 100 % 20 129/81 -- -- AM   01/22/25 1830 98.6 °F (37 °C) 76 100 % 20 128/80 -- -- AM   01/22/25 1815 99 °F (37.2 °C) 75 100 % 20 124/81 -- -- AM   01/22/25 1803 99 °F (37.2 °C) 78 -- 20 113/72 -- -- AM   01/22/25 1800 98.6 °F (37 °C) 82 97 % 20 113/72 -- -- AM   01/22/25 1745 98.6 °F (37 °C) 86 98 % 20 120/81 -- -- AM   01/22/25 1735 -- -- -- -- -- 10 - Worst Possible Pain -- AM   01/22/25 1725 98.6 °F (37 °C) 98 91 % 20 105/75 -- -- AM   01/22/25 1700 98.2 °F (36.8 °C) 90 93 % 20 123/81 -- -- AM   01/22/25 1645 97.9 °F (36.6 °C) 102 95 % 20 119/86 -- -- AM   01/22/25 1630 97.2 °F (36.2 °C) 88 92 % 20 110/79 -- -- AM   01/22/25 1615 97.3 °F (36.3 °C) 97 -- 20 98/66 -- -- AM   01/22/25 1600 97.2 °F (36.2 °C) 92 -- 20 128/93 -- -- AM   01/22/25 1545 96.8 °F (36 °C) 100 94 % 20 128/93 -- -- AM   01/22/25 1530 96.4 °F (35.8 °C) 94 -- 20 143/102 -- -- AM   01/22/25 1515 96.4 °F (35.8 °C) 78 97 % 20 134/82 -- -- ARNULFO   01/22/25 1500 96.1 °F (35.6 °C) 76 96 % 20 119/79 -- -- AM   01/22/25 1445 96.1 °F (35.6 °C) 90 100 % 20 119/73 -- -- AM   01/22/25 1430 96.1 °F (35.6 °C) 90 95 % 20 128/80 -- -- AM   01/22/25 1415 95.7 °F (35.4 °C) 90 -- 20 142/91 -- -- AM   01/22/25 1345 95.7 °F (35.4 °C) 90 99 % 20 137/91 -- -- AM   01/22/25 1330 95.7 °F (35.4 °C) 122 -- 20 61/53 -- -- AM   01/22/25 1315 95.7 °F (35.4 °C) 124 99 % 20 -- -- -- AM   01/22/25 1300 95.7 °F (35.4 °C) 118 -- 20 95/69 -- -- AM   01/22/25 1245 95.7 °F (35.4 °C) 128 99 % 20 100/71 -- -- AM   01/22/25 1240 95.7 °F (35.4 °C) -- -- -- -- -- -- AM   01/22/25 1234 94.7 °F (34.8 °C) -- 94 % -- -- -- -- AM   01/22/25 1208 94.7 °F (34.8 °C) -- -- -- -- -- -- AM   01/22/25 1158 -- -- -- -- -- 10 - Worst Possible Pain -- AM   01/22/25 1138 -- 117 -- 20 124/77 -- -- AM            Physical Exam    Results Reviewed       Procedure  Component Value Units Date/Time    Blood culture #1 [[id number]] Collected: 01/22/25 1218    Lab Status: Preliminary result Specimen: Blood from Arm, Left Updated: 01/23/25 1701     Blood Culture No Growth at 24 hrs.    Blood culture #2 [[id number]] Collected: 01/22/25 1218    Lab Status: Preliminary result Specimen: Blood from Hand, Right Updated: 01/23/25 1701     Blood Culture No Growth at 24 hrs.    CBC and differential [[id number]]  (Abnormal) Collected: 01/23/25 0511    Lab Status: Final result Specimen: Blood from Arm, Left Updated: 01/23/25 0607     WBC 3.97 Thousand/uL      RBC 3.16 Million/uL      Hemoglobin 9.2 g/dL      Hematocrit 29.1 %      MCV 92 fL      MCH 29.1 pg      MCHC 31.6 g/dL      RDW 16.4 %      MPV 11.6 fL      Platelets 136 Thousands/uL      nRBC 0 /100 WBCs      Segmented % 59 %      Immature Grans % 0 %      Lymphocytes % 29 %      Monocytes % 10 %      Eosinophils Relative 1 %      Basophils Relative 1 %      Absolute Neutrophils 2.33 Thousands/µL      Absolute Immature Grans 0.01 Thousand/uL      Absolute Lymphocytes 1.16 Thousands/µL      Absolute Monocytes 0.41 Thousand/µL      Eosinophils Absolute 0.02 Thousand/µL      Basophils Absolute 0.04 Thousands/µL     Narrative:      No Clots  This is an appended report.  These results have been appended to a previously verified report.    B-Type Natriuretic Peptide(BNP) [[id number]]  (Abnormal) Collected: 01/23/25 0511    Lab Status: Final result Specimen: Blood from Arm, Left Updated: 01/23/25 0553      pg/mL     Comprehensive metabolic panel [[id number]]  (Abnormal) Collected: 01/23/25 0511    Lab Status: Final result Specimen: Blood from Arm, Left Updated: 01/23/25 0552     Sodium 141 mmol/L      Potassium 4.3 mmol/L      Chloride 113 mmol/L      CO2 22 mmol/L      ANION GAP 6 mmol/L      BUN 30 mg/dL      Creatinine 0.90 mg/dL      Glucose 83 mg/dL      Calcium 7.8 mg/dL      Corrected Calcium 8.7 mg/dL      AST 38 U/L      ALT 29  U/L      Alkaline Phosphatase 51 U/L      Total Protein 4.8 g/dL      Albumin 2.9 g/dL      Total Bilirubin 0.78 mg/dL      eGFR 61 ml/min/1.73sq m     Narrative:      National Kid[employer] Disease Foundation guidelines for Chronic Kidney Disease (CKD):     Stage 1 with normal or high GFR (GFR > 90 mL/min/1.73 square meters)    Stage 2 Mild CKD (GFR = 60-89 mL/min/1.73 square meters)    Stage 3A Moderate CKD (GFR = 45-59 mL/min/1.73 square meters)    Stage 3B Moderate CKD (GFR = 30-44 mL/min/1.73 square meters)    Stage 4 Severe CKD (GFR = 15-29 mL/min/1.73 square meters)    Stage 5 End Stage CKD (GFR <15 mL/min/1.73 square meters)  Note: GFR calculation is accurate only with a steady state creatinine    Magnesium [[id number]]  (Abnormal) Collected: 01/23/25 0511    Lab Status: Final result Specimen: Blood from Arm, Left Updated: 01/23/25 0552     Magnesium 1.6 mg/dL     Phosphorus [[id number]]  (Normal) Collected: 01/23/25 0511    Lab Status: Final result Specimen: Blood from Arm, Left Updated: 01/23/25 0552     Phosphorus 2.7 mg/dL     Ferritin [[id number]]  (Normal) Collected: 01/22/25 1218    Lab Status: Final result Specimen: Blood from Arm, Left Updated: 01/22/25 2008     Ferritin 16 ng/mL     Lactic acid 2 Hours [[id number]]  (Abnormal) Collected: 01/22/25 1448    Lab Status: Final result Specimen: Blood from Arm, Left Updated: 01/22/25 1945     LACTIC ACID 2.4 mmol/L     Narrative:      Result may be elevated if tourniquet was used during collection.    TIBC Panel (incl. Iron, TIBC, % Iron Saturation) [[id number]]  (Abnormal) Collected: 01/22/25 1218    Lab Status: Final result Specimen: Blood from Arm, Left Updated: 01/22/25 1925     Iron Saturation 13 %      TIBC 350 ug/dL      Iron 44 ug/dL      Transferrin 250 mg/dL      UIBC 306 ug/dL     Hemoglobin and hematocrit, blood [[id number]]  (Abnormal) Collected: 01/22/25 1724    Lab Status: Final result Specimen: Blood from Line, Venous Updated: 01/22/25 1735      Hemoglobin 8.5 g/dL      Hematocrit 26.4 %     POCT Blood Gas (CG8+) [[id number]]  (Abnormal) Collected: 01/22/25 1348    Lab Status: Final result Specimen: Venous Updated: 01/22/25 1357     ph, Michael ISTAT 7.275     pCO2, Michael i-STAT 44.2 mm HG      pO2, Michael i-STAT 24.0 mm HG      BE, i-STAT -6 mmol/L      HCO3, Michael i-STAT 20.5 mmol/L      CO2, i-STAT 22 mmol/L      O2 Sat, i-STAT 35 %      SODIUM, I-STAT 145 mmol/l      Potassium, i-STAT 4.1 mmol/L      Calcium, Ionized i-STAT 1.16 mmol/L      Hct, i-STAT 22 %      Hgb, i-STAT 7.5 g/dl      Glucose, i-STAT 111 mg/dl      Specimen Type VENOUS    Lactic acid [[id number]]  (Abnormal) Collected: 01/22/25 1223    Lab Status: Final result Specimen: Blood from Arm, Left Updated: 01/22/25 1312     LACTIC ACID 4.4 mmol/L     Narrative:      Result may be elevated if tourniquet was used during collection.    Procalcitonin [[id number]]  (Normal) Collected: 01/22/25 1218    Lab Status: Final result Specimen: Blood from Arm, Left Updated: 01/22/25 1300     Procalcitonin 0.08 ng/ml     Protime-INR [[id number]]  (Abnormal) Collected: 01/22/25 1223    Lab Status: Final result Specimen: Blood from Arm, Left Updated: 01/22/25 1258     Protime 15.9 seconds      INR 1.24    Narrative:      INR Therapeutic Range    Indication                                             INR Range      Atrial Fibrillation                                               2.0-3.0  Hypercoagulable State                                    2.0.2.3  Left Ventricular Asist Device                            2.0-3.0  Mechanical Heart Valve                                  -    Aortic(with afib, MI, embolism, HF, LA enlargement,    and/or coagulopathy)                                     2.0-3.0 (2.5-3.5)     Mitral                                                             2.5-3.5  Prosthetic/Bioprosthetic Heart Valve               2.0-3.0  Venous thromboembolism (VTE: VT, PE        2.0-3.0    APTT [[id number]]  (Normal)  Collected: 01/22/25 1223    Lab Status: Final result Specimen: Blood from Arm, Left Updated: 01/22/25 1258     PTT 29 seconds     POCT Blood Gas (CG8+) [[id number]]  (Abnormal) Collected: 01/22/25 1250    Lab Status: Final result Specimen: Venous Updated: 01/22/25 1254     ph, Michael ISTAT 7.339     pCO2, Michael i-STAT 29.4 mm HG      pO2, Michael i-STAT 32.0 mm HG      BE, i-STAT -9 mmol/L      HCO3, Michael i-STAT 15.8 mmol/L      CO2, i-STAT 17 mmol/L      O2 Sat, i-STAT 59 %      SODIUM, I-STAT 147 mmol/l      Potassium, i-STAT 3.8 mmol/L      Calcium, Ionized i-STAT 1.01 mmol/L      Hct, i-STAT <15 %      Hgb, i-STAT --     Glucose, i-STAT 89 mg/dl      Specimen Type VENOUS    Comprehensive metabolic panel [[id number]]  (Abnormal) Collected: 01/22/25 1218    Lab Status: Final result Specimen: Blood from Arm, Left Updated: 01/22/25 1251     Sodium 142 mmol/L      Potassium 4.2 mmol/L      Chloride 109 mmol/L      CO2 21 mmol/L      ANION GAP 12 mmol/L      BUN 52 mg/dL      Creatinine 1.34 mg/dL      Glucose 133 mg/dL      Calcium 8.9 mg/dL      AST 53 U/L      ALT 39 U/L      Alkaline Phosphatase 60 U/L      Total Protein 6.7 g/dL      Albumin 3.7 g/dL      Total Bilirubin 0.72 mg/dL      eGFR 38 ml/min/1.73sq m     Narrative:      National Kidney Disease Foundation guidelines for Chronic Kidney Disease (CKD):     Stage 1 with normal or high GFR (GFR > 90 mL/min/1.73 square meters)    Stage 2 Mild CKD (GFR = 60-89 mL/min/1.73 square meters)    Stage 3A Moderate CKD (GFR = 45-59 mL/min/1.73 square meters)    Stage 3B Moderate CKD (GFR = 30-44 mL/min/1.73 square meters)    Stage 4 Severe CKD (GFR = 15-29 mL/min/1.73 square meters)    Stage 5 End Stage CKD (GFR <15 mL/min/1.73 square meters)  Note: GFR calculation is accurate only with a steady state creatinine    UA w Reflex to Microscopic w Reflex to Culture [[id number]] Collected: 01/22/25 1235    Lab Status: Final result Specimen: Urine, Indwelling Aguilar Catheter Updated:  01/22/25 1248     Color, UA Light Yellow     Clarity, UA Clear     Specific Gravity, UA 1.008     pH, UA 6.0     Leukocytes, UA Negative     Nitrite, UA Negative     Protein, UA Negative mg/dl      Glucose, UA Negative mg/dl      Ketones, UA Negative mg/dl      Urobilinogen, UA <2.0 mg/dl      Bilirubin, UA Negative     Occult Blood, UA Negative    CBC and differential [[id number]]  (Abnormal) Collected: 01/22/25 1218    Lab Status: Final result Specimen: Blood from Arm, Left Updated: 01/22/25 1230     WBC 9.12 Thousand/uL      RBC 3.30 Million/uL      Hemoglobin 9.6 g/dL      Hematocrit 30.3 %      MCV 92 fL      MCH 29.1 pg      MCHC 31.7 g/dL      RDW 16.6 %      MPV 11.4 fL      Platelets 249 Thousands/uL      nRBC 0 /100 WBCs      Segmented % 75 %      Immature Grans % 0 %      Lymphocytes % 19 %      Monocytes % 6 %      Eosinophils Relative 0 %      Basophils Relative 0 %      Absolute Neutrophils 6.79 Thousands/µL      Absolute Immature Grans 0.04 Thousand/uL      Absolute Lymphocytes 1.69 Thousands/µL      Absolute Monocytes 0.56 Thousand/µL      Eosinophils Absolute 0.00 Thousand/µL      Basophils Absolute 0.04 Thousands/µL             CT head without contrast   Final Interpretation by Pallav N Shah, MD (01/22 1444)      No acute intracranial hemorrhage, mass effect or edema.      Stable chronic atrophy with asymmetric prominence of the right extra-axial spaces. Mild chronic microangiopathic changes.                  Resident: Mike Reardon I, the attending radiologist, have reviewed the images and agree with the final report above.      Workstation performed: [id number]         CT chest abdomen pelvis w contrast   Final Interpretation by Tim Larsen MD (01/22 1445)         1. Distended gallbladder without adjacent inflammatory changes possibly related to physiologic distention. Further clinical assessment advised.   2. 4.8 cm ascending aortic aneurysm redemonstrated. Nonemergent  cardiothoracic surgery surveillance is advised.               Workstation performed: [id number]         XR chest 1 view portable   Final Interpretation by Kendall Kumar MD (01/22 1442)      No focal airspace consolidation.            Workstation performed: [id number]             Procedures    ED Medication and Procedure Management   Prior to Admission Medications   Prescriptions Last Dose Informant Patient Reported? Taking?   Diclofenac Sodium (VOLTAREN) 1 %   No No   Sig: Apply 1 g topically 4 (four) times a day   Melatonin 5 MG TABS   No No   Sig: Take 2 tablets (10 mg total) by mouth daily at bedtime as needed (as needed for insomnia)   apixaban (Eliquis) 5 mg   No No   Sig: Take 1 tablet (5 mg total) by mouth 2 (two) times a day   cyclobenzaprine (FLEXERIL) 5 mg tablet   No No   Sig: Take 1 tablet (5 mg total) by mouth daily at bedtime   docusate sodium (COLACE) 100 mg capsule   No No   Sig: Take 1 capsule (100 mg total) by mouth 2 (two) times a day   escitalopram (LEXAPRO) 10 mg tablet   No No   Sig: Take 1 tablet (10 mg total) by mouth daily   gabapentin (NEURONTIN) 300 mg capsule   No No   Sig: Take 3 capsules (900 mg total) by mouth 3 (three) times a day   hydrocortisone 2.5 % cream   No No   Sig: Apply topically 2 (two) times a day As needed   lidocaine (LIDODERM) 5 %   No No   Sig: Apply 2 patches topically over 12 hours daily Remove & Discard patch within 12 hours or as directed by MD   metoprolol succinate (TOPROL-XL) 50 mg 24 hr tablet   No No   Sig: Take 1 tablet (50 mg total) by mouth 2 (two) times a day   oxyCODONE (ROXICODONE) 10 MG TABS   No No   Sig: Take 1 tablet (10 mg total) by mouth every 8 (eight) hours as needed for moderate pain Max Daily Amount: 30 mg   polyethylene glycol (MIRALAX) 17 g packet   No No   Sig: Take 17 g by mouth daily   senna (SENOKOT) 8.6 mg  Self No No   Sig: Take 1 tablet (8.6 mg total) by mouth daily at bedtime   zolpidem (AMBIEN) 5 mg tablet   No No   Sig: Take 1 tablet (5  mg total) by mouth daily at bedtime as needed for sleep      Facility-Administered Medications: None     Current Discharge Medication List        CONTINUE these medications which have NOT CHANGED    Details   apixaban (Eliquis) 5 mg Take 1 tablet (5 mg total) by mouth 2 (two) times a day  Qty: 90 tablet, Refills: 3    Associated Diagnoses: Paroxysmal atrial fibrillation (HCC)      cyclobenzaprine (FLEXERIL) 5 mg tablet Take 1 tablet (5 mg total) by mouth daily at bedtime  Qty: 30 tablet, Refills: 1    Associated Diagnoses: Muscle spasm      Diclofenac Sodium (VOLTAREN) 1 % Apply 1 g topically 4 (four) times a day  Qty: 20 g, Refills: 0    Associated Diagnoses: Spinal stenosis of lumbar region, unspecified whether neurogenic claudication present      docusate sodium (COLACE) 100 mg capsule Take 1 capsule (100 mg total) by mouth 2 (two) times a day  Qty: 90 capsule, Refills: 0    Associated Diagnoses: Drug-induced constipation      escitalopram (LEXAPRO) 10 mg tablet Take 1 tablet (10 mg total) by mouth daily  Qty: 90 tablet, Refills: 1    Associated Diagnoses: Depression with anxiety      gabapentin (NEURONTIN) 300 mg capsule Take 3 capsules (900 mg total) by mouth 3 (three) times a day  Qty: 270 capsule, Refills: 1    Associated Diagnoses: Musculoskeletal arm pain, right      hydrocortisone 2.5 % cream Apply topically 2 (two) times a day As needed  Qty: 28 g, Refills: 1    Associated Diagnoses: Eczema of right external ear      lidocaine (LIDODERM) 5 % Apply 2 patches topically over 12 hours daily Remove & Discard patch within 12 hours or as directed by MD  Qty: 10 patch, Refills: 0    Associated Diagnoses: Musculoskeletal arm pain, right      Melatonin 5 MG TABS Take 2 tablets (10 mg total) by mouth daily at bedtime as needed (as needed for insomnia)  Qty: 60 tablet, Refills: 0    Associated Diagnoses: Insomnia, unspecified type      metoprolol succinate (TOPROL-XL) 50 mg 24 hr tablet Take 1 tablet (50 mg total) by  mouth 2 (two) times a day  Qty: 180 tablet, Refills: 3    Associated Diagnoses: Paroxysmal atrial fibrillation (HCC)      oxyCODONE (ROXICODONE) 10 MG TABS Take 1 tablet (10 mg total) by mouth every 8 (eight) hours as needed for moderate pain Max Daily Amount: 30 mg  Qty: 90 tablet, Refills: 0    Comments: PLEASE SEND RX TO [facility] BECAUSE [facility] CANNOT FILL, THANK YOU  Associated Diagnoses: Myofascial pain syndrome      polyethylene glycol (MIRALAX) 17 g packet Take 17 g by mouth daily  Qty: 20 each, Refills: 1    Associated Diagnoses: Drug-induced constipation      senna (SENOKOT) 8.6 mg Take 1 tablet (8.6 mg total) by mouth daily at bedtime  Qty: 30 tablet, Refills: 3    Associated Diagnoses: Drug-induced constipation      zolpidem (AMBIEN) 5 mg tablet Take 1 tablet (5 mg total) by mouth daily at bedtime as needed for sleep  Qty: 30 tablet, Refills: 0    Associated Diagnoses: Insomnia, unspecified type           No discharge procedures on file.  ED SEPSIS DOCUMENTATION   Time reflects when diagnosis was documented in both MDM as applicable and the Disposition within this note       Time User Action Codes Description Comment    1/22/2025  1:52 PM Facchiano, Brooke Add [K92.1] Black stool     1/22/2025  3:30 PM Facchiano, Brooke Add [I48.91] Atrial fibrillation with rapid ventricular response (HCC)     1/22/2025  3:30 PM Facchiano, Brooke Add [E87.20] Lactic acidosis     1/22/2025  3:30 PM Facchiano, Brooke Modify [K92.1] Black stool     1/22/2025  3:30 PM Facchiano, Brooke Modify [I48.91] Atrial fibrillation with rapid ventricular response (HCC)     1/22/2025  3:32 PM Facchiano, Brooke Add [N17.9] MARU (acute kidney injury) (HCC)     1/22/2025  3:33 PM Facchiano, Brooke Add [A41.9] Sepsis (HCC)     1/22/2025  4:24 PM Pia Soto Add [D64.9] Acute on chronic anemia     1/23/2025  2:52 PM Alix Power [K92.1] Black stool     1/23/2025  2:52 PM Alix Power [I48.91] Atrial  fibrillation with rapid ventricular response ([facility])     1/23/2025  2:52 PM Alix Power Modify [E87.20] Lactic acidosis     1/23/2025  2:52 PM Alix Power Modify [N17.9] MARU (acute kidney injury) ([facility])     1/23/2025  2:52 PM Alix Power Modify [A41.9] Sepsis ([facility])     1/23/2025  2:52 PM Alix Power Modify [D64.9] Acute on chronic anemia            Initial Sepsis Screening       Row Name 01/22/25 9509                Is the patient's history suggestive of a new or worsening infection? Yes (Proceed)  -AF        Suspected source of infection suspect infection, source unknown  -AF        Indicate SIRS criteria --        Are two or more of the above signs & symptoms of infection both present and new to the patient? --                  User Key  (r) = Recorded By, (t) = Taken By, (c) = Cosigned By      Initials Name Provider Type    AF Brooke Sanchez MD Resident                       cyclobenzaprine (FLEXERIL) 5 mg tablet Take 1 tablet (5 mg total) by mouth daily at bedtime, Starting Fri 1/10/2025, Normal      Diclofenac Sodium (VOLTAREN) 1 % Apply 1 g topically 4 (four) times a day, Starting Fri 8/30/2024, Normal      docusate sodium (COLACE) 100 mg capsule Take 1 capsule (100 mg total) by mouth 2 (two) times a day, Starting Fri 8/30/2024, Normal      escitalopram (LEXAPRO) 10 mg tablet Take 1 tablet (10 mg total) by mouth daily, Starting Fri 1/10/2025, Normal      gabapentin (NEURONTIN) 300 mg capsule Take 3 capsules (900 mg total) by mouth 3 (three) times a day, Starting Fri 11/1/2024, Normal      hydrocortisone 2.5 % cream Apply topically 2 (two) times a day As needed, Starting Fri 8/2/2024, Normal      lidocaine (LIDODERM) 5 % Apply 2 patches topically over 12 hours daily Remove & Discard patch within 12 hours or as directed by MD, Starting Fri 8/30/2024, Normal      Melatonin 5 MG TABS Take 2 tablets (10 mg total) by mouth daily at bedtime as needed (as needed for insomnia), Starting Fri 8/30/2024, Normal      metoprolol succinate (TOPROL-XL) 50 mg 24 hr tablet Take 1 tablet (50 mg total) by mouth 2 (two) times a day, Starting Fri 8/30/2024, Normal      oxyCODONE (ROXICODONE) 10 MG TABS Take 1 tablet (10 mg total) by mouth every 8 (eight) hours as needed for moderate pain Max Daily Amount: 30 mg, Starting Tue 1/14/2025, Until Thu 2/13/2025 at 2359, Normal      polyethylene glycol (MIRALAX) 17 g packet Take 17 g by mouth daily, Starting Tue 8/6/2024, Normal      senna (SENOKOT) 8.6 mg Take 1 tablet (8.6 mg total) by mouth daily at bedtime, Starting Fri 10/1/2021, Normal      zolpidem (AMBIEN) 5 mg tablet Take 1 tablet (5 mg total) by mouth daily at bedtime as needed for sleep, Starting Fri 1/10/2025, Normal             ED SEPSIS DOCUMENTATION   Time reflects when diagnosis was documented in both MDM as applicable and the Disposition within this note       Time User Action Codes  Description Comment    1/22/2025  1:52 PM Facchiano, Brooke Add [K92.1] Black stool     1/22/2025  3:30 PM Facchiano, Brooke Add [I48.91] Atrial fibrillation with rapid ventricular response (HCC)     1/22/2025  3:30 PM Facchiano, Brooke Add [E87.20] Lactic acidosis     1/22/2025  3:30 PM Facchiano, Brooke Modify [K92.1] Black stool     1/22/2025  3:30 PM Facchiano, Brooke Modify [I48.91] Atrial fibrillation with rapid ventricular response (HCC)     1/22/2025  3:32 PM Facchiano, Brooke Add [N17.9] MARU (acute kidney injury) (HCC)     1/22/2025  3:33 PM Facchiano, Brooke Add [A41.9] Sepsis (HCC)     1/22/2025  4:24 PM EddiepPia Add [D64.9] Acute on chronic anemia     1/23/2025  2:52 PM Alix Power Modify [K92.1] Black stool     1/23/2025  2:52 PM SeniorAlix Modify [I48.91] Atrial fibrillation with rapid ventricular response (HCC)     1/23/2025  2:52 PM Alix Power Modify [E87.20] Lactic acidosis     1/23/2025  2:52 PM SeniorAlix Modify [N17.9] MARU (acute kidney injury) (HCC)     1/23/2025  2:52 PM Alix Power Modify [A41.9] Sepsis (HCC)     1/23/2025  2:52 PM Alix Power Modify [D64.9] Acute on chronic anemia     1/24/2025 11:39 AM Raz Dickens Modify [K92.1] Black stool     1/24/2025 11:39 AM Raz Dickens Modify [I48.91] Atrial fibrillation with rapid ventricular response (HCC)     1/24/2025 11:39 AM Raz Dickens Modify [E87.20] Lactic acidosis     1/24/2025 11:39 AM Raz Dickens Modify [N17.9] MARU (acute kidney injury) (HCC)     1/24/2025 11:39 AM Raz Dickens Modify [A41.9] Sepsis (HCC)     1/24/2025 11:39 AM Raz Dickens [D64.9] Acute on chronic anemia     1/24/2025 11:39 AM Raz Dickens [M79.601] Musculoskeletal arm pain, right     1/24/2025 11:44 AM Raz Dickens [F11.220] Opioid dependence with uncomplicated intoxication ([facility])     1/24/2025 11:44 AM Raz Dickens [K26.9] Duodenal ulcer     1/24/2025 11:44 AM Raz Dickens [I50.32]  Chronic heart failure with preserved ejection fraction (HCC)     1/24/2025 11:44 AM Raz Dickens Add [M62.838] Muscle spasm     1/24/2025 11:58 AM Raz Dickens Add [I71.21] Aneurysm of ascending aorta without rupture (HCC)            Initial Sepsis Screening       Row Name 01/22/25 5719                Is the patient's history suggestive of a new or worsening infection? Yes (Proceed)  -AF        Suspected source of infection suspect infection, source unknown  -AF        Indicate SIRS criteria --        Are two or more of the above signs & symptoms of infection both present and new to the patient? --                  User Key  (r) = Recorded By, (t) = Taken By, (c) = Cosigned By      Initials Name Provider Type    AF Brooke Sanchez MD Resident                       Brooke Sanchez MD  01/27/25 [id number]

## 2025-01-24 NOTE — ASSESSMENT & PLAN NOTE
This is a 79-year-old female with history of A-fib on Eliquis who presented via EMS after a fall unwitnessed head strike.  Found by EMS unable to get up.  Found to be hypothermic placed on Amanda hugger in ED CMP remarkable for MARU on CKD.  CBC showing stable hemoglobin in the eights however repeat by i-STAT showed decreased to 7 so in the setting of melena provided to ED physician she was transfused with 2 units of packed red blood cells.  Hemodynamics remained stable.  There is a brief episode of hypotension requiring levo however patient responded with profound hypotension requiring monitoring off.  Procalcitonin normal however in the setting of hypothermia tachycardia A-fib with RVR septic workup was started.  Lactic acid 4.4 procalcitonin normal chest x-ray normal CT chest abdomen pelvis showing stable known aortic aneurysm and distended gallbladder otherwise normal.  CT head without any acute abnormalities.  Was not rate controlled and given 2 L of fluid bolus and corrected out of A-fib to sinus after that.    -Continue cefepime renally dosed to creatinine clearance every 12 hours 1 g   -Blood cultures collected follow-up  -Urine collected urinalysis normal will not reflex to culture this is not a source of a potential infection  -In the setting of negative Pro-Srini unlikely to be sepsis picture is likely hypovolemia driving lactic acidosis and reactive A-fib with RVR now corrected with fluid repletion.  Will further hydrate   -Check BNP>>> slight elevation but likely due to acidosis and afib rvr. Do not suspect fluid overload. Will check echo to be throrough>>>> echo WNL.  - blood cultures negative 24 hours

## 2025-01-24 NOTE — ASSESSMENT & PLAN NOTE
MARU on CKD stage III.  Patient has a history of low fluid intake in the outpatient setting.  This is likely etiology of her prerenal azotemia and MARU.  Will likely correct with fluid repletion.  Will place on 125 cc/h of fluid repletion after 2 L bolus in ED.  2 L of blood are also running at the time my exam and will also help with her fluid repletion    - switch fluids to isolyte. Hyperchloremic in BMP this AM 1/23>>> 1/24 downtrend 113 to 111 chloride   - renal function markedly improved with overnight UV hydration. Continues to improve 1/24

## 2025-01-24 NOTE — QUICK NOTE
Patient seen at bedside tonight.  Not in any acute distress.  Denies any concerns.  On exam patient looks clinically improved from yesterday.  No concerns per nursing staff

## 2025-01-24 NOTE — PROGRESS NOTES
Patient:    MRN:  [id number]    Christiano Request ID:  [id number]    Level of care reserved:    Partner Reserved:    Clinical needs requested:    Geography searched:  [phone]    Start of Service:    Request sent:  12:14pm EST on 1/24/2025 by Graciela Mansfield    Partner reserved:  by Graciela Mansfield    Choice list shared:  1:52pm EST on 1/24/2025 by Graciela Mansfield

## 2025-01-24 NOTE — ASSESSMENT & PLAN NOTE
Rapid A-fib with RVR on admission.  A-fib is not a new problem for her she has had it for many years takes Eliquis 5 mg twice daily as outpatient.  Patient has a history of poor p.o. intake from a volume status on an outpatient basis.  This likely exacerbated her A-fib and with fluid repletion she corrected to normal sinus.    At the time of my exam she had received 2 L of fluids and she was in normal sinus rhythm.  She did not need to be rate controlled in the ED and broke rhythm on her own back to sinus.    -Resume eliquis 1/24. RRR on todays exam   -Continue home Toprol-XL  -Continue to monitor hemodynamics  -If back of the A-fib can add on as needed Lopressor IV  -Telemetry

## 2025-01-24 NOTE — ASSESSMENT & PLAN NOTE
Chronically anemic and outpatient.  Concerns for i-STAT hemoglobin less than 7 today in the setting of history provided by patient stating she had dark melenic stools prompting administration of 2 units of packed red blood cells.    GI was consulted who was not convinced the patient was having an acute GI bleed.  Hemodynamics were stable  .  They recommended n.p.o. at midnight and clears for now for EGD scope today.  Patient will also likely require colonoscopy.    -sp 2 units prbc  - no further evidence of bleeding. Morning hg remains stable   -Monitor vitals and for any acute signs of hypotension or bright red blood per rectum.  -Avoid NSAIDs  -Anemia is chronic to her kidney disease    1/24: yesterday went for EGD, found duodenal ulcer but not bleeding. Need to avoid all future NSAIDS. Will DC with oral PPI. Resumed eliquis yesterday and diet per GI. Hg stable today. No further blood products

## 2025-01-24 NOTE — MALNUTRITION/BMI
This medical record reflects one or more clinical indicators suggestive of malnutrition.    Malnutrition Findings:   Adult Malnutrition type: Chronic illness  Adult Degree of Malnutrition: Malnutrition of moderate degree  Malnutrition Characteristics: Fat loss, Muscle loss, Inadequate energy                360 Statement: moderate malnutrition r/t chronic illness as evidenced by PO intake <75% estimated needs for >1 month, Mild-moderate muscle loss around clavicles and in temporal region, mild buccal and orbital fat pad loss. Treatment: oral diet and oral nutrition supplements    BMI Findings:           Body mass index is 27.96 kg/m².     See Nutrition note dated 1/23/25 for additional details.  Completed nutrition assessment is viewable in the nutrition documentation.

## 2025-01-24 NOTE — PLAN OF CARE
Problem: PAIN - ADULT  Goal: Verbalizes/displays adequate comfort level or baseline comfort level  Description: Interventions:  - Encourage patient to monitor pain and request assistance  - Assess pain using appropriate pain scale  - Administer analgesics based on type and severity of pain and evaluate response  - Implement non-pharmacological measures as appropriate and evaluate response  - Consider cultural and social influences on pain and pain management  - Notify physician/advanced practitioner if interventions unsuccessful or patient reports new pain  Outcome: Progressing     Problem: INFECTION - ADULT  Goal: Absence or prevention of progression during hospitalization  Description: INTERVENTIONS:  - Assess and monitor for signs and symptoms of infection  - Monitor lab/diagnostic results  - Monitor all insertion sites, i.e. indwelling lines, tubes, and drains  - Monitor endotracheal if appropriate and nasal secretions for changes in amount and color  - [place] appropriate cooling/warming therapies per order  - Administer medications as ordered  - Instruct and encourage patient and family to use good hand hygiene technique  - Identify and instruct in appropriate isolation precautions for identified infection/condition  Outcome: Progressing  Goal: Absence of fever/infection during neutropenic period  Description: INTERVENTIONS:  - Monitor WBC    Outcome: Progressing     Problem: SAFETY ADULT  Goal: Patient will remain free of falls  Description: INTERVENTIONS:  - Educate patient/family on patient safety including physical limitations  - Instruct patient to call for assistance with activity   - Consult OT/PT to assist with strengthening/mobility   - Keep Call bell within reach  - Keep bed low and locked with side rails adjusted as appropriate  - Keep care items and personal belongings within reach  - Initiate and maintain comfort rounds  - Make Fall Risk Sign visible to staff  - Offer Toileting every 2 Hours,  in advance of need  - Initiate/Maintain alarm  - Obtain necessary fall risk management equipment:   - Apply yellow socks and bracelet for high fall risk patients  - Consider moving patient to room near nurses station  Outcome: Progressing  Goal: Maintain or return to baseline ADL function  Description: INTERVENTIONS:  -  Assess patient's ability to carry out ADLs; assess patient's baseline for ADL function and identify physical deficits which impact ability to perform ADLs (bathing, care of mouth/teeth, toileting, grooming, dressing, etc.)  - Assess/evaluate cause of self-care deficits   - Assess range of motion  - Assess patient's mobility; develop plan if impaired  - Assess patient's need for assistive devices and provide as appropriate  - Encourage maximum independence but intervene and supervise when necessary  - Involve family in performance of ADLs  - Assess for home care needs following discharge   - Consider OT consult to assist with ADL evaluation and planning for discharge  - Provide patient education as appropriate  Outcome: Progressing  Goal: Maintains/Returns to pre admission functional level  Description: INTERVENTIONS:  - Perform AM-PAC 6 Click Basic Mobility/ Daily Activity assessment daily.  - Set and communicate daily mobility goal to care team and patient/family/caregiver.   - Collaborate with rehabilitation services on mobility goals if consulted  - Perform Range of Motion 2 times a day.  - Reposition patient every 2 hours.  - Dangle patient 2 times a day  - Stand patient 2 times a day  - Ambulate patient 2 times a day  - Out of bed to chair 2 times a day   - Out of bed for meals 2 times a day  - Out of bed for toileting  - Record patient progress and toleration of activity level   Outcome: Progressing     Problem: DISCHARGE PLANNING  Goal: Discharge to home or other facility with appropriate resources  Description: INTERVENTIONS:  - Identify barriers to discharge w/patient and caregiver  -  Arrange for needed discharge resources and transportation as appropriate  - Identify discharge learning needs (meds, wound care, etc.)  - Arrange for interpretive services to assist at discharge as needed  - Refer to Case Management Department for coordinating discharge planning if the patient needs post-hospital services based on physician/advanced practitioner order or complex needs related to functional status, cognitive ability, or social support system  Outcome: Progressing     Problem: Knowledge Deficit  Goal: Patient/family/caregiver demonstrates understanding of disease process, treatment plan, medications, and discharge instructions  Description: Complete learning assessment and assess knowledge base.  Interventions:  - Provide teaching at level of understanding  - Provide teaching via preferred learning methods  Outcome: Progressing     Problem: Prexisting or High Potential for Compromised Skin Integrity  Goal: Skin integrity is maintained or improved  Description: INTERVENTIONS:  - Identify patients at risk for skin breakdown  - Assess and monitor skin integrity  - Assess and monitor nutrition and hydration status  - Monitor labs   - Assess for incontinence   - Turn and reposition patient  - Assist with mobility/ambulation  - Relieve pressure over bony prominences  - Avoid friction and shearing  - Provide appropriate hygiene as needed including keeping skin clean and dry  - Evaluate need for skin moisturizer/barrier cream  - Collaborate with interdisciplinary team   - Patient/family teaching  - Consider wound care consult   Outcome: Progressing     Problem: Nutrition/Hydration-ADULT  Goal: Nutrient/Hydration intake appropriate for improving, restoring or maintaining nutritional needs  Description: Monitor and assess patient's nutrition/hydration status for malnutrition. Collaborate with interdisciplinary team and initiate plan and interventions as ordered.  Monitor patient's weight and dietary intake as  ordered or per policy. Utilize nutrition screening tool and intervene as necessary. Determine patient's food preferences and provide high-protein, high-caloric foods as appropriate.     INTERVENTIONS:  - Monitor oral intake, urinary output, labs, and treatment plans  - Assess nutrition and hydration status and recommend course of action  - Evaluate amount of meals eaten  - Assist patient with eating if necessary   - Allow adequate time for meals  - Recommend/ encourage appropriate diets, oral nutritional supplements, and vitamin/mineral supplements  - Order, calculate, and assess calorie counts as needed  - Recommend, monitor, and adjust tube feedings and TPN/PPN based on assessed needs  - Assess need for intravenous fluids  - Provide specific nutrition/hydration education as appropriate  - Include patient/family/caregiver in decisions related to nutrition  Outcome: Progressing

## 2025-01-24 NOTE — NURSING NOTE
AVS reviewed with patient including medication changes, incidental findings, and follow up instructions. Patient verbalizes understanding.

## 2025-01-24 NOTE — PROGRESS NOTES
Progress Note - Family Medicine   Name: Lisa Marques 79 y.o. female I MRN: [id number]  Unit/Bed#: [facility] 423-01 I Date of Admission: 1/22/2025   Date of Service: 1/24/2025 I Hospital Day: 2     Assessment & Plan  Fall  This is a 79-year-old female with history of A-fib on Eliquis who presented via EMS after a fall unwitnessed head strike.  Found by EMS unable to get up.  Found to be hypothermic placed on Amanda hugger in ED CMP remarkable for MARU on CKD.  CBC showing stable hemoglobin in the eights however repeat by i-STAT showed decreased to 7 so in the setting of melena provided to ED physician she was transfused with 2 units of packed red blood cells.  Hemodynamics remained stable.  There is a brief episode of hypotension requiring levo however patient responded with profound hypotension requiring monitoring off.  Procalcitonin normal however in the setting of hypothermia tachycardia A-fib with RVR septic workup was started.  Lactic acid 4.4 procalcitonin normal chest x-ray normal CT chest abdomen pelvis showing stable known aortic aneurysm and distended gallbladder otherwise normal.  CT head without any acute abnormalities.  Was not rate controlled and given 2 L of fluid bolus and corrected out of A-fib to sinus after that.    -Continue cefepime renally dosed to creatinine clearance every 12 hours 1 g   -Blood cultures collected follow-up  -Urine collected urinalysis normal will not reflex to culture this is not a source of a potential infection  -In the setting of negative Pro-Srini unlikely to be sepsis picture is likely hypovolemia driving lactic acidosis and reactive A-fib with RVR now corrected with fluid repletion.  Will further hydrate   -Check BNP>>> slight elevation but likely due to acidosis and afib rvr. Do not suspect fluid overload. Will check echo to be throrough>>>> echo WNL.  - blood cultures negative 24 hours   MARU (acute kidney injury) (HCC)  MARU on CKD stage III.  Patient has a history of low fluid  intake in the outpatient setting.  This is likely etiology of her prerenal azotemia and MARU.  Will likely correct with fluid repletion.  Will place on 125 cc/h of fluid repletion after 2 L bolus in ED.  2 L of blood are also running at the time my exam and will also help with her fluid repletion    - switch fluids to isolyte. Hyperchloremic in BMP this AM 1/23>>> 1/24 downtrend 113 to 111 chloride   - renal function markedly improved with overnight UV hydration. Continues to improve 1/24  Chronic pain  Takes oxycodone 10 mg every 8 hours at home.  She has been on this dose for many years.    Continue home oxycodone  Anxiety  Continue home Lexapro  Aortic aneurysm (HCC)  There is a known 4.8 cm aortic aneurysm.  Seen on CT chest.  Recommend nonurgent outpatient CT surgical monitoring.  No concerns this hospitalization  Insomnia  Continue home Ambien 5 mg.  This is recently cut by her PCP from 10 mg.  The patient does have a history of trouble sleeping and takes multiple 10 mg melatonin tablets to sleep.    Continue 5 mg Ambien and melatonin at night    Can consider adding on mirtazapine or trazodone rather than uptitrating Ambien if remains an issue  Constipation  Continue home constipation medication  Acute on chronic anemia  Chronically anemic and outpatient.  Concerns for i-STAT hemoglobin less than 7 today in the setting of history provided by patient stating she had dark melenic stools prompting administration of 2 units of packed red blood cells.    GI was consulted who was not convinced the patient was having an acute GI bleed.  Hemodynamics were stable  .  They recommended n.p.o. at midnight and clears for now for EGD scope today.  Patient will also likely require colonoscopy.    -sp 2 units prbc  - no further evidence of bleeding. Morning hg remains stable   -Monitor vitals and for any acute signs of hypotension or bright red blood per rectum.  -Avoid NSAIDs  -Anemia is chronic to her kidney disease    1/24:  yesterday went for EGD, found duodenal ulcer but not bleeding. Need to avoid all future NSAIDS. Will DC with oral PPI. Resumed eliquis yesterday and diet per GI. Hg stable today. No further blood products       Atrial fibrillation (HCC)  Rapid A-fib with RVR on admission.  A-fib is not a new problem for her she has had it for many years takes Eliquis 5 mg twice daily as outpatient.  Patient has a history of poor p.o. intake from a volume status on an outpatient basis.  This likely exacerbated her A-fib and with fluid repletion she corrected to normal sinus.    At the time of my exam she had received 2 L of fluids and she was in normal sinus rhythm.  She did not need to be rate controlled in the ED and broke rhythm on her own back to sinus.    -Resume eliquis 1/24. RRR on todays exam   -Continue home Toprol-XL  -Continue to monitor hemodynamics  -If back of the A-fib can add on as needed Lopressor IV  -Telemetry  Family history of colon cancer  Per GI HPI.  Apparently young family history of colon cancer.  Patient's PCP has been trying to get her colonoscopy for many months but she has been overtly refusing and not even amenable to Cologuard or FIT testing.    -outpatient colonoscopy per GI   Excessive use of nonsteroidal anti-inflammatory drugs (NSAIDs)  Patient has a history of melena in the outpatient setting.  She is voices to her PCP many times.  She takes chronic NSAIDs although has been told not to by her PCP for aches and pains.    - EGD found duodenal ulcer. Cease all NSAID use   Moderate protein-calorie malnutrition (HCC)  Malnutrition Findings:   Adult Malnutrition type: Chronic illness  Adult Degree of Malnutrition: Malnutrition of moderate degree  Malnutrition Characteristics: Fat loss, Muscle loss, Inadequate energy                Ensure daily  360 Statement: moderate malnutrition r/t chronic illness as evidenced by PO intake <75% estimated needs for >1 month, Mild-moderate muscle loss around clavicles  "and in temporal region, mild buccal and orbital fat pad loss. Treatment: oral diet and oral nutrition supplements    BMI Findings:           Body mass index is 27.96 kg/m².     Duodenal ulcer  Found yesterday by GI on EGD See above. Continuing PPI. Cease all daily NSAID use.     24 Hour Events : difficulty sleeping overnight. Otherwise ok  Subjective : \"I want to go home\"    Objective :  Temp:  [96.3 °F (35.7 °C)-98.1 °F (36.7 °C)] 97.6 °F (36.4 °C)  HR:  [58-78] 61  BP: (120-161)/(66-93) 132/82  Resp:  [17-20] 18  SpO2:  [92 %-100 %] 94 %  O2 Device: None (Room air)    Physical Exam  Constitutional:       General: She is not in acute distress.  HENT:      Head: Normocephalic and atraumatic.      Right Ear: External ear normal.      Left Ear: External ear normal.      Nose: Nose normal.   Cardiovascular:      Rate and Rhythm: Normal rate and regular rhythm.      Pulses: Normal pulses.      Heart sounds: Normal heart sounds.   Pulmonary:      Effort: Pulmonary effort is normal.      Breath sounds: Normal breath sounds.   Abdominal:      Palpations: Abdomen is soft.   Musculoskeletal:      Cervical back: Normal range of motion.   Skin:     General: Skin is warm and dry.      Capillary Refill: Capillary refill takes less than 2 seconds.   Neurological:      Mental Status: She is alert. Mental status is at baseline.   Psychiatric:         Mood and Affect: Mood normal.         Behavior: Behavior normal.         Thought Content: Thought content normal.         Judgment: Judgment normal.         Lab Results: I have reviewed the following results:reviewed    VTE Pharmacologic Prophylaxis: VTE covered by:  apixaban, Oral, 5 mg at 01/24/25 0859     VTE Mechanical Prophylaxis: sequential compression device    Raz Dickens DO  PGY-2 [facility]   01/24/25, 9:04 AM    "

## 2025-01-24 NOTE — DISCHARGE SUMMARY
Discharge Summary - Family Medicine   Name: Lisa Marques 79 y.o. female I MRN: [id number]  Unit/Bed#: [facility] 423-01 I Date of Admission: 1/22/2025   Date of Service: 1/24/2025 I Hospital Day: 2    Admission Date: 1/22/2025 1136  Discharge Date: 01/24/25  Admitting Diagnosis: Lactic acidosis [E87.20]  Rapid heart rate [R00.0]  Black stool [K92.1]  MARU (acute kidney injury) (HCC) [N17.9]  Atrial fibrillation with rapid ventricular response (HCC) [I48.91]  Sepsis (HCC) [A41.9]  Acute on chronic anemia [D64.9]      Discharge Diagnosis:   Medical Problems       Resolved Problems  Date Reviewed: 11/19/2024          Resolved    Hypovolemic shock (HCC) 1/23/2025     Resolved by  Raz Dickens DO          HPI: 79-year-old female history of A-fib on Eliquis presenting after fall.  It was an unwitnessed fall with unknown head strike.  She was evaluated by EMS and transported to the hospital.  EKG showing A-fib with RVR initially which is known for the patient she was given fluid resuscitation due to hypothermia and tachycardia meeting sepsis criteria.  Blood cultures were collected and cefepime was started for broad coverage.  The patient corrected out of A-fib to sinus after that.  Procalcitonin normal.  Lactic acidosis of 4.4.  Blood gas showing pCO2 in the 20s normal pH 7.3 reassessment with later blood gas showing pH 7.2 and correction of pCO2 to the 40s.  Patient was given 2 units of packed red blood cells after voicing she has had recent melanotic stools.  She was evaluated by the GI service who recommended n.p.o. at midnight with EGD tomorrow and holding Eliquis and continuing clear diet tonight.  He required brief pressors for an isolated episode of hypotension but quickly corrected to profound hypertension after so she was immediately taken off of pressors.  UA within normal limits CT chest abdomen pelvis showing distended gallbladder and known aortic aneurysm 4.8 cm CT head within normal limits 2-hour lactic acid  pending.     On exam, the patient is alert and oriented and recognizes me as her PCP.  She states she is in pain and asking for her pain medications.  Otherwise, she feels well she has no concerns does not endorse any abdominal tenderness denies shortness of breath chest pain swelling in her legs dyspnea on exertion.    Procedures Performed:   Orders Placed This Encounter   Procedures    Critical Care    POC Cardiac US       Summary of Hospital Course:     79-year-old female admitted to hospital secondary to fall brought in by EMS concern hypothermia sepsis unknown head strike so underwent CT head which was normal and CTAP noo acute abnormalities blood cultures drawn procalcitonin normal started on cefepime.  Also was found to have an MARU on admission as well as rapid A-fib.  The patient was not rate controlled with IV medicines but was rather fluid repleted with 2 L normal saline in the ED. Afib then resolved. Initial lactic acidosis of 4.4 subsequently resolved after fluid repletion.  The patient endorsed some melanotic stools so GI was consulted and i-STAT hemoglobin around 7 prompted 2 packed red blood cell transfusions.  Evaluate further inpatient by GI team who recommended EGD performed on 1/23 showing nonbleeding duodenal ulcer recommended daily PPI therapy and cessation of all NSAID therapy which the patient has been taking. her renal function resolved and her MARU resolved at the time of her discharge.  Her blood cultures were negative at 24 hours and her urinalysis was negative.  She had no further fevers or signs of hemodynamic instability while in the hospital.  She will be discharged home with instructions to hydrate and follow-up with family doctor.  She was not sent home with antibiotics as do not believe an infectious source was the cause of her lactic acidosis and believed it was hypovolemia induced.  She should have an outpatient colonoscopy with GI services as well as should follow-up with CT  surgeons for her known aortic aneurysm to 4.8 cm.  She does see a cardiologist and on her follow-up with them they can connect her with CT surgery if necessary.  I have placed a new referral just in case.  She should follow-up with her family doctor in 1 week.    Significant Findings, Care, Treatment and Services Provided: duodenal ulcer     Complications: none    Condition at Discharge: stable       Discharge instructions/Information to patient and family:   See After Visit Summary (AVS) for information provided to patient and family.      Provisions for Follow-Up Care:  See after visit summary for information related to follow-up care and any pertinent home health orders.      PCP: Raz Dickens DO    Disposition: Home    Planned Readmission: No     Discharge Medications:  See after visit summary for reconciled discharge medications provided to patient and family.      Discharge Statement:  I have spent a total time of 30 minutes in caring for this patient on the day of the visit/encounter.     Raz Dickens DO  PGY-2 Family Medicine - [place]   01/24/25, 12:06 PM

## 2025-01-24 NOTE — ASSESSMENT & PLAN NOTE
Patient has a history of melena in the outpatient setting.  She is voices to her PCP many times.  She takes chronic NSAIDs although has been told not to by her PCP for aches and pains.    - EGD found duodenal ulcer. Cease all NSAID use

## 2025-01-24 NOTE — DISCHARGE INSTR - AVS FIRST PAGE
You are hospitalized for dehydration and concerns for an infection however you did not end up having an infection and do not need any antibiotics going home.  Your blood cultures were negative for any bacteria in your urine did not indicate infection.    Please drink more water at home.    You were evaluated by the GI doctors here in the hospital and they did a EGD study yesterday showing a duodenal ulcer likely due to too much Aleve and other NSAIDs at home.  Please stop taking any and all NSAIDs.  This includes ibuprofen Aleve naproxen Advil.  Please do not take any of these medications.  Tylenol is okay.  I have sent some Tylenol to the pharmacy for you to get before you leave the hospital today.    Please start taking Protonix which is a new medicine for you please take 1 pill in the morning before food every day for the next few months before you follow-up with the GI doctors outpatient or your family doctor.    Also see your family doctor within 1 to 2 weeks of discharge.    It also appears that a pulmonology referral was placed at the time of your discharge.  Please follow-up in the office with them on an outpatient basis    You also have an aortic aneurysm which is stable.  Please follow-up with a cardiologist about plans moving forward about addressing this.

## 2025-01-24 NOTE — ASSESSMENT & PLAN NOTE
Malnutrition Findings:   Adult Malnutrition type: Chronic illness  Adult Degree of Malnutrition: Malnutrition of moderate degree  Malnutrition Characteristics: Fat loss, Muscle loss, Inadequate energy                Ensure daily  360 Statement: moderate malnutrition r/t chronic illness as evidenced by PO intake <75% estimated needs for >1 month, Mild-moderate muscle loss around clavicles and in temporal region, mild buccal and orbital fat pad loss. Treatment: oral diet and oral nutrition supplements    BMI Findings:           Body mass index is 27.96 kg/m².

## 2025-01-24 NOTE — ASSESSMENT & PLAN NOTE
Per GI HPI.  Apparently young family history of colon cancer.  Patient's PCP has been trying to get her colonoscopy for many months but she has been overtly refusing and not even amenable to Cologuard or FIT testing.    -outpatient colonoscopy per GI

## 2025-01-24 NOTE — INCIDENTAL FINDINGS
The following findings require follow up:  Radiographic finding   Finding: Ascending aortic aneurysm measuring 4.8 cm.  Unchanged from prior   Follow up required: Should follow-up with CT surgery outpatient nonurgently   Follow up should be done within 1 month(s)    Please notify the following clinician to assist with the follow up:   Dr. Dickens    Incidental finding results were discussed with the Patient by Raz Dickens DO on 01/24/25.   They expressed understanding and all questions answered.

## 2025-01-25 ENCOUNTER — HOME CARE VISIT (OUTPATIENT)
Dept: HOME HEALTH SERVICES | Facility: HOME HEALTHCARE | Age: 80
End: 2025-01-25
Payer: COMMERCIAL

## 2025-01-25 ENCOUNTER — HOME CARE VISIT (OUTPATIENT)
Dept: HOME HEALTH SERVICES | Facility: HOME HEALTHCARE | Age: 80
End: 2025-01-25

## 2025-01-25 VITALS
SYSTOLIC BLOOD PRESSURE: 110 MMHG | HEART RATE: 68 BPM | RESPIRATION RATE: 16 BRPM | DIASTOLIC BLOOD PRESSURE: 76 MMHG | TEMPERATURE: 97.1 F | OXYGEN SATURATION: 95 %

## 2025-01-25 PROCEDURE — 400013 VN SOC

## 2025-01-25 PROCEDURE — G0299 HHS/HOSPICE OF RN EA 15 MIN: HCPCS

## 2025-01-26 LAB
BACTERIA BLD CULT: NORMAL
BACTERIA BLD CULT: NORMAL

## 2025-01-27 ENCOUNTER — RESULTS FOLLOW-UP (OUTPATIENT)
Dept: GASTROENTEROLOGY | Facility: CLINIC | Age: 80
End: 2025-01-27

## 2025-01-27 ENCOUNTER — HOME CARE VISIT (OUTPATIENT)
Dept: HOME HEALTH SERVICES | Facility: HOME HEALTHCARE | Age: 80
End: 2025-01-27

## 2025-01-27 ENCOUNTER — TRANSITIONAL CARE MANAGEMENT (OUTPATIENT)
Dept: FAMILY MEDICINE CLINIC | Facility: CLINIC | Age: 80
End: 2025-01-27

## 2025-01-27 DIAGNOSIS — Z71.89 COMPLEX CARE COORDINATION: Primary | ICD-10-CM

## 2025-01-27 DIAGNOSIS — Z78.9 NEED FOR FOLLOW-UP BY SOCIAL WORKER: Primary | ICD-10-CM

## 2025-01-27 LAB
BACTERIA BLD CULT: NORMAL
BACTERIA BLD CULT: NORMAL

## 2025-01-27 PROCEDURE — 88305 TISSUE EXAM BY PATHOLOGIST: CPT | Performed by: STUDENT IN AN ORGANIZED HEALTH CARE EDUCATION/TRAINING PROGRAM

## 2025-01-27 NOTE — UTILIZATION REVIEW
NOTIFICATION OF ADMISSION DISCHARGE   This is a Notification of Discharge from [facility]. Please be advised that this patient has been discharge from our facility. Below you will find the admission and discharge date and time including the patient’s disposition.   UTILIZATION REVIEW CONTACT:  Mahesh Nayak  Utilization   Network Utilization Review Department  Phone: [phone] x carefully listen to the prompts. All voicemails are confidential.  Email: [email or web address]     ADMISSION INFORMATION  PRESENTATION DATE: 1/22/2025 11:36 AM  OBERVATION ADMISSION DATE: N/A  INPATIENT ADMISSION DATE: 1/22/25  3:34 PM   DISCHARGE DATE: 1/24/2025  4:04 PM   DISPOSITION:Home with Home Health Care    Network Utilization Review Department  ATTENTION: Please call with any questions or concerns to [phone] and carefully listen to the prompts so that you are directed to the right person. All voicemails are confidential.   For Discharge needs, contact Care Management DC Support Team at [phone] opt. 2  Send all requests for admission clinical reviews, approved or denied determinations and any other requests to dedicated fax number below belonging to the campus where the patient is receiving treatment. List of dedicated fax numbers for the Facilities:  FACILITY NAME UR FAX NUMBER   ADMISSION DENIALS (Administrative/Medical Necessity) [phone]   DISCHARGE SUPPORT TEAM ([facility]) [phone]   PARENT CHILD HEALTH (Maternity/NICU/Pediatrics) [phone]   [facility] [phone]   [facility] [phone]   Critical access hospital [phone]   [facility] [phone]   [facility] [phone]   [facility] [phone]   [facility] [phone]   [facility]  [phone]   [facility] [phone]   [facility] [phone]   [facility] [phone]   [facility] [phone]

## 2025-01-28 ENCOUNTER — TELEPHONE (OUTPATIENT)
Dept: GASTROENTEROLOGY | Facility: CLINIC | Age: 80
End: 2025-01-28

## 2025-01-28 ENCOUNTER — HOME CARE VISIT (OUTPATIENT)
Dept: HOME HEALTH SERVICES | Facility: HOME HEALTHCARE | Age: 80
End: 2025-01-28

## 2025-01-28 NOTE — ANESTHESIA POSTPROCEDURE EVALUATION
Post-Op Assessment Note    CV Status:  Stable    Pain management: adequate       Mental Status:  Alert   Hydration Status:  Stable   PONV Controlled:  None   Airway Patency:  Patent     Post Op Vitals Reviewed: Yes    No anethesia notable event occurred.    Staff: Anesthesiologist           Last Filed PACU Vitals:  Vitals Value Taken Time   Temp 96.5 °F (35.8 °C) 01/23/25 1457   Pulse 58 01/23/25 1513   /80 01/23/25 1513   Resp 18 01/23/25 1513   SpO2 99 % 01/23/25 1513       Modified Azalia:     Vitals Value Taken Time   Activity 2 01/23/25 1513   Respiration 2 01/23/25 1513   Circulation 2 01/23/25 1513   Consciousness 1 01/23/25 1513   Oxygen Saturation 2 01/23/25 1513     Modified Azalia Score: 9

## 2025-01-28 NOTE — TELEPHONE ENCOUNTER
----- Message from Gladys CHUN sent at 1/24/2025  9:15 AM EST -----  Regarding: FW: Hospital Follow Up    ----- Message -----  From: Pia Soto DO  Sent: 1/23/2025   5:30 PM EST  To: #  Subject: Hospital Follow Up                               Hello,    Patient was seen during recent [facility] hospitalization.  She will require outpatient GI follow-up in the next 6 to 8 weeks.  Please call patient to schedule.  Thank you.    Pia Soto DO, PGY-5  [facility] Gastroenterology Fellow

## 2025-01-29 ENCOUNTER — PATIENT OUTREACH (OUTPATIENT)
Dept: FAMILY MEDICINE CLINIC | Facility: CLINIC | Age: 80
End: 2025-01-29

## 2025-01-29 ENCOUNTER — HOME CARE VISIT (OUTPATIENT)
Dept: HOME HEALTH SERVICES | Facility: HOME HEALTHCARE | Age: 80
End: 2025-01-29
Payer: COMMERCIAL

## 2025-01-29 VITALS
OXYGEN SATURATION: 97 % | HEART RATE: 84 BPM | SYSTOLIC BLOOD PRESSURE: 136 MMHG | RESPIRATION RATE: 20 BRPM | TEMPERATURE: 98.6 F | DIASTOLIC BLOOD PRESSURE: 90 MMHG

## 2025-01-29 PROCEDURE — G0299 HHS/HOSPICE OF RN EA 15 MIN: HCPCS

## 2025-01-29 NOTE — PROGRESS NOTES
Outpatient RN Care Manager Note    RN VERONICA received IB HRR patient referral for complex care management patient outreach. Patient was admitted from  1/22 to 1/24 with primary diagnosis of fall.  Patient has medical history of AF, acute on chronic anemia, moderate protein calorie malnutrition, duodenal ulcer aortic aneurysm, anxiety and chronic pain.      Referral received. Chart review completed for the following sections:  Recent Vital Signs  Allergies/Contradictions  Medication Review   History   SDOH   Problem List  Immunizations  Past hospitalizations and major procedures, including surgery  Significant past illnesses and treatment history including: History and Physical, Other provider notes, PT, OT, ST, and Medications/Infusions/Transfusions  Relevant past medications related to the patient's condition       Discharge referrals to Cardiology and Pulmonology.  No appointments to date.  Patient discharged with [facility].  SN began SOC on 1/25/25.  Patient was contacted by OT on 1/28 and patient declined service. Patient was contacted by PT on 1/28 with no answer and message left to reschedule.    Called patient with no answer. Left message, this is Sandi the Outpatient Nurse Care Manager at [facility] FP office calling to follow up with you. Requested return phone call at [phone].

## 2025-01-30 ENCOUNTER — PATIENT OUTREACH (OUTPATIENT)
Dept: FAMILY MEDICINE CLINIC | Facility: CLINIC | Age: 80
End: 2025-01-30

## 2025-01-30 ENCOUNTER — HOME CARE VISIT (OUTPATIENT)
Dept: HOME HEALTH SERVICES | Facility: HOME HEALTHCARE | Age: 80
End: 2025-01-30
Payer: COMMERCIAL

## 2025-01-30 VITALS
SYSTOLIC BLOOD PRESSURE: 132 MMHG | HEART RATE: 66 BPM | OXYGEN SATURATION: 96 % | TEMPERATURE: 98.4 F | DIASTOLIC BLOOD PRESSURE: 90 MMHG

## 2025-01-30 PROCEDURE — G0151 HHCP-SERV OF PT,EA 15 MIN: HCPCS

## 2025-01-30 NOTE — PROGRESS NOTES
Outpatient RN Care Manager Note    RN CM received IB HRR patient referral for complex care management patient outreach. Patient was admitted from  1/22 to 1/24 with primary diagnosis of fall.  Patient has medical history of AF, acute on chronic anemia, moderate protein calorie malnutrition, duodenal ulcer aortic aneurysm, anxiety and chronic pain.       Referral received. Chart review completed for the following sections:  Recent Vital Signs  Allergies/Contradictions  Medication Review   History   SDOH   Problem List  Immunizations  Past hospitalizations and major procedures, including surgery  Significant past illnesses and treatment history including: History and Physical, Other provider notes, PT, OT, ST, and Medications/Infusions/Transfusions  Relevant past medications related to the patient's condition        Discharge referrals to Cardiology and Pulmonology.  No appointments to date.  Patient discharged with [facility].  SN began SOC on 1/25/25.  Patient was contacted by OT on 1/28 and patient declined service. Patient was contacted by PT on 1/28 with no answer and message left to reschedule.     Called and spoke to the patient with success, introducing myself and explaining my role.  Patient answered call but was just waking up.  RN CM will contact patient later in the day.    RN CM called and spoke to patient successfully, introducing self and explaining role in office.  Patient reports continued fatigue since returning home.  Patient is walking in the home with rolling walker and will be receiving PT SOC today at 4 pm.  Patient is eating and drinking fluids.  Patient states she made chicken soup yesterday and has Propel water to drink.      Medication changes reviewed with the patient.  Acetaminophen 975 mg every 8 hours and  Pantoprazole 40 mg daily.  Patient has not obtained and is unsure if medications were covered by her insurance.     Reviewed referrals placed to Pulmonology, Pulmonology Rehab and  Cardiology with patient.  Patient states she believes she received a message from Cardiology and will return call.  Patient was unsure if referrals placed for Pulmonology and Pulmonary Rehab were needed and she will discuss with Dr. Dickens at follow up appointment.    Patient reports she has a car and drives herself to appointments.  RN CM suggested patient contact our clerical staff if she continues to feel fatigued.  The office could provide a Lyft ride for patient.    Patient has all discharge needs met at this time and no further outreach is needed.  Will close to complex care management.     Late entry: RN CM called pharmacy and was advised acetaminophen, 100 tablets cost of $7.99 and Pantoprazole $4.90.  Pharmacy will deliver medications to patient's home.  LVM for patient to advise.

## 2025-01-31 ENCOUNTER — PATIENT OUTREACH (OUTPATIENT)
Dept: FAMILY MEDICINE CLINIC | Facility: CLINIC | Age: 80
End: 2025-01-31

## 2025-01-31 ENCOUNTER — HOME CARE VISIT (OUTPATIENT)
Dept: HOME HEALTH SERVICES | Facility: HOME HEALTHCARE | Age: 80
End: 2025-01-31
Payer: COMMERCIAL

## 2025-01-31 NOTE — PROGRESS NOTES
OP SWCM received a referral from Dr. Maninder DO from IP admission. Chart review completed. Per chart review, pt was seen in the hospital from 1/22-1/24 related to a fall. Per chart review, pt was d/c home w/ VNA services. Per chart review, IP CM assessed pt who expressed need for transportation assistance and interest in applying for MA. Per chart review, pt was provided with [employer] application and information for [facility].     OP SWCM placed call to pt w/ success. OP SWCM introduced self, role and reason for calling. Pt stated that she is interested in applying for [employer] and MA and talking further w/ SW CM. Pt has an appt in office w/ Dr. Dickens on 2/6. OP SWCM offered to meet w/ pt in office that day to review applications w/ pt. Pt agreeable.     OP SWCM to add self to care team and completed psychosocial assessment. OP SWCM scheduled next outreach for 2/6.

## 2025-02-02 ENCOUNTER — HOME CARE VISIT (OUTPATIENT)
Dept: HOME HEALTH SERVICES | Facility: HOME HEALTHCARE | Age: 80
End: 2025-02-02

## 2025-02-03 ENCOUNTER — HOME CARE VISIT (OUTPATIENT)
Dept: HOME HEALTH SERVICES | Facility: HOME HEALTHCARE | Age: 80
End: 2025-02-03
Payer: COMMERCIAL

## 2025-02-03 ENCOUNTER — TELEPHONE (OUTPATIENT)
Dept: FAMILY MEDICINE CLINIC | Facility: CLINIC | Age: 80
End: 2025-02-03

## 2025-02-03 VITALS
OXYGEN SATURATION: 93 % | DIASTOLIC BLOOD PRESSURE: 78 MMHG | TEMPERATURE: 98.6 F | SYSTOLIC BLOOD PRESSURE: 96 MMHG | HEART RATE: 87 BPM

## 2025-02-03 PROCEDURE — G0151 HHCP-SERV OF PT,EA 15 MIN: HCPCS

## 2025-02-03 NOTE — TELEPHONE ENCOUNTER
Patient left message on rx line requesting refill on the Oxycodone. I saw in patient chart a prior authorization has been started by Davina AVERY) and sent to [employer] insurance today 2/3/25.    Contact patient to notified her and per patient she already received a letter from [employer] her medication has been approved until 12/31/25. Letter scanned under media on 10/10/24.

## 2025-02-03 NOTE — TELEPHONE ENCOUNTER
[facility]    [Follow - Up] : a follow-up visit [DM Type 1] : DM Type 1

## 2025-02-05 ENCOUNTER — HOME CARE VISIT (OUTPATIENT)
Dept: HOME HEALTH SERVICES | Facility: HOME HEALTHCARE | Age: 80
End: 2025-02-05
Payer: COMMERCIAL

## 2025-02-05 VITALS
OXYGEN SATURATION: 96 % | RESPIRATION RATE: 18 BRPM | DIASTOLIC BLOOD PRESSURE: 84 MMHG | SYSTOLIC BLOOD PRESSURE: 124 MMHG | TEMPERATURE: 98.2 F | HEART RATE: 60 BPM

## 2025-02-05 PROCEDURE — G0299 HHS/HOSPICE OF RN EA 15 MIN: HCPCS

## 2025-02-06 ENCOUNTER — TELEMEDICINE (OUTPATIENT)
Dept: FAMILY MEDICINE CLINIC | Facility: CLINIC | Age: 80
End: 2025-02-06

## 2025-02-06 DIAGNOSIS — K26.9 DUODENAL ULCER: ICD-10-CM
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
## 2025-02-06 DIAGNOSIS — D50.0 IRON DEFICIENCY ANEMIA DUE TO CHRONIC BLOOD LOSS: ICD-10-CM

## 2025-02-06 PROCEDURE — 99213 OFFICE O/P EST LOW 20 MIN: CPT | Performed by: FAMILY MEDICINE

## 2025-02-06 RX ORDER — FERROUS SULFATE 324(65)MG
324 TABLET, DELAYED RELEASE (ENTERIC COATED) ORAL
Qty: 90 TABLET | Refills: 1 | Status: SHIPPED | OUTPATIENT
Start: 2025-02-06

## 2025-02-06 NOTE — PROGRESS NOTES
Virtual Brief Visit  Name: Lisa Marques      : 1945      MRN: [id number]  Encounter Provider: Iglesia Patel MD  Encounter Date: 2025   Encounter department: [facility]    This Visit is being completed by telephone. The Patient is located at Home and in the following state in which I hold an active license [place]    The patient was identified by name and date of birth. Lisa Marques was informed that this is a telemedicine visit and that the visit is being conducted through Telephone.  My office door was closed. Other methods to assure confidentiality were taken. The patient was notified the following individuals were present in the room Clementine Zhong MS3.  She acknowledged consent and understanding of privacy and security of the video platform. The patient has agreed to participate and understands they can discontinue the visit at any time.    Patient is aware this is a billable service.     Assessment & Plan  Fall, sequela  Patient was recently discharged from the hospital after an admission from 2025-2025. She was admitted after being found down for an unknown amount of time presumably after a fall.  Patient reports that she does not remember a fall, any inciting event, or much of her hospitalization in general.      Patient was found to be septic on initial workup. Patient was started on broad-spectrum antibiotics and resuscitated with IV fluids.  Initially, she responded but then had an episode of hypotension in the setting of A-fib with RVR.  Patient converted spontaneously with prompt improvement of her hemodynamics.    Patient was also anemic on arrival in the setting of prolonged, high-dose NSAID use. EGD was performed that showed a small, clean based, duodenal ulcer.  She was started on daily PPIs and ultimately discharged in good condition.    Today she reports continued fatigue and memory issues specific to her hospitalization.  She  "denies any abdominal pain, coffee-ground emesis, hematemesis, or abdominal bloating. Her appetite has been poor, but she denies any postprandial pain/discomfort causing food avoidance.  - Recommended continued bland diet for now and encouraged adequate oral hydration  - Provided patient with the details of her upcoming follow-up appointment with GI and cardiology  - Emphasized that the patient should avoid any medications such as naproxen, ibuprofen (Motrin, Advil), or\"etodolac\" which she ha seen prescribed in the past  - Discussed the prospect of home PT/OT with VNA.  Patient agreeable for evaluation and short to medium course of treatment.  - Pain control today, continue current regimen    Orders:    Referral to Home Health- [facility]; Future    Duodenal ulcer  Small clean-based duodenal ulcer found during EGD.  No acute intervention at the time of procedure.  Pain resolving.  -Follow-up with GI on 4/2/2025       Iron deficiency anemia due to chronic blood loss  Patient been having normocytic iron deficiency anemia collected during her hospitalization from 1/22 through 1/27.  Follow also provided additional evidence that her anemia was secondary to an upper GI bleed.  No acute intervention was required, but NSAIDs were discontinued and patient was started on daily proton pump inhibitors.    Today she denies any persistent abdominal pain, postprandial pain, hematemesis, coffee-ground emesis.  Her appetite remains poor, but she attributes most of that to fatigue.  Hemoglobin stabilized during hospitalization, last checked 1/24/2025.  -With patient's poor appetite and iron deficiency, she would likely benefit from an oral iron supplement.  -Consider repeat CBC in 2 to 3 months or sooner if patient's symptoms change  Orders:    ferrous sulfate 324 (65 Fe) mg; Take 1 tablet (324 mg total) by mouth daily before breakfast        History of Present Illness   Fall  The accident occurred More than 1 week ago. The fall " occurred in unknown circumstances. She fell from an unknown height. She landed on Hard floor. There was no blood loss. The pain is present in the buttocks and back. The pain is moderate. The symptoms are aggravated by standing. Pertinent negatives include no headaches. She has tried acetaminophen, immobilization and rest (oxycodone) for the symptoms. The treatment provided mild relief.       Visit Time  Total Visit Duration: 23min    Iglesia Patel MD

## 2025-02-06 NOTE — ASSESSMENT & PLAN NOTE
"Patient was recently discharged from the hospital after an admission from 1/22/2025-1/27/2025. She was admitted after being found down for an unknown amount of time presumably after a fall.  Patient reports that she does not remember a fall, any inciting event, or much of her hospitalization in general.      Patient was found to be septic on initial workup. Patient was started on broad-spectrum antibiotics and resuscitated with IV fluids.  Initially, she responded but then had an episode of hypotension in the setting of A-fib with RVR.  Patient converted spontaneously with prompt improvement of her hemodynamics.    Patient was also anemic on arrival in the setting of prolonged, high-dose NSAID use. EGD was performed that showed a small, clean based, duodenal ulcer.  She was started on daily PPIs and ultimately discharged in good condition.    Today she reports continued fatigue and memory issues specific to her hospitalization.  She denies any abdominal pain, coffee-ground emesis, hematemesis, or abdominal bloating. Her appetite has been poor, but she denies any postprandial pain/discomfort causing food avoidance.  - Recommended continued bland diet for now and encouraged adequate oral hydration  - Provided patient with the details of her upcoming follow-up appointment with GI and cardiology  - Emphasized that the patient should avoid any medications such as naproxen, ibuprofen (Motrin, Advil), or\"etodolac\" which she ha seen prescribed in the past  - Discussed the prospect of home PT/OT with VNA.  Patient agreeable for evaluation and short to medium course of treatment.  - Pain control today, continue current regimen    Orders:    Referral to Home Health- [facility]; Future    "

## 2025-02-06 NOTE — ASSESSMENT & PLAN NOTE
Patient been having normocytic iron deficiency anemia collected during her hospitalization from 1/22 through 1/27.  Follow also provided additional evidence that her anemia was secondary to an upper GI bleed.  No acute intervention was required, but NSAIDs were discontinued and patient was started on daily proton pump inhibitors.    Today she denies any persistent abdominal pain, postprandial pain, hematemesis, coffee-ground emesis.  Her appetite remains poor, but she attributes most of that to fatigue.  Hemoglobin stabilized during hospitalization, last checked 1/24/2025.  -With patient's poor appetite and iron deficiency, she would likely benefit from an oral iron supplement.  -Consider repeat CBC in 2 to 3 months or sooner if patient's symptoms change  Orders:    ferrous sulfate 324 (65 Fe) mg; Take 1 tablet (324 mg total) by mouth daily before breakfast

## 2025-02-07 ENCOUNTER — HOME CARE VISIT (OUTPATIENT)
Dept: HOME HEALTH SERVICES | Facility: HOME HEALTHCARE | Age: 80
End: 2025-02-07
Payer: COMMERCIAL

## 2025-02-12 DIAGNOSIS — M62.838 MUSCLE SPASM: ICD-10-CM

## 2025-02-12 DIAGNOSIS — M79.601 MUSCULOSKELETAL ARM PAIN, RIGHT: ICD-10-CM

## 2025-02-12 DIAGNOSIS — G47.00 INSOMNIA, UNSPECIFIED TYPE: ICD-10-CM

## 2025-02-12 RX ORDER — CYCLOBENZAPRINE HCL 5 MG
5 TABLET ORAL
Qty: 30 TABLET | Refills: 1 | Status: SHIPPED | OUTPATIENT
Start: 2025-02-12

## 2025-02-12 RX ORDER — ZOLPIDEM TARTRATE 5 MG/1
5 TABLET ORAL
Qty: 30 TABLET | Refills: 0 | Status: SHIPPED | OUTPATIENT
Start: 2025-02-12

## 2025-02-12 RX ORDER — ZOLPIDEM TARTRATE 5 MG/1
5 TABLET ORAL
Qty: 30 TABLET | Refills: 0 | Status: CANCELLED | OUTPATIENT
Start: 2025-02-12

## 2025-02-12 RX ORDER — GABAPENTIN 300 MG/1
900 CAPSULE ORAL 3 TIMES DAILY
Qty: 270 CAPSULE | Refills: 1 | Status: SHIPPED | OUTPATIENT
Start: 2025-02-12

## 2025-02-13 ENCOUNTER — HOME CARE VISIT (OUTPATIENT)
Dept: HOME HEALTH SERVICES | Facility: HOME HEALTHCARE | Age: 80
End: 2025-02-13
Payer: COMMERCIAL

## 2025-02-13 PROCEDURE — G0299 HHS/HOSPICE OF RN EA 15 MIN: HCPCS

## 2025-02-13 NOTE — CASE COMMUNICATION
Major drug interactions: eliquis, apixaban, and diclofenac sodium, diclofenac sodium and escitalopram  Abnormal clinical findings: lightheadedness/dizziness  This report is informational only, no response is needed  [facility] has Admitted your patient to Home Health service with the following disciplines: SN, PT  Patient stated goals of care: Stop falling  Potential barriers to goal achievement: CP status, anemia, a-fib, lives alycia e  Primary focus of home health care:Cardiac Circulatory  Anticipated visit pattern 2w1, 1w1   Thank you for allowing us to participate in the care of your patient.

## 2025-02-14 VITALS
SYSTOLIC BLOOD PRESSURE: 110 MMHG | RESPIRATION RATE: 20 BRPM | OXYGEN SATURATION: 94 % | TEMPERATURE: 98 F | DIASTOLIC BLOOD PRESSURE: 58 MMHG | HEART RATE: 84 BPM

## 2025-02-17 ENCOUNTER — HOME CARE VISIT (OUTPATIENT)
Dept: HOME HEALTH SERVICES | Facility: HOME HEALTHCARE | Age: 80
End: 2025-02-17
Payer: COMMERCIAL

## 2025-02-17 VITALS
HEART RATE: 69 BPM | SYSTOLIC BLOOD PRESSURE: 102 MMHG | OXYGEN SATURATION: 94 % | TEMPERATURE: 98.2 F | DIASTOLIC BLOOD PRESSURE: 62 MMHG

## 2025-02-17 PROCEDURE — G0151 HHCP-SERV OF PT,EA 15 MIN: HCPCS

## 2025-02-18 ENCOUNTER — PATIENT OUTREACH (OUTPATIENT)
Dept: FAMILY MEDICINE CLINIC | Facility: CLINIC | Age: 80
End: 2025-02-18

## 2025-02-18 ENCOUNTER — OFFICE VISIT (OUTPATIENT)
Dept: FAMILY MEDICINE CLINIC | Facility: CLINIC | Age: 80
End: 2025-02-18

## 2025-02-18 VITALS
TEMPERATURE: 98.4 F | BODY MASS INDEX: 26.08 KG/M2 | DIASTOLIC BLOOD PRESSURE: 40 MMHG | HEIGHT: 63 IN | OXYGEN SATURATION: 97 % | RESPIRATION RATE: 18 BRPM | SYSTOLIC BLOOD PRESSURE: 78 MMHG | WEIGHT: 147.2 LBS

## 2025-02-18 DIAGNOSIS — E86.1 HYPOVOLEMIA: ICD-10-CM

## 2025-02-18 DIAGNOSIS — E61.1 IRON DEFICIENCY: ICD-10-CM

## 2025-02-18 DIAGNOSIS — F11.20 CONTINUOUS OPIOID DEPENDENCE (HCC): Primary | ICD-10-CM

## 2025-02-18 DIAGNOSIS — F41.8 DEPRESSION WITH ANXIETY: ICD-10-CM

## 2025-02-18 DIAGNOSIS — D50.0 IRON DEFICIENCY ANEMIA DUE TO CHRONIC BLOOD LOSS: ICD-10-CM

## 2025-02-18 DIAGNOSIS — Z79.899 OTHER LONG TERM (CURRENT) DRUG THERAPY: ICD-10-CM

## 2025-02-18 DIAGNOSIS — G89.4 CHRONIC PAIN SYNDROME: ICD-10-CM

## 2025-02-18 DIAGNOSIS — I48.0 PAROXYSMAL ATRIAL FIBRILLATION (HCC): ICD-10-CM

## 2025-02-18 PROCEDURE — G2211 COMPLEX E/M VISIT ADD ON: HCPCS | Performed by: FAMILY MEDICINE

## 2025-02-18 PROCEDURE — 99213 OFFICE O/P EST LOW 20 MIN: CPT | Performed by: FAMILY MEDICINE

## 2025-02-18 RX ORDER — CITALOPRAM HYDROBROMIDE 20 MG/1
20 TABLET ORAL DAILY
Qty: 30 TABLET | Refills: 1 | Status: SHIPPED | OUTPATIENT
Start: 2025-02-18

## 2025-02-18 RX ORDER — FUROSEMIDE 20 MG/1
20 TABLET ORAL AS NEEDED
COMMUNITY
End: 2025-02-18

## 2025-02-18 RX ORDER — METOPROLOL SUCCINATE 25 MG/1
25 TABLET, EXTENDED RELEASE ORAL 2 TIMES DAILY
Qty: 60 TABLET | Refills: 1 | Status: SHIPPED | OUTPATIENT
Start: 2025-02-18

## 2025-02-18 RX ORDER — FERROUS SULFATE 324(65)MG
324 TABLET, DELAYED RELEASE (ENTERIC COATED) ORAL
Qty: 90 TABLET | Refills: 1 | Status: SHIPPED | OUTPATIENT
Start: 2025-02-18

## 2025-02-18 RX ORDER — OXYCODONE HYDROCHLORIDE 10 MG/1
10 TABLET ORAL EVERY 8 HOURS PRN
COMMUNITY
Start: 2025-02-01

## 2025-02-18 NOTE — ASSESSMENT & PLAN NOTE
Depression Screening Follow-up Plan: Patient's depression screening was positive with a PHQ-9 score of 17.     PHQ-2/9 Depression Screening    Little interest or pleasure in doing things: 2 - more than half the days  Feeling down, depressed, or hopeless: 2 - more than half the days  Trouble falling or staying asleep, or sleeping too much: 2 - more than half the days  Feeling tired or having little energy: 2 - more than half the days  Poor appetite or overeating: 3 - nearly every day  Feeling bad about yourself - or that you are a failure or have let yourself or your family down: 2 - more than half the days  Trouble concentrating on things, such as reading the newspaper or watching television: 1 - several days  Moving or speaking so slowly that other people could have noticed. Or the opposite - being so fidgety or restless that you have been moving around a lot more than usual: 2 - more than half the days  Thoughts that you would be better off dead, or of hurting yourself in some way: 1 - several days  PHQ-9 Score: 17  PHQ-9 Interpretation: Moderately severe depression       Objectively depressed.  Multifactorial.  Has little family support.  Lives alone.  Declining functional status with age.  Recent memory issues.  On multiple controlled substances.  Switch out Lexapro for citalopram intensify dose to 20 mg. Follow up for further intensification at next visit.    Orders:    citalopram (CeleXA) 20 mg tablet; Take 1 tablet (20 mg total) by mouth daily

## 2025-02-18 NOTE — ASSESSMENT & PLAN NOTE
Normal sinus in the office today.  Continues on Eliquis 5 mg twice daily.  In light of her hypovolemia we decreased her Toprol-XL from 50 twice daily to 25 twice daily.  Sees cardiology in 2 days will follow-up with the recs.  Encouraged adequate oral hydration.  Orders:    metoprolol succinate (TOPROL-XL) 25 mg 24 hr tablet; Take 1 tablet (25 mg total) by mouth 2 (two) times a day

## 2025-02-18 NOTE — PROGRESS NOTES
Name: Lisa Marques      : 1945      MRN: [id number]  Encounter Provider: Raz Dickens DO  Encounter Date: 2025   Encounter department: [facility]    Assessment & Plan  Continuous opioid dependence (HCC)  Doesn't need a refill today. Dose stable. PDMP reviewed. Plan to eventually decrease her regimen but she is incredibly difficult to agree to opioid deescalation.     Unable to provide urine in office today. Will need to give it at the lab   Orders:    Oxycodone/Oxymorphone urine; Future    Comprehensive metabolic panel; Future    Chronic pain syndrome         Other long term (current) drug therapy         Iron deficiency  Iron deficient in the hospital on iron panel.  She states she has not been taking the ferrous sulfate so I refilled and sent a new prescription to [place] pharmacy.  Orders:    CBC and differential; Future    Comprehensive metabolic panel; Future    Hypovolemia  This is a chronic problem for her.  Systolically she is usually 80s over 40s.  Today she is 78 over 40s again.  Her p.o. intake is incredibly poor and she drinks multiple sodas with low amounts of water per day.  She states she is also not very hungry or thirsty.  I gave her a bottle water in the clinic fortunately her orthostatics do not seem to be affected however she recently went to the hospital for fall and I am wondering if this is contributory.  On review of her medications she takes Toprol-XL 50 mg twice daily and with reconciliation of her prior cardiology note back in  May of last year, they have kept her on this dose but she missed her appointment in December.      I think in light of her hypovolemia and recent fall this dose should be reduced to 25 twice daily to help with her hemodynamics.  Of course, if she goes back into A-fib with this dose reduction we can always uptitrate back up.  She is in normal sinus in the office today.    She has a follow-up with her  cardiologist in a few days and they are welcome to weigh in on this issue as well.  I will follow along with their input.    Recheck cbc CMP    Discontinue lasix due to hypotension but per patient only taking every 10 days or so. Recent echo with normal EF but + BNP likely HFPEF. Appreciate cards input here.     Orders:    Comprehensive metabolic panel; Future    Iron deficiency anemia due to chronic blood loss    Orders:    ferrous sulfate 324 (65 Fe) mg; Take 1 tablet (324 mg total) by mouth daily before breakfast    Depression with anxiety  Depression Screening Follow-up Plan: Patient's depression screening was positive with a PHQ-9 score of 17.     PHQ-2/9 Depression Screening    Little interest or pleasure in doing things: 2 - more than half the days  Feeling down, depressed, or hopeless: 2 - more than half the days  Trouble falling or staying asleep, or sleeping too much: 2 - more than half the days  Feeling tired or having little energy: 2 - more than half the days  Poor appetite or overeating: 3 - nearly every day  Feeling bad about yourself - or that you are a failure or have let yourself or your family down: 2 - more than half the days  Trouble concentrating on things, such as reading the newspaper or watching television: 1 - several days  Moving or speaking so slowly that other people could have noticed. Or the opposite - being so fidgety or restless that you have been moving around a lot more than usual: 2 - more than half the days  Thoughts that you would be better off dead, or of hurting yourself in some way: 1 - several days  PHQ-9 Score: 17  PHQ-9 Interpretation: Moderately severe depression       Objectively depressed.  Multifactorial.  Has little family support.  Lives alone.  Declining functional status with age.  Recent memory issues.  On multiple controlled substances.  Switch out Lexapro for citalopram intensify dose to 20 mg. Follow up for further intensification at next visit.    Orders:     "citalopram (CeleXA) 20 mg tablet; Take 1 tablet (20 mg total) by mouth daily    Paroxysmal atrial fibrillation (HCC)  Normal sinus in the office today.  Continues on Eliquis 5 mg twice daily.  In light of her hypovolemia we decreased her Toprol-XL from 50 twice daily to 25 twice daily.  Sees cardiology in 2 days will follow-up with the recs.  Encouraged adequate oral hydration.  Orders:    metoprolol succinate (TOPROL-XL) 25 mg 24 hr tablet; Take 1 tablet (25 mg total) by mouth 2 (two) times a day       Treatment Plan: \"To get my pain pills\"    Treatment Goals: \"To get my pain pills\"    Opiate risks  There are risks associated with opioid medications, including dependence, addiction and tolerance. The patient understands and agrees to use these medications only as prescribed. Potential side effects of the medications include, but are not limited to, constipation, drowsiness, addiction, impaired judgment and risk of fatal overdose if not taken as prescribed. The patient was warned against driving while taking sedation medications.  Sharing medications is a felony. At this point in time, the patient is showing no signs of addiction, abuse, diversion or suicidal ideation.      Pateint is taking concurrent benzodiazepines. Has been counseled on the risks of taking opioids and benzodiazepines including sedation, increased fall risk, dizziness, addictive potential and death.      Patient is taking concurrent muscle relaxers.  Has been counseled on the risks of taking opioids and muscle relaxers including sedation, increased fall risk, dizziness, addictive potential and death.      Patient has a high risk condition (age > 65, MARIBEL, renal or hepatic impairment, mental health condition, use of alcohol or other substances). Has been counseled on the specific risks of taking opioids with these conditions and the increased risks including including sedation, increased fall risk, dizziness, addictive potential and death.  " "    PDMP review  [place] or [place]  reviewed. No red flags were identified; safe to proceed with prescription      discussing prognosis, risks and benefits of treatment options, importance of treatment compliance, risk factor reductions, impressions, counseling/coordination of care, documenting in the medical record, reviewing/ordering tests, medicine, procedures, obtaining or reviewing history and communicating with other healthcare professionals.     BMI Counseling: Body mass index is 26.08 kg/m². The BMI is above normal. Nutrition recommendations include decreasing portion sizes. Exercise recommendations include vigorous physical activity 75 minutes/week. Rationale for BMI follow-up plan is due to patient being overweight or obese.     Depression Screening and Follow-up Plan: Patient's depression screening was positive with a PHQ-9 score of 17.   Patient with underlying depression and was advised to continue current medications as prescribed.          History of Present Illness         \" I need my pain pills\"    Current pain description: \"Severe\"    Functional status: \"Bad\"    Goals of care: \"To keep getting my pain pills\"    Social Support System  Patient lacks significant family support      Screening Tools/Assessments:    PHQ-2/9:  PHQ-9 score: 17    Brief Pain Inventory (BPI):  1) Throughout our lives, most of us have had pain from time to time (such as minor headaches, sprains, and toothaches). Have you had pain other than these everyday kinds of pain today? Yes  2) Where is your pain located?  3) Rate your pain at its worst in the last 24 hours: 9  4) Rate your pain at its least in the last 24 hours: 8  5) Rate your average level of pain: 8  6) Rate your pain right now: 8  7) What treatments or medications are you receiving for your pain?  8) In the past 24 hours, how much relief have pain treatments or medication provided? 50%  9) During the past 24 hours, pain has interfered with your:     A) General " activity: 10     B) Mood: 10     C) Walking ability: 10     D) Normal work (work outside the home & housework): 10     E) Relations with other people: 10     F) Sleep: 10     G) Enjoyment of life: 10    Drug Screen:  Date of last drug screen: 11/23/2024    Opioid agreement:  Active Opioid agreement on file?: No    Opioid agreement signed date: 11/18/2022  Opioid agreement expiration date: 11/18/2023    Naloxone:  Currently prescribed Naloxone (Narcan): Yes        Outpatient Morphine Milligram Equivalents Per Day       2/18/25 and after 45 MME/Day      Order Name Dose Route Frequency Maximum MME/Day     oxyCODONE (ROXICODONE) 10 MG TABS 10 mg Oral Every 8 hours PRN 45 MME/Day    Total Potential Morphine Milligram Equivalents Per Day 45 MME/Day      Calculation Information          oxyCODONE (ROXICODONE) 10 MG TABS    oxyCODONE 10 MG Tabs: single dose of 10 mg * 3 doses per day * morphine equivalence factor of 1.5 = 45 MME/Day                                 PDMP Review         Value Time User    PDMP Reviewed  Yes 2/18/2025  3:14 PM Raz Dickens DO           Review of Systems   Constitutional:  Negative for fatigue, fever and unexpected weight change.   HENT:  Negative for congestion, sinus pressure, sinus pain and sore throat.    Eyes:  Negative for visual disturbance.   Respiratory:  Negative for cough, chest tightness, shortness of breath and wheezing.    Cardiovascular:  Negative for chest pain, palpitations and leg swelling.   Gastrointestinal:  Negative for abdominal pain, constipation, diarrhea, nausea and vomiting.   Endocrine: Negative for polydipsia, polyphagia and polyuria.   Genitourinary:  Negative for difficulty urinating, dysuria and urgency.   Musculoskeletal:  Negative for arthralgias and back pain.   Skin:  Negative for rash.   Neurological:  Negative for dizziness, weakness, light-headedness, numbness and headaches.   Psychiatric/Behavioral:  Negative for behavioral problems and confusion. The  "patient is not nervous/anxious.      Objective   BP (!) 78/76 (BP Location: Right arm, Patient Position: Sitting, Cuff Size: Standard)   Temp 98.4 °F (36.9 °C) (Temporal)   Resp 18   Ht 5' 3\" (1.6 m)   Wt 66.8 kg (147 lb 3.2 oz)   SpO2 97%   BMI 26.08 kg/m²        Physical Exam  Constitutional:       General: She is not in acute distress.     Appearance: Normal appearance. She is normal weight. She is not ill-appearing or toxic-appearing.   HENT:      Head: Normocephalic and atraumatic.      Right Ear: External ear normal.      Left Ear: External ear normal.      Nose: Nose normal.   Eyes:      Conjunctiva/sclera: Conjunctivae normal.   Cardiovascular:      Rate and Rhythm: Normal rate and regular rhythm.      Pulses: Normal pulses.      Heart sounds: Normal heart sounds. No murmur heard.     No friction rub. No gallop.   Pulmonary:      Effort: Pulmonary effort is normal. No respiratory distress.      Breath sounds: Normal breath sounds. No wheezing.   Abdominal:      Palpations: Abdomen is soft.   Musculoskeletal:         General: Normal range of motion.      Cervical back: Normal range of motion and neck supple.   Skin:     General: Skin is warm and dry.      Capillary Refill: Capillary refill takes less than 2 seconds.   Neurological:      General: No focal deficit present.      Mental Status: She is alert and oriented to person, place, and time. Mental status is at baseline.   Psychiatric:         Mood and Affect: Mood normal.         Behavior: Behavior normal.         Thought Content: Thought content normal.         Judgment: Judgment normal.       Raz Dickens DO  PGY-2 Family Medicine - [place]   02/18/25, 3:54 PM      "

## 2025-02-19 ENCOUNTER — HOME CARE VISIT (OUTPATIENT)
Dept: HOME HEALTH SERVICES | Facility: HOME HEALTHCARE | Age: 80
End: 2025-02-19
Payer: COMMERCIAL

## 2025-02-19 VITALS
HEART RATE: 75 BPM | OXYGEN SATURATION: 96 % | TEMPERATURE: 98.1 F | DIASTOLIC BLOOD PRESSURE: 50 MMHG | SYSTOLIC BLOOD PRESSURE: 72 MMHG

## 2025-02-19 PROCEDURE — G0151 HHCP-SERV OF PT,EA 15 MIN: HCPCS

## 2025-02-20 ENCOUNTER — PATIENT OUTREACH (OUTPATIENT)
Dept: FAMILY MEDICINE CLINIC | Facility: CLINIC | Age: 80
End: 2025-02-20

## 2025-02-20 ENCOUNTER — TELEPHONE (OUTPATIENT)
Dept: FAMILY MEDICINE CLINIC | Facility: CLINIC | Age: 80
End: 2025-02-20

## 2025-02-20 NOTE — PROGRESS NOTES
Cardiology Follow Up    Lisa Marques  1945  [id number]  [facility] [place]  [address]  [place] [address] [phone]      Plan:   Euvolemic on exam--no diuretics needed  Toprol XL 25 mg BID--BP's stable today  Encouraged to increase fluid intake  Encouraged to reduce sodium intake  Don't Pass the Salt book  Encouraged grief counseling for death of grandson  Call office or go to ER with return of palpitations/Afib, SOB, CP dizziness  RTO with Dr Caraballo in 6 months      Atrial Fib, RVR  Chronic Afib RVR--s/p fall at home. Also, in the s/o hypotension, MARU creat 1.45, and hypoxia.   AC: Eliquis  Rate: Toprol XL 50 QD  In SR on auscultation today    Hypertension  Well controlled  Toprol XL 25 BID  BP today: 110/68    Chronic HFpEF, LVEF  Weight today 152 lbs  1/23/25 last admit:  (prior 57)  Rx: No diuretic.  Toprol xL 25 BID  Volume status: Euvolemic on exam    AAA 4.3 cm    CKD IIIa    DVT      Interval History:   Ms Lisa Marques was admitted to [facility] on 1/22 - 1/24/25 with a fall.    Atrial fibrillation with RVR    6/2024: Dr Caraballo:  Patient is here for a f/u visit. Patient has HTN, DVT, PAF (on rate control and Eliquis) and ascending aortic aneurysm.  CT scan of the chest 3/2019 demonstrated a mild ascending aortic aneurysm of 4.3 cm.  Echocardiogram 9/2021 demonstrated preserved LV systolic function. The aortic root was 4.4 cm and the ascending aorta was dilated at 4.4 cm.  Patient was hospitalized April 2024 with PAF.  This was in the setting of volume depletion and MARU.  She converted to NSR with hydration. She had had 3 episodes of syncope prior to this admission with routine activity.  CT of the chest 4/15/2024 demonstrated no pulmonary embolism.  Stable 4.7 cm ascending aortic aneurysm was noted.  This was comparable to prior study 9/16/2023.  CV surgery evaluation was recommended and  patient was seen 5/10/2024.  No intervention was recommended given her frailty,  she would not be a surgical candidate.  Zio patch May 2024 demonstrated NSR with average heart rate of 72.  Episodes of PAF with intermittent RVR were noted.  Longest episode was 49 minutes and 11 seconds.  Patient continues on Eliquis and dose of metoprolol succinate was increased to twice daily.  Patient has had no chest pain or significant dyspnea.  Her vital signs are stable today.  Her HTN and PAF are controlled on her current medicines.     01/22/25 Hosp Admit: 79-year-old female history of A-fib on Eliquis presenting after fall. It was an unwitnessed fall with unknown head strike. She was evaluated by EMS and transported to the hospital. EKG showing A-fib with RVR initially which is known for the patient she was given fluid resuscitation due to hypothermia and tachycardia meeting sepsis criteria. Blood cultures were collected and cefepime was started for broad coverage. The patient corrected out of A-fib to sinus after that. Procalcitonin normal. Lactic acidosis of 4.4. Blood gas showing pCO2 in the 20s normal pH 7.3 reassessment with later blood gas showing pH 7.2 and correction of pCO2 to the 40s. Patient was given 2 units of packed red blood cells after voicing she has had recent melanotic stools. She was evaluated by the GI service who recommended n.p.o. at midnight with EGD tomorrow and holding Eliquis and continuing clear diet tonight. He required brief pressors for an isolated episode of hypotension but quickly corrected to profound hypertension after so she was immediately taken off of pressors. UA within normal limits CT chest abdomen pelvis showing distended gallbladder and known aortic aneurysm 4.8 cm CT head within normal limits 2-hour lactic acid pending.     02/25/25: post hosp f/u  Pt lives alone in an apt in [facility].  Has no cardiac complaints today.  Concerned about fluctuating BP's at home. Today, BP at  "home 130/100--checked by [facility].  States her appetite has been reduced recently.  Does not follow a low sodium diet and likes to \"salt\" to everything.  She states does not drink enough water, but is trying to increase her water intake.  She reports being more fatigued than prior, but is also moving slower d/t history ot 3-4 falls in the last year.  She admits to not being very active due to back and knee pain. We discussed her depression, and will not to live long after the tragic death of her grandson.  Encouraged to start grief counseling, but she will consider in the future.  Has symptomatic AFib--understands when to call the office or go to ER for worsening cardiac symptoms.      Patient Active Problem List   Diagnosis    Atrial fibrillation with RVR ([facility])    MARU (acute kidney injury) ([facility])    Chronic pain    Age-related osteoporosis without current pathological fracture    Hypertension    Anxiety    Aortic aneurysm (HCC)    Depression with anxiety    Insomnia    Left knee pain    Lumbar canal stenosis    Myofascial pain syndrome    Opioid dependence ([facility])    Pain syndrome, chronic    Right shoulder pain    Spondylosis of lumbar region without myelopathy or radiculopathy    Vitamin D deficiency    Constipation    Eczema of right external ear    Balance disorder    Musculoskeletal arm pain, right    Primary osteoarthritis of right shoulder    Chronic heart failure with preserved ejection fraction (HCC)    Hypokalemia    Stage 3a chronic kidney disease ([facility])    Ganglion of hand, left    Dry eye of left side    Bladder prolapse, female, acquired    Financial insecurity    Viral illness    Anemia    Fall    Chronic pain of left knee    Food insecurity    Hypotension    Acute hypoxic respiratory failure (HCC)    Elevated d-dimer    Generalized weakness    Cystocele with prolapse    Muscle spasm    Long term (current) use of opiate analgesic    Thrombocytopenia (HCC)    Acute on chronic anemia    Atrial fibrillation " (HCC)    Family history of colon cancer    Excessive use of nonsteroidal anti-inflammatory drugs (NSAIDs)    Moderate protein-calorie malnutrition (HCC)    Duodenal ulcer     Past Medical History:   Diagnosis Date    Acute deep vein thrombosis of lower limb (HCC)     last assessed [id number]    Aortic aneurysm (HCC)     Arthritis     Atrial fibrillation (HCC)     Dislocation of hip (HCC)     last assessed [id number]    Hypertension     Psychiatric disorder     anxiety     Social History     Socioeconomic History    Marital status:      Spouse name: Not on file    Number of children: Not on file    Years of education: Not on file    Highest education level: Not on file   Occupational History    Not on file   Tobacco Use    Smoking status: Never    Smokeless tobacco: Never   Vaping Use    Vaping status: Never Used   Substance and Sexual Activity    Alcohol use: No     Comment: none    Drug use: No    Sexual activity: Not Currently     Partners: Male     Birth control/protection: Female Sterilization   Other Topics Concern    Not on file   Social History Narrative    Not on file     Social Drivers of Health     Financial Resource Strain: Low Risk  (2/18/2025)    Overall Financial Resource Strain (CARDIA)     Difficulty of Paying Living Expenses: Not hard at all   Food Insecurity: Food Insecurity Present (1/22/2025)    Nursing - Inadequate Food Risk Classification     Worried About Running Out of Food in the Last Year: Not on file     Ran Out of Food in the Last Year: Not on file     Ran Out of Food in the Last Year: Sometimes true   Transportation Needs: No Transportation Needs (2/25/2025)    OASIS : Transportation     Lack of Transportation (Medical): No     Lack of Transportation (Non-Medical): No     Patient Unable or Declines to Respond: No   Recent Concern: Transportation Needs - Unmet Transportation Needs (1/22/2025)    Nursing - Transportation Risk Classification     Lack of Transportation: Not on file      Lack of Transportation: Yes   Physical Activity: Inactive (2024)    Exercise Vital Sign     Days of Exercise per Week: 0 days     Minutes of Exercise per Session: 0 min   Stress: No Stress Concern Present (2024)    [employer] - Occupational Stress Questionnaire     Feeling of Stress : Only a little   Social Connections: Not on file   Intimate Partner Violence: Unknown (2025)    Nursing IPS     Feels Physically and Emotionally Safe: Not on file     Physically Hurt by Someone: Not on file     Humiliated or Emotionally Abused by Someone: Not on file     Physically Hurt by Someone: No     Hurt or Threatened by Someone: No   Housing Stability: Unknown (2025)    Nursing: Inadequate Housing Risk Classification     Has Housing: Not on file     Worried About Losing Housing: Not on file     Unable to Get Utilities: Not on file     Unable to Pay for Housing in the Last Year: No     Has Housin      Family History   Problem Relation Age of Onset    Colon cancer Mother     Breast cancer Family     Thyroid cancer Family     Colon cancer Family     Hypertension Family     Thyroid disease Family     Hypertension Father     Dementia Father      Past Surgical History:   Procedure Laterality Date    HIP SURGERY      JOINT REPLACEMENT      KNEE ARTHROSCOPY Bilateral     x2 rt and 1 on left    TUBAL LIGATION         Current Outpatient Medications:     apixaban (Eliquis) 5 mg, Take 1 tablet (5 mg total) by mouth 2 (two) times a day, Disp: 90 tablet, Rfl: 3    citalopram (CeleXA) 20 mg tablet, Take 1 tablet (20 mg total) by mouth daily, Disp: 30 tablet, Rfl: 1    cyclobenzaprine (FLEXERIL) 5 mg tablet, Take 1 tablet (5 mg total) by mouth daily at bedtime, Disp: 30 tablet, Rfl: 1    Diclofenac Sodium (VOLTAREN) 1 %, Apply 1 g topically 4 (four) times a day, Disp: 20 g, Rfl: 0    ferrous sulfate 324 (65 Fe) mg, Take 1 tablet (324 mg total) by mouth daily before breakfast, Disp: 90  tablet, Rfl: 1    gabapentin (NEURONTIN) 300 mg capsule, Take 3 capsules (900 mg total) by mouth 3 (three) times a day, Disp: 270 capsule, Rfl: 1    hydrocortisone 2.5 % cream, Apply topically 2 (two) times a day As needed, Disp: 28 g, Rfl: 1    lidocaine (LIDODERM) 5 %, Apply 2 patches topically over 12 hours daily Remove & Discard patch within 12 hours or as directed by MD, Disp: 10 patch, Rfl: 0    Melatonin 5 MG TABS, Take 2 tablets (10 mg total) by mouth daily at bedtime as needed (as needed for insomnia), Disp: 60 tablet, Rfl: 0    metoprolol succinate (TOPROL-XL) 25 mg 24 hr tablet, Take 1 tablet (25 mg total) by mouth 2 (two) times a day, Disp: 60 tablet, Rfl: 1    naloxone (NARCAN) 4 mg/0.1 mL nasal spray, 1 spray into each nostril every 3 (three) minutes as needed for opioid reversal, Disp: , Rfl:     oxyCODONE (ROXICODONE) 10 MG TABS, Take 10 mg by mouth every 8 (eight) hours as needed for severe pain, Disp: , Rfl:     pantoprazole (PROTONIX) 40 mg tablet, Take 1 tablet (40 mg total) by mouth daily in the early morning, Disp: 90 tablet, Rfl: 1    polyethylene glycol (MIRALAX) 17 g packet, Take 17 g by mouth daily, Disp: 20 each, Rfl: 1    zolpidem (AMBIEN) 5 mg tablet, Take 1 tablet (5 mg total) by mouth daily at bedtime as needed for sleep, Disp: 30 tablet, Rfl: 0    acetaminophen (TYLENOL) 325 mg tablet, Take 3 tablets (975 mg total) by mouth every 8 (eight) hours (Patient not taking: Reported on 2/25/2025), Disp: 270 tablet, Rfl: 3    docusate sodium (COLACE) 100 mg capsule, Take 1 capsule (100 mg total) by mouth 2 (two) times a day (Patient not taking: Reported on 2/18/2025), Disp: 90 capsule, Rfl: 0    senna (SENOKOT) 8.6 mg, Take 1 tablet (8.6 mg total) by mouth daily at bedtime (Patient not taking: Reported on 2/18/2025), Disp: 30 tablet, Rfl: 3  No Known Allergies      No results displayed because visit has over 200 results.        Review of Systems:  Review of Systems   Constitutional:  Positive  for appetite change and fatigue. Negative for unexpected weight change.   HENT: Negative.     Eyes: Negative.    Respiratory:  Negative for shortness of breath.    Cardiovascular:  Negative for chest pain, palpitations and leg swelling.   Gastrointestinal: Negative.    Musculoskeletal:  Positive for arthralgias and back pain.   Skin: Negative.    Neurological:  Negative for dizziness.   Psychiatric/Behavioral:          Depression following grandson's death       Physical Exam:  Physical Exam  Constitutional:       Appearance: Normal appearance.   Neck:      Vascular: No JVD.   Cardiovascular:      Rate and Rhythm: Normal rate and regular rhythm.      Heart sounds: Normal heart sounds, S1 normal and S2 normal.   Pulmonary:      Effort: No respiratory distress.      Breath sounds: Decreased breath sounds present.   Musculoskeletal:      Right lower leg: No edema.      Left lower leg: No edema.   Skin:     General: Skin is warm and dry.   Neurological:      Mental Status: She is alert and oriented to person, place, and time. Mental status is at baseline.   Psychiatric:         Behavior: Behavior is cooperative.         Cognition and Memory: Cognition normal.         JOE Moeller

## 2025-02-20 NOTE — PROGRESS NOTES
OP SWCM due to outreach pt for follow up.    SW was scheduled to meet w/ pt at her previous office visit however the visit was rescheduled to telehealth due to weather. Pt has not been back in the office since and is not scheduled next until May.    SW placed call to pt w/ success. SW introduced self, role and reason for calling. Pt stated she is still interested in applying for MA and [employer]. SW offered for CMOC to go out to the home but pt would prefer to meet w/ care team in the office. Pt would like to meet next week sometime. CASIMIRO provided pt w/ SW name and contact information and advised pt she can call prior to coming into the office to meet w/ SW. Pt appreciative and agreeable.     CASIMIRO will plan for follow up in 1 week and await pt to come to the office.

## 2025-02-20 NOTE — TELEPHONE ENCOUNTER
Pt called, according to her the appointment she has or had with the pulmonologist was canceled because the diagnosis was not sufficient enough and insurance would not cover it. Please advise and thank you.

## 2025-02-25 ENCOUNTER — OFFICE VISIT (OUTPATIENT)
Dept: CARDIOLOGY CLINIC | Facility: CLINIC | Age: 80
End: 2025-02-25
Payer: COMMERCIAL

## 2025-02-25 ENCOUNTER — HOME CARE VISIT (OUTPATIENT)
Dept: HOME HEALTH SERVICES | Facility: HOME HEALTHCARE | Age: 80
End: 2025-02-25
Payer: COMMERCIAL

## 2025-02-25 VITALS
HEART RATE: 69 BPM | BODY MASS INDEX: 27 KG/M2 | HEIGHT: 63 IN | OXYGEN SATURATION: 100 % | WEIGHT: 152.4 LBS | SYSTOLIC BLOOD PRESSURE: 110 MMHG | DIASTOLIC BLOOD PRESSURE: 68 MMHG

## 2025-02-25 VITALS
DIASTOLIC BLOOD PRESSURE: 100 MMHG | OXYGEN SATURATION: 95 % | HEART RATE: 59 BPM | SYSTOLIC BLOOD PRESSURE: 130 MMHG | TEMPERATURE: 98.4 F

## 2025-02-25 DIAGNOSIS — I50.32 CHRONIC HEART FAILURE WITH PRESERVED EJECTION FRACTION (HCC): ICD-10-CM

## 2025-02-25 DIAGNOSIS — I48.91 ATRIAL FIBRILLATION WITH RVR (HCC): ICD-10-CM

## 2025-02-25 DIAGNOSIS — I71.21 ANEURYSM OF ASCENDING AORTA WITHOUT RUPTURE (HCC): Primary | ICD-10-CM

## 2025-02-25 PROCEDURE — 99214 OFFICE O/P EST MOD 30 MIN: CPT

## 2025-02-25 PROCEDURE — G0151 HHCP-SERV OF PT,EA 15 MIN: HCPCS

## 2025-02-25 NOTE — PATIENT INSTRUCTIONS
Continue all current medications    Reduced salt in diet    Increase water intake    Consider grief counseling

## 2025-02-26 DIAGNOSIS — G89.4 PAIN SYNDROME, CHRONIC: ICD-10-CM

## 2025-02-26 DIAGNOSIS — F11.220 OPIOID DEPENDENCE WITH UNCOMPLICATED INTOXICATION (HCC): Primary | ICD-10-CM

## 2025-02-26 RX ORDER — OXYCODONE HYDROCHLORIDE 10 MG/1
10 TABLET ORAL EVERY 8 HOURS PRN
Qty: 90 TABLET | Refills: 0 | Status: SHIPPED | OUTPATIENT
Start: 2025-03-02 | End: 2025-03-04 | Stop reason: SDUPTHER

## 2025-02-26 RX ORDER — OXYCODONE HYDROCHLORIDE 10 MG/1
10 TABLET ORAL EVERY 8 HOURS PRN
Refills: 0 | Status: CANCELLED | OUTPATIENT
Start: 2025-02-26

## 2025-02-28 ENCOUNTER — PATIENT OUTREACH (OUTPATIENT)
Dept: FAMILY MEDICINE CLINIC | Facility: CLINIC | Age: 80
End: 2025-02-28

## 2025-02-28 DIAGNOSIS — I50.32 CHRONIC HEART FAILURE WITH PRESERVED EJECTION FRACTION (HCC): Primary | ICD-10-CM

## 2025-02-28 DIAGNOSIS — N18.31 STAGE 3A CHRONIC KIDNEY DISEASE (HCC): ICD-10-CM

## 2025-02-28 NOTE — PROGRESS NOTES
Pt due for outreach.     Chart review completed. Pt was supposed to come into the office this week to meet w/ CASIMIRO CM in person to complete applications for MA and [employer] services. Pt did not show or schedule a specific date to come in and meet w/ CASIMIRO MARTIN.    CASIMIRO MARTIN attempted to reach pt and received her VM box. CASIMIRO MARTIN left a VM. SW will await a call back from pt to gauge interest in applications and ongoing assistance.

## 2025-02-28 NOTE — TELEPHONE ENCOUNTER
Patient calling for refill of eliquis. Made aware refill sent in January with 3 refills. Patient to follow up with pharmacy

## 2025-03-01 DIAGNOSIS — G89.4 PAIN SYNDROME, CHRONIC: ICD-10-CM

## 2025-03-03 RX ORDER — OXYCODONE HYDROCHLORIDE 10 MG/1
TABLET ORAL
Qty: 90 TABLET | OUTPATIENT
Start: 2025-03-03

## 2025-03-04 DIAGNOSIS — G47.00 INSOMNIA, UNSPECIFIED TYPE: ICD-10-CM

## 2025-03-04 DIAGNOSIS — G89.4 PAIN SYNDROME, CHRONIC: ICD-10-CM

## 2025-03-04 RX ORDER — OXYCODONE HYDROCHLORIDE 10 MG/1
10 TABLET ORAL EVERY 8 HOURS PRN
Qty: 90 TABLET | Refills: 0 | Status: SHIPPED | OUTPATIENT
Start: 2025-03-04

## 2025-03-04 RX ORDER — ZOLPIDEM TARTRATE 5 MG/1
5 TABLET ORAL
Qty: 30 TABLET | Refills: 0 | Status: SHIPPED | OUTPATIENT
Start: 2025-03-11

## 2025-03-04 NOTE — TELEPHONE ENCOUNTER
Patient called clinical line, her script for Oxycodone was sent to the wrong pharmacy. Pended to the correct pharmacy. Thank you

## 2025-03-07 ENCOUNTER — PATIENT OUTREACH (OUTPATIENT)
Dept: FAMILY MEDICINE CLINIC | Facility: CLINIC | Age: 80
End: 2025-03-07

## 2025-03-07 NOTE — LETTER
[address] [address]  [phone]    Re:    3/7/2025       Dear Lisa,    I am a  at [facility] and wanted to be certain you had information to contact me should you desire assistance with or have questions about non-medical aspects of your care such as [but not limited to] medical insurance, housing, transportation, material needs, or emergency needs.  If I do not have an answer I will assist you in finding the appropriate agency or individual who can help.      Please feel free to contact me at [phone]. Thank You.    Sincerely,         Niyah Cosme

## 2025-03-07 NOTE — PROGRESS NOTES
Pt due for follow up.    CASIMIRO placed call to pt and received her VM box. CASIMIRO CM introduced self and role and asked pt to call back if she was still interested in scheduling a time to meet w/ SW in the office the complete applications. CASIMIRO will send UTR letter and close program. CASIMIRO remains available for re referral if pt should respond to letter or VM.

## 2025-03-11 ENCOUNTER — PATIENT OUTREACH (OUTPATIENT)
Dept: FAMILY MEDICINE CLINIC | Facility: CLINIC | Age: 80
End: 2025-03-11

## 2025-03-11 NOTE — PROGRESS NOTES
Chronic Care Management Program Consent:    Patient informed of availability of Chronic Care Management services. The services will billed monthly by their Primary Care Provider only. Patient is informed there may be a monthly cost sharing associated with the Chronic Care Management services. Patient is aware that financial counseling is available to assist with any co-pay questions or concerns.    Chronic Care Management services include:    24/7 access to care.  Comprehensive plan of care created by the provider.  Individualized care planning by the care manager(s).  Transitional care support.    The patient is informed that they have the right to stop Chronic Care Management services at anytime.       Patient consents to Chronic Care Management services? No    Patient informed that consent is needed only once unless the patient switches qualifying providers.    I called the patient, introduced my role and the CCM program.  She declines at this time. I asked her to call back if she changes her mind.    Case is being closed.

## 2025-03-14 ENCOUNTER — TELEPHONE (OUTPATIENT)
Dept: OTHER | Facility: OTHER | Age: 80
End: 2025-03-14

## 2025-03-14 NOTE — TELEPHONE ENCOUNTER
Pt reached answering service, she states she's been leaving [facility] since Tuesday for her refill of ambien. I advised rx shows sent to [facility] pharmacy on 3/11--pt is going to contact pharmacy but states it should have gone to [facility].

## 2025-03-17 ENCOUNTER — PATIENT OUTREACH (OUTPATIENT)
Dept: FAMILY MEDICINE CLINIC | Facility: CLINIC | Age: 80
End: 2025-03-17

## 2025-03-17 NOTE — PROGRESS NOTES
CASIMIRO CM received incoming call from pt during OOO hours. CASIMIRO previously closed case due to lack of contact w/ pt.     SW returned call and completed psychosocial assessment w/ pt over the phone. Pt stated she is still interested in assistance applying for Medicaid. Pt stated that she had been in the hospital for several days and she now has medical bills that she is struggling to pay off. CASIMIRO advised SW can help pt over the phone to complete the application right now. Pt declined and stated she preferred to complete the application in person. CASIMIRO advised pt of office hours and when pt can come to the office to meet w/ SW. Pt agreeable to coming in this week to meet w/ SW.    CASIMIRO opened new  program and will plan to meet w/ pt in office this week. CASIMIRO will follow up next week if pt does not come to the office.

## 2025-03-19 ENCOUNTER — PATIENT OUTREACH (OUTPATIENT)
Dept: FAMILY MEDICINE CLINIC | Facility: CLINIC | Age: 80
End: 2025-03-19

## 2025-03-19 NOTE — PROGRESS NOTES
Pt came into office for assistance completed MA and SNAP application. CASIMIRO CM met w/ pt and completed application via [employer]. CASIMIRO CM will follow up on application status.

## 2025-04-01 ENCOUNTER — PATIENT OUTREACH (OUTPATIENT)
Dept: FAMILY MEDICINE CLINIC | Facility: CLINIC | Age: 80
End: 2025-04-01

## 2025-04-01 NOTE — PROGRESS NOTES
SW received call from pt. Pt left a VM stating she received some paperwork from the [employer] regarding her application and needing some paperwork submitted. Pt requesting call back and asking if SW can assist.    CASIMIRO MARTIN placed call to pt w/ success. SW reviewed pt's application via [employer] and reviewed requested verification documents. CASIMIRO noted that the [employer] is requesting proof of residency for pt and pt must provide one of the following documents: utility bill, lease, 's license, bank statement, heating bill, tax document, etc. - OR - form [place] 1795 or   1796, if included in this packet. CASIMIRO informed pt of this and pt stated that is what she received in the mail and was overwhelmed as they were asking for a lot. CASIMIRO advised that pt only needs to provide one of those items and pt expressed understanding. CASIMIRO MARTIN advised that SW can help submit it for pt if she wants to bring one of those items in to the office or she can email SW a picture/copy of one of the items. Pt expressed understanding.     CASIMIRO MARTIN advised pt to call CASIMIRO MARTIN back w/ any questions and that SW will await to hear back from pt regarding the documentation. {T expressed appreciation.

## 2025-04-02 ENCOUNTER — TELEPHONE (OUTPATIENT)
Dept: GASTROENTEROLOGY | Facility: CLINIC | Age: 80
End: 2025-04-02

## 2025-04-02 NOTE — TELEPHONE ENCOUNTER
Called patient regarding 4/2/25 no show appnt., could not leave a message, mailbox is full. Sent letter.

## 2025-04-04 ENCOUNTER — TELEPHONE (OUTPATIENT)
Dept: FAMILY MEDICINE CLINIC | Facility: CLINIC | Age: 80
End: 2025-04-04

## 2025-04-04 DIAGNOSIS — G89.4 PAIN SYNDROME, CHRONIC: ICD-10-CM

## 2025-04-04 DIAGNOSIS — G47.00 INSOMNIA, UNSPECIFIED TYPE: ICD-10-CM

## 2025-04-04 RX ORDER — ZOLPIDEM TARTRATE 5 MG/1
5 TABLET ORAL
Qty: 30 TABLET | Refills: 0 | Status: CANCELLED | OUTPATIENT
Start: 2025-04-10

## 2025-04-04 RX ORDER — OXYCODONE HYDROCHLORIDE 10 MG/1
10 TABLET ORAL EVERY 8 HOURS PRN
Qty: 90 TABLET | Refills: 0 | Status: CANCELLED | OUTPATIENT
Start: 2025-04-04

## 2025-04-04 RX ORDER — ZOLPIDEM TARTRATE 5 MG/1
5 TABLET ORAL
Qty: 30 TABLET | Refills: 0 | Status: SHIPPED | OUTPATIENT
Start: 2025-04-10

## 2025-04-04 RX ORDER — OXYCODONE HYDROCHLORIDE 10 MG/1
10 TABLET ORAL EVERY 8 HOURS PRN
Qty: 90 TABLET | Refills: 0 | Status: SHIPPED | OUTPATIENT
Start: 2025-04-04 | End: 2025-04-10 | Stop reason: SDUPTHER

## 2025-04-04 NOTE — TELEPHONE ENCOUNTER
Patient left message requesting Rx refill.   1. Ambien- [facility]   2. Oxycodone 10mg- [facility]  Please review

## 2025-04-09 DIAGNOSIS — G89.4 PAIN SYNDROME, CHRONIC: ICD-10-CM

## 2025-04-09 NOTE — TELEPHONE ENCOUNTER
[facility] called to follow up on the medication change, they stated that [facility] is not able to fill the medication,

## 2025-04-10 DIAGNOSIS — G89.4 PAIN SYNDROME, CHRONIC: ICD-10-CM

## 2025-04-10 RX ORDER — OXYCODONE HYDROCHLORIDE 10 MG/1
10 TABLET ORAL EVERY 8 HOURS PRN
Qty: 90 TABLET | OUTPATIENT
Start: 2025-04-10

## 2025-04-10 RX ORDER — OXYCODONE HYDROCHLORIDE 10 MG/1
TABLET ORAL
Qty: 90 TABLET | OUTPATIENT
Start: 2025-04-10

## 2025-04-10 RX ORDER — OXYCODONE HYDROCHLORIDE 10 MG/1
10 TABLET ORAL EVERY 8 HOURS PRN
Qty: 90 TABLET | Refills: 0 | Status: SHIPPED | OUTPATIENT
Start: 2025-04-10

## 2025-04-14 ENCOUNTER — PATIENT OUTREACH (OUTPATIENT)
Dept: FAMILY MEDICINE CLINIC | Facility: CLINIC | Age: 80
End: 2025-04-14

## 2025-04-14 NOTE — PROGRESS NOTES
CASIMIRO CM called pt to follow up regarding her MA application. CASIMIRO received pt's VM box and left a message asking for pt to return call to discuss the application and remaining documents that need to be sent. CASIMIRO will await a call back and schedule f/u for next week.

## 2025-04-22 ENCOUNTER — PATIENT OUTREACH (OUTPATIENT)
Dept: FAMILY MEDICINE CLINIC | Facility: CLINIC | Age: 80
End: 2025-04-22

## 2025-04-22 NOTE — PROGRESS NOTES
OP SAM reviewed pt's applications on [employer] website. Per [facility], pt was approved for SNAP and MA.    CASIMIRO MARTIN placed call to pt w/ success and informed pt that she was approved. CASIMIRO advised pt that she will be receiving an Access card in the mail and paperwork to pick out an MCO. CASIMIRO MARTIN advised pt if she has any questions, she can reach out to CASIMIRO MARTIN directly for assistance.     Pt expressed appreciation. She declined having any questions at this time, but stated she might have some in the future. CASIMIRO will remain available to assist as needed. CASIMIRO MARTIN will adjust frequency of sequence to monthly and will remain available over the next month should pt reach out for help.

## 2025-04-28 ENCOUNTER — TELEPHONE (OUTPATIENT)
Dept: FAMILY MEDICINE CLINIC | Facility: CLINIC | Age: 80
End: 2025-04-28

## 2025-04-28 DIAGNOSIS — I48.0 PAROXYSMAL ATRIAL FIBRILLATION (HCC): ICD-10-CM

## 2025-04-28 DIAGNOSIS — F41.8 DEPRESSION WITH ANXIETY: ICD-10-CM

## 2025-04-28 DIAGNOSIS — M62.838 MUSCLE SPASM: ICD-10-CM

## 2025-04-28 RX ORDER — METOPROLOL SUCCINATE 25 MG/1
25 TABLET, EXTENDED RELEASE ORAL 2 TIMES DAILY
Qty: 60 TABLET | Refills: 1 | Status: SHIPPED | OUTPATIENT
Start: 2025-04-28

## 2025-04-28 RX ORDER — CYCLOBENZAPRINE HCL 5 MG
5 TABLET ORAL
Qty: 30 TABLET | Refills: 1 | Status: SHIPPED | OUTPATIENT
Start: 2025-04-28

## 2025-04-28 RX ORDER — CITALOPRAM HYDROBROMIDE 20 MG/1
20 TABLET ORAL DAILY
Qty: 30 TABLET | Refills: 1 | Status: SHIPPED | OUTPATIENT
Start: 2025-04-28

## 2025-04-28 NOTE — TELEPHONE ENCOUNTER
Patient called clinical line asking for a refill of her lasix medication. In her chart that medication was discontinued. Please review thank you

## 2025-04-28 NOTE — TELEPHONE ENCOUNTER
Called patient back she stated its not that she feels like she needs the lasix she was just continuing the medication. She understands the reasoning and is ok with Dr. Dickens not filling the medication.

## 2025-04-29 ENCOUNTER — PATIENT OUTREACH (OUTPATIENT)
Dept: FAMILY MEDICINE CLINIC | Facility: CLINIC | Age: 80
End: 2025-04-29

## 2025-04-29 NOTE — PROGRESS NOTES
OP SAM received call from pt after hours and pt left . Pt stated that she received some papers in the mail and is not understanding what was sent to her and asked for CASIMIRO MARTIN to call pt back.    CASIMIRO MARTIN returned call to pt w/ success. Pt stated she received a letter from the [employer] that they are reviewing her SNAP application. This was dated 4/17. CASIMIRO advised that SW checked pt's application status via [employer] site last week on 4/22 and noted pt was approved for both SNAP and MA. CASIMIRO advised that the letter was mailed out before pt was approved, but assured that pt is approved for state benefits. CASIMIRO MARTIN advised pt that she should be getting an Access Card in the mail for her benefits. CASIMIRO MARTIN advised pt to let SW know when she receives this and SW can assist further. Pt expressed appreciation and understanding.     SW will await follow up from pt.

## 2025-05-02 DIAGNOSIS — M79.601 MUSCULOSKELETAL ARM PAIN, RIGHT: ICD-10-CM

## 2025-05-02 DIAGNOSIS — G47.00 INSOMNIA, UNSPECIFIED TYPE: ICD-10-CM

## 2025-05-02 RX ORDER — ZOLPIDEM TARTRATE 5 MG/1
5 TABLET ORAL
Qty: 30 TABLET | Refills: 2 | Status: SHIPPED | OUTPATIENT
Start: 2025-05-02

## 2025-05-02 RX ORDER — GABAPENTIN 300 MG/1
900 CAPSULE ORAL 3 TIMES DAILY
Qty: 270 CAPSULE | Refills: 1 | Status: SHIPPED | OUTPATIENT
Start: 2025-05-02

## 2025-05-02 RX ORDER — ZOLPIDEM TARTRATE 5 MG/1
5 TABLET ORAL
Qty: 30 TABLET | Refills: 0 | Status: CANCELLED | OUTPATIENT
Start: 2025-05-02

## 2025-05-05 ENCOUNTER — PATIENT OUTREACH (OUTPATIENT)
Dept: FAMILY MEDICINE CLINIC | Facility: CLINIC | Age: 80
End: 2025-05-05

## 2025-05-05 NOTE — PROGRESS NOTES
CASIMIRO CM received incoming call and VM from pt during out of office ours. Pt left VM requesting SW assistance reviewing paperwork from the [facility] office. Pt stated she has an appt on the 20th in the office w/ Dr. Dickens and she expressed confusion w/ what insurance she has.    SW placed return call to pt and received her VM box. CASIMIRO CM left a message stating if pt would like to meet prior to the 20th, pt can call SW and schedule a time to meet otherwise CASIMIRO CM can meet with pt in office on the 20th. CASIMIRO advised pt to return call if needed.    Addendum:  Pt returned CASIMIRO CM phone call, but CASIMIRO CM was unavailable to take the call and pt left VM. SW attempted to return call when CASIMIRO CM was available and received pt's VM. SW left a message requesting a return call.

## 2025-05-06 ENCOUNTER — OFFICE VISIT (OUTPATIENT)
Dept: FAMILY MEDICINE CLINIC | Facility: CLINIC | Age: 80
End: 2025-05-06

## 2025-05-06 VITALS
DIASTOLIC BLOOD PRESSURE: 60 MMHG | TEMPERATURE: 98.6 F | SYSTOLIC BLOOD PRESSURE: 91 MMHG | OXYGEN SATURATION: 95 % | HEART RATE: 79 BPM | WEIGHT: 153 LBS | BODY MASS INDEX: 27.1 KG/M2 | RESPIRATION RATE: 18 BRPM

## 2025-05-06 DIAGNOSIS — F51.01 PRIMARY INSOMNIA: ICD-10-CM

## 2025-05-06 DIAGNOSIS — G89.4 CHRONIC PAIN SYNDROME: ICD-10-CM

## 2025-05-06 DIAGNOSIS — Z79.899 HIGH RISK MEDICATION USE: ICD-10-CM

## 2025-05-06 DIAGNOSIS — F41.8 DEPRESSION WITH ANXIETY: ICD-10-CM

## 2025-05-06 DIAGNOSIS — F11.20 CONTINUOUS OPIOID DEPENDENCE (HCC): Primary | ICD-10-CM

## 2025-05-06 DIAGNOSIS — Z79.899 OTHER LONG TERM (CURRENT) DRUG THERAPY: ICD-10-CM
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
## 2025-05-06 DIAGNOSIS — G89.4 PAIN SYNDROME, CHRONIC: ICD-10-CM

## 2025-05-06 DIAGNOSIS — R63.0 DECREASED APPETITE: ICD-10-CM

## 2025-05-06 RX ORDER — OXYCODONE HYDROCHLORIDE 10 MG/1
10 TABLET ORAL EVERY 8 HOURS PRN
Qty: 90 TABLET | Refills: 0 | Status: CANCELLED | OUTPATIENT
Start: 2025-05-06

## 2025-05-06 RX ORDER — LIDOCAINE 4 G/G
1 PATCH TOPICAL DAILY
Qty: 10 PATCH | Refills: 0 | Status: SHIPPED | OUTPATIENT
Start: 2025-05-06

## 2025-05-06 RX ORDER — OXYCODONE HYDROCHLORIDE 10 MG/1
10 TABLET ORAL EVERY 8 HOURS PRN
Qty: 90 TABLET | Refills: 0 | Status: SHIPPED | OUTPATIENT
Start: 2025-05-06 | End: 2025-05-07 | Stop reason: SDUPTHER

## 2025-05-06 RX ORDER — MIRTAZAPINE 7.5 MG/1
7.5 TABLET, FILM COATED ORAL
Qty: 90 TABLET | Refills: 0 | Status: SHIPPED | OUTPATIENT
Start: 2025-05-06

## 2025-05-06 NOTE — ASSESSMENT & PLAN NOTE
Patient notes decreased appetite recently.  This is multifactorial involving her depression, frailty, poor access to food, Ambien utilization.    The Celexa helps her depression slightly.  Will keep the dose the same today and add on Remeron for appetite stimulation as well as add on Ensure supplementation.  Continue with melatonin and Ambien use as prescribed.  Orders:    mirtazapine (REMERON) 7.5 MG tablet; Take 1 tablet (7.5 mg total) by mouth daily at bedtime

## 2025-05-06 NOTE — TELEPHONE ENCOUNTER
Patient left message on rx line requesting refill on the Oxycodone. Upcoming appointment on 5/20/25 with Dr. Dickens. Thank you.    Requested Prescriptions     Pending Prescriptions Disp Refills    oxyCODONE (ROXICODONE) 10 MG TABS 90 tablet 0     Sig: Take 1 tablet (10 mg total) by mouth every 8 (eight) hours as needed for severe pain Max Daily Amount: 30 mg

## 2025-05-06 NOTE — ASSESSMENT & PLAN NOTE
Had a fall 5 weeks ago.  Had some bruising on her right shoulder that has resolved.  Patient states she awoke in the middle of the night and ended up on the floor.  She does not remember how this occurred.  Since then she has had no further episodes of this.  She has not had this worked up yet.  Will order x-ray imaging for rule out.  She is able to actively move her right shoulder but range of motion is restricted with abduction.  Rule out fracture will follow-up on x-ray results. Trial lidocaine patches   Orders:    XR shoulder 2+ vw right; Future    Lidocaine (HM Lidocaine Patch) 4 % PTCH; Apply 1 patch topically in the morning

## 2025-05-06 NOTE — PROGRESS NOTES
Name: Lisa Marques      : 1945      MRN: [id number]  Encounter Provider: Raz Dickens DO  Encounter Date: 2025   Encounter department: [facility] [place]    :  Assessment & Plan  Continuous opioid dependence (HCC)  Continue with current ambien and oxy doses. Tolerated dec doses recently well. PDMP reviewed today       Fall, initial encounter  Had a fall 5 weeks ago.  Had some bruising on her right shoulder that has resolved.  Patient states she awoke in the middle of the night and ended up on the floor.  She does not remember how this occurred.  Since then she has had no further episodes of this.  She has not had this worked up yet.  Will order x-ray imaging for rule out.  She is able to actively move her right shoulder but range of motion is restricted with abduction.  Rule out fracture will follow-up on x-ray results. Trial lidocaine patches   Orders:    XR shoulder 2+ vw right; Future    Lidocaine (HM Lidocaine Patch) 4 % PTCH; Apply 1 patch topically in the morning    Primary insomnia  Patient notes decreased appetite recently.  This is multifactorial involving her depression, frailty, poor access to food, Ambien utilization.    The Celexa helps her depression slightly.  Will keep the dose the same today and add on Remeron for appetite stimulation as well as add on Ensure supplementation.  Continue with melatonin and Ambien use as prescribed.  Orders:    mirtazapine (REMERON) 7.5 MG tablet; Take 1 tablet (7.5 mg total) by mouth daily at bedtime    Decreased appetite  As above   Orders:    Nutritional Supplements (Ensure Complete Shake) LIQD; Take 1 Bottle by mouth 2 (two) times a day with meals    Depression with anxiety  As above          Chronic pain syndrome         High risk medication use         Other long term (current) drug therapy             Treatment Plan: Continue current care plan.  She has recently cut down on her preference of her oxycodone dosing  and her Ambien was cut from 10 to 5 mg    Treatment Goals: To maximize pain control while lowering drug burden.    Opiate risks  There are risks associated with opioid medications, including dependence, addiction and tolerance. The patient understands and agrees to use these medications only as prescribed. Potential side effects of the medications include, but are not limited to, constipation, drowsiness, addiction, impaired judgment and risk of fatal overdose if not taken as prescribed. The patient was warned against driving while taking sedation medications.  Sharing medications is a felony. At this point in time, the patient is showing no signs of addiction, abuse, diversion or suicidal ideation.      Pateint is taking concurrent benzodiazepines. Has been counseled on the risks of taking opioids and benzodiazepines including sedation, increased fall risk, dizziness, addictive potential and death.      Patient is taking concurrent muscle relaxers.  Has been counseled on the risks of taking opioids and muscle relaxers including sedation, increased fall risk, dizziness, addictive potential and death.      Patient has a high risk condition (age > 65, MARIBEL, renal or hepatic impairment, mental health condition, use of alcohol or other substances). Has been counseled on the specific risks of taking opioids with these conditions and the increased risks including including sedation, increased fall risk, dizziness, addictive potential and death.      PDMP review  [place] or [place]  reviewed. No red flags were identified; safe to proceed with prescription      discussing diagnostic results, instructions for management, patient and family education, impressions, documenting in the medical record, reviewing/ordering tests, medicine, procedures and obtaining or reviewing history.           History of Present Illness     Opioid Management:   Type of visit: Follow-up    Pain related diagnoses: knee OA, spondylolithesis    Interval  history: No changes since prior     Aberrant behavior?: No      Adverse effects from medication?: No      Screening Tools/Assessments:    PHQ-2/9:  Last PHQ-2 score: 0 (Last PHQ-2 date: 5/2/2024)  Last PHQ-9 score: 17 (Last PHQ-9 date: 2/18/2025)    Brief Pain Inventory (BPI):  1) Throughout our lives, most of us have had pain from time to time (such as minor headaches, sprains, and toothaches). Have you had pain other than these everyday kinds of pain today?  2) Where is your pain located?  3) Rate your pain at its worst in the last 24 hours: 7  4) Rate your pain at its least in the last 24 hours: 7  5) Rate your average level of pain: 7  6) Rate your pain right now: 8  7) What treatments or medications are you receiving for your pain? oxycodone  8) In the past 24 hours, how much relief have pain treatments or medication provided? 80%  9) During the past 24 hours, pain has interfered with your:     A) General activity: 7     B) Mood: 7     C) Walking ability: 8     D) Normal work (work outside the home & housework): 7     E) Relations with other people: 7     F) Sleep: 9     G) Enjoyment of life: 8    Drug Screen:  Date of last drug screen: 11/23/2024    Drug screen comments: Pt cannot give urine in office today. Will go to lab to complete     Opioid agreement:  Active Opioid agreement on file?: Yes    Opioid agreement signed date: 5/2/2024  Opioid agreement expiration date: 5/2/2025    Naloxone:  Currently prescribed Naloxone (Narcan): No    Reason not prescribing Naloxone: patient declines      Outpatient Morphine Milligram Equivalents Per Day       5/6/25 and after 45 MME/Day      Order Name Dose Route Frequency Maximum MME/Day     oxyCODONE (ROXICODONE) 10 MG TABS 10 mg Oral Every 8 hours PRN 45 MME/Day    Total Potential Morphine Milligram Equivalents Per Day 45 MME/Day      Calculation Information          oxyCODONE (ROXICODONE) 10 MG TABS    oxyCODONE 10 MG Tabs: maximum daily dose of 30 mg * morphine  equivalence factor of 1.5 = 45 MME/Day                                 PDMP Review         Value Time User    PDMP Reviewed  Yes 5/6/2025  1:50 PM Felicity Aviles MD           Review of Systems   Constitutional:  Negative for activity change, chills, diaphoresis, fatigue, fever and unexpected weight change.   Respiratory:  Negative for cough, chest tightness, shortness of breath and wheezing.    Cardiovascular:  Negative for chest pain, palpitations and leg swelling.   Gastrointestinal:  Negative for abdominal pain, blood in stool, constipation, diarrhea, nausea and vomiting.   Musculoskeletal:  Negative for back pain.        Right shoulder pain    Skin:  Negative for color change.   Neurological:  Negative for dizziness, weakness and light-headedness.   Psychiatric/Behavioral:  The patient is not nervous/anxious.      Objective   BP 91/60 (BP Location: Left arm, Patient Position: Sitting, Cuff Size: Standard)   Pulse 79   Temp 98.6 °F (37 °C)   Resp 18   Wt 69.4 kg (153 lb)   SpO2 95%   BMI 27.10 kg/m²        Physical Exam  Constitutional:       General: She is not in acute distress.     Appearance: Normal appearance. She is normal weight. She is not ill-appearing, toxic-appearing or diaphoretic.   HENT:      Head: Normocephalic and atraumatic.      Right Ear: External ear normal.      Left Ear: External ear normal.      Nose: Nose normal.   Eyes:      Conjunctiva/sclera: Conjunctivae normal.   Cardiovascular:      Rate and Rhythm: Normal rate and regular rhythm.      Pulses: Normal pulses.      Heart sounds: Normal heart sounds. No murmur heard.     No friction rub. No gallop.   Pulmonary:      Effort: Pulmonary effort is normal. No respiratory distress.      Breath sounds: Normal breath sounds. No stridor. No wheezing, rhonchi or rales.   Abdominal:      Palpations: Abdomen is soft.   Musculoskeletal:         General: Tenderness present.      Cervical back: Normal range of motion and neck supple.       Comments: Right shoulder tenderness    Skin:     General: Skin is warm and dry.      Capillary Refill: Capillary refill takes less than 2 seconds.      Coloration: Skin is not jaundiced.   Neurological:      Mental Status: She is alert and oriented to person, place, and time. Mental status is at baseline.      Gait: Gait normal.   Psychiatric:         Mood and Affect: Mood normal.         Behavior: Behavior normal.         Thought Content: Thought content normal.         Judgment: Judgment normal.       Raz Dickens DO  PGY-2 [facility]   05/06/25, 4:48 PM

## 2025-05-07 DIAGNOSIS — G89.4 PAIN SYNDROME, CHRONIC: ICD-10-CM

## 2025-05-07 RX ORDER — OXYCODONE HYDROCHLORIDE 10 MG/1
10 TABLET ORAL EVERY 8 HOURS PRN
Qty: 90 TABLET | Refills: 0 | Status: SHIPPED | OUTPATIENT
Start: 2025-05-07

## 2025-05-07 RX ORDER — OXYCODONE HYDROCHLORIDE 10 MG/1
TABLET ORAL
Qty: 90 TABLET | OUTPATIENT
Start: 2025-05-07

## 2025-05-14 ENCOUNTER — PATIENT OUTREACH (OUTPATIENT)
Dept: FAMILY MEDICINE CLINIC | Facility: CLINIC | Age: 80
End: 2025-05-14

## 2025-05-14 NOTE — PROGRESS NOTES
OP SWCM received call from pt requesting assistance contacting [facility] office regarding pt's benefits. Pt stated she is receiving paperwork regarding MA and she is unsure what they mean.     SW placed call to [facility] Assistance Office. After 30+ minute wait, SW spoke w/ a  w/ success. SW introduced self and role and reason for calling on behalf of pt. The  stated that SW did not provide the correct CRISTIANO to speak on behalf of pt but that she would try to answer CASIMIRO MARTIN question. SW asked about pt's benefit status. She stated pt was approved for SNAP and partial medical coverage. She stated that pt was approved for her Medicare premium to be covered. SW confirmed that pt's SNAP is active and  confirmed. SW expressed appreciation.    SW placed call to pt w/ success. Pt explained this to pt and pt expressed understanding. SW discussed PACE w/ pt but pt stated that the cost of her medications is not a problem for her. SW expressed understanding. CASIMIRO advised if pt had wanted to reconsider applying, SW will be in the office at her next appt next week to assist. Pt stated that she does not need that appt as she was seen in the office last week. CASIMIRO CM confirmed w/ pt that she did not want that appt anymore. CASIMIRO sent IB message to clerical about cancelling appt.     CASIMIRO will close out  program and advised pt how to contact CASIMIRO MARTIN if any future needs or questions arise. Pt expressed appreciation.

## 2025-05-29 ENCOUNTER — APPOINTMENT (OUTPATIENT)
Dept: LAB | Age: 80
End: 2025-05-29
Payer: COMMERCIAL

## 2025-05-29 ENCOUNTER — APPOINTMENT (OUTPATIENT)
Dept: RADIOLOGY | Age: 80
End: 2025-05-29
Payer: COMMERCIAL

## 2025-05-29 DIAGNOSIS — Z79.891 LONG TERM (CURRENT) USE OF OPIATE ANALGESIC: ICD-10-CM

## 2025-05-29 DIAGNOSIS — E55.9 VITAMIN D DEFICIENCY: ICD-10-CM

## 2025-05-29 DIAGNOSIS — E86.1 HYPOTENSION DUE TO HYPOVOLEMIA: ICD-10-CM
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
## 2025-05-29 LAB
25(OH)D3 SERPL-MCNC: 11.5 NG/ML (ref 30–100)
ALBUMIN SERPL BCG-MCNC: 3.8 G/DL (ref 3.5–5)
ALP SERPL-CCNC: 92 U/L (ref 34–104)
ALT SERPL W P-5'-P-CCNC: 16 U/L (ref 7–52)
ANION GAP SERPL CALCULATED.3IONS-SCNC: 11 MMOL/L (ref 4–13)
AST SERPL W P-5'-P-CCNC: 25 U/L (ref 13–39)
BASOPHILS # BLD AUTO: 0.03 THOUSANDS/ÂΜL (ref 0–0.1)
BASOPHILS NFR BLD AUTO: 1 % (ref 0–1)
BILIRUB SERPL-MCNC: 0.9 MG/DL (ref 0.2–1)
BUN SERPL-MCNC: 13 MG/DL (ref 5–25)
CALCIUM SERPL-MCNC: 8.9 MG/DL (ref 8.4–10.2)
CHLORIDE SERPL-SCNC: 103 MMOL/L (ref 96–108)
CO2 SERPL-SCNC: 27 MMOL/L (ref 21–32)
CREAT SERPL-MCNC: 1.18 MG/DL (ref 0.6–1.3)
EOSINOPHIL # BLD AUTO: 0 THOUSAND/ÂΜL (ref 0–0.61)
EOSINOPHIL NFR BLD AUTO: 0 % (ref 0–6)
ERYTHROCYTE [DISTWIDTH] IN BLOOD BY AUTOMATED COUNT: 19 % (ref 11.6–15.1)
GFR SERPL CREATININE-BSD FRML MDRD: 44 ML/MIN/1.73SQ M
GLUCOSE SERPL-MCNC: 123 MG/DL (ref 65–140)
HCT VFR BLD AUTO: 43.3 % (ref 34.8–46.1)
HGB BLD-MCNC: 13.3 G/DL (ref 11.5–15.4)
IMM GRANULOCYTES # BLD AUTO: 0.05 THOUSAND/UL (ref 0–0.2)
IMM GRANULOCYTES NFR BLD AUTO: 1 % (ref 0–2)
LYMPHOCYTES # BLD AUTO: 1.32 THOUSANDS/ÂΜL (ref 0.6–4.47)
LYMPHOCYTES NFR BLD AUTO: 25 % (ref 14–44)
MAGNESIUM SERPL-MCNC: 1.8 MG/DL (ref 1.9–2.7)
MCH RBC QN AUTO: 27.5 PG (ref 26.8–34.3)
MCHC RBC AUTO-ENTMCNC: 30.7 G/DL (ref 31.4–37.4)
MCV RBC AUTO: 90 FL (ref 82–98)
MONOCYTES # BLD AUTO: 0.43 THOUSAND/ÂΜL (ref 0.17–1.22)
MONOCYTES NFR BLD AUTO: 8 % (ref 4–12)
NEUTROPHILS # BLD AUTO: 3.41 THOUSANDS/ÂΜL (ref 1.85–7.62)
NEUTS SEG NFR BLD AUTO: 65 % (ref 43–75)
NRBC BLD AUTO-RTO: 0 /100 WBCS
PLATELET # BLD AUTO: 226 THOUSANDS/UL (ref 149–390)
PMV BLD AUTO: 11.4 FL (ref 8.9–12.7)
POTASSIUM SERPL-SCNC: 4 MMOL/L (ref 3.5–5.3)
PROT SERPL-MCNC: 6.6 G/DL (ref 6.4–8.4)
RBC # BLD AUTO: 4.84 MILLION/UL (ref 3.81–5.12)
SODIUM SERPL-SCNC: 141 MMOL/L (ref 135–147)
WBC # BLD AUTO: 5.24 THOUSAND/UL (ref 4.31–10.16)

## 2025-05-29 PROCEDURE — 83735 ASSAY OF MAGNESIUM: CPT

## 2025-05-29 PROCEDURE — 73030 X-RAY EXAM OF SHOULDER: CPT

## 2025-05-29 PROCEDURE — 80053 COMPREHEN METABOLIC PANEL: CPT

## 2025-05-29 PROCEDURE — 36415 COLL VENOUS BLD VENIPUNCTURE: CPT

## 2025-05-29 PROCEDURE — 85025 COMPLETE CBC W/AUTO DIFF WBC: CPT

## 2025-05-29 PROCEDURE — 82306 VITAMIN D 25 HYDROXY: CPT

## 2025-05-29 PROCEDURE — 80365 DRUG SCREENING OXYCODONE: CPT

## 2025-05-29 PROCEDURE — 80307 DRUG TEST PRSMV CHEM ANLYZR: CPT

## 2025-05-30 ENCOUNTER — RESULTS FOLLOW-UP (OUTPATIENT)
Dept: FAMILY MEDICINE CLINIC | Facility: CLINIC | Age: 80
End: 2025-05-30

## 2025-05-30 ENCOUNTER — TELEPHONE (OUTPATIENT)
Dept: FAMILY MEDICINE CLINIC | Facility: CLINIC | Age: 80
End: 2025-05-30

## 2025-05-30 DIAGNOSIS — E83.42 HYPOMAGNESEMIA: Primary | ICD-10-CM

## 2025-05-30 DIAGNOSIS — E55.9 VITAMIN D DEFICIENCY: ICD-10-CM

## 2025-05-30 RX ORDER — MAGNESIUM OXIDE 400 MG/1
400 TABLET ORAL 2 TIMES DAILY
Qty: 60 TABLET | Refills: 0 | Status: SHIPPED | OUTPATIENT
Start: 2025-05-30

## 2025-05-30 RX ORDER — ERGOCALCIFEROL 1.25 MG/1
50000 CAPSULE, LIQUID FILLED ORAL WEEKLY
Qty: 8 CAPSULE | Refills: 0 | Status: SHIPPED | OUTPATIENT
Start: 2025-05-30 | End: 2025-07-19

## 2025-05-30 NOTE — TELEPHONE ENCOUNTER
Discussed need to replete vitamin D and magnesium. Also discussed drop in renal function and emphasis on staying hydrated.     Will send for high dose vit D and 30 days of mag supplementation to remediate levels.    Follow up as scheduled in August for appointment     Reviewed a healthy diet to adhere to

## 2025-06-02 DIAGNOSIS — G89.4 PAIN SYNDROME, CHRONIC: ICD-10-CM

## 2025-06-02 RX ORDER — OXYCODONE HYDROCHLORIDE 10 MG/1
10 TABLET ORAL EVERY 8 HOURS PRN
Qty: 90 TABLET | Refills: 0 | Status: SHIPPED | OUTPATIENT
Start: 2025-06-05 | End: 2025-06-05 | Stop reason: SDUPTHER

## 2025-06-03 ENCOUNTER — TELEPHONE (OUTPATIENT)
Dept: FAMILY MEDICINE CLINIC | Facility: CLINIC | Age: 80
End: 2025-06-03

## 2025-06-03 DIAGNOSIS — S43.001A ACQUIRED SUBLUXATION OF RIGHT SHOULDER, INITIAL ENCOUNTER: Primary | ICD-10-CM

## 2025-06-03 LAB
OXYCODONE UR QL CFM: 1308 NG/ML
OXYCODONE+OXYMORPHONE SERPLBLD QL SCN: POSITIVE
OXYCODONE+OXYMORPHONE UR QL SCN: NORMAL NG/ML
OXYCODONE+OXYMORPHONE UR QL SCN: POSITIVE
OXYMORPHONE UR CFM-MCNC: 2159 NG/ML
OXYMORPHONE UR QL CFM: POSITIVE

## 2025-06-03 NOTE — TELEPHONE ENCOUNTER
Hi, this is Lisa Marques calling. I had an X-ray taken off my shoulder last Thursday and some blood work in a urine, but Doctor Maninder called me back about my blood work results. But my right shoulder is really. It's very painful and I can't hardly use that or very well anymore. I have pain medicine for other things which I called. I need a refill on that as soon as you can do that, but I'm interested in on this X-ray for my shoulder because it's really painful. Somebody can give me a call back. I'll let that X-ray showed my number is [phone]. Thank you.  You received a voice mail from [phone]

## 2025-06-05 DIAGNOSIS — G89.4 PAIN SYNDROME, CHRONIC: ICD-10-CM

## 2025-06-05 RX ORDER — OXYCODONE HYDROCHLORIDE 10 MG/1
10 TABLET ORAL EVERY 8 HOURS PRN
Qty: 90 TABLET | Refills: 0 | Status: SHIPPED | OUTPATIENT
Start: 2025-06-05

## 2025-06-05 NOTE — TELEPHONE ENCOUNTER
Patient called asking about her medication. Medication is suppose to go to [facility] pharmacy not [facility] [place]. Please send script to the pharmacy again. Pended to the correct pharmacy now. Thank you

## 2025-06-17 ENCOUNTER — OFFICE VISIT (OUTPATIENT)
Dept: FAMILY MEDICINE CLINIC | Facility: CLINIC | Age: 80
End: 2025-06-17

## 2025-06-17 VITALS
HEART RATE: 103 BPM | WEIGHT: 147 LBS | BODY MASS INDEX: 26.05 KG/M2 | HEIGHT: 63 IN | SYSTOLIC BLOOD PRESSURE: 132 MMHG | RESPIRATION RATE: 20 BRPM | TEMPERATURE: 98 F | DIASTOLIC BLOOD PRESSURE: 88 MMHG

## 2025-06-17 DIAGNOSIS — G89.29 CHRONIC RIGHT SHOULDER PAIN: Primary | ICD-10-CM

## 2025-06-17 DIAGNOSIS — E55.9 VITAMIN D DEFICIENCY: ICD-10-CM

## 2025-06-17 DIAGNOSIS — M25.511 CHRONIC RIGHT SHOULDER PAIN: Primary | ICD-10-CM

## 2025-06-17 PROCEDURE — 99213 OFFICE O/P EST LOW 20 MIN: CPT | Performed by: FAMILY MEDICINE

## 2025-06-17 RX ORDER — ACETAMINOPHEN 325 MG/1
975 TABLET ORAL EVERY 8 HOURS SCHEDULED
Qty: 270 TABLET | Refills: 3 | Status: SHIPPED | OUTPATIENT
Start: 2025-06-17

## 2025-06-17 RX ORDER — ERGOCALCIFEROL 1.25 MG/1
50000 CAPSULE, LIQUID FILLED ORAL WEEKLY
Qty: 12 CAPSULE | Refills: 0 | Status: SHIPPED | OUTPATIENT
Start: 2025-06-17 | End: 2025-09-03

## 2025-06-17 NOTE — PROGRESS NOTES
"Name: Lisa Marques      : 1945      MRN: [id number]  Encounter Provider: Kemi Elliott MD  Encounter Date: 2025   Encounter department: [facility] [place]  :  Assessment & Plan  Chronic right shoulder pain  - Increased pain recently in R shoulder   - Recent R shoulder Xray: Severe end-stage degenerative change of the glenohumeral joint. No fractures.  - Decreased ROM of R shoulder compared to L, states \"pulling sensation\"  Orders:    Diclofenac Sodium (VOLTAREN) 1 %; Apply 2 g topically 4 (four) times a day    Ambulatory Referral to Physical Therapy; Future    acetaminophen (TYLENOL) 325 mg tablet; Take 3 tablets (975 mg total) by mouth every 8 (eight) hours  Offered orthopedic referral - patient declined, will try PT first before considering surgical/injection options  Vitamin D deficiency  - Low vit D  - Previously prescribed high dose Vit D however patient states pharmacy did not call for   Orders:    ergocalciferol (VITAMIN D2) 50,000 units; Take 1 capsule (50,000 Units total) by mouth once a week for 12 doses           History of Present Illness   80 yo female with PMH of severe osteoarthritis presents for same day appointment due to worsening of R shoulder pain. Previously had X ray performed that shows severe stage osteoarthritis. Pain initially started after fall in May, was hospitalized, and pain still persisted. Denies recent falls after hospitalization. Not ready for surgical options as yet. Will also consider corticosteroid shot if PT is not sufficient. On chronic opioids for back pain.      Review of Systems   Constitutional:  Negative for chills and fever.   HENT:  Negative for ear pain and sore throat.    Eyes:  Negative for pain and visual disturbance.   Respiratory:  Negative for cough and shortness of breath.    Cardiovascular:  Negative for chest pain and palpitations.   Gastrointestinal:  Negative for abdominal pain and vomiting. " "  Genitourinary:  Negative for dysuria and hematuria.   Musculoskeletal:  Positive for arthralgias. Negative for back pain.   Skin:  Negative for color change and rash.   Neurological:  Negative for seizures and syncope.   All other systems reviewed and are negative.      Objective   /88   Pulse 103   Temp 98 °F (36.7 °C) (Temporal)   Resp 20   Ht 5' 3\" (1.6 m)   Wt 66.7 kg (147 lb)   BMI 26.04 kg/m²      Physical Exam  Vitals and nursing note reviewed.   Constitutional:       General: She is not in acute distress.     Appearance: She is well-developed. She is not ill-appearing.   HENT:      Head: Normocephalic and atraumatic.     Eyes:      Conjunctiva/sclera: Conjunctivae normal.       Cardiovascular:      Rate and Rhythm: Normal rate and regular rhythm.      Heart sounds: No murmur heard.  Pulmonary:      Effort: Pulmonary effort is normal. No respiratory distress.      Breath sounds: Normal breath sounds.   Abdominal:      Palpations: Abdomen is soft.      Tenderness: There is no abdominal tenderness.     Musculoskeletal:         General: No swelling.      Right shoulder: Bony tenderness present. Decreased range of motion. Normal pulse.      Left shoulder: Normal.      Cervical back: Neck supple.      Right lower leg: No edema.      Left lower leg: No edema.     Skin:     General: Skin is warm and dry.      Capillary Refill: Capillary refill takes less than 2 seconds.     Neurological:      Mental Status: She is alert and oriented to person, place, and time.     Psychiatric:         Mood and Affect: Mood normal.         "

## 2025-06-17 NOTE — ASSESSMENT & PLAN NOTE
Orders:    acetaminophen (TYLENOL) 325 mg tablet; Take 3 tablets (975 mg total) by mouth every 8 (eight) hours

## 2025-06-17 NOTE — ASSESSMENT & PLAN NOTE
"- Increased pain recently in R shoulder   - Recent R shoulder Xray: Severe end-stage degenerative change of the glenohumeral joint. No fractures.  - Decreased ROM of R shoulder compared to L, states \"pulling sensation\"  Orders:    Diclofenac Sodium (VOLTAREN) 1 %; Apply 2 g topically 4 (four) times a day    Ambulatory Referral to Physical Therapy; Future    acetaminophen (TYLENOL) 325 mg tablet; Take 3 tablets (975 mg total) by mouth every 8 (eight) hours  Offered orthopedic referral - patient declined, will try PT first before considering surgical/injection options  "

## 2025-06-17 NOTE — ASSESSMENT & PLAN NOTE
- Low vit D  - Previously prescribed high dose Vit D however patient states pharmacy did not call for   Orders:    ergocalciferol (VITAMIN D2) 50,000 units; Take 1 capsule (50,000 Units total) by mouth once a week for 12 doses

## 2025-07-01 DIAGNOSIS — F41.8 DEPRESSION WITH ANXIETY: ICD-10-CM

## 2025-07-01 DIAGNOSIS — M62.838 MUSCLE SPASM: ICD-10-CM

## 2025-07-01 DIAGNOSIS — K26.9 DUODENAL ULCER: ICD-10-CM

## 2025-07-01 DIAGNOSIS — G89.4 PAIN SYNDROME, CHRONIC: ICD-10-CM

## 2025-07-01 RX ORDER — CITALOPRAM HYDROBROMIDE 20 MG/1
20 TABLET ORAL DAILY
Qty: 90 TABLET | Refills: 1 | Status: SHIPPED | OUTPATIENT
Start: 2025-07-01

## 2025-07-01 RX ORDER — PANTOPRAZOLE SODIUM 40 MG/1
40 TABLET, DELAYED RELEASE ORAL
Qty: 90 TABLET | Refills: 1 | Status: SHIPPED | OUTPATIENT
Start: 2025-07-01

## 2025-07-01 RX ORDER — OXYCODONE HYDROCHLORIDE 10 MG/1
10 TABLET ORAL EVERY 8 HOURS PRN
Qty: 90 TABLET | Refills: 0 | Status: SHIPPED | OUTPATIENT
Start: 2025-07-03

## 2025-07-01 RX ORDER — CYCLOBENZAPRINE HCL 5 MG
5 TABLET ORAL
Qty: 30 TABLET | Refills: 1 | Status: SHIPPED | OUTPATIENT
Start: 2025-07-01

## 2025-07-01 RX ORDER — OXYCODONE HYDROCHLORIDE 10 MG/1
10 TABLET ORAL EVERY 8 HOURS PRN
Qty: 90 TABLET | Refills: 0 | Status: CANCELLED | OUTPATIENT
Start: 2025-07-01

## 2025-07-17 ENCOUNTER — OFFICE VISIT (OUTPATIENT)
Dept: FAMILY MEDICINE CLINIC | Facility: CLINIC | Age: 80
End: 2025-07-17

## 2025-07-17 VITALS
HEIGHT: 63 IN | HEART RATE: 73 BPM | SYSTOLIC BLOOD PRESSURE: 93 MMHG | BODY MASS INDEX: 26.4 KG/M2 | TEMPERATURE: 98 F | DIASTOLIC BLOOD PRESSURE: 64 MMHG | RESPIRATION RATE: 20 BRPM | WEIGHT: 149 LBS | OXYGEN SATURATION: 92 %

## 2025-07-17 DIAGNOSIS — F51.01 PRIMARY INSOMNIA: ICD-10-CM

## 2025-07-17 DIAGNOSIS — M19.011 ARTHRITIS OF RIGHT SHOULDER: Primary | ICD-10-CM

## 2025-07-17 DIAGNOSIS — F11.29 OPIOID DEPENDENCE WITH OPIOID-INDUCED DISORDER (HCC): ICD-10-CM

## 2025-07-17 DIAGNOSIS — I48.0 PAROXYSMAL ATRIAL FIBRILLATION (HCC): ICD-10-CM

## 2025-07-17 PROCEDURE — 99213 OFFICE O/P EST LOW 20 MIN: CPT | Performed by: FAMILY MEDICINE

## 2025-07-17 PROCEDURE — 80365 DRUG SCREENING OXYCODONE: CPT

## 2025-07-17 PROCEDURE — 80307 DRUG TEST PRSMV CHEM ANLYZR: CPT

## 2025-07-17 RX ORDER — MIRTAZAPINE 7.5 MG/1
7.5 TABLET, FILM COATED ORAL
Qty: 90 TABLET | Refills: 2 | Status: SHIPPED | OUTPATIENT
Start: 2025-07-17 | End: 2025-07-30 | Stop reason: SDUPTHER

## 2025-07-17 RX ORDER — METOPROLOL SUCCINATE 25 MG/1
25 TABLET, EXTENDED RELEASE ORAL DAILY
Qty: 90 TABLET | Refills: 1 | Status: SHIPPED | OUTPATIENT
Start: 2025-07-17 | End: 2025-07-30 | Stop reason: SDUPTHER

## 2025-07-17 RX ORDER — ACETAMINOPHEN 500 MG
1000 TABLET ORAL 3 TIMES DAILY
Qty: 120 TABLET | Refills: 8 | Status: SHIPPED | OUTPATIENT
Start: 2025-07-17

## 2025-07-17 NOTE — ASSESSMENT & PLAN NOTE
Patient states she is able to give a urine specimen in office today will collect  Orders:    Oxycodone/Oxymorphone urine; Future

## 2025-07-17 NOTE — ASSESSMENT & PLAN NOTE
Controlled.  Normal sinus rhythm today.  Blood pressure in the past has been slightly decreased today systolic 93/64.  This is actually improved for the patient previously I have evaluated her in the office with a systolic of 70 with no symptoms.  Patient is inquiring whether her twice daily XL Toprol can be reduced to once daily.  She previously was on 50 mg once daily and I reduced her dose to 25 mg twice daily.  Today we will change to 25 mg XL once daily.  I gave her precautions against atrial fibrillation if she has any breakthrough arrhythmias she is to resume twice daily XL 25 mg dosing but trial once a day Toprol to see if her blood pressure improves.  Orders:    metoprolol succinate (TOPROL-XL) 25 mg 24 hr tablet; Take 1 tablet (25 mg total) by mouth daily

## 2025-07-17 NOTE — PROGRESS NOTES
Name: Lisa Marques      : 1945      MRN: [id number]  Encounter Provider: Raz Dickens DO  Encounter Date: 2025   Encounter department: [facility] [place]  :  Assessment & Plan  Arthritis of right shoulder  X-ray previously obtained shows end-stage degenerative disease but patient inquiring about intra-articular steroid injection.  Will refer to orthopedics to see if this is appropriate for the patient.    Altered Tylenol ordered to include 1000 mg 3 times a day scheduled for baseline pain reduction  Orders:    Ambulatory Referral to Orthopedic Surgery; Future    acetaminophen (TYLENOL) 500 mg tablet; Take 2 tablets (1,000 mg total) by mouth 3 (three) times a day    Paroxysmal atrial fibrillation (HCC)  Controlled.  Normal sinus rhythm today.  Blood pressure in the past has been slightly decreased today systolic 93/64.  This is actually improved for the patient previously I have evaluated her in the office with a systolic of 70 with no symptoms.  Patient is inquiring whether her twice daily XL Toprol can be reduced to once daily.  She previously was on 50 mg once daily and I reduced her dose to 25 mg twice daily.  Today we will change to 25 mg XL once daily.  I gave her precautions against atrial fibrillation if she has any breakthrough arrhythmias she is to resume twice daily XL 25 mg dosing but trial once a day Toprol to see if her blood pressure improves.  Orders:    metoprolol succinate (TOPROL-XL) 25 mg 24 hr tablet; Take 1 tablet (25 mg total) by mouth daily    Primary insomnia  Positive response to mirtazapine.  Will continue and refill today.  Orders:    mirtazapine (REMERON) 7.5 MG tablet; Take 1 tablet (7.5 mg total) by mouth daily at bedtime    Opioid dependence with opioid-induced disorder (HCC)  Patient states she is able to give a urine specimen in office today will collect  Orders:    Oxycodone/Oxymorphone urine; Future           History of Present  "Illness   Patient is a pleasant 79-year-old female who I evaluated many times before in the office.  She is well-known to me and has a history of paroxysmal atrial fibrillation, chronic neck and back pain, shoulder pain, known aortic aneurysm not a candidate for surgical intervention, opioid dependence presented to the office today to inquire about her right shoulder pain.  She has previously had this image which shows end-stage degenerative changes in her joint space otherwise no fracture or dislocation.  She is inquiring whether she can go have steroid injections done for this.  She has been taking Tylenol but inconsistently.  She is not taking NSAIDs.  Will discuss optimizing regimen for Tylenol around-the-clock and will discuss orthopedics referral      Review of Systems   Constitutional:  Negative for fatigue, fever and unexpected weight change.   HENT:  Negative for congestion, sinus pressure, sinus pain and sore throat.    Eyes:  Negative for visual disturbance.   Respiratory:  Negative for cough, chest tightness, shortness of breath and wheezing.    Cardiovascular:  Negative for chest pain, palpitations and leg swelling.   Gastrointestinal:  Negative for abdominal pain, constipation, diarrhea, nausea and vomiting.   Endocrine: Negative for polydipsia, polyphagia and polyuria.   Genitourinary:  Negative for difficulty urinating, dysuria and urgency.   Musculoskeletal:  Negative for arthralgias and back pain.        Shoulder pain    Skin:  Negative for rash.   Neurological:  Negative for dizziness, weakness, light-headedness, numbness and headaches.   Psychiatric/Behavioral:  Negative for behavioral problems and confusion. The patient is not nervous/anxious.        Objective   BP 93/64   Pulse 73   Temp 98 °F (36.7 °C) (Temporal)   Resp 20   Ht 5' 3\" (1.6 m)   Wt 67.6 kg (149 lb)   SpO2 92%   BMI 26.39 kg/m²      Physical Exam  Constitutional:       General: She is not in acute distress.     Appearance: " Normal appearance. She is normal weight.   HENT:      Head: Normocephalic and atraumatic.      Right Ear: External ear normal.      Left Ear: External ear normal.     Eyes:      Conjunctiva/sclera: Conjunctivae normal.       Cardiovascular:      Rate and Rhythm: Normal rate and regular rhythm.      Pulses: Normal pulses.      Heart sounds: Normal heart sounds. No murmur heard.     No friction rub. No gallop.   Pulmonary:      Effort: Pulmonary effort is normal. No respiratory distress.      Breath sounds: Normal breath sounds. No stridor. No wheezing, rhonchi or rales.     Musculoskeletal:         General: Tenderness present.      Comments: R shoulder     Skin:     General: Skin is warm and dry.      Capillary Refill: Capillary refill takes less than 2 seconds.     Neurological:      Mental Status: She is alert and oriented to person, place, and time. Mental status is at baseline.     Psychiatric:         Mood and Affect: Mood normal.         Behavior: Behavior normal.         Thought Content: Thought content normal.         Judgment: Judgment normal.         Raz Dickens DO  PGY-3 [facility]   07/17/25, 4:20 PM

## 2025-07-17 NOTE — ASSESSMENT & PLAN NOTE
Positive response to mirtazapine.  Will continue and refill today.  Orders:    mirtazapine (REMERON) 7.5 MG tablet; Take 1 tablet (7.5 mg total) by mouth daily at bedtime

## 2025-07-22 LAB
OXYCODONE UR QL CFM: 393 NG/ML
OXYCODONE+OXYMORPHONE SERPLBLD QL SCN: POSITIVE
OXYCODONE+OXYMORPHONE UR QL SCN: NORMAL NG/ML
OXYCODONE+OXYMORPHONE UR QL SCN: POSITIVE
OXYMORPHONE UR CFM-MCNC: 1708 NG/ML
OXYMORPHONE UR QL CFM: POSITIVE

## 2025-07-28 DIAGNOSIS — G47.00 INSOMNIA, UNSPECIFIED TYPE: ICD-10-CM

## 2025-07-28 RX ORDER — ZOLPIDEM TARTRATE 5 MG/1
5 TABLET ORAL
Qty: 30 TABLET | Refills: 0 | Status: SHIPPED | OUTPATIENT
Start: 2025-07-29

## 2025-07-29 DIAGNOSIS — G89.4 PAIN SYNDROME, CHRONIC: ICD-10-CM

## 2025-07-29 DIAGNOSIS — I48.0 PAROXYSMAL ATRIAL FIBRILLATION (HCC): ICD-10-CM

## 2025-07-29 DIAGNOSIS — F51.01 PRIMARY INSOMNIA: ICD-10-CM

## 2025-07-29 DIAGNOSIS — M79.601 MUSCULOSKELETAL ARM PAIN, RIGHT: ICD-10-CM

## 2025-07-29 RX ORDER — OXYCODONE HYDROCHLORIDE 10 MG/1
10 TABLET ORAL EVERY 8 HOURS PRN
Qty: 90 TABLET | Refills: 0 | Status: CANCELLED | OUTPATIENT
Start: 2025-07-29

## 2025-07-30 DIAGNOSIS — G89.4 PAIN SYNDROME, CHRONIC: ICD-10-CM

## 2025-07-30 RX ORDER — MIRTAZAPINE 7.5 MG/1
7.5 TABLET, FILM COATED ORAL
Qty: 90 TABLET | Refills: 2 | Status: SHIPPED | OUTPATIENT
Start: 2025-07-30

## 2025-07-30 RX ORDER — GABAPENTIN 300 MG/1
300 CAPSULE ORAL 3 TIMES DAILY
Qty: 90 CAPSULE | Refills: 1 | Status: SHIPPED | OUTPATIENT
Start: 2025-07-30

## 2025-07-30 RX ORDER — OXYCODONE HYDROCHLORIDE 10 MG/1
10 TABLET ORAL EVERY 8 HOURS PRN
Qty: 90 TABLET | Refills: 0 | Status: CANCELLED | OUTPATIENT
Start: 2025-07-30

## 2025-07-30 RX ORDER — OXYCODONE HYDROCHLORIDE 10 MG/1
10 TABLET ORAL EVERY 8 HOURS PRN
Qty: 90 TABLET | Refills: 0 | Status: SHIPPED | OUTPATIENT
Start: 2025-07-30

## 2025-07-30 RX ORDER — METOPROLOL SUCCINATE 25 MG/1
25 TABLET, EXTENDED RELEASE ORAL DAILY
Qty: 90 TABLET | Refills: 2 | Status: SHIPPED | OUTPATIENT
Start: 2025-07-30

## 2025-07-31 ENCOUNTER — TELEPHONE (OUTPATIENT)
Dept: FAMILY MEDICINE CLINIC | Facility: CLINIC | Age: 80
End: 2025-07-31

## 2025-08-05 ENCOUNTER — OFFICE VISIT (OUTPATIENT)
Dept: OBGYN CLINIC | Facility: CLINIC | Age: 80
End: 2025-08-05
Payer: COMMERCIAL

## 2025-08-05 VITALS — HEIGHT: 68 IN | BODY MASS INDEX: 22.58 KG/M2 | WEIGHT: 149 LBS

## 2025-08-05 DIAGNOSIS — M19.011 ARTHRITIS OF RIGHT SHOULDER: ICD-10-CM

## 2025-08-05 DIAGNOSIS — M12.811 ROTATOR CUFF ARTHROPATHY, RIGHT: ICD-10-CM

## 2025-08-05 DIAGNOSIS — M19.011 OSTEOARTHRITIS OF GLENOHUMERAL JOINT, RIGHT: Primary | ICD-10-CM

## 2025-08-05 PROCEDURE — 99204 OFFICE O/P NEW MOD 45 MIN: CPT | Performed by: ORTHOPAEDIC SURGERY

## 2025-08-05 PROCEDURE — 20610 DRAIN/INJ JOINT/BURSA W/O US: CPT | Performed by: ORTHOPAEDIC SURGERY

## 2025-08-05 RX ORDER — LIDOCAINE HYDROCHLORIDE 10 MG/ML
3 INJECTION, SOLUTION INFILTRATION; PERINEURAL
Status: COMPLETED | OUTPATIENT
Start: 2025-08-05 | End: 2025-08-05

## 2025-08-05 RX ORDER — TRIAMCINOLONE ACETONIDE 40 MG/ML
80 INJECTION, SUSPENSION INTRA-ARTICULAR; INTRAMUSCULAR
Status: COMPLETED | OUTPATIENT
Start: 2025-08-05 | End: 2025-08-05

## 2025-08-05 RX ADMIN — LIDOCAINE HYDROCHLORIDE 3 ML: 10 INJECTION, SOLUTION INFILTRATION; PERINEURAL at 14:45

## 2025-08-05 RX ADMIN — TRIAMCINOLONE ACETONIDE 80 MG: 40 INJECTION, SUSPENSION INTRA-ARTICULAR; INTRAMUSCULAR at 14:45

## (undated) RX ORDER — HYDROXYZINE HYDROCHLORIDE 25 MG/1: 1 TABLET, FILM COATED ORAL